# Patient Record
Sex: MALE | Race: BLACK OR AFRICAN AMERICAN | NOT HISPANIC OR LATINO | Employment: OTHER | URBAN - METROPOLITAN AREA
[De-identification: names, ages, dates, MRNs, and addresses within clinical notes are randomized per-mention and may not be internally consistent; named-entity substitution may affect disease eponyms.]

---

## 2017-01-04 ENCOUNTER — HOSPITAL ENCOUNTER (OUTPATIENT)
Dept: RADIOLOGY | Facility: OTHER | Age: 72
Discharge: HOME | End: 2017-01-04
Attending: INTERNAL MEDICINE
Payer: MEDICARE

## 2017-01-04 ENCOUNTER — OFFICE VISIT (OUTPATIENT)
Dept: INTERNAL MEDICINE | Facility: CLINIC | Age: 72
End: 2017-01-04
Payer: MEDICARE

## 2017-01-04 ENCOUNTER — TELEPHONE (OUTPATIENT)
Dept: INTERNAL MEDICINE | Facility: CLINIC | Age: 72
End: 2017-01-04

## 2017-01-04 VITALS
OXYGEN SATURATION: 96 % | DIASTOLIC BLOOD PRESSURE: 82 MMHG | WEIGHT: 206 LBS | TEMPERATURE: 98 F | BODY MASS INDEX: 28.84 KG/M2 | HEIGHT: 71 IN | HEART RATE: 79 BPM | SYSTOLIC BLOOD PRESSURE: 132 MMHG

## 2017-01-04 DIAGNOSIS — R05.9 COUGH: ICD-10-CM

## 2017-01-04 DIAGNOSIS — J41.1 BRONCHITIS, MUCOPURULENT RECURRENT: Primary | ICD-10-CM

## 2017-01-04 PROCEDURE — 71020 XR CHEST PA AND LATERAL: CPT | Mod: TC

## 2017-01-04 PROCEDURE — 1160F RVW MEDS BY RX/DR IN RCRD: CPT | Mod: S$GLB,,, | Performed by: INTERNAL MEDICINE

## 2017-01-04 PROCEDURE — 3075F SYST BP GE 130 - 139MM HG: CPT | Mod: S$GLB,,, | Performed by: INTERNAL MEDICINE

## 2017-01-04 PROCEDURE — 1159F MED LIST DOCD IN RCRD: CPT | Mod: S$GLB,,, | Performed by: INTERNAL MEDICINE

## 2017-01-04 PROCEDURE — 1157F ADVNC CARE PLAN IN RCRD: CPT | Mod: S$GLB,,, | Performed by: INTERNAL MEDICINE

## 2017-01-04 PROCEDURE — 71020 XR CHEST PA AND LATERAL: CPT | Mod: 26,,, | Performed by: RADIOLOGY

## 2017-01-04 PROCEDURE — 90662 IIV NO PRSV INCREASED AG IM: CPT | Mod: S$GLB,,, | Performed by: INTERNAL MEDICINE

## 2017-01-04 PROCEDURE — 99499 UNLISTED E&M SERVICE: CPT | Mod: S$GLB,,, | Performed by: INTERNAL MEDICINE

## 2017-01-04 PROCEDURE — 1126F AMNT PAIN NOTED NONE PRSNT: CPT | Mod: S$GLB,,, | Performed by: INTERNAL MEDICINE

## 2017-01-04 PROCEDURE — G0008 ADMIN INFLUENZA VIRUS VAC: HCPCS | Mod: 59,S$GLB,, | Performed by: INTERNAL MEDICINE

## 2017-01-04 PROCEDURE — 99999 PR PBB SHADOW E&M-EST. PATIENT-LVL III: CPT | Mod: PBBFAC,,, | Performed by: INTERNAL MEDICINE

## 2017-01-04 PROCEDURE — 99213 OFFICE O/P EST LOW 20 MIN: CPT | Mod: 25,S$GLB,, | Performed by: INTERNAL MEDICINE

## 2017-01-04 PROCEDURE — 3079F DIAST BP 80-89 MM HG: CPT | Mod: S$GLB,,, | Performed by: INTERNAL MEDICINE

## 2017-01-04 RX ORDER — BENZONATATE 200 MG/1
200 CAPSULE ORAL 3 TIMES DAILY PRN
Qty: 30 CAPSULE | Refills: 0 | Status: SHIPPED | OUTPATIENT
Start: 2017-01-04 | End: 2017-02-03

## 2017-01-04 NOTE — MR AVS SNAPSHOT
[facility] Internal Medicine  [address]  [place] [address]  Phone: [phone]  Fax: [phone]                  Tito Menard   2017 11:00 AM   Office Visit    Description:  Male : 1945   Provider:  Hayden Franks MD   Department:  [facility] Internal Medicine           Reason for Visit     Cough     Sputum Production     Headache     Immunizations           Diagnoses this Visit        Comments    Bronchitis, mucopurulent recurrent    -  Primary     Cough                To Do List           Future Appointments        Provider Department Dept Phone    2017 11:30 AM [facility] XROP  [facility] [phone]    2017 8:40 AM Omar Casey MD [facility] Neurology [phone]      Goals (5 Years of Data)     None      Follow-Up and Disposition     Return if symptoms worsen or fail to improve.       These Medications        Disp Refills Start End    benzonatate (TESSALON) 200 MG capsule 30 capsule 0 2017 2/3/2017    Take 1 capsule (200 mg total) by mouth 3 (three) times daily as needed for Cough. - Oral    Pharmacy: [facility] [address] [place] [address] KELIN BEN Ph #: [phone]         [facility]     [facility] -  Assistance  Registered nurses in the [facility] provide clinical advisement, health education, appointment booking, and other advisory services.  Call for this free service at [phone].             Medications           Message regarding Medications     Verify the changes and/or additions to your medication regime listed below are the same as discussed with your clinician today.  If any of these changes or additions are incorrect, please notify your healthcare provider.        START taking these NEW medications        Refills    benzonatate (TESSALON) 200 MG capsule 0    Sig: Take 1 capsule (200 mg total) by mouth 3 (three) times daily as needed for Cough.    Class:  Normal    Route: Oral           Verify that the below list of medications is an accurate representation of the medications you are currently taking.  If none reported, the list may be blank. If incorrect, please contact your healthcare provider. Carry this list with you in case of emergency.           Current Medications     acetaminophen (TYLENOL) 500 mg Cap Take 1,000 mg by mouth nightly as needed (for back pain).     albuterol (PROVENTIL) 2.5 mg /3 mL (0.083 %) nebulizer solution Take 3 mLs (2.5 mg total) by nebulization every 6 (six) hours as needed for Wheezing.    albuterol 90 mcg/actuation inhaler Inhale 1-2 puffs into the lungs every 6 (six) hours as needed for Wheezing.    amlodipine (NORVASC) 5 MG tablet TAKE 1 TABLET EVERY DAY    aspirin (ECOTRIN) 81 MG EC tablet Take 81 mg by mouth once daily.    cholecalciferol, vitamin D3, (VITAMIN D3) 2,000 unit Cap Take 1 capsule by mouth once daily.    finasteride (PROSCAR) 5 mg tablet TAKE 1 TABLET ONE TIME DAILY    fish oil-omega-3 fatty acids 300-1,000 mg capsule Take 1 g by mouth once daily.    fluticasone (FLONASE) 50 mcg/actuation nasal spray USE 1 SPRAY NASALLY ONE TIME DAILY    gabapentin (NEURONTIN) 300 MG capsule TAKE 1 CAPSULE THREE TIMES DAILY    lisinopril-hydrochlorothiazide (PRINZIDE,ZESTORETIC) 20-12.5 mg per tablet TAKE 2 TABLETS ONE TIME DAILY    methocarbamol (ROBAXIN) 500 MG Tab 3 (three) times daily as needed.     omeprazole (PRILOSEC) 20 MG capsule TAKE 2 CAPSULES ONE TIME DAILY    potassium chloride (MICRO-K) 10 MEQ CpSR TAKE 1 CAPSULE ONE TIME DAILY    pravastatin (PRAVACHOL) 20 MG tablet TAKE 1 TABLET ONE TIME DAILY    tamsulosin (FLOMAX) 0.4 mg Cp24 Take 1 capsule (0.4 mg total) by mouth once daily.    amoxicillin-clavulanate 875-125mg (AUGMENTIN) 875-125 mg per tablet Take 1 tablet by mouth every 12 (twelve) hours.    azelastine (ASTELIN) 137 mcg nasal spray 1 spray (137 mcg total) by Nasal route 2 (two) times daily.    benzonatate  "(TESSALON) 200 MG capsule Take 1 capsule (200 mg total) by mouth 3 (three) times daily as needed for Cough.    docusate sodium (COLACE) 100 MG capsule Take 100 mg by mouth once daily.    econazole nitrate 1 % cream Apply topically 2 (two) times daily.    olopatadine (PATADAY) 0.2 % Drop Place 1 drop into both eyes once daily.    vardenafil (LEVITRA) 20 MG tablet Take 20 mg by mouth daily as needed.             Clinical Reference Information           Vital Signs - Last Recorded  Most recent update: 1/4/2017 11:08 AM by Addie Marks MA    BP Pulse Temp Ht Wt SpO2    132/82 79 98.2 °F (36.8 °C) (Oral) 5' 11" (1.803 m) 93.4 kg (206 lb) 96%    BMI                28.73 kg/m2          Blood Pressure          Most Recent Value    BP  132/82      Allergies as of 1/4/2017     No Known Allergies      Immunizations Administered on Date of Encounter - 1/4/2017     Name Date Dose VIS Date Route    Influenza - High Dose 1/4/2017 0.5 mL 8/7/2015 Intramuscular      Orders Placed During Today's Visit      Normal Orders This Visit    Ambulatory consult to Pulmonology     Influenza - High Dose (65+) (PF) (IM)     Future Labs/Procedures Expected by Expires    X-Ray Chest PA And Lateral  1/4/2017 1/4/2018      "

## 2017-01-04 NOTE — TELEPHONE ENCOUNTER
Spoke with pt advised per Dr. Franks cxr did not show anything concerning, Pt verbalized understanding

## 2017-01-04 NOTE — PROGRESS NOTES
Subjective:       Patient ID: Tito Menard is a 71 y.o. male.    Chief Complaint: Cough; Sputum Production; Headache (coughing); and Immunizations    HPI Comments: Pt c/o wheezing and some SOB with cough that is prod of thin clear sputum. No fever. Symptoms have been going on for 2 months. Minimal nasal with minimal rhinorrhea. No sore throat. Using flonase, afrin and alb neb with only some relief. He took augmentin at onset and never improved. No orthopnea/pnd. Review of record shows recurrent bronchitis and PFTs that were non-obstructive. He has never seen pulmonary and is not on any maint inhalers. No edema.     Cough   Associated symptoms include headaches. Pertinent negatives include no chest pain, fever, rhinorrhea, sore throat or shortness of breath.   Headache    Associated symptoms include coughing. Pertinent negatives include no fever, rhinorrhea or sore throat.     Review of Systems   Constitutional: Negative for fever.   HENT: Positive for congestion. Negative for rhinorrhea and sore throat.    Respiratory: Positive for cough. Negative for shortness of breath.    Cardiovascular: Negative for chest pain.   Neurological: Positive for headaches.       Objective:      Physical Exam   Constitutional: He is oriented to person, place, and time. He appears well-developed and well-nourished.   HENT:   Right Ear: Tympanic membrane, external ear and ear canal normal.   Left Ear: Tympanic membrane, external ear and ear canal normal.   Nose: No mucosal edema or rhinorrhea.   Mouth/Throat: No oropharyngeal exudate or posterior oropharyngeal erythema.   Neck: Neck supple. No thyromegaly present.   Cardiovascular: Normal rate, regular rhythm and normal heart sounds.    Pulmonary/Chest: Effort normal and breath sounds normal. He has no wheezes. He has no rales.   Lymphadenopathy:     He has no cervical adenopathy.   Neurological: He is alert and oriented to person, place, and time.   Psychiatric: He has a normal mood  and affect.       Assessment:       1. Bronchitis, mucopurulent recurrent    2. Cough        Plan:       1. No evidence for infection at this time or exacerbation of obstructive lung process--continue with flonase, alb neb   2. CXR  3. Pulmonary referral  4. May consider maintenance inhaled steroid

## 2017-02-27 DIAGNOSIS — E78.5 HLD (HYPERLIPIDEMIA): ICD-10-CM

## 2017-03-01 RX ORDER — PRAVASTATIN SODIUM 20 MG/1
TABLET ORAL
Qty: 90 TABLET | Refills: 3 | Status: SHIPPED | OUTPATIENT
Start: 2017-03-01 | End: 2017-12-21 | Stop reason: SDUPTHER

## 2017-04-03 RX ORDER — POTASSIUM CHLORIDE 750 MG/1
CAPSULE, EXTENDED RELEASE ORAL
Qty: 90 CAPSULE | Refills: 3 | Status: SHIPPED | OUTPATIENT
Start: 2017-04-03 | End: 2017-06-02 | Stop reason: SDUPTHER

## 2017-05-17 ENCOUNTER — PATIENT OUTREACH (OUTPATIENT)
Dept: ADMINISTRATIVE | Facility: HOSPITAL | Age: 72
End: 2017-05-17

## 2017-05-23 ENCOUNTER — OFFICE VISIT (OUTPATIENT)
Dept: NEUROLOGY | Facility: CLINIC | Age: 72
End: 2017-05-23
Payer: MEDICARE

## 2017-05-23 ENCOUNTER — OFFICE VISIT (OUTPATIENT)
Dept: INTERNAL MEDICINE | Facility: CLINIC | Age: 72
End: 2017-05-23
Payer: MEDICARE

## 2017-05-23 VITALS
SYSTOLIC BLOOD PRESSURE: 149 MMHG | BODY MASS INDEX: 29.2 KG/M2 | DIASTOLIC BLOOD PRESSURE: 89 MMHG | HEIGHT: 71 IN | HEART RATE: 81 BPM | WEIGHT: 208.56 LBS

## 2017-05-23 VITALS
HEIGHT: 70 IN | DIASTOLIC BLOOD PRESSURE: 80 MMHG | HEART RATE: 73 BPM | BODY MASS INDEX: 29.95 KG/M2 | RESPIRATION RATE: 18 BRPM | OXYGEN SATURATION: 96 % | SYSTOLIC BLOOD PRESSURE: 136 MMHG | WEIGHT: 209.19 LBS

## 2017-05-23 DIAGNOSIS — E78.5 HYPERLIPIDEMIA, UNSPECIFIED HYPERLIPIDEMIA TYPE: ICD-10-CM

## 2017-05-23 DIAGNOSIS — R26.9 GAIT DISORDER: Primary | ICD-10-CM

## 2017-05-23 DIAGNOSIS — M48.00 SPINAL STENOSIS, UNSPECIFIED SPINAL REGION: ICD-10-CM

## 2017-05-23 DIAGNOSIS — E11.42 DIABETIC POLYNEUROPATHY ASSOCIATED WITH TYPE 2 DIABETES MELLITUS: ICD-10-CM

## 2017-05-23 DIAGNOSIS — J44.89 OBSTRUCTIVE CHRONIC BRONCHITIS WITHOUT EXACERBATION: ICD-10-CM

## 2017-05-23 DIAGNOSIS — K21.9 GASTROESOPHAGEAL REFLUX DISEASE, ESOPHAGITIS PRESENCE NOT SPECIFIED: ICD-10-CM

## 2017-05-23 DIAGNOSIS — Z00.00 ENCOUNTER FOR PREVENTIVE HEALTH EXAMINATION: Primary | ICD-10-CM

## 2017-05-23 DIAGNOSIS — I72.4 ANEURYSM OF ARTERY OF LOWER EXTREMITY: ICD-10-CM

## 2017-05-23 DIAGNOSIS — I71.40 ABDOMINAL AORTIC ANEURYSM (AAA) WITHOUT RUPTURE: ICD-10-CM

## 2017-05-23 DIAGNOSIS — E55.9 VITAMIN D DEFICIENCY DISEASE: ICD-10-CM

## 2017-05-23 DIAGNOSIS — M85.80 OSTEOPENIA, UNSPECIFIED LOCATION: ICD-10-CM

## 2017-05-23 DIAGNOSIS — Z12.12 ENCOUNTER FOR COLORECTAL CANCER SCREENING: ICD-10-CM

## 2017-05-23 DIAGNOSIS — I10 ESSENTIAL HYPERTENSION: ICD-10-CM

## 2017-05-23 DIAGNOSIS — Z12.11 ENCOUNTER FOR COLORECTAL CANCER SCREENING: ICD-10-CM

## 2017-05-23 PROCEDURE — 3075F SYST BP GE 130 - 139MM HG: CPT | Mod: S$GLB,,, | Performed by: NURSE PRACTITIONER

## 2017-05-23 PROCEDURE — 1157F ADVNC CARE PLAN IN RCRD: CPT | Mod: 8P,S$GLB,, | Performed by: PSYCHIATRY & NEUROLOGY

## 2017-05-23 PROCEDURE — 3044F HG A1C LEVEL LT 7.0%: CPT | Mod: S$GLB,,, | Performed by: PSYCHIATRY & NEUROLOGY

## 2017-05-23 PROCEDURE — 3079F DIAST BP 80-89 MM HG: CPT | Mod: S$GLB,,, | Performed by: NURSE PRACTITIONER

## 2017-05-23 PROCEDURE — 1125F AMNT PAIN NOTED PAIN PRSNT: CPT | Mod: S$GLB,,, | Performed by: PSYCHIATRY & NEUROLOGY

## 2017-05-23 PROCEDURE — 99999 PR PBB SHADOW E&M-EST. PATIENT-LVL V: CPT | Mod: PBBFAC,,, | Performed by: NURSE PRACTITIONER

## 2017-05-23 PROCEDURE — 99999 PR PBB SHADOW E&M-EST. PATIENT-LVL III: CPT | Mod: PBBFAC,,, | Performed by: PSYCHIATRY & NEUROLOGY

## 2017-05-23 PROCEDURE — 4010F ACE/ARB THERAPY RXD/TAKEN: CPT | Mod: S$GLB,,, | Performed by: PSYCHIATRY & NEUROLOGY

## 2017-05-23 PROCEDURE — 1159F MED LIST DOCD IN RCRD: CPT | Mod: S$GLB,,, | Performed by: PSYCHIATRY & NEUROLOGY

## 2017-05-23 PROCEDURE — 99214 OFFICE O/P EST MOD 30 MIN: CPT | Mod: S$GLB,,, | Performed by: PSYCHIATRY & NEUROLOGY

## 2017-05-23 PROCEDURE — 1160F RVW MEDS BY RX/DR IN RCRD: CPT | Mod: S$GLB,,, | Performed by: PSYCHIATRY & NEUROLOGY

## 2017-05-23 PROCEDURE — 99499 UNLISTED E&M SERVICE: CPT | Mod: S$GLB,,, | Performed by: NURSE PRACTITIONER

## 2017-05-23 PROCEDURE — 3079F DIAST BP 80-89 MM HG: CPT | Mod: S$GLB,,, | Performed by: PSYCHIATRY & NEUROLOGY

## 2017-05-23 PROCEDURE — 99499 UNLISTED E&M SERVICE: CPT | Mod: S$GLB,,, | Performed by: PSYCHIATRY & NEUROLOGY

## 2017-05-23 PROCEDURE — G0439 PPPS, SUBSEQ VISIT: HCPCS | Mod: S$GLB,,, | Performed by: NURSE PRACTITIONER

## 2017-05-23 PROCEDURE — 3077F SYST BP >= 140 MM HG: CPT | Mod: S$GLB,,, | Performed by: PSYCHIATRY & NEUROLOGY

## 2017-05-23 RX ORDER — GUAIFENESIN 600 MG/1
1200 TABLET, EXTENDED RELEASE ORAL 2 TIMES DAILY PRN
COMMUNITY

## 2017-05-23 NOTE — PATIENT INSTRUCTIONS
Fall Prevention  Falls often occur due to slipping, tripping or losing your balance. Millions of people fall every year and injure themselves. Here are ways to reduce your risk of falling again.  · Think about your fall, was there anything that caused your fall that can be fixed, removed, or replaced?  · Make your home safe by keeping walkways clear of objects you may trip over.  · Use non-slip pads under rugs. Do not use area rugs or small throw rugs.  · Use non-slip mats in bathtubs and showers.  · Install handrails and lights on staircases.  · Do not walk in poorly lit areas.  · Do not stand on chairs or wobbly ladders.  · Use caution when reaching overhead or looking upward. This position can cause a loss of balance.  · Be sure your shoes fit properly, have non-slip bottoms and are in good condition.   · Wear shoes both inside and out. Avoid going barefoot or wearing slippers.  · Be cautious when going up and down stairs, curbs, and when walking on uneven sidewalks.  · If your balance is poor, consider using a cane or walker.  · If your fall was related to alcohol use, stop or limit alcohol intake.   · If your fall was related to use of sleeping medicines, talk to your doctor about this. You may need to reduce your dosage at bedtime if you awaken during the night to go to the bathroom.    · To reduce the need for nighttime bathroom trips:  ¨ Avoid drinking fluids for several hours before going to bed  ¨ Empty your bladder before going to bed  ¨ Men can keep a urinal at the bedside  · Stay as active as you can. Balance, flexibility, strength, and endurance all come from exercise. They all play a role in preventing falls. Ask your healthcare provider which types of activity are right for you.  · Get your vision checked on a regular basis.  · If you have pets, know where they are before you stand up or walk so you don't trip over them.  · Use night lights.  Date Last Reviewed: 11/5/2015  © 7075-5383 [employer]. [address] [place] [address], [place] [address]. All rights reserved. This information is not intended as a substitute for professional medical care. Always follow your healthcare professional's instructions.          Counseling and Referral of Other Preventative  (Italic type indicates deductible and co-insurance are waived)    Patient Name: Tito Menard  Today's Date: 5/23/2017      SERVICE LIMITATIONS RECOMMENDATION    Vaccines    · Pneumococcal (once after 65)    · Influenza (annually)    · Hepatitis B (if medium/high risk)    · Prevnar 13      Hepatitis B medium/high risk factors:       - End-stage renal disease       - Hemophiliacs who received Factor VII or         IX concentrates       - Clients of institutions for the mentally             retarded       - Persons who live in the same house as          a HepB carrier       - Homosexual men       - Illicit injectable drug abusers     Pneumococcal: Done, no repeat necessary     Influenza: N/A     Hepatitis B: N/A     Prevnar 13: Done, no repeat necessary    Prostate cancer screening (annually to age 75)     Prostate specific antigen (PSA) Shared decision making with Provider. Sometimes a co-pay may be required if the patient decides to have this test. The USPSTF no longer recommends prostate cancer screening routinely in medicine: not to follow    Colorectal cancer screening (to age 75)    · Fecal occult blood test (annual)  · Flexible sigmoidoscopy (5y)  · Screening colonoscopy (10y)  · Barium enema   Last done 8/2007, recommend to repeat every 10  years    Diabetes self-management training (no USPSTF recommendations)  Requires referral by treating physician for patient with diabetes or renal disease. 10 hours of initial DSMT sessions of no less than 30 minutes each in a continuous 12-month period. 2 hours of follow-up DSMT in subsequent years.  N/A    Glaucoma screening (no USPSTF recommendation)  Diabetes mellitus, family history     American, age 50 or over    American, age 65 or over  Last done 11/2016, recommend to repeat every 1  years    Medical nutrition therapy for diabetes or renal disease (no recommended schedule)  Requires referral by treating physician for patient with diabetes or renal disease or kidney transplant within the past 3 years.  Can be provided in same year as diabetes self-management training (DSMT), and CMS recommends medical nutrition therapy take place after DSMT. Up to 3 hours for initial year and 2 hours in subsequent years.  N/A    Cardiovascular screening blood tests (every 5 years)  · Fasting lipid panel  Order as a panel if possible  Last done 11/2016, recommend to repeat every 1  years    Diabetes screening tests (at least every 3 years, Medicare covers annually or at 6-month intervals for prediabetic patients)  · Fasting blood sugar (FBS) or glucose tolerance test (GTT)  Patient must be diagnosed with one of the following:       - Hypertension       - Dyslipidemia       - Obesity (BMI 30kg/m2)       - Previous elevated impaired FBS or GTT       ... or any two of the following:       - Overweight (BMI 25 but <30)       - Family history of diabetes       - Age 65 or older       - History of gestational diabetes or birth of baby weighing more than 9 pounds  N/A    Abdominal aortic aneurysm screening (once)  · Sonogram   Limited to patients who meet one of the following criteria:       - Men who are 65-75 years old and have smoked more than 100 cigarette in their lifetime       - Anyone with a family history of abdominal aortic aneurysm       - Anyone recommended for screening by the USPSTF  N/A    HIV screening (annually for increased risk patients)  · HIV-1 and HIV-2 by EIA, or DRAKE, rapid antibody test or oral mucosa transudate  Patients must be at increased risk for HIV infection per USPSTF guidelines or pregnant. Tests covered annually for patient at increased risk or as requested by the patient.  Pregnant patients may receive up to 3 tests during pregnancy.  Risks discussed, screening is not recommended    Smoking cessation counseling (up to 8 sessions per year)  Patients must be asymptomatic of tobacco-related conditions to receive as a preventative service.  Non-smoker    Subsequent annual wellness visit  At least 12 months since last AWV  Return in one year     The following information is provided to all patients.  This information is to help you find resources for any of the problems found today that may be affecting your health:                Living healthy guide: www.[facility].[place].[facility]      Understanding Diabetes: www.diabetes.org      Eating healthy: www.cdc.gov/healthyweight      [facility] home safety checklist: www.cdc.gov/steadi/patient.html      Agency on Aging: www.goea.[place].[facility]      Alcoholics anonymous (AA): www.aa.org      Physical Activity: www.geraldine.nih.gov/ox6eoii      Tobacco use: www.quitwithusla.org

## 2017-05-23 NOTE — PROGRESS NOTES
"Tito Menard presented for a  Medicare AWV and comprehensive Health Risk Assessment today. The following components were reviewed and updated:    · Medical history  · Family History  · Social history  · Allergies and Current Medications  · Health Risk Assessment  · Health Maintenance  · Care Team     ** See Completed Assessments for Annual Wellness Visit within the encounter summary.**       The following assessments were completed:  · Living Situation  · CAGE  · Depression Screening  · Timed Get Up and Go  · Whisper Test  · Cognitive Function Screening  · Nutrition Screening  · ADL Screening  · PAQ Screening            Vitals:    05/23/17 0941   BP: 136/80   BP Location: Left arm   Patient Position: Sitting   BP Method: Manual   Pulse: 73   Resp: 18   SpO2: 96%   Weight: 94.9 kg (209 lb 3.5 oz)   Height: 5' 10" (1.778 m)     Body mass index is 30.02 kg/m².  Physical Exam   Constitutional: He is oriented to person, place, and time. He appears well-developed and well-nourished. No distress.   HENT:   Head: Normocephalic.   Right Ear: External ear normal.   Left Ear: External ear normal.   Nose: Nose normal.   Eyes: Conjunctivae are normal. No scleral icterus.   Neck: Normal range of motion. Neck supple.   Cardiovascular: Normal rate, regular rhythm, normal heart sounds and intact distal pulses.  Exam reveals no gallop and no friction rub.    No murmur heard.  Pulmonary/Chest: Effort normal and breath sounds normal. No respiratory distress. He has no wheezes. He has no rales. He exhibits no tenderness.   Abdominal: Soft. Bowel sounds are normal.   Musculoskeletal: Normal range of motion. He exhibits no edema.   Neurological: He is alert and oriented to person, place, and time.   Skin: Skin is warm and dry. He is not diaphoretic. No erythema.   Psychiatric: He has a normal mood and affect. His behavior is normal. Judgment and thought content normal.   Vitals reviewed.        Diagnoses and health risks identified " today and associated recommendations/orders:    1. Diabetic polyneuropathy associated with type 2 diabetes mellitus  Chronic.  DM stable and controlled.  Last Hemoglobin A1C was 5.8% from 11/2016. Treated with diet and exercise. Neuropathy is stable and controlled.  Treated with gabapentin.  Encouraged to increase exercise as tolerated to at least 2 hours and 30 minutes of moderate-intensity aerobic activity per week and  2 days per week of muscle-strengthening activities and to continue diabetic diet.  Encouraged to continue treatment plan.  Monitored by PCP.      2. Abdominal aortic aneurysm (AAA) without rupture  Noted on CT Chest from 11/2016.  Chronic.  Stable.  Monitored by Vascular Surgery .  Recommended CT repeat around 12/2017.      3. Obstructive chronic bronchitis without exacerbation  Chronic.  Stable and controlled.  Treated with albuterol inhaler as needed, albuterol nebulizer as needed.  Encouraged to continue treatment plan.  Monitored by PCP.      4. Encounter for preventive health examination  Annual Health Risk Assessment (HRA) visit today.  Counseling and referral of health maintenance and preventative health measures performed.  Patient given annual wellness paperwork to take home.  Encouraged to return in 1 year for subsequent HRA visit.   Zoster: Financial concerns.  Encouraged to contact insurance regarding immunization co-pay at retail pharmacies.      5. Encounter for colorectal cancer screening  - Case request GI: COLONOSCOPY    6. Essential hypertension  Chronic.  Controlled.  Treated with amlodipine and lisinopril-HCTZ.  Encouraged to increase exercise as tolerated to at least 2 hours and 30 minutes of moderate-intensity aerobic activity per week and  2 days per week of muscle-strengthening activities and to improve diet to heart healthy, low sodium diet.  Education provided.  Encouraged to continue treatment plan.  Monitored by PCP.     7. Hyperlipidemia, unspecified hyperlipidemia  type  Chronic.  Stable and controlled.  Treated with pravastatin.  Encouraged patient to continue treatment plan. Monitored by PCP.      8. Gastroesophageal reflux disease, esophagitis presence not specified  Chronic.  Stable and controlled.  Treated with omeprazole.  Encouraged to continue treatment plan. Monitored by PCP.      9. Vitamin D deficiency disease  Chronic.  Stable and controlled.  Treated with vitamin D supplementation.  Last Vitamin D level = 48 from 11/2016.  Encouraged to continue treatment plan.  Monitored by PCP.      10. Osteopenia, unspecified location  Noted on DXA bone density scan from 10/2012.  Chronic.  Stable.  Treated withvitamin D supplementation.  Encouraged to continue treatment plan. Monitored by PCP.     Provided Tito with a 5-10 year written screening schedule and personal prevention plan. Recommendations were developed using the USPSTF age appropriate recommendations. Education, counseling, and referrals were provided as needed. After Visit Summary printed and given to patient which includes a list of additional screenings\tests needed.    Return in about 1 year (around 5/23/2018) for your next Health Risk Assessment visit.    Wendy Greer NP

## 2017-05-23 NOTE — PROGRESS NOTES
Subjective:       Patient ID: Tito Menard is a 71 y.o. male.    Chief Complaint:  Peripheral Neuropathy and Back Pain      History of Present Illness  HPI  This is a 71-year-old -American male who had been seen by me with complaints of a gait disturbance that he has had since 2014 at which time he had intermittent problems usually noted when walking.  Symptoms are more pronounced when the light was low or if he has his eyes closed.  Symptoms have been more pronounced over the past one year.  The patient has had a history of diabetes for about 20 years and reports that blood sugar control was not very good until about 3 years ago when he got an a strict diet and lost weight and his blood sugars have been doing well and he was able to get off his medications.  His most recent hemoglobin A1c done in November 2016 was 5.9.  His spouse was present today.    He also has history of chronic neck and back problems and was seen at the spine clinic in the past.  His primary problem is that of neck pain and the MRI scan of the cervical spine done showed evidence of degenerative changes.  He has documented myelopathy secondary to the cervical spondylosis and was advised getting a surgical opinion however he declined getting any surgery.  He reports that he has had 2 back surgeries and continues to have back problems.  He has had numbness and tingling in feet for many years but denies any weakness however he does have difficulty climbing stairs and has to pull himself up.  He uses a cane for stability.  He has had no recent falls.  He denies any bladder difficulties.  He has not history of headaches, visual difficulties, blackouts or seizures and no prior history of strokes.  An MRI scan of the brain done after his last visit was unremarkable.  He did not want to go back to the back and spine clinic but is trying to get an exercise stationary bike for home.       Review of Systems  Review of Systems   Constitutional:  Negative.    HENT: Negative.  Negative for hearing loss.    Eyes: Negative.  Negative for visual disturbance.   Respiratory: Negative.  Negative for shortness of breath.    Cardiovascular: Negative.  Negative for chest pain and palpitations.   Gastrointestinal: Negative.    Endocrine: Negative.    Genitourinary: Negative.    Musculoskeletal: Positive for arthralgias, back pain, gait problem, neck pain and neck stiffness.   Skin: Negative.    Allergic/Immunologic: Negative.    Neurological: Positive for numbness. Negative for dizziness, tremors, seizures, syncope, speech difficulty, weakness and headaches.   Hematological: Negative.    Psychiatric/Behavioral: Negative.  Negative for decreased concentration and sleep disturbance.       Objective:      Neurologic Exam     Mental Status   Oriented to person, place, and time.   Registration: recalls 3 of 3 objects. Follows 3 step commands.   Attention: normal. Concentration: normal.   Speech: speech is normal   Level of consciousness: alert  Knowledge: good.   Able to name object. Able to read. Able to repeat. Able to write. Normal comprehension.     Cranial Nerves   Cranial nerves II through XII intact.     Motor Exam   Muscle bulk: normal  Overall muscle tone: normal    Strength   Strength 5/5 except as noted. Some difficulty with heel and toe walking.  Minimal weakness of the left  as compared to the right     Sensory Exam   Right arm light touch: normal  Left arm light touch: normal  Right leg light touch: decreased from toes  Left leg light touch: decreased from toes  Right arm vibration: normal  Left arm vibration: normal  Right leg vibration: decreased from ankle (Vibration sense less than 5 seconds at the toes)  Left leg vibration: decreased from ankle  Right arm proprioception: normal  Left arm proprioception: normal  Right leg proprioception: decreased from toes  Left leg proprioception: decreased from toes  Right arm pinprick: normal  Left arm pinprick:  normal  Right leg pinprick: decreased from ankle  Left leg pinprick: decreased from ankle    Gait, Coordination, and Reflexes     Gait  Gait: normal    Coordination   Romberg: negative  Finger to nose coordination: normal    Tremor   Resting tremor: absent  Intention tremor: absent  Action tremor: absent    Reflexes   Right brachioradialis: 2+  Left brachioradialis: 2+  Right biceps: 2+  Left biceps: 2+  Right triceps: 2+  Left triceps: 2+  Right patellar: 2+  Left patellar: 2+  Right achilles: 1+  Left achilles: 1+  Right plantar: normal  Left plantar: normal      Physical Exam   Constitutional: He is oriented to person, place, and time. He appears well-developed and well-nourished.   HENT:   Head: Normocephalic and atraumatic.   Neck: Neck supple. Muscular tenderness (Bilateral paraspinal muscle tenderness with some limitation of movement rotational and extension secondary to pain) present. Carotid bruit is not present.   Neurological: He is oriented to person, place, and time. He has a normal Finger-Nose-Finger Test and a normal Romberg Test. Gait normal.   Reflex Scores:       Tricep reflexes are 2+ on the right side and 2+ on the left side.       Bicep reflexes are 2+ on the right side and 2+ on the left side.       Brachioradialis reflexes are 2+ on the right side and 2+ on the left side.       Patellar reflexes are 2+ on the right side and 2+ on the left side.       Achilles reflexes are 1+ on the right side and 1+ on the left side.  Psychiatric: His speech is normal.   Vitals reviewed.        Assessment:        1. Gait disorder    2. Spinal stenosis, unspecified spinal region    3. Diabetic polyneuropathy associated with type 2 diabetes mellitus            Plan:         Discussed with patient regarding his gait difficulty.  Clinically there is evidence of cervical myelopathy with hyperreflexia that has been documented at spine clinic in the past, a peripheral neuropathy predominantly sensory most likely  related to the history of diabetes and chronic back problems with status post surgeries.  All these would result in his gait disturbance.  The patient does not want to go back to the back and spine clinic or see neurosurgery.  He is amenable to initiating physical therapy which will be ordered.  In addition he is advised use of a back brace.  Discussed fall precautions.  Follow-up in 6 months if stable.

## 2017-05-23 NOTE — PATIENT INSTRUCTIONS
Discussed with patient regarding his gait difficulty.  Clinically there is evidence of cervical myelopathy with hyperreflexia that has been documented at spine clinic in the past, a peripheral neuropathy predominantly sensory most likely related to the history of diabetes and chronic back problems with status post surgeries.  All these would result in his gait disturbance.  The patient does not want to go back to the back and spine clinic or see neurosurgery.  He is amenable to initiating physical therapy which will be ordered.  In addition he is advised use of a back brace.  Discussed fall precautions.

## 2017-06-01 ENCOUNTER — OFFICE VISIT (OUTPATIENT)
Dept: INTERNAL MEDICINE | Facility: CLINIC | Age: 72
End: 2017-06-01
Attending: INTERNAL MEDICINE
Payer: MEDICARE

## 2017-06-01 ENCOUNTER — HOSPITAL ENCOUNTER (OUTPATIENT)
Dept: RADIOLOGY | Facility: OTHER | Age: 72
Discharge: HOME | End: 2017-06-01
Attending: INTERNAL MEDICINE
Payer: MEDICARE

## 2017-06-01 VITALS
DIASTOLIC BLOOD PRESSURE: 78 MMHG | WEIGHT: 212.94 LBS | HEART RATE: 76 BPM | SYSTOLIC BLOOD PRESSURE: 120 MMHG | HEIGHT: 70 IN | OXYGEN SATURATION: 97 % | BODY MASS INDEX: 30.49 KG/M2

## 2017-06-01 DIAGNOSIS — Z23 NEED FOR SHINGLES VACCINE: ICD-10-CM

## 2017-06-01 DIAGNOSIS — E11.42 DIABETIC POLYNEUROPATHY ASSOCIATED WITH TYPE 2 DIABETES MELLITUS: Primary | ICD-10-CM

## 2017-06-01 DIAGNOSIS — M89.9 DISORDER OF BONE: ICD-10-CM

## 2017-06-01 DIAGNOSIS — E78.2 MIXED HYPERLIPIDEMIA: ICD-10-CM

## 2017-06-01 DIAGNOSIS — M85.80 OSTEOPENIA, UNSPECIFIED LOCATION: ICD-10-CM

## 2017-06-01 DIAGNOSIS — Z12.12 SCREENING FOR COLORECTAL CANCER: ICD-10-CM

## 2017-06-01 DIAGNOSIS — R26.9 GAIT DISORDER: ICD-10-CM

## 2017-06-01 DIAGNOSIS — M48.02 SPINAL STENOSIS OF CERVICAL REGION: ICD-10-CM

## 2017-06-01 DIAGNOSIS — I71.40 ABDOMINAL AORTIC ANEURYSM (AAA) WITHOUT RUPTURE: ICD-10-CM

## 2017-06-01 DIAGNOSIS — Z12.11 SCREENING FOR COLORECTAL CANCER: ICD-10-CM

## 2017-06-01 DIAGNOSIS — E55.9 VITAMIN D DEFICIENCY DISEASE: ICD-10-CM

## 2017-06-01 DIAGNOSIS — R91.8 PULMONARY NODULES: ICD-10-CM

## 2017-06-01 DIAGNOSIS — I10 ESSENTIAL HYPERTENSION: ICD-10-CM

## 2017-06-01 PROCEDURE — 1159F MED LIST DOCD IN RCRD: CPT | Mod: S$GLB,,, | Performed by: INTERNAL MEDICINE

## 2017-06-01 PROCEDURE — 4010F ACE/ARB THERAPY RXD/TAKEN: CPT | Mod: S$GLB,,, | Performed by: INTERNAL MEDICINE

## 2017-06-01 PROCEDURE — 3044F HG A1C LEVEL LT 7.0%: CPT | Mod: S$GLB,,, | Performed by: INTERNAL MEDICINE

## 2017-06-01 PROCEDURE — 99499 UNLISTED E&M SERVICE: CPT | Mod: S$GLB,,, | Performed by: INTERNAL MEDICINE

## 2017-06-01 PROCEDURE — 99214 OFFICE O/P EST MOD 30 MIN: CPT | Mod: S$GLB,,, | Performed by: INTERNAL MEDICINE

## 2017-06-01 PROCEDURE — 99999 PR PBB SHADOW E&M-EST. PATIENT-LVL IV: CPT | Mod: PBBFAC,,, | Performed by: INTERNAL MEDICINE

## 2017-06-01 PROCEDURE — 1125F AMNT PAIN NOTED PAIN PRSNT: CPT | Mod: S$GLB,,, | Performed by: INTERNAL MEDICINE

## 2017-06-01 NOTE — PROGRESS NOTES
Subjective:       Patient ID: Tito Menard is a 71 y.o. male.    Chief Complaint: Hypertension (6 month f/u)     Tito Menard is a 71 y.o.  male who presents for Hypertension (6 month f/u)  .  Patient Active Problem List   Diagnosis    Hypertension    Hyperlipidemia    Osteopenia    GERD (gastroesophageal reflux disease)    Spinal stenosis    Vitamin D deficiency disease    Obstructive chronic bronchitis without exacerbation    Diabetic polyneuropathy associated with type 2 diabetes mellitus    Abdominal aortic aneurysm (AAA) without rupture    Spondylosis of cervical joint with myelopathy    Gait disorder      Tito Menard is a 71 y.o.  male who presents for Hypertension (6 month f/u)  Will be starting PT for his back pain at [facility] next week.          Hypertension   This is a chronic problem. The current episode started more than 1 year ago. The problem is unchanged. The problem is controlled. Pertinent negatives include no chest pain, orthopnea, palpitations, peripheral edema or shortness of breath. Risk factors for coronary artery disease include obesity, male gender, diabetes mellitus and dyslipidemia. Past treatments include diuretics and ACE inhibitors.   Diabetes   He presents for his follow-up diabetic visit. He has type 2 diabetes mellitus. His disease course has been stable. Pertinent negatives for hypoglycemia include no seizures. There are no diabetic associated symptoms. Pertinent negatives for diabetes include no chest pain, no polydipsia, no polyphagia and no polyuria. Risk factors for coronary artery disease include obesity, male sex, hypertension, dyslipidemia, diabetes mellitus and sedentary lifestyle. Current diabetic treatment includes diet. He is compliant with treatment all of the time. He is following a diabetic diet. An ACE inhibitor/angiotensin II receptor blocker is being taken.     Health Maintenance       Date Due Completion Date    Zoster Vaccine 12/05/2004  ---    Hemoglobin A1c 05/01/2017 11/1/2016    Override on 10/23/2015: Done    Colonoscopy 08/01/2017 8/1/2007 (Done)    Override on 8/1/2007: Done    Influenza Vaccine 08/01/2017 1/4/2017    Override on 10/23/2015: Done    Override on 10/1/2014: Done (per pt)    Override on 11/7/2013: Done    Lipid Panel 11/01/2017 11/1/2016    Foot Exam 11/04/2017 11/4/2016    Override on 4/28/2016: Done    Override on 5/15/2015: Done    Eye Exam 11/10/2017 11/10/2016    Override on 8/5/2015: Done    TETANUS VACCINE 07/22/2021 7/22/2011          Review of Systems   Constitutional: Negative for chills and fever.   HENT: Negative for rhinorrhea and sore throat.    Respiratory: Negative for cough and shortness of breath.    Cardiovascular: Negative for chest pain, palpitations and orthopnea.   Gastrointestinal: Negative for nausea and vomiting.   Endocrine: Negative for polydipsia, polyphagia and polyuria.   Genitourinary: Negative for dysuria and hematuria.   Musculoskeletal: Positive for gait problem. Negative for arthralgias.   Skin: Negative for color change and rash.   Neurological: Negative for seizures and syncope.   Psychiatric/Behavioral: Negative for agitation and dysphoric mood.         Past Medical History:   Diagnosis Date    Allergy     Aneurysm of artery of lower extremity     Chalazion of left eye     Diabetes mellitus type II     Enlarged aorta 8/2/2016    Noted on pharmacological stress echo 2/28/2014.      GERD (gastroesophageal reflux disease)     egd 2008    Hyperlipidemia     Hypertension     MGD (meibomian gland dysfunction)     Osteopenia     Spinal stenosis     Spondylosis without myelopathy 10/23/2015    Vitamin D deficiency disease        Past Surgical History:   Procedure Laterality Date    CHALAZION EXCISION Left 8/18/13    CYST REMOVAL  2013    Back of neck    SPINE SURGERY  2007    x2 (2000, 2007)       Family History   Problem Relation Age of Onset    Hyperlipidemia Mother      "Hypertension Mother     Kidney disease Mother     Cancer Mother     No Known Problems Daughter     No Known Problems Sister     No Known Problems Brother     No Known Problems Son     No Known Problems Brother     No Known Problems Son     Hypertension Maternal Grandmother     Amblyopia Neg Hx     Blindness Neg Hx     Cataracts Neg Hx     Diabetes Neg Hx     Glaucoma Neg Hx     Macular degeneration Neg Hx     Retinal detachment Neg Hx     Strabismus Neg Hx     Stroke Neg Hx     Thyroid disease Neg Hx     Melanoma Neg Hx     Psoriasis Neg Hx     Lupus Neg Hx     Eczema Neg Hx        Social History   Substance Use Topics    Smoking status: Never Smoker    Smokeless tobacco: Former User     Types: Chew      Comment: Chewed tobacco once per day for 4-5 years when a     Alcohol use No             Objective:   Blood pressure 120/78, pulse 76, height 5' 10" (1.778 m), weight 96.6 kg (212 lb 15.4 oz), SpO2 97 %.     Physical Exam   Constitutional: He is oriented to person, place, and time. He appears well-developed and well-nourished. No distress.   HENT:   Head: Normocephalic and atraumatic.   Right Ear: External ear normal.   Left Ear: External ear normal.   Eyes: Conjunctivae are normal. No scleral icterus.   Neck: No JVD present. No thyromegaly present.   Cardiovascular: Normal heart sounds.  Exam reveals no gallop and no friction rub.    No murmur heard.  Pulmonary/Chest: Effort normal and breath sounds normal. He has no wheezes. He has no rales.   Abdominal: Soft. Bowel sounds are normal. He exhibits no distension. There is no tenderness.   Musculoskeletal: He exhibits no edema or tenderness.   Lymphadenopathy:     He has no cervical adenopathy.   Neurological: He is alert and oriented to person, place, and time.   Skin: Skin is warm and dry.   Psychiatric: He has a normal mood and affect. Thought content normal.       Prior labs reviewed  Assessment/Plan:        Tito was seen " today for hypertension.    Diagnoses and all orders for this visit:    Diabetic polyneuropathy associated with type 2 diabetes mellitus  Cont diabetic diet and regular exercise  Recommend Annual eye exam and foot exams  Daily 81 mg enteric coated ASA, ACE/ARB     Pulmonary nodules  Repeat scanning planned for follow up of AAA    Screening for colorectal cancer  -     Ambulatory referral to Gastroenterology    Abdominal aortic aneurysm (AAA) without rupture  Sable, repeat scanning planned by vascular med  Spinal stenosis of cervical region    Vitamin D deficiency disease    Essential hypertension  Well controlled  Cont current BP medication(s) and low salt diet    Gait disorder  Starting PT   No new issues    Need for shingles vaccine  -     zoster vaccine live, PF, (ZOSTAVAX, PF,) 19,400 unit/0.65 mL injection; Inject 19,400 Units into the skin once.    Other orders  -     Cancel: Hemoglobin A1c; Future

## 2017-06-02 ENCOUNTER — LAB VISIT (OUTPATIENT)
Dept: LAB | Facility: HOSPITAL | Age: 72
End: 2017-06-02
Attending: INTERNAL MEDICINE
Payer: MEDICARE

## 2017-06-02 ENCOUNTER — OFFICE VISIT (OUTPATIENT)
Dept: UROLOGY | Facility: CLINIC | Age: 72
End: 2017-06-02
Payer: MEDICARE

## 2017-06-02 ENCOUNTER — TELEPHONE (OUTPATIENT)
Dept: INTERNAL MEDICINE | Facility: CLINIC | Age: 72
End: 2017-06-02

## 2017-06-02 VITALS
DIASTOLIC BLOOD PRESSURE: 78 MMHG | BODY MASS INDEX: 29.63 KG/M2 | HEIGHT: 71 IN | WEIGHT: 211.63 LBS | SYSTOLIC BLOOD PRESSURE: 124 MMHG | HEART RATE: 66 BPM

## 2017-06-02 DIAGNOSIS — N40.1 BPH W URINARY OBS/LUTS: ICD-10-CM

## 2017-06-02 DIAGNOSIS — E55.9 VITAMIN D DEFICIENCY DISEASE: ICD-10-CM

## 2017-06-02 DIAGNOSIS — E87.6 HYPOKALEMIA: Primary | ICD-10-CM

## 2017-06-02 DIAGNOSIS — R31.29 MICROSCOPIC HEMATURIA: Primary | ICD-10-CM

## 2017-06-02 DIAGNOSIS — E78.2 MIXED HYPERLIPIDEMIA: ICD-10-CM

## 2017-06-02 DIAGNOSIS — E11.42 DIABETIC POLYNEUROPATHY ASSOCIATED WITH TYPE 2 DIABETES MELLITUS: ICD-10-CM

## 2017-06-02 DIAGNOSIS — N13.8 BPH W URINARY OBS/LUTS: ICD-10-CM

## 2017-06-02 LAB
25(OH)D3+25(OH)D2 SERPL-MCNC: 57 NG/ML
ALBUMIN SERPL BCP-MCNC: 3.5 G/DL
ALP SERPL-CCNC: 75 U/L
ALT SERPL W/O P-5'-P-CCNC: 20 U/L
ANION GAP SERPL CALC-SCNC: 8 MMOL/L
AST SERPL-CCNC: 23 U/L
BILIRUB SERPL-MCNC: 0.5 MG/DL
BUN SERPL-MCNC: 15 MG/DL
CALCIUM SERPL-MCNC: 8.9 MG/DL
CHLORIDE SERPL-SCNC: 103 MMOL/L
CHOLEST/HDLC SERPL: 3.9 {RATIO}
CO2 SERPL-SCNC: 29 MMOL/L
CREAT SERPL-MCNC: 1 MG/DL
EST. GFR  (AFRICAN AMERICAN): >60 ML/MIN/1.73 M^2
EST. GFR  (NON AFRICAN AMERICAN): >60 ML/MIN/1.73 M^2
ESTIMATED AVG GLUCOSE: 117 MG/DL
GLUCOSE SERPL-MCNC: 104 MG/DL
HBA1C MFR BLD HPLC: 5.7 %
HDL/CHOLESTEROL RATIO: 25.9 %
HDLC SERPL-MCNC: 170 MG/DL
HDLC SERPL-MCNC: 44 MG/DL
LDLC SERPL CALC-MCNC: 106.8 MG/DL
NONHDLC SERPL-MCNC: 126 MG/DL
POTASSIUM SERPL-SCNC: 3.3 MMOL/L
PROT SERPL-MCNC: 7.1 G/DL
SODIUM SERPL-SCNC: 140 MMOL/L
TRIGL SERPL-MCNC: 96 MG/DL

## 2017-06-02 PROCEDURE — 1159F MED LIST DOCD IN RCRD: CPT | Mod: S$GLB,,, | Performed by: UROLOGY

## 2017-06-02 PROCEDURE — 99999 PR PBB SHADOW E&M-EST. PATIENT-LVL IV: CPT | Mod: PBBFAC,,, | Performed by: UROLOGY

## 2017-06-02 PROCEDURE — 1125F AMNT PAIN NOTED PAIN PRSNT: CPT | Mod: S$GLB,,, | Performed by: UROLOGY

## 2017-06-02 PROCEDURE — 99214 OFFICE O/P EST MOD 30 MIN: CPT | Mod: 25,S$GLB,, | Performed by: UROLOGY

## 2017-06-02 PROCEDURE — 81002 URINALYSIS NONAUTO W/O SCOPE: CPT | Mod: S$GLB,,, | Performed by: UROLOGY

## 2017-06-02 RX ORDER — LIDOCAINE HYDROCHLORIDE 20 MG/ML
JELLY TOPICAL ONCE
Status: CANCELLED | OUTPATIENT
Start: 2017-06-02 | End: 2017-06-02

## 2017-06-02 RX ORDER — FINASTERIDE 5 MG/1
TABLET, FILM COATED ORAL
Qty: 90 TABLET | Refills: 3 | Status: SHIPPED | OUTPATIENT
Start: 2017-06-02 | End: 2018-02-27 | Stop reason: SDUPTHER

## 2017-06-02 RX ORDER — CIPROFLOXACIN 250 MG/1
500 TABLET, FILM COATED ORAL ONCE
Status: CANCELLED | OUTPATIENT
Start: 2017-06-02 | End: 2017-06-02

## 2017-06-02 RX ORDER — POTASSIUM CHLORIDE 750 MG/1
20 CAPSULE, EXTENDED RELEASE ORAL DAILY
Qty: 180 CAPSULE | Refills: 0 | Status: SHIPPED | OUTPATIENT
Start: 2017-06-02 | End: 2017-08-07 | Stop reason: SDUPTHER

## 2017-06-02 RX ORDER — TAMSULOSIN HYDROCHLORIDE 0.4 MG/1
1 CAPSULE ORAL DAILY
Qty: 90 CAPSULE | Refills: 3 | Status: SHIPPED | OUTPATIENT
Start: 2017-06-02 | End: 2018-02-27 | Stop reason: SDUPTHER

## 2017-06-02 NOTE — TELEPHONE ENCOUNTER
Please call pt and inquire if he is still taking potassium supplement as ordered. If not then rec resume this and let pcp know. If he is taking this then let pcp know as dose will need to be increased.

## 2017-06-02 NOTE — PROGRESS NOTES
CHIEF COMPLAINT:    Mr. Menard is a 71 y.o. male presenting for a follow up on lower urinary tract symptoms, epididymal cysts.    PRESENTING ILLNESS:    Tito Menard is a 71 y.o. male who returns for follow up.  Since he was last seen, he has been taking care of his wife as she had to have emergency heart surgery for an aortic valve complication.  She then was diagnosed with breast cancer.  In the midst of taking care of her, he states that he has not been able to attend to his own issues.   He is being evaluated for osteopenia as he has a curvature of his spine and he has to walk with a cane.  He is also scheduled to so physical therapy.      He states that the epididymal cysts may be getting bigger but they are not interfering with his activities.  He continues to have sweating, which is worse at night so he places his pajamas in the thigh creases.  He used to use Gold Bond powder but stopped when he was prescribed a cream by his dermatologist.      Today, he was found to have microscopic hematuria.  He denies a history of aniline dye or solvent exposure, he is a non smoker, has no history of chemotherapy or radiation therapy.  He continues to take tamsulosin and finasteride and tolerates them both well. He states that they control his urinary symptoms and he has no complaints.      REVIEW OF SYSTEMS:    Review of Systems   Constitutional: Negative.    HENT: Negative.    Eyes: Negative.    Respiratory: Negative.    Cardiovascular:        Deconditioned.  Cannot walk as far as he used to   Gastrointestinal: Negative.    Genitourinary: Negative.    Musculoskeletal: Positive for back pain.        Cannot stand straight   Skin: Negative.    Neurological: Negative.    Endo/Heme/Allergies: Negative.    Psychiatric/Behavioral: Negative.      PATIENT HISTORY:    Past Medical History:   Diagnosis Date    Allergy     Aneurysm of artery of lower extremity     Chalazion of left eye     Diabetes mellitus type II      Enlarged aorta 8/2/2016    Noted on pharmacological stress echo 2/28/2014.      GERD (gastroesophageal reflux disease)     egd 2008    Hyperlipidemia     Hypertension     MGD (meibomian gland dysfunction)     Osteopenia     Spinal stenosis     Spondylosis without myelopathy 10/23/2015    Vitamin D deficiency disease      Past Surgical History:   Procedure Laterality Date    CHALAZION EXCISION Left 8/18/13    CYST REMOVAL  2013    Back of neck    SPINE SURGERY  2007    x2 (2000, 2007)     Family History   Problem Relation Age of Onset    Hyperlipidemia Mother     Hypertension Mother     Kidney disease Mother     Cancer Mother     Hypertension Maternal Grandmother      Social History    Marital status:      Occupational History    Retired           Social History Main Topics    Smoking status: Never Smoker    Smokeless tobacco: Former User     Types: Chew      Comment: Chewed tobacco once per day for 4-5 years when a     Alcohol use No    Drug use: No    Sexual activity: No     Social History Narrative        Colon 2007       Allergies:  Review of patient's allergies indicates no known allergies.    Medications:  Outpatient Encounter Prescriptions as of 6/2/2017   Medication Sig Dispense Refill    acetaminophen (TYLENOL) 500 mg Cap Take 1,000 mg by mouth nightly as needed (for back pain).       albuterol (PROVENTIL) 2.5 mg /3 mL (0.083 %) nebulizer solution Take 3 mLs (2.5 mg total) by nebulization every 6 (six) hours as needed for Wheezing. 3 mL 1    albuterol 90 mcg/actuation inhaler Inhale 1-2 puffs into the lungs every 6 (six) hours as needed for Wheezing. 1 Inhaler 0    amlodipine (NORVASC) 5 MG tablet TAKE 1 TABLET EVERY DAY 90 tablet 3    aspirin (ECOTRIN) 81 MG EC tablet Take 81 mg by mouth once daily.      azelastine (ASTELIN) 137 mcg nasal spray 1 spray (137 mcg total) by Nasal route 2 (two) times daily. (Patient taking differently: 1  spray by Nasal route 2 (two) times daily as needed. ) 90 mL 3    cholecalciferol, vitamin D3, (VITAMIN D3) 2,000 unit Cap Take 1 capsule by mouth once daily.      econazole nitrate 1 % cream Apply topically 2 (two) times daily. (Patient taking differently: Apply topically 2 (two) times daily as needed. ) 85 g 1    finasteride (PROSCAR) 5 mg tablet TAKE 1 TABLET ONE TIME DAILY 90 tablet 3    fish oil-omega-3 fatty acids 300-1,000 mg capsule Take 1 g by mouth once daily.      fluticasone (FLONASE) 50 mcg/actuation nasal spray USE 1 SPRAY NASALLY ONE TIME DAILY 32 g 6    gabapentin (NEURONTIN) 300 MG capsule TAKE 1 CAPSULE THREE TIMES DAILY 270 capsule 1    guaifenesin (MUCINEX) 600 mg 12 hr tablet Take 1,200 mg by mouth 2 (two) times daily.      lisinopril-hydrochlorothiazide (PRINZIDE,ZESTORETIC) 20-12.5 mg per tablet TAKE 2 TABLETS ONE TIME DAILY 180 tablet 3    methocarbamol (ROBAXIN) 500 MG Tab 3 (three) times daily as needed.       olopatadine (PATADAY) 0.2 % Drop Place 1 drop into both eyes once daily. (Patient taking differently: Place 1 drop into both eyes daily as needed. ) 2.5 mL 3    omeprazole (PRILOSEC) 20 MG capsule TAKE 2 CAPSULES ONE TIME DAILY (Patient taking differently: TAKE 1 CAPSULES ONE TIME DAILY) 180 capsule 4    potassium chloride (MICRO-K) 10 MEQ CpSR TAKE 1 CAPSULE ONE TIME DAILY 90 capsule 3    pravastatin (PRAVACHOL) 20 MG tablet TAKE 1 TABLET ONE TIME DAILY 90 tablet 3    tamsulosin (FLOMAX) 0.4 mg Cp24 Take 1 capsule (0.4 mg total) by mouth once daily. 90 capsule 3     No facility-administered encounter medications on file as of 6/2/2017.          PHYSICAL EXAMINATION:    The patient generally appears in good health, is appropriately interactive, and is in no apparent distress.    Skin: No lesions.    Mental: Cooperative with normal affect.    Neuro: Grossly intact.    HEENT: Normal. No evidence of lymphadenopathy.    Chest: , normal inspiratory effort.    Abdomen:  Soft,  non-tender. No masses or organomegaly. Bladder is not palpable. No evidence of flank discomfort. No evidence of inguinal hernia.    Extremities: No clubbing, cyanosis, or edema    Scrotum showed no rashes or lesions. Testicles showed no tenderness, no testiculare masses.  Epididymis showed no tenderness, he had bilateral epididymal cysts, there is a rather large one on the right side, has increased in size, several small ones on the left.  Penis was circumcised. No meatal stenosis. No penile discharge.  No inguinal hernias.  No inguinal lymphadenopathy.    IRINA:  40 g without nodularity.  Normal rectal tone, no rectal masses, median furrow preserved.    LABS:    Lab Results   Component Value Date    BUN 15 11/01/2016    CREATININE 1.0 11/01/2016     Lab Results   Component Value Date    PSA 1.9 05/27/2016    PSA 1.8 06/30/2014    PSA 1.35 06/17/2013    PSADIAG 1.9 05/15/2015     IMPRESSION:    Encounter Diagnoses   Name Primary?    Microscopic hematuria Yes    BPH w urinary obs/LUTS        PLAN:    1.  Renewed the tamsulosin and finasteride  2.  PSA when he comes for the ultrasound  3.  Cystoscopy and renal ultrasound to workup the microscopic hematuria

## 2017-06-02 NOTE — PATIENT INSTRUCTIONS

## 2017-06-02 NOTE — TELEPHONE ENCOUNTER
Please notify pt that I rec  Inc dose of potassium to 20meq daily - new rx sent to pharmacy - please schedule bmp in 1 week to repeat potassium on new dosing.

## 2017-06-02 NOTE — TELEPHONE ENCOUNTER
Spoke with the patient and he states that he is in fact taking the medication and the dose is 10 mEq

## 2017-06-07 ENCOUNTER — HOSPITAL ENCOUNTER (OUTPATIENT)
Dept: RADIOLOGY | Facility: HOSPITAL | Age: 72
Discharge: HOME | End: 2017-06-07
Attending: THORACIC SURGERY (CARDIOTHORACIC VASCULAR SURGERY)
Payer: MEDICARE

## 2017-06-07 DIAGNOSIS — I71.21 ASCENDING AORTIC ANEURYSM: ICD-10-CM

## 2017-06-07 PROCEDURE — 71250 CT THORAX DX C-: CPT | Mod: 26,,, | Performed by: RADIOLOGY

## 2017-06-07 PROCEDURE — 71250 CT THORAX DX C-: CPT | Mod: TC

## 2017-06-08 ENCOUNTER — CLINICAL SUPPORT (OUTPATIENT)
Dept: REHABILITATION | Facility: HOSPITAL | Age: 72
End: 2017-06-08
Attending: PSYCHIATRY & NEUROLOGY
Payer: MEDICARE

## 2017-06-08 DIAGNOSIS — M54.50 CHRONIC RIGHT-SIDED LOW BACK PAIN WITHOUT SCIATICA: ICD-10-CM

## 2017-06-08 DIAGNOSIS — G89.29 CHRONIC RIGHT-SIDED LOW BACK PAIN WITHOUT SCIATICA: ICD-10-CM

## 2017-06-08 PROCEDURE — G8979 MOBILITY GOAL STATUS: HCPCS | Mod: CL,PO

## 2017-06-08 PROCEDURE — 97162 PT EVAL MOD COMPLEX 30 MIN: CPT | Mod: PO

## 2017-06-08 PROCEDURE — 97110 THERAPEUTIC EXERCISES: CPT | Mod: PO

## 2017-06-08 PROCEDURE — G8978 MOBILITY CURRENT STATUS: HCPCS | Mod: CL,PO

## 2017-06-08 NOTE — PROGRESS NOTES
Name: Tito Menard  Clinic Number: [id number]  06/08/2017.     Diagnosis: low back pain   Physician: Omar Casey MD  Treatment Orders: PT Eval and Treat    Past Medical History:   Diagnosis Date    Allergy     Aneurysm of artery of lower extremity     Chalazion of left eye     Diabetes mellitus type II     Enlarged aorta 8/2/2016    Noted on pharmacological stress echo 2/28/2014.      GERD (gastroesophageal reflux disease)     egd 2008    Hyperlipidemia     Hypertension     MGD (meibomian gland dysfunction)     Osteopenia     Spinal stenosis     Spondylosis without myelopathy 10/23/2015    Vitamin D deficiency disease      Current Outpatient Prescriptions   Medication Sig    acetaminophen (TYLENOL) 500 mg Cap Take 1,000 mg by mouth nightly as needed (for back pain).     albuterol (PROVENTIL) 2.5 mg /3 mL (0.083 %) nebulizer solution Take 3 mLs (2.5 mg total) by nebulization every 6 (six) hours as needed for Wheezing.    albuterol 90 mcg/actuation inhaler Inhale 1-2 puffs into the lungs every 6 (six) hours as needed for Wheezing.    amlodipine (NORVASC) 5 MG tablet TAKE 1 TABLET EVERY DAY    aspirin (ECOTRIN) 81 MG EC tablet Take 81 mg by mouth once daily.    azelastine (ASTELIN) 137 mcg nasal spray 1 spray (137 mcg total) by Nasal route 2 (two) times daily. (Patient taking differently: 1 spray by Nasal route 2 (two) times daily as needed. )    cholecalciferol, vitamin D3, (VITAMIN D3) 2,000 unit Cap Take 1 capsule by mouth once daily.    econazole nitrate 1 % cream Apply topically 2 (two) times daily. (Patient taking differently: Apply topically 2 (two) times daily as needed. )    finasteride (PROSCAR) 5 mg tablet TAKE 1 TABLET ONE TIME DAILY    fish oil-omega-3 fatty acids 300-1,000 mg capsule Take 1 g by mouth once daily.    fluticasone (FLONASE) 50 mcg/actuation nasal spray USE 1 SPRAY NASALLY ONE TIME DAILY    gabapentin (NEURONTIN) 300 MG capsule TAKE 1 CAPSULE THREE TIMES  DAILY    guaifenesin (MUCINEX) 600 mg 12 hr tablet Take 1,200 mg by mouth 2 (two) times daily.    lisinopril-hydrochlorothiazide (PRINZIDE,ZESTORETIC) 20-12.5 mg per tablet TAKE 2 TABLETS ONE TIME DAILY    methocarbamol (ROBAXIN) 500 MG Tab 3 (three) times daily as needed.     olopatadine (PATADAY) 0.2 % Drop Place 1 drop into both eyes once daily. (Patient taking differently: Place 1 drop into both eyes daily as needed. )    omeprazole (PRILOSEC) 20 MG capsule TAKE 2 CAPSULES ONE TIME DAILY (Patient taking differently: TAKE 1 CAPSULES ONE TIME DAILY)    potassium chloride (MICRO-K) 10 MEQ CpSR Take 2 capsules (20 mEq total) by mouth once daily.    pravastatin (PRAVACHOL) 20 MG tablet TAKE 1 TABLET ONE TIME DAILY    tamsulosin (FLOMAX) 0.4 mg Cp24 Take 1 capsule (0.4 mg total) by mouth once daily.     No current facility-administered medications for this visit.      Review of patient's allergies indicates:  No Known Allergies  Precautions: fall risk       Subjective:    Previous PT: yes; same dx; states PT helped; could do things without a cane before 2014  Work history: retired; was a ; had to retire 2/2 back, retired in 2006 or 7  Recreational activities/exercise program: basically got away from everything to take care of her back     Tito is a 71 y.o. male referred to PT per MD. Yesterday was his worst incidence.  Had to get an MRI and lay on his back. Is unable to lay on his back comfortably. If he was to get in a chair and lean back, sxs would be on his R side. No history of CA. No night pain, but states wakes up sweaty. Stated LE sxs are sharp, like a sunshine horse. States get out of breath after only walking a short distance, and states he loses his balance often. Denies bowel and bladder changes. Has a 6 year old grandson that he is unable to do stuff with. Bought his grandson a fishing joe, but hasn't had a chance to take him yet. His son comes over to cut his grass. When they go to  the [employer], he has to wait in the car because he can't do the walking. Had 2 back surgeries, last one was about 2006 or 2008.    g  Pain is rated on a 0-10 scale with 0 being no pain and 10 being pain requiring emergency room visit.    Diagnostic Tests: n/a    Patient Goals for PT: improve walking      Objective:    FOTO Low back  Survey 75% limitations  Current -XO Goal -AS (60%)   Category: Mobility     G-Code Modifiers  CH  0% Impaired, limited, or restricted    CI  19% - 1%  Impaired, limited, or restricted    CJ  20% - 39% Impaired, limited, or restricted    CK  40% - 59% Impaired, limited, or restricted    CL  60% - 79% Impaired, limited, or restricted    CM  80% - 99% Impaired, limited, or restricted    CN  100% Impaired, limited, or restricted        Visit # 1  Time In: 1000  Time Out: 1100  Treatment time: 60  Date of eval/re-eval: 6/8/2017       Gait - ambulated with SC in R, decreased step length, reports R LE gives most problems.   2MWT - 150' with SC    AROM l/s   Flexion (-) unable to touch toes   Extension (-) lacks full ROM   Rotation 50% limited B      Unable to tolerate change of positions well enough for completing exam due to pain       PT Evaluation Complete? NO  Discussed Plan of Care with patient: YES     [1 Therapeutic exercise for strengthening and/or improving fitness x 15'   Supine heel slides 8x R/L   Quadruped rock back 2 x 10 HEP  Quadruped shoulder extension 2 x 10 R/l HEP         Written HEP Provided: no  Patient education: HEP  Tito casey good understanding of the education provided. Patient demo good return demo of skill of exercises.    Problem List: muscle length impairments, decreased motor control and mobility, impaired ADLs, activity and participation restrictions.     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  Unable to tolerate more extended posture. Won't be able to make any progress until he can tolerate laying supine with his legs extended. Will continue evaluation next visit.    Tito will benefit from skilled PT services to address these impairments and progress towards the below listed functional goals.  Tito is in agreement with the goals that will be addressed in the treatment plan.Tito demonstrates no additional cultural, spiritual or educational needs and currently has no barriers to learning.      Medical necessity: see problem list -  indicates  / mod /  complexity based on clinical presentation that is  / evolving due to tolerance of positioning. .       Functional Goals  Time frame     1.   The pt will lay supine with legs extended without increase in sxs to improve walking posture.   6 weeks     2.   The pt will ambulate > 200' in 2 minutes with SC for improved community ambulation.   6 weeks     3.    The pt will ambulate > 500' in 6 minutes for improved community ambulation.   8 weeks     4.   The pt will be independent in performance and progression of HEP.     2-3 visits            Plan:      Proceed/Continue with POC.     Pt will be treated by physical therapy 1-2x/week during the certification period. Treatment will consist of therapeutic exercise, manual therapy, neuromuscular re-education, heat and cold modalities, electrical stimulation for pain relief and/or muscle activation, and any other types of treatment hat may be deemed medically necessary. Tito may at times be seen by a PTA as part of the Rehab Team.       Physical Therapist: BETTY Mercado, PT, DPT, CSCS    I certify the need for these services furnished under this plan of treatment and while under my  care.       ___________________________________  Physician/Referring Practitioner        _________________  Date of Signature

## 2017-06-09 ENCOUNTER — TELEPHONE (OUTPATIENT)
Dept: INTERNAL MEDICINE | Facility: CLINIC | Age: 72
End: 2017-06-09

## 2017-06-09 NOTE — TELEPHONE ENCOUNTER
Pt states that he had a bone density test scheduled this morning but was unable to complete his bone density  because of back pain and would like to know if he could complete his bone density while standing up.

## 2017-06-09 NOTE — TELEPHONE ENCOUNTER
rec call radiology in reference to question below     Ok to reschedule bone density in 1-2 weeks when symptoms resolve  If they worsen or do not resolve then rec he make appt for eval of back pain

## 2017-06-09 NOTE — TELEPHONE ENCOUNTER
"----- Message from Garima Goddard sent at 6/9/2017 11:22 AM CDT -----  Contact: Patient himself  X  1st Request  _  2nd Request  _  3rd Request    Who: Tito Menard (mrn# [id number])    Why: Patient called requesting a call.  Says, "he's been in so much pain he didn't have the bone density test that scheduled for today Friday 06/09/2017."  I did ask patient if he feels the need to be seen but he refused.  Please give a call back at your earliest convenience.  THANKS!    What Number to Call Back:  [phone]    When to Expect a call back: (Before the end of the day)   -- if the call is after 12:00, the call back will be tomorrow.                        "

## 2017-06-12 ENCOUNTER — HOSPITAL ENCOUNTER (OUTPATIENT)
Dept: RADIOLOGY | Facility: HOSPITAL | Age: 72
Discharge: HOME | End: 2017-06-12
Attending: UROLOGY
Payer: MEDICARE

## 2017-06-12 ENCOUNTER — TELEPHONE (OUTPATIENT)
Dept: INTERNAL MEDICINE | Facility: CLINIC | Age: 72
End: 2017-06-12

## 2017-06-12 DIAGNOSIS — R31.29 MICROSCOPIC HEMATURIA: ICD-10-CM

## 2017-06-12 PROCEDURE — 76770 US EXAM ABDO BACK WALL COMP: CPT | Mod: TC

## 2017-06-12 PROCEDURE — 76770 US EXAM ABDO BACK WALL COMP: CPT | Mod: 26,,, | Performed by: RADIOLOGY

## 2017-06-12 NOTE — TELEPHONE ENCOUNTER
Please notify pt that his potassium is now in normal range. rec cont current dose of potassium supplement.

## 2017-06-13 ENCOUNTER — TELEPHONE (OUTPATIENT)
Dept: INTERNAL MEDICINE | Facility: CLINIC | Age: 72
End: 2017-06-13

## 2017-06-13 NOTE — TELEPHONE ENCOUNTER
----- Message from Fay Heaton sent at 6/12/2017 10:58 AM CDT -----  _  1st Request  XX_  2nd Request  _  3rd Request        Who: pt wife Guillermina Menard    Why: pt did not take the Bone Density because he couldn't lay flat it hurt his back, please call pt and advise. Thank you  Pt states he called last week and no one returned his call     What Number to Call Back:[phone]    When to Expect a call back: (Before the end of the day)   -- if the call is after 12:00, the call back will be tomorrow.

## 2017-06-13 NOTE — TELEPHONE ENCOUNTER
Pt has been informed of pcp's rec. Pt states that he was unable to hold his leg up during his bone density scan and wanted to know if he could complete the bone density another way. Contact information to radiology has been given to pt in regards to his questions.

## 2017-06-13 NOTE — TELEPHONE ENCOUNTER
----- Message from Savanna De La Cruz sent at 6/13/2017  8:36 AM CDT -----  Contact: Guillermina Menard (Spouse)  X   1st Request  _  2nd Request  _  3rd Request        Who: Guillermina Menard (Spouse)    Why: Pt's wife states she is returning a call to the clinical team. Please call,thanks!    What Number to Call Back: [phone]    When to Expect a call back: (Before the end of the day)   -- if the call is after 12:00, the call back will be tomorrow.

## 2017-06-15 ENCOUNTER — CLINICAL SUPPORT (OUTPATIENT)
Dept: REHABILITATION | Facility: HOSPITAL | Age: 72
End: 2017-06-15
Attending: PSYCHIATRY & NEUROLOGY
Payer: MEDICARE

## 2017-06-15 DIAGNOSIS — G89.29 CHRONIC RIGHT-SIDED LOW BACK PAIN WITHOUT SCIATICA: ICD-10-CM

## 2017-06-15 DIAGNOSIS — M54.50 CHRONIC RIGHT-SIDED LOW BACK PAIN WITHOUT SCIATICA: ICD-10-CM

## 2017-06-15 PROCEDURE — 97110 THERAPEUTIC EXERCISES: CPT | Mod: PO

## 2017-06-15 NOTE — PROGRESS NOTES
Name: Tito Menard  Clinic Number: [id number]  06/15/2017.     Diagnosis: low back pain   Physician: Omar Casey MD  Treatment Orders: PT Eval and Treat    Past Medical History:   Diagnosis Date    Allergy     Aneurysm of artery of lower extremity     Chalazion of left eye     Diabetes mellitus type II     Enlarged aorta 8/2/2016    Noted on pharmacological stress echo 2/28/2014.      GERD (gastroesophageal reflux disease)     egd 2008    Hyperlipidemia     Hypertension     MGD (meibomian gland dysfunction)     Osteopenia     Spinal stenosis     Spondylosis without myelopathy 10/23/2015    Vitamin D deficiency disease      Current Outpatient Prescriptions   Medication Sig    acetaminophen (TYLENOL) 500 mg Cap Take 1,000 mg by mouth nightly as needed (for back pain).     albuterol (PROVENTIL) 2.5 mg /3 mL (0.083 %) nebulizer solution Take 3 mLs (2.5 mg total) by nebulization every 6 (six) hours as needed for Wheezing.    albuterol 90 mcg/actuation inhaler Inhale 1-2 puffs into the lungs every 6 (six) hours as needed for Wheezing.    amlodipine (NORVASC) 5 MG tablet TAKE 1 TABLET EVERY DAY    aspirin (ECOTRIN) 81 MG EC tablet Take 81 mg by mouth once daily.    azelastine (ASTELIN) 137 mcg nasal spray 1 spray (137 mcg total) by Nasal route 2 (two) times daily. (Patient taking differently: 1 spray by Nasal route 2 (two) times daily as needed. )    cholecalciferol, vitamin D3, (VITAMIN D3) 2,000 unit Cap Take 1 capsule by mouth once daily.    econazole nitrate 1 % cream Apply topically 2 (two) times daily. (Patient taking differently: Apply topically 2 (two) times daily as needed. )    finasteride (PROSCAR) 5 mg tablet TAKE 1 TABLET ONE TIME DAILY    fish oil-omega-3 fatty acids 300-1,000 mg capsule Take 1 g by mouth once daily.    fluticasone (FLONASE) 50 mcg/actuation nasal spray USE 1 SPRAY NASALLY ONE TIME DAILY    gabapentin (NEURONTIN) 300 MG capsule TAKE 1 CAPSULE THREE TIMES  DAILY    guaifenesin (MUCINEX) 600 mg 12 hr tablet Take 1,200 mg by mouth 2 (two) times daily.    lisinopril-hydrochlorothiazide (PRINZIDE,ZESTORETIC) 20-12.5 mg per tablet TAKE 2 TABLETS ONE TIME DAILY    methocarbamol (ROBAXIN) 500 MG Tab 3 (three) times daily as needed.     olopatadine (PATADAY) 0.2 % Drop Place 1 drop into both eyes once daily. (Patient taking differently: Place 1 drop into both eyes daily as needed. )    omeprazole (PRILOSEC) 20 MG capsule TAKE 2 CAPSULES ONE TIME DAILY (Patient taking differently: TAKE 1 CAPSULES ONE TIME DAILY)    potassium chloride (MICRO-K) 10 MEQ CpSR Take 2 capsules (20 mEq total) by mouth once daily.    pravastatin (PRAVACHOL) 20 MG tablet TAKE 1 TABLET ONE TIME DAILY    tamsulosin (FLOMAX) 0.4 mg Cp24 Take 1 capsule (0.4 mg total) by mouth once daily.     No current facility-administered medications for this visit.      Review of patient's allergies indicates:  No Known Allergies  Precautions: fall risk       Subjective:    Previous PT: yes; same dx; states PT helped; could do things without a cane before 2014  Work history: retired; was a ; had to retire 2/2 back, retired in 2006 or 7  Recreational activities/exercise program: basically got away from everything to take care of her back     Tito reports doing a little better. Went to an MD appointment with less difficulty. Has been doing the HEP.      Pain is rated on a 0-10 scale with 0 being no pain and 10 being pain requiring emergency room visit.    Diagnostic Tests: n/a    Patient Goals for PT: improve walking      Objective:    FOTO Low back  Survey 75% limitations  Current -TH Goal -OP (60%)   Category: Mobility     G-Code Modifiers  CH  0% Impaired, limited, or restricted    CI  19% - 1%  Impaired, limited, or restricted    CJ  20% - 39% Impaired, limited, or restricted    CK  40% - 59% Impaired, limited, or restricted    CL  60% - 79% Impaired, limited, or restricted    CM  80% -  99% Impaired, limited, or restricted    CN  100% Impaired, limited, or restricted        Visit # 2  Time In: 0900  Time Out: 0945  Treatment time: 45  Date of eval/re-eval: 6/8/2017       Gait - ambulated with SC in R, decreased step length, reports R LE gives most problems.   2MWT - 150' with SC    AROM l/s   Flexion (-) unable to touch toes   Extension (-) lacks full ROM   Rotation 50% limited B      Unable to tolerate change of positions well enough for completing exam due to pain       PT Evaluation Complete? NO  Discussed Plan of Care with patient: YES     [3]Therapeutic exercise for strengthening and/or improving fitness x 45'  Progression to hooklying, 10x horiz abd x 3 at decreasing levels of HOB  D2 football motion 10x R/L in hooklying   Unilateral wall slides 3 x 8 R/L        Written HEP Provided: no  Patient education: HEP  Tito demo good understanding of the education provided. Patient demo good return demo of skill of exercises.    Problem List: muscle length impairments, decreased motor control and mobility, impaired ADLs, activity and participation restrictions.     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  Able to get to hooklying with minimal difficulty. Lack of hip extension c/o increase movement in l/s, likely lacking extension in the l/s as well.  Tito will benefit from skilled PT services to address these impairments and progress towards the below listed functional goals.  Tito is in agreement with the goals that will be addressed in the treatment plan.Tito demonstrates no additional cultural, spiritual or  educational needs and currently has no barriers to learning.      Medical necessity: see problem list -  indicates  / mod /  complexity based on clinical presentation that is  / evolving due to tolerance of positioning. .       Functional Goals  Time frame     1.   The pt will lay supine with legs extended without increase in sxs to improve walking posture.   6 weeks     2.   The pt will ambulate > 200' in 2 minutes with SC for improved community ambulation.   6 weeks     3.    The pt will ambulate > 500' in 6 minutes for improved community ambulation.   8 weeks     4.   The pt will be independent in performance and progression of HEP.     2-3 visits            Plan:      Proceed/Continue with POC.     Pt will be treated by physical therapy 1-2x/week during the certification period. Treatment will consist of therapeutic exercise, manual therapy, neuromuscular re-education, heat and cold modalities, electrical stimulation for pain relief and/or muscle activation, and any other types of treatment hat may be deemed medically necessary. Tito may at times be seen by a PTA as part of the Rehab Team.       Physical Therapist: BETTY Mercado, PT, DPT, CSCS    I certify the need for these services furnished under this plan of treatment and while under my care.       ___________________________________  Physician/Referring Practitioner        _________________  Date of Signature

## 2017-06-23 ENCOUNTER — OFFICE VISIT (OUTPATIENT)
Dept: PODIATRY | Facility: CLINIC | Age: 72
End: 2017-06-23
Payer: MEDICARE

## 2017-06-23 VITALS
WEIGHT: 216.5 LBS | DIASTOLIC BLOOD PRESSURE: 81 MMHG | BODY MASS INDEX: 30.31 KG/M2 | HEIGHT: 71 IN | HEART RATE: 81 BPM | SYSTOLIC BLOOD PRESSURE: 152 MMHG

## 2017-06-23 DIAGNOSIS — B35.1 ONYCHOMYCOSIS DUE TO DERMATOPHYTE: ICD-10-CM

## 2017-06-23 DIAGNOSIS — L60.0 INGROWN NAIL: ICD-10-CM

## 2017-06-23 DIAGNOSIS — L60.9 DISEASE OF NAIL: Primary | ICD-10-CM

## 2017-06-23 PROCEDURE — 1159F MED LIST DOCD IN RCRD: CPT | Mod: S$GLB,,, | Performed by: PODIATRIST

## 2017-06-23 PROCEDURE — 1125F AMNT PAIN NOTED PAIN PRSNT: CPT | Mod: S$GLB,,, | Performed by: PODIATRIST

## 2017-06-23 PROCEDURE — 99999 PR PBB SHADOW E&M-EST. PATIENT-LVL V: CPT | Mod: PBBFAC,,, | Performed by: PODIATRIST

## 2017-06-23 PROCEDURE — 99499 UNLISTED E&M SERVICE: CPT | Mod: S$GLB,,, | Performed by: PODIATRIST

## 2017-06-23 PROCEDURE — 99214 OFFICE O/P EST MOD 30 MIN: CPT | Mod: S$GLB,,, | Performed by: PODIATRIST

## 2017-06-23 RX ORDER — TERBINAFINE HYDROCHLORIDE 250 MG/1
250 TABLET ORAL DAILY
Qty: 90 TABLET | Refills: 0 | Status: SHIPPED | OUTPATIENT
Start: 2017-06-23 | End: 2018-06-01 | Stop reason: ALTCHOICE

## 2017-06-23 NOTE — PATIENT INSTRUCTIONS
Nail Fungal Infection  A nail fungal infection changes the way fingernails and toenails look. They may thicken, discolor, change shape, or split. This condition is hard to treat because nails grow slowly and have limited blood supply. The infection often comes back after treatment.  There are 2 types of medicines used to treat this condition:  · Topical anti-fungal medicines. These are applied to the surface of the skin and nail area. These medicines are not very effective because they cant get deep into the nail.  · Oral antifungal medicines. These medicines work better because they go into the nail from the inside out. But the infection may still come back. It may take 9 to 12 months for your nail to look normal again. This means you are cured. You can repeat treatment if needed. Most people take these medicines without any problems. It is rare to stop therapy because of side effects. But your healthcare provider may give you some monitoring tests. Talk about possible side effects with your provider before starting treatment.  If medicines fail, the nail can be removed surgically or chemically. These methods physically remove the fungus from the body. This helps medical treatment be more effective.  Home care  · Use medicines exactly as directed for as long as directed. Treating a fungal infection can take longer than other kinds of infections.  · Smoking is a risk factor for fungal infection. This is one more reason to quit.  · Wear absorbent socks, and shoes that let your feet breathe. Sweaty feet increase your risk of fungal infection. They also make an existing infection harder to treat.  · Use footwear when in damp public places like swimming pools, gyms, and shower rooms. This will help you avoid the fungus that grows there.  · Don't share nail clippers or scissors with others.  Follow-up care  Follow up with your healthcare provider, or as advised.  When to seek medical advice  Call your healthcare  provider right away if any of these occur:  · Skin by the nail becomes red, swollen, painful, or drains pus (a creamy yellow or white liquid)  · Side effects from oral anti-fungal medicines  Date Last Reviewed: 8/1/2016 © 2000-2016 [employer]. [address] [place] [address]. All rights reserved. This information is not intended as a substitute for professional medical care. Always follow your healthcare professional's instructions.        Ingrown Toenail, Not Infected (Home Treatment)  An ingrown toenail occurs when the nail grows sideways into the skin next to the nail. This can cause pain, especially when wearing tight shoes. It can also lead to an infection with redness, swelling, and pus drainage. Most people respond to the treatments described here. Sometimes surgery is needed, however.  Home care  The following guidelines will help you care for your toenail at home:  · Soak the painful toe in warm water twice a day for 10 to 20 minutes each time. Wash the entire foot with an antibacterial soap.  · If there is redness or swelling around the toenail, apply an antibiotic ointment three times a day.  · Insert a small piece of rolled-up cotton under the corner of the nail to promote growth of the nail outward, away from the cuticle.  · Wear shoes that do not put pressure on the toes, such as a sandal or open shoe. Closed shoes should be big enough in the toes so that there is no pressure on the painful toe.  · You may use acetaminophen or ibuprofen for pain, unless another pain medicine was prescribed. Talk with your healthcare provider before using these medicines if you have chronic liver or kidney disease. Also tell your healthcare provider if you have ever had a stomach ulcer or GI bleeding.  Prevention  The following tips will help you prevent ingrown toenails:  · Avoid pointed, tight, or narrow shoes.  · Trim toenails once a month so they dont grow too long. Cut the nail straight  across.  Follow-up care  Follow up with your healthcare provider or as advised.  When to seek medical advice  Call your health care provider right away if any of these occur:  · Increasing redness, pain, or swelling of the toe  · Tender red streaks in the skin leading toward the ankle  · Pus or fluid drainage from the toe  · Fever of 100.4°F (38°C) or higher  Date Last Reviewed: 5/14/2015  © 4540-2265 [employer]. [address] [place] [address], [place] [address]. All rights reserved. This information is not intended as a substitute for professional medical care. Always follow your healthcare professional's instructions.        Understanding Ingrown Toenails    An ingrown nail is the result of a nail growing into the skin that surrounds it. This often occurs at either edge of the big toe. Ingrown nails may be caused by improper trimming, inherited nail deformities, injuries, fungal infections, or pressure.  Symptoms  Ingrown nails may cause pain at the tip of the toe or all the way to the base of the toe. The pain is often worse while walking. An ingrown nail may also lead to infection, inflammation, or a more serious condition. If its infected, you might see pus or redness.  Evaluation  To determine the extent of your problem, your healthcare provider examines and possibly presses the painful area. If other problems are suspected, blood tests, cultures, and X-rays may be done as well.  Treatment  If the nail isnt infected, your healthcare provider may trim the corner of it to help relieve your symptoms. He or she may need to remove one side of your nail back to the cuticle. The base of the nail may then be treated with a chemical to keep the ingrown part from growing back. Severe infections or ingrown nails may require antibiotics and temporary or permanent removal of a portion of the nail. To prevent pain, a local anesthetic may be used in these procedures. This treatment is usually done at your healthcare  provider's office.  Prevention  Many nail problems can be prevented by wearing the right shoes and trimming your nails properly. To help avoid infection, keep your feet clean and dry. If you have diabetes, talk with your healthcare provider before doing any foot self-care.  · The right shoes: Get your feet measured (your size may change as you age). Wear shoes that are supportive and roomy enough for your toes to wiggle. Look for shoes made of natural materials such as leather, which allow your feet to breathe.  · Proper trimming: To avoid problems, trim your toenails straight across without cutting down into the corners. If you cant trim your own nails, ask your healthcare provider to do so for you.  Date Last Reviewed: 10/1/2016  © 3817-7165 [employer]. [address] [place] [address], [place] [address]. All rights reserved. This information is not intended as a substitute for professional medical care. Always follow your healthcare professional's instructions.    Terbinafine tablets  What is this medicine?  TERBINAFINE (TER bin a feen) is an antifungal medicine. It is used to treat certain kinds of fungal or yeast infections.  How should I use this medicine?  Take this medicine by mouth with a full glass of water. Follow the directions on the prescription label. You can take this medicine with food or on an empty stomach. Take your medicine at regular intervals. Do not take your medicine more often than directed. Do not skip doses or stop your medicine early even if you feel better. Do not stop taking except on your doctor's advice. Talk to your pediatrician regarding the use of this medicine in children. Special care may be needed.  What side effects may I notice from receiving this medicine?  Side effects that you should report to your doctor or health care professional as soon as possible:  · allergic reactions like skin rash or hives, swelling of the face, lips, or tongue  · changes in vision  · dark  urine  · fever or infection  · general ill feeling or flu-like symptoms  · light-colored stools  · loss of appetite, nausea  · redness, blistering, peeling or loosening of the skin, including inside the mouth  · right upper belly pain  · unusually weak or tired  · yellowing of the eyes or skin  Side effects that usually do not require medical attention (report to your doctor or health care professional if they continue or are bothersome):  · changes in taste  · diarrhea  · hair loss  · muscle or joint pain  · stomach gas  · stomach upset  What may interact with this medicine?  Do not take this medicine with any of the following medications:  · thioridazine  This medicine may also interact with the following medications:  · beta-blockers  · caffeine  · cimetidine  · cyclosporine  · medicines for depression, anxiety, or psychotic disturbances  · medicines for fungal infections like fluconazole and ketoconazole  · medicines for irregular heartbeat like amiodarone, flecainide and propafenone  · rifampin  · warfarin  What if I miss a dose?  If you miss a dose, take it as soon as you can. If it is almost time for your next dose, take only that dose. Do not take double or extra doses.  Where should I keep my medicine?  Keep out of the reach of children.  Store at room temperature below 25 degrees C (77 degrees F). Protect from light. Throw away any unused medicine after the expiration date.  What should I tell my health care provider before I take this medicine?  They need to know if you have any of these conditions:  · drink alcoholic beverages  · kidney disease  · liver disease  · an unusual or allergic reaction to terbinafine, other medicines, foods, dyes, or preservatives  · pregnant or trying to get pregnant  · breast-feeding  What should I watch for while using this medicine?  Visit your doctor or health care provider regularly. Tell your doctor right away if you have nausea or vomiting, loss of appetite, stomach  pain on your right upper side, yellow skin, dark urine, light stools, or are over tired. Some fungal infections need many weeks or months of treatment to cure. If you are taking this medicine for a long time, you will need to have important blood work done.  Date Last Reviewed:   NOTE:This sheet is a summary. It may not cover all possible information. If you have questions about this medicine, talk to your doctor, pharmacist, or health care provider. Copyright© 2016 [employer]

## 2017-06-29 ENCOUNTER — CLINICAL SUPPORT (OUTPATIENT)
Dept: REHABILITATION | Facility: HOSPITAL | Age: 72
End: 2017-06-29
Attending: PSYCHIATRY & NEUROLOGY
Payer: MEDICARE

## 2017-06-29 DIAGNOSIS — G89.29 CHRONIC RIGHT-SIDED LOW BACK PAIN WITHOUT SCIATICA: ICD-10-CM

## 2017-06-29 DIAGNOSIS — M54.50 CHRONIC RIGHT-SIDED LOW BACK PAIN WITHOUT SCIATICA: ICD-10-CM

## 2017-06-29 PROCEDURE — 97140 MANUAL THERAPY 1/> REGIONS: CPT | Mod: PO

## 2017-06-29 PROCEDURE — 97110 THERAPEUTIC EXERCISES: CPT | Mod: PO

## 2017-06-29 NOTE — PROGRESS NOTES
Subjective:      Patient ID: Tito Menard is a 71 y.o. male.    Chief Complaint: Diabetes Mellitus (PCP Dr. Brown 6/1/17); Diabetic Foot Exam; Routine Foot Care; Nail Problem (fungus); and Peripheral Neuropathy    Tito is a 71 y.o. male who presents to the clinic upon referral from Dr. Promise monroe. provider found  for evaluation and treatment of diabetic feet. Tito has a past medical history of Allergy; Aneurysm of artery of lower extremity; Chalazion of left eye; Diabetes mellitus type II; Enlarged aorta (8/2/2016); GERD (gastroesophageal reflux disease); Hyperlipidemia; Hypertension; MGD (meibomian gland dysfunction); Osteopenia; Spinal stenosis; Spondylosis without myelopathy (10/23/2015); and Vitamin D deficiency disease. Patient relates no major problem with feet. Only complaints today consist of slight numbness to little toes, dry, itchy feet and fungal toenails.    PCP: Amanda Brown MD    Date Last Seen by PCP: 6/1/17    Current shoe gear: tennis shoes     Hemoglobin A1C   Date Value Ref Range Status   06/02/2017 5.7 4.5 - 6.2 % Final     Comment:     According to ADA guidelines, hemoglobin A1C <7.0% represents  optimal control in non-pregnant diabetic patients.  Different  metrics may apply to specific populations.   Standards of Medical Care in Diabetes - 2016.  For the purpose of screening for the presence of diabetes:  <5.7%     Consistent with the absence of diabetes  5.7-6.4%  Consistent with increasing risk for diabetes   (prediabetes)  >or=6.5%  Consistent with diabetes  Currently no consensus exists for use of hemoglobin A1C  for diagnosis of diabetes for children.     11/01/2016 5.9 4.5 - 6.2 % Final     Comment:     According to ADA guidelines, hemoglobin A1C <7.0% represents  optimal control in non-pregnant diabetic patients.  Different  metrics may apply to specific populations.   Standards of Medical Care in Diabetes - 2016.  For the purpose of screening for the presence of  diabetes:  <5.7%     Consistent with the absence of diabetes  5.7-6.4%  Consistent with increasing risk for diabetes   (prediabetes)  >or=6.5%  Consistent with diabetes  Currently no consensus exists for use of hemoglobin A1C  for diagnosis of diabetes for children.     04/28/2016 5.8 4.5 - 6.2 % Final             Review of Systems   Constitution: Negative for chills, decreased appetite and fever.   Cardiovascular: Negative for leg swelling.   Skin: Positive for color change, dry skin, itching and nail changes (R great nail removed due to IGTN, fungus L great toe ).   Musculoskeletal: Negative for arthritis, joint pain, joint swelling and myalgias.   Gastrointestinal: Negative for nausea and vomiting.   Neurological: Positive for numbness. Negative for loss of balance and paresthesias.           Objective:      Physical Exam   Constitutional: He is oriented to person, place, and time. He appears well-developed and well-nourished.   Cardiovascular: Intact distal pulses.    dorsalis pedis and posterior tibial pulses are palpable bilaterally. Capillary refill time is within normal limits.         Musculoskeletal: Normal range of motion. He exhibits no edema or tenderness.   Adequate joint range of motion without pain, limitation, nor crepitation Bilateral feet and ankle joints. Muscle strength is 5/5 in all groups bilaterally.         Neurological: He is alert and oriented to person, place, and time. He has normal strength. No sensory deficit.   Pilger-Ching 5.07 monofilament is intact bilateral feet. Sharp/dull sensation is also intact Bilateral feet.       Skin: Skin is warm, dry and intact. No lesion and no rash noted.   Toenails 1-5 bilaterally are elongated, thickened by 2-3 mm, discolored/yellowed, dystrophic, brittle with subungual debris.  Plantar foot scaling and mild erythema. No interdigital maceration.    Psychiatric: He has a normal mood and affect. His behavior is normal.             Assessment:        Encounter Diagnoses   Name Primary?    Disease of nail Yes    Onychomycosis due to dermatophyte     Ingrown nail - Left Foot          Plan:       Tito was seen today for diabetes mellitus, diabetic foot exam, routine foot care, nail problem and peripheral neuropathy.    Diagnoses and all orders for this visit:    Disease of nail    Onychomycosis due to dermatophyte  -     terbinafine HCl (LAMISIL) 250 mg tablet; Take 1 tablet (250 mg total) by mouth once daily. 1 pill by mouth x 90 days. Avoid alcohol intake while taking medication    Ingrown nail - Left Foot      I counseled the patient on his conditions, their implications and medical management.        - Shoe inspection. Diabetic Foot Education. Patient reminded of the importance of good nutrition and blood sugar control to help prevent podiatric complications of diabetes. Patient instructed on proper foot hygeine. We discussed wearing proper shoe gear, daily foot inspections, never walking without protective shoe gear, never putting sharp instruments to feet.    - We discussed using Lamisil for onychomycosis. This drug offers a fairly high but not universal cure rate. A 12 week treatment course is recommended. The patient is aware that rare cases of liver injury have been reported; and agrees to come in for liver function tests at 6 weeks of treatment. The symptoms of liver disease have been discussed; call if such occurs. In addition, some insurance plans do not cover the expense of this drug for treating a cosmetic condition, and the patient understands they may have to pay for the medication. Other side effects, such as headaches and rashes, have also been discussed.       - Declines prescription for diabetic shoes today    RTC in  3-4 months or sooner if problems arise

## 2017-06-29 NOTE — PROGRESS NOTES
Name: Tito Menard  Clinic Number: [id number]  06/29/2017.     Diagnosis: low back pain   Physician: Omar Casey MD  Treatment Orders: PT Eval and Treat    Past Medical History:   Diagnosis Date    Allergy     Aneurysm of artery of lower extremity     Chalazion of left eye     Diabetes mellitus type II     Enlarged aorta 8/2/2016    Noted on pharmacological stress echo 2/28/2014.      GERD (gastroesophageal reflux disease)     egd 2008    Hyperlipidemia     Hypertension     MGD (meibomian gland dysfunction)     Osteopenia     Spinal stenosis     Spondylosis without myelopathy 10/23/2015    Vitamin D deficiency disease      Current Outpatient Prescriptions   Medication Sig    acetaminophen (TYLENOL) 500 mg Cap Take 1,000 mg by mouth nightly as needed (for back pain).     albuterol (PROVENTIL) 2.5 mg /3 mL (0.083 %) nebulizer solution Take 3 mLs (2.5 mg total) by nebulization every 6 (six) hours as needed for Wheezing.    albuterol 90 mcg/actuation inhaler Inhale 1-2 puffs into the lungs every 6 (six) hours as needed for Wheezing.    amlodipine (NORVASC) 5 MG tablet TAKE 1 TABLET EVERY DAY    aspirin (ECOTRIN) 81 MG EC tablet Take 81 mg by mouth once daily.    azelastine (ASTELIN) 137 mcg nasal spray 1 spray (137 mcg total) by Nasal route 2 (two) times daily. (Patient taking differently: 1 spray by Nasal route 2 (two) times daily as needed. )    cholecalciferol, vitamin D3, (VITAMIN D3) 2,000 unit Cap Take 1 capsule by mouth once daily.    econazole nitrate 1 % cream Apply topically 2 (two) times daily. (Patient taking differently: Apply topically 2 (two) times daily as needed. )    finasteride (PROSCAR) 5 mg tablet TAKE 1 TABLET ONE TIME DAILY    fish oil-omega-3 fatty acids 300-1,000 mg capsule Take 1 g by mouth once daily.    fluticasone (FLONASE) 50 mcg/actuation nasal spray USE 1 SPRAY NASALLY ONE TIME DAILY    gabapentin (NEURONTIN) 300 MG capsule TAKE 1 CAPSULE THREE TIMES  DAILY    guaifenesin (MUCINEX) 600 mg 12 hr tablet Take 1,200 mg by mouth 2 (two) times daily.    lisinopril-hydrochlorothiazide (PRINZIDE,ZESTORETIC) 20-12.5 mg per tablet TAKE 2 TABLETS ONE TIME DAILY    methocarbamol (ROBAXIN) 500 MG Tab 3 (three) times daily as needed.     olopatadine (PATADAY) 0.2 % Drop Place 1 drop into both eyes once daily. (Patient taking differently: Place 1 drop into both eyes daily as needed. )    omeprazole (PRILOSEC) 20 MG capsule TAKE 2 CAPSULES ONE TIME DAILY (Patient taking differently: TAKE 1 CAPSULES ONE TIME DAILY)    potassium chloride (MICRO-K) 10 MEQ CpSR Take 2 capsules (20 mEq total) by mouth once daily.    pravastatin (PRAVACHOL) 20 MG tablet TAKE 1 TABLET ONE TIME DAILY    tamsulosin (FLOMAX) 0.4 mg Cp24 Take 1 capsule (0.4 mg total) by mouth once daily.    terbinafine HCl (LAMISIL) 250 mg tablet Take 1 tablet (250 mg total) by mouth once daily. 1 pill by mouth x 90 days. Avoid alcohol intake while taking medication     No current facility-administered medications for this visit.      Review of patient's allergies indicates:  No Known Allergies  Precautions: fall risk       Subjective:    Previous PT: yes; same dx; states PT helped; could do things without a cane before 2014  Work history: retired; was a ; had to retire 2/2 back, retired in 2006 or 7  Recreational activities/exercise program: basically got away from everything to take care of her back     Tito reports still has pain but it is less. Did some work in the yard with some tree branches. Took about 20' after to recovery. Has a procedure coming up where he has to lay down, doesn't know what to expect. Has been doing some chair exercises.      Pain is rated on a 0-10 scale with 0 being no pain and 10 being pain requiring emergency room visit.    Diagnostic Tests: n/a    Patient Goals for PT: improve walking      Objective:    FOTO Low back  Survey 75% limitations  Current -HB Goal  "-GW (60%)   Category: Mobility     G-Code Modifiers  CH  0% Impaired, limited, or restricted    CI  19% - 1%  Impaired, limited, or restricted    CJ  20% - 39% Impaired, limited, or restricted    CK  40% - 59% Impaired, limited, or restricted    CL  60% - 79% Impaired, limited, or restricted    CM  80% - 99% Impaired, limited, or restricted    CN  100% Impaired, limited, or restricted        Visit # 3  Time In: 0900  Time Out: 1000  Treatment time: 45  Date of eval/re-eval: 6/8/2017       Gait - ambulated with SC in R, decreased step length, reports R LE gives most problems.   2MWT - 150' with SC    AROM l/s   Flexion (-) unable to touch toes   Extension (-) lacks full ROM   Rotation 50% limited B      Unable to tolerate change of positions well enough for completing exam due to pain       PT Evaluation Complete? NO  Discussed Plan of Care with patient: YES     [1] Manual therapy x 15'   Long axis distraction and oscillations for hip extension in SL R/L   Contract/relax for hip extension 5 x 5" R/L     [2]Therapeutic exercise for strengthening and/or improving fitness x 45'  Progression to hooklying, 10x horiz abd x 3 at decreasing levels of HOB  D2 football motion 10x R/L in hooklying   Unilateral wall slides 3 x 8 R/L NP  Hip progressions from elbows propped to back to wall support        Written HEP Provided: no  Patient education: HEP  Tito casey good understanding of the education provided. Patient demo good return demo of skill of exercises.    Problem List: muscle length impairments, decreased motor control and mobility, impaired ADLs, activity and participation restrictions.     Personal factors: level of disability, chronicity of sxs, coping strategies     CPT Code reference        Hx  Exam  Presentation   Code     Low     0   1-2    Stable    21635     Mod     1-2   3    Evolving    74136     High     3+   4+     Unstable    68735       Assessment:    Physical therapy diagnosis: lumbar extension and " rotation syndrome   Rehab potential: alden Francis presents with the above listed impairments.  Continue to work on tolerance for extension. Worked on hip extension and hip hinge pattern. Difficulty moving into hip flexion instead of lumbar flexion. Slight improvements with practice, but not much.   Tito is in agreement with the goals that will be addressed in the treatment plan.Tito demonstrates no additional cultural, spiritual or educational needs and currently has no barriers to learning.      Medical necessity: see problem list -  indicates  / mod /  complexity based on clinical presentation that is  / evolving due to tolerance of positioning. .       Functional Goals  Time frame     1.   The pt will lay supine with legs extended without increase in sxs to improve walking posture.   6 weeks     2.   The pt will ambulate > 200' in 2 minutes with SC for improved community ambulation.   6 weeks     3.    The pt will ambulate > 500' in 6 minutes for improved community ambulation.   8 weeks     4.   The pt will be independent in performance and progression of HEP.     2-3 visits            Plan:      Proceed/Continue with POC.     Pt will be treated by physical therapy 1-2x/week during the certification period. Treatment will consist of therapeutic exercise, manual therapy, neuromuscular re-education, heat and cold modalities, electrical stimulation for pain relief and/or muscle activation, and any other types of treatment hat may be deemed medically necessary. Tito may at times be seen by a PTA as part of the Rehab Team.       Physical Therapist: BETTY Mercado, PT, DPT, CSCS    I certify the need for these services furnished under this plan of treatment and while under my care.       ___________________________________  Physician/Referring Practitioner        _________________  Date of Signature

## 2017-07-06 ENCOUNTER — CLINICAL SUPPORT (OUTPATIENT)
Dept: REHABILITATION | Facility: HOSPITAL | Age: 72
End: 2017-07-06
Attending: INTERNAL MEDICINE
Payer: MEDICARE

## 2017-07-06 DIAGNOSIS — M54.50 CHRONIC RIGHT-SIDED LOW BACK PAIN WITHOUT SCIATICA: ICD-10-CM

## 2017-07-06 DIAGNOSIS — G89.29 CHRONIC RIGHT-SIDED LOW BACK PAIN WITHOUT SCIATICA: ICD-10-CM

## 2017-07-06 PROCEDURE — 97140 MANUAL THERAPY 1/> REGIONS: CPT | Mod: PO

## 2017-07-06 PROCEDURE — 97110 THERAPEUTIC EXERCISES: CPT | Mod: PO

## 2017-07-06 NOTE — PROGRESS NOTES
Name: Tito Menard  Clinic Number: [id number]  07/06/2017.     Diagnosis: low back pain   Physician: Omar Casey MD  Treatment Orders: PT Eval and Treat    Past Medical History:   Diagnosis Date    Allergy     Aneurysm of artery of lower extremity     Chalazion of left eye     Diabetes mellitus type II     Enlarged aorta 8/2/2016    Noted on pharmacological stress echo 2/28/2014.      GERD (gastroesophageal reflux disease)     egd 2008    Hyperlipidemia     Hypertension     MGD (meibomian gland dysfunction)     Osteopenia     Spinal stenosis     Spondylosis without myelopathy 10/23/2015    Vitamin D deficiency disease      Current Outpatient Prescriptions   Medication Sig    acetaminophen (TYLENOL) 500 mg Cap Take 1,000 mg by mouth nightly as needed (for back pain).     albuterol (PROVENTIL) 2.5 mg /3 mL (0.083 %) nebulizer solution Take 3 mLs (2.5 mg total) by nebulization every 6 (six) hours as needed for Wheezing.    albuterol 90 mcg/actuation inhaler Inhale 1-2 puffs into the lungs every 6 (six) hours as needed for Wheezing.    amlodipine (NORVASC) 5 MG tablet TAKE 1 TABLET EVERY DAY    aspirin (ECOTRIN) 81 MG EC tablet Take 81 mg by mouth once daily.    azelastine (ASTELIN) 137 mcg nasal spray 1 spray (137 mcg total) by Nasal route 2 (two) times daily. (Patient taking differently: 1 spray by Nasal route 2 (two) times daily as needed. )    cholecalciferol, vitamin D3, (VITAMIN D3) 2,000 unit Cap Take 1 capsule by mouth once daily.    econazole nitrate 1 % cream Apply topically 2 (two) times daily. (Patient taking differently: Apply topically 2 (two) times daily as needed. )    finasteride (PROSCAR) 5 mg tablet TAKE 1 TABLET ONE TIME DAILY    fish oil-omega-3 fatty acids 300-1,000 mg capsule Take 1 g by mouth once daily.    fluticasone (FLONASE) 50 mcg/actuation nasal spray USE 1 SPRAY NASALLY ONE TIME DAILY    gabapentin (NEURONTIN) 300 MG capsule TAKE 1 CAPSULE THREE TIMES  DAILY    guaifenesin (MUCINEX) 600 mg 12 hr tablet Take 1,200 mg by mouth 2 (two) times daily.    lisinopril-hydrochlorothiazide (PRINZIDE,ZESTORETIC) 20-12.5 mg per tablet TAKE 2 TABLETS ONE TIME DAILY    methocarbamol (ROBAXIN) 500 MG Tab 3 (three) times daily as needed.     olopatadine (PATADAY) 0.2 % Drop Place 1 drop into both eyes once daily. (Patient taking differently: Place 1 drop into both eyes daily as needed. )    omeprazole (PRILOSEC) 20 MG capsule TAKE 2 CAPSULES ONE TIME DAILY (Patient taking differently: TAKE 1 CAPSULES ONE TIME DAILY)    potassium chloride (MICRO-K) 10 MEQ CpSR Take 2 capsules (20 mEq total) by mouth once daily.    pravastatin (PRAVACHOL) 20 MG tablet TAKE 1 TABLET ONE TIME DAILY    tamsulosin (FLOMAX) 0.4 mg Cp24 Take 1 capsule (0.4 mg total) by mouth once daily.    terbinafine HCl (LAMISIL) 250 mg tablet Take 1 tablet (250 mg total) by mouth once daily. 1 pill by mouth x 90 days. Avoid alcohol intake while taking medication     No current facility-administered medications for this visit.      Review of patient's allergies indicates:  No Known Allergies  Precautions: fall risk       Subjective:    Previous PT: yes; same dx; states PT helped; could do things without a cane before 2014  Work history: retired; was a ; had to retire 2/2 back, retired in 2006 or 7  Recreational activities/exercise program: basically got away from everything to take care of her back     Tito reports used rollator at hospital and felt he walked better/faster.      Pain is rated on a 0-10 scale with 0 being no pain and 10 being pain requiring emergency room visit.    Diagnostic Tests: n/a    Patient Goals for PT: improve walking      Objective:    FOTO Low back  Survey 75% limitations  Current -UV Goal -EJ (60%)   Category: Mobility     G-Code Modifiers  CH  0% Impaired, limited, or restricted    CI  19% - 1%  Impaired, limited, or restricted    CJ  20% - 39% Impaired,  "limited, or restricted    CK  40% - 59% Impaired, limited, or restricted    CL  60% - 79% Impaired, limited, or restricted    CM  80% - 99% Impaired, limited, or restricted    CN  100% Impaired, limited, or restricted        Visit # 4  Time In: 0900  Time Out: 1000  Treatment time: 45  Date of eval/re-eval: 6/8/2017       Gait - ambulated with SC in R, decreased step length, reports R LE gives most problems.   2MWT - 150' with SC    AROM l/s   Flexion (-) unable to touch toes   Extension (-) lacks full ROM   Rotation 50% limited B      Unable to tolerate change of positions well enough for completing exam due to pain       PT Evaluation Complete? NO  Discussed Plan of Care with patient: YES     [1] Manual therapy x 15'   Long axis distraction and oscillations for hip extension in SL R/L   Contract/relax for hip extension 5 x 5" R/L     [2]Therapeutic exercise for strengthening and/or improving fitness x 45'  Progression to hooklying, 10x horiz abd x 3 at decreasing levels of HOB  Supine glute sets/bridges 2 x 10   Hip hinge with BUE support on table 2 x 10   D2 football motion 10x R/L in hooklying   Unilateral wall slides 3 x 8 R/L NP  Hip progressions from elbows propped to back to wall support        Written HEP Provided: no  Patient education: KALIA  Tito demo good understanding of the education provided. Patient demo good return demo of skill of exercises.    Problem List: muscle length impairments, decreased motor control and mobility, impaired ADLs, activity and participation restrictions.     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  Tolerated hooklying in supine. Long discussion on expectations and to focus on his progress. Encouraged to use rollator walker and go with wife into the store instead of continuing to sit in the car.    Tito is in agreement with the goals that will be addressed in the treatment plan.Tito demonstrates no additional cultural, spiritual or educational needs and currently has no barriers to learning.      Medical necessity: see problem list -  indicates  / mod /  complexity based on clinical presentation that is  / evolving due to tolerance of positioning. .       Functional Goals  Time frame     1.   The pt will lay supine with legs extended without increase in sxs to improve walking posture.   6 weeks     2.   The pt will ambulate > 200' in 2 minutes with SC for improved community ambulation.   6 weeks     3.    The pt will ambulate > 500' in 6 minutes for improved community ambulation.   8 weeks     4.   The pt will be independent in performance and progression of HEP.     2-3 visits            Plan:      Proceed/Continue with POC.     Pt will be treated by physical therapy 1-2x/week during the certification period. Treatment will consist of therapeutic exercise, manual therapy, neuromuscular re-education, heat and cold modalities, electrical stimulation for pain relief and/or muscle activation, and any other types of treatment hat may be deemed medically necessary. Tito may at times be seen by a PTA as part of the Rehab Team.       Physical Therapist: BETTY Mercado, PT, DPT, CSCS    I certify the need for these services furnished under this plan of treatment and while under my care.       ___________________________________  Physician/Referring Practitioner        _________________  Date of Signature

## 2017-07-10 ENCOUNTER — HOSPITAL ENCOUNTER (OUTPATIENT)
Dept: UROLOGY | Facility: HOSPITAL | Age: 72
Discharge: HOME | End: 2017-07-10
Attending: UROLOGY
Payer: MEDICARE

## 2017-07-10 VITALS
RESPIRATION RATE: 18 BRPM | DIASTOLIC BLOOD PRESSURE: 90 MMHG | HEART RATE: 65 BPM | TEMPERATURE: 98 F | SYSTOLIC BLOOD PRESSURE: 159 MMHG | HEIGHT: 71 IN | WEIGHT: 213 LBS | BODY MASS INDEX: 29.82 KG/M2

## 2017-07-10 DIAGNOSIS — R31.29 MICROSCOPIC HEMATURIA: ICD-10-CM

## 2017-07-10 PROCEDURE — 52000 CYSTOURETHROSCOPY: CPT

## 2017-07-10 PROCEDURE — 52000 CYSTOURETHROSCOPY: CPT | Mod: ,,, | Performed by: UROLOGY

## 2017-07-10 RX ORDER — LIDOCAINE HYDROCHLORIDE 20 MG/ML
JELLY TOPICAL ONCE
Status: COMPLETED | OUTPATIENT
Start: 2017-07-10 | End: 2017-07-10

## 2017-07-10 RX ORDER — CIPROFLOXACIN 500 MG/1
500 TABLET ORAL ONCE
Status: COMPLETED | OUTPATIENT
Start: 2017-07-10 | End: 2017-07-10

## 2017-07-10 RX ADMIN — LIDOCAINE HYDROCHLORIDE: 20 JELLY TOPICAL at 08:07

## 2017-07-10 RX ADMIN — CIPROFLOXACIN 500 MG: 500 TABLET ORAL at 08:07

## 2017-07-10 NOTE — PATIENT INSTRUCTIONS
What to Expect After a Cystoscopy  For the next 24-48 hours, you may feel a mild burning when you urinate. This burning is normal and expected. Drink plenty of water to dilute the urine to help relieve the burning sensation. You may also see a small amount of blood in your urine after the procedure.    Unless you are already taking antibiotics, you may be given an antibiotic after the test to prevent infection.    Signs and Symptoms to Report  Call the [facility] at [phone] if you develop any of the following:  · Fever of 101 degrees or higher  · Chills or persistent bleeding  · Inability to urinate

## 2017-07-10 NOTE — H&P
CHIEF COMPLAINT:    Mr. Menard is a 71 y.o. male presenting for a flexible cystoscopy to complete workup for microscopic hematuria  PRESENTING ILLNESS:    Tito Menard is a 71 y.o. male who was found to have microscopic hematuria at his last visit.  His renal ultrasound showed a simple right renal cyst.  Left side was normal.  He presents today for flexible cystoscopy.      REVIEW OF SYSTEMS:    Review of Systems   Constitutional: Negative.    HENT: Negative.    Eyes: Negative.    Respiratory: Negative.    Cardiovascular: Negative.    Gastrointestinal: Negative.    Genitourinary: Negative.    Musculoskeletal: Positive for back pain.   Skin: Negative.    Neurological: Negative.    Endo/Heme/Allergies: Negative.    Psychiatric/Behavioral: Negative.      PATIENT HISTORY:    Past Medical History:   Diagnosis Date    Allergy     Aneurysm of artery of lower extremity     Chalazion of left eye     Diabetes mellitus type II     Enlarged aorta 8/2/2016    Noted on pharmacological stress echo 2/28/2014.      GERD (gastroesophageal reflux disease)     egd 2008    Hyperlipidemia     Hypertension     MGD (meibomian gland dysfunction)     Osteopenia     Spinal stenosis     Spondylosis without myelopathy 10/23/2015    Vitamin D deficiency disease        Past Surgical History:   Procedure Laterality Date    CHALAZION EXCISION Left 8/18/13    CYST REMOVAL  2013    Back of neck    SPINE SURGERY  2007    x2 (2000, 2007)       Family History   Problem Relation Age of Onset    Hyperlipidemia Mother     Hypertension Mother     Kidney disease Mother     Cancer Mother     Hypertension Maternal Grandmother      Social History    Marital status:      Occupational History    Retired           Social History Main Topics    Smoking status: Never Smoker    Smokeless tobacco: Former User     Types: Chew      Comment: Chewed tobacco once per day for 4-5 years when a     Alcohol use No     Drug use: No    Sexual activity: No     Social History Narrative        Colon 2007       Allergies:  Review of patient's allergies indicates no known allergies.    Medications:  [unfilled]      PHYSICAL EXAMINATION:    The patient generally appears in good health, is appropriately interactive, and is in no apparent distress.    Skin: No lesions.    Mental: Cooperative with normal affect.    Neuro: Grossly intact.    HEENT: Normal. No evidence of lymphadenopathy.    Chest:  normal inspiratory effort.    Abdomen: Soft, non-tender. No masses or organomegaly. Bladder is not palpable. No evidence of flank discomfort. No evidence of inguinal hernia.    Extremities: No clubbing, cyanosis, or edema      LABS:    Lab Results   Component Value Date    BUN 13 06/12/2017    CREATININE 0.9 06/12/2017     Lab Results   Component Value Date    PSA 1.9 05/27/2016    PSA 1.8 06/30/2014    PSA 1.35 06/17/2013    PSADIAG 1.6 06/12/2017    PSADIAG 1.9 05/15/2015       IMPRESSION:    Microscopic hematuria    PLAN:    1.  For cystoscopy.

## 2017-07-10 NOTE — PROCEDURES
CYSTOSCOPY REPORT    Pre Procedure Diagnosis:  Microscopic hematuria    Post Procedure Diagnosis:  Enlarged prostate    Anesthesia: 10 cc 2% lidocaine jelly applied per urethra.    14 FR Flexible Olympus cystoscope used.    FINDINGS:  Dome, anterior, posterior, lateral walls and bladder base free of urothelial abnormalities. Right and left ureteral orifices in the normal postion and configuration, and were only able to be seen on retroflexion of the cystoscope due to the prostate.  Bladder neck and urethra were normal.  At the bulbar urethra was a soft, thin stricture that accommodated the flexible cystoscope without difficulties.   The SVML was 3 cm.  The prostate was enlarged and has circumferential protrusion into the bladder.      Specimen:  none    The patient was taken to the cystoscopy suite and placed in supine position.  The genitalia was prepped and draped  in the usual sterile fashion.  Two percent lidocaine jelly was inserted in the urethra and held in place with a penile clamp.  After sufficent time had passed to allow good local anesthesia, the cystoscope was inserted in the urethra and passed into the bladder visualizing the urethra along its entire course.  The dome, anterior, posterior and lateral walls were examined systematically.  The ureteral orifices were in their usual position and configuration.  The cystoscope was turned upon itself 180 degrees to visualize the bladder neck.  The cystoscope was then brought to the level of the bladder neck, the water was turned on and the prostate was visualized.  The cystoscope was removed and the patient was instructed to urinate prior to leaving the office.     Post procedure medication:  Cipro 500 mg x 1    ADDITIONAL NOTES:  Renal ultrasound showed a simple right renal cyst.  PSA was 1.6 ng/ml on 6/12/2017 which is stable.       ASSESSMENT/PLAN:  71 year old man status post flexible cystoscopy.  1. Push fluids for 24 hours.  2. May see blood in the  urine, this should gradually improve over the next 2-3 days.  3. The patient was instructed to return to the office or go to the emergency should fever, chills, cloudy urine, or inability to urinate develop.  4. Follow up in 6 months.  We discussed that if his symptoms bothered him, then would do urodynamics.  He has a lot on his plate right now with his wife being ill, he is going through PT for his back.  Would do a TURP if he requires an outlet relieving procedure in the future.

## 2017-07-13 ENCOUNTER — CLINICAL SUPPORT (OUTPATIENT)
Dept: REHABILITATION | Facility: HOSPITAL | Age: 72
End: 2017-07-13
Attending: PSYCHIATRY & NEUROLOGY
Payer: MEDICARE

## 2017-07-13 DIAGNOSIS — G89.29 CHRONIC RIGHT-SIDED LOW BACK PAIN WITHOUT SCIATICA: ICD-10-CM

## 2017-07-13 DIAGNOSIS — M54.50 CHRONIC RIGHT-SIDED LOW BACK PAIN WITHOUT SCIATICA: ICD-10-CM

## 2017-07-13 PROCEDURE — G8979 MOBILITY GOAL STATUS: HCPCS | Mod: CK,PO

## 2017-07-13 PROCEDURE — 97110 THERAPEUTIC EXERCISES: CPT | Mod: PO

## 2017-07-13 PROCEDURE — G8978 MOBILITY CURRENT STATUS: HCPCS | Mod: CL,PO

## 2017-07-13 NOTE — PROGRESS NOTES
Name: Tito Menard  Clinic Number: [id number]  07/13/2017.     Diagnosis: low back pain   Physician: Omar Casey MD  Treatment Orders: PT Eval and Treat    Past Medical History:   Diagnosis Date    Allergy     Aneurysm of artery of lower extremity     Chalazion of left eye     Diabetes mellitus type II     Enlarged aorta 8/2/2016    Noted on pharmacological stress echo 2/28/2014.      GERD (gastroesophageal reflux disease)     egd 2008    Hyperlipidemia     Hypertension     MGD (meibomian gland dysfunction)     Osteopenia     Spinal stenosis     Spondylosis without myelopathy 10/23/2015    Vitamin D deficiency disease      Current Outpatient Prescriptions   Medication Sig    acetaminophen (TYLENOL) 500 mg Cap Take 1,000 mg by mouth nightly as needed (for back pain).     albuterol (PROVENTIL) 2.5 mg /3 mL (0.083 %) nebulizer solution Take 3 mLs (2.5 mg total) by nebulization every 6 (six) hours as needed for Wheezing.    albuterol 90 mcg/actuation inhaler Inhale 1-2 puffs into the lungs every 6 (six) hours as needed for Wheezing.    amlodipine (NORVASC) 5 MG tablet TAKE 1 TABLET EVERY DAY    aspirin (ECOTRIN) 81 MG EC tablet Take 81 mg by mouth once daily.    azelastine (ASTELIN) 137 mcg nasal spray 1 spray (137 mcg total) by Nasal route 2 (two) times daily. (Patient taking differently: 1 spray by Nasal route 2 (two) times daily as needed. )    cholecalciferol, vitamin D3, (VITAMIN D3) 2,000 unit Cap Take 1 capsule by mouth once daily.    econazole nitrate 1 % cream Apply topically 2 (two) times daily. (Patient taking differently: Apply topically 2 (two) times daily as needed. )    finasteride (PROSCAR) 5 mg tablet TAKE 1 TABLET ONE TIME DAILY    fish oil-omega-3 fatty acids 300-1,000 mg capsule Take 1 g by mouth once daily.    fluticasone (FLONASE) 50 mcg/actuation nasal spray USE 1 SPRAY NASALLY ONE TIME DAILY    gabapentin (NEURONTIN) 300 MG capsule TAKE 1 CAPSULE THREE TIMES  DAILY    guaifenesin (MUCINEX) 600 mg 12 hr tablet Take 1,200 mg by mouth 2 (two) times daily.    lisinopril-hydrochlorothiazide (PRINZIDE,ZESTORETIC) 20-12.5 mg per tablet TAKE 2 TABLETS ONE TIME DAILY    methocarbamol (ROBAXIN) 500 MG Tab 3 (three) times daily as needed.     olopatadine (PATADAY) 0.2 % Drop Place 1 drop into both eyes once daily. (Patient taking differently: Place 1 drop into both eyes daily as needed. )    omeprazole (PRILOSEC) 20 MG capsule TAKE 2 CAPSULES ONE TIME DAILY (Patient taking differently: TAKE 1 CAPSULES ONE TIME DAILY)    potassium chloride (MICRO-K) 10 MEQ CpSR Take 2 capsules (20 mEq total) by mouth once daily.    pravastatin (PRAVACHOL) 20 MG tablet TAKE 1 TABLET ONE TIME DAILY    tamsulosin (FLOMAX) 0.4 mg Cp24 Take 1 capsule (0.4 mg total) by mouth once daily.    terbinafine HCl (LAMISIL) 250 mg tablet Take 1 tablet (250 mg total) by mouth once daily. 1 pill by mouth x 90 days. Avoid alcohol intake while taking medication     No current facility-administered medications for this visit.      Review of patient's allergies indicates:  No Known Allergies  Precautions: fall risk       Subjective:    Previous PT: yes; same dx; states PT helped; could do things without a cane before 2014  Work history: retired; was a ; had to retire 2/2 back, retired in 2006 or 7  Recreational activities/exercise program: basically got away from everything to take care of her back     Tito reports able to tolerate last test, and they had an adjustable table that also helped. States been using his rollator and walking up/down the driveway. His wife will walk with him. Has been doing the same in the store as well. Rates himself at 30-40%. States probably at 0% when he first came in .     Pain is rated on a 0-10 scale with 0 being no pain and 10 being pain requiring emergency room visit.    Diagnostic Tests: n/a    Patient Goals for PT: improve walking      Objective:    FOTO Low  back  Survey 63% limitations  Current -FS Goal -GH 51%)   Category: Mobility     G-Code Modifiers  CH  0% Impaired, limited, or restricted    CI  19% - 1%  Impaired, limited, or restricted    CJ  20% - 39% Impaired, limited, or restricted    CK  40% - 59% Impaired, limited, or restricted    CL  60% - 79% Impaired, limited, or restricted    CM  80% - 99% Impaired, limited, or restricted    CN  100% Impaired, limited, or restricted        Visit # 5  Time In: 0900  Time Out: 1000  Treatment time: 45  Date of eval/re-eval: 7/13/2017       Gait - ambulated with SC in R, decreased step length, reports R LE gives most problems.   2MWT - 245' with SC (150' with SC 6/8/2017)    AROM l/s   Flexion (-) unable to touch toes   Extension (-) lacks full ROM   Rotation 50% limited B      Unable to tolerate change of positions well enough for completing exam due to pain       PT Evaluation Complete? NO  Discussed Plan of Care with patient: YES     [1] Manual therapy x 15'   Lumbar distraction at sacrum with diaphragmatic breathing 15x breaths   Long axis distraction and oscillations for hip extension in SL R/L   PA glides with hip extension     [2]Therapeutic exercise for strengthening and/or improving fitness x 45'  Quadruped hip extension with slider 2 x 12/12   Quadruped shoulder flexion 2 x 12/12     --np--  Progression to hooklying, 10x horiz abd x 3 at decreasing levels of HOB  Supine glute sets/bridges 2 x 10   Hip hinge with BUE support on table 2 x 10   D2 football motion 10x R/L in hooklying   Unilateral wall slides 3 x 8 R/L NP  Hip progressions from elbows propped to back to wall support        Written HEP Provided: no  Patient education: HEP  Tito casey good understanding of the education provided. Patient demo good return demo of skill of exercises.    Problem List: muscle length impairments, decreased motor control and mobility, impaired ADLs, activity and participation restrictions.     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 Improved 2 MWT distance. Reports reduced pain and has improved ability to lay flat. Limitations still present but improving.     Tolerated hooklying in supine. Long discussion on expectations and to focus on his progress. Encouraged to use rollator walker and go with wife into the store instead of continuing to sit in the car.    Tito is in agreement with the goals that will be addressed in the treatment plan.Tito demonstrates no additional cultural, spiritual or educational needs and currently has no barriers to learning.      Medical necessity: see problem list -  indicates  / mod /  complexity based on clinical presentation that is  / evolving due to tolerance of positioning. .       Functional Goals  Time frame     1.   The pt will lay supine with legs extended without increase in sxs to improve walking posture. ongoing  6 weeks     2.   The pt will ambulate > 200' in 2 minutes with SC for improved community ambulation. met  6 weeks     3.    The pt will ambulate > 500' in 6 minutes for improved community ambulation. ongoing  8 weeks     4.   The pt will be independent in performance and progression of HEP.     2-3 visits            Plan:      Proceed/Continue with POC.     Pt will be treated by physical therapy 1-2x/week during the certification period. Treatment will consist of therapeutic exercise, manual therapy, neuromuscular re-education, heat and cold modalities, electrical stimulation for pain relief and/or muscle activation, and any other types of treatment hat may be deemed medically necessary. Tito  may at times be seen by a PTA as part of the Rehab Team.       Physical Therapist: BETTY Mercado, PT, DPT, CSCS    I certify the need for these services furnished under this plan of treatment and while under my care.       ___________________________________  Physician/Referring Practitioner        _________________  Date of Signature

## 2017-07-20 ENCOUNTER — CLINICAL SUPPORT (OUTPATIENT)
Dept: REHABILITATION | Facility: HOSPITAL | Age: 72
End: 2017-07-20
Attending: PSYCHIATRY & NEUROLOGY
Payer: MEDICARE

## 2017-07-20 DIAGNOSIS — G89.29 CHRONIC RIGHT-SIDED LOW BACK PAIN WITHOUT SCIATICA: ICD-10-CM

## 2017-07-20 DIAGNOSIS — M54.50 CHRONIC RIGHT-SIDED LOW BACK PAIN WITHOUT SCIATICA: ICD-10-CM

## 2017-07-20 PROCEDURE — 97140 MANUAL THERAPY 1/> REGIONS: CPT | Mod: PO | Performed by: PHYSICAL THERAPIST

## 2017-07-20 PROCEDURE — 97110 THERAPEUTIC EXERCISES: CPT | Mod: PO | Performed by: PHYSICAL THERAPIST

## 2017-07-20 NOTE — PROGRESS NOTES
Name: Tito Menard  Clinic Number: [id number]  07/20/2017.     Diagnosis: low back pain   Physician: Omar Casey MD  Treatment Orders: PT Eval and Treat    Past Medical History:   Diagnosis Date    Allergy     Aneurysm of artery of lower extremity     Chalazion of left eye     Diabetes mellitus type II     Enlarged aorta 8/2/2016    Noted on pharmacological stress echo 2/28/2014.      GERD (gastroesophageal reflux disease)     egd 2008    Hyperlipidemia     Hypertension     MGD (meibomian gland dysfunction)     Osteopenia     Spinal stenosis     Spondylosis without myelopathy 10/23/2015    Vitamin D deficiency disease      Current Outpatient Prescriptions   Medication Sig    acetaminophen (TYLENOL) 500 mg Cap Take 1,000 mg by mouth nightly as needed (for back pain).     albuterol (PROVENTIL) 2.5 mg /3 mL (0.083 %) nebulizer solution Take 3 mLs (2.5 mg total) by nebulization every 6 (six) hours as needed for Wheezing.    albuterol 90 mcg/actuation inhaler Inhale 1-2 puffs into the lungs every 6 (six) hours as needed for Wheezing.    amlodipine (NORVASC) 5 MG tablet TAKE 1 TABLET EVERY DAY    aspirin (ECOTRIN) 81 MG EC tablet Take 81 mg by mouth once daily.    azelastine (ASTELIN) 137 mcg nasal spray 1 spray (137 mcg total) by Nasal route 2 (two) times daily. (Patient taking differently: 1 spray by Nasal route 2 (two) times daily as needed. )    cholecalciferol, vitamin D3, (VITAMIN D3) 2,000 unit Cap Take 1 capsule by mouth once daily.    econazole nitrate 1 % cream Apply topically 2 (two) times daily. (Patient taking differently: Apply topically 2 (two) times daily as needed. )    finasteride (PROSCAR) 5 mg tablet TAKE 1 TABLET ONE TIME DAILY    fish oil-omega-3 fatty acids 300-1,000 mg capsule Take 1 g by mouth once daily.    fluticasone (FLONASE) 50 mcg/actuation nasal spray USE 1 SPRAY NASALLY ONE TIME DAILY    gabapentin (NEURONTIN) 300 MG capsule TAKE 1 CAPSULE THREE TIMES  DAILY    guaifenesin (MUCINEX) 600 mg 12 hr tablet Take 1,200 mg by mouth 2 (two) times daily.    lisinopril-hydrochlorothiazide (PRINZIDE,ZESTORETIC) 20-12.5 mg per tablet TAKE 2 TABLETS ONE TIME DAILY    methocarbamol (ROBAXIN) 500 MG Tab 3 (three) times daily as needed.     olopatadine (PATADAY) 0.2 % Drop Place 1 drop into both eyes once daily. (Patient taking differently: Place 1 drop into both eyes daily as needed. )    omeprazole (PRILOSEC) 20 MG capsule TAKE 2 CAPSULES ONE TIME DAILY (Patient taking differently: TAKE 1 CAPSULES ONE TIME DAILY)    potassium chloride (MICRO-K) 10 MEQ CpSR Take 2 capsules (20 mEq total) by mouth once daily.    pravastatin (PRAVACHOL) 20 MG tablet TAKE 1 TABLET ONE TIME DAILY    tamsulosin (FLOMAX) 0.4 mg Cp24 Take 1 capsule (0.4 mg total) by mouth once daily.    terbinafine HCl (LAMISIL) 250 mg tablet Take 1 tablet (250 mg total) by mouth once daily. 1 pill by mouth x 90 days. Avoid alcohol intake while taking medication     No current facility-administered medications for this visit.      Review of patient's allergies indicates:  No Known Allergies  Precautions: fall risk       Subjective:    Previous PT: yes; same dx; states PT helped; could do things without a cane before 2014  Work history: retired; was a ; had to retire 2/2 back, retired in 2006 or 7  Recreational activities/exercise program: basically got away from everything to take care of her back     Tito reports he was able to walk around the grocery store yesterday using the shopping cart as a walker. He feels like he walks better with a walker because it helps him stand up straighter     Pain: 5/10 low back pain, pain is rated on a 0-10 scale with 0 being no pain and 10 being pain requiring emergency room visit.    Diagnostic Tests: n/a    Patient Goals for PT: improve walking      Objective:    FOTO Low back  Survey 63% limitations  Current -BR Goal -II 51%)   Category: Mobility      G-Code Modifiers  CH  0% Impaired, limited, or restricted    CI  19% - 1%  Impaired, limited, or restricted    CJ  20% - 39% Impaired, limited, or restricted    CK  40% - 59% Impaired, limited, or restricted    CL  60% - 79% Impaired, limited, or restricted    CM  80% - 99% Impaired, limited, or restricted    CN  100% Impaired, limited, or restricted        Visit # 6 Time In: 0920  Time Out: 1015  Treatment time: 55  Date of eval/re-eval: 7/13/2017       Gait - ambulated with SC in R, decreased step length, reports R LE gives most problems.   2MWT - 245' with SC (150' with SC 6/8/2017)    AROM l/s   Flexion (-) unable to touch toes   Extension (-) lacks full ROM   Rotation 50% limited B      Unable to tolerate change of positions well enough for completing exam due to pain       PT Evaluation Complete? NO  Discussed Plan of Care with patient: YES     [1] Manual therapy x 15'   Lumbar distraction at sacrum with diaphragmatic breathing 15x breaths   Long axis distraction and oscillations for hip extension in SL R/L   PA glides with hip extension     [2]Therapeutic exercise for strengthening and/or improving fitness x 40'  Quadruped hip extension with slider 2 x 12/12   Quadruped shoulder flexion 2 x 12/12   Hip progressions from elbows propped to back to wall support   Unilateral wall slides 3 x 8 R/L   Supine glute sets/bridges 2 x 10 with HOB elevated  Ambulation: 3 laps on turf      --np--  Progression to hooklying, 10x horiz abd x 3 at decreasing levels of HOB  Hip hinge with BUE support on table 2 x 10   D2 football motion 10x R/L in hooklying            Written HEP Provided: no  Patient education: HEP  Tito casey good understanding of the education provided. Patient demo good return demo of skill of exercises.    Problem List: muscle length impairments, decreased motor control and mobility, impaired ADLs, activity and participation restrictions.     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 Pt able to perform minimal supine exercises today, pain increased in supine. Pt did not have any increased pain with standing exercises. Pt tolerated 3 laps ambulation on turf before taking a seated rest break. He has been trying to increase his community ambulation by going to the store with his wife.  Tito is in agreement with the goals that will be addressed in the treatment plan.Tito demonstrates no additional cultural, spiritual or educational needs and currently has no barriers to learning.      Medical necessity: see problem list -  indicates  / mod /  complexity based on clinical presentation that is  / evolving due to tolerance of positioning. .       Functional Goals  Time frame     1.   The pt will lay supine with legs extended without increase in sxs to improve walking posture. ongoing  6 weeks     2.   The pt will ambulate > 200' in 2 minutes with SC for improved community ambulation. met  6 weeks     3.    The pt will ambulate > 500' in 6 minutes for improved community ambulation. ongoing  8 weeks     4.   The pt will be independent in performance and progression of HEP.     2-3 visits            Plan:      Proceed/Continue with POC.     Pt will be treated by physical therapy 1-2x/week during the certification period. Treatment will consist of therapeutic exercise, manual therapy, neuromuscular re-education, heat and cold modalities, electrical stimulation for pain relief and/or muscle activation, and any other types of treatment hat may be deemed medically necessary. Tito may at times be seen by a PTA as part of the Rehab Team.        Physical Therapist: Kael Daniel, PT     I certify the need for these services furnished under this plan of treatment and while under my care.       ___________________________________  Physician/Referring Practitioner        _________________  Date of Signature

## 2017-07-25 ENCOUNTER — CLINICAL SUPPORT (OUTPATIENT)
Dept: REHABILITATION | Facility: HOSPITAL | Age: 72
End: 2017-07-25
Attending: PSYCHIATRY & NEUROLOGY
Payer: MEDICARE

## 2017-07-25 DIAGNOSIS — G89.29 CHRONIC RIGHT-SIDED LOW BACK PAIN WITHOUT SCIATICA: ICD-10-CM

## 2017-07-25 DIAGNOSIS — M54.50 CHRONIC RIGHT-SIDED LOW BACK PAIN WITHOUT SCIATICA: ICD-10-CM

## 2017-07-25 PROCEDURE — 97110 THERAPEUTIC EXERCISES: CPT | Mod: PO

## 2017-07-25 NOTE — PROGRESS NOTES
Name: Tito Menard  Clinic Number: [id number]  07/25/2017.     Diagnosis: low back pain   Physician: Omar Casey MD  Treatment Orders: PT Eval and Treat    Past Medical History:   Diagnosis Date    Allergy     Aneurysm of artery of lower extremity     Chalazion of left eye     Diabetes mellitus type II     Enlarged aorta 8/2/2016    Noted on pharmacological stress echo 2/28/2014.      GERD (gastroesophageal reflux disease)     egd 2008    Hyperlipidemia     Hypertension     MGD (meibomian gland dysfunction)     Osteopenia     Spinal stenosis     Spondylosis without myelopathy 10/23/2015    Vitamin D deficiency disease      Current Outpatient Prescriptions   Medication Sig    acetaminophen (TYLENOL) 500 mg Cap Take 1,000 mg by mouth nightly as needed (for back pain).     albuterol (PROVENTIL) 2.5 mg /3 mL (0.083 %) nebulizer solution Take 3 mLs (2.5 mg total) by nebulization every 6 (six) hours as needed for Wheezing.    albuterol 90 mcg/actuation inhaler Inhale 1-2 puffs into the lungs every 6 (six) hours as needed for Wheezing.    amlodipine (NORVASC) 5 MG tablet TAKE 1 TABLET EVERY DAY    aspirin (ECOTRIN) 81 MG EC tablet Take 81 mg by mouth once daily.    azelastine (ASTELIN) 137 mcg nasal spray 1 spray (137 mcg total) by Nasal route 2 (two) times daily. (Patient taking differently: 1 spray by Nasal route 2 (two) times daily as needed. )    cholecalciferol, vitamin D3, (VITAMIN D3) 2,000 unit Cap Take 1 capsule by mouth once daily.    econazole nitrate 1 % cream Apply topically 2 (two) times daily. (Patient taking differently: Apply topically 2 (two) times daily as needed. )    finasteride (PROSCAR) 5 mg tablet TAKE 1 TABLET ONE TIME DAILY    fish oil-omega-3 fatty acids 300-1,000 mg capsule Take 1 g by mouth once daily.    fluticasone (FLONASE) 50 mcg/actuation nasal spray USE 1 SPRAY NASALLY ONE TIME DAILY    gabapentin (NEURONTIN) 300 MG capsule TAKE 1 CAPSULE THREE TIMES  DAILY    guaifenesin (MUCINEX) 600 mg 12 hr tablet Take 1,200 mg by mouth 2 (two) times daily.    lisinopril-hydrochlorothiazide (PRINZIDE,ZESTORETIC) 20-12.5 mg per tablet TAKE 2 TABLETS ONE TIME DAILY    methocarbamol (ROBAXIN) 500 MG Tab 3 (three) times daily as needed.     olopatadine (PATADAY) 0.2 % Drop Place 1 drop into both eyes once daily. (Patient taking differently: Place 1 drop into both eyes daily as needed. )    omeprazole (PRILOSEC) 20 MG capsule TAKE 2 CAPSULES ONE TIME DAILY (Patient taking differently: TAKE 1 CAPSULES ONE TIME DAILY)    potassium chloride (MICRO-K) 10 MEQ CpSR Take 2 capsules (20 mEq total) by mouth once daily.    pravastatin (PRAVACHOL) 20 MG tablet TAKE 1 TABLET ONE TIME DAILY    tamsulosin (FLOMAX) 0.4 mg Cp24 Take 1 capsule (0.4 mg total) by mouth once daily.    terbinafine HCl (LAMISIL) 250 mg tablet Take 1 tablet (250 mg total) by mouth once daily. 1 pill by mouth x 90 days. Avoid alcohol intake while taking medication     No current facility-administered medications for this visit.      Review of patient's allergies indicates:  No Known Allergies  Precautions: fall risk       Subjective:    Previous PT: yes; same dx; states PT helped; could do things without a cane before 2014  Work history: retired; was a ; had to retire 2/2 back, retired in 2006 or 7  Recreational activities/exercise program: basically got away from everything to take care of her back     Tito reports increased pain with in R leg with activities. States he has been walking more in the grocery store, and has to do more yard work today.      Pain is rated on a 0-10 scale with 0 being no pain and 10 being pain requiring emergency room visit.    Diagnostic Tests: n/a    Patient Goals for PT: improve walking      Objective:    FOTO Low back  Survey 63% limitations  Current -BA Goal -RF 51%)   Category: Mobility     G-Code Modifiers  CH  0% Impaired, limited, or restricted    CI   19% - 1%  Impaired, limited, or restricted    CJ  20% - 39% Impaired, limited, or restricted    CK  40% - 59% Impaired, limited, or restricted    CL  60% - 79% Impaired, limited, or restricted    CM  80% - 99% Impaired, limited, or restricted    CN  100% Impaired, limited, or restricted        Visit # 7  Time In: 0900  Time Out: 1000  Treatment time: 45  Date of eval/re-eval: 7/13/2017       Gait - ambulated with SC in R, decreased step length, reports R LE gives most problems.   2MWT - 245' with SC (150' with SC 6/8/2017)    AROM l/s   Flexion (-) unable to touch toes   Extension (-) lacks full ROM   Rotation 50% limited B      Unable to tolerate change of positions well enough for completing exam due to pain       PT Evaluation Complete? NO  Discussed Plan of Care with patient: YES      [2]Therapeutic exercise for strengthening and/or improving fitness x 30'  Quadruped hip extension with slider 2 x 12/12   Quadruped shoulder flexion 2 x 12/12   Seated shoulder flexion 3 x 10 BUE with elevated table height    --np--  Progression to hooklying, 10x horiz abd x 3 at decreasing levels of HOB  Supine glute sets/bridges 2 x 10   Hip hinge with BUE support on table 2 x 10   D2 football motion 10x R/L in hooklying   Unilateral wall slides 3 x 8 R/L NP  Hip progressions from elbows propped to back to wall support        Written HEP Provided: no  Patient education: HEP  Tito casey good understanding of the education provided. Patient demo good return demo of skill of exercises.    Problem List: muscle length impairments, decreased motor control and mobility, impaired ADLs, activity and participation restrictions.     Personal factors: level of disability, chronicity of sxs, coping strategies     CPT Code reference        Hx  Exam  Presentation   Code     Low     0   1-2    Stable    99605     Mod     1-2   3    Evolving    25888     High     3+   4+     Unstable    97247       Assessment:    Physical therapy diagnosis:  lumbar extension and rotation syndrome   Rehab potential: fair     Tito tolerated tx without increase in sxs. Decreased exercise tolerance today. Continue to try and shift attention away from R thigh sxs and focus on function.  LE likely involved, especially with some l/s pathology, but based on structure unable to really unload the structures.   Tito is in agreement with the goals that will be addressed in the treatment plan.Tito demonstrates no additional cultural, spiritual or educational needs and currently has no barriers to learning.      Medical necessity: see problem list -  indicates  / mod /  complexity based on clinical presentation that is  / evolving due to tolerance of positioning. .       Functional Goals  Time frame     1.   The pt will lay supine with legs extended without increase in sxs to improve walking posture. ongoing  6 weeks     2.   The pt will ambulate > 200' in 2 minutes with SC for improved community ambulation. met  6 weeks     3.    The pt will ambulate > 500' in 6 minutes for improved community ambulation. ongoing  8 weeks     4.   The pt will be independent in performance and progression of HEP.     2-3 visits            Plan:      Proceed/Continue with POC.     Pt will be treated by physical therapy 1-2x/week during the certification period. Treatment will consist of therapeutic exercise, manual therapy, neuromuscular re-education, heat and cold modalities, electrical stimulation for pain relief and/or muscle activation, and any other types of treatment hat may be deemed medically necessary. Tito may at times be seen by a PTA as part of the Rehab Team.       Physical Therapist: BETTY Mercado, PT, DPT, CSCS    I certify the need for these services furnished under this plan of treatment and while under my care.       ___________________________________  Physician/Referring Practitioner        _________________  Date of Signature

## 2017-08-01 ENCOUNTER — CLINICAL SUPPORT (OUTPATIENT)
Dept: REHABILITATION | Facility: HOSPITAL | Age: 72
End: 2017-08-01
Attending: PSYCHIATRY & NEUROLOGY
Payer: MEDICARE

## 2017-08-01 DIAGNOSIS — M54.50 CHRONIC RIGHT-SIDED LOW BACK PAIN WITHOUT SCIATICA: ICD-10-CM

## 2017-08-01 DIAGNOSIS — G89.29 CHRONIC RIGHT-SIDED LOW BACK PAIN WITHOUT SCIATICA: ICD-10-CM

## 2017-08-01 PROCEDURE — 97110 THERAPEUTIC EXERCISES: CPT | Mod: PO

## 2017-08-01 NOTE — PROGRESS NOTES
Name: Tito Menard  Clinic Number: [id number]  08/01/2017.     Diagnosis: low back pain   Physician: Omar Casey MD  Treatment Orders: PT Eval and Treat    Past Medical History:   Diagnosis Date    Allergy     Aneurysm of artery of lower extremity     Chalazion of left eye     Diabetes mellitus type II     Enlarged aorta 8/2/2016    Noted on pharmacological stress echo 2/28/2014.      GERD (gastroesophageal reflux disease)     egd 2008    Hyperlipidemia     Hypertension     MGD (meibomian gland dysfunction)     Osteopenia     Spinal stenosis     Spondylosis without myelopathy 10/23/2015    Vitamin D deficiency disease      Current Outpatient Prescriptions   Medication Sig    acetaminophen (TYLENOL) 500 mg Cap Take 1,000 mg by mouth nightly as needed (for back pain).     albuterol (PROVENTIL) 2.5 mg /3 mL (0.083 %) nebulizer solution Take 3 mLs (2.5 mg total) by nebulization every 6 (six) hours as needed for Wheezing.    albuterol 90 mcg/actuation inhaler Inhale 1-2 puffs into the lungs every 6 (six) hours as needed for Wheezing.    amlodipine (NORVASC) 5 MG tablet TAKE 1 TABLET EVERY DAY    aspirin (ECOTRIN) 81 MG EC tablet Take 81 mg by mouth once daily.    azelastine (ASTELIN) 137 mcg nasal spray 1 spray (137 mcg total) by Nasal route 2 (two) times daily. (Patient taking differently: 1 spray by Nasal route 2 (two) times daily as needed. )    cholecalciferol, vitamin D3, (VITAMIN D3) 2,000 unit Cap Take 1 capsule by mouth once daily.    econazole nitrate 1 % cream Apply topically 2 (two) times daily. (Patient taking differently: Apply topically 2 (two) times daily as needed. )    finasteride (PROSCAR) 5 mg tablet TAKE 1 TABLET ONE TIME DAILY    fish oil-omega-3 fatty acids 300-1,000 mg capsule Take 1 g by mouth once daily.    fluticasone (FLONASE) 50 mcg/actuation nasal spray USE 1 SPRAY NASALLY ONE TIME DAILY    gabapentin (NEURONTIN) 300 MG capsule TAKE 1 CAPSULE THREE TIMES  DAILY    guaifenesin (MUCINEX) 600 mg 12 hr tablet Take 1,200 mg by mouth 2 (two) times daily.    lisinopril-hydrochlorothiazide (PRINZIDE,ZESTORETIC) 20-12.5 mg per tablet TAKE 2 TABLETS ONE TIME DAILY    methocarbamol (ROBAXIN) 500 MG Tab 3 (three) times daily as needed.     olopatadine (PATADAY) 0.2 % Drop Place 1 drop into both eyes once daily. (Patient taking differently: Place 1 drop into both eyes daily as needed. )    omeprazole (PRILOSEC) 20 MG capsule TAKE 2 CAPSULES ONE TIME DAILY (Patient taking differently: TAKE 1 CAPSULES ONE TIME DAILY)    potassium chloride (MICRO-K) 10 MEQ CpSR Take 2 capsules (20 mEq total) by mouth once daily.    pravastatin (PRAVACHOL) 20 MG tablet TAKE 1 TABLET ONE TIME DAILY    tamsulosin (FLOMAX) 0.4 mg Cp24 Take 1 capsule (0.4 mg total) by mouth once daily.    terbinafine HCl (LAMISIL) 250 mg tablet Take 1 tablet (250 mg total) by mouth once daily. 1 pill by mouth x 90 days. Avoid alcohol intake while taking medication     No current facility-administered medications for this visit.      Review of patient's allergies indicates:  No Known Allergies  Precautions: fall risk       Subjective:    Previous PT: yes; same dx; states PT helped; could do things without a cane before 2014  Work history: retired; was a ; had to retire 2/2 back, retired in 2006 or 7  Recreational activities/exercise program: basically got away from everything to take care of her back     Tito reports doing slightly better. Less pain in the R leg.     Pain is rated on a 0-10 scale with 0 being no pain and 10 being pain requiring emergency room visit.    Diagnostic Tests: n/a    Patient Goals for PT: improve walking      Objective:    FOTO Low back  Survey 63% limitations  Current -LB Goal -MZ 51%)   Category: Mobility     G-Code Modifiers  CH  0% Impaired, limited, or restricted    CI  19% - 1%  Impaired, limited, or restricted    CJ  20% - 39% Impaired, limited, or  restricted    CK  40% - 59% Impaired, limited, or restricted    CL  60% - 79% Impaired, limited, or restricted    CM  80% - 99% Impaired, limited, or restricted    CN  100% Impaired, limited, or restricted        Visit # 8  Time In: 0900  Time Out: 0930  Treatment time: 30'   Date of eval/re-eval: 7/13/2017       Gait - ambulated with SC in R, decreased step length, reports R LE gives most problems.   2MWT - 245' with SC (150' with SC 6/8/2017)    AROM l/s   Flexion (-) unable to touch toes   Extension (-) lacks full ROM   Rotation 50% limited B      PT Evaluation Complete? NO  Discussed Plan of Care with patient: YES      [2]Therapeutic exercise for strengthening and/or improving fitness x 30'  Quadruped hip extension with slider 2 x 12/12   Quadruped shoulder flexion 2 x 12/12   Hip hinge 2 x 8 10# each hand, 8x 15# each hand       Written HEP Provided: no  Patient education: HEP  Tito demo good understanding of the education provided. Patient demo good return demo of skill of exercises.    Problem List: muscle length impairments, decreased motor control and mobility, impaired ADLs, activity and participation restrictions.     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 Added deadlift pattern with resistance. Did well.   Tito is in agreement with the goals that will be addressed in the treatment plan.Tito demonstrates no additional cultural, spiritual or educational needs and currently has no barriers to learning.      Medical necessity: see problem list -  indicates  / mod /  complexity based on clinical presentation that is  / evolving  due to tolerance of positioning. .       Functional Goals  Time frame     1.   The pt will lay supine with legs extended without increase in sxs to improve walking posture. ongoing  6 weeks     2.   The pt will ambulate > 200' in 2 minutes with SC for improved community ambulation. met  6 weeks     3.    The pt will ambulate > 500' in 6 minutes for improved community ambulation. ongoing  8 weeks     4.   The pt will be independent in performance and progression of HEP.     2-3 visits            Plan:      Proceed/Continue with POC.     Pt will be treated by physical therapy 1-2x/week during the certification period. Treatment will consist of therapeutic exercise, manual therapy, neuromuscular re-education, heat and cold modalities, electrical stimulation for pain relief and/or muscle activation, and any other types of treatment hat may be deemed medically necessary. Tito may at times be seen by a PTA as part of the Rehab Team.       Physical Therapist: BETTY Mercado, PT, DPT, CSCS    I certify the need for these services furnished under this plan of treatment and while under my care.       ___________________________________  Physician/Referring Practitioner        _________________  Date of Signature

## 2017-08-08 ENCOUNTER — CLINICAL SUPPORT (OUTPATIENT)
Dept: REHABILITATION | Facility: HOSPITAL | Age: 72
End: 2017-08-08
Attending: PSYCHIATRY & NEUROLOGY
Payer: MEDICARE

## 2017-08-08 DIAGNOSIS — M54.50 CHRONIC RIGHT-SIDED LOW BACK PAIN WITHOUT SCIATICA: ICD-10-CM

## 2017-08-08 DIAGNOSIS — G89.29 CHRONIC RIGHT-SIDED LOW BACK PAIN WITHOUT SCIATICA: ICD-10-CM

## 2017-08-08 PROCEDURE — 97110 THERAPEUTIC EXERCISES: CPT | Mod: PO

## 2017-08-08 RX ORDER — POTASSIUM CHLORIDE 750 MG/1
CAPSULE, EXTENDED RELEASE ORAL
Qty: 180 CAPSULE | Refills: 0 | Status: SHIPPED | OUTPATIENT
Start: 2017-08-08 | End: 2017-10-10 | Stop reason: SDUPTHER

## 2017-08-08 NOTE — PROGRESS NOTES
Name: Tito Menard  Clinic Number: [id number]  08/08/2017.     Diagnosis: low back pain   Physician: Omar Casey MD  Treatment Orders: PT Eval and Treat    Past Medical History:   Diagnosis Date    Allergy     Aneurysm of artery of lower extremity     Chalazion of left eye     Diabetes mellitus type II     Enlarged aorta 8/2/2016    Noted on pharmacological stress echo 2/28/2014.      GERD (gastroesophageal reflux disease)     egd 2008    Hyperlipidemia     Hypertension     MGD (meibomian gland dysfunction)     Osteopenia     Spinal stenosis     Spondylosis without myelopathy 10/23/2015    Vitamin D deficiency disease      Current Outpatient Prescriptions   Medication Sig    acetaminophen (TYLENOL) 500 mg Cap Take 1,000 mg by mouth nightly as needed (for back pain).     albuterol (PROVENTIL) 2.5 mg /3 mL (0.083 %) nebulizer solution Take 3 mLs (2.5 mg total) by nebulization every 6 (six) hours as needed for Wheezing.    albuterol 90 mcg/actuation inhaler Inhale 1-2 puffs into the lungs every 6 (six) hours as needed for Wheezing.    amlodipine (NORVASC) 5 MG tablet TAKE 1 TABLET EVERY DAY    aspirin (ECOTRIN) 81 MG EC tablet Take 81 mg by mouth once daily.    azelastine (ASTELIN) 137 mcg nasal spray 1 spray (137 mcg total) by Nasal route 2 (two) times daily. (Patient taking differently: 1 spray by Nasal route 2 (two) times daily as needed. )    cholecalciferol, vitamin D3, (VITAMIN D3) 2,000 unit Cap Take 1 capsule by mouth once daily.    econazole nitrate 1 % cream Apply topically 2 (two) times daily. (Patient taking differently: Apply topically 2 (two) times daily as needed. )    finasteride (PROSCAR) 5 mg tablet TAKE 1 TABLET ONE TIME DAILY    fish oil-omega-3 fatty acids 300-1,000 mg capsule Take 1 g by mouth once daily.    fluticasone (FLONASE) 50 mcg/actuation nasal spray USE 1 SPRAY NASALLY ONE TIME DAILY    gabapentin (NEURONTIN) 300 MG capsule TAKE 1 CAPSULE THREE TIMES  DAILY    guaifenesin (MUCINEX) 600 mg 12 hr tablet Take 1,200 mg by mouth 2 (two) times daily.    lisinopril-hydrochlorothiazide (PRINZIDE,ZESTORETIC) 20-12.5 mg per tablet TAKE 2 TABLETS ONE TIME DAILY    methocarbamol (ROBAXIN) 500 MG Tab 3 (three) times daily as needed.     olopatadine (PATADAY) 0.2 % Drop Place 1 drop into both eyes once daily. (Patient taking differently: Place 1 drop into both eyes daily as needed. )    omeprazole (PRILOSEC) 20 MG capsule TAKE 2 CAPSULES ONE TIME DAILY (Patient taking differently: TAKE 1 CAPSULES ONE TIME DAILY)    potassium chloride (MICRO-K) 10 MEQ CpSR TAKE 2 CAPSULES ONE TIME DAILY    pravastatin (PRAVACHOL) 20 MG tablet TAKE 1 TABLET ONE TIME DAILY    tamsulosin (FLOMAX) 0.4 mg Cp24 Take 1 capsule (0.4 mg total) by mouth once daily.    terbinafine HCl (LAMISIL) 250 mg tablet Take 1 tablet (250 mg total) by mouth once daily. 1 pill by mouth x 90 days. Avoid alcohol intake while taking medication     No current facility-administered medications for this visit.      Review of patient's allergies indicates:  No Known Allergies  Precautions: fall risk     Subjective:    Previous PT: yes; same dx; states PT helped; could do things without a cane before 2014  Work history: retired; was a ; had to retire 2/2 back, retired in 2006 or 7  Recreational activities/exercise program: basically got away from everything to take care of her back     Tito reports he normally uses walker and cane at home and walker in community. Bending forward and walking is hard for him. He is compliant with HEP.     Pain is rated 0/10 on a 0-10 scale with 0 being no pain and 10 being pain requiring emergency room visit.    Diagnostic Tests: n/a    Patient Goals for PT: improve walking    Objective:    FOTO Low back  Survey 63% limitations  Current -LZ Goal -PA 51%)   Category: Mobility     G-Code Modifiers  CH  0% Impaired, limited, or restricted    CI  19% - 1%  Impaired,  limited, or restricted    CJ  20% - 39% Impaired, limited, or restricted    CK  40% - 59% Impaired, limited, or restricted    CL  60% - 79% Impaired, limited, or restricted    CM  80% - 99% Impaired, limited, or restricted    CN  100% Impaired, limited, or restricted        Visit # 9  Time In: 0902  Time Out: 1000  Treatment time: 58'   Date of eval/re-eval: 7/13/2017       Gait - ambulated with SC in R, decreased step length, reports R LE gives most problems.   2MWT - 245' with SC (150' with SC 6/8/2017)    AROM l/s   Flexion (-) unable to touch toes   Extension (-) lacks full ROM   Rotation 50% limited B    PT Evaluation Complete? NO  Discussed Plan of Care with patient: YES       [2]Therapeutic exercise for strengthening and/or improving fitness x 30'  Sidelying hip flexor/quad str w strap 5 x 30 sec  Supine hip flexor/quad str w stool 5 x 30 sec  Supine hamstring str w strap 5 x 30 sec  Heel slides x 30  Quadruped hip extension with slider 2 x 12/12   Quadruped shoulder flexion 2 x 12/12   Hip hinge 2 x 8 10# each hand, 8x 15# each hand    Written HEP Provided: supine hamstring stretch, sidelying hip flexor/quad stretch, supine hip flexor/quad stretch  Patient education: KALIA  Tito demo good understanding of the education provided. Patient demo good return demo of skill of exercises.    Problem List: muscle length impairments, decreased motor control and mobility, impaired ADLs, activity and participation restrictions.     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 He was  challenged with quadruped and hip hinging exercises targeting core stabilization. He required manual and verbal cueing to maintain proper spinal alignment. Tito is in agreement with the goals that will be addressed in the treatment plan.Tito demonstrates no additional cultural, spiritual or educational needs and currently has no barriers to learning.      Medical necessity: see problem list -  indicates  / mod /  complexity based on clinical presentation that is  / evolving due to tolerance of positioning. .       Functional Goals  Time frame     1.   The pt will lay supine with legs extended without increase in sxs to improve walking posture. ongoing  6 weeks     2.   The pt will ambulate > 200' in 2 minutes with SC for improved community ambulation. met  6 weeks     3.    The pt will ambulate > 500' in 6 minutes for improved community ambulation. ongoing  8 weeks     4.   The pt will be independent in performance and progression of HEP.     2-3 visits            Plan:      Proceed/Continue with POC.     Pt will be treated by physical therapy 1-2x/week during the certification period. Treatment will consist of therapeutic exercise, manual therapy, neuromuscular re-education, heat and cold modalities, electrical stimulation for pain relief and/or muscle activation, and any other types of treatment hat may be deemed medically necessary. Tito may at times be seen by a PTA as part of the Rehab Team.       Physical Therapist: BETTY Mercado, PT, DPT, CSCS    I certify the need for these services furnished under this plan of treatment and while under my care.       ___________________________________  Physician/Referring Practitioner        _________________  Date of Signature

## 2017-08-15 ENCOUNTER — CLINICAL SUPPORT (OUTPATIENT)
Dept: REHABILITATION | Facility: HOSPITAL | Age: 72
End: 2017-08-15
Attending: PSYCHIATRY & NEUROLOGY
Payer: MEDICARE

## 2017-08-15 DIAGNOSIS — M54.50 CHRONIC RIGHT-SIDED LOW BACK PAIN WITHOUT SCIATICA: ICD-10-CM

## 2017-08-15 DIAGNOSIS — G89.29 CHRONIC RIGHT-SIDED LOW BACK PAIN WITHOUT SCIATICA: ICD-10-CM

## 2017-08-15 PROCEDURE — 97110 THERAPEUTIC EXERCISES: CPT | Mod: PO

## 2017-08-15 PROCEDURE — 97140 MANUAL THERAPY 1/> REGIONS: CPT | Mod: PO

## 2017-08-15 NOTE — PROGRESS NOTES
Name: Tito Menard  Clinic Number: [id number]  08/15/2017.     Diagnosis: low back pain   Physician: Omar Casey MD  Treatment Orders: PT Eval and Treat    Past Medical History:   Diagnosis Date    Allergy     Aneurysm of artery of lower extremity     Chalazion of left eye     Diabetes mellitus type II     Enlarged aorta 8/2/2016    Noted on pharmacological stress echo 2/28/2014.      GERD (gastroesophageal reflux disease)     egd 2008    Hyperlipidemia     Hypertension     MGD (meibomian gland dysfunction)     Osteopenia     Spinal stenosis     Spondylosis without myelopathy 10/23/2015    Vitamin D deficiency disease      Current Outpatient Prescriptions   Medication Sig    acetaminophen (TYLENOL) 500 mg Cap Take 1,000 mg by mouth nightly as needed (for back pain).     albuterol (PROVENTIL) 2.5 mg /3 mL (0.083 %) nebulizer solution Take 3 mLs (2.5 mg total) by nebulization every 6 (six) hours as needed for Wheezing.    albuterol 90 mcg/actuation inhaler Inhale 1-2 puffs into the lungs every 6 (six) hours as needed for Wheezing.    amlodipine (NORVASC) 5 MG tablet TAKE 1 TABLET EVERY DAY    aspirin (ECOTRIN) 81 MG EC tablet Take 81 mg by mouth once daily.    azelastine (ASTELIN) 137 mcg nasal spray 1 spray (137 mcg total) by Nasal route 2 (two) times daily. (Patient taking differently: 1 spray by Nasal route 2 (two) times daily as needed. )    cholecalciferol, vitamin D3, (VITAMIN D3) 2,000 unit Cap Take 1 capsule by mouth once daily.    econazole nitrate 1 % cream Apply topically 2 (two) times daily. (Patient taking differently: Apply topically 2 (two) times daily as needed. )    finasteride (PROSCAR) 5 mg tablet TAKE 1 TABLET ONE TIME DAILY    fish oil-omega-3 fatty acids 300-1,000 mg capsule Take 1 g by mouth once daily.    fluticasone (FLONASE) 50 mcg/actuation nasal spray USE 1 SPRAY NASALLY ONE TIME DAILY    gabapentin (NEURONTIN) 300 MG capsule TAKE 1 CAPSULE THREE TIMES  DAILY    guaifenesin (MUCINEX) 600 mg 12 hr tablet Take 1,200 mg by mouth 2 (two) times daily.    lisinopril-hydrochlorothiazide (PRINZIDE,ZESTORETIC) 20-12.5 mg per tablet TAKE 2 TABLETS ONE TIME DAILY    methocarbamol (ROBAXIN) 500 MG Tab 3 (three) times daily as needed.     olopatadine (PATADAY) 0.2 % Drop Place 1 drop into both eyes once daily. (Patient taking differently: Place 1 drop into both eyes daily as needed. )    omeprazole (PRILOSEC) 20 MG capsule TAKE 2 CAPSULES ONE TIME DAILY (Patient taking differently: TAKE 1 CAPSULES ONE TIME DAILY)    potassium chloride (MICRO-K) 10 MEQ CpSR TAKE 2 CAPSULES ONE TIME DAILY    pravastatin (PRAVACHOL) 20 MG tablet TAKE 1 TABLET ONE TIME DAILY    tamsulosin (FLOMAX) 0.4 mg Cp24 Take 1 capsule (0.4 mg total) by mouth once daily.    terbinafine HCl (LAMISIL) 250 mg tablet Take 1 tablet (250 mg total) by mouth once daily. 1 pill by mouth x 90 days. Avoid alcohol intake while taking medication     No current facility-administered medications for this visit.      Review of patient's allergies indicates:  No Known Allergies  Precautions: fall risk     Subjective:    Previous PT: yes; same dx; states PT helped; could do things without a cane before 2014  Work history: retired; was a ; had to retire 2/2 back, retired in 2006 or 7  Recreational activities/exercise program: basically got away from everything to take care of her back     Tito reports it is getting easier to lay flat. Pain is decreaseing.     Pain is rated 0/10 on a 0-10 scale with 0 being no pain and 10 being pain requiring emergency room visit.    Diagnostic Tests: n/a    Patient Goals for PT: improve walking    Objective:    FOTO Low back  Survey 63% limitations  Current -OQ Goal -EL 51%)   Category: Mobility     G-Code Modifiers  CH  0% Impaired, limited, or restricted    CI  19% - 1%  Impaired, limited, or restricted    CJ  20% - 39% Impaired, limited, or restricted    CK   "40% - 59% Impaired, limited, or restricted    CL  60% - 79% Impaired, limited, or restricted    CM  80% - 99% Impaired, limited, or restricted    CN  100% Impaired, limited, or restricted        Visit # 10  Time In: 0900  Time Out: 0945  Treatment time: 45'  Date of eval/re-eval: 7/13/2017       Gait - ambulated with SC in R, decreased step length, reports R LE gives most problems.   2MWT - 245' with SC (150' with SC 6/8/2017)    AROM l/s   Flexion (-) unable to touch toes   Extension (-) lacks full ROM   Rotation 50% limited B    PT Evaluation Complete? NO  Discussed Plan of Care with patient: YES       [1] Manual therapy x 15'   Oscillations ER/IR 90/90 R hip   MWM hip IR STM 30# db    [2]Therapeutic exercise for strengthening and/or improving fitness x 30'  Hip isometrics 10" x 2 abd, hip IR  DB hip hinge 8x, 10# x 8, 2 x 15#   STS 8x with BUE support x 2, 8x, 8x 15# db      Written HEP Provided: supine hamstring stretch, sidelying hip flexor/quad stretch, supine hip flexor/quad stretch  Patient education: HEP  Tito demo good understanding of the education provided. Patient demo good return demo of skill of exercises.    Problem List: muscle length impairments, decreased motor control and mobility, impaired ADLs, activity and participation restrictions.     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 He was challenged with quadruped and hip hinging exercises targeting core stabilization. He required manual and verbal cueing to maintain proper spinal alignment. Tito is in " agreement with the goals that will be addressed in the treatment plan.Tito demonstrates no additional cultural, spiritual or educational needs and currently has no barriers to learning.      Medical necessity: see problem list -  indicates  / mod /  complexity based on clinical presentation that is  / evolving due to tolerance of positioning. .       Functional Goals  Time frame     1.   The pt will lay supine with legs extended without increase in sxs to improve walking posture. ongoing  6 weeks     2.   The pt will ambulate > 200' in 2 minutes with SC for improved community ambulation. met  6 weeks     3.    The pt will ambulate > 500' in 6 minutes for improved community ambulation. ongoing  8 weeks     4.   The pt will be independent in performance and progression of HEP.     2-3 visits            Plan:      Proceed/Continue with POC.     Pt will be treated by physical therapy 1-2x/week during the certification period. Treatment will consist of therapeutic exercise, manual therapy, neuromuscular re-education, heat and cold modalities, electrical stimulation for pain relief and/or muscle activation, and any other types of treatment hat may be deemed medically necessary. Tito may at times be seen by a PTA as part of the Rehab Team.       Physical Therapist: BETTY Mercado, PT, DPT, CSCS    I certify the need for these services furnished under this plan of treatment and while under my care.       ___________________________________  Physician/Referring Practitioner        _________________  Date of Signature

## 2017-08-22 ENCOUNTER — CLINICAL SUPPORT (OUTPATIENT)
Dept: REHABILITATION | Facility: HOSPITAL | Age: 72
End: 2017-08-22
Attending: PSYCHIATRY & NEUROLOGY
Payer: MEDICARE

## 2017-08-22 DIAGNOSIS — M54.50 CHRONIC RIGHT-SIDED LOW BACK PAIN WITHOUT SCIATICA: ICD-10-CM

## 2017-08-22 DIAGNOSIS — G89.29 CHRONIC RIGHT-SIDED LOW BACK PAIN WITHOUT SCIATICA: ICD-10-CM

## 2017-08-22 PROCEDURE — 97140 MANUAL THERAPY 1/> REGIONS: CPT | Mod: PO

## 2017-08-22 PROCEDURE — 97110 THERAPEUTIC EXERCISES: CPT | Mod: PO

## 2017-08-22 NOTE — PROGRESS NOTES
Name: Tito Menard  Clinic Number: [id number]  08/22/2017.     Diagnosis: low back pain   Physician: Omar Casey MD  Treatment Orders: PT Eval and Treat    Past Medical History:   Diagnosis Date    Allergy     Aneurysm of artery of lower extremity     Chalazion of left eye     Diabetes mellitus type II     Enlarged aorta 8/2/2016    Noted on pharmacological stress echo 2/28/2014.      GERD (gastroesophageal reflux disease)     egd 2008    Hyperlipidemia     Hypertension     MGD (meibomian gland dysfunction)     Osteopenia     Spinal stenosis     Spondylosis without myelopathy 10/23/2015    Vitamin D deficiency disease      Current Outpatient Prescriptions   Medication Sig    acetaminophen (TYLENOL) 500 mg Cap Take 1,000 mg by mouth nightly as needed (for back pain).     albuterol (PROVENTIL) 2.5 mg /3 mL (0.083 %) nebulizer solution Take 3 mLs (2.5 mg total) by nebulization every 6 (six) hours as needed for Wheezing.    albuterol 90 mcg/actuation inhaler Inhale 1-2 puffs into the lungs every 6 (six) hours as needed for Wheezing.    amlodipine (NORVASC) 5 MG tablet TAKE 1 TABLET EVERY DAY    aspirin (ECOTRIN) 81 MG EC tablet Take 81 mg by mouth once daily.    azelastine (ASTELIN) 137 mcg nasal spray 1 spray (137 mcg total) by Nasal route 2 (two) times daily. (Patient taking differently: 1 spray by Nasal route 2 (two) times daily as needed. )    cholecalciferol, vitamin D3, (VITAMIN D3) 2,000 unit Cap Take 1 capsule by mouth once daily.    econazole nitrate 1 % cream Apply topically 2 (two) times daily. (Patient taking differently: Apply topically 2 (two) times daily as needed. )    finasteride (PROSCAR) 5 mg tablet TAKE 1 TABLET ONE TIME DAILY    fish oil-omega-3 fatty acids 300-1,000 mg capsule Take 1 g by mouth once daily.    fluticasone (FLONASE) 50 mcg/actuation nasal spray USE 1 SPRAY NASALLY ONE TIME DAILY    gabapentin (NEURONTIN) 300 MG capsule TAKE 1 CAPSULE THREE TIMES  DAILY    guaifenesin (MUCINEX) 600 mg 12 hr tablet Take 1,200 mg by mouth 2 (two) times daily.    lisinopril-hydrochlorothiazide (PRINZIDE,ZESTORETIC) 20-12.5 mg per tablet TAKE 2 TABLETS ONE TIME DAILY    methocarbamol (ROBAXIN) 500 MG Tab 3 (three) times daily as needed.     olopatadine (PATADAY) 0.2 % Drop Place 1 drop into both eyes once daily. (Patient taking differently: Place 1 drop into both eyes daily as needed. )    omeprazole (PRILOSEC) 20 MG capsule TAKE 2 CAPSULES ONE TIME DAILY (Patient taking differently: TAKE 1 CAPSULES ONE TIME DAILY)    potassium chloride (MICRO-K) 10 MEQ CpSR TAKE 2 CAPSULES ONE TIME DAILY    pravastatin (PRAVACHOL) 20 MG tablet TAKE 1 TABLET ONE TIME DAILY    tamsulosin (FLOMAX) 0.4 mg Cp24 Take 1 capsule (0.4 mg total) by mouth once daily.    terbinafine HCl (LAMISIL) 250 mg tablet Take 1 tablet (250 mg total) by mouth once daily. 1 pill by mouth x 90 days. Avoid alcohol intake while taking medication     No current facility-administered medications for this visit.      Review of patient's allergies indicates:  No Known Allergies  Precautions: fall risk     Subjective:    Previous PT: yes; same dx; states PT helped; could do things without a cane before 2014  Work history: retired; was a ; had to retire 2/2 back, retired in 2006 or 7  Recreational activities/exercise program: basically got away from everything to take care of her back     Tito reports it is getting easier to lay flat. Pain is decreaseing.     Pain is rated 0/10 on a 0-10 scale with 0 being no pain and 10 being pain requiring emergency room visit.    Diagnostic Tests: n/a    Patient Goals for PT: improve walking    Objective:    FOTO Low back  Survey 63% limitations  Current -AA Goal -MG 51%)   Category: Mobility     G-Code Modifiers  CH  0% Impaired, limited, or restricted    CI  19% - 1%  Impaired, limited, or restricted    CJ  20% - 39% Impaired, limited, or restricted    CK   "40% - 59% Impaired, limited, or restricted    CL  60% - 79% Impaired, limited, or restricted    CM  80% - 99% Impaired, limited, or restricted    CN  100% Impaired, limited, or restricted        Visit # 10  Time In: 0900  Time Out: 0945  Treatment time: 45'  Date of eval/re-eval: 7/13/2017       Gait - ambulated with SC in R, decreased step length, reports R LE gives most problems.   2MWT - 245' with SC (150' with SC 6/8/2017)    AROM l/s   Flexion (-) unable to touch toes   Extension (-) lacks full ROM   Rotation 50% limited B    PT Evaluation Complete? NO  Discussed Plan of Care with patient: YES       [1] Manual therapy x 15'   Oscillations ER/IR 90/90 R hip   MWM hip IR STM 30# db    [2]Therapeutic exercise for strengthening and/or improving fitness x 30'  Hip isometrics 5" x 3  abd, hip IR/ER, hip flexion   DB hip hinge 2 x 8 15#, 5x 25# each hand   STS 8x with BUE support x 2, 8x, 8x 15# db  NP    Written HEP Provided: supine hamstring stretch, sidelying hip flexor/quad stretch, supine hip flexor/quad stretch  Patient education: HEP  Tito demo good understanding of the education provided. Patient demo good return demo of skill of exercises.    Problem List: muscle length impairments, decreased motor control and mobility, impaired ADLs, activity and participation restrictions.     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 He was challenged with quadruped and hip hinging exercises targeting core stabilization. He required manual and verbal cueing to maintain proper spinal " alignment. Tito is in agreement with the goals that will be addressed in the treatment plan.Tito demonstrates no additional cultural, spiritual or educational needs and currently has no barriers to learning.      Medical necessity: see problem list -  indicates  / mod /  complexity based on clinical presentation that is  / evolving due to tolerance of positioning. .       Functional Goals  Time frame     1.   The pt will lay supine with legs extended without increase in sxs to improve walking posture. ongoing  6 weeks     2.   The pt will ambulate > 200' in 2 minutes with SC for improved community ambulation. met  6 weeks     3.    The pt will ambulate > 500' in 6 minutes for improved community ambulation. ongoing  8 weeks     4.   The pt will be independent in performance and progression of HEP.     2-3 visits            Plan:      Proceed/Continue with POC.     Pt will be treated by physical therapy 1-2x/week during the certification period. Treatment will consist of therapeutic exercise, manual therapy, neuromuscular re-education, heat and cold modalities, electrical stimulation for pain relief and/or muscle activation, and any other types of treatment hat may be deemed medically necessary. Tito may at times be seen by a PTA as part of the Rehab Team.       Physical Therapist: BETTY Mercado, PT, DPT, CSCS    I certify the need for these services furnished under this plan of treatment and while under my care.       ___________________________________  Physician/Referring Practitioner        _________________  Date of Signature

## 2017-10-10 RX ORDER — POTASSIUM CHLORIDE 750 MG/1
CAPSULE, EXTENDED RELEASE ORAL
Qty: 180 CAPSULE | Refills: 0 | Status: SHIPPED | OUTPATIENT
Start: 2017-10-10 | End: 2017-12-12 | Stop reason: SDUPTHER

## 2017-10-24 RX ORDER — LISINOPRIL AND HYDROCHLOROTHIAZIDE 12.5; 2 MG/1; MG/1
TABLET ORAL
Qty: 180 TABLET | Refills: 3 | Status: SHIPPED | OUTPATIENT
Start: 2017-10-24 | End: 2018-07-23 | Stop reason: SDUPTHER

## 2017-10-24 RX ORDER — AMLODIPINE BESYLATE 5 MG/1
TABLET ORAL
Qty: 90 TABLET | Refills: 3 | Status: SHIPPED | OUTPATIENT
Start: 2017-10-24 | End: 2018-07-23 | Stop reason: SDUPTHER

## 2017-12-12 RX ORDER — FLUTICASONE PROPIONATE 50 MCG
SPRAY, SUSPENSION (ML) NASAL
Qty: 32 G | Refills: 6 | Status: SHIPPED | OUTPATIENT
Start: 2017-12-12 | End: 2018-12-12 | Stop reason: SDUPTHER

## 2017-12-12 RX ORDER — POTASSIUM CHLORIDE 750 MG/1
CAPSULE, EXTENDED RELEASE ORAL
Qty: 180 CAPSULE | Refills: 1 | Status: SHIPPED | OUTPATIENT
Start: 2017-12-12 | End: 2018-05-08 | Stop reason: SDUPTHER

## 2017-12-15 ENCOUNTER — TELEPHONE (OUTPATIENT)
Dept: INTERNAL MEDICINE | Facility: CLINIC | Age: 72
End: 2017-12-15

## 2017-12-15 NOTE — TELEPHONE ENCOUNTER
----- Message from Raheem Reyes sent at 12/15/2017 12:40 PM CST -----  Contact: Pt  _ 1st Request  _ 2nd Request  _ 3rd Request    Who: ASHLEY FISHER [[id number]]    Why: Patient called stating [employer] called and stated he has 4 prescriptions that need new prescriptions sent in. Patient does not know which medications these were but provided the fax number, [phone] fax to [employer] [facility]    What Number to Call Back: [phone]    When to Expect a call back: (Before the end of the day)  -- if call after 3:00 call back will be tomorrow.

## 2017-12-21 DIAGNOSIS — E78.5 HYPERLIPIDEMIA, UNSPECIFIED HYPERLIPIDEMIA TYPE: ICD-10-CM

## 2017-12-21 NOTE — TELEPHONE ENCOUNTER
----- Message from Amanda Lentz sent at 12/21/2017  1:52 PM CST -----  Contact: ASHLEY FISHER [[id number]]      x_  1st Request  _  2nd Request  _  3rd Request    Please refill the medication(s) listed below. Please call the patient when the prescription(s) is ready for  at this phone number            Medication #1 pravastatin (PRAVACHOL) 20 MG tablet 90 tablet     Medication #2    Preferred Pharmacy:    Please Fax to [facility]   [phone]

## 2017-12-22 RX ORDER — PRAVASTATIN SODIUM 20 MG/1
TABLET ORAL
Qty: 90 TABLET | Refills: 3 | Status: SHIPPED | OUTPATIENT
Start: 2017-12-22 | End: 2018-09-25 | Stop reason: SDUPTHER

## 2018-01-05 DIAGNOSIS — E11.9 TYPE 2 DIABETES MELLITUS WITHOUT COMPLICATION: ICD-10-CM

## 2018-02-27 DIAGNOSIS — N13.8 BENIGN PROSTATIC HYPERPLASIA WITH URINARY OBSTRUCTION: ICD-10-CM

## 2018-02-27 DIAGNOSIS — N40.1 BENIGN PROSTATIC HYPERPLASIA WITH URINARY OBSTRUCTION: ICD-10-CM

## 2018-02-27 RX ORDER — TAMSULOSIN HYDROCHLORIDE 0.4 MG/1
CAPSULE ORAL
Qty: 90 CAPSULE | Refills: 1 | Status: SHIPPED | OUTPATIENT
Start: 2018-02-27 | End: 2018-07-19 | Stop reason: SDUPTHER

## 2018-02-27 RX ORDER — FINASTERIDE 5 MG/1
TABLET, FILM COATED ORAL
Qty: 90 TABLET | Refills: 1 | Status: SHIPPED | OUTPATIENT
Start: 2018-02-27 | End: 2018-07-19 | Stop reason: SDUPTHER

## 2018-03-29 ENCOUNTER — PES CALL (OUTPATIENT)
Dept: ADMINISTRATIVE | Facility: CLINIC | Age: 73
End: 2018-03-29

## 2018-05-08 RX ORDER — POTASSIUM CHLORIDE 750 MG/1
CAPSULE, EXTENDED RELEASE ORAL
Qty: 180 CAPSULE | Refills: 1 | Status: SHIPPED | OUTPATIENT
Start: 2018-05-08 | End: 2018-09-25 | Stop reason: SDUPTHER

## 2018-06-01 ENCOUNTER — OFFICE VISIT (OUTPATIENT)
Dept: INTERNAL MEDICINE | Facility: CLINIC | Age: 73
End: 2018-06-01
Payer: MEDICARE

## 2018-06-01 VITALS
HEIGHT: 71 IN | SYSTOLIC BLOOD PRESSURE: 102 MMHG | OXYGEN SATURATION: 97 % | BODY MASS INDEX: 29.38 KG/M2 | WEIGHT: 209.88 LBS | DIASTOLIC BLOOD PRESSURE: 60 MMHG | HEART RATE: 64 BPM

## 2018-06-01 DIAGNOSIS — R26.9 GAIT DISORDER: ICD-10-CM

## 2018-06-01 DIAGNOSIS — I71.40 ABDOMINAL AORTIC ANEURYSM (AAA) WITHOUT RUPTURE: ICD-10-CM

## 2018-06-01 DIAGNOSIS — E11.42 DIABETIC POLYNEUROPATHY ASSOCIATED WITH TYPE 2 DIABETES MELLITUS: ICD-10-CM

## 2018-06-01 DIAGNOSIS — K21.9 GASTROESOPHAGEAL REFLUX DISEASE, ESOPHAGITIS PRESENCE NOT SPECIFIED: ICD-10-CM

## 2018-06-01 DIAGNOSIS — I10 ESSENTIAL HYPERTENSION: ICD-10-CM

## 2018-06-01 DIAGNOSIS — Z00.00 ENCOUNTER FOR PREVENTIVE HEALTH EXAMINATION: Primary | ICD-10-CM

## 2018-06-01 DIAGNOSIS — E55.9 VITAMIN D DEFICIENCY DISEASE: ICD-10-CM

## 2018-06-01 DIAGNOSIS — M85.80 OSTEOPENIA, UNSPECIFIED LOCATION: ICD-10-CM

## 2018-06-01 DIAGNOSIS — M54.50 CHRONIC RIGHT-SIDED LOW BACK PAIN WITHOUT SCIATICA: ICD-10-CM

## 2018-06-01 DIAGNOSIS — J44.89 OBSTRUCTIVE CHRONIC BRONCHITIS WITHOUT EXACERBATION: ICD-10-CM

## 2018-06-01 DIAGNOSIS — E78.5 HYPERLIPIDEMIA, UNSPECIFIED HYPERLIPIDEMIA TYPE: ICD-10-CM

## 2018-06-01 DIAGNOSIS — M47.12 SPONDYLOSIS OF CERVICAL JOINT WITH MYELOPATHY: ICD-10-CM

## 2018-06-01 DIAGNOSIS — G89.29 CHRONIC RIGHT-SIDED LOW BACK PAIN WITHOUT SCIATICA: ICD-10-CM

## 2018-06-01 PROCEDURE — 99999 PR PBB SHADOW E&M-EST. PATIENT-LVL V: CPT | Mod: PBBFAC,,, | Performed by: NURSE PRACTITIONER

## 2018-06-01 PROCEDURE — 3044F HG A1C LEVEL LT 7.0%: CPT | Mod: CPTII,S$GLB,, | Performed by: NURSE PRACTITIONER

## 2018-06-01 PROCEDURE — G0439 PPPS, SUBSEQ VISIT: HCPCS | Mod: S$GLB,,, | Performed by: NURSE PRACTITIONER

## 2018-06-01 PROCEDURE — 3078F DIAST BP <80 MM HG: CPT | Mod: CPTII,S$GLB,, | Performed by: NURSE PRACTITIONER

## 2018-06-01 PROCEDURE — 99499 UNLISTED E&M SERVICE: CPT | Mod: S$GLB,,, | Performed by: NURSE PRACTITIONER

## 2018-06-01 PROCEDURE — 3074F SYST BP LT 130 MM HG: CPT | Mod: CPTII,S$GLB,, | Performed by: NURSE PRACTITIONER

## 2018-06-01 RX ORDER — DEXTROMETHORPHAN HYDROBROMIDE, GUAIFENESIN 5; 100 MG/5ML; MG/5ML
1300 LIQUID ORAL EVERY 8 HOURS PRN
COMMUNITY
End: 2023-06-20

## 2018-06-01 NOTE — PATIENT INSTRUCTIONS
Counseling and Referral of Other Preventative  (Italic type indicates deductible and co-insurance are waived)    Patient Name: Tito Menard  Today's Date: 6/1/2018    Health Maintenance       Date Due Completion Date    Zoster Vaccine 12/05/2004-Back Order ---    Foot Exam 11/04/2017-Podiatry visit will schedule 11/4/2016    Override on 4/28/2016: Done    Override on 5/15/2015: Done    Hemoglobin A1c 12/02/2017-Discussed 6/2/2017    Override on 10/23/2015: Done    Eye Exam 06/01/2018-Discussed 6/1/2017    Override on 8/5/2015: Done    Lipid Panel 06/02/2018 6/2/2017    Influenza Vaccine 08/01/2018 1/4/2017    Override on 10/23/2015: Done    Override on 10/1/2014: Done (per pt)    Override on 11/7/2013: Done    Low Dose Statin 06/01/2019 6/1/2018    TETANUS VACCINE 07/22/2021 7/22/2011    Colonoscopy 09/13/2027 9/13/2017    Override on 8/1/2007: Done        No orders of the defined types were placed in this encounter.    The following information is provided to all patients.  This information is to help you find resources for any of the problems found today that may be affecting your health:                Living healthy guide: www.[facility].[place].gov      Understanding Diabetes: www.diabetes.org      Eating healthy: www.cdc.gov/healthyweight      CDC home safety checklist: www.cdc.gov/steadi/patient.html      Agency on Aging: www.goea.[place].gov      Alcoholics anonymous (AA): www.aa.org      Physical Activity: www.geraldine.nih.gov/oj9iwoz      Tobacco use: www.quitwithusla.org

## 2018-06-01 NOTE — Clinical Note
Dr. Brown, I had the pleasure of seeing Mr Menard and his wife in the clinic for a health assessment today. He reports having shortness of breath after walking 1/2 block. He denies syncope and chest pain. He does have 1+ BLE.  He has become very limited in physical activity and his willingness to go on outings with his wife because of the SOB and problems with balance. He uses a cane today, PT was offered but he declined at this time. His vital signs today in the clinic were within normal range and he was not in any distress. Fi02 97%. He has an appointment with you to address this in July. Please follow up as you deem appropriate. Thank you Melonie

## 2018-06-01 NOTE — PROGRESS NOTES
"Tito Menard presented for a  Medicare AWV and comprehensive Health Risk Assessment today. The following components were reviewed and updated:    · Medical history  · Family History  · Social history  · Allergies and Current Medications  · Health Risk Assessment  · Health Maintenance  · Care Team     ** See Completed Assessments for Annual Wellness Visit within the encounter summary.**       The following assessments were completed:  · Living Situation  · CAGE  · Depression Screening  · Timed Get Up and Go  · Whisper Test  · Cognitive Function Screening  ·   ·   · Nutrition Screening  · ADL Screening  · PAQ Screening    Vitals:    06/01/18 0908   BP: 102/60   Pulse: 64   SpO2: 97%   Weight: 95.2 kg (209 lb 14.1 oz)   Height: 5' 11" (1.803 m)     Body mass index is 29.27 kg/m².  Physical Exam   Constitutional: He is oriented to person, place, and time. He appears well-developed and well-nourished.   HENT:   Head: Normocephalic and atraumatic.   Nose: Nose normal.   Eyes: Conjunctivae and EOM are normal.   Cardiovascular: Normal rate, regular rhythm, normal heart sounds and intact distal pulses.    1+BLE   Pulmonary/Chest: Effort normal and breath sounds normal.   Musculoskeletal: Normal range of motion.   Neurological: He is alert and oriented to person, place, and time.   Skin: Skin is warm and dry.   Psychiatric: He has a normal mood and affect. His behavior is normal. Judgment and thought content normal.   Nursing note and vitals reviewed.        Diagnoses and health risks identified today and associated recommendations/orders:    1. Encounter for preventive health examination  Assessment performed. Health maintenance updated. Chart review completed.  Note to PCP:  Dr. Brown,  I had the pleasure of seeing Mr Menard and his wife in the clinic for a health assessment today. He reports having shortness of breath after walking 1/2 block. He denies syncope and chest pain. He does have 1+ BLE. He has become very " limited in physical activity and his willingness to go on outings with his wife because of the SOB and problems with balance. He uses a cane today, PT was offered but he declined at this time. His vital signs today in the clinic were within normal range and he was not in any distress. Fi02 97%. He has an appointment with you to address this in July. Please follow up as you deem appropriate.  Thank you  Melonie  2. Obstructive chronic bronchitis without exacerbation  Stable. Continue current regimen as instructed. Followed by PCP.    3. Diabetic polyneuropathy associated with type 2 diabetes mellitus  Component      Latest Ref Rng & Units 6/2/2017   Hemoglobin A1C      4.5 - 6.2 % 5.7   Stable. Followed by PCP.    4. Abdominal aortic aneurysm (AAA) without rupture  Stable. Followed by PCP.    5. Essential hypertension  Chronic. Stable on current regimen. Followed by PCP.    6. Hyperlipidemia, unspecified hyperlipidemia type  Chronic. Stable on current regimen. Followed by PCP.    7. Spondylosis of cervical joint with myelopathy  Chronic. Continue regimen as instructed. PT offered for balance. Declined today. Followed PCP.    8. Vitamin D deficiency disease  Chronic. Stable on current regimen. Followed by PCP.    9. Gastroesophageal reflux disease, esophagitis presence not specified  Chronic. Stable on current regimen. Followed by PCP.    10. Chronic right-sided low back pain without sciatica  Chronic. Continue regimen as instructed. PT offered for balance. Declined today. Followed PCP.    11. Osteopenia, unspecified location  Chronic. Stable on current regimen. Followed by PCP.    12. Gait disorder  Chronic. Continue regimen as instructed. PT offered for balance. Declined today. Followed PCP.        Provided Tito with a 5-10 year written screening schedule and personal prevention plan. Recommendations were developed using the USPSTF age appropriate recommendations. Education, counseling, and referrals were provided  as needed. After Visit Summary printed and given to patient which includes a list of additional screenings\tests needed.    Follow-up for follow up with Primary Care Provider as instructed, ;sooner if problems, HRA in 1 year.    SARAH Cloud

## 2018-06-14 ENCOUNTER — OFFICE VISIT (OUTPATIENT)
Dept: OPTOMETRY | Facility: CLINIC | Age: 73
End: 2018-06-14
Payer: COMMERCIAL

## 2018-06-14 DIAGNOSIS — H00.024 HORDEOLUM INTERNUM LEFT UPPER EYELID: ICD-10-CM

## 2018-06-14 DIAGNOSIS — H52.203 HYPEROPIA OF BOTH EYES WITH ASTIGMATISM: ICD-10-CM

## 2018-06-14 DIAGNOSIS — Z01.00 EYE EXAM, ROUTINE: Primary | ICD-10-CM

## 2018-06-14 DIAGNOSIS — H52.03 HYPEROPIA OF BOTH EYES WITH ASTIGMATISM: ICD-10-CM

## 2018-06-14 PROCEDURE — 92014 COMPRE OPH EXAM EST PT 1/>: CPT | Mod: S$GLB,,, | Performed by: OPTOMETRIST

## 2018-06-14 PROCEDURE — 99999 PR PBB SHADOW E&M-EST. PATIENT-LVL III: CPT | Mod: PBBFAC,,, | Performed by: OPTOMETRIST

## 2018-06-14 PROCEDURE — 92015 DETERMINE REFRACTIVE STATE: CPT | Mod: S$GLB,,, | Performed by: OPTOMETRIST

## 2018-06-14 RX ORDER — DOXYCYCLINE 100 MG/1
100 CAPSULE ORAL 2 TIMES DAILY
Qty: 42 CAPSULE | Refills: 0 | Status: SHIPPED | OUTPATIENT
Start: 2018-06-14 | End: 2018-07-09

## 2018-06-14 NOTE — PROGRESS NOTES
HPI     DLS: 11/1/2016  [facility] Exam  Diabetic eye exam  Pt reports a small stye along left upper eyelid that is bothersome.   Pt states doing warm compress  Type 2 diabetes   NSC OU   Hemoglobin A1C       Date                     Value               Ref Range             Status                06/02/2017               5.7                 4.5 - 6.2 %           Final                 11/01/2016               5.9                 4.5 - 6.2 %           Final                    04/28/2016               5.8                 4.5 - 6.2 %           Final                  Last edited by Tito Palafox, OD on 6/14/2018  9:32 AM. (History)            Assessment /Plan     For exam results, see Encounter Report.    Eye exam, routine  -[facility] vision exam    Hyperopia of both eyes with astigmatism  Eyeglass Final Rx     Eyeglass Final Rx       Sphere Cylinder Axis Dist VA Add    Right +1.50 +0.50 005 20/30 +2.50    Left +1.50 +0.50 180 20/30 +2.50    Type:  Bifocal    Expiration Date:  6/15/2019              Hordeolum internum left upper eyelid  -Doxycyline bid x 14 days, QD Po x 14 days  -if Chalazion forms ok to schedule direct with [facility]    Other orders  -     doxycycline (MONODOX) 100 MG capsule; Take 1 capsule (100 mg total) by mouth 2 (two) times daily. BID PO x 14 days, than QD PO x 14 days  Dispense: 42 capsule; Refill: 0      RTC annual

## 2018-06-25 ENCOUNTER — PATIENT OUTREACH (OUTPATIENT)
Dept: ADMINISTRATIVE | Facility: HOSPITAL | Age: 73
End: 2018-06-25

## 2018-07-09 ENCOUNTER — TELEPHONE (OUTPATIENT)
Dept: INTERNAL MEDICINE | Facility: CLINIC | Age: 73
End: 2018-07-09

## 2018-07-09 ENCOUNTER — HOSPITAL ENCOUNTER (OUTPATIENT)
Dept: CARDIOLOGY | Facility: OTHER | Age: 73
Discharge: HOME | End: 2018-07-09
Attending: INTERNAL MEDICINE
Payer: MEDICARE

## 2018-07-09 ENCOUNTER — OFFICE VISIT (OUTPATIENT)
Dept: INTERNAL MEDICINE | Facility: CLINIC | Age: 73
End: 2018-07-09
Attending: INTERNAL MEDICINE
Payer: MEDICARE

## 2018-07-09 VITALS
SYSTOLIC BLOOD PRESSURE: 118 MMHG | BODY MASS INDEX: 29.38 KG/M2 | DIASTOLIC BLOOD PRESSURE: 74 MMHG | HEART RATE: 60 BPM | WEIGHT: 209.88 LBS | HEIGHT: 71 IN

## 2018-07-09 DIAGNOSIS — J43.9 PULMONARY EMPHYSEMA, UNSPECIFIED EMPHYSEMA TYPE: ICD-10-CM

## 2018-07-09 DIAGNOSIS — R06.09 DOE (DYSPNEA ON EXERTION): ICD-10-CM

## 2018-07-09 DIAGNOSIS — R94.31 EKG ABNORMALITIES: Primary | ICD-10-CM

## 2018-07-09 DIAGNOSIS — E78.5 HYPERLIPIDEMIA, UNSPECIFIED HYPERLIPIDEMIA TYPE: Primary | ICD-10-CM

## 2018-07-09 DIAGNOSIS — I10 ESSENTIAL HYPERTENSION: ICD-10-CM

## 2018-07-09 DIAGNOSIS — E11.9 CONTROLLED TYPE 2 DIABETES MELLITUS WITHOUT COMPLICATION, WITHOUT LONG-TERM CURRENT USE OF INSULIN: ICD-10-CM

## 2018-07-09 DIAGNOSIS — M48.00 SPINAL STENOSIS, UNSPECIFIED SPINAL REGION: ICD-10-CM

## 2018-07-09 PROCEDURE — 93010 ELECTROCARDIOGRAM REPORT: CPT | Mod: ,,, | Performed by: INTERNAL MEDICINE

## 2018-07-09 PROCEDURE — 99214 OFFICE O/P EST MOD 30 MIN: CPT | Mod: S$GLB,,, | Performed by: INTERNAL MEDICINE

## 2018-07-09 PROCEDURE — 99999 PR PBB SHADOW E&M-EST. PATIENT-LVL V: CPT | Mod: PBBFAC,,, | Performed by: INTERNAL MEDICINE

## 2018-07-09 PROCEDURE — 93005 ELECTROCARDIOGRAM TRACING: CPT

## 2018-07-09 PROCEDURE — 3078F DIAST BP <80 MM HG: CPT | Mod: CPTII,S$GLB,, | Performed by: INTERNAL MEDICINE

## 2018-07-09 PROCEDURE — 3074F SYST BP LT 130 MM HG: CPT | Mod: CPTII,S$GLB,, | Performed by: INTERNAL MEDICINE

## 2018-07-09 RX ORDER — CLOTRIMAZOLE AND BETAMETHASONE DIPROPIONATE 10; .64 MG/G; MG/G
CREAM TOPICAL 2 TIMES DAILY
Qty: 45 G | Refills: 0 | Status: SHIPPED | OUTPATIENT
Start: 2018-07-09 | End: 2018-07-19

## 2018-07-09 NOTE — PROGRESS NOTES
Subjective:       Patient ID: Tito Menard is a 72 y.o. male.    Chief Complaint: Annual Exam     Tito Menard is a 72 y.o.  male who presents for Annual Exam  .  Patient Active Problem List   Diagnosis    Hypertension    Hyperlipidemia    Osteopenia    GERD (gastroesophageal reflux disease)    Spinal stenosis    Vitamin D deficiency disease    Obstructive chronic bronchitis without exacerbation    Diabetic polyneuropathy associated with type 2 diabetes mellitus    Abdominal aortic aneurysm (AAA) without rupture    Spondylosis of cervical joint with myelopathy    Gait disorder    Chronic right-sided low back pain without sciatica     History of lumbar surgery with residual chronic low back pain and right sciatica.  Also with cervical spinal stenosis.  C/o worsening balance and gait instability.  Pain is bothering him at night.  He has stopped his gabapentin due to feeling it was making him forgetful or he was getting addicted.      Has some ZUNIGA at under one block. No orthopnea or pnd.  No chest tightness, n/v.  C/o fatigue and not being able to do the activities he used to do such as mowing the lawn.  No CP.         Health Maintenance       Date Due Completion Date    Zoster Vaccine 12/05/2004 ---    Hemoglobin A1c 12/02/2017 6/2/2017    Override on 10/23/2015: Done    Lipid Panel 06/02/2018 6/2/2017    Foot Exam 06/23/2018 6/23/2017 (Done)    Override on 6/23/2017: Done    Override on 4/28/2016: Done    Override on 5/15/2015: Done    Influenza Vaccine 08/01/2018 1/4/2017    Override on 10/23/2015: Done    Override on 10/1/2014: Done (per pt)    Override on 11/7/2013: Done    Eye Exam 06/14/2019 6/14/2018    Override on 6/14/2018: Done    Override on 8/5/2015: Done    Low Dose Statin 07/09/2019 7/9/2018    TETANUS VACCINE 07/22/2021 7/22/2011    Colonoscopy 09/13/2027 9/13/2017    Override on 8/1/2007: Done          Review of Systems   Constitutional: Negative for chills and fever.   Respiratory:  Positive for shortness of breath. Negative for cough, chest tightness and wheezing.    Cardiovascular: Negative for chest pain and palpitations.   Gastrointestinal: Negative for nausea and vomiting.   Genitourinary: Positive for scrotal swelling (gradual, followed by urology). Negative for dysuria and hematuria.   Musculoskeletal: Positive for back pain and neck pain.   Skin: Positive for rash. Negative for wound.   Neurological: Positive for light-headedness.   Psychiatric/Behavioral: Negative for dysphoric mood. The patient is not nervous/anxious.          Past Medical History:   Diagnosis Date    Allergy     Aneurysm of artery of lower extremity     Chalazion of left eye     Diabetes mellitus type II     Enlarged aorta 8/2/2016    Noted on pharmacological stress echo 2/28/2014.      GERD (gastroesophageal reflux disease)     egd 2008    Hyperlipidemia     Hypertension     MGD (meibomian gland dysfunction)     Osteopenia     Spinal stenosis     Spondylosis without myelopathy 10/23/2015    Vitamin D deficiency disease        Past Surgical History:   Procedure Laterality Date    CHALAZION EXCISION Left 8/18/13    CYST REMOVAL  2013    Back of neck    SPINE SURGERY  2007    x2 (2000, 2007)       Family History   Problem Relation Age of Onset    Hyperlipidemia Mother     Hypertension Mother     Kidney disease Mother     Cancer Mother     No Known Problems Daughter     No Known Problems Sister     No Known Problems Brother     No Known Problems Son     No Known Problems Brother     No Known Problems Son     Hypertension Maternal Grandmother     Amblyopia Neg Hx     Blindness Neg Hx     Cataracts Neg Hx     Diabetes Neg Hx     Glaucoma Neg Hx     Macular degeneration Neg Hx     Retinal detachment Neg Hx     Strabismus Neg Hx     Stroke Neg Hx     Thyroid disease Neg Hx     Melanoma Neg Hx     Psoriasis Neg Hx     Lupus Neg Hx     Eczema Neg Hx        Social History   Substance  "Use Topics    Smoking status: Never Smoker    Smokeless tobacco: Former User     Types: Chew      Comment: Chewed tobacco once per day for 4-5 years when a     Alcohol use No             Objective:   Blood pressure 118/74, pulse 60, height 5' 11" (1.803 m), weight 95.2 kg (209 lb 14.1 oz).     Physical Exam   Constitutional: He is oriented to person, place, and time. He appears well-developed and well-nourished. No distress.   Walks with cane   HENT:   Head: Normocephalic and atraumatic.   Right Ear: External ear normal.   Left Ear: External ear normal.   Eyes: Conjunctivae are normal. No scleral icterus.   Neck: No JVD present.   Cardiovascular: Normal heart sounds.  Exam reveals no gallop and no friction rub.    No murmur heard.  Pulmonary/Chest: Effort normal and breath sounds normal. He has no wheezes. He has no rales.   Abdominal: Soft. Bowel sounds are normal. He exhibits no distension. There is no tenderness.   Musculoskeletal: He exhibits edema (1 + edema to ankle bilaterally). He exhibits no tenderness.   Lymphadenopathy:     He has no cervical adenopathy.   Neurological: He is alert and oriented to person, place, and time. He displays normal reflexes.   Nl foot flexion, unable to walk on toes, nl gait, nl patellar dtrs   Skin: Skin is warm and dry.   Psychiatric: He has a normal mood and affect. Thought content normal.       Prior labs reviewed  Assessment/Plan:        Tito was seen today for annual exam.    Diagnoses and all orders for this visit:    Hyperlipidemia, unspecified hyperlipidemia type  -     Lipid panel; Future  -  Previously well controlled  - cont current medication  - recommend low cholesterol diet and regular cardiovascular exercise to reduce risk of cardiovascular events  - repeat lipid profile and CMP annually    ZUNIGA (dyspnea on exertion)  -     CBC auto differential; Future  -     Urinalysis; Future  -     Brain natriuretic peptide; Future  -     EKG 12-lead; " Future    Controlled type 2 diabetes mellitus without complication, without long-term current use of insulin  -     Comprehensive metabolic panel; Future  -     Hemoglobin A1c; Future    Spinal stenosis, unspecified spinal region  -     Ambulatory Referral to [facility]  Restart gabapentin 300 mg qhs  Stop benadryl at night  Needs assistance with pain control options    Essential hypertension  -     NURSING COMMUNICATION: Create [facility] Account  -     Hypertension Digital Medicine (HDMP) Enrollment Order  Well controlled  Cont current BP medication(s) and low salt diet    Pulmonary emphysema, unspecified emphysema type  -     Complete PFT with bronchodilator; Future  Hx of chronic bronchitis  Cont prn albuterol    Tinea corporis  -     clotrimazole-betamethasone 1-0.05% (LOTRISONE) cream; Apply topically 2 (two) times daily. for 10 days

## 2018-07-09 NOTE — TELEPHONE ENCOUNTER
Scheduled for Stress Test on 7/18 and cardiology appointment on 7/17. Patient was notified to present to the ER or call 911 should he have continuation or worsening of his current symptoms, or if new symptoms develop. He verbalized understanding and will do so.

## 2018-07-09 NOTE — TELEPHONE ENCOUNTER
Please inform pt that his EKG has some changes and I would like to do a stress test and have him see cardiology ASAP.

## 2018-07-13 ENCOUNTER — HOSPITAL ENCOUNTER (OUTPATIENT)
Dept: PULMONOLOGY | Facility: OTHER | Age: 73
Discharge: HOME | End: 2018-07-13
Attending: INTERNAL MEDICINE
Payer: MEDICARE

## 2018-07-13 DIAGNOSIS — J43.9 PULMONARY EMPHYSEMA, UNSPECIFIED EMPHYSEMA TYPE: ICD-10-CM

## 2018-07-13 PROCEDURE — 94727 GAS DIL/WSHOT DETER LNG VOL: CPT

## 2018-07-13 PROCEDURE — 94729 DIFFUSING CAPACITY: CPT

## 2018-07-13 PROCEDURE — 94060 EVALUATION OF WHEEZING: CPT

## 2018-07-13 PROCEDURE — 25000242 PHARM REV CODE 250 ALT 637 W/ HCPCS

## 2018-07-17 ENCOUNTER — OFFICE VISIT (OUTPATIENT)
Dept: CARDIOLOGY | Facility: CLINIC | Age: 73
End: 2018-07-17
Payer: MEDICARE

## 2018-07-17 VITALS
HEART RATE: 79 BPM | BODY MASS INDEX: 29.75 KG/M2 | HEIGHT: 71 IN | OXYGEN SATURATION: 98 % | SYSTOLIC BLOOD PRESSURE: 131 MMHG | DIASTOLIC BLOOD PRESSURE: 81 MMHG | WEIGHT: 212.5 LBS

## 2018-07-17 DIAGNOSIS — E11.42 DIABETIC POLYNEUROPATHY ASSOCIATED WITH TYPE 2 DIABETES MELLITUS: Primary | ICD-10-CM

## 2018-07-17 DIAGNOSIS — R06.09 DOE (DYSPNEA ON EXERTION): ICD-10-CM

## 2018-07-17 DIAGNOSIS — E78.00 PURE HYPERCHOLESTEROLEMIA: ICD-10-CM

## 2018-07-17 DIAGNOSIS — I10 ESSENTIAL HYPERTENSION: ICD-10-CM

## 2018-07-17 DIAGNOSIS — R94.31 NONSPECIFIC ABNORMAL ELECTROCARDIOGRAM (ECG) (EKG): ICD-10-CM

## 2018-07-17 PROCEDURE — 99999 PR PBB SHADOW E&M-EST. PATIENT-LVL III: CPT | Mod: PBBFAC,,, | Performed by: INTERNAL MEDICINE

## 2018-07-17 PROCEDURE — 3079F DIAST BP 80-89 MM HG: CPT | Mod: CPTII,S$GLB,, | Performed by: INTERNAL MEDICINE

## 2018-07-17 PROCEDURE — 99204 OFFICE O/P NEW MOD 45 MIN: CPT | Mod: S$GLB,,, | Performed by: INTERNAL MEDICINE

## 2018-07-17 PROCEDURE — 3044F HG A1C LEVEL LT 7.0%: CPT | Mod: CPTII,S$GLB,, | Performed by: INTERNAL MEDICINE

## 2018-07-17 PROCEDURE — 3075F SYST BP GE 130 - 139MM HG: CPT | Mod: CPTII,S$GLB,, | Performed by: INTERNAL MEDICINE

## 2018-07-17 NOTE — PROGRESS NOTES
Subjective:   Patient ID:  Tito Menard is a 72 y.o. male who presents for evaluation of Abnormal ECG; Shortness of Breath; and Dizziness      HPI: He is referred today by Dr. Brown for an abnormal ECG and dyspnea. He reports that he used to be able to cut his grass in 30 minutes and now it takes 2 days. He feels short of breath and very fatigued with exertion. He denies chest discomfort. He has chronic back pain as well as problems with his balance. He walks with a walker. His ECG done 7/9/18 showed NSR with a RBBB.  He had a previous DSE done in 2014 which was normal.    Past Medical History:   Diagnosis Date    Allergy     Aneurysm of artery of lower extremity     Chalazion of left eye     Diabetes mellitus type II     Enlarged aorta 8/2/2016    Noted on pharmacological stress echo 2/28/2014.      GERD (gastroesophageal reflux disease)     egd 2008    Hyperlipidemia     Hypertension     MGD (meibomian gland dysfunction)     Osteopenia     Spinal stenosis     Spondylosis without myelopathy 10/23/2015    Vitamin D deficiency disease        Past Surgical History:   Procedure Laterality Date    CHALAZION EXCISION Left 8/18/13    CYST REMOVAL  2013    Back of neck    SPINE SURGERY  2007    x2 (2000, 2007)       Social History     Social History    Marital status:      Spouse name: N/A    Number of children: N/A    Years of education: N/A     Occupational History    Retired           Social History Main Topics    Smoking status: Never Smoker    Smokeless tobacco: Former User     Types: Chew      Comment: Chewed tobacco once per day for 4-5 years when a     Alcohol use No    Drug use: No    Sexual activity: No     Other Topics Concern    None     Social History Narrative        Colon 2007       Family History   Problem Relation Age of Onset    Hyperlipidemia Mother     Hypertension Mother     Kidney disease Mother     Cancer Mother     Heart  disease Mother     No Known Problems Daughter     No Known Problems Sister     No Known Problems Brother     No Known Problems Son     No Known Problems Brother     No Known Problems Son     Hypertension Maternal Grandmother     Amblyopia Neg Hx     Blindness Neg Hx     Cataracts Neg Hx     Diabetes Neg Hx     Glaucoma Neg Hx     Macular degeneration Neg Hx     Retinal detachment Neg Hx     Strabismus Neg Hx     Stroke Neg Hx     Thyroid disease Neg Hx     Melanoma Neg Hx     Psoriasis Neg Hx     Lupus Neg Hx     Eczema Neg Hx        Patient's Medications   New Prescriptions    No medications on file   Previous Medications    ACETAMINOPHEN (TYLENOL) 650 MG TBSR    Take 650 mg by mouth every 8 (eight) hours.    ALBUTEROL 90 MCG/ACTUATION INHALER    Inhale 1-2 puffs into the lungs every 6 (six) hours as needed for Wheezing.    AMLODIPINE (NORVASC) 5 MG TABLET    TAKE 1 TABLET EVERY DAY    ASPIRIN (ECOTRIN) 81 MG EC TABLET    Take 81 mg by mouth once daily.    AZELASTINE (ASTELIN) 137 MCG NASAL SPRAY    1 spray (137 mcg total) by Nasal route 2 (two) times daily.    CHOLECALCIFEROL, VITAMIN D3, (VITAMIN D3) 2,000 UNIT CAP    Take 1 capsule by mouth once daily.    CLOTRIMAZOLE-BETAMETHASONE 1-0.05% (LOTRISONE) CREAM    Apply topically 2 (two) times daily. for 10 days    ECONAZOLE NITRATE 1 % CREAM    Apply topically 2 (two) times daily.    FINASTERIDE (PROSCAR) 5 MG TABLET    TAKE 1 TABLET EVERY DAY    FISH OIL-OMEGA-3 FATTY ACIDS 300-1,000 MG CAPSULE    Take 1 g by mouth once daily.    FLUTICASONE (FLONASE) 50 MCG/ACTUATION NASAL SPRAY    USE 1 SPRAY NASALLY ONE TIME DAILY    GABAPENTIN (NEURONTIN) 300 MG CAPSULE    TAKE 1 CAPSULE THREE TIMES DAILY    GUAIFENESIN (MUCINEX) 600 MG 12 HR TABLET    Take 1,200 mg by mouth 2 (two) times daily.    LISINOPRIL-HYDROCHLOROTHIAZIDE (PRINZIDE,ZESTORETIC) 20-12.5 MG PER TABLET    TAKE 2 TABLETS ONE TIME DAILY    METHOCARBAMOL (ROBAXIN) 500 MG TAB    3 (three)  times daily as needed.     OLOPATADINE (PATADAY) 0.2 % DROP    Place 1 drop into both eyes once daily.    OMEPRAZOLE (PRILOSEC) 20 MG CAPSULE    TAKE 2 CAPSULES ONE TIME DAILY    POTASSIUM CHLORIDE (MICRO-K) 10 MEQ CPSR    TAKE 2 CAPSULES ONE TIME DAILY    PRAVASTATIN (PRAVACHOL) 20 MG TABLET    TAKE 1 TABLET ONE TIME DAILY    TAMSULOSIN (FLOMAX) 0.4 MG CP24    TAKE 1 CAPSULE ONE TIME DAILY   Modified Medications    No medications on file   Discontinued Medications    ACETAMINOPHEN (TYLENOL) 500 MG CAP    Take 1,000 mg by mouth nightly as needed (for back pain).        Review of Systems   Constitution: Positive for malaise/fatigue. Negative for weakness and weight gain.   HENT: Positive for tinnitus. Negative for hearing loss.    Eyes: Negative for visual disturbance.   Cardiovascular: Positive for dyspnea on exertion. Negative for chest pain, claudication, leg swelling, near-syncope, orthopnea, palpitations, paroxysmal nocturnal dyspnea and syncope.   Respiratory: Negative for cough, shortness of breath, sleep disturbances due to breathing, snoring and wheezing.    Endocrine: Negative for cold intolerance, heat intolerance, polydipsia, polyphagia and polyuria.   Hematologic/Lymphatic: Negative for bleeding problem. Does not bruise/bleed easily.   Skin: Negative for rash and suspicious lesions.   Musculoskeletal: Positive for muscle cramps. Negative for arthritis, falls, joint pain, muscle weakness and myalgias.   Gastrointestinal: Negative for abdominal pain, change in bowel habit, constipation, diarrhea, heartburn, hematochezia, melena and nausea.   Genitourinary: Negative for hematuria and nocturia.   Neurological: Positive for dizziness and loss of balance. Negative for excessive daytime sleepiness, headaches and light-headedness.   Psychiatric/Behavioral: Negative for depression. The patient is not nervous/anxious.    Allergic/Immunologic: Negative for environmental allergies.       /81 (BP Location: Left  "arm, Patient Position: Sitting, BP Method: Medium (Automatic))   Pulse 79   Ht 5' 11" (1.803 m)   Wt 96.4 kg (212 lb 8.4 oz)   SpO2 98%   BMI 29.64 kg/m²     Objective:   Physical Exam   Constitutional: He is oriented to person, place, and time. He appears well-developed and well-nourished.        HENT:   Head: Normocephalic and atraumatic.   Mouth/Throat: Oropharynx is clear and moist.   Eyes: Conjunctivae and EOM are normal. Pupils are equal, round, and reactive to light. No scleral icterus.   Neck: Normal range of motion. Neck supple. No hepatojugular reflux and no JVD present. No tracheal deviation present. No thyromegaly present.   Cardiovascular: Normal rate, regular rhythm, normal heart sounds and intact distal pulses.  PMI is not displaced.    Pulses:       Carotid pulses are 2+ on the right side, and 2+ on the left side.       Radial pulses are 2+ on the right side, and 2+ on the left side.        Dorsalis pedis pulses are 2+ on the right side, and 2+ on the left side.        Posterior tibial pulses are 2+ on the right side, and 2+ on the left side.   Pulmonary/Chest: Effort normal and breath sounds normal.   Abdominal: Soft. Bowel sounds are normal. He exhibits no distension and no mass. There is no hepatosplenomegaly. There is no tenderness.   Musculoskeletal: He exhibits no edema or tenderness.   Lymphadenopathy:     He has no cervical adenopathy.   Neurological: He is alert and oriented to person, place, and time.   Skin: Skin is warm and dry. No rash noted. No cyanosis or erythema. Nails show no clubbing.   Psychiatric: He has a normal mood and affect. His speech is normal and behavior is normal.       Lab Results   Component Value Date     07/09/2018    K 3.6 07/09/2018     07/09/2018    CO2 29 07/09/2018    BUN 15 07/09/2018    CREATININE 1.0 07/09/2018     07/09/2018    HGBA1C 5.8 (H) 07/09/2018    MG 2.0 01/24/2009    AST 21 07/09/2018    ALT 22 07/09/2018    ALBUMIN 3.9 " 07/09/2018    PROT 7.6 07/09/2018    BILITOT 0.5 07/09/2018    WBC 6.33 07/09/2018    HGB 14.1 07/09/2018    HCT 43.3 07/09/2018    MCV 90 07/09/2018     07/09/2018    INR 1.0 01/24/2009    TSH 1.023 10/03/2014    CHOL 200 (H) 07/09/2018    HDL 56 07/09/2018    LDLCALC 114.4 07/09/2018    TRIG 148 07/09/2018    BNP <10 07/09/2018       Assessment:     1. Diabetic polyneuropathy associated with type 2 diabetes mellitus    2. Pure hypercholesterolemia : He's on pravastatin. His LDL has been creeping up.   3. Essential hypertension : Blood pressure is at goal. I have made no changes. Continue current regimen.   4. ZUNIGA (dyspnea on exertion) : He is already scheduled for a DSE tomorrow and PFT's for further evaluation. He has no evidence of CHF.   5. Nonspecific abnormal electrocardiogram (ECG) (EKG) : His ECG shows a RBBB which is not new. The anterior T wave inversions are secondary to the bundle branch block.       Plan:     Tito was seen today for abnormal ecg, shortness of breath and dizziness.    Diagnoses and all orders for this visit:    Diabetic polyneuropathy associated with type 2 diabetes mellitus    Pure hypercholesterolemia    Essential hypertension    ZUNIGA (dyspnea on exertion)    Nonspecific abnormal electrocardiogram (ECG) (EKG)        Thank you for allowing me to participate in this patient's care. Please do not hesitate to contact me with any questions or concerns.

## 2018-07-17 NOTE — LETTER
July 17, 2018      Amanda Brown MD  [address]  [facility] [address]           [facility] - Cardiology  [address]  [place] [address]  Phone: [phone]          Patient: Tito Menard   MR Number: [id number]   YOB: 1945   Date of Visit: 7/17/2018       Dear Dr. Amanda Brown:    Thank you for referring Tito Menard to me for evaluation. Attached you will find relevant portions of my assessment and plan of care.    If you have questions, please do not hesitate to call me. I look forward to following Tito Menard along with you.    Sincerely,    Ghada Lua MD    Enclosure  CC:  No Recipients    If you would like to receive this communication electronically, please contact [email or web address] or [phone] to request more information on [employer] Link access.    For providers and/or their staff who would like to refer a patient to [facility], please contact us through our one-stop-shop provider referral line, [facility], at [phone].    If you feel you have received this communication in error or would no longer like to receive these types of communications, please e-mail [email or web address]

## 2018-07-18 ENCOUNTER — DOCUMENTATION ONLY (OUTPATIENT)
Dept: CARDIOLOGY | Facility: CLINIC | Age: 73
End: 2018-07-18

## 2018-07-18 ENCOUNTER — HOSPITAL ENCOUNTER (OUTPATIENT)
Dept: CARDIOLOGY | Facility: CLINIC | Age: 73
Discharge: HOME | End: 2018-07-18
Attending: INTERNAL MEDICINE
Payer: MEDICARE

## 2018-07-18 DIAGNOSIS — R06.09 DOE (DYSPNEA ON EXERTION): ICD-10-CM

## 2018-07-18 DIAGNOSIS — R94.31 EKG ABNORMALITIES: ICD-10-CM

## 2018-07-18 LAB
DIASTOLIC DYSFUNCTION: NO
MITRAL VALVE MOBILITY: NORMAL
RETIRED EF AND QEF - SEE NOTES: 65 (ref 55–65)

## 2018-07-18 PROCEDURE — 93321 DOPPLER ECHO F-UP/LMTD STD: CPT | Mod: S$GLB,,, | Performed by: INTERNAL MEDICINE

## 2018-07-18 PROCEDURE — 93351 STRESS TTE COMPLETE: CPT | Mod: S$GLB,,, | Performed by: INTERNAL MEDICINE

## 2018-07-18 PROCEDURE — 96372 THER/PROPH/DIAG INJ SC/IM: CPT | Mod: 59,S$GLB,, | Performed by: INTERNAL MEDICINE

## 2018-07-18 NOTE — PROCEDURES
DIAGNOSIS:  Pulmonary emphysema.    HISTORY OF PRESENT ILLNESS:  The patient is a 72-year-old male, 71 inches tall,   209 pounds.    The patient has a forced vital capacity of 2.51 L, which is 66% of predicted.    FEV1 is 1.90, which is 76% of predicted.  FEV1/FVC is 115%.  FEF 25-75 is   decreased at 62% of predicted.  There is no significant response to   bronchodilators.  Lung volume showed total lung capacity of 67%.  Diffusing   capacity corrects for alveolar ventilation.  Flow volume loop is consistent with   above.    IMPRESSION:  1.  Spirometry is suggestive of a restrictive ventilatory defect.  2.  Lung volumes confirm this as a mild restrictive ventilatory defect.  3.  There is no evidence of diffusion defect.      CCS/IN  dd: 07/18/2018 07:16:16 (CDT)  td: 07/18/2018 12:32:38 (CDT)  Doc ID   #[id number]  Job ID #[id number]    CC: Amanda Brown M.D.

## 2018-07-19 ENCOUNTER — OFFICE VISIT (OUTPATIENT)
Dept: UROLOGY | Facility: CLINIC | Age: 73
End: 2018-07-19
Payer: MEDICARE

## 2018-07-19 VITALS
SYSTOLIC BLOOD PRESSURE: 142 MMHG | HEART RATE: 78 BPM | DIASTOLIC BLOOD PRESSURE: 79 MMHG | HEIGHT: 71 IN | BODY MASS INDEX: 29.75 KG/M2 | WEIGHT: 212.5 LBS

## 2018-07-19 DIAGNOSIS — N13.8 BPH WITH OBSTRUCTION/LOWER URINARY TRACT SYMPTOMS: Primary | ICD-10-CM

## 2018-07-19 DIAGNOSIS — N40.1 BENIGN PROSTATIC HYPERPLASIA WITH URINARY OBSTRUCTION: ICD-10-CM

## 2018-07-19 DIAGNOSIS — N40.1 BPH WITH OBSTRUCTION/LOWER URINARY TRACT SYMPTOMS: Primary | ICD-10-CM

## 2018-07-19 DIAGNOSIS — N13.8 BENIGN PROSTATIC HYPERPLASIA WITH URINARY OBSTRUCTION: ICD-10-CM

## 2018-07-19 DIAGNOSIS — B35.6 JOCK ITCH: ICD-10-CM

## 2018-07-19 PROCEDURE — 99999 PR PBB SHADOW E&M-EST. PATIENT-LVL III: CPT | Mod: PBBFAC,,, | Performed by: UROLOGY

## 2018-07-19 PROCEDURE — 3078F DIAST BP <80 MM HG: CPT | Mod: CPTII,S$GLB,, | Performed by: UROLOGY

## 2018-07-19 PROCEDURE — 3077F SYST BP >= 140 MM HG: CPT | Mod: CPTII,S$GLB,, | Performed by: UROLOGY

## 2018-07-19 PROCEDURE — 99213 OFFICE O/P EST LOW 20 MIN: CPT | Mod: S$GLB,,, | Performed by: UROLOGY

## 2018-07-19 RX ORDER — FINASTERIDE 5 MG/1
5 TABLET, FILM COATED ORAL DAILY
Qty: 90 TABLET | Refills: 3 | Status: SHIPPED | OUTPATIENT
Start: 2018-07-19 | End: 2019-04-29 | Stop reason: SDUPTHER

## 2018-07-19 RX ORDER — TAMSULOSIN HYDROCHLORIDE 0.4 MG/1
1 CAPSULE ORAL DAILY
Qty: 90 CAPSULE | Refills: 3 | Status: SHIPPED | OUTPATIENT
Start: 2018-07-19 | End: 2019-04-29 | Stop reason: SDUPTHER

## 2018-07-19 NOTE — PROGRESS NOTES
CHIEF COMPLAINT:    Mr. Menard is a 72 y.o. male presenting for a follow up on LUTS and history of microscopic hematuria and epididymal cysts .    PRESENTING ILLNESS:    Tito Menard is a 72 y.o. male who returns for follow up.  Since he was last seen he has had no episodes of gross hematuria.  He continues on finasteride and tamsulosin and these control his urinary symptoms.  He states that he has had a rash between the scrotum and inner thighs and he was given a prescription for lotrisone by his primary but he just got it in the mail.  He has tried a number of topical therapies in the past (Gold Tafoya, and an over the counter anti fungal that his wife gave him--likely monistat)  But these did not work.  He has epididymal cysts demonstrated on ultrasound previously and the right two are getting bigger.  He has one on the left that is the size of a marble.      He just lost his mother who was 91 years old, had renal failure and had broken her hip.  His daughter is in the hospital with a renal and psoas abscess.      REVIEW OF SYSTEMS:    Review of Systems   Constitutional: Negative.    HENT: Negative.    Eyes: Negative.    Respiratory: Negative.    Cardiovascular: Negative.    Gastrointestinal: Negative.    Musculoskeletal: Positive for back pain.   Skin: Positive for rash.   Neurological: Negative.    Endo/Heme/Allergies: Negative.    Psychiatric/Behavioral: Negative.      PATIENT HISTORY:    Past Medical History:   Diagnosis Date    Allergy     Aneurysm of artery of lower extremity     Chalazion of left eye     Diabetes mellitus type II     Enlarged aorta 8/2/2016    Noted on pharmacological stress echo 2/28/2014.      GERD (gastroesophageal reflux disease)     egd 2008    Hyperlipidemia     Hypertension     MGD (meibomian gland dysfunction)     Osteopenia     Spinal stenosis     Spondylosis without myelopathy 10/23/2015    Vitamin D deficiency disease        Past Surgical History:   Procedure  Laterality Date    CHALAZION EXCISION Left 8/18/13    CYST REMOVAL  2013    Back of neck    SPINE SURGERY  2007    x2 (2000, 2007)       Family History   Problem Relation Age of Onset    Hyperlipidemia Mother     Hypertension Mother     Kidney disease Mother     Cancer Mother     Heart disease Mother      Social History    Marital status:      Occupational History    Retired           Social History Main Topics    Smoking status: Never Smoker    Smokeless tobacco: Former User     Types: Chew      Comment: Chewed tobacco once per day for 4-5 years when a     Alcohol use No    Drug use: No    Sexual activity: No     Social History Narrative        Colon 2007       Allergies:  Patient has no known allergies.    Medications:  Outpatient Encounter Prescriptions as of 7/19/2018   Medication Sig Dispense Refill    acetaminophen (TYLENOL) 650 MG TbSR Take 650 mg by mouth every 8 (eight) hours.      albuterol 90 mcg/actuation inhaler Inhale 1-2 puffs into the lungs every 6 (six) hours as needed for Wheezing. 1 Inhaler 0    amLODIPine (NORVASC) 5 MG tablet TAKE 1 TABLET EVERY DAY 90 tablet 3    aspirin (ECOTRIN) 81 MG EC tablet Take 81 mg by mouth once daily.      azelastine (ASTELIN) 137 mcg nasal spray 1 spray (137 mcg total) by Nasal route 2 (two) times daily. (Patient taking differently: 1 spray by Nasal route 2 (two) times daily as needed. ) 90 mL 3    cholecalciferol, vitamin D3, (VITAMIN D3) 2,000 unit Cap Take 1 capsule by mouth once daily.      clotrimazole-betamethasone 1-0.05% (LOTRISONE) cream Apply topically 2 (two) times daily. for 10 days 45 g 0    finasteride (PROSCAR) 5 mg tablet Take 1 tablet (5 mg total) by mouth once daily. 90 tablet 3    fish oil-omega-3 fatty acids 300-1,000 mg capsule Take 1 g by mouth once daily.      fluticasone (FLONASE) 50 mcg/actuation nasal spray USE 1 SPRAY NASALLY ONE TIME DAILY 32 g 6    gabapentin (NEURONTIN)  300 MG capsule TAKE 1 CAPSULE THREE TIMES DAILY 270 capsule 1    guaifenesin (MUCINEX) 600 mg 12 hr tablet Take 1,200 mg by mouth 2 (two) times daily.      lisinopril-hydrochlorothiazide (PRINZIDE,ZESTORETIC) 20-12.5 mg per tablet TAKE 2 TABLETS ONE TIME DAILY 180 tablet 3    methocarbamol (ROBAXIN) 500 MG Tab 3 (three) times daily as needed.       olopatadine (PATADAY) 0.2 % Drop Place 1 drop into both eyes once daily. (Patient taking differently: Place 1 drop into both eyes daily as needed. ) 2.5 mL 3    omeprazole (PRILOSEC) 20 MG capsule TAKE 2 CAPSULES ONE TIME DAILY (Patient taking differently: TAKE 1 CAPSULES ONE TIME DAILY) 180 capsule 4    potassium chloride (MICRO-K) 10 MEQ CpSR TAKE 2 CAPSULES ONE TIME DAILY 180 capsule 1    pravastatin (PRAVACHOL) 20 MG tablet TAKE 1 TABLET ONE TIME DAILY 90 tablet 3    tamsulosin (FLOMAX) 0.4 mg Cap Take 1 capsule (0.4 mg total) by mouth once daily. 90 capsule 3    econazole nitrate 1 % cream Apply topically 2 (two) times daily. (Patient taking differently: Apply topically 2 (two) times daily as needed. ) 85 g 1     No facility-administered encounter medications on file as of 7/19/2018.          PHYSICAL EXAMINATION:    The patient generally appears in good health, is appropriately interactive, and is in no apparent distress.    Skin: No lesions.    Mental: Cooperative with normal affect.    Neuro: Grossly intact.    HEENT: Normal. No evidence of lymphadenopathy.    Chest:  normal inspiratory effort.    Abdomen: Soft, non-tender. No masses or organomegaly. Bladder is not palpable. No evidence of flank discomfort. No evidence of inguinal hernia.    Extremities: No clubbing, cyanosis, or edema    Scrotum showed no lesions.  Skin is hyperpigmented in the intertriginous area. Testicles showed no masses or tenderness.  Epididymis showed two epididymal cysts fairly large about the size of the testicle on the right side, one smaller on the left, no tenderness.  Penis  was circumcised. No meatal stenosis. No penile discharge.  No inguinal hernias.  No inguinal lymphadenopathy.      IRINA:  Normal rectal tone, no rectal masses.  The prostate is about 50 g in size, no nodularity.  Central sulcus preserved.   LABS:    Lab Results   Component Value Date    BUN 15 07/09/2018    CREATININE 1.0 07/09/2018     Lab Results   Component Value Date    PSA 1.9 05/27/2016    PSA 1.8 06/30/2014    PSA 1.35 06/17/2013    PSADIAG 1.6 06/12/2017    PSADIAG 1.9 05/15/2015         IMPRESSION:    Encounter Diagnoses   Name Primary?    BPH with obstruction/lower urinary tract symptoms Yes    Benign prostatic hyperplasia with urinary obstruction     Jock itch        PLAN:    1.  PSA   2.  Refilled the tamsulosin and finasteride  3.  Discussed excision of the right epididymal cysts, could take care of the left as well. Discussed that this is outpatient surgery.  He will think about it and consider a date for surgery.

## 2018-07-19 NOTE — LETTER
July 22, 2018      Amanda Brown MD  [address]  [facility] [address]  [facility] [address]           [facility] - Urology 4th Floor  [address]  [facility] [address]  Phone: [phone]          Patient: Tito Menard   MR Number: [id number]   YOB: 1945   Date of Visit: 7/19/2018       Dear Dr. Amanda Brown:    Thank you for referring Tito Menard to me for evaluation. Attached you will find relevant portions of my assessment and plan of care.    If you have questions, please do not hesitate to call me. I look forward to following Tito Menard along with you.    Sincerely,    Beatrice Vaca MD    Enclosure  CC:  No Recipients    If you would like to receive this communication electronically, please contact [email or web address] or [phone] to request more information on [employer] Link access.    For providers and/or their staff who would like to refer a patient to [facility], please contact us through our one-stop-shop provider referral line, [facility], at [phone].    If you feel you have received this communication in error or would no longer like to receive these types of communications, please e-mail [email or web address]

## 2018-07-24 RX ORDER — LISINOPRIL AND HYDROCHLOROTHIAZIDE 12.5; 2 MG/1; MG/1
TABLET ORAL
Qty: 180 TABLET | Refills: 3 | Status: SHIPPED | OUTPATIENT
Start: 2018-07-24 | End: 2018-12-13 | Stop reason: ALTCHOICE

## 2018-07-24 RX ORDER — AMLODIPINE BESYLATE 5 MG/1
TABLET ORAL
Qty: 90 TABLET | Refills: 3 | Status: SHIPPED | OUTPATIENT
Start: 2018-07-24 | End: 2019-04-29 | Stop reason: SDUPTHER

## 2018-08-01 ENCOUNTER — TELEPHONE (OUTPATIENT)
Dept: INTERNAL MEDICINE | Facility: CLINIC | Age: 73
End: 2018-08-01

## 2018-08-01 RX ORDER — CLOTRIMAZOLE AND BETAMETHASONE DIPROPIONATE 10; .64 MG/G; MG/G
CREAM TOPICAL
Qty: 45 G | Refills: 0 | Status: SHIPPED | OUTPATIENT
Start: 2018-08-01 | End: 2018-10-08 | Stop reason: SDUPTHER

## 2018-08-01 NOTE — TELEPHONE ENCOUNTER
----- Message from Amanda Lentz sent at 8/1/2018 12:48 PM CDT -----  Contact: Spouse            Name of Who is Calling: Spouse       What is the request in detail: Please call her to let her know if the forms has been filled out and faxed to the insurance company. Please call her.       Can the clinic reply by [facility]: No      What Number to Call Back if not in [facility]: [phone]

## 2018-08-01 NOTE — TELEPHONE ENCOUNTER
spoke w/ pt and confirmed that forms has been faxed to [facility].  Pt verbally understands and asked if copy can be mailed .     Copy has been placed in mail for pt

## 2018-08-06 ENCOUNTER — OFFICE VISIT (OUTPATIENT)
Dept: INTERNAL MEDICINE | Facility: CLINIC | Age: 73
End: 2018-08-06
Attending: INTERNAL MEDICINE
Payer: MEDICARE

## 2018-08-06 VITALS
HEART RATE: 72 BPM | OXYGEN SATURATION: 98 % | HEIGHT: 71 IN | WEIGHT: 211.44 LBS | SYSTOLIC BLOOD PRESSURE: 130 MMHG | BODY MASS INDEX: 29.6 KG/M2 | DIASTOLIC BLOOD PRESSURE: 80 MMHG

## 2018-08-06 DIAGNOSIS — I10 ESSENTIAL HYPERTENSION: Primary | ICD-10-CM

## 2018-08-06 DIAGNOSIS — F43.21 GRIEF REACTION: ICD-10-CM

## 2018-08-06 DIAGNOSIS — M48.02 SPINAL STENOSIS OF CERVICAL REGION: ICD-10-CM

## 2018-08-06 DIAGNOSIS — J98.4 RESTRICTIVE LUNG DISEASE DUE TO KYPHOSCOLIOSIS: ICD-10-CM

## 2018-08-06 DIAGNOSIS — R06.09 DOE (DYSPNEA ON EXERTION): ICD-10-CM

## 2018-08-06 DIAGNOSIS — M41.9 RESTRICTIVE LUNG DISEASE DUE TO KYPHOSCOLIOSIS: ICD-10-CM

## 2018-08-06 PROCEDURE — 3079F DIAST BP 80-89 MM HG: CPT | Mod: CPTII,S$GLB,, | Performed by: INTERNAL MEDICINE

## 2018-08-06 PROCEDURE — 99499 UNLISTED E&M SERVICE: CPT | Mod: S$GLB,,, | Performed by: INTERNAL MEDICINE

## 2018-08-06 PROCEDURE — 3075F SYST BP GE 130 - 139MM HG: CPT | Mod: CPTII,S$GLB,, | Performed by: INTERNAL MEDICINE

## 2018-08-06 PROCEDURE — 99214 OFFICE O/P EST MOD 30 MIN: CPT | Mod: S$GLB,,, | Performed by: INTERNAL MEDICINE

## 2018-08-06 PROCEDURE — 99999 PR PBB SHADOW E&M-EST. PATIENT-LVL III: CPT | Mod: PBBFAC,,, | Performed by: INTERNAL MEDICINE

## 2018-08-06 RX ORDER — OMEPRAZOLE 20 MG/1
40 CAPSULE, DELAYED RELEASE ORAL DAILY
Qty: 180 CAPSULE | Refills: 4 | Status: SHIPPED | OUTPATIENT
Start: 2018-08-06 | End: 2019-07-01 | Stop reason: SDUPTHER

## 2018-08-06 NOTE — PROGRESS NOTES
"Subjective:       Patient ID: Tito Menard is a 72 y.o. male.    Chief Complaint: Follow-up     Tito Menard is a 72 y.o.  male who presents for Follow-up  .  Seen recently for ZUNIGA.  DSE was normal.  PFTs showed persistent mild restriction.      Back pain - reports "good days and bad days"      Still working in yard.  Recommend continuing with rests as needed.  Not interested in PT [facility].  Recently lost his mother at age 91.  Father with alzheimers age 94.  He is having trouble visiting.  No SI.        Review of Systems   Constitutional: Negative for chills and fever.   HENT: Negative for rhinorrhea and sore throat.    Respiratory: Negative for cough and shortness of breath.    Cardiovascular: Negative for chest pain and palpitations.   Gastrointestinal: Negative for nausea and vomiting.   Genitourinary: Negative for dysuria and hematuria.   Musculoskeletal: Negative for arthralgias and back pain.   Skin: Negative for color change and rash.   Neurological: Negative for weakness and numbness.   Psychiatric/Behavioral: Positive for dysphoric mood. Negative for agitation and suicidal ideas.       Patient Active Problem List   Diagnosis    Hypertension    Hyperlipidemia    Osteopenia    GERD (gastroesophageal reflux disease)    Spinal stenosis    Vitamin D deficiency disease    Pulmonary emphysema    Obstructive chronic bronchitis without exacerbation    Diabetic polyneuropathy associated with type 2 diabetes mellitus    Abdominal aortic aneurysm (AAA) without rupture    Spondylosis of cervical joint with myelopathy    Gait disorder    Chronic right-sided low back pain without sciatica    ZUNIGA (dyspnea on exertion)    Nonspecific abnormal electrocardiogram (ECG) (EKG)    BPH with obstruction/lower urinary tract symptoms    Jock itch    Restrictive lung disease due to kyphoscoliosis       Past Medical History:   Diagnosis Date    Allergy     Aneurysm of artery of lower extremity     " "Chalazion of left eye     Diabetes mellitus type II     Enlarged aorta 8/2/2016    Noted on pharmacological stress echo 2/28/2014.      GERD (gastroesophageal reflux disease)     egd 2008    Hyperlipidemia     Hypertension     MGD (meibomian gland dysfunction)     Osteopenia     Spinal stenosis     Spondylosis without myelopathy 10/23/2015    Vitamin D deficiency disease        Past Surgical History:   Procedure Laterality Date    CHALAZION EXCISION Left 8/18/13    CYST REMOVAL  2013    Back of neck    SPINE SURGERY  2007    x2 (2000, 2007)       Family History   Problem Relation Age of Onset    Hyperlipidemia Mother     Hypertension Mother     Kidney disease Mother     Cancer Mother     Heart disease Mother     No Known Problems Daughter     No Known Problems Sister     No Known Problems Brother     No Known Problems Son     No Known Problems Brother     No Known Problems Son     Hypertension Maternal Grandmother     Amblyopia Neg Hx     Blindness Neg Hx     Cataracts Neg Hx     Diabetes Neg Hx     Glaucoma Neg Hx     Macular degeneration Neg Hx     Retinal detachment Neg Hx     Strabismus Neg Hx     Stroke Neg Hx     Thyroid disease Neg Hx     Melanoma Neg Hx     Psoriasis Neg Hx     Lupus Neg Hx     Eczema Neg Hx        Social History   Substance Use Topics    Smoking status: Never Smoker    Smokeless tobacco: Former User     Types: Chew      Comment: Chewed tobacco once per day for 4-5 years when a     Alcohol use No       Objective:   Blood pressure 130/80, pulse 72, height 5' 11" (1.803 m), weight 95.9 kg (211 lb 6.7 oz), SpO2 98 %.     Physical Exam   Constitutional: He is oriented to person, place, and time. He appears well-developed and well-nourished. No distress.   HENT:   Head: Normocephalic and atraumatic.   Right Ear: External ear normal.   Left Ear: External ear normal.   Eyes: Conjunctivae are normal. No scleral icterus.   Neck: No JVD present. No " thyromegaly present.   Cardiovascular: Normal heart sounds.  Exam reveals no gallop and no friction rub.    No murmur heard.  Pulmonary/Chest: Effort normal and breath sounds normal. He has no wheezes. He has no rales.   Abdominal: Soft. Bowel sounds are normal. He exhibits no distension. There is no tenderness.   Musculoskeletal: He exhibits no edema or tenderness.   Lymphadenopathy:     He has no cervical adenopathy.   Neurological: He is alert and oriented to person, place, and time.   Skin: Skin is warm and dry.   Psychiatric: He has a normal mood and affect. Thought content normal.       Prior labs reviewed  Assessment/Plan:        Tito was seen today for follow-up.    Diagnoses and all orders for this visit:    Essential hypertension  Well controlled  Cont current BP medication(s) and low salt diet  Has new phone- start digital hypertension    Spinal stenosis of cervical region  Pain improving with gabapentin    ZUNIGA (dyspnea on exertion)  Stable, work up unchanged, no acute issues    Restrictive lung disease due to kyphoscoliosis  Recommend PT for multiple chroninc conditions but declines at this time  Recommend he maintain his active lifestyle with rests as needed    Grief reaction  Appropriate  reassurrance  Other orders  -     omeprazole (PRILOSEC) 20 MG capsule; Take 2 capsules (40 mg total) by mouth once daily.  -     NURSING COMMUNICATION: Create [facility] Account  -     NURSING COMMUNICATION: Create [facility] Account  -     Hypertension Digital Medicine (HDMP) Enrollment Order  -     Diabetes Digital Medicine (DDMP) Enrollment Order

## 2018-09-25 DIAGNOSIS — E78.5 HYPERLIPIDEMIA, UNSPECIFIED HYPERLIPIDEMIA TYPE: ICD-10-CM

## 2018-09-26 RX ORDER — PRAVASTATIN SODIUM 20 MG/1
TABLET ORAL
Qty: 90 TABLET | Refills: 3 | Status: SHIPPED | OUTPATIENT
Start: 2018-09-26 | End: 2019-07-15 | Stop reason: SDUPTHER

## 2018-10-09 RX ORDER — CLOTRIMAZOLE AND BETAMETHASONE DIPROPIONATE 10; .64 MG/G; MG/G
CREAM TOPICAL
Qty: 45 G | Refills: 0 | Status: SHIPPED | OUTPATIENT
Start: 2018-10-09 | End: 2019-07-01 | Stop reason: SDUPTHER

## 2018-12-13 ENCOUNTER — OFFICE VISIT (OUTPATIENT)
Dept: INTERNAL MEDICINE | Facility: CLINIC | Age: 73
End: 2018-12-13
Attending: INTERNAL MEDICINE
Payer: MEDICARE

## 2018-12-13 VITALS
HEART RATE: 82 BPM | SYSTOLIC BLOOD PRESSURE: 134 MMHG | OXYGEN SATURATION: 96 % | BODY MASS INDEX: 29.38 KG/M2 | TEMPERATURE: 99 F | HEIGHT: 71 IN | DIASTOLIC BLOOD PRESSURE: 85 MMHG | WEIGHT: 209.88 LBS

## 2018-12-13 DIAGNOSIS — J44.1 OBSTRUCTIVE CHRONIC BRONCHITIS WITH EXACERBATION: ICD-10-CM

## 2018-12-13 DIAGNOSIS — R60.0 LOWER EXTREMITY EDEMA: ICD-10-CM

## 2018-12-13 DIAGNOSIS — E11.42 DIABETIC PERIPHERAL NEUROPATHY: ICD-10-CM

## 2018-12-13 DIAGNOSIS — Z78.9 ALLERGY HISTORY UNKNOWN: Primary | ICD-10-CM

## 2018-12-13 DIAGNOSIS — I10 ESSENTIAL HYPERTENSION: Primary | ICD-10-CM

## 2018-12-13 PROCEDURE — 99214 OFFICE O/P EST MOD 30 MIN: CPT | Mod: HCNC,25,S$GLB, | Performed by: INTERNAL MEDICINE

## 2018-12-13 PROCEDURE — 99999 PR PBB SHADOW E&M-EST. PATIENT-LVL III: CPT | Mod: PBBFAC,HCNC,, | Performed by: INTERNAL MEDICINE

## 2018-12-13 PROCEDURE — 3044F HG A1C LEVEL LT 7.0%: CPT | Mod: CPTII,HCNC,S$GLB, | Performed by: INTERNAL MEDICINE

## 2018-12-13 PROCEDURE — 3079F DIAST BP 80-89 MM HG: CPT | Mod: CPTII,HCNC,S$GLB, | Performed by: INTERNAL MEDICINE

## 2018-12-13 PROCEDURE — 1101F PT FALLS ASSESS-DOCD LE1/YR: CPT | Mod: CPTII,HCNC,S$GLB, | Performed by: INTERNAL MEDICINE

## 2018-12-13 PROCEDURE — 3075F SYST BP GE 130 - 139MM HG: CPT | Mod: CPTII,HCNC,S$GLB, | Performed by: INTERNAL MEDICINE

## 2018-12-13 PROCEDURE — 96372 THER/PROPH/DIAG INJ SC/IM: CPT | Mod: HCNC,S$GLB,, | Performed by: INTERNAL MEDICINE

## 2018-12-13 RX ORDER — CHLORTHALIDONE 25 MG/1
25 TABLET ORAL DAILY
Qty: 90 TABLET | Refills: 2 | Status: SHIPPED | OUTPATIENT
Start: 2018-12-13 | End: 2019-07-01 | Stop reason: SDUPTHER

## 2018-12-13 RX ORDER — FLUTICASONE PROPIONATE 50 MCG
SPRAY, SUSPENSION (ML) NASAL
Qty: 32 G | Refills: 6 | Status: SHIPPED | OUTPATIENT
Start: 2018-12-13 | End: 2019-04-01 | Stop reason: SDUPTHER

## 2018-12-13 RX ORDER — AZITHROMYCIN 250 MG/1
250 TABLET, FILM COATED ORAL DAILY
Status: DISCONTINUED | OUTPATIENT
Start: 2018-12-13 | End: 2021-07-16

## 2018-12-13 RX ORDER — IRBESARTAN 300 MG/1
300 TABLET ORAL NIGHTLY
Qty: 90 TABLET | Refills: 3 | Status: SHIPPED | OUTPATIENT
Start: 2018-12-13 | End: 2019-10-30 | Stop reason: SDUPTHER

## 2018-12-13 RX ORDER — TRIAMCINOLONE ACETONIDE 40 MG/ML
40 INJECTION, SUSPENSION INTRA-ARTICULAR; INTRAMUSCULAR
Status: COMPLETED | OUTPATIENT
Start: 2018-12-13 | End: 2018-12-13

## 2018-12-13 RX ORDER — PROMETHAZINE HYDROCHLORIDE AND DEXTROMETHORPHAN HYDROBROMIDE 6.25; 15 MG/5ML; MG/5ML
5 SYRUP ORAL EVERY 8 HOURS PRN
Qty: 150 ML | Refills: 0 | Status: SHIPPED | OUTPATIENT
Start: 2018-12-13 | End: 2018-12-23

## 2018-12-13 RX ORDER — FLUTICASONE PROPIONATE 50 MCG
SPRAY, SUSPENSION (ML) NASAL
Qty: 32 G | Refills: 6 | Status: SHIPPED | OUTPATIENT
Start: 2018-12-13 | End: 2020-01-08 | Stop reason: SDUPTHER

## 2018-12-13 RX ADMIN — TRIAMCINOLONE ACETONIDE 40 MG: 40 INJECTION, SUSPENSION INTRA-ARTICULAR; INTRAMUSCULAR at 02:12

## 2018-12-13 NOTE — PROGRESS NOTES
"Subjective:       Patient ID: Tito Menard is a 73 y.o. male.    Chief Complaint: Follow-up; Cough; and Hoarse     Tito Menard is a 73 y.o.  male who presents for Follow-up; Cough; and Hoarse  .  Using his home nebulizer to help with wheezing. Started in chest and is now also in his sinuses.  Reports he has this "every so often".     Using his gabapentin and his back pain has been improving again.      Reports BP at home high.  Complaint with meds and low salt diet. No CP.       URI    This is a new problem. The current episode started 1 to 4 weeks ago. The problem has been gradually worsening. There has been no fever. Associated symptoms include congestion, coughing and wheezing. Pertinent negatives include no chest pain, dysuria, nausea, rash, rhinorrhea, sinus pain, sore throat or vomiting. He has tried NSAIDs (robitussin, mucinex, and albuterol ) for the symptoms. The treatment provided mild relief.     Review of Systems   Constitutional: Negative for chills and fever.   HENT: Positive for congestion. Negative for rhinorrhea, sinus pain and sore throat.    Respiratory: Positive for cough, shortness of breath and wheezing.         No hemoptysis   Cardiovascular: Negative for chest pain and palpitations.   Gastrointestinal: Negative for nausea and vomiting.   Genitourinary: Negative for dysuria and hematuria.   Musculoskeletal: Positive for back pain. Negative for myalgias.   Skin: Negative for color change and rash.       Patient Active Problem List   Diagnosis    Hypertension    Hyperlipidemia    Osteopenia    GERD (gastroesophageal reflux disease)    Spinal stenosis    Vitamin D deficiency disease    Pulmonary emphysema    Obstructive chronic bronchitis without exacerbation    Diabetic polyneuropathy associated with type 2 diabetes mellitus    Abdominal aortic aneurysm (AAA) without rupture    Spondylosis of cervical joint with myelopathy    Gait disorder    Chronic right-sided low back " pain without sciatica    ZUNIGA (dyspnea on exertion)    Nonspecific abnormal electrocardiogram (ECG) (EKG)    BPH with obstruction/lower urinary tract symptoms    Jock itch    Restrictive lung disease due to kyphoscoliosis       Past Medical History:   Diagnosis Date    Allergy     Aneurysm of artery of lower extremity     Chalazion of left eye     Diabetes mellitus type II     Enlarged aorta 8/2/2016    Noted on pharmacological stress echo 2/28/2014.      GERD (gastroesophageal reflux disease)     egd 2008    Hyperlipidemia     Hypertension     MGD (meibomian gland dysfunction)     Osteopenia     Spinal stenosis     Spondylosis without myelopathy 10/23/2015    Vitamin D deficiency disease        Past Surgical History:   Procedure Laterality Date    CHALAZION EXCISION Left 8/18/13    CYST REMOVAL  2013    Back of neck    EGD (ESOPHAGOGASTRODUODENOSCOPY) N/A 7/25/2013    Performed by Rasheed Thibodeaux MD at [facility] ([address] [place] [address])    SPINE SURGERY  2007    x2 (2000, 2007)       Family History   Problem Relation Age of Onset    Hyperlipidemia Mother     Hypertension Mother     Kidney disease Mother     Cancer Mother     Heart disease Mother     No Known Problems Daughter     No Known Problems Sister     No Known Problems Brother     No Known Problems Son     No Known Problems Brother     No Known Problems Son     Hypertension Maternal Grandmother     Amblyopia Neg Hx     Blindness Neg Hx     Cataracts Neg Hx     Diabetes Neg Hx     Glaucoma Neg Hx     Macular degeneration Neg Hx     Retinal detachment Neg Hx     Strabismus Neg Hx     Stroke Neg Hx     Thyroid disease Neg Hx     Melanoma Neg Hx     Psoriasis Neg Hx     Lupus Neg Hx     Eczema Neg Hx        Social History     Tobacco Use    Smoking status: Never Smoker    Smokeless tobacco: Former User     Types: Chew    Tobacco comment: Chewed tobacco once per day for 4-5 years when a    Substance Use Topics    Alcohol  "use: No    Drug use: No       Objective:   Blood pressure 134/85, pulse 82, temperature 98.5 °F (36.9 °C), height 5' 11" (1.803 m), weight 95.2 kg (209 lb 14.1 oz), SpO2 96 %.     Physical Exam   Constitutional: He is oriented to person, place, and time. He appears well-developed and well-nourished. No distress.   HENT:   Head: Normocephalic and atraumatic.   Right Ear: External ear normal.   Left Ear: External ear normal.   Eyes: Conjunctivae are normal. No scleral icterus.   Neck: No JVD present. No thyromegaly present.   Cardiovascular: Normal heart sounds. Exam reveals no gallop and no friction rub.   No murmur heard.  Pulmonary/Chest: Effort normal. He has wheezes. He has no rales.   Abdominal: Soft. Bowel sounds are normal. He exhibits no distension. There is no tenderness.   Musculoskeletal: He exhibits edema. He exhibits no tenderness.   Lymphadenopathy:     He has no cervical adenopathy.   Neurological: He is alert and oriented to person, place, and time.   Skin: Skin is warm and dry.   Psychiatric: He has a normal mood and affect. Thought content normal.       Prior labs reviewed  Assessment/Plan:        Tito was seen today for follow-up, cough and hoarse.    Diagnoses and all orders for this visit:    Essential hypertension  Comments: uncontrolled at home also  stop lisinopril hct  start irbesartan 300mg and chlorthalidone 25 mg  [facility]  Orders:  -     Basic metabolic panel; Future    irbesartan (AVAPRO) 300 MG tablet; Take 1 tablet (300 mg total) by mouth every evening.  -     chlorthalidone (HYGROTEN) 25 MG Tab; Take 1 tablet (25 mg total) by mouth once daily.    Obstructive chronic bronchitis with exacerbation  -     triamcinolone acetonide injection 40 mg  -     azithromycin tablet 250 mg  -     promethazine-dextromethorphan (PROMETHAZINE-DM) 6.25-15 mg/5 mL Syrp; Take 5 mLs by mouth every 8 (eight) hours as needed.  -       Lower extremity edema  Comments:  compression " stockings  elevate feet when seated    Diabetic peripheral neuropathy  -     Hemoglobin A1c; Future              Medication List           Accurate as of 12/13/18  2:24 PM. If you have any questions, ask your nurse or doctor.               START taking these medications    chlorthalidone 25 MG Tab  Commonly known as:  HYGROTEN  Take 1 tablet (25 mg total) by mouth once daily.  Started by:  Amanda Brown MD     irbesartan 300 MG tablet  Commonly known as:  AVAPRO  Take 1 tablet (300 mg total) by mouth every evening.  Started by:  Amanda Brown MD     promethazine-dextromethorphan 6.25-15 mg/5 mL Syrp  Commonly known as:  PROMETHAZINE-DM  Take 5 mLs by mouth every 8 (eight) hours as needed.  Started by:  Amanda Brown MD        CHANGE how you take these medications    azelastine 137 mcg (0.1 %) nasal spray  Commonly known as:  ASTELIN  1 spray (137 mcg total) by Nasal route 2 (two) times daily.  What changed:    · when to take this  · reasons to take this     econazole nitrate 1 % cream  Apply topically 2 (two) times daily.  What changed:    · when to take this  · reasons to take this        CONTINUE taking these medications    acetaminophen 650 MG Tbsr  Commonly known as:  TYLENOL     albuterol 90 mcg/actuation inhaler  Commonly known as:  PROVENTIL/VENTOLIN HFA  Inhale 1-2 puffs into the lungs every 6 (six) hours as needed for Wheezing.     amLODIPine 5 MG tablet  Commonly known as:  NORVASC  TAKE 1 TABLET EVERY DAY     aspirin 81 MG EC tablet  Commonly known as:  ECOTRIN     clotrimazole-betamethasone 1-0.05% cream  Commonly known as:  LOTRISONE  APPLY TO THE AFFECTED AREA(S) TWICE DAILY  FOR  10  DAYS     finasteride 5 mg tablet  Commonly known as:  PROSCAR  Take 1 tablet (5 mg total) by mouth once daily.     fish oil-omega-3 fatty acids 300-1,000 mg capsule     fluticasone 50 mcg/actuation nasal spray  Commonly known as:  FLONASE  USE 1 SPRAY NASALLY ONE TIME DAILY     gabapentin 300 MG capsule  Commonly  known as:  NEURONTIN  TAKE 1 CAPSULE THREE TIMES DAILY     guaiFENesin 600 mg 12 hr tablet  Commonly known as:  MUCINEX     KLOR-CON SPRINKLE 10 MEQ Cpsr  Generic drug:  potassium chloride  TAKE 2 CAPSULES ONE TIME DAILY     methocarbamol 500 MG Tab  Commonly known as:  ROBAXIN     omeprazole 20 MG capsule  Commonly known as:  PRILOSEC  Take 2 capsules (40 mg total) by mouth once daily.     pravastatin 20 MG tablet  Commonly known as:  PRAVACHOL  TAKE 1 TABLET ONE TIME DAILY     tamsulosin 0.4 mg Cap  Commonly known as:  FLOMAX  Take 1 capsule (0.4 mg total) by mouth once daily.     VITAMIN D3 2,000 unit Cap  Generic drug:  cholecalciferol (vitamin D3)        STOP taking these medications    lisinopril-hydrochlorothiazide 20-12.5 mg per tablet  Commonly known as:  JACOB CROFTSTORETIC  Stopped by:  Amanda Brown MD           Where to Get Your Medications      These medications were sent to [facility] - [place], [place] - [address]  [address], [facility] [address]    Phone:  [phone]   · chlorthalidone 25 MG Tab  · irbesartan 300 MG tablet  · promethazine-dextromethorphan 6.25-15 mg/5 mL Syrp

## 2018-12-13 NOTE — PROGRESS NOTES
"Per order, patient to receive 1mL of Kenalog (triamcinolone acetonide) 40mg/mL. The area of injection was palpated using the medial fold and the iliac crest as anatomical landmarks. Patient was advised to relax the muscle. The area was cleaned with alcohol and allowed to dry. Using a 25g 1.5" needle, 1mL of Kenalog (triamcinolone acetonide) 40mg/mL was placed intramuscularly into the left upper outer gluteal quadrant. Patient experienced no complications and was discharged in stable condition. Kenalog Lot: [id number] Exp: [id number]    "

## 2018-12-28 ENCOUNTER — TELEPHONE (OUTPATIENT)
Dept: INTERNAL MEDICINE | Facility: CLINIC | Age: 73
End: 2018-12-28

## 2018-12-28 NOTE — TELEPHONE ENCOUNTER
----- Message from Raheem Reyes sent at 12/28/2018 10:31 AM CST -----  Contact: Guillermina (spouse)    Name of Who is Calling: Guillermina (spouse)      What is the request in detail: would like to speak with staff in regards to insurance will not fill new medication due to being duplicate therapy to another drug he is taking lisinopril. Please advise on what medication patient is suppose to be taking.      Can the clinic reply by [facility]: no      What [facility] to [facility] [phone]

## 2018-12-28 NOTE — TELEPHONE ENCOUNTER
Spoke with pt and explained the meds that were changed at his last appt with Dr. Brown. He states he has not received any of the new meds but they are on their way. He was ask to cont present until the new meds arrive and then stop the old and start the new. If he does not receive the meds that was ordered let us know. Pt verbalized understanding

## 2019-01-10 ENCOUNTER — LAB VISIT (OUTPATIENT)
Dept: LAB | Facility: OTHER | Age: 74
End: 2019-01-10
Attending: INTERNAL MEDICINE
Payer: MEDICARE

## 2019-01-10 DIAGNOSIS — I10 ESSENTIAL HYPERTENSION: ICD-10-CM

## 2019-01-10 DIAGNOSIS — E11.42 DIABETIC PERIPHERAL NEUROPATHY: ICD-10-CM

## 2019-01-10 LAB
ANION GAP SERPL CALC-SCNC: 10 MMOL/L
BUN SERPL-MCNC: 13 MG/DL
CALCIUM SERPL-MCNC: 9.3 MG/DL
CHLORIDE SERPL-SCNC: 102 MMOL/L
CO2 SERPL-SCNC: 27 MMOL/L
CREAT SERPL-MCNC: 1 MG/DL
EST. GFR  (AFRICAN AMERICAN): >60 ML/MIN/1.73 M^2
EST. GFR  (NON AFRICAN AMERICAN): >60 ML/MIN/1.73 M^2
ESTIMATED AVG GLUCOSE: 120 MG/DL
GLUCOSE SERPL-MCNC: 121 MG/DL
HBA1C MFR BLD HPLC: 5.8 %
POTASSIUM SERPL-SCNC: 3.4 MMOL/L
SODIUM SERPL-SCNC: 139 MMOL/L

## 2019-01-10 PROCEDURE — 80048 BASIC METABOLIC PNL TOTAL CA: CPT | Mod: HCNC

## 2019-01-10 PROCEDURE — 83036 HEMOGLOBIN GLYCOSYLATED A1C: CPT | Mod: HCNC

## 2019-01-10 PROCEDURE — 36415 COLL VENOUS BLD VENIPUNCTURE: CPT | Mod: HCNC

## 2019-01-14 ENCOUNTER — OFFICE VISIT (OUTPATIENT)
Dept: INTERNAL MEDICINE | Facility: CLINIC | Age: 74
End: 2019-01-14
Attending: INTERNAL MEDICINE
Payer: MEDICARE

## 2019-01-14 VITALS
WEIGHT: 208.13 LBS | SYSTOLIC BLOOD PRESSURE: 130 MMHG | OXYGEN SATURATION: 95 % | BODY MASS INDEX: 29.14 KG/M2 | HEIGHT: 71 IN | HEART RATE: 102 BPM | DIASTOLIC BLOOD PRESSURE: 77 MMHG

## 2019-01-14 DIAGNOSIS — E11.42 DIABETIC POLYNEUROPATHY ASSOCIATED WITH TYPE 2 DIABETES MELLITUS: ICD-10-CM

## 2019-01-14 DIAGNOSIS — I77.9 AORTIC DISEASE: ICD-10-CM

## 2019-01-14 DIAGNOSIS — J44.89 OBSTRUCTIVE CHRONIC BRONCHITIS WITHOUT EXACERBATION: ICD-10-CM

## 2019-01-14 DIAGNOSIS — I10 ESSENTIAL HYPERTENSION: Primary | ICD-10-CM

## 2019-01-14 PROCEDURE — 1101F PR PT FALLS ASSESS DOC 0-1 FALLS W/OUT INJ PAST YR: ICD-10-PCS | Mod: CPTII,HCNC,S$GLB, | Performed by: INTERNAL MEDICINE

## 2019-01-14 PROCEDURE — 99214 PR OFFICE/OUTPT VISIT, EST, LEVL IV, 30-39 MIN: ICD-10-PCS | Mod: HCNC,25,S$GLB, | Performed by: INTERNAL MEDICINE

## 2019-01-14 PROCEDURE — 1101F PT FALLS ASSESS-DOCD LE1/YR: CPT | Mod: CPTII,HCNC,S$GLB, | Performed by: INTERNAL MEDICINE

## 2019-01-14 PROCEDURE — 90662 IIV NO PRSV INCREASED AG IM: CPT | Mod: HCNC,S$GLB,, | Performed by: INTERNAL MEDICINE

## 2019-01-14 PROCEDURE — 3075F SYST BP GE 130 - 139MM HG: CPT | Mod: CPTII,HCNC,S$GLB, | Performed by: INTERNAL MEDICINE

## 2019-01-14 PROCEDURE — 3044F PR MOST RECENT HEMOGLOBIN A1C LEVEL <7.0%: ICD-10-PCS | Mod: CPTII,HCNC,S$GLB, | Performed by: INTERNAL MEDICINE

## 2019-01-14 PROCEDURE — G0008 FLU VACCINE - HIGH DOSE (65+) PRESERVATIVE FREE IM: ICD-10-PCS | Mod: HCNC,S$GLB,, | Performed by: INTERNAL MEDICINE

## 2019-01-14 PROCEDURE — 3078F DIAST BP <80 MM HG: CPT | Mod: CPTII,HCNC,S$GLB, | Performed by: INTERNAL MEDICINE

## 2019-01-14 PROCEDURE — G0008 ADMIN INFLUENZA VIRUS VAC: HCPCS | Mod: HCNC,S$GLB,, | Performed by: INTERNAL MEDICINE

## 2019-01-14 PROCEDURE — 3044F HG A1C LEVEL LT 7.0%: CPT | Mod: CPTII,HCNC,S$GLB, | Performed by: INTERNAL MEDICINE

## 2019-01-14 PROCEDURE — 99214 OFFICE O/P EST MOD 30 MIN: CPT | Mod: HCNC,25,S$GLB, | Performed by: INTERNAL MEDICINE

## 2019-01-14 PROCEDURE — 90662 FLU VACCINE - HIGH DOSE (65+) PRESERVATIVE FREE IM: ICD-10-PCS | Mod: HCNC,S$GLB,, | Performed by: INTERNAL MEDICINE

## 2019-01-14 PROCEDURE — 3078F PR MOST RECENT DIASTOLIC BLOOD PRESSURE < 80 MM HG: ICD-10-PCS | Mod: CPTII,HCNC,S$GLB, | Performed by: INTERNAL MEDICINE

## 2019-01-14 PROCEDURE — 3075F PR MOST RECENT SYSTOLIC BLOOD PRESS GE 130-139MM HG: ICD-10-PCS | Mod: CPTII,HCNC,S$GLB, | Performed by: INTERNAL MEDICINE

## 2019-01-14 PROCEDURE — 99999 PR PBB SHADOW E&M-EST. PATIENT-LVL V: ICD-10-PCS | Mod: PBBFAC,HCNC,, | Performed by: INTERNAL MEDICINE

## 2019-01-14 PROCEDURE — 99999 PR PBB SHADOW E&M-EST. PATIENT-LVL V: CPT | Mod: PBBFAC,HCNC,, | Performed by: INTERNAL MEDICINE

## 2019-01-14 RX ORDER — POTASSIUM CHLORIDE 750 MG/1
30 CAPSULE, EXTENDED RELEASE ORAL DAILY
Qty: 270 CAPSULE | Refills: 2 | Status: SHIPPED | OUTPATIENT
Start: 2019-01-14 | End: 2019-12-30 | Stop reason: SDUPTHER

## 2019-01-14 NOTE — PROGRESS NOTES
"Patient was given vaccine information sheet for the Flu Vaccine. The area of injection was palpated using the acromion process as a landmark. This area was cleaned with alcohol. Using a 25g 1" safety needle, 0.5mL of the vaccine was placed into the left deltiod muscle. The injection site was dressed with a bandage. Patient experienced no complications and was discharged in stable condition. Fluzone High Dose vaccine Lot: [id number] Exp: [id number]    "

## 2019-01-14 NOTE — PROGRESS NOTES
Subjective:       Patient ID: Tito Menard is a 73 y.o. male.    Chief Complaint: Annual Exam     Tito Menard is a 73 y.o.  male who presents for Annual Exam  .  Changed from lisinopril hct to chlorthalidone and irbesartan 300mg.  Recent labs show low potassium. Is taking 20 MeQ daily.  Started with mild leg cramps last week.     Cough has improved. Still taking mucinex. No SOB.      Diabetes doing well on diet only. No recent issues. Attempting diabetic diet.     Back doing fairly well.  Plans to attmept to be more active. Walks with cane.     Patient has a history of hyperlipidemia. he follows a low cholesterol diet and has been exercising regularly.  he is compliant with cholesterol lowering medications listed in medication list.         Review of Systems   Constitutional: Negative for chills and fever.   Respiratory: Negative for cough and shortness of breath.    Cardiovascular: Negative for chest pain and palpitations.   Gastrointestinal: Negative for nausea and vomiting.   Genitourinary: Negative for dysuria and hematuria.       Patient Active Problem List   Diagnosis    Hypertension    Hyperlipidemia    Osteopenia    GERD (gastroesophageal reflux disease)    Spinal stenosis    Vitamin D deficiency disease    Pulmonary emphysema    Obstructive chronic bronchitis without exacerbation    Diabetic polyneuropathy associated with type 2 diabetes mellitus    Abdominal aortic aneurysm (AAA) without rupture    Spondylosis of cervical joint with myelopathy    Gait disorder    Chronic right-sided low back pain without sciatica    ZUNIGA (dyspnea on exertion)    Nonspecific abnormal electrocardiogram (ECG) (EKG)    BPH with obstruction/lower urinary tract symptoms    Jock itch    Restrictive lung disease due to kyphoscoliosis       Past Medical History:   Diagnosis Date    Allergy     Aneurysm of artery of lower extremity     Chalazion of left eye     Diabetes mellitus type II     Enlarged  "aorta 8/2/2016    Noted on pharmacological stress echo 2/28/2014.      GERD (gastroesophageal reflux disease)     egd 2008    Hyperlipidemia     Hypertension     MGD (meibomian gland dysfunction)     Osteopenia     Spinal stenosis     Spondylosis without myelopathy 10/23/2015    Vitamin D deficiency disease        Past Surgical History:   Procedure Laterality Date    CHALAZION EXCISION Left 8/18/13    CYST REMOVAL  2013    Back of neck    EGD (ESOPHAGOGASTRODUODENOSCOPY) N/A 7/25/2013    Performed by Rasheed Thibodeaux MD at [facility] ([address] [place] [address])    SPINE SURGERY  2007    x2 (2000, 2007)       Family History   Problem Relation Age of Onset    Hyperlipidemia Mother     Hypertension Mother     Kidney disease Mother     Cancer Mother     Heart disease Mother     No Known Problems Daughter     No Known Problems Sister     No Known Problems Brother     No Known Problems Son     No Known Problems Brother     No Known Problems Son     Hypertension Maternal Grandmother     Amblyopia Neg Hx     Blindness Neg Hx     Cataracts Neg Hx     Diabetes Neg Hx     Glaucoma Neg Hx     Macular degeneration Neg Hx     Retinal detachment Neg Hx     Strabismus Neg Hx     Stroke Neg Hx     Thyroid disease Neg Hx     Melanoma Neg Hx     Psoriasis Neg Hx     Lupus Neg Hx     Eczema Neg Hx        Social History     Tobacco Use    Smoking status: Never Smoker    Smokeless tobacco: Former User     Types: Chew    Tobacco comment: Chewed tobacco once per day for 4-5 years when a    Substance Use Topics    Alcohol use: No    Drug use: No       Objective:   Blood pressure 130/77, pulse 102, height 5' 11" (1.803 m), weight 94.4 kg (208 lb 1.8 oz), SpO2 95 %.     Physical Exam   Constitutional: He is oriented to person, place, and time. He appears well-developed and well-nourished. No distress.   HENT:   Head: Normocephalic and atraumatic.   Right Ear: External ear normal.   Left Ear: External ear normal. "   Eyes: Conjunctivae are normal. No scleral icterus.   Neck: No JVD present. No thyromegaly present.   Cardiovascular: Normal heart sounds. Exam reveals no gallop and no friction rub.   No murmur heard.  Pulmonary/Chest: Effort normal and breath sounds normal. He has no wheezes. He has no rales.   Abdominal: Soft. Bowel sounds are normal. He exhibits no distension. There is no tenderness.   Musculoskeletal: He exhibits no edema or tenderness.   Lymphadenopathy:     He has no cervical adenopathy.   Neurological: He is alert and oriented to person, place, and time.   Skin: Skin is warm and dry.   Psychiatric: He has a normal mood and affect. Thought content normal.       Prior labs reviewed  Assessment/Plan:        Tito was seen today for annual exam.    Diagnoses and all orders for this visit:    Essential hypertension  Comments:  mild decrease in potassium  increase to 30 meq daily  cont chlorthalidone, irbesartan and amlodipine  digital hypertenstion referral  Orders:  -     Diabetes Digital Medicine (DDMP) Enrollment Order  -     Diabetes Digital Medicine (DDMP): Assign Onboarding Questionnaires  -     NURSING COMMUNICATION: Create [facility] Account  -     NURSING COMMUNICATION: Create [facility] Account  -     Hypertension Digital Medicine (HDMP) Enrollment Order  -     Hypertension Digital Medicine (HDMP): Assign Onboarding Questionnaires    Obstructive chronic bronchitis without exacerbation  Comments:  recetn exacerbation improved  cont prn albuterol with exacerbations      Diabetic polyneuropathy associated with type 2 diabetes mellitus  Comments:  diet controlled  cont current plan  Orders:  -     Lipid panel; Future  -     Comprehensive metabolic panel; Future  -     Hemoglobin A1c; Future    Aortic disease  Comments:  previously followed by jamil  repeat ct chest  if any changes will refer back to vascular  Orders:  -     CT Chest Without Contrast; Future    Other orders  -     Influenza - High Dose (65+)  (PF) (IM)  -     potassium chloride (KLOR-CON SPRINKLE) 10 MEQ CpSR; Take 3 capsules (30 mEq total) by mouth once daily.           Medication List           Accurate as of 1/14/19 11:07 AM. If you have any questions, ask your nurse or doctor.               CHANGE how you take these medications    azelastine 137 mcg (0.1 %) nasal spray  Commonly known as:  ASTELIN  1 spray (137 mcg total) by Nasal route 2 (two) times daily.  What changed:    · when to take this  · reasons to take this     econazole nitrate 1 % cream  Apply topically 2 (two) times daily.  What changed:    · when to take this  · reasons to take this     potassium chloride 10 MEQ Cpsr  Commonly known as:  KLOR-CON SPRINKLE  Take 3 capsules (30 mEq total) by mouth once daily.  What changed:  how much to take  Changed by:  Amanda Brown MD        CONTINUE taking these medications    acetaminophen 650 MG Tbsr  Commonly known as:  TYLENOL     albuterol 90 mcg/actuation inhaler  Commonly known as:  PROVENTIL/VENTOLIN HFA  Inhale 1-2 puffs into the lungs every 6 (six) hours as needed for Wheezing.     amLODIPine 5 MG tablet  Commonly known as:  NORVASC  TAKE 1 TABLET EVERY DAY     aspirin 81 MG EC tablet  Commonly known as:  ECOTRIN     chlorthalidone 25 MG Tab  Commonly known as:  HYGROTEN  Take 1 tablet (25 mg total) by mouth once daily.     clotrimazole-betamethasone 1-0.05% cream  Commonly known as:  LOTRISONE  APPLY TO THE AFFECTED AREA(S) TWICE DAILY  FOR  10  DAYS     finasteride 5 mg tablet  Commonly known as:  PROSCAR  Take 1 tablet (5 mg total) by mouth once daily.     fish oil-omega-3 fatty acids 300-1,000 mg capsule     * fluticasone 50 mcg/actuation nasal spray  Commonly known as:  FLONASE  USE 1 SPRAY NASALLY ONE TIME DAILY     * fluticasone 50 mcg/actuation nasal spray  Commonly known as:  FLONASE  USE 1 SPRAY NASALLY ONE TIME DAILY     gabapentin 300 MG capsule  Commonly known as:  NEURONTIN  TAKE 1 CAPSULE THREE TIMES DAILY     guaiFENesin  600 mg 12 hr tablet  Commonly known as:  MUCINEX     irbesartan 300 MG tablet  Commonly known as:  AVAPRO  Take 1 tablet (300 mg total) by mouth every evening.     methocarbamol 500 MG Tab  Commonly known as:  ROBAXIN     omeprazole 20 MG capsule  Commonly known as:  PRILOSEC  Take 2 capsules (40 mg total) by mouth once daily.     pravastatin 20 MG tablet  Commonly known as:  PRAVACHOL  TAKE 1 TABLET ONE TIME DAILY     tamsulosin 0.4 mg Cap  Commonly known as:  FLOMAX  Take 1 capsule (0.4 mg total) by mouth once daily.     VITAMIN D3 2,000 unit Cap  Generic drug:  cholecalciferol (vitamin D3)         * This list has 2 medication(s) that are the same as other medications prescribed for you. Read the directions carefully, and ask your doctor or other care provider to review them with you.               Where to Get Your Medications      These medications were sent to [facility] - [place], [place] - [address]  [address], [facility] [address]    Phone:  [phone]   · potassium chloride 10 MEQ Cpsr

## 2019-01-14 NOTE — PATIENT INSTRUCTIONS
Step-by-Step  Checking Your Blood Pressure    Date Last Reviewed: 4/27/2016  © 8204-5917 [employer], [employer]. [address] [place] [address], [place] [address]. All rights reserved. This information is not intended as a substitute for professional medical care. Always follow your healthcare professional's instructions.

## 2019-02-22 ENCOUNTER — PES CALL (OUTPATIENT)
Dept: ADMINISTRATIVE | Facility: CLINIC | Age: 74
End: 2019-02-22

## 2019-04-01 ENCOUNTER — OFFICE VISIT (OUTPATIENT)
Dept: INTERNAL MEDICINE | Facility: CLINIC | Age: 74
End: 2019-04-01
Payer: MEDICARE

## 2019-04-01 VITALS
BODY MASS INDEX: 28.98 KG/M2 | HEIGHT: 71 IN | OXYGEN SATURATION: 97 % | HEART RATE: 66 BPM | SYSTOLIC BLOOD PRESSURE: 118 MMHG | WEIGHT: 207 LBS | DIASTOLIC BLOOD PRESSURE: 72 MMHG

## 2019-04-01 DIAGNOSIS — J43.9 PULMONARY EMPHYSEMA, UNSPECIFIED EMPHYSEMA TYPE: ICD-10-CM

## 2019-04-01 DIAGNOSIS — Z00.00 ENCOUNTER FOR PREVENTIVE HEALTH EXAMINATION: Primary | ICD-10-CM

## 2019-04-01 DIAGNOSIS — J44.89 OBSTRUCTIVE CHRONIC BRONCHITIS WITHOUT EXACERBATION: ICD-10-CM

## 2019-04-01 DIAGNOSIS — R26.9 GAIT DISORDER: ICD-10-CM

## 2019-04-01 DIAGNOSIS — E11.42 DIABETIC POLYNEUROPATHY ASSOCIATED WITH TYPE 2 DIABETES MELLITUS: ICD-10-CM

## 2019-04-01 DIAGNOSIS — N18.2 CONTROLLED TYPE 2 DIABETES MELLITUS WITH STAGE 2 CHRONIC KIDNEY DISEASE, WITHOUT LONG-TERM CURRENT USE OF INSULIN: ICD-10-CM

## 2019-04-01 DIAGNOSIS — N18.2 CKD (CHRONIC KIDNEY DISEASE), STAGE II: ICD-10-CM

## 2019-04-01 DIAGNOSIS — I71.40 ABDOMINAL AORTIC ANEURYSM (AAA) WITHOUT RUPTURE: ICD-10-CM

## 2019-04-01 DIAGNOSIS — M41.9 RESTRICTIVE LUNG DISEASE DUE TO KYPHOSCOLIOSIS: ICD-10-CM

## 2019-04-01 DIAGNOSIS — E11.22 CONTROLLED TYPE 2 DIABETES MELLITUS WITH STAGE 2 CHRONIC KIDNEY DISEASE, WITHOUT LONG-TERM CURRENT USE OF INSULIN: ICD-10-CM

## 2019-04-01 DIAGNOSIS — M85.80 OSTEOPENIA, UNSPECIFIED LOCATION: ICD-10-CM

## 2019-04-01 DIAGNOSIS — K21.9 GASTROESOPHAGEAL REFLUX DISEASE, ESOPHAGITIS PRESENCE NOT SPECIFIED: ICD-10-CM

## 2019-04-01 DIAGNOSIS — N40.1 BPH WITH OBSTRUCTION/LOWER URINARY TRACT SYMPTOMS: ICD-10-CM

## 2019-04-01 DIAGNOSIS — E78.00 PURE HYPERCHOLESTEROLEMIA: ICD-10-CM

## 2019-04-01 DIAGNOSIS — M48.02 SPINAL STENOSIS OF CERVICAL REGION: ICD-10-CM

## 2019-04-01 DIAGNOSIS — N13.8 BPH WITH OBSTRUCTION/LOWER URINARY TRACT SYMPTOMS: ICD-10-CM

## 2019-04-01 DIAGNOSIS — I10 ESSENTIAL HYPERTENSION: ICD-10-CM

## 2019-04-01 DIAGNOSIS — E55.9 VITAMIN D DEFICIENCY DISEASE: ICD-10-CM

## 2019-04-01 DIAGNOSIS — J98.4 RESTRICTIVE LUNG DISEASE DUE TO KYPHOSCOLIOSIS: ICD-10-CM

## 2019-04-01 PROCEDURE — 99999 PR PBB SHADOW E&M-EST. PATIENT-LVL V: CPT | Mod: PBBFAC,HCNC,, | Performed by: NURSE PRACTITIONER

## 2019-04-01 PROCEDURE — G0439 PR MEDICARE ANNUAL WELLNESS SUBSEQUENT VISIT: ICD-10-PCS | Mod: HCNC,S$GLB,, | Performed by: NURSE PRACTITIONER

## 2019-04-01 PROCEDURE — 99499 UNLISTED E&M SERVICE: CPT | Mod: HCNC,S$GLB,, | Performed by: NURSE PRACTITIONER

## 2019-04-01 PROCEDURE — 99499 RISK ADDL DX/OHS AUDIT: ICD-10-PCS | Mod: HCNC,S$GLB,, | Performed by: NURSE PRACTITIONER

## 2019-04-01 PROCEDURE — 3044F HG A1C LEVEL LT 7.0%: CPT | Mod: HCNC,CPTII,S$GLB, | Performed by: NURSE PRACTITIONER

## 2019-04-01 PROCEDURE — 3074F SYST BP LT 130 MM HG: CPT | Mod: HCNC,CPTII,S$GLB, | Performed by: NURSE PRACTITIONER

## 2019-04-01 PROCEDURE — 3078F PR MOST RECENT DIASTOLIC BLOOD PRESSURE < 80 MM HG: ICD-10-PCS | Mod: HCNC,CPTII,S$GLB, | Performed by: NURSE PRACTITIONER

## 2019-04-01 PROCEDURE — 3078F DIAST BP <80 MM HG: CPT | Mod: HCNC,CPTII,S$GLB, | Performed by: NURSE PRACTITIONER

## 2019-04-01 PROCEDURE — G0439 PPPS, SUBSEQ VISIT: HCPCS | Mod: HCNC,S$GLB,, | Performed by: NURSE PRACTITIONER

## 2019-04-01 PROCEDURE — 99999 PR PBB SHADOW E&M-EST. PATIENT-LVL V: ICD-10-PCS | Mod: PBBFAC,HCNC,, | Performed by: NURSE PRACTITIONER

## 2019-04-01 PROCEDURE — 3074F PR MOST RECENT SYSTOLIC BLOOD PRESSURE < 130 MM HG: ICD-10-PCS | Mod: HCNC,CPTII,S$GLB, | Performed by: NURSE PRACTITIONER

## 2019-04-01 PROCEDURE — 3044F PR MOST RECENT HEMOGLOBIN A1C LEVEL <7.0%: ICD-10-PCS | Mod: HCNC,CPTII,S$GLB, | Performed by: NURSE PRACTITIONER

## 2019-04-01 RX ORDER — IBUPROFEN 200 MG
200 TABLET ORAL EVERY 6 HOURS PRN
COMMUNITY
End: 2023-06-20

## 2019-04-01 RX ORDER — MELATONIN 10 MG
1 CAPSULE ORAL NIGHTLY PRN
COMMUNITY
End: 2023-06-20

## 2019-04-01 NOTE — PATIENT INSTRUCTIONS
Counseling and Referral of Other Preventative  (Italic type indicates deductible and co-insurance are waived)    Patient Name: Tito Menard  Today's Date: 4/1/2019    Health Maintenance       Date Due Completion Date    Zoster Vaccine 12/05/2004 ---    Foot Exam 06/23/2018 6/23/2017 (Done)    Override on 6/23/2017: Done    Override on 4/28/2016: Done    Override on 5/15/2015: Done    Eye Exam 06/14/2019 6/14/2018    Override on 6/14/2018: Done    Override on 8/5/2015: Done    Lipid Panel 07/09/2019 7/9/2018    Hemoglobin A1c 07/10/2019 1/10/2019    Override on 10/23/2015: Done    Low Dose Statin 04/01/2020 4/1/2019    TETANUS VACCINE 07/22/2021 7/22/2011    Colonoscopy 09/13/2027 9/13/2017    Override on 8/1/2007: Done        No orders of the defined types were placed in this encounter.    The following information is provided to all patients.  This information is to help you find resources for any of the problems found today that may be affecting your health:                Living healthy guide: www.[facility].[place].gov      Understanding Diabetes: www.diabetes.org      Eating healthy: www.cdc.gov/healthyweight      CDC home safety checklist: www.cdc.gov/steadi/patient.html      Agency on Aging: www.goea.[place].[facility]      Alcoholics anonymous (AA): www.aa.org      Physical Activity: www.geraldine.nih.gov/rj5obhm      Tobacco use: www.quitwithusla.org

## 2019-04-01 NOTE — PROGRESS NOTES
"  I offered to discuss end of life issues, including information on how to make advance directives that the patient could use to name someone who would make medical decisions on their behalf if they became too ill to make themselves.    ___Patient declined  _X_Patient is interested, I provided paper work and offered to shadi  Tito Menard presented for a  Medicare AWV and comprehensive Health Risk Assessment today. The following components were reviewed and updated:    · Medical history  · Family History  · Social history  · Allergies and Current Medications  · Health Risk Assessment  · Health Maintenance  · Care Team     ** See Completed Assessments for Annual Wellness Visit within the encounter summary.**       The following assessments were completed:  · Living Situation  · CAGE  · Depression Screening  · Timed Get Up and Go  · Whisper Test  · Cognitive Function Screening  ·   ·   · Nutrition Screening  · ADL Screening  · PAQ Screening    Vitals:    04/01/19 1106   BP: 118/72   Pulse: 66   SpO2: 97%   Weight: 93.9 kg (207 lb 0.2 oz)   Height: 5' 11" (1.803 m)     Body mass index is 28.87 kg/m².  Physical Exam   Constitutional: He is oriented to person, place, and time. He appears well-developed and well-nourished.   HENT:   Head: Normocephalic and atraumatic.   Nose: Nose normal.   Eyes: Conjunctivae and EOM are normal.   Cardiovascular: Normal rate, regular rhythm, normal heart sounds and intact distal pulses.   Pulmonary/Chest: Effort normal and breath sounds normal.   Musculoskeletal: Normal range of motion.   Neurological: He is alert and oriented to person, place, and time. Gait abnormal.   Cane use   Skin: Skin is warm and dry.   Psychiatric: He has a normal mood and affect. His behavior is normal. Judgment and thought content normal.   Nursing note and vitals reviewed.        Diagnoses and health risks identified today and associated recommendations/orders:    1. Encounter for preventive health " examination  Assessment performed. Health maintenance updated. Chart review completed.    2. Controlled type 2 diabetes mellitus with stage 2 chronic kidney disease, without long-term current use of insulin  Chronic. Stable with current regimen. Followed by PCP.    3. Obstructive chronic bronchitis without exacerbation  Chronic. Stable with current regimen. Followed by Pulmonology.    4. Pulmonary emphysema, unspecified emphysema type  Chronic. Stable with current regimen. Followed by Pulmonology.    5. Diabetic polyneuropathy associated with type 2 diabetes mellitus  Chronic. Stable with current regimen. Followed by PCP.    6. Abdominal aortic aneurysm (AAA) without rupture  Chronic. Stable. Followed by Vascular surgery.  Last seen 2016.    7. Spinal stenosis of cervical region  Chronic. Stable. Followed by spine services.    8. Restrictive lung disease due to kyphoscoliosis  Chronic. Stable with current regimen. Followed by Pulmonology.    9. Pure hypercholesterolemia  Chronic. Stable with current regimen. Followed by PCP.    10. Essential hypertension  Chronic. Stable with current regimen. Followed by PCP.    11. Vitamin D deficiency disease  Chronic. Stable with current regimen. Followed by PCP.    12. BPH with obstruction/lower urinary tract symptoms  Chronic. Stable with current regimen. Followed by Urology.    13. Gastroesophageal reflux disease, esophagitis presence not specified  Chronic. Stable with current regimen. Followed by PCP.    14. Osteopenia, unspecified location  Chronic. Stable with current regimen. Followed by PCP.    15. Gait disorder  Chronic. Stable with current regimen. Followed by PCP.  Continue cane use.    16. CKD (chronic kidney disease), stage II  Chronic. Stable with current regimen. Followed by PCP.    Provided Tito with a 5-10 year written screening schedule and personal prevention plan. Recommendations were developed using the USPSTF age appropriate recommendations. Education,  counseling, and referrals were provided as needed. After Visit Summary printed and given to patient which includes a list of additional screenings\tests needed.    Follow up for Annual Wellness Visit in 1 year, follow up with PCP as instructed, ;sooner if problems.    SARAH Cloud

## 2019-04-29 DIAGNOSIS — N40.1 BENIGN PROSTATIC HYPERPLASIA WITH URINARY OBSTRUCTION: ICD-10-CM

## 2019-04-29 DIAGNOSIS — N13.8 BENIGN PROSTATIC HYPERPLASIA WITH URINARY OBSTRUCTION: ICD-10-CM

## 2019-04-29 RX ORDER — TAMSULOSIN HYDROCHLORIDE 0.4 MG/1
CAPSULE ORAL
Qty: 90 CAPSULE | Refills: 0 | Status: SHIPPED | OUTPATIENT
Start: 2019-04-29 | End: 2019-07-02 | Stop reason: SDUPTHER

## 2019-04-29 RX ORDER — FINASTERIDE 5 MG/1
5 TABLET, FILM COATED ORAL DAILY
Qty: 90 TABLET | Refills: 0 | Status: SHIPPED | OUTPATIENT
Start: 2019-04-29 | End: 2019-07-01 | Stop reason: SDUPTHER

## 2019-04-30 RX ORDER — AMLODIPINE BESYLATE 5 MG/1
TABLET ORAL
Qty: 90 TABLET | Refills: 3 | Status: SHIPPED | OUTPATIENT
Start: 2019-04-30 | End: 2020-02-03

## 2019-06-24 ENCOUNTER — TELEPHONE (OUTPATIENT)
Dept: INTERNAL MEDICINE | Facility: CLINIC | Age: 74
End: 2019-06-24

## 2019-06-24 DIAGNOSIS — N18.2 CKD (CHRONIC KIDNEY DISEASE), STAGE II: ICD-10-CM

## 2019-06-24 DIAGNOSIS — E78.00 PURE HYPERCHOLESTEROLEMIA: ICD-10-CM

## 2019-06-24 DIAGNOSIS — E11.42 DIABETIC POLYNEUROPATHY ASSOCIATED WITH TYPE 2 DIABETES MELLITUS: Primary | ICD-10-CM

## 2019-06-24 NOTE — TELEPHONE ENCOUNTER
----- Message from Willa Pruitt sent at 6/24/2019 10:36 AM CDT -----  Type: Patient Call Back    Who called: pt    What is the request in detail: pt asking for lab work for 6 mo f/u to be added prior to appt on 07/01    Can the clinic reply by [facility]? No     Would the patient rather a call back or a response via [facility]? Call back     Best call back number: [phone]    Additional Information:

## 2019-07-01 ENCOUNTER — OFFICE VISIT (OUTPATIENT)
Dept: INTERNAL MEDICINE | Facility: CLINIC | Age: 74
End: 2019-07-01
Attending: INTERNAL MEDICINE
Payer: MEDICARE

## 2019-07-01 ENCOUNTER — HOSPITAL ENCOUNTER (OUTPATIENT)
Dept: CARDIOLOGY | Facility: OTHER | Age: 74
Discharge: HOME | End: 2019-07-01
Attending: INTERNAL MEDICINE
Payer: MEDICARE

## 2019-07-01 VITALS
SYSTOLIC BLOOD PRESSURE: 132 MMHG | BODY MASS INDEX: 30.21 KG/M2 | DIASTOLIC BLOOD PRESSURE: 80 MMHG | OXYGEN SATURATION: 99 % | HEIGHT: 71 IN | HEART RATE: 70 BPM | WEIGHT: 215.81 LBS

## 2019-07-01 DIAGNOSIS — R06.09 DOE (DYSPNEA ON EXERTION): ICD-10-CM

## 2019-07-01 DIAGNOSIS — M48.02 SPINAL STENOSIS OF CERVICAL REGION: ICD-10-CM

## 2019-07-01 DIAGNOSIS — N13.8 BENIGN PROSTATIC HYPERPLASIA WITH URINARY OBSTRUCTION: ICD-10-CM

## 2019-07-01 DIAGNOSIS — N40.1 BENIGN PROSTATIC HYPERPLASIA WITH URINARY OBSTRUCTION: ICD-10-CM

## 2019-07-01 DIAGNOSIS — E11.42 DIABETIC POLYNEUROPATHY ASSOCIATED WITH TYPE 2 DIABETES MELLITUS: ICD-10-CM

## 2019-07-01 DIAGNOSIS — Z86.39 HISTORY OF DIABETES MELLITUS, TYPE II: ICD-10-CM

## 2019-07-01 DIAGNOSIS — B35.6 JOCK ITCH: ICD-10-CM

## 2019-07-01 DIAGNOSIS — I10 ESSENTIAL HYPERTENSION: Primary | ICD-10-CM

## 2019-07-01 DIAGNOSIS — K21.00 GASTROESOPHAGEAL REFLUX DISEASE WITH ESOPHAGITIS: Primary | ICD-10-CM

## 2019-07-01 PROCEDURE — 93005 ELECTROCARDIOGRAM TRACING: CPT | Mod: HCNC

## 2019-07-01 PROCEDURE — 3079F DIAST BP 80-89 MM HG: CPT | Mod: HCNC,CPTII,S$GLB, | Performed by: INTERNAL MEDICINE

## 2019-07-01 PROCEDURE — 3075F PR MOST RECENT SYSTOLIC BLOOD PRESS GE 130-139MM HG: ICD-10-PCS | Mod: HCNC,CPTII,S$GLB, | Performed by: INTERNAL MEDICINE

## 2019-07-01 PROCEDURE — 99499 RISK ADDL DX/OHS AUDIT: ICD-10-PCS | Mod: HCNC,S$GLB,, | Performed by: INTERNAL MEDICINE

## 2019-07-01 PROCEDURE — 99499 UNLISTED E&M SERVICE: CPT | Mod: HCNC,S$GLB,, | Performed by: INTERNAL MEDICINE

## 2019-07-01 PROCEDURE — 99214 OFFICE O/P EST MOD 30 MIN: CPT | Mod: HCNC,S$GLB,, | Performed by: INTERNAL MEDICINE

## 2019-07-01 PROCEDURE — 99214 PR OFFICE/OUTPT VISIT, EST, LEVL IV, 30-39 MIN: ICD-10-PCS | Mod: HCNC,S$GLB,, | Performed by: INTERNAL MEDICINE

## 2019-07-01 PROCEDURE — 3075F SYST BP GE 130 - 139MM HG: CPT | Mod: HCNC,CPTII,S$GLB, | Performed by: INTERNAL MEDICINE

## 2019-07-01 PROCEDURE — 3044F HG A1C LEVEL LT 7.0%: CPT | Mod: HCNC,CPTII,S$GLB, | Performed by: INTERNAL MEDICINE

## 2019-07-01 PROCEDURE — 99999 PR PBB SHADOW E&M-EST. PATIENT-LVL V: CPT | Mod: PBBFAC,HCNC,, | Performed by: INTERNAL MEDICINE

## 2019-07-01 PROCEDURE — 3079F PR MOST RECENT DIASTOLIC BLOOD PRESSURE 80-89 MM HG: ICD-10-PCS | Mod: HCNC,CPTII,S$GLB, | Performed by: INTERNAL MEDICINE

## 2019-07-01 PROCEDURE — 93010 ELECTROCARDIOGRAM REPORT: CPT | Mod: HCNC,,, | Performed by: INTERNAL MEDICINE

## 2019-07-01 PROCEDURE — 1101F PT FALLS ASSESS-DOCD LE1/YR: CPT | Mod: HCNC,CPTII,S$GLB, | Performed by: INTERNAL MEDICINE

## 2019-07-01 PROCEDURE — 3044F PR MOST RECENT HEMOGLOBIN A1C LEVEL <7.0%: ICD-10-PCS | Mod: HCNC,CPTII,S$GLB, | Performed by: INTERNAL MEDICINE

## 2019-07-01 PROCEDURE — 93010 EKG 12-LEAD: ICD-10-PCS | Mod: HCNC,,, | Performed by: INTERNAL MEDICINE

## 2019-07-01 PROCEDURE — 1101F PR PT FALLS ASSESS DOC 0-1 FALLS W/OUT INJ PAST YR: ICD-10-PCS | Mod: HCNC,CPTII,S$GLB, | Performed by: INTERNAL MEDICINE

## 2019-07-01 PROCEDURE — 99999 PR PBB SHADOW E&M-EST. PATIENT-LVL V: ICD-10-PCS | Mod: PBBFAC,HCNC,, | Performed by: INTERNAL MEDICINE

## 2019-07-01 RX ORDER — GABAPENTIN 300 MG/1
300 CAPSULE ORAL 3 TIMES DAILY
Qty: 270 CAPSULE | Refills: 3 | Status: SHIPPED | OUTPATIENT
Start: 2019-07-01 | End: 2019-07-01 | Stop reason: SDUPTHER

## 2019-07-01 RX ORDER — GABAPENTIN 300 MG/1
300 CAPSULE ORAL 3 TIMES DAILY
Qty: 90 CAPSULE | Refills: 0 | Status: SHIPPED | OUTPATIENT
Start: 2019-07-01 | End: 2019-10-10

## 2019-07-01 RX ORDER — TAMSULOSIN HYDROCHLORIDE 0.4 MG/1
1 CAPSULE ORAL DAILY
Qty: 90 CAPSULE | Refills: 3 | Status: CANCELLED | OUTPATIENT
Start: 2019-07-01

## 2019-07-01 NOTE — PROGRESS NOTES
Subjective:       Patient ID: Tito Menard is a 73 y.o. male.    Chief Complaint: Follow-up (6month)     Tito Menard is a 73 y.o.  male who presents for Follow-up (6month)  .  Pt has a history of HTN.  Counseled on low salt diet and graded exercise program. Denies CP, SOB, orthopnea or PND.  Currently treated with antihypertensives listed in med card and compliant most of the time. No side effects from medication noted by patient.   Referred to digital medicine at last visit.  Patient has a history of hyperlipidemia. Pt is complaint with her medication. Denies muscle cramping and weakness. Pt attempts to follow a low cholesterol diet and exercise. No CP, SOB, orthopnea or PND.     Pt has a recent hx of prediabetes and a remote history of type 2 diabetes.  Is taking no diabetic medications.  Last two hba1c under 6. Treated for diabetes over ten years ago.      Has restarted his gabapentin and is now taking 300 mg tid. Has had significant improvement in his pain. Able to lay down now and tolerate pain.     Review of Systems   Constitutional: Negative for chills and fever.   HENT: Negative for rhinorrhea and sore throat.    Respiratory: Negative for cough and shortness of breath.         + ZUNIGA in driveway c 3-6 mo   Cardiovascular: Negative for chest pain and palpitations.   Gastrointestinal: Negative for nausea and vomiting.   Genitourinary: Negative for dysuria and hematuria.   Musculoskeletal: Negative for arthralgias and back pain.   Skin: Negative for color change and rash.   Neurological: Negative for weakness and numbness.   Psychiatric/Behavioral: Negative for agitation and dysphoric mood.       Patient Active Problem List   Diagnosis    Essential hypertension    Mixed hyperlipidemia    Osteopenia    GERD (gastroesophageal reflux disease)    Spinal stenosis    Vitamin D deficiency disease    Pulmonary emphysema    Obstructive chronic bronchitis without exacerbation    Diabetic polyneuropathy  associated with type 2 diabetes mellitus    Abdominal aortic aneurysm (AAA) without rupture    Spondylosis of cervical joint with myelopathy    Gait disorder    Chronic right-sided low back pain without sciatica    ZUNIGA (dyspnea on exertion)    Nonspecific abnormal electrocardiogram (ECG) (EKG)    BPH with obstruction/lower urinary tract symptoms    Jock itch    Restrictive lung disease due to kyphoscoliosis    CKD (chronic kidney disease), stage II    History of diabetes mellitus, type II       Past Medical History:   Diagnosis Date    Allergy     Aneurysm of artery of lower extremity     Chalazion of left eye     CKD (chronic kidney disease), stage II 4/1/2019    Diabetes mellitus type II     Enlarged aorta 8/2/2016    Noted on pharmacological stress echo 2/28/2014.      GERD (gastroesophageal reflux disease)     egd 2008    Hyperlipidemia     Hypertension     MGD (meibomian gland dysfunction)     Osteopenia     Spinal stenosis     Spondylosis without myelopathy 10/23/2015    Vitamin D deficiency disease        Past Surgical History:   Procedure Laterality Date    CHALAZION EXCISION Left 8/18/13    CYST REMOVAL  2013    Back of neck    EGD (ESOPHAGOGASTRODUODENOSCOPY) N/A 7/25/2013    Performed by Rasheed Thibodeaux MD at [facility] ([address] [place] [address])    SPINE SURGERY  2007    x2 (2000, 2007)       Family History   Problem Relation Age of Onset    Hyperlipidemia Mother     Hypertension Mother     Kidney disease Mother     Cancer Mother     Heart disease Mother     Kidney failure Mother     No Known Problems Daughter     No Known Problems Sister     No Known Problems Brother     No Known Problems Son     No Known Problems Brother     No Known Problems Son     Hypertension Maternal Grandmother     Amblyopia Neg Hx     Blindness Neg Hx     Cataracts Neg Hx     Diabetes Neg Hx     Glaucoma Neg Hx     Macular degeneration Neg Hx     Retinal detachment Neg Hx     Strabismus Neg Hx      "Stroke Neg Hx     Thyroid disease Neg Hx     Melanoma Neg Hx     Psoriasis Neg Hx     Lupus Neg Hx     Eczema Neg Hx        Social History     Tobacco Use    Smoking status: Never Smoker    Smokeless tobacco: Former User     Types: Chew    Tobacco comment: Chewed tobacco once per day for 4-5 years when a    Substance Use Topics    Alcohol use: No    Drug use: No       Objective:   Blood pressure 132/80, pulse 70, height 5' 11" (1.803 m), weight 97.9 kg (215 lb 13.3 oz), SpO2 99 %.     Physical Exam   Constitutional: He is oriented to person, place, and time. He appears well-developed and well-nourished. No distress.   HENT:   Head: Normocephalic and atraumatic.   Right Ear: External ear normal.   Left Ear: External ear normal.   Eyes: Conjunctivae are normal. No scleral icterus.   Neck: JVD (8 cm) present. No thyromegaly present.   Cardiovascular: Normal heart sounds. Exam reveals no gallop and no friction rub.   No murmur heard.  Pulses:       Dorsalis pedis pulses are 2+ on the right side, and 2+ on the left side.   Pulmonary/Chest: Effort normal and breath sounds normal. He has no wheezes. He has no rales.   Abdominal: Soft. Bowel sounds are normal. He exhibits no distension. There is no tenderness.   Musculoskeletal: He exhibits edema (1+ bilateral LE). He exhibits no tenderness.        Right foot: There is no deformity.        Left foot: There is no deformity.   Missing right great toenail   Feet:   Right Foot:   Protective Sensation: 6 sites tested. 6 sites sensed.   Skin Integrity: Negative for ulcer, blister or skin breakdown.   Left Foot:   Protective Sensation: 6 sites tested. 6 sites sensed.   Skin Integrity: Negative for ulcer, blister or skin breakdown.   Lymphadenopathy:     He has no cervical adenopathy.   Neurological: He is alert and oriented to person, place, and time.   Skin: Skin is warm and dry.   Psychiatric: He has a normal mood and affect. Thought content normal.     "   Prior labs reviewed  Assessment/Plan:        Tito was seen today for follow-up.    Diagnoses and all orders for this visit:    Essential hypertension  Well controlled  Cont current BP medication(s) and low salt diet  Check bmp today    History of diabetes mellitus, type II  Diabetic polyneuropathy associated with type 2 diabetes mellitus  -     Ambulatory consult to Podiatry  Diet controlled  hba1c today    ZUNIGA (dyspnea on exertion)  -     EKG 12-lead; Future  -     Ambulatory Referral to Cardiology    Spinal stenosis of cervical region  Cont gabapentin  Other orders  -     Discontinue: gabapentin (NEURONTIN) 300 MG capsule; Take 1 capsule (300 mg total) by mouth 3 (three) times daily.  -     gabapentin (NEURONTIN) 300 MG capsule; Take 1 capsule (300 mg total) by mouth 3 (three) times daily.        Medication List with Changes/Refills   Current Medications    ACETAMINOPHEN (TYLENOL) 650 MG TBSR    Take 650 mg by mouth every 8 (eight) hours.    ALBUTEROL 90 MCG/ACTUATION INHALER    Inhale 1-2 puffs into the lungs every 6 (six) hours as needed for Wheezing.    AMLODIPINE (NORVASC) 5 MG TABLET    TAKE 1 TABLET EVERY DAY    ASPIRIN (ECOTRIN) 81 MG EC TABLET    Take 81 mg by mouth once daily.    AZELASTINE (ASTELIN) 137 MCG NASAL SPRAY    1 spray (137 mcg total) by Nasal route 2 (two) times daily.    CHLORTHALIDONE (HYGROTEN) 25 MG TAB    Take 1 tablet (25 mg total) by mouth once daily.    CHOLECALCIFEROL, VITAMIN D3, (VITAMIN D3) 2,000 UNIT CAP    Take 1 capsule by mouth once daily.    CLOTRIMAZOLE-BETAMETHASONE 1-0.05% (LOTRISONE) CREAM    APPLY TO THE AFFECTED AREA(S) TWICE DAILY  FOR  10  DAYS    DOCUSATE SODIUM (STOOL SOFTENER) 50 MG CAPSULE    Take by mouth 2 (two) times daily.    ECONAZOLE NITRATE 1 % CREAM    Apply topically 2 (two) times daily.    FINASTERIDE (PROSCAR) 5 MG TABLET    TAKE 1 TABLET (5 MG TOTAL) BY MOUTH ONCE DAILY.    FLUTICASONE (FLONASE) 50 MCG/ACTUATION NASAL SPRAY    USE 1 SPRAY NASALLY  ONE TIME DAILY    GUAIFENESIN (MUCINEX) 600 MG 12 HR TABLET    Take 1,200 mg by mouth 2 (two) times daily.    IBUPROFEN (ADVIL) 200 MG TABLET    Take 200 mg by mouth every 6 (six) hours as needed for Pain.    IRBESARTAN (AVAPRO) 300 MG TABLET    Take 1 tablet (300 mg total) by mouth every evening.    MELATONIN 10 MG CAP    Take 1 capsule by mouth every evening.    METHOCARBAMOL (ROBAXIN) 500 MG TAB    3 (three) times daily as needed.     OMEPRAZOLE (PRILOSEC) 20 MG CAPSULE    Take 2 capsules (40 mg total) by mouth once daily.    POTASSIUM CHLORIDE (KLOR-CON SPRINKLE) 10 MEQ CPSR    Take 3 capsules (30 mEq total) by mouth once daily.    PRAVASTATIN (PRAVACHOL) 20 MG TABLET    TAKE 1 TABLET ONE TIME DAILY    TAMSULOSIN (FLOMAX) 0.4 MG CAP    TAKE 1 CAPSULE EVERY DAY   Changed and/or Refilled Medications    Modified Medication Previous Medication    GABAPENTIN (NEURONTIN) 300 MG CAPSULE gabapentin (NEURONTIN) 300 MG capsule       Take 1 capsule (300 mg total) by mouth 3 (three) times daily.    TAKE 1 CAPSULE THREE TIMES DAILY

## 2019-07-02 RX ORDER — TAMSULOSIN HYDROCHLORIDE 0.4 MG/1
CAPSULE ORAL
Qty: 90 CAPSULE | Refills: 0 | Status: SHIPPED | OUTPATIENT
Start: 2019-07-02 | End: 2019-08-29 | Stop reason: SDUPTHER

## 2019-07-02 RX ORDER — FINASTERIDE 5 MG/1
5 TABLET, FILM COATED ORAL DAILY
Qty: 90 TABLET | Refills: 0 | Status: SHIPPED | OUTPATIENT
Start: 2019-07-02 | End: 2019-08-29 | Stop reason: SDUPTHER

## 2019-07-03 RX ORDER — CLOTRIMAZOLE AND BETAMETHASONE DIPROPIONATE 10; .64 MG/G; MG/G
CREAM TOPICAL
Qty: 45 G | Refills: 3 | Status: SHIPPED | OUTPATIENT
Start: 2019-07-03 | End: 2019-08-16 | Stop reason: SDUPTHER

## 2019-07-03 RX ORDER — CHLORTHALIDONE 25 MG/1
25 TABLET ORAL DAILY
Qty: 90 TABLET | Refills: 0 | Status: SHIPPED | OUTPATIENT
Start: 2019-07-03 | End: 2019-09-11 | Stop reason: SDUPTHER

## 2019-07-03 RX ORDER — OMEPRAZOLE 20 MG/1
40 CAPSULE, DELAYED RELEASE ORAL DAILY
Qty: 180 CAPSULE | Refills: 4 | Status: SHIPPED | OUTPATIENT
Start: 2019-07-03 | End: 2020-06-18

## 2019-07-08 ENCOUNTER — LAB VISIT (OUTPATIENT)
Dept: LAB | Facility: OTHER | Age: 74
End: 2019-07-08
Attending: INTERNAL MEDICINE
Payer: MEDICARE

## 2019-07-08 DIAGNOSIS — I10 ESSENTIAL HYPERTENSION: ICD-10-CM

## 2019-07-08 DIAGNOSIS — Z86.39 HISTORY OF DIABETES MELLITUS, TYPE II: ICD-10-CM

## 2019-07-08 DIAGNOSIS — E11.42 DIABETIC POLYNEUROPATHY ASSOCIATED WITH TYPE 2 DIABETES MELLITUS: ICD-10-CM

## 2019-07-08 LAB
ALBUMIN SERPL BCP-MCNC: 3.7 G/DL (ref 3.5–5.2)
ALP SERPL-CCNC: 94 U/L (ref 55–135)
ALT SERPL W/O P-5'-P-CCNC: 23 U/L (ref 10–44)
ANION GAP SERPL CALC-SCNC: 10 MMOL/L (ref 8–16)
AST SERPL-CCNC: 21 U/L (ref 10–40)
BILIRUB SERPL-MCNC: 0.4 MG/DL (ref 0.1–1)
BUN SERPL-MCNC: 14 MG/DL (ref 8–23)
CALCIUM SERPL-MCNC: 9.1 MG/DL (ref 8.7–10.5)
CHLORIDE SERPL-SCNC: 103 MMOL/L (ref 95–110)
CHOLEST SERPL-MCNC: 163 MG/DL (ref 120–199)
CHOLEST/HDLC SERPL: 3.3 {RATIO} (ref 2–5)
CO2 SERPL-SCNC: 27 MMOL/L (ref 23–29)
CREAT SERPL-MCNC: 0.9 MG/DL (ref 0.5–1.4)
EST. GFR  (AFRICAN AMERICAN): >60 ML/MIN/1.73 M^2
EST. GFR  (NON AFRICAN AMERICAN): >60 ML/MIN/1.73 M^2
ESTIMATED AVG GLUCOSE: 117 MG/DL (ref 68–131)
GLUCOSE SERPL-MCNC: 97 MG/DL (ref 70–110)
HBA1C MFR BLD HPLC: 5.7 % (ref 4–5.6)
HDLC SERPL-MCNC: 49 MG/DL (ref 40–75)
HDLC SERPL: 30.1 % (ref 20–50)
LDLC SERPL CALC-MCNC: 93.2 MG/DL (ref 63–159)
NONHDLC SERPL-MCNC: 114 MG/DL
POTASSIUM SERPL-SCNC: 3.6 MMOL/L (ref 3.5–5.1)
PROT SERPL-MCNC: 7.3 G/DL (ref 6–8.4)
SODIUM SERPL-SCNC: 140 MMOL/L (ref 136–145)
TRIGL SERPL-MCNC: 104 MG/DL (ref 30–150)

## 2019-07-08 PROCEDURE — 80061 LIPID PANEL: CPT | Mod: HCNC

## 2019-07-08 PROCEDURE — 83036 HEMOGLOBIN GLYCOSYLATED A1C: CPT | Mod: HCNC

## 2019-07-08 PROCEDURE — 80053 COMPREHEN METABOLIC PANEL: CPT | Mod: HCNC

## 2019-07-08 PROCEDURE — 36415 COLL VENOUS BLD VENIPUNCTURE: CPT | Mod: HCNC

## 2019-07-10 ENCOUNTER — TELEPHONE (OUTPATIENT)
Dept: INTERNAL MEDICINE | Facility: CLINIC | Age: 74
End: 2019-07-10

## 2019-07-10 NOTE — TELEPHONE ENCOUNTER
----- Message from Amanda Brown MD sent at 7/8/2019  5:49 PM CDT -----  Please let pt know last labs were fine.  Please let me know if they have any concerns.

## 2019-07-15 DIAGNOSIS — E78.5 HYPERLIPIDEMIA, UNSPECIFIED HYPERLIPIDEMIA TYPE: ICD-10-CM

## 2019-07-16 RX ORDER — PRAVASTATIN SODIUM 20 MG/1
TABLET ORAL
Qty: 90 TABLET | Refills: 3 | Status: SHIPPED | OUTPATIENT
Start: 2019-07-16 | End: 2020-04-25

## 2019-07-24 ENCOUNTER — OFFICE VISIT (OUTPATIENT)
Dept: OPTOMETRY | Facility: CLINIC | Age: 74
End: 2019-07-24
Payer: COMMERCIAL

## 2019-07-24 ENCOUNTER — LAB VISIT (OUTPATIENT)
Dept: LAB | Facility: HOSPITAL | Age: 74
End: 2019-07-24
Attending: INTERNAL MEDICINE
Payer: MEDICARE

## 2019-07-24 DIAGNOSIS — Z01.00 EYE EXAM, ROUTINE: Primary | ICD-10-CM

## 2019-07-24 DIAGNOSIS — E11.42 DIABETIC POLYNEUROPATHY ASSOCIATED WITH TYPE 2 DIABETES MELLITUS: ICD-10-CM

## 2019-07-24 DIAGNOSIS — H52.03 HYPEROPIA OF BOTH EYES WITH ASTIGMATISM: ICD-10-CM

## 2019-07-24 DIAGNOSIS — E78.00 PURE HYPERCHOLESTEROLEMIA: ICD-10-CM

## 2019-07-24 DIAGNOSIS — H52.203 HYPEROPIA OF BOTH EYES WITH ASTIGMATISM: ICD-10-CM

## 2019-07-24 DIAGNOSIS — N18.2 CKD (CHRONIC KIDNEY DISEASE), STAGE II: ICD-10-CM

## 2019-07-24 DIAGNOSIS — H00.012 HORDEOLUM EXTERNUM OF RIGHT LOWER EYELID: ICD-10-CM

## 2019-07-24 LAB
ALBUMIN SERPL BCP-MCNC: 3.7 G/DL (ref 3.5–5.2)
ALP SERPL-CCNC: 101 U/L (ref 55–135)
ALT SERPL W/O P-5'-P-CCNC: 20 U/L (ref 10–44)
ANION GAP SERPL CALC-SCNC: 10 MMOL/L (ref 8–16)
AST SERPL-CCNC: 23 U/L (ref 10–40)
BILIRUB SERPL-MCNC: 0.5 MG/DL (ref 0.1–1)
BUN SERPL-MCNC: 24 MG/DL (ref 8–23)
CALCIUM SERPL-MCNC: 9.7 MG/DL (ref 8.7–10.5)
CHLORIDE SERPL-SCNC: 105 MMOL/L (ref 95–110)
CHOLEST SERPL-MCNC: 171 MG/DL (ref 120–199)
CHOLEST/HDLC SERPL: 3.6 {RATIO} (ref 2–5)
CO2 SERPL-SCNC: 29 MMOL/L (ref 23–29)
CREAT SERPL-MCNC: 1.2 MG/DL (ref 0.5–1.4)
EST. GFR  (AFRICAN AMERICAN): >60 ML/MIN/1.73 M^2
EST. GFR  (NON AFRICAN AMERICAN): 59.2 ML/MIN/1.73 M^2
ESTIMATED AVG GLUCOSE: 120 MG/DL (ref 68–131)
GLUCOSE SERPL-MCNC: 92 MG/DL (ref 70–110)
HBA1C MFR BLD HPLC: 5.8 % (ref 4–5.6)
HDLC SERPL-MCNC: 47 MG/DL (ref 40–75)
HDLC SERPL: 27.5 % (ref 20–50)
LDLC SERPL CALC-MCNC: 99 MG/DL (ref 63–159)
NONHDLC SERPL-MCNC: 124 MG/DL
POTASSIUM SERPL-SCNC: 3.6 MMOL/L (ref 3.5–5.1)
PROT SERPL-MCNC: 7.4 G/DL (ref 6–8.4)
SODIUM SERPL-SCNC: 144 MMOL/L (ref 136–145)
TRIGL SERPL-MCNC: 125 MG/DL (ref 30–150)

## 2019-07-24 PROCEDURE — 92014 COMPRE OPH EXAM EST PT 1/>: CPT | Mod: S$GLB,,, | Performed by: OPTOMETRIST

## 2019-07-24 PROCEDURE — 83036 HEMOGLOBIN GLYCOSYLATED A1C: CPT | Mod: HCNC

## 2019-07-24 PROCEDURE — 92015 PR REFRACTION: ICD-10-PCS | Mod: S$GLB,,, | Performed by: OPTOMETRIST

## 2019-07-24 PROCEDURE — 92015 DETERMINE REFRACTIVE STATE: CPT | Mod: S$GLB,,, | Performed by: OPTOMETRIST

## 2019-07-24 PROCEDURE — 99999 PR PBB SHADOW E&M-EST. PATIENT-LVL II: ICD-10-PCS | Mod: PBBFAC,,, | Performed by: OPTOMETRIST

## 2019-07-24 PROCEDURE — 80061 LIPID PANEL: CPT | Mod: HCNC

## 2019-07-24 PROCEDURE — 99999 PR PBB SHADOW E&M-EST. PATIENT-LVL II: CPT | Mod: PBBFAC,,, | Performed by: OPTOMETRIST

## 2019-07-24 PROCEDURE — 36415 COLL VENOUS BLD VENIPUNCTURE: CPT | Mod: HCNC

## 2019-07-24 PROCEDURE — 92014 PR EYE EXAM, EST PATIENT,COMPREHESV: ICD-10-PCS | Mod: S$GLB,,, | Performed by: OPTOMETRIST

## 2019-07-24 PROCEDURE — 80053 COMPREHEN METABOLIC PANEL: CPT | Mod: HCNC

## 2019-07-24 NOTE — PROGRESS NOTES
HPI     DLS:06/14/2018 Vivienne  [facility]  Patient states OS vision seem blurry and feeling of fb sensation.  OD bump RLL. No warm compress. ATs for dryness  No eye pain.  Eye drops:OTC Lubricating daily OU  BS stable    Hemoglobin A1C       Date                     Value               Ref Range             Status                07/08/2019               5.7 (H)             4.0 - 5.6 %           Final                  01/10/2019               5.8 (H)             4.0 - 5.6 %           Final                  07/09/2018               5.8 (H)             4.0 - 5.6 %           Final                Last edited by Tito Palafox, OD on 7/24/2019 10:05 AM. (History)            Assessment /Plan     For exam results, see Encounter Report.    Eye exam, routine  -[facility]    Hyperopia of both eyes with astigmatism  Eyeglass Final Rx     Eyeglass Final Rx       Sphere Cylinder Axis Dist VA Add    Right +1.50 +0.50 005 20/25 +2.50    Left +1.25 +0.50 180 20/30 +2.50    Type:  PAL    Expiration Date:  7/24/2020                Hordeolum externum of right lower eyelid  -hot compresses BID x 4wks  -if persists Ava      RTC 12 mo annual

## 2019-08-05 ENCOUNTER — OFFICE VISIT (OUTPATIENT)
Dept: CARDIOLOGY | Facility: CLINIC | Age: 74
End: 2019-08-05
Payer: MEDICARE

## 2019-08-05 VITALS
WEIGHT: 209.44 LBS | HEART RATE: 77 BPM | BODY MASS INDEX: 29.32 KG/M2 | DIASTOLIC BLOOD PRESSURE: 81 MMHG | HEIGHT: 71 IN | SYSTOLIC BLOOD PRESSURE: 131 MMHG

## 2019-08-05 DIAGNOSIS — I10 ESSENTIAL HYPERTENSION: ICD-10-CM

## 2019-08-05 DIAGNOSIS — R06.09 DOE (DYSPNEA ON EXERTION): Primary | ICD-10-CM

## 2019-08-05 PROCEDURE — 99213 OFFICE O/P EST LOW 20 MIN: CPT | Mod: HCNC,GC,S$GLB, | Performed by: STUDENT IN AN ORGANIZED HEALTH CARE EDUCATION/TRAINING PROGRAM

## 2019-08-05 PROCEDURE — 1101F PR PT FALLS ASSESS DOC 0-1 FALLS W/OUT INJ PAST YR: ICD-10-PCS | Mod: HCNC,CPTII,GC,S$GLB | Performed by: STUDENT IN AN ORGANIZED HEALTH CARE EDUCATION/TRAINING PROGRAM

## 2019-08-05 PROCEDURE — 3079F PR MOST RECENT DIASTOLIC BLOOD PRESSURE 80-89 MM HG: ICD-10-PCS | Mod: HCNC,CPTII,GC,S$GLB | Performed by: STUDENT IN AN ORGANIZED HEALTH CARE EDUCATION/TRAINING PROGRAM

## 2019-08-05 PROCEDURE — 3079F DIAST BP 80-89 MM HG: CPT | Mod: HCNC,CPTII,GC,S$GLB | Performed by: STUDENT IN AN ORGANIZED HEALTH CARE EDUCATION/TRAINING PROGRAM

## 2019-08-05 PROCEDURE — 3075F PR MOST RECENT SYSTOLIC BLOOD PRESS GE 130-139MM HG: ICD-10-PCS | Mod: HCNC,CPTII,GC,S$GLB | Performed by: STUDENT IN AN ORGANIZED HEALTH CARE EDUCATION/TRAINING PROGRAM

## 2019-08-05 PROCEDURE — 3075F SYST BP GE 130 - 139MM HG: CPT | Mod: HCNC,CPTII,GC,S$GLB | Performed by: STUDENT IN AN ORGANIZED HEALTH CARE EDUCATION/TRAINING PROGRAM

## 2019-08-05 PROCEDURE — 99999 PR PBB SHADOW E&M-EST. PATIENT-LVL V: CPT | Mod: PBBFAC,HCNC,GC, | Performed by: STUDENT IN AN ORGANIZED HEALTH CARE EDUCATION/TRAINING PROGRAM

## 2019-08-05 PROCEDURE — 99213 PR OFFICE/OUTPT VISIT, EST, LEVL III, 20-29 MIN: ICD-10-PCS | Mod: HCNC,GC,S$GLB, | Performed by: STUDENT IN AN ORGANIZED HEALTH CARE EDUCATION/TRAINING PROGRAM

## 2019-08-05 PROCEDURE — 1101F PT FALLS ASSESS-DOCD LE1/YR: CPT | Mod: HCNC,CPTII,GC,S$GLB | Performed by: STUDENT IN AN ORGANIZED HEALTH CARE EDUCATION/TRAINING PROGRAM

## 2019-08-05 PROCEDURE — 99999 PR PBB SHADOW E&M-EST. PATIENT-LVL V: ICD-10-PCS | Mod: PBBFAC,HCNC,GC, | Performed by: STUDENT IN AN ORGANIZED HEALTH CARE EDUCATION/TRAINING PROGRAM

## 2019-08-05 NOTE — PROGRESS NOTES
Cardiology Clinic Note  Reason for Visit: ZUNIGA    HPI:   74 y/o M referred for dyspnea on exertion ongoing for over 1 year. Occurs when walking around his home and occurred when walking up the ramp on the second floor on his way to our clinic.  Associated with his chronic back pain. Dyspnea resolves with rest. Denies chest discomfort, no hx of MI/CAD, does not smoke, no PND, PFTs with mild restriction, not a diabetic A1c for the past 8 years is normal.  He had the same symptoms 1 year ago and was seen by Dr. Lua, dobutamine stress echo negative for ischemia. States that over the past 2 years due to his back pain his activity level has decreased, denies hx of VTE, no recent surgeries.    Hx of HTN.  Ekg NSR with RBBB     ROS:    Review of Systems   Constitution: Negative.   Eyes: Negative.    Cardiovascular: Negative.    Respiratory: Negative.    Endocrine: Negative.    Gastrointestinal: Negative.    Genitourinary: Negative.    Neurological: Negative.      PMH:     Past Medical History:   Diagnosis Date    Allergy     Aneurysm of artery of lower extremity     Chalazion of left eye     CKD (chronic kidney disease), stage II 4/1/2019    Diabetes mellitus type II     Enlarged aorta 8/2/2016    Noted on pharmacological stress echo 2/28/2014.      GERD (gastroesophageal reflux disease)     egd 2008    Hyperlipidemia     Hypertension     MGD (meibomian gland dysfunction)     Osteopenia     Spinal stenosis     Spondylosis without myelopathy 10/23/2015    Vitamin D deficiency disease      Past Surgical History:   Procedure Laterality Date    CHALAZION EXCISION Left 8/18/13    CYST REMOVAL  2013    Back of neck    EGD (ESOPHAGOGASTRODUODENOSCOPY) N/A 7/25/2013    Performed by Rasheed [facility]aux MD at [facility] ([address] [place] [address])    SPINE SURGERY  2007    x2 (2000, 2007)     Allergies:   Review of patient's allergies indicates:  No Known Allergies  Medications:     Current Outpatient Medications on File Prior to Visit    Medication Sig Dispense Refill    acetaminophen (TYLENOL) 650 MG TbSR Take 650 mg by mouth every 8 (eight) hours.      albuterol 90 mcg/actuation inhaler Inhale 1-2 puffs into the lungs every 6 (six) hours as needed for Wheezing. 1 Inhaler 0    amLODIPine (NORVASC) 5 MG tablet TAKE 1 TABLET EVERY DAY 90 tablet 3    aspirin (ECOTRIN) 81 MG EC tablet Take 81 mg by mouth once daily.      azelastine (ASTELIN) 137 mcg nasal spray 1 spray (137 mcg total) by Nasal route 2 (two) times daily. (Patient taking differently: 1 spray by Nasal route 2 (two) times daily as needed. ) 90 mL 3    chlorthalidone (HYGROTEN) 25 MG Tab Take 1 tablet (25 mg total) by mouth once daily. 90 tablet 0    cholecalciferol, vitamin D3, (VITAMIN D3) 2,000 unit Cap Take 1 capsule by mouth once daily.      clotrimazole-betamethasone 1-0.05% (LOTRISONE) cream APPLY TO THE AFFECTED AREA(S) TWICE DAILY  FOR  10  DAYS 45 g 3    docusate sodium (STOOL SOFTENER) 50 MG capsule Take by mouth 2 (two) times daily.      econazole nitrate 1 % cream Apply topically 2 (two) times daily. (Patient taking differently: Apply topically 2 (two) times daily as needed. ) 85 g 1    finasteride (PROSCAR) 5 mg tablet TAKE 1 TABLET (5 MG TOTAL) BY MOUTH ONCE DAILY. 90 tablet 0    fluticasone (FLONASE) 50 mcg/actuation nasal spray USE 1 SPRAY NASALLY ONE TIME DAILY 32 g 6    gabapentin (NEURONTIN) 300 MG capsule Take 1 capsule (300 mg total) by mouth 3 (three) times daily. 90 capsule 0    guaifenesin (MUCINEX) 600 mg 12 hr tablet Take 1,200 mg by mouth 2 (two) times daily.      ibuprofen (ADVIL) 200 MG tablet Take 200 mg by mouth every 6 (six) hours as needed for Pain.      irbesartan (AVAPRO) 300 MG tablet Take 1 tablet (300 mg total) by mouth every evening. 90 tablet 3    melatonin 10 mg Cap Take 1 capsule by mouth every evening.      methocarbamol (ROBAXIN) 500 MG Tab 3 (three) times daily as needed.       omeprazole (PRILOSEC) 20 MG capsule Take 2  "capsules (40 mg total) by mouth once daily. 180 capsule 4    potassium chloride (KLOR-CON SPRINKLE) 10 MEQ CpSR Take 3 capsules (30 mEq total) by mouth once daily. 270 capsule 2    pravastatin (PRAVACHOL) 20 MG tablet TAKE 1 TABLET EVERY DAY 90 tablet 3    tamsulosin (FLOMAX) 0.4 mg Cap TAKE 1 CAPSULE EVERY DAY 90 capsule 0     Current Facility-Administered Medications on File Prior to Visit   Medication Dose Route Frequency Provider Last Rate Last Dose    azithromycin tablet 250 mg  250 mg Oral Daily Amanda Brown MD         Social History:     Social History     Tobacco Use    Smoking status: Never Smoker    Smokeless tobacco: Former User     Types: Chew    Tobacco comment: Chewed tobacco once per day for 4-5 years when a    Substance Use Topics    Alcohol use: No     Family History:     Family History   Problem Relation Age of Onset    Hyperlipidemia Mother     Hypertension Mother     Kidney disease Mother     Cancer Mother     Heart disease Mother     Kidney failure Mother     No Known Problems Daughter     No Known Problems Sister     No Known Problems Brother     No Known Problems Son     No Known Problems Brother     No Known Problems Son     Hypertension Maternal Grandmother     Amblyopia Neg Hx     Blindness Neg Hx     Cataracts Neg Hx     Diabetes Neg Hx     Glaucoma Neg Hx     Macular degeneration Neg Hx     Retinal detachment Neg Hx     Strabismus Neg Hx     Stroke Neg Hx     Thyroid disease Neg Hx     Melanoma Neg Hx     Psoriasis Neg Hx     Lupus Neg Hx     Eczema Neg Hx      Physical Exam:   /81 (BP Location: Left arm, Patient Position: Sitting, BP Method: Large (Automatic))   Pulse 77   Ht 5' 11" (1.803 m)   Wt 95 kg (209 lb 7 oz)   BMI 29.21 kg/m²      Physical Exam   Constitutional: He is oriented to person, place, and time. He appears well-developed and well-nourished.   HENT:   Head: Normocephalic and atraumatic.   Eyes: No scleral " icterus.   Cardiovascular: Normal rate, regular rhythm and normal heart sounds.   Pulmonary/Chest: Effort normal and breath sounds normal.   Musculoskeletal: He exhibits no edema.   Neurological: He is oriented to person, place, and time.   Skin: Skin is warm.       Labs:     Lab Results   Component Value Date     07/24/2019    K 3.6 07/24/2019     07/24/2019    CO2 29 07/24/2019    BUN 24 (H) 07/24/2019    CREATININE 1.2 07/24/2019    ANIONGAP 10 07/24/2019     Lab Results   Component Value Date    HGBA1C 5.8 (H) 07/24/2019     Lab Results   Component Value Date    BNP <10 07/09/2018    BNP <10 01/23/2009    Lab Results   Component Value Date    WBC 6.33 07/09/2018    HGB 14.1 07/09/2018    HCT 43.3 07/09/2018     07/09/2018    GRAN 2.3 07/09/2018    GRAN 36.0 (L) 07/09/2018     Lab Results   Component Value Date    CHOL 171 07/24/2019    HDL 47 07/24/2019    LDLCALC 99.0 07/24/2019    TRIG 125 07/24/2019          Imaging:     Assessment:      1. ZUNIGA (dyspnea on exertion)    2. Essential hypertension      74 y/o M, his dyspnea on exertion is likely a result of deconditioning. His symptoms are the same as they were one year ago and his stress echo was negative then.   Not a diabetic or a smoker, no hx of CAD.  Ideally would have liked to do a CPX but I dont think he can participate given his back pain.      Plan:     Follow up with primary care for pain control for lower back    RTC Prn         Signed:  Steve Mackenzie DO  Cardiology Fellow

## 2019-08-16 DIAGNOSIS — B35.6 JOCK ITCH: ICD-10-CM

## 2019-08-16 RX ORDER — CLOTRIMAZOLE AND BETAMETHASONE DIPROPIONATE 10; .64 MG/G; MG/G
CREAM TOPICAL
Qty: 45 G | Refills: 0 | Status: SHIPPED | OUTPATIENT
Start: 2019-08-16 | End: 2020-02-26

## 2019-08-26 ENCOUNTER — TELEPHONE (OUTPATIENT)
Dept: INTERNAL MEDICINE | Facility: CLINIC | Age: 74
End: 2019-08-26

## 2019-08-26 DIAGNOSIS — M48.00 SPINAL STENOSIS, UNSPECIFIED SPINAL REGION: Primary | ICD-10-CM

## 2019-08-26 NOTE — TELEPHONE ENCOUNTER
Called pt and LVM stating that PCP rec he f/u w/ [facility] , left [facility] dept number for pt to call and schedule .  Also left office number for pt to call w/ any question

## 2019-08-26 NOTE — PROGRESS NOTES
CHIEF COMPLAINT:    Mr. Menard is a 73 y.o. male presenting for a follow up on LUTS, epididymal cysts, bilaterally    PRESENTING ILLNESS:    Tito Menard is a 73 y.o. male who returns for follow up.  He continues on finasteride and tamsulosin.  No complaints.  He feels he empties well.  He states that the epididymal cysts are enlarging so the discomfort is more marked particularly on the right side.  This is particularly bothersome during the hot months as the skin chafes.  He denies any pain.     REVIEW OF SYSTEMS:    Review of Systems   Constitutional: Negative.    HENT: Negative.    Eyes: Negative.    Respiratory: Negative.    Cardiovascular: Negative.    Gastrointestinal: Negative.    Genitourinary: Negative.    Musculoskeletal: Positive for back pain and myalgias.   Skin: Negative.    Neurological: Positive for weakness.        Balance is off   Endo/Heme/Allergies: Negative.    Psychiatric/Behavioral: Negative.        PATIENT HISTORY:    Past Medical History:   Diagnosis Date    Allergy     Aneurysm of artery of lower extremity     Chalazion of left eye     CKD (chronic kidney disease), stage II 4/1/2019    Diabetes mellitus type II     Enlarged aorta 8/2/2016    Noted on pharmacological stress echo 2/28/2014.      GERD (gastroesophageal reflux disease)     egd 2008    Hyperlipidemia     Hypertension     MGD (meibomian gland dysfunction)     Osteopenia     Spinal stenosis     Spondylosis without myelopathy 10/23/2015    Vitamin D deficiency disease        Past Surgical History:   Procedure Laterality Date    CHALAZION EXCISION Left 8/18/13    CYST REMOVAL  2013    Back of neck    EGD (ESOPHAGOGASTRODUODENOSCOPY) N/A 7/25/2013    Performed by Rasheed Thibodeaux MD at [facility] ([address] [place] [address])    SPINE SURGERY  2007    x2 (2000, 2007)       Family History   Problem Relation Age of Onset    Hyperlipidemia Mother     Hypertension Mother     Kidney disease Mother     Cancer Mother     Heart disease  Mother     Kidney failure Mother     Hypertension Maternal Grandmother      Socioeconomic History    Marital status:    Occupational History    Occupation: Retired     Comment:    Tobacco Use    Smoking status: Never Smoker    Smokeless tobacco: Former User     Types: Chew    Tobacco comment: Chewed tobacco once per day for 4-5 years when a    Substance and Sexual Activity    Alcohol use: No    Drug use: No    Sexual activity: Never     Partners: Female   Social History Narrative        Colon 2007       Allergies:  Patient has no known allergies.    Medications:  Outpatient Encounter Medications as of 8/29/2019   Medication Sig Dispense Refill    acetaminophen (TYLENOL) 650 MG TbSR Take 650 mg by mouth every 8 (eight) hours.      albuterol 90 mcg/actuation inhaler Inhale 1-2 puffs into the lungs every 6 (six) hours as needed for Wheezing. 1 Inhaler 0    amLODIPine (NORVASC) 5 MG tablet TAKE 1 TABLET EVERY DAY 90 tablet 3    aspirin (ECOTRIN) 81 MG EC tablet Take 81 mg by mouth once daily.      azelastine (ASTELIN) 137 mcg nasal spray 1 spray (137 mcg total) by Nasal route 2 (two) times daily. (Patient taking differently: 1 spray by Nasal route 2 (two) times daily as needed. ) 90 mL 3    chlorthalidone (HYGROTEN) 25 MG Tab Take 1 tablet (25 mg total) by mouth once daily. 90 tablet 0    cholecalciferol, vitamin D3, (VITAMIN D3) 2,000 unit Cap Take 1 capsule by mouth once daily.      clotrimazole-betamethasone 1-0.05% (LOTRISONE) cream APPLY TO THE AFFECTED AREA(S) TWICE DAILY  FOR  10  DAYS 45 g 0    docusate sodium (STOOL SOFTENER) 50 MG capsule Take by mouth 2 (two) times daily.      finasteride (PROSCAR) 5 mg tablet Take 1 tablet (5 mg total) by mouth once daily. 90 tablet 3    fluticasone (FLONASE) 50 mcg/actuation nasal spray USE 1 SPRAY NASALLY ONE TIME DAILY 32 g 6    gabapentin (NEURONTIN) 300 MG capsule Take 1 capsule (300 mg total) by mouth 3  (three) times daily. 90 capsule 0    guaifenesin (MUCINEX) 600 mg 12 hr tablet Take 1,200 mg by mouth 2 (two) times daily.      ibuprofen (ADVIL) 200 MG tablet Take 200 mg by mouth every 6 (six) hours as needed for Pain.      irbesartan (AVAPRO) 300 MG tablet Take 1 tablet (300 mg total) by mouth every evening. 90 tablet 3    melatonin 10 mg Cap Take 1 capsule by mouth every evening.      methocarbamol (ROBAXIN) 500 MG Tab 3 (three) times daily as needed.       omeprazole (PRILOSEC) 20 MG capsule Take 2 capsules (40 mg total) by mouth once daily. 180 capsule 4    potassium chloride (KLOR-CON SPRINKLE) 10 MEQ CpSR Take 3 capsules (30 mEq total) by mouth once daily. 270 capsule 2    pravastatin (PRAVACHOL) 20 MG tablet TAKE 1 TABLET EVERY DAY 90 tablet 3    tamsulosin (FLOMAX) 0.4 mg Cap Take 1 capsule (0.4 mg total) by mouth once daily. 90 capsule 3    [DISCONTINUED] finasteride (PROSCAR) 5 mg tablet TAKE 1 TABLET (5 MG TOTAL) BY MOUTH ONCE DAILY. 90 tablet 0    [DISCONTINUED] tamsulosin (FLOMAX) 0.4 mg Cap TAKE 1 CAPSULE EVERY DAY 90 capsule 0    econazole nitrate 1 % cream Apply topically 2 (two) times daily. (Patient taking differently: Apply topically 2 (two) times daily as needed. ) 85 g 1     Facility-Administered Encounter Medications as of 8/29/2019   Medication Dose Route Frequency Provider Last Rate Last Dose    azithromycin tablet 250 mg  250 mg Oral Daily Amanda Brown MD             PHYSICAL EXAMINATION:    The patient generally appears in good health, is appropriately interactive, and is in no apparent distress.    Skin: No lesions.    Mental: Cooperative with normal affect.    Neuro: Grossly intact.    HEENT: Normal. No evidence of lymphadenopathy.    Chest:  normal inspiratory effort.    Abdomen: Soft, non-tender. No masses or organomegaly. Bladder is not palpable. No evidence of flank discomfort. No evidence of inguinal hernia.    Extremities: No clubbing, cyanosis, or  edema    Scrotum showed no rashes or lesions. Testicles showed no masses or tenderness.  Epididymis showed large ballotable cyst on the right side, this may also be an encysted hydrocele of the cord or tenderness.  Smaller one present on the left side. Penis was circumcised. No meatal stenosis. No penile discharge.  No inguinal hernias.  No inguinal lymphadenopathy.    IRINA:  50 g prostate without any nodularity.  Normal rectal tone, no anal masses.     LABS:    Lab Results   Component Value Date    BUN 24 (H) 07/24/2019    CREATININE 1.2 07/24/2019     Lab Results   Component Value Date    PSA 1.9 05/27/2016    PSADIAG 2.1 07/19/2018     UA 1.000, pH 7, otherwise, negative    IMPRESSION:    Encounter Diagnoses   Name Primary?    Prostate cancer screening Yes    Benign prostatic hyperplasia with urinary obstruction     Epididymal cyst        PLAN:    1. Refilled the tamsulosin and finasteride  2.  PSA today  3.  Discussed the following regarding excision of the epididymal cysts       Out patient procedure       Midline scrotal incision       Risks and benefits (bleeding, infection, recurrence), will be more comfortable       Need for scrotal support (can get a jock strap or use tight white underwear)       Can take 3 months for the swelling to go down       Will need to get him cleared through the pre op center, Dr. James as he has many co morbidities and this is an elective procedure.  4.  We tried to record the above on his phone for his wife to hear as she is having a heart cath today, but his phone did not have enough memory in his phone.  Will call next week to give her the information.

## 2019-08-26 NOTE — TELEPHONE ENCOUNTER
----- Message from Amanda Brown MD sent at 8/26/2019 11:14 AM CDT -----  Needs apt with back and spine

## 2019-08-26 NOTE — PROGRESS NOTES
Pt's wife requesting referral to back and spine for assistance managing symptoms. Still not interested in surgery.  Consider pain management referral if appropriate

## 2019-08-27 ENCOUNTER — OFFICE VISIT (OUTPATIENT)
Dept: PODIATRY | Facility: CLINIC | Age: 74
End: 2019-08-27
Payer: MEDICARE

## 2019-08-27 VITALS
SYSTOLIC BLOOD PRESSURE: 143 MMHG | HEART RATE: 72 BPM | WEIGHT: 209 LBS | HEIGHT: 71 IN | BODY MASS INDEX: 29.26 KG/M2 | DIASTOLIC BLOOD PRESSURE: 88 MMHG

## 2019-08-27 DIAGNOSIS — L60.0 INGROWN NAIL: ICD-10-CM

## 2019-08-27 DIAGNOSIS — E11.42 DIABETIC POLYNEUROPATHY ASSOCIATED WITH TYPE 2 DIABETES MELLITUS: Primary | ICD-10-CM

## 2019-08-27 PROCEDURE — 99214 PR OFFICE/OUTPT VISIT, EST, LEVL IV, 30-39 MIN: ICD-10-PCS | Mod: HCNC,S$GLB,, | Performed by: PODIATRIST

## 2019-08-27 PROCEDURE — 3079F DIAST BP 80-89 MM HG: CPT | Mod: HCNC,CPTII,S$GLB, | Performed by: PODIATRIST

## 2019-08-27 PROCEDURE — 3044F HG A1C LEVEL LT 7.0%: CPT | Mod: HCNC,CPTII,S$GLB, | Performed by: PODIATRIST

## 2019-08-27 PROCEDURE — 99999 PR PBB SHADOW E&M-EST. PATIENT-LVL III: ICD-10-PCS | Mod: PBBFAC,HCNC,, | Performed by: PODIATRIST

## 2019-08-27 PROCEDURE — 99214 OFFICE O/P EST MOD 30 MIN: CPT | Mod: HCNC,S$GLB,, | Performed by: PODIATRIST

## 2019-08-27 PROCEDURE — 3077F SYST BP >= 140 MM HG: CPT | Mod: HCNC,CPTII,S$GLB, | Performed by: PODIATRIST

## 2019-08-27 PROCEDURE — 1101F PR PT FALLS ASSESS DOC 0-1 FALLS W/OUT INJ PAST YR: ICD-10-PCS | Mod: HCNC,CPTII,S$GLB, | Performed by: PODIATRIST

## 2019-08-27 PROCEDURE — 99999 PR PBB SHADOW E&M-EST. PATIENT-LVL III: CPT | Mod: PBBFAC,HCNC,, | Performed by: PODIATRIST

## 2019-08-27 PROCEDURE — 1101F PT FALLS ASSESS-DOCD LE1/YR: CPT | Mod: HCNC,CPTII,S$GLB, | Performed by: PODIATRIST

## 2019-08-27 PROCEDURE — 3044F PR MOST RECENT HEMOGLOBIN A1C LEVEL <7.0%: ICD-10-PCS | Mod: HCNC,CPTII,S$GLB, | Performed by: PODIATRIST

## 2019-08-27 PROCEDURE — 3079F PR MOST RECENT DIASTOLIC BLOOD PRESSURE 80-89 MM HG: ICD-10-PCS | Mod: HCNC,CPTII,S$GLB, | Performed by: PODIATRIST

## 2019-08-27 PROCEDURE — 3077F PR MOST RECENT SYSTOLIC BLOOD PRESSURE >= 140 MM HG: ICD-10-PCS | Mod: HCNC,CPTII,S$GLB, | Performed by: PODIATRIST

## 2019-08-27 NOTE — LETTER
September 2, 2019      Amanda Brown MD  [address]  [facility] [address]           [facility] - [facility]iat[facility]  [address]  [facility] [address]  Phone: [phone]          Patient: Tito Menard   MR Number: [id number]   YOB: 1945   Date of Visit: 8/27/2019       Dear Dr. Amanda Brown:    Thank you for referring Tito Menard to me for evaluation. Attached you will find relevant portions of my assessment and plan of care.    If you have questions, please do not hesitate to call me. I look forward to following Tito Menard along with you.    Sincerely,    Wilian Brown, DPSYLVESTER    Enclosure  CC:  No Recipients    If you would like to receive this communication electronically, please contact [email or web address] or [phone] to request more information on [employer] Link access.    For providers and/or their staff who would like to refer a patient to [facility], please contact us through our one-stop-shop provider referral line, [facility], at [phone].    If you feel you have received this communication in error or would no longer like to receive these types of communications, please e-mail [email or web address]

## 2019-08-29 ENCOUNTER — OFFICE VISIT (OUTPATIENT)
Dept: UROLOGY | Facility: CLINIC | Age: 74
End: 2019-08-29
Payer: MEDICARE

## 2019-08-29 VITALS
HEIGHT: 71 IN | SYSTOLIC BLOOD PRESSURE: 148 MMHG | WEIGHT: 209.19 LBS | HEART RATE: 61 BPM | DIASTOLIC BLOOD PRESSURE: 72 MMHG | BODY MASS INDEX: 29.29 KG/M2

## 2019-08-29 DIAGNOSIS — N13.8 BENIGN PROSTATIC HYPERPLASIA WITH URINARY OBSTRUCTION: ICD-10-CM

## 2019-08-29 DIAGNOSIS — N40.1 BENIGN PROSTATIC HYPERPLASIA WITH URINARY OBSTRUCTION: ICD-10-CM

## 2019-08-29 DIAGNOSIS — N50.3 EPIDIDYMAL CYST: ICD-10-CM

## 2019-08-29 DIAGNOSIS — Z12.5 PROSTATE CANCER SCREENING: Primary | ICD-10-CM

## 2019-08-29 PROCEDURE — 99999 PR PBB SHADOW E&M-EST. PATIENT-LVL IV: CPT | Mod: PBBFAC,HCNC,, | Performed by: UROLOGY

## 2019-08-29 PROCEDURE — 99213 OFFICE O/P EST LOW 20 MIN: CPT | Mod: HCNC,S$GLB,, | Performed by: UROLOGY

## 2019-08-29 PROCEDURE — 3078F DIAST BP <80 MM HG: CPT | Mod: HCNC,CPTII,S$GLB, | Performed by: UROLOGY

## 2019-08-29 PROCEDURE — 3078F PR MOST RECENT DIASTOLIC BLOOD PRESSURE < 80 MM HG: ICD-10-PCS | Mod: HCNC,CPTII,S$GLB, | Performed by: UROLOGY

## 2019-08-29 PROCEDURE — 99999 PR PBB SHADOW E&M-EST. PATIENT-LVL IV: ICD-10-PCS | Mod: PBBFAC,HCNC,, | Performed by: UROLOGY

## 2019-08-29 PROCEDURE — 3077F SYST BP >= 140 MM HG: CPT | Mod: HCNC,CPTII,S$GLB, | Performed by: UROLOGY

## 2019-08-29 PROCEDURE — 99213 PR OFFICE/OUTPT VISIT, EST, LEVL III, 20-29 MIN: ICD-10-PCS | Mod: HCNC,S$GLB,, | Performed by: UROLOGY

## 2019-08-29 PROCEDURE — 3077F PR MOST RECENT SYSTOLIC BLOOD PRESSURE >= 140 MM HG: ICD-10-PCS | Mod: HCNC,CPTII,S$GLB, | Performed by: UROLOGY

## 2019-08-29 PROCEDURE — 1101F PR PT FALLS ASSESS DOC 0-1 FALLS W/OUT INJ PAST YR: ICD-10-PCS | Mod: HCNC,CPTII,S$GLB, | Performed by: UROLOGY

## 2019-08-29 PROCEDURE — 1101F PT FALLS ASSESS-DOCD LE1/YR: CPT | Mod: HCNC,CPTII,S$GLB, | Performed by: UROLOGY

## 2019-08-29 RX ORDER — FINASTERIDE 5 MG/1
5 TABLET, FILM COATED ORAL DAILY
Qty: 90 TABLET | Refills: 3 | Status: SHIPPED | OUTPATIENT
Start: 2019-08-29 | End: 2020-06-01

## 2019-08-29 RX ORDER — TAMSULOSIN HYDROCHLORIDE 0.4 MG/1
1 CAPSULE ORAL DAILY
Qty: 90 CAPSULE | Refills: 3 | Status: SHIPPED | OUTPATIENT
Start: 2019-08-29 | End: 2020-06-01

## 2019-09-02 NOTE — PROGRESS NOTES
Subjective:      Patient ID: Tito Menard is a 73 y.o. male.    Chief Complaint: Ingrown Toenail (lt great toe ) and Diabetes Mellitus (bilateral pcp Dr Brown 7/1/19)    Tito is a 73 y.o. male who presents to the clinic complaining of painful ingrown toenail on the right foot.    Review of Systems   Constitution: Negative for chills and fever.   HENT: Negative for congestion and tinnitus.    Eyes: Negative for double vision and visual disturbance.   Cardiovascular: Negative for chest pain and claudication.   Respiratory: Negative for hemoptysis and shortness of breath.    Endocrine: Negative for cold intolerance and heat intolerance.   Hematologic/Lymphatic: Negative for adenopathy and bleeding problem.   Skin: Negative for color change and nail changes.   Musculoskeletal: Negative for myalgias and stiffness.   Gastrointestinal: Negative for nausea and vomiting.   Genitourinary: Negative for dysuria and hematuria.   Neurological: Negative for numbness and paresthesias.   Psychiatric/Behavioral: Negative for altered mental status and suicidal ideas.   Allergic/Immunologic: Negative for environmental allergies and persistent infections.           Objective:      Physical Exam   Constitutional: He is oriented to person, place, and time. Vital signs are normal. He appears well-developed and well-nourished.   Cardiovascular:   Pulses:       Dorsalis pedis pulses are 2+ on the right side, and 2+ on the left side.        Posterior tibial pulses are 2+ on the right side, and 2+ on the left side.   Musculoskeletal:        Right foot: There is normal range of motion and no deformity.        Left foot: There is normal range of motion and no deformity.   Inspection and palpation of the muscles joints and bones of both lower extremities reveal that muscle strength for the anterior, lateral, and posterior muscle groups and intrinsic muscle groups of the foot are all 5 over 5 symmetrical. Ankle, subtalar, midtarsal, and  digital joint range of motion  are within normal limits, nonpainful, without crepitus or effusion. Patient exhibits a normal angle and base of gait. Palpation of the tendons reveal no defects.     Feet:   Right Foot:   Skin Integrity: Negative for skin breakdown or erythema.   Left Foot:   Skin Integrity: Negative for skin breakdown or erythema.   Neurological: He is oriented to person, place, and time. He has normal strength. No sensory deficit.   Reflex Scores:       Patellar reflexes are 2+ on the right side and 2+ on the left side.       Achilles reflexes are 2+ on the right side and 2+ on the left side.  Sharp, dull, light touch, vibratory, and proprioceptive sensation are intact bilaterally. Deep tendon reflexes to patellar and Achilles tendon are symmetrical, 2/4 bilaterally. No ankle clonus or Babinski reflexes noted bilaterally. Coordination is normal to both feet and lower extremities.   Skin: Skin is warm, dry and intact. No cyanosis. Nails show no clubbing.   Skin turgor is normal bilaterally. Skin texture is well hydrated to both lower extremities. No lesions or rashes or wounds appreciated bilaterally. Nails mildly thickened and discolored, incurvation noted with minimal discomfort.             Assessment:       Encounter Diagnoses   Name Primary?    Diabetic polyneuropathy associated with type 2 diabetes mellitus Yes    Ingrown nail          Plan:       Tito was seen today for ingrown toenail and diabetes mellitus.    Diagnoses and all orders for this visit:    Diabetic polyneuropathy associated with type 2 diabetes mellitus    Ingrown nail      I counseled the patient on his conditions, their implications and medical management.      Shoe inspection. Diabetic Foot Education. Patient reminded of the importance of good nutrition and blood sugar control to help prevent podiatric complications of diabetes. Patient instructed on proper foot hygeine. We discussed wearing proper shoe gear, daily foot  inspections and Diabetic foot education in detail.    Return to clinic in 3-6 months or sooner if problems arise   .

## 2019-09-11 ENCOUNTER — TELEPHONE (OUTPATIENT)
Dept: OPTOMETRY | Facility: CLINIC | Age: 74
End: 2019-09-11

## 2019-09-11 ENCOUNTER — TELEPHONE (OUTPATIENT)
Dept: OPHTHALMOLOGY | Facility: CLINIC | Age: 74
End: 2019-09-11

## 2019-09-11 DIAGNOSIS — B35.6 JOCK ITCH: ICD-10-CM

## 2019-09-12 RX ORDER — CLOTRIMAZOLE AND BETAMETHASONE DIPROPIONATE 10; .64 MG/G; MG/G
CREAM TOPICAL
Qty: 45 G | Refills: 0 | OUTPATIENT
Start: 2019-09-12

## 2019-09-12 RX ORDER — CHLORTHALIDONE 25 MG/1
TABLET ORAL
Qty: 90 TABLET | Refills: 0 | Status: SHIPPED | OUTPATIENT
Start: 2019-09-12 | End: 2019-11-18 | Stop reason: SDUPTHER

## 2019-09-24 ENCOUNTER — PROCEDURE VISIT (OUTPATIENT)
Dept: PODIATRY | Facility: CLINIC | Age: 74
End: 2019-09-24
Payer: MEDICARE

## 2019-09-24 VITALS
WEIGHT: 209 LBS | HEIGHT: 71 IN | SYSTOLIC BLOOD PRESSURE: 142 MMHG | HEART RATE: 62 BPM | DIASTOLIC BLOOD PRESSURE: 76 MMHG | BODY MASS INDEX: 29.26 KG/M2

## 2019-09-24 DIAGNOSIS — L60.0 INGROWN NAIL: ICD-10-CM

## 2019-09-24 DIAGNOSIS — E11.42 DIABETIC POLYNEUROPATHY ASSOCIATED WITH TYPE 2 DIABETES MELLITUS: Primary | ICD-10-CM

## 2019-09-24 PROCEDURE — 11750 PR REMOVAL OF NAIL BED: ICD-10-PCS | Mod: HCNC,S$GLB,, | Performed by: PODIATRIST

## 2019-09-24 PROCEDURE — 11750 EXCISION NAIL&NAIL MATRIX: CPT | Mod: HCNC,S$GLB,, | Performed by: PODIATRIST

## 2019-09-29 NOTE — PROCEDURES
Subjective:      Patient ID: Tito Menard is a 73 y.o. male.    Chief Complaint: Ingrown Toenail (both great toes)    Tito is a 73 y.o. male who presents to the clinic complaining of painful ingrown toenail on the right foot.    Review of Systems   Constitution: Negative for chills and fever.   HENT: Negative for congestion and tinnitus.    Eyes: Negative for double vision and visual disturbance.   Cardiovascular: Negative for chest pain and claudication.   Respiratory: Negative for hemoptysis and shortness of breath.    Endocrine: Negative for cold intolerance and heat intolerance.   Hematologic/Lymphatic: Negative for adenopathy and bleeding problem.   Skin: Negative for color change and nail changes.   Musculoskeletal: Negative for myalgias and stiffness.   Gastrointestinal: Negative for nausea and vomiting.   Genitourinary: Negative for dysuria and hematuria.   Neurological: Negative for numbness and paresthesias.   Psychiatric/Behavioral: Negative for altered mental status and suicidal ideas.   Allergic/Immunologic: Negative for environmental allergies and persistent infections.           Objective:      Physical Exam   Constitutional: He is oriented to person, place, and time. Vital signs are normal. He appears well-developed and well-nourished.   Cardiovascular:   Pulses:       Dorsalis pedis pulses are 2+ on the right side, and 2+ on the left side.        Posterior tibial pulses are 2+ on the right side, and 2+ on the left side.   Musculoskeletal:        Right foot: There is normal range of motion and no deformity.        Left foot: There is normal range of motion and no deformity.   Inspection and palpation of the muscles joints and bones of both lower extremities reveal that muscle strength for the anterior, lateral, and posterior muscle groups and intrinsic muscle groups of the foot are all 5 over 5 symmetrical. Ankle, subtalar, midtarsal, and digital joint range of motion  are within normal limits,  nonpainful, without crepitus or effusion. Patient exhibits a normal angle and base of gait. Palpation of the tendons reveal no defects.     Feet:   Right Foot:   Skin Integrity: Negative for skin breakdown or erythema.   Left Foot:   Skin Integrity: Negative for skin breakdown or erythema.   Neurological: He is oriented to person, place, and time. He has normal strength. No sensory deficit.   Reflex Scores:       Patellar reflexes are 2+ on the right side and 2+ on the left side.       Achilles reflexes are 2+ on the right side and 2+ on the left side.  Sharp, dull, light touch, vibratory, and proprioceptive sensation are intact bilaterally. Deep tendon reflexes to patellar and Achilles tendon are symmetrical, 2/4 bilaterally. No ankle clonus or Babinski reflexes noted bilaterally. Coordination is normal to both feet and lower extremities.   Skin: Skin is warm, dry and intact. No cyanosis. Nails show no clubbing.   Skin turgor is normal bilaterally. Skin texture is well hydrated to both lower extremities. No lesions or rashes or wounds appreciated bilaterally. Nails mildly thickened and discolored, incurvation noted with minimal discomfort.             Assessment:       Encounter Diagnoses   Name Primary?    Diabetic polyneuropathy associated with type 2 diabetes mellitus Yes    Ingrown nail          Plan:       Tito was seen today for ingrown toenail.    Diagnoses and all orders for this visit:    Diabetic polyneuropathy associated with type 2 diabetes mellitus    Ingrown nail      I counseled the patient on his conditions, their implications and medical management.      Permanent Nail Removal     Performed by:  Wilian Brown   Authorized by:  Patient     Consent Done?:  Yes (Written)     Location:   right hallux  Anesthesia:  Digital block  Local anesthetic: 0.5% Marcaine without epinephrine  Anesthetic total (ml):  4  Preparation:  Skin prepped with alcohol and skin prepped with Betadine     Amount  removed:   partial matrixectomy right hallux bilateral borders  Wedge excision of skin of nail fold: No    Nail bed sutured?: No    Nail matrix removed:  Yes  Removed nail replaced and anchored: No    Dressing applied:  4x4, antibiotic ointment and dressing applied  Patient tolerance:  Patient tolerated the procedure well with no immediate complications    Digital nerve block applied to the above-mentioned toe. Toe was prepped and draped in a sterile fashion and penrose drain applied. Anesthesia was confirmed and the offending portion of nail plate was freed from the nail bed and eponychium, and was then excised. 89% phenol was applied to the nail matrix for 3 consecutive periods of 30 seconds and was then lavaged with copious amounts of 70% isopropyl alcohol. Penrose drain was removed and betadine ointment and dry sterile dressing applied. Post-op care instructions were discussed and dispensed to patient.     .  Follow-up in 2 weeks.

## 2019-10-08 ENCOUNTER — OFFICE VISIT (OUTPATIENT)
Dept: PODIATRY | Facility: CLINIC | Age: 74
End: 2019-10-08
Payer: MEDICARE

## 2019-10-08 VITALS
WEIGHT: 209 LBS | BODY MASS INDEX: 29.26 KG/M2 | DIASTOLIC BLOOD PRESSURE: 82 MMHG | SYSTOLIC BLOOD PRESSURE: 139 MMHG | HEIGHT: 71 IN | HEART RATE: 63 BPM

## 2019-10-08 DIAGNOSIS — L60.0 INGROWN NAIL: ICD-10-CM

## 2019-10-08 DIAGNOSIS — E11.42 DIABETIC POLYNEUROPATHY ASSOCIATED WITH TYPE 2 DIABETES MELLITUS: Primary | ICD-10-CM

## 2019-10-08 PROCEDURE — 99213 OFFICE O/P EST LOW 20 MIN: CPT | Mod: HCNC,S$GLB,, | Performed by: PODIATRIST

## 2019-10-08 PROCEDURE — 3075F SYST BP GE 130 - 139MM HG: CPT | Mod: HCNC,CPTII,S$GLB, | Performed by: PODIATRIST

## 2019-10-08 PROCEDURE — 99999 PR PBB SHADOW E&M-EST. PATIENT-LVL III: CPT | Mod: PBBFAC,HCNC,, | Performed by: PODIATRIST

## 2019-10-08 PROCEDURE — 99999 PR PBB SHADOW E&M-EST. PATIENT-LVL III: ICD-10-PCS | Mod: PBBFAC,HCNC,, | Performed by: PODIATRIST

## 2019-10-08 PROCEDURE — 99213 PR OFFICE/OUTPT VISIT, EST, LEVL III, 20-29 MIN: ICD-10-PCS | Mod: HCNC,S$GLB,, | Performed by: PODIATRIST

## 2019-10-08 PROCEDURE — 3044F HG A1C LEVEL LT 7.0%: CPT | Mod: HCNC,CPTII,S$GLB, | Performed by: PODIATRIST

## 2019-10-08 PROCEDURE — 3044F PR MOST RECENT HEMOGLOBIN A1C LEVEL <7.0%: ICD-10-PCS | Mod: HCNC,CPTII,S$GLB, | Performed by: PODIATRIST

## 2019-10-08 PROCEDURE — 3079F PR MOST RECENT DIASTOLIC BLOOD PRESSURE 80-89 MM HG: ICD-10-PCS | Mod: HCNC,CPTII,S$GLB, | Performed by: PODIATRIST

## 2019-10-08 PROCEDURE — 1101F PR PT FALLS ASSESS DOC 0-1 FALLS W/OUT INJ PAST YR: ICD-10-PCS | Mod: HCNC,CPTII,S$GLB, | Performed by: PODIATRIST

## 2019-10-08 PROCEDURE — 3075F PR MOST RECENT SYSTOLIC BLOOD PRESS GE 130-139MM HG: ICD-10-PCS | Mod: HCNC,CPTII,S$GLB, | Performed by: PODIATRIST

## 2019-10-08 PROCEDURE — 3079F DIAST BP 80-89 MM HG: CPT | Mod: HCNC,CPTII,S$GLB, | Performed by: PODIATRIST

## 2019-10-08 PROCEDURE — 1101F PT FALLS ASSESS-DOCD LE1/YR: CPT | Mod: HCNC,CPTII,S$GLB, | Performed by: PODIATRIST

## 2019-10-08 NOTE — LETTER
October 15, 2019      Amanda Brown MD  [address]  [facility] [address]           [facility] - Podiatry  [address]  [facility] [address]  Phone: [phone]          Patient: Tito Menard   MR Number: [id number]   YOB: 1945   Date of Visit: 10/8/2019       Dear Dr. Amanda Brown:    Thank you for referring Tito Menard to me for evaluation. Attached you will find relevant portions of my assessment and plan of care.    If you have questions, please do not hesitate to call me. I look forward to following Tito Menard along with you.    Sincerely,    Wilian Brown, DPSYLVESTER    Enclosure  CC:  No Recipients    If you would like to receive this communication electronically, please contact [email or web address] or [phone] to request more information on [employer] Link access.    For providers and/or their staff who would like to refer a patient to [facility], please contact us through our one-stop-shop provider referral line, [facility], at [phone].    If you feel you have received this communication in error or would no longer like to receive these types of communications, please e-mail [email or web address]

## 2019-10-10 ENCOUNTER — OFFICE VISIT (OUTPATIENT)
Dept: INTERNAL MEDICINE | Facility: CLINIC | Age: 74
End: 2019-10-10
Attending: INTERNAL MEDICINE
Payer: MEDICARE

## 2019-10-10 VITALS
OXYGEN SATURATION: 97 % | SYSTOLIC BLOOD PRESSURE: 130 MMHG | DIASTOLIC BLOOD PRESSURE: 78 MMHG | BODY MASS INDEX: 29.42 KG/M2 | HEIGHT: 71 IN | HEART RATE: 72 BPM | WEIGHT: 210.13 LBS

## 2019-10-10 DIAGNOSIS — E11.9 DIET-CONTROLLED DIABETES MELLITUS: ICD-10-CM

## 2019-10-10 DIAGNOSIS — M48.062 SPINAL STENOSIS OF LUMBAR REGION WITH NEUROGENIC CLAUDICATION: ICD-10-CM

## 2019-10-10 DIAGNOSIS — I10 ESSENTIAL HYPERTENSION: Primary | ICD-10-CM

## 2019-10-10 PROCEDURE — 99999 PR PBB SHADOW E&M-EST. PATIENT-LVL V: ICD-10-PCS | Mod: PBBFAC,HCNC,, | Performed by: INTERNAL MEDICINE

## 2019-10-10 PROCEDURE — 99999 PR PBB SHADOW E&M-EST. PATIENT-LVL V: CPT | Mod: PBBFAC,HCNC,, | Performed by: INTERNAL MEDICINE

## 2019-10-10 PROCEDURE — 99214 PR OFFICE/OUTPT VISIT, EST, LEVL IV, 30-39 MIN: ICD-10-PCS | Mod: HCNC,S$GLB,, | Performed by: INTERNAL MEDICINE

## 2019-10-10 PROCEDURE — 99214 OFFICE O/P EST MOD 30 MIN: CPT | Mod: HCNC,S$GLB,, | Performed by: INTERNAL MEDICINE

## 2019-10-10 RX ORDER — GABAPENTIN 300 MG/1
CAPSULE ORAL
Qty: 120 CAPSULE | Refills: 3 | Status: SHIPPED | OUTPATIENT
Start: 2019-10-10 | End: 2020-02-13 | Stop reason: SDUPTHER

## 2019-10-10 NOTE — PROGRESS NOTES
Subjective:       Patient ID: Tito Menard is a 73 y.o. male.    Chief Complaint: Hypertension     Tito Menard is a 73 y.o.  male who presents for Hypertension  .  Patient Active Problem List   Diagnosis    Essential hypertension    Mixed hyperlipidemia    Osteopenia    GERD (gastroesophageal reflux disease)    Spinal stenosis    Vitamin D deficiency disease    Pulmonary emphysema    Obstructive chronic bronchitis without exacerbation    Diabetic polyneuropathy associated with type 2 diabetes mellitus    Abdominal aortic aneurysm (AAA) without rupture    Spondylosis of cervical joint with myelopathy    Gait disorder    Chronic right-sided low back pain without sciatica    ZUNIGA (dyspnea on exertion)    Nonspecific abnormal electrocardiogram (ECG) (EKG)    BPH with obstruction/lower urinary tract symptoms    Jock itch    Restrictive lung disease due to kyphoscoliosis    CKD (chronic kidney disease), stage II    History of diabetes mellitus, type II     Pt has a history of HTN.  Counseled on low salt diet and graded exercise program. Denies CP, SOB, orthopnea or PND.  Currently treated with antihypertensives listed in med card and compliant most of the time. No side effects from medication noted by patient.     His main complaint today is his back pain due to his history of spinal stenosis.  He reports continued decline in his functional abilities due to pain.  No longer as active as he used to be. Affecting his enjoyment of life and his mood. No SI.  He is not interested in pursuing surgery. He has declined or not follow up with previous referrals to spine clinic, PMR and PT. Has seen neurology who felt his gait imbalance was related to his lulmbar and cervical spine issues as well as his peripheral neuropathy. Has been taking his gabapentin with mild improvement in his pain.    Health Maintenance       Date Due Completion Date    Shingles Vaccine (1 of 2) 12/05/1994 ---    Influenza  Vaccine (1) 09/01/2019 1/14/2019    Override on 10/1/2014: Done (per pt)    Override on 11/7/2013: Done    Hemoglobin A1c 01/24/2020 7/24/2019    Override on 10/23/2015: Done    Lipid Panel 07/24/2020 7/24/2019    Eye Exam 07/24/2020 7/24/2019    Override on 6/14/2018: Done    Override on 8/5/2015: Done    Foot Exam 09/24/2020 9/24/2019    Override on 6/23/2017: Done    Override on 4/28/2016: Done    Override on 5/15/2015: Done    Low Dose Statin 10/08/2020 10/8/2019    TETANUS VACCINE 07/22/2021 7/22/2011    Colonoscopy 09/13/2027 9/13/2017    Override on 8/1/2007: Done          Review of Systems      Past Medical History:   Diagnosis Date    Allergy     Aneurysm of artery of lower extremity     Chalazion of left eye     CKD (chronic kidney disease), stage II 4/1/2019    Diabetes mellitus type II     Enlarged aorta 8/2/2016    Noted on pharmacological stress echo 2/28/2014.      GERD (gastroesophageal reflux disease)     egd 2008    Hyperlipidemia     Hypertension     MGD (meibomian gland dysfunction)     Osteopenia     Spinal stenosis     Spondylosis without myelopathy 10/23/2015    Vitamin D deficiency disease        Past Surgical History:   Procedure Laterality Date    CHALAZION EXCISION Left 8/18/13    CYST REMOVAL  2013    Back of neck    SPINE SURGERY  2007    x2 (2000, 2007)       Family History   Problem Relation Age of Onset    Hyperlipidemia Mother     Hypertension Mother     Kidney disease Mother     Cancer Mother     Heart disease Mother     Kidney failure Mother     No Known Problems Daughter     No Known Problems Sister     No Known Problems Brother     No Known Problems Son     No Known Problems Brother     No Known Problems Son     Hypertension Maternal Grandmother     Amblyopia Neg Hx     Blindness Neg Hx     Cataracts Neg Hx     Diabetes Neg Hx     Glaucoma Neg Hx     Macular degeneration Neg Hx     Retinal detachment Neg Hx     Strabismus Neg Hx     Stroke  "Neg Hx     Thyroid disease Neg Hx     Melanoma Neg Hx     Psoriasis Neg Hx     Lupus Neg Hx     Eczema Neg Hx        Social History     Tobacco Use    Smoking status: Never Smoker    Smokeless tobacco: Former User     Types: Chew    Tobacco comment: Chewed tobacco once per day for 4-5 years when a    Substance Use Topics    Alcohol use: No    Drug use: No             Objective:   Blood pressure 130/78, pulse 72, height 5' 11" (1.803 m), weight 95.3 kg (210 lb 1.6 oz), SpO2 97 %.     Physical Exam   Constitutional: He is oriented to person, place, and time. He appears well-developed and well-nourished. No distress.   HENT:   Head: Normocephalic and atraumatic.   Right Ear: External ear normal.   Left Ear: External ear normal.   Eyes: Conjunctivae are normal. No scleral icterus.   Neck: No JVD present. No thyromegaly present.   Cardiovascular: Normal heart sounds. Exam reveals no gallop and no friction rub.   No murmur heard.  Pulmonary/Chest: Effort normal and breath sounds normal. He has no wheezes. He has no rales.   Abdominal: Soft. Bowel sounds are normal. He exhibits no distension. There is no tenderness.   Musculoskeletal: He exhibits no edema or tenderness (no point tenderness).   Lymphadenopathy:     He has no cervical adenopathy.   Neurological: He is alert and oriented to person, place, and time.   Skin: Skin is warm and dry.   Psychiatric: He has a normal mood and affect. Thought content normal.       Prior labs reviewed  Assessment/Plan:        Tito was seen today for hypertension.    Diagnoses and all orders for this visit:    Essential hypertension  Well controlled  Cont current BP medication(s) and low salt diet    Spinal stenosis of lumbar region with neurogenic claudication  -     [facility]  Increase gabapentin gradually  Cont one 300 mg am, lunch and incerase to two in evening    Diet-controlled diabetes mellitus  -     Hemoglobin " A1c; Future  Well contorlled  repat hba1c q 6 mo  Other orders  -     gabapentin (NEURONTIN) 300 MG capsule; Take one po in morning one at noon and two in the evening        Medication List with Changes/Refills   Current Medications    ACETAMINOPHEN (TYLENOL) 650 MG TBSR    Take 650 mg by mouth every 8 (eight) hours.    ALBUTEROL 90 MCG/ACTUATION INHALER    Inhale 1-2 puffs into the lungs every 6 (six) hours as needed for Wheezing.    AMLODIPINE (NORVASC) 5 MG TABLET    TAKE 1 TABLET EVERY DAY    ASPIRIN (ECOTRIN) 81 MG EC TABLET    Take 81 mg by mouth once daily.    AZELASTINE (ASTELIN) 137 MCG NASAL SPRAY    1 spray (137 mcg total) by Nasal route 2 (two) times daily.    CHLORTHALIDONE (HYGROTEN) 25 MG TAB    TAKE 1 TABLET EVERY DAY    CHOLECALCIFEROL, VITAMIN D3, (VITAMIN D3) 2,000 UNIT CAP    Take 1 capsule by mouth once daily.    CLOTRIMAZOLE-BETAMETHASONE 1-0.05% (LOTRISONE) CREAM    APPLY TO THE AFFECTED AREA(S) TWICE DAILY  FOR  10  DAYS    DOCUSATE SODIUM (STOOL SOFTENER) 50 MG CAPSULE    Take by mouth 2 (two) times daily.    ECONAZOLE NITRATE 1 % CREAM    Apply topically 2 (two) times daily.    FINASTERIDE (PROSCAR) 5 MG TABLET    Take 1 tablet (5 mg total) by mouth once daily.    FLUTICASONE (FLONASE) 50 MCG/ACTUATION NASAL SPRAY    USE 1 SPRAY NASALLY ONE TIME DAILY    GUAIFENESIN (MUCINEX) 600 MG 12 HR TABLET    Take 1,200 mg by mouth 2 (two) times daily.    IBUPROFEN (ADVIL) 200 MG TABLET    Take 200 mg by mouth every 6 (six) hours as needed for Pain.    IRBESARTAN (AVAPRO) 300 MG TABLET    Take 1 tablet (300 mg total) by mouth every evening.    MELATONIN 10 MG CAP    Take 1 capsule by mouth every evening.    METHOCARBAMOL (ROBAXIN) 500 MG TAB    3 (three) times daily as needed.     OMEPRAZOLE (PRILOSEC) 20 MG CAPSULE    Take 2 capsules (40 mg total) by mouth once daily.    POTASSIUM CHLORIDE (KLOR-CON SPRINKLE) 10 MEQ CPSR    Take 3 capsules (30 mEq total) by mouth once daily.    PRAVASTATIN  (PRAVACHOL) 20 MG TABLET    TAKE 1 TABLET EVERY DAY    TAMSULOSIN (FLOMAX) 0.4 MG CAP    Take 1 capsule (0.4 mg total) by mouth once daily.   Changed and/or Refilled Medications    Modified Medication Previous Medication    GABAPENTIN (NEURONTIN) 300 MG CAPSULE gabapentin (NEURONTIN) 300 MG capsule       Take one po in morning one at noon and two in the evening    Take 1 capsule (300 mg total) by mouth 3 (three) times daily.

## 2019-10-11 ENCOUNTER — IMMUNIZATION (OUTPATIENT)
Dept: PHARMACY | Facility: CLINIC | Age: 74
End: 2019-10-11
Payer: MEDICARE

## 2019-10-16 NOTE — PROGRESS NOTES
Subjective:      Patient ID: Tito Menard is a 73 y.o. male.    Chief Complaint: Ingrown Toenail (post op f/u both great toe nails)    Tito is a 73 y.o. male who presents to the clinic for nail surgery follow-up, doing well with no complications.  Review of Systems   Constitution: Negative for chills and fever.   HENT: Negative for congestion and tinnitus.    Eyes: Negative for double vision and visual disturbance.   Cardiovascular: Negative for chest pain and claudication.   Respiratory: Negative for hemoptysis and shortness of breath.    Endocrine: Negative for cold intolerance and heat intolerance.   Hematologic/Lymphatic: Negative for adenopathy and bleeding problem.   Skin: Negative for color change and nail changes.   Musculoskeletal: Negative for myalgias and stiffness.   Gastrointestinal: Negative for nausea and vomiting.   Genitourinary: Negative for dysuria and hematuria.   Neurological: Negative for numbness and paresthesias.   Psychiatric/Behavioral: Negative for altered mental status and suicidal ideas.   Allergic/Immunologic: Negative for environmental allergies and persistent infections.           Objective:      Physical Exam   Constitutional: He is oriented to person, place, and time. Vital signs are normal. He appears well-developed and well-nourished.   Cardiovascular:   Pulses:       Dorsalis pedis pulses are 2+ on the right side, and 2+ on the left side.        Posterior tibial pulses are 2+ on the right side, and 2+ on the left side.   Musculoskeletal:        Right foot: There is normal range of motion and no deformity.        Left foot: There is normal range of motion and no deformity.   Inspection and palpation of the muscles joints and bones of both lower extremities reveal that muscle strength for the anterior, lateral, and posterior muscle groups and intrinsic muscle groups of the foot are all 5 over 5 symmetrical. Ankle, subtalar, midtarsal, and digital joint range of motion  are  within normal limits, nonpainful, without crepitus or effusion. Patient exhibits a normal angle and base of gait. Palpation of the tendons reveal no defects.     Feet:   Right Foot:   Skin Integrity: Negative for skin breakdown or erythema.   Left Foot:   Skin Integrity: Negative for skin breakdown or erythema.   Neurological: He is oriented to person, place, and time. He has normal strength. No sensory deficit.   Reflex Scores:       Patellar reflexes are 2+ on the right side and 2+ on the left side.       Achilles reflexes are 2+ on the right side and 2+ on the left side.  Sharp, dull, light touch, vibratory, and proprioceptive sensation are intact bilaterally. Deep tendon reflexes to patellar and Achilles tendon are symmetrical, 2/4 bilaterally. No ankle clonus or Babinski reflexes noted bilaterally. Coordination is normal to both feet and lower extremities.   Skin: Skin is warm, dry and intact. No cyanosis. Nails show no clubbing.   Skin turgor is normal bilaterally. Skin texture is well hydrated to both lower extremities. No lesions or rashes or wounds appreciated bilaterally. Nail surgery site well healed.             Assessment:       Encounter Diagnoses   Name Primary?    Diabetic polyneuropathy associated with type 2 diabetes mellitus Yes    Ingrown nail          Plan:       Tito was seen today for ingrown toenail.    Diagnoses and all orders for this visit:    Diabetic polyneuropathy associated with type 2 diabetes mellitus    Ingrown nail      I counseled the patient on his conditions, their implications and medical management.      Shoe inspection. Diabetic Foot Education. Patient reminded of the importance of good nutrition and blood sugar control to help prevent podiatric complications of diabetes. Patient instructed on proper foot hygeine. We discussed wearing proper shoe gear, daily foot inspections and Diabetic foot education in detail.    Return to clinic in 3-6 months or sooner if problems  arise   .

## 2019-10-22 ENCOUNTER — OFFICE VISIT (OUTPATIENT)
Dept: PAIN MEDICINE | Facility: OTHER | Age: 74
End: 2019-10-22
Payer: MEDICARE

## 2019-10-22 DIAGNOSIS — R52 PAIN AGGRAVATED BY ACTIVITIES OF DAILY LIVING: ICD-10-CM

## 2019-10-22 DIAGNOSIS — R53.81 PHYSICAL DECONDITIONING: ICD-10-CM

## 2019-10-22 DIAGNOSIS — M51.36 DDD (DEGENERATIVE DISC DISEASE), LUMBAR: ICD-10-CM

## 2019-10-22 DIAGNOSIS — M50.30 DDD (DEGENERATIVE DISC DISEASE), CERVICAL: ICD-10-CM

## 2019-10-22 DIAGNOSIS — M48.061 SPINAL STENOSIS OF LUMBAR REGION, UNSPECIFIED WHETHER NEUROGENIC CLAUDICATION PRESENT: ICD-10-CM

## 2019-10-22 DIAGNOSIS — F32.A DEPRESSION, UNSPECIFIED DEPRESSION TYPE: ICD-10-CM

## 2019-10-22 DIAGNOSIS — M96.1 LUMBAR POST-LAMINECTOMY SYNDROME: ICD-10-CM

## 2019-10-22 DIAGNOSIS — Z78.9 IMPAIRED INSTRUMENTAL ACTIVITIES OF DAILY LIVING (IADL): ICD-10-CM

## 2019-10-22 DIAGNOSIS — M47.816 LUMBAR SPONDYLOSIS: ICD-10-CM

## 2019-10-22 DIAGNOSIS — Z78.9 DECREASED ACTIVITIES OF DAILY LIVING (ADL): ICD-10-CM

## 2019-10-22 DIAGNOSIS — M47.812 SPONDYLOSIS OF CERVICAL REGION WITHOUT MYELOPATHY OR RADICULOPATHY: ICD-10-CM

## 2019-10-22 DIAGNOSIS — M48.02 CERVICAL STENOSIS OF SPINAL CANAL: ICD-10-CM

## 2019-10-22 DIAGNOSIS — G89.4 CHRONIC PAIN DISORDER: Primary | ICD-10-CM

## 2019-10-22 PROCEDURE — 99499 UNLISTED E&M SERVICE: CPT | Mod: HCNC,,, | Performed by: NURSE PRACTITIONER

## 2019-10-22 PROCEDURE — 99499 RISK ADDL DX/OHS AUDIT: ICD-10-PCS | Mod: HCNC,,, | Performed by: NURSE PRACTITIONER

## 2019-10-22 PROCEDURE — 1101F PR PT FALLS ASSESS DOC 0-1 FALLS W/OUT INJ PAST YR: ICD-10-PCS | Mod: HCNC,CPTII,, | Performed by: NURSE PRACTITIONER

## 2019-10-22 PROCEDURE — 1101F PT FALLS ASSESS-DOCD LE1/YR: CPT | Mod: HCNC,CPTII,, | Performed by: NURSE PRACTITIONER

## 2019-10-22 PROCEDURE — 99205 OFFICE O/P NEW HI 60 MIN: CPT | Mod: HCNC,,, | Performed by: NURSE PRACTITIONER

## 2019-10-22 PROCEDURE — 99205 PR OFFICE/OUTPT VISIT, NEW, LEVL V, 60-74 MIN: ICD-10-PCS | Mod: HCNC,,, | Performed by: NURSE PRACTITIONER

## 2019-10-22 NOTE — PROGRESS NOTES
Functional Restoration Program  NP Note    Subjective:       Chief Complaint Requiring Rehabilitation: lower back and right leg Pain    Consulted by: Dr. Brown    HPI:    Tito Menard is a 73 y.o. male who presents today with his wife. His CC is lower back and right LE pain. Reports onset of current pain appx 4-5 years ago, but he has a long-standing history of back issues and is s/p two lumbar surgeries, one is 2000 and one in 2007. He said that the surgeries helped but eventually the pain would come back. He had a prolonged recovery after the first surgery but denies complications. States at one point he did PT and got much better and did not need an assistive device to walk, but as time went on his condition declined.He also reports that his RLE will give out when walking occasionally. States this does not happen frequently but it will happen from time to time. He uses a rollator or cane at all times. He is currently leading a very sedentary lifestyle and his wife is particularly concerned. He notes SOB and weakness with prolonged walking. He was evaluated by his cardiologist in August and worked-up for SOB on exertion; per Cardiology 'dyspnea on exertion is likely a result of deconditioning. His symptoms are the same as they were one year ago and his stress echo was negative then'.     In 2015 he was seen by MILLICENT Soto for low back pain and balance problems. His back pain improved with PT. A C spine MRI was ordered to further evaluate myelopathic symptoms and he was noted to have C3-4 spinal stenosis but was not interested in surgical intervention and did not follow-up again after that.     He has also seen Neurology for balance deficits. His LCV with Dr. Casey was 5/2017 who noted that:  'Clinically there is evidence of cervical myelopathy with hyperreflexia that has been documented at spine clinic in the past, a peripheral neuropathy predominantly sensory most likely related to the history  of diabetes and chronic back problems with status post surgeries.  All these would result in his gait disturbance.  The patient does not want to go back to the back and spine clinic or see neurosurgery.  He is amenable to initiating physical therapy which will be ordered.'    This pain is described in detail below.    Aggravating factors: mowing the yard, laying on his back flat    Mitigating factors: laying on right side (sleeps on right side)    Physical Therapy: 2-2.5 years ago ([facility])- reports this was helpful     HEP: no. He does do some leg stretches occasionally but not regularly and not in the past several months     Non-pharmacologic Treatment:     · Ice/Heat: no  · TENS: in the past   · Massage: yes  · Chiropractic care: no  · Acupuncture: no  · Other: OTC pain cream          Pain Medications:         · Currently taking: gabapentin 300 mg TID, tylenol PM nightly     · Has tried in the past:    · Robaxin, Norco    Blood thinners: ASA 81 mg     Interventional Therapies: Yes- sometime prior to 2005- helpful     Relevant Surgeries: two lumbar surgeries- 2000 and 2007     Affecting sleep? Goes to bed at 9-10 AM and wakes around 5:30 AM. Occasionally pain/muscle cramping will wake him from sleep. Has to sleep on his right side for comfort; takes tylenol PM nightly     Affecting daily activities? Yes    Depressive symptoms? Mild           · SI/HI? No    Work status: Retired around 2008. Worked as a . Could not return to work after second lumbar surgery in 2007 2/2 medication he was taking for pain and overall condition of his lower back.     Daily routine: He is mostly at home during the daytime. He does not get out of the house much. He will try to do things in the yard (cutting grass), but that is about it. He is sedentary for much of the day.       Past Medical History:   Diagnosis Date    Allergy     Aneurysm of artery of lower extremity     Chalazion of left eye     CKD (chronic kidney  disease), stage II 4/1/2019    Diabetes mellitus type II     Enlarged aorta 8/2/2016    Noted on pharmacological stress echo 2/28/2014.      GERD (gastroesophageal reflux disease)     egd 2008    Hyperlipidemia     Hypertension     MGD (meibomian gland dysfunction)     Osteopenia     Spinal stenosis     Spondylosis without myelopathy 10/23/2015    Vitamin D deficiency disease        Past Surgical History:   Procedure Laterality Date    CHALAZION EXCISION Left 8/18/13    CYST REMOVAL  2013    Back of neck    SPINE SURGERY  2007    x2 (2000, 2007)       Review of patient's allergies indicates:  No Known Allergies    Current Outpatient Medications   Medication Sig Dispense Refill    acetaminophen (TYLENOL) 650 MG TbSR Take 650 mg by mouth every 8 (eight) hours.      albuterol 90 mcg/actuation inhaler Inhale 1-2 puffs into the lungs every 6 (six) hours as needed for Wheezing. 1 Inhaler 0    amLODIPine (NORVASC) 5 MG tablet TAKE 1 TABLET EVERY DAY 90 tablet 3    aspirin (ECOTRIN) 81 MG EC tablet Take 81 mg by mouth once daily.      azelastine (ASTELIN) 137 mcg nasal spray 1 spray (137 mcg total) by Nasal route 2 (two) times daily. (Patient taking differently: 1 spray by Nasal route 2 (two) times daily as needed. ) 90 mL 3    chlorthalidone (HYGROTEN) 25 MG Tab TAKE 1 TABLET EVERY DAY 90 tablet 0    cholecalciferol, vitamin D3, (VITAMIN D3) 2,000 unit Cap Take 1 capsule by mouth once daily.      clotrimazole-betamethasone 1-0.05% (LOTRISONE) cream APPLY TO THE AFFECTED AREA(S) TWICE DAILY  FOR  10  DAYS 45 g 0    docusate sodium (STOOL SOFTENER) 50 MG capsule Take by mouth 2 (two) times daily.      econazole nitrate 1 % cream Apply topically 2 (two) times daily. (Patient taking differently: Apply topically 2 (two) times daily as needed. ) 85 g 1    finasteride (PROSCAR) 5 mg tablet Take 1 tablet (5 mg total) by mouth once daily. 90 tablet 3    fluticasone (FLONASE) 50 mcg/actuation nasal spray USE  1 SPRAY NASALLY ONE TIME DAILY 32 g 6    gabapentin (NEURONTIN) 300 MG capsule Take one po in morning one at noon and two in the evening 120 capsule 3    guaifenesin (MUCINEX) 600 mg 12 hr tablet Take 1,200 mg by mouth 2 (two) times daily.      ibuprofen (ADVIL) 200 MG tablet Take 200 mg by mouth every 6 (six) hours as needed for Pain.      irbesartan (AVAPRO) 300 MG tablet Take 1 tablet (300 mg total) by mouth every evening. 90 tablet 3    melatonin 10 mg Cap Take 1 capsule by mouth every evening.      methocarbamol (ROBAXIN) 500 MG Tab 3 (three) times daily as needed.       omeprazole (PRILOSEC) 20 MG capsule Take 2 capsules (40 mg total) by mouth once daily. 180 capsule 4    potassium chloride (KLOR-CON SPRINKLE) 10 MEQ CpSR Take 3 capsules (30 mEq total) by mouth once daily. 270 capsule 2    pravastatin (PRAVACHOL) 20 MG tablet TAKE 1 TABLET EVERY DAY 90 tablet 3    tamsulosin (FLOMAX) 0.4 mg Cap Take 1 capsule (0.4 mg total) by mouth once daily. 90 capsule 3     Current Facility-Administered Medications   Medication Dose Route Frequency Provider Last Rate Last Dose    azithromycin tablet 250 mg  250 mg Oral Daily Amanda Brown MD           Family History   Problem Relation Age of Onset    Hyperlipidemia Mother     Hypertension Mother     Kidney disease Mother     Cancer Mother     Heart disease Mother     Kidney failure Mother     No Known Problems Daughter     No Known Problems Sister     No Known Problems Brother     No Known Problems Son     No Known Problems Brother     No Known Problems Son     Hypertension Maternal Grandmother     Amblyopia Neg Hx     Blindness Neg Hx     Cataracts Neg Hx     Diabetes Neg Hx     Glaucoma Neg Hx     Macular degeneration Neg Hx     Retinal detachment Neg Hx     Strabismus Neg Hx     Stroke Neg Hx     Thyroid disease Neg Hx     Melanoma Neg Hx     Psoriasis Neg Hx     Lupus Neg Hx     Eczema Neg Hx        Social History      Socioeconomic History    Marital status:      Spouse name: Not on file    Number of children: Not on file    Years of education: Not on file    Highest education level: Not on file   Occupational History    Occupation: Retired     Comment:    Social Needs    Financial resource strain: Not on file    Food insecurity:     Worry: Not on file     Inability: Not on file    Transportation needs:     Medical: Not on file     Non-medical: Not on file   Tobacco Use    Smoking status: Never Smoker    Smokeless tobacco: Former User     Types: Chew    Tobacco comment: Chewed tobacco once per day for 4-5 years when a    Substance and Sexual Activity    Alcohol use: No    Drug use: No    Sexual activity: Never     Partners: Female   Lifestyle    Physical activity:     Days per week: Not on file     Minutes per session: Not on file    Stress: Not on file   Relationships    Social connections:     Talks on phone: Not on file     Gets together: Not on file     Attends Holiness service: Not on file     Active member of club or organization: Not on file     Attends meetings of clubs or organizations: Not on file     Relationship status: Not on file   Other Topics Concern    Not on file   Social History Narrative        Colon 2007            Objective:     GEN: Well developed, well nourished. No acute distress.   HEENT: No trauma. Mucous membranes moist. Nares patent bilaterally.  PSYCH: Normal affect. Thought content appropriate.  CHEST: Breathing symmetric. No audible wheezing.  SKIN: Warm, pink, dry. No rash on exposed areas.   EXT: No cyanosis, clubbing, or edema. No color change or changes in nail or hair growth.  NEURO/MUSCULOSKELETAL:  Lumbar: limited lumbar flexion and pain with return to standing from flexed position; limited and painful extension; TTP lumbar musculature; diffusely diminished strength RLE compared to LLE; gross sensation and reflexes intact  bilaterally; negative clonus bilaterally  SLR negative bilaterally (sitting)  IRVING positive on the right. Pt has to lift his right leg to cross it over his left knee.   IRVING negative on the left.   Fully alert, oriented, and appropriate. Speech normal ar. No cranial nerve deficits.   Gait: Antalgic; uses a rollator. Requires support/assistance to rise from sitting to standing.        Imaging:    Lumbar MRI reports from 2009 reviewed in [facility] and shows multilevel DDD, spondylosis and stenosis of the L spine and post-operative changes. Stenosis is most significant at L3-4 and L4-5 levels.     Report Summary:  Impression:   Duplex ultrasound of the bilateral Popliteal arteries reveals no evidence of an aneurysm.    Sonographer: LEONEL Kelly    Electronically Signed by:  Venkata Thomas MD [[id number]]                        On: 12/07/2016 09:45      Findings: No marrow replacement marrow edema infection or neoplasm is seen.  No soft tissue injury or whiplash injury is identified.  The odontoid is intact.  The craniocervical junction demonstrates a nothing unusual.  There is moderate degenerative change.    C2 -- C3 demonstrates disk osteophyte complex.  This flattens the anterior thecal sac and there is mild neural foraminal narrowing on the left.    C3 -- C4 demonstrates disk osteophyte complex that lateralizes towards the left.  This causes canal stenosis left worse than right with slight flattening of the cord on the left and mild cord edema versus myelomalacia.  There is severe bilateral neural foraminal narrowing.    C4 -- C5 demonstrates disk osteophyte complex.  Canal is patent.  There is severe bilateral neuroforaminal narrowing.    C5 -- C6 demonstrates disk osteophyte complex that lateralizes toward the left.  Canal is patent.  There is severe bilateral neural foraminal narrowing.    C6 -- C7 demonstrates mild disk osteophyte complex and severe bilateral neural foraminal narrowing.    C7 -- T1  demonstrates mild degenerative change.      T1 -- T2 demonstrates bulging disk osteophyte complex.      Impression      Degenerative change as above.      Electronically signed by: JUDE VILLAGRAN  Date: 12/29/15  Time: 10:09      Time of Procedure: 06/08/13 19:39:00  Accession # [id number]    Procedure: MRI the brain without contrast.    Technique: Sagittal and axial T1, axial T2, axial FLAIR, axial gradient, axial diffusion, axial T2 Star, ADC, and coronal T2 images of the whole brain.  No IV contrast administered.    Comparison: Head CT from 01/29/2009    Findings: No significant cerebral volume loss.  Minimal increased T2/flair signal of the supratentorial white matter consistent with mild chronic microvascular ischemic changes.  There are no areas of restricted diffusion to suggest acute infarction. The ventricles are normal in size and configuration without evidence for hydrocephalus. There are no abnormal areas of the gradient susceptibility to suggest parenchymal hemorrhage. No abnormal intra or extra axial fluid collections. The major intracranial T2  flow-voids are present.    The globes, orbits, pituitary gland, pineal gland, craniocervical junction, and upper cervical spine demonstrate no significant abnormalities.  Because of membrane thickening of the ethmoid sinuses bilaterally as well as the maxillary sinuses bilaterally.      Impression          1. No evidence of acute infarction, hemorrhage, mass, or hydrocephalus.    2.  Minimal chronic microvascular ischemic changes.    3.  Patchy sinus disease.      Electronically signed by: MAURI RIOS MD  Date: 11/26/16  Time: 12:15          Assessment:     Encounter Diagnoses   Name Primary?    Chronic pain disorder Yes    Impaired instrumental activities of daily living (IADL)     Pain aggravated by activities of daily living     Cervical stenosis of spinal canal     Depression, unspecified depression type     Decreased activities of daily living (ADL)      Physical deconditioning     Spondylosis of cervical region without myelopathy or radiculopathy     DDD (degenerative disc disease), lumbar     DDD (degenerative disc disease), cervical     Lumbar spondylosis     Spinal stenosis of lumbar region, unspecified whether neurogenic claudication present     Lumbar post-laminectomy syndrome        Plan:     Diagnoses and all orders for this visit:    Chronic pain disorder    Impaired instrumental activities of daily living (IADL)    Pain aggravated by activities of daily living    Cervical stenosis of spinal canal    Depression, unspecified depression type    Decreased activities of daily living (ADL)    Physical deconditioning    Spondylosis of cervical region without myelopathy or radiculopathy    DDD (degenerative disc disease), lumbar    DDD (degenerative disc disease), cervical    Lumbar spondylosis    Spinal stenosis of lumbar region, unspecified whether neurogenic claudication present    Lumbar post-laminectomy syndrome       Tito Menard is a 73 y.o. with CC of low back and RLE pain. He also has a hx of cervical myelopathy and balance issues. He is s/p lumbar surgery x2 (2000 and 2007). No cervical spine surgery. He declined C spine surgical intervention in the past. He continues to struggle with balance deficits in addition to back and RLE pain. He has benefited from PT in the remote and more recent (2 years ago) past but has not been consistent with HEP and is currently living a largely sedentary lifestyle that has contributed to a decline in his endurance and function. At this point Tito Menard wishes to avoid invasive treatment and is amenable to considering FRP. Based on the above he appears to be an appropriate candidate, although he will certainly need to have his strength, endurance and balance further evaluated by FRP OT and PT prior to considering program enrollment to ensure safety and appropriateness.     We discussed the FRP program  in detail.  We discussed that this program is to enhance functional ability and decrease disability. Ultimately Tito Menard will benefit from a multidisciplinary approach to managing his chronic pain to help with pain, emotional and physical health and wellness. Recommend proceeding with PT/OT screening visit for further evaluation of  his personal goals, functional status and limitations prior to enrolling in FR.      I spent a total of 60 minutes with the patient and his wife, and greater than 50% of that time was spent in counseling.       The above plan and management options were discussed at length with patient. Patient is in agreement with the above and verbalized understanding. It will be communicated with the referring physician via electronic record, fax, or mail.

## 2019-10-25 ENCOUNTER — CLINICAL SUPPORT (OUTPATIENT)
Dept: REHABILITATION | Facility: OTHER | Age: 74
End: 2019-10-25
Payer: MEDICARE

## 2019-10-25 DIAGNOSIS — Z78.9 IMPAIRED INSTRUMENTAL ACTIVITIES OF DAILY LIVING (IADL): ICD-10-CM

## 2019-10-25 DIAGNOSIS — M47.816 LUMBAR SPONDYLOSIS: ICD-10-CM

## 2019-10-25 DIAGNOSIS — M48.061 SPINAL STENOSIS OF LUMBAR REGION, UNSPECIFIED WHETHER NEUROGENIC CLAUDICATION PRESENT: ICD-10-CM

## 2019-10-25 DIAGNOSIS — Z78.9 ALTERATION IN PERFORMANCE OF ACTIVITIES OF DAILY LIVING: ICD-10-CM

## 2019-10-25 DIAGNOSIS — R52 PAIN AGGRAVATED BY ACTIVITIES OF DAILY LIVING: ICD-10-CM

## 2019-10-25 DIAGNOSIS — M50.30 DDD (DEGENERATIVE DISC DISEASE), CERVICAL: ICD-10-CM

## 2019-10-25 DIAGNOSIS — M48.02 CERVICAL STENOSIS OF SPINAL CANAL: ICD-10-CM

## 2019-10-25 DIAGNOSIS — R26.89 DECREASED FUNCTIONAL MOBILITY: ICD-10-CM

## 2019-10-25 DIAGNOSIS — F32.A DEPRESSION, UNSPECIFIED DEPRESSION TYPE: ICD-10-CM

## 2019-10-25 DIAGNOSIS — M96.1 LUMBAR POST-LAMINECTOMY SYNDROME: ICD-10-CM

## 2019-10-25 DIAGNOSIS — R53.81 PHYSICAL DECONDITIONING: ICD-10-CM

## 2019-10-25 DIAGNOSIS — Z78.9 DECREASED ACTIVITIES OF DAILY LIVING (ADL): ICD-10-CM

## 2019-10-25 DIAGNOSIS — M47.812 SPONDYLOSIS OF CERVICAL REGION WITHOUT MYELOPATHY OR RADICULOPATHY: ICD-10-CM

## 2019-10-25 DIAGNOSIS — M51.36 DDD (DEGENERATIVE DISC DISEASE), LUMBAR: ICD-10-CM

## 2019-10-25 DIAGNOSIS — G89.4 CHRONIC PAIN DISORDER: ICD-10-CM

## 2019-10-25 PROCEDURE — G8991 OTHER PT/OT GOAL STATUS: HCPCS | Mod: CJ,HCNC

## 2019-10-25 PROCEDURE — G8979 MOBILITY GOAL STATUS: HCPCS | Mod: CJ,HCNC

## 2019-10-25 PROCEDURE — G8978 MOBILITY CURRENT STATUS: HCPCS | Mod: CK,HCNC

## 2019-10-25 PROCEDURE — 97165 OT EVAL LOW COMPLEX 30 MIN: CPT | Mod: HCNC

## 2019-10-25 PROCEDURE — 97161 PT EVAL LOW COMPLEX 20 MIN: CPT | Mod: HCNC

## 2019-10-25 PROCEDURE — G8990 OTHER PT/OT CURRENT STATUS: HCPCS | Mod: CK,HCNC
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
## 2019-10-30 RX ORDER — IRBESARTAN 300 MG/1
300 TABLET ORAL NIGHTLY
Qty: 90 TABLET | Refills: 3 | Status: SHIPPED | OUTPATIENT
Start: 2019-10-30 | End: 2020-08-04

## 2019-10-31 ENCOUNTER — TELEPHONE (OUTPATIENT)
Dept: PAIN MEDICINE | Facility: OTHER | Age: 74
End: 2019-10-31
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
## 2019-11-06 ENCOUNTER — CLINICAL SUPPORT (OUTPATIENT)
Dept: REHABILITATION | Facility: OTHER | Age: 74
End: 2019-11-06
Payer: MEDICARE

## 2019-11-06 DIAGNOSIS — M47.12 SPONDYLOSIS OF CERVICAL JOINT WITH MYELOPATHY: ICD-10-CM

## 2019-11-06 DIAGNOSIS — M48.02 SPINAL STENOSIS OF CERVICAL REGION: ICD-10-CM

## 2019-11-06 DIAGNOSIS — G89.29 CHRONIC RIGHT-SIDED LOW BACK PAIN WITHOUT SCIATICA: ICD-10-CM

## 2019-11-06 DIAGNOSIS — R26.9 GAIT DISORDER: ICD-10-CM

## 2019-11-06 DIAGNOSIS — M54.50 CHRONIC RIGHT-SIDED LOW BACK PAIN WITHOUT SCIATICA: ICD-10-CM

## 2019-11-06 DIAGNOSIS — Z78.9 IMPAIRED INSTRUMENTAL ACTIVITIES OF DAILY LIVING (IADL): ICD-10-CM

## 2019-11-06 DIAGNOSIS — R26.89 DECREASED FUNCTIONAL MOBILITY: Primary | ICD-10-CM

## 2019-11-06 PROCEDURE — 97110 THERAPEUTIC EXERCISES: CPT | Mod: HCNC

## 2019-11-06 NOTE — PROGRESS NOTES
Functional Restoration Program  Physical Therapy   Daily Therapy Note      Name: Tito Menard  MRN:[id number]  Date: 11/6/2019    Independent Therapy  Time in: 10:00a  Time out: 11:05p     Billable minutes: 60      Subjective: Mr. Menard reports that he was not too sore after his evaluation & he is trying to get in shape enough to tolerate the January cohort.       Current 2/10  Location(s): bilateral back  and R LE      Objective:   Mr. Menard participated in the following activities:    Individualized Treatment:       Tito received therapeutic exercises to develop strength, endurance, posture and core stabilization for 60 minutes including:    Bike manual 10 minutes level 1  Standing hip abduction 2x10  Seated hip flx 1.5 lb 2x10 B  Seated LAQ 1.5 lb 2x12 B  Seated PF 2x20 (AAROM R LE)  Sit to stand 1 pad w/out UE 2x10  Quick walking steps for ~40 ft x 3 trials    Billable units: Therapeutic Exercise 4 units       Assessment: Mr. Menard w/ good tolerance to exercises & demonstrates poor activation of lower leg muscles leading to very poor balance.    Plan: Continue PT per POC.    Nick Momin, PT, DPT

## 2019-11-08 ENCOUNTER — CLINICAL SUPPORT (OUTPATIENT)
Dept: REHABILITATION | Facility: OTHER | Age: 74
End: 2019-11-08
Payer: MEDICARE

## 2019-11-08 DIAGNOSIS — M54.50 CHRONIC RIGHT-SIDED LOW BACK PAIN WITHOUT SCIATICA: ICD-10-CM

## 2019-11-08 DIAGNOSIS — R26.89 DECREASED FUNCTIONAL MOBILITY: Primary | ICD-10-CM

## 2019-11-08 DIAGNOSIS — R26.9 GAIT DISORDER: ICD-10-CM

## 2019-11-08 DIAGNOSIS — G89.29 CHRONIC RIGHT-SIDED LOW BACK PAIN WITHOUT SCIATICA: ICD-10-CM

## 2019-11-08 DIAGNOSIS — M48.02 SPINAL STENOSIS OF CERVICAL REGION: ICD-10-CM

## 2019-11-08 DIAGNOSIS — Z78.9 IMPAIRED INSTRUMENTAL ACTIVITIES OF DAILY LIVING (IADL): ICD-10-CM

## 2019-11-08 DIAGNOSIS — M47.12 SPONDYLOSIS OF CERVICAL JOINT WITH MYELOPATHY: ICD-10-CM

## 2019-11-08 PROCEDURE — 97110 THERAPEUTIC EXERCISES: CPT | Mod: HCNC

## 2019-11-08 NOTE — PROGRESS NOTES
Functional Restoration Program  Physical Therapy   Daily Therapy Note      Name: Tito Menard  MRN:[id number]  Date: 11/8/2019    Independent Therapy  Time in: 10:00a  Time out: 11:05p     Billable minutes: 60      Subjective: Mr. Menard reports some quad & low back soreness, but he wore his back brace today to help.       Current 5/10  Location(s): bilateral back  and R LE      Objective:   Mr. Menard participated in the following activities:    Individualized Treatment:       Tito received therapeutic exercises to develop strength, endurance, posture and core stabilization for 60 minutes including:    Bike manual 10 minutes level 2  Standing hip abduction on standing on foam 3x10  Seated hip flx 1.5 lb 2x10 B  Seated LAQ 1.5 lb 2x15 B  Seated PF w/ black band 2x20   Sit to stand 1 pad w/out UE 2x12  Quick walking steps for ~40 ft x 4 trials  BATCA single leg press 15 lb 2x15 each side  Heel to toe walking w/ min A for balance 2x10 steps  Hip marching walking w/ min A for balance 2x10 steps    Billable units: Therapeutic Exercise 4 units       Assessment: Mr. Menard w/ improved tolerance to increased exercises in today's treatment session.    Plan: Continue PT per POC.    Nick Momin, PT, DPT

## 2019-11-18 RX ORDER — CHLORTHALIDONE 25 MG/1
TABLET ORAL
Qty: 90 TABLET | Refills: 3 | Status: SHIPPED | OUTPATIENT
Start: 2019-11-18 | End: 2020-08-24

## 2019-12-10 ENCOUNTER — TELEPHONE (OUTPATIENT)
Dept: INTERNAL MEDICINE | Facility: CLINIC | Age: 74
End: 2019-12-10

## 2019-12-10 NOTE — TELEPHONE ENCOUNTER
----- Message from Miriam Vazquez sent at 12/10/2019  3:21 PM CST -----  Contact: ASHLEY FISHER [[id number]]  Name of Who is Calling: ASHLEY FISHER [[id number]]      What is the request in detail: Pt is calling to speak to staff regarding medical records . Please call to further assist .      Can the clinic reply by [facility]: Prefer a phone call       What Number to Call Back if not in [facility]: [phone]

## 2019-12-19 ENCOUNTER — OFFICE VISIT (OUTPATIENT)
Dept: OPHTHALMOLOGY | Facility: CLINIC | Age: 74
End: 2019-12-19
Payer: MEDICARE

## 2019-12-19 DIAGNOSIS — H00.12 CHALAZION OF RIGHT LOWER EYELID: Primary | ICD-10-CM

## 2019-12-19 DIAGNOSIS — H02.883 MEIBOMIAN GLAND DYSFUNCTION (MGD) OF BOTH EYES: ICD-10-CM

## 2019-12-19 DIAGNOSIS — H00.15 CHALAZION OF LEFT LOWER EYELID: ICD-10-CM

## 2019-12-19 DIAGNOSIS — H02.886 MEIBOMIAN GLAND DYSFUNCTION (MGD) OF BOTH EYES: ICD-10-CM

## 2019-12-19 PROCEDURE — 92285 EXTERNAL PHOTOGRAPHY - OU - BOTH EYES: ICD-10-PCS | Mod: HCNC,S$GLB,, | Performed by: OPHTHALMOLOGY

## 2019-12-19 PROCEDURE — 92285 EXTERNAL OCULAR PHOTOGRAPHY: CPT | Mod: HCNC,S$GLB,, | Performed by: OPHTHALMOLOGY

## 2019-12-19 PROCEDURE — 99999 PR PBB SHADOW E&M-EST. PATIENT-LVL II: ICD-10-PCS | Mod: PBBFAC,HCNC,, | Performed by: OPHTHALMOLOGY

## 2019-12-19 PROCEDURE — 99999 PR PBB SHADOW E&M-EST. PATIENT-LVL II: CPT | Mod: PBBFAC,HCNC,, | Performed by: OPHTHALMOLOGY

## 2019-12-19 PROCEDURE — 92012 PR EYE EXAM, EST PATIENT,INTERMED: ICD-10-PCS | Mod: HCNC,S$GLB,, | Performed by: OPHTHALMOLOGY

## 2019-12-19 PROCEDURE — 92012 INTRM OPH EXAM EST PATIENT: CPT | Mod: HCNC,S$GLB,, | Performed by: OPHTHALMOLOGY

## 2019-12-19 NOTE — LETTER
December 19, 2019      Tito Palafox, OD  [address]  [place] [address]           [facility]  [address]  [place] [address]  Phone: [phone]  Fax: [phone]          Patient: Tito Menard   MR Number: [id number]   YOB: 1945   Date of Visit: 12/19/2019       Dear Dr. Tito Palafox:    Thank you for referring Tito Menard to me for evaluation. Attached you will find relevant portions of my assessment and plan of care.    If you have questions, please do not hesitate to call me. I look forward to following Tito Menard along with you.    Sincerely,    Nallely Escalante MD    Enclosure  CC:  No Recipients    If you would like to receive this communication electronically, please contact [email or web address] or [phone] to request more information on [facility] access.    For providers and/or their staff who would like to refer a patient to [facility], please contact us through our one-stop-shop provider referral line, [facility] [place], at [phone].    If you feel you have received this communication in error or would no longer like to receive these types of communications, please e-mail [email or web address]

## 2019-12-23 ENCOUNTER — DOCUMENTATION ONLY (OUTPATIENT)
Dept: REHABILITATION | Facility: OTHER | Age: 74
End: 2019-12-23

## 2019-12-23 NOTE — PROGRESS NOTES
Pt's wife reported that he would no longer participate in PT w/ FRP w/out an estimate for the cost of the total program.  FRP coordinator & patient finance department reported to PT attempting to contact pt and/or his wife several times & was never contacted back to give patient the full estimate.  Pt to be discharged from PT due to lack of communication & attendance.

## 2019-12-30 RX ORDER — POTASSIUM CHLORIDE 750 MG/1
30 CAPSULE, EXTENDED RELEASE ORAL DAILY
Qty: 270 CAPSULE | Refills: 2 | Status: SHIPPED | OUTPATIENT
Start: 2019-12-30 | End: 2020-07-16

## 2019-12-30 NOTE — TELEPHONE ENCOUNTER
Contacted patient and states he would like provider to look at notes from ER visit 12/5/19 and would like to know are there any recommendations and does he need evaluation sooner than next available appointment on 2/13/20. States [facility] sent a letter to get refills on medication. Unable verify what medications needed refills. Contacted [facility] mail Delivery [facility] to clarify what medications needed refills and states the only prescription that does not have any refills is the potassium. Medication pended and message sent to provider. Appointment reminder placed in mail.

## 2019-12-30 NOTE — TELEPHONE ENCOUNTER
----- Message from Mary Grace Bailey sent at 12/30/2019 11:00 AM CST -----  Contact: Self  Type: Patient Call Back    Who called:Tito    What is the request in detail: Patient called to speak with nurse regarding [place] [facility] also request that [facility] because pharmacy is having trouble with med refillsPlease call to advise.    Can the clinic reply by [facility]?    Would the patient rather a call back or a response via [facility]? Call    Best call back number:[phone]

## 2019-12-31 NOTE — TELEPHONE ENCOUNTER
Contacted patient and informed him potassium was the medication that did not have any refills and was sent to the pharmacy. Also requested to make an appointment for patient's wife on the same day as patient's office visit. Appointment made.

## 2020-01-06 ENCOUNTER — LAB VISIT (OUTPATIENT)
Dept: LAB | Facility: OTHER | Age: 75
End: 2020-01-06
Attending: INTERNAL MEDICINE
Payer: MEDICARE

## 2020-01-06 DIAGNOSIS — E11.9 DIET-CONTROLLED DIABETES MELLITUS: ICD-10-CM

## 2020-01-06 LAB
ESTIMATED AVG GLUCOSE: 126 MG/DL (ref 68–131)
HBA1C MFR BLD HPLC: 6 % (ref 4–5.6)

## 2020-01-06 PROCEDURE — 83036 HEMOGLOBIN GLYCOSYLATED A1C: CPT | Mod: HCNC

## 2020-01-06 PROCEDURE — 36415 COLL VENOUS BLD VENIPUNCTURE: CPT | Mod: HCNC

## 2020-01-08 RX ORDER — FLUTICASONE PROPIONATE 50 MCG
SPRAY, SUSPENSION (ML) NASAL
Qty: 32 G | Refills: 6 | Status: SHIPPED | OUTPATIENT
Start: 2020-01-08 | End: 2021-02-24

## 2020-01-20 ENCOUNTER — PES CALL (OUTPATIENT)
Dept: ADMINISTRATIVE | Facility: CLINIC | Age: 75
End: 2020-01-20

## 2020-01-30 ENCOUNTER — PATIENT OUTREACH (OUTPATIENT)
Dept: ADMINISTRATIVE | Facility: HOSPITAL | Age: 75
End: 2020-01-30

## 2020-02-03 RX ORDER — AMLODIPINE BESYLATE 5 MG/1
TABLET ORAL
Qty: 90 TABLET | Refills: 3 | Status: SHIPPED | OUTPATIENT
Start: 2020-02-03 | End: 2020-02-24

## 2020-02-13 ENCOUNTER — OFFICE VISIT (OUTPATIENT)
Dept: INTERNAL MEDICINE | Facility: CLINIC | Age: 75
End: 2020-02-13
Attending: INTERNAL MEDICINE
Payer: MEDICARE

## 2020-02-13 ENCOUNTER — LAB VISIT (OUTPATIENT)
Dept: LAB | Facility: OTHER | Age: 75
End: 2020-02-13
Attending: INTERNAL MEDICINE
Payer: MEDICARE

## 2020-02-13 VITALS
HEART RATE: 85 BPM | DIASTOLIC BLOOD PRESSURE: 83 MMHG | BODY MASS INDEX: 30.13 KG/M2 | HEIGHT: 71 IN | WEIGHT: 215.19 LBS | SYSTOLIC BLOOD PRESSURE: 122 MMHG | OXYGEN SATURATION: 97 %

## 2020-02-13 DIAGNOSIS — I45.10 RIGHT BUNDLE BRANCH BLOCK: ICD-10-CM

## 2020-02-13 DIAGNOSIS — D22.9 NEVUS: Primary | ICD-10-CM

## 2020-02-13 DIAGNOSIS — I51.89 MILD LEFT VENTRICULAR SYSTOLIC DYSFUNCTION: ICD-10-CM

## 2020-02-13 DIAGNOSIS — I51.9 MILD AORTIC REGURGITATION WITH LEFT VENTRICULAR SYSTOLIC DYSFUNCTION BY PRIOR ECHOCARDIOGRAM: ICD-10-CM

## 2020-02-13 DIAGNOSIS — I35.1 MILD AORTIC REGURGITATION WITH LEFT VENTRICULAR SYSTOLIC DYSFUNCTION BY PRIOR ECHOCARDIOGRAM: ICD-10-CM

## 2020-02-13 DIAGNOSIS — I71.20 THORACIC AORTIC ANEURYSM WITHOUT RUPTURE: ICD-10-CM

## 2020-02-13 DIAGNOSIS — E87.6 HYPOKALEMIA: ICD-10-CM

## 2020-02-13 DIAGNOSIS — N18.2 CKD (CHRONIC KIDNEY DISEASE), STAGE II: ICD-10-CM

## 2020-02-13 DIAGNOSIS — R25.1 PILL ROLLING TREMOR: ICD-10-CM

## 2020-02-13 DIAGNOSIS — E78.5 HYPERLIPIDEMIA, UNSPECIFIED HYPERLIPIDEMIA TYPE: ICD-10-CM

## 2020-02-13 DIAGNOSIS — I71.40 ABDOMINAL AORTIC ANEURYSM (AAA) WITHOUT RUPTURE: ICD-10-CM

## 2020-02-13 DIAGNOSIS — R73.03 PREDIABETES: ICD-10-CM

## 2020-02-13 DIAGNOSIS — E44.0 MODERATE PROTEIN-CALORIE MALNUTRITION: ICD-10-CM

## 2020-02-13 DIAGNOSIS — Z86.39 HISTORY OF DIABETES MELLITUS, TYPE II: ICD-10-CM

## 2020-02-13 PROBLEM — B35.6 JOCK ITCH: Status: RESOLVED | Noted: 2018-07-19 | Resolved: 2020-02-13

## 2020-02-13 LAB
ALBUMIN SERPL BCP-MCNC: 3.6 G/DL (ref 3.5–5.2)
ALP SERPL-CCNC: 97 U/L (ref 55–135)
ALT SERPL W/O P-5'-P-CCNC: 24 U/L (ref 10–44)
ANION GAP SERPL CALC-SCNC: 9 MMOL/L (ref 8–16)
AST SERPL-CCNC: 23 U/L (ref 10–40)
BILIRUB SERPL-MCNC: 0.4 MG/DL (ref 0.1–1)
BUN SERPL-MCNC: 14 MG/DL (ref 8–23)
CALCIUM SERPL-MCNC: 9.1 MG/DL (ref 8.7–10.5)
CHLORIDE SERPL-SCNC: 103 MMOL/L (ref 95–110)
CO2 SERPL-SCNC: 28 MMOL/L (ref 23–29)
CREAT SERPL-MCNC: 1 MG/DL (ref 0.5–1.4)
EST. GFR  (AFRICAN AMERICAN): >60 ML/MIN/1.73 M^2
EST. GFR  (NON AFRICAN AMERICAN): >60 ML/MIN/1.73 M^2
GLUCOSE SERPL-MCNC: 106 MG/DL (ref 70–110)
POTASSIUM SERPL-SCNC: 3.6 MMOL/L (ref 3.5–5.1)
PROT SERPL-MCNC: 7.4 G/DL (ref 6–8.4)
SODIUM SERPL-SCNC: 140 MMOL/L (ref 136–145)

## 2020-02-13 PROCEDURE — 99214 PR OFFICE/OUTPT VISIT, EST, LEVL IV, 30-39 MIN: ICD-10-PCS | Mod: HCNC,S$GLB,, | Performed by: INTERNAL MEDICINE

## 2020-02-13 PROCEDURE — 3074F PR MOST RECENT SYSTOLIC BLOOD PRESSURE < 130 MM HG: ICD-10-PCS | Mod: HCNC,CPTII,S$GLB, | Performed by: INTERNAL MEDICINE

## 2020-02-13 PROCEDURE — 1126F AMNT PAIN NOTED NONE PRSNT: CPT | Mod: HCNC,S$GLB,, | Performed by: INTERNAL MEDICINE

## 2020-02-13 PROCEDURE — 99214 OFFICE O/P EST MOD 30 MIN: CPT | Mod: HCNC,S$GLB,, | Performed by: INTERNAL MEDICINE

## 2020-02-13 PROCEDURE — 1126F PR PAIN SEVERITY QUANTIFIED, NO PAIN PRESENT: ICD-10-PCS | Mod: HCNC,S$GLB,, | Performed by: INTERNAL MEDICINE

## 2020-02-13 PROCEDURE — 99999 PR PBB SHADOW E&M-EST. PATIENT-LVL V: CPT | Mod: PBBFAC,HCNC,, | Performed by: INTERNAL MEDICINE

## 2020-02-13 PROCEDURE — 1101F PT FALLS ASSESS-DOCD LE1/YR: CPT | Mod: HCNC,CPTII,S$GLB, | Performed by: INTERNAL MEDICINE

## 2020-02-13 PROCEDURE — 3074F SYST BP LT 130 MM HG: CPT | Mod: HCNC,CPTII,S$GLB, | Performed by: INTERNAL MEDICINE

## 2020-02-13 PROCEDURE — 3079F PR MOST RECENT DIASTOLIC BLOOD PRESSURE 80-89 MM HG: ICD-10-PCS | Mod: HCNC,CPTII,S$GLB, | Performed by: INTERNAL MEDICINE

## 2020-02-13 PROCEDURE — 36415 COLL VENOUS BLD VENIPUNCTURE: CPT | Mod: HCNC

## 2020-02-13 PROCEDURE — 99499 RISK ADDL DX/OHS AUDIT: ICD-10-PCS | Mod: HCNC,S$GLB,, | Performed by: INTERNAL MEDICINE

## 2020-02-13 PROCEDURE — 1159F MED LIST DOCD IN RCRD: CPT | Mod: HCNC,S$GLB,, | Performed by: INTERNAL MEDICINE

## 2020-02-13 PROCEDURE — 3079F DIAST BP 80-89 MM HG: CPT | Mod: HCNC,CPTII,S$GLB, | Performed by: INTERNAL MEDICINE

## 2020-02-13 PROCEDURE — 99999 PR PBB SHADOW E&M-EST. PATIENT-LVL V: ICD-10-PCS | Mod: PBBFAC,HCNC,, | Performed by: INTERNAL MEDICINE

## 2020-02-13 PROCEDURE — 80053 COMPREHEN METABOLIC PANEL: CPT | Mod: HCNC

## 2020-02-13 PROCEDURE — 1159F PR MEDICATION LIST DOCUMENTED IN MEDICAL RECORD: ICD-10-PCS | Mod: HCNC,S$GLB,, | Performed by: INTERNAL MEDICINE

## 2020-02-13 PROCEDURE — 99499 UNLISTED E&M SERVICE: CPT | Mod: HCNC,S$GLB,, | Performed by: INTERNAL MEDICINE

## 2020-02-13 PROCEDURE — 1101F PR PT FALLS ASSESS DOC 0-1 FALLS W/OUT INJ PAST YR: ICD-10-PCS | Mod: HCNC,CPTII,S$GLB, | Performed by: INTERNAL MEDICINE

## 2020-02-13 RX ORDER — DULOXETIN HYDROCHLORIDE 30 MG/1
30 CAPSULE, DELAYED RELEASE ORAL DAILY
Qty: 30 CAPSULE | Refills: 3 | Status: SHIPPED | OUTPATIENT
Start: 2020-02-13 | End: 2020-05-14

## 2020-02-13 RX ORDER — GABAPENTIN 300 MG/1
CAPSULE ORAL
Qty: 120 CAPSULE | Refills: 3 | Status: SHIPPED | OUTPATIENT
Start: 2020-02-13 | End: 2020-08-06 | Stop reason: SDUPTHER

## 2020-02-13 NOTE — PROGRESS NOTES
Subjective:       Patient ID: Tito Menard is a 74 y.o. male.    Chief Complaint: Hospital Follow Up     Tito Menard is a 74 y.o.  male who presents for Hospital Follow Up  .  Episode of syncope and bradycardia on 12/5/19. Taking to [facility]. Found to by hypovolemic, mild hyponatremia. Normal carotid ultrasound, TTE below:    Study Quality:The study was technically adequate.  ECG Rhythm:Resting bradycardia (Heart rate < 60 bpm).  AORTA:Normal aortic root and proximal ascending aorta.  LEFT VENTRICLE (LV)::Overall left ventricular systolic function is at the lower limit of normal with an estimated EF of 50 - 55%. The left ventricle size is normal. Left ventricular wall thickness is normal.  LV HEMODYNAMICS:The left ventricular hemodynamics for this study based upon an LVOT diameter of 2.1cm are as follows: Heart rate 54BPM. Stroke volume index 37.22ml/m². Cardiac index 2.03l/minm².  LV STRAIN:Mildly reduced mean left ventricular global longitudinal strain value of -17% in a non-specific pattern.  DIASTOLOGY:Normal diastolic left ventricular function.  RIGHT VENTRICLE:The right ventricle is normal in size and function.  LEFT ATRIUM:The left atrial volume is normal.  RIGHT ATRIUM:The right atrial size is normal.  AORTIC VALVE:The aortic valve is trileaflet and appears structurally normal. There is no aortic stenosis. Mild aortic regurgitation.  MITRAL VALVE:Structurally normal mitral valve without stenosis. Trace mitral regurgitation (MR).  TRICUSPID VALVE:The tricuspid valve is structurally normal without stenosis. Mild tricuspid regurgitation.  PULMONIC VALVE:Normal pulmonic valve with a minor degree of pulmonic regurgitation.  PULMONARY VEINS:The spectral Doppler pulmonary venous flow pattern is normal (S:D > 1).  PERICARDIUM / EFFUSIONS:No pericardial effusion.  VENA CAVAE:Normal size inferior vena cava (<2.1 cm) with <50% collapse during inspiration.  PA PRESSURE:The estimated resting systolic pulmonary artery  "pressure is normal at 30-35 mm Hg.  CARDIOLOGY: Electronically signed: STAFF:THUY Thompson M.D.  Fellow:JAEL GUSTAFSON M.D.    CONCLUSIONS  -----------  1. Low normal to mildly impaired LV systolic function. LVEF of 50-55%.  2. Mild aortic regurgitation.    He reports he has returned to his regular medications however reports taking two potassium tabs in am, not three.  No further weakness or lightheadedness or dizziness.  Has some weakness with standing quickly or leaning down and raises head quickly.    Still with balance issues but has not continued PT due to cost of co-pays. Admits to feeling down after losing his father in late 2019 and his mother one year earlier. He is able to "push through it", no SI.       Previous labs showed prediabetes.  He has continued to gain wt.  HAs not followed up with PT due to expense. Attempting to do some exercises at home but admits to starting to limit further his activites.  Has noticed what sounds like a pill rolling tremor in his right hand when he is seated.  Gait has been unsteady for some time so difficult to assess changes.  No masked facies. Feels his "mind wanders" and that he loses his train of thought.  Admits to feeling somewhat down with his physical limitations. Denies SI.   States that he has noticed that he notices his right hand thumb and forefinger rubbing together at times. Feels loss of soft touch in these fingers.     Review of Systems   Constitutional: Negative for chills and fever.   HENT: Negative for rhinorrhea and sore throat.    Respiratory: Negative for cough and shortness of breath.    Cardiovascular: Negative for chest pain, palpitations and leg swelling.   Gastrointestinal: Negative for nausea and vomiting.   Genitourinary: Negative for dysuria and hematuria.   Musculoskeletal: Negative for arthralgias and back pain.   Skin: Negative for color change and rash.   Neurological: Negative for weakness and numbness.   Psychiatric/Behavioral: Negative for " agitation and dysphoric mood.       Patient Active Problem List   Diagnosis    Essential hypertension    Mixed hyperlipidemia    Osteopenia    GERD (gastroesophageal reflux disease)    Spinal stenosis    Vitamin D deficiency disease    Pulmonary emphysema    Obstructive chronic bronchitis without exacerbation    Diabetic polyneuropathy associated with type 2 diabetes mellitus    Abdominal aortic aneurysm (AAA) without rupture    Spondylosis of cervical joint with myelopathy    Gait disorder    Chronic right-sided low back pain without sciatica    ZUNIGA (dyspnea on exertion)    Nonspecific abnormal electrocardiogram (ECG) (EKG)    BPH with obstruction/lower urinary tract symptoms    Restrictive lung disease due to kyphoscoliosis    CKD (chronic kidney disease), stage II    History of diabetes mellitus, type II    Decreased functional mobility    Alteration in performance of activities of daily living    Impaired instrumental activities of daily living (IADL)    Mild aortic regurgitation with left ventricular systolic dysfunction by prior echocardiogram    Right bundle branch block       Past Medical History:   Diagnosis Date    Allergy     Aneurysm of artery of lower extremity     Chalazion of left eye     CKD (chronic kidney disease), stage II 4/1/2019    Diabetes mellitus type II     Enlarged aorta 8/2/2016    Noted on pharmacological stress echo 2/28/2014.      GERD (gastroesophageal reflux disease)     egd 2008    Hyperlipidemia     Hypertension     Jock itch 7/19/2018    MGD (meibomian gland dysfunction)     Osteopenia     Spinal stenosis     Spondylosis without myelopathy 10/23/2015    Vitamin D deficiency disease        Past Surgical History:   Procedure Laterality Date    CHALAZION EXCISION Left 8/18/13    CYST REMOVAL  2013    Back of neck    SPINE SURGERY  2007    x2 (2000, 2007)       Family History   Problem Relation Age of Onset    Hyperlipidemia Mother      "Hypertension Mother     Kidney disease Mother     Cancer Mother     Heart disease Mother     Kidney failure Mother     No Known Problems Daughter     No Known Problems Sister     No Known Problems Brother     No Known Problems Son     No Known Problems Brother     No Known Problems Son     Hypertension Maternal Grandmother     Amblyopia Neg Hx     Blindness Neg Hx     Cataracts Neg Hx     Diabetes Neg Hx     Glaucoma Neg Hx     Macular degeneration Neg Hx     Retinal detachment Neg Hx     Strabismus Neg Hx     Stroke Neg Hx     Thyroid disease Neg Hx     Melanoma Neg Hx     Psoriasis Neg Hx     Lupus Neg Hx     Eczema Neg Hx        Social History     Tobacco Use    Smoking status: Never Smoker    Smokeless tobacco: Former User     Types: Chew    Tobacco comment: Chewed tobacco once per day for 4-5 years when a    Substance Use Topics    Alcohol use: No    Drug use: No       Objective:   Blood pressure 122/83, pulse 85, height 5' 11" (1.803 m), weight 97.6 kg (215 lb 2.7 oz), SpO2 97 %.     Physical Exam   Constitutional: He is oriented to person, place, and time. He appears well-developed and well-nourished. No distress.   HENT:   Head: Normocephalic and atraumatic.   Right Ear: External ear normal.   Left Ear: External ear normal.   Eyes: Conjunctivae are normal. No scleral icterus.   Neck: No JVD present. No thyromegaly present.   Cardiovascular: Normal heart sounds. Exam reveals no gallop and no friction rub.   No murmur heard.  Pulmonary/Chest: Effort normal and breath sounds normal. He has no wheezes. He has no rales.   Abdominal: Soft. Bowel sounds are normal. He exhibits no distension. There is no tenderness.   Musculoskeletal: He exhibits no edema or tenderness.   Lymphadenopathy:     He has no cervical adenopathy.   Neurological: He is alert and oriented to person, place, and time.   Skin: Skin is warm and dry.   Psychiatric: He has a normal mood and affect. Thought " content normal.             Prior labs reviewed  Assessment/Plan:        Tito was seen today for hospital follow up.    Diagnoses and all orders for this visit:    Nevus  -     Ambulatory referral/consult to Dermatology; Future  New nevus,  Pt will see derm    Mild left ventricular systolic dysfunction  New finding  Follow up with cards  aysmptomatic    Mild aortic regurgitation with left ventricular systolic dysfunction by prior echocardiogram  -     Ambulatory referral/consult to Cardiology; Future    Right bundle branch block  -     Ambulatory referral/consult to Cardiology; Future  Old finding    Hypokalemia  -     Comprehensive metabolic panel; Future  Seen during ED visit, repeat and adjust up from the 20 mEq daily if needed    Pill rolling tremor  -     Ambulatory referral/consult to Neurology; Future    Thoracic aortic aneurysm without rupture  -     CT Chest Without Contrast; Future    Abdominal aortic aneurysm (AAA) without rupture  Repeat CT chest  Stable    CKD (chronic kidney disease), stage II  Stable, avoid nsadis  BP control    History of diabetes mellitus, type II  Now returning to prediabetes  Recommend mediterreanean diet  Avoid simple carbs and sweets    Moderate protein-calorie malnutrition  Comments:  increase protien in diet, add boost after meals        Medication List with Changes/Refills   Current Medications    ACETAMINOPHEN (TYLENOL) 650 MG TBSR    Take 650 mg by mouth every 8 (eight) hours.    ALBUTEROL 90 MCG/ACTUATION INHALER    Inhale 1-2 puffs into the lungs every 6 (six) hours as needed for Wheezing.    AMLODIPINE (NORVASC) 5 MG TABLET    TAKE 1 TABLET EVERY DAY    ASPIRIN (ECOTRIN) 81 MG EC TABLET    Take 81 mg by mouth once daily.    AZELASTINE (ASTELIN) 137 MCG NASAL SPRAY    1 spray (137 mcg total) by Nasal route 2 (two) times daily.    CHLORTHALIDONE (HYGROTEN) 25 MG TAB    TAKE 1 TABLET EVERY DAY    CHOLECALCIFEROL, VITAMIN D3, (VITAMIN D3) 2,000 UNIT CAP    Take 1 capsule  by mouth once daily.    CLOTRIMAZOLE-BETAMETHASONE 1-0.05% (LOTRISONE) CREAM    APPLY TO THE AFFECTED AREA(S) TWICE DAILY  FOR  10  DAYS    DOCUSATE SODIUM (STOOL SOFTENER) 50 MG CAPSULE    Take by mouth 2 (two) times daily.    ECONAZOLE NITRATE 1 % CREAM    Apply topically 2 (two) times daily.    FINASTERIDE (PROSCAR) 5 MG TABLET    Take 1 tablet (5 mg total) by mouth once daily.    FLUTICASONE PROPIONATE (FLONASE) 50 MCG/ACTUATION NASAL SPRAY    USE 1 SPRAY NASALLY ONE TIME DAILY    GABAPENTIN (NEURONTIN) 300 MG CAPSULE    Take one po in morning one at noon and two in the evening    GUAIFENESIN (MUCINEX) 600 MG 12 HR TABLET    Take 1,200 mg by mouth 2 (two) times daily.    IBUPROFEN (ADVIL) 200 MG TABLET    Take 200 mg by mouth every 6 (six) hours as needed for Pain.    IRBESARTAN (AVAPRO) 300 MG TABLET    TAKE 1 TABLET (300 MG TOTAL) BY MOUTH EVERY EVENING.    MELATONIN 10 MG CAP    Take 1 capsule by mouth every evening.    METHOCARBAMOL (ROBAXIN) 500 MG TAB    3 (three) times daily as needed.     OMEPRAZOLE (PRILOSEC) 20 MG CAPSULE    Take 2 capsules (40 mg total) by mouth once daily.    POTASSIUM CHLORIDE (KLOR-CON SPRINKLE) 10 MEQ CPSR    Take 3 capsules (30 mEq total) by mouth once daily.    PRAVASTATIN (PRAVACHOL) 20 MG TABLET    TAKE 1 TABLET EVERY DAY    TAMSULOSIN (FLOMAX) 0.4 MG CAP    Take 1 capsule (0.4 mg total) by mouth once daily.

## 2020-02-18 ENCOUNTER — OFFICE VISIT (OUTPATIENT)
Dept: INTERNAL MEDICINE | Facility: CLINIC | Age: 75
End: 2020-02-18
Payer: MEDICARE

## 2020-02-18 VITALS
DIASTOLIC BLOOD PRESSURE: 84 MMHG | SYSTOLIC BLOOD PRESSURE: 128 MMHG | WEIGHT: 215.81 LBS | HEART RATE: 82 BPM | BODY MASS INDEX: 30.21 KG/M2 | HEIGHT: 71 IN

## 2020-02-18 DIAGNOSIS — I45.10 RIGHT BUNDLE BRANCH BLOCK: ICD-10-CM

## 2020-02-18 DIAGNOSIS — E55.9 VITAMIN D DEFICIENCY DISEASE: ICD-10-CM

## 2020-02-18 DIAGNOSIS — R26.9 ABNORMALITY OF GAIT AND MOBILITY: ICD-10-CM

## 2020-02-18 DIAGNOSIS — G89.29 CHRONIC RIGHT-SIDED LOW BACK PAIN WITHOUT SCIATICA: ICD-10-CM

## 2020-02-18 DIAGNOSIS — I71.40 ABDOMINAL AORTIC ANEURYSM (AAA) WITHOUT RUPTURE: ICD-10-CM

## 2020-02-18 DIAGNOSIS — J44.89 OBSTRUCTIVE CHRONIC BRONCHITIS WITHOUT EXACERBATION: ICD-10-CM

## 2020-02-18 DIAGNOSIS — I10 ESSENTIAL HYPERTENSION: ICD-10-CM

## 2020-02-18 DIAGNOSIS — E11.42 DIABETIC POLYNEUROPATHY ASSOCIATED WITH TYPE 2 DIABETES MELLITUS: ICD-10-CM

## 2020-02-18 DIAGNOSIS — N18.2 CKD (CHRONIC KIDNEY DISEASE), STAGE II: ICD-10-CM

## 2020-02-18 DIAGNOSIS — Z86.39 HISTORY OF DIABETES MELLITUS, TYPE II: ICD-10-CM

## 2020-02-18 DIAGNOSIS — K21.9 GASTROESOPHAGEAL REFLUX DISEASE, ESOPHAGITIS PRESENCE NOT SPECIFIED: ICD-10-CM

## 2020-02-18 DIAGNOSIS — M47.12 SPONDYLOSIS OF CERVICAL JOINT WITH MYELOPATHY: ICD-10-CM

## 2020-02-18 DIAGNOSIS — N13.8 BPH WITH OBSTRUCTION/LOWER URINARY TRACT SYMPTOMS: ICD-10-CM

## 2020-02-18 DIAGNOSIS — M54.50 CHRONIC RIGHT-SIDED LOW BACK PAIN WITHOUT SCIATICA: ICD-10-CM

## 2020-02-18 DIAGNOSIS — I51.9 MILD AORTIC REGURGITATION WITH LEFT VENTRICULAR SYSTOLIC DYSFUNCTION BY PRIOR ECHOCARDIOGRAM: ICD-10-CM

## 2020-02-18 DIAGNOSIS — I35.1 MILD AORTIC REGURGITATION WITH LEFT VENTRICULAR SYSTOLIC DYSFUNCTION BY PRIOR ECHOCARDIOGRAM: ICD-10-CM

## 2020-02-18 DIAGNOSIS — Z00.00 ENCOUNTER FOR PREVENTIVE HEALTH EXAMINATION: Primary | ICD-10-CM

## 2020-02-18 DIAGNOSIS — M41.9 RESTRICTIVE LUNG DISEASE DUE TO KYPHOSCOLIOSIS: ICD-10-CM

## 2020-02-18 DIAGNOSIS — Z99.89 DEPENDENCE ON OTHER ENABLING MACHINES AND DEVICES: ICD-10-CM

## 2020-02-18 DIAGNOSIS — M85.80 OSTEOPENIA, UNSPECIFIED LOCATION: ICD-10-CM

## 2020-02-18 DIAGNOSIS — J43.9 PULMONARY EMPHYSEMA, UNSPECIFIED EMPHYSEMA TYPE: ICD-10-CM

## 2020-02-18 DIAGNOSIS — E78.2 MIXED HYPERLIPIDEMIA: ICD-10-CM

## 2020-02-18 DIAGNOSIS — N40.1 BPH WITH OBSTRUCTION/LOWER URINARY TRACT SYMPTOMS: ICD-10-CM

## 2020-02-18 DIAGNOSIS — J98.4 RESTRICTIVE LUNG DISEASE DUE TO KYPHOSCOLIOSIS: ICD-10-CM

## 2020-02-18 DIAGNOSIS — M48.00 SPINAL STENOSIS, UNSPECIFIED SPINAL REGION: ICD-10-CM

## 2020-02-18 PROCEDURE — 3074F PR MOST RECENT SYSTOLIC BLOOD PRESSURE < 130 MM HG: ICD-10-PCS | Mod: HCNC,CPTII,S$GLB, | Performed by: NURSE PRACTITIONER

## 2020-02-18 PROCEDURE — 99999 PR PBB SHADOW E&M-EST. PATIENT-LVL V: ICD-10-PCS | Mod: PBBFAC,HCNC,, | Performed by: NURSE PRACTITIONER

## 2020-02-18 PROCEDURE — 3074F SYST BP LT 130 MM HG: CPT | Mod: HCNC,CPTII,S$GLB, | Performed by: NURSE PRACTITIONER

## 2020-02-18 PROCEDURE — 3044F HG A1C LEVEL LT 7.0%: CPT | Mod: HCNC,CPTII,S$GLB, | Performed by: NURSE PRACTITIONER

## 2020-02-18 PROCEDURE — G0439 PR MEDICARE ANNUAL WELLNESS SUBSEQUENT VISIT: ICD-10-PCS | Mod: HCNC,S$GLB,, | Performed by: NURSE PRACTITIONER

## 2020-02-18 PROCEDURE — 3079F DIAST BP 80-89 MM HG: CPT | Mod: HCNC,CPTII,S$GLB, | Performed by: NURSE PRACTITIONER

## 2020-02-18 PROCEDURE — 3044F PR MOST RECENT HEMOGLOBIN A1C LEVEL <7.0%: ICD-10-PCS | Mod: HCNC,CPTII,S$GLB, | Performed by: NURSE PRACTITIONER

## 2020-02-18 PROCEDURE — 3079F PR MOST RECENT DIASTOLIC BLOOD PRESSURE 80-89 MM HG: ICD-10-PCS | Mod: HCNC,CPTII,S$GLB, | Performed by: NURSE PRACTITIONER

## 2020-02-18 PROCEDURE — 99999 PR PBB SHADOW E&M-EST. PATIENT-LVL V: CPT | Mod: PBBFAC,HCNC,, | Performed by: NURSE PRACTITIONER

## 2020-02-18 PROCEDURE — 99499 UNLISTED E&M SERVICE: CPT | Mod: HCNC,S$GLB,, | Performed by: NURSE PRACTITIONER

## 2020-02-18 PROCEDURE — G0439 PPPS, SUBSEQ VISIT: HCPCS | Mod: HCNC,S$GLB,, | Performed by: NURSE PRACTITIONER

## 2020-02-18 PROCEDURE — 99499 RISK ADDL DX/OHS AUDIT: ICD-10-PCS | Mod: HCNC,S$GLB,, | Performed by: NURSE PRACTITIONER

## 2020-02-18 NOTE — PATIENT INSTRUCTIONS
Counseling and Referral of Other Preventative  (Italic type indicates deductible and co-insurance are waived)    Patient Name: Tito Menard  Today's Date: 2/18/2020    Health Maintenance       Date Due Completion Date    Shingles Vaccine (1 of 2) 12/05/1994 Obtain new shingles vaccine - SHINGRIX - when available    Hemoglobin A1c 07/06/2020 1/6/2020    Override on 10/23/2015: Done    Lipid Panel 07/24/2020 7/24/2019    Foot Exam 10/08/2020 10/8/2019    Override on 6/23/2017: Done    Override on 4/28/2016: Done    Override on 5/15/2015: Done    Low Dose Statin 12/19/2020 12/19/2019    Eye Exam 12/19/2020 12/19/2019    Override on 6/14/2018: Done    Override on 8/5/2015: Done    TETANUS VACCINE 07/22/2021 7/22/2011    Colonoscopy 09/13/2027 9/13/2017    Override on 8/1/2007: Done        No orders of the defined types were placed in this encounter.    The following information is provided to all patients.  This information is to help you find resources for any of the problems found today that may be affecting your health:                Living healthy guide: www.[facility].[place].gov      Understanding Diabetes: www.diabetes.org      Eating healthy: www.cdc.gov/healthyweight      CDC home safety checklist: www.cdc.gov/steadi/patient.html      Agency on Aging: www.goea.[place].[facility]      Alcoholics anonymous (AA): www.aa.org      Physical Activity: www.geraldine.nih.gov/ti0skuk      Tobacco use: www.quitwithusla.org

## 2020-02-18 NOTE — PROGRESS NOTES
"Tito Menard presented for a  Medicare AWV and comprehensive Health Risk Assessment today. The following components were reviewed and updated:    · Medical history  · Family History  · Social history  · Allergies and Current Medications  · Health Risk Assessment  · Health Maintenance  · Care Team     ** See Completed Assessments for Annual Wellness Visit within the encounter summary.**       The following assessments were completed:  · Living Situation  · CAGE  · Depression Screening  · Timed Get Up and Go  · Whisper Test  · Cognitive Function Screening      ·   · Nutrition Screening  · ADL Screening  · PAQ Screening    Vitals:    02/18/20 1006   BP: 128/84   BP Location: Right arm   Pulse: 82   Weight: 97.9 kg (215 lb 13.3 oz)   Height: 5' 11" (1.803 m)     Body mass index is 30.1 kg/m².  Physical Exam   Constitutional: He is oriented to person, place, and time. He appears well-developed and well-nourished.   HENT:   Head: Normocephalic.   Cardiovascular: Normal rate, regular rhythm and normal heart sounds.   No murmur heard.  Pulmonary/Chest: Effort normal and breath sounds normal.   Abdominal: Soft. Bowel sounds are normal.   Musculoskeletal: He exhibits no edema.   Gait antalgic, ambulates with walker   Neurological: He is alert and oriented to person, place, and time.   Skin: Skin is warm and dry.   Psychiatric: He has a normal mood and affect.   Nursing note and vitals reviewed.        Diagnoses and health risks identified today and associated recommendations/orders:    1. Encounter for preventive health examination  Here for Health Risk Assessment/Annual Wellness Visit.  Health maintenance reviewed and updated. Follow up in one year.    2. Essential hypertension  Chronic, stable on current medications. Followed by PCP.    3. Abdominal aortic aneurysm (AAA) without rupture  Chronic, stable on current medications. Followed by PCP, Cardiology.    4. Mild aortic regurgitation with left ventricular systolic " dysfunction by prior echocardiogram  Chronic, stable on current medications. Followed by PCP, Cardiology.    5. Right bundle branch block  Chronic, stable. Followed by PCP, Cardiology.    6. Mixed hyperlipidemia  Chronic, stable on current medications. Followed by PCP, Cardiology.    7. Restrictive lung disease due to kyphoscoliosis  Chronic, stable on current medications. Followed by PCP.    8. Obstructive chronic bronchitis without exacerbation  Chronic, stable on current medications. Followed by PCP.    9. Pulmonary emphysema, unspecified emphysema type  Chronic, stable on current medications. Followed by PCP.    10. CKD (chronic kidney disease), stage II  Chronic, stable on current medications. Followed by PCP.    11. BPH with obstruction/lower urinary tract symptoms  Chronic, stable on current medications.  Followed by PCP, Urology.    12. History of diabetes mellitus, type II  Stable with diet. Last A1c 6.0. Followed by PCP.    13. Diabetic polyneuropathy associated with type 2 diabetes mellitus  Chronic, stable on current medications. Followed by PCP.    14. Vitamin D deficiency disease  Chronic, stable on current medication. Followed by PCP.    15. Gastroesophageal reflux disease, esophagitis presence not specified  Chronic, stable on current medication. Followed by PCP.    16. Osteopenia, unspecified location  Chronic, stable on current medications. Followed by PCP.    17. Spondylosis of cervical joint with myelopathy  Chronic, stable on current medications. Followed by PCP.    18. Spinal stenosis, unspecified spinal region  Chronic, stable on current medications. Followed by PCP.    19. Chronic right-sided low back pain without sciatica  Chronic, stable on current medications. Followed by PCP.    20. Dependence on other enabling machines and devices  Chronic, uses walker for ambulation, no reported falls last year. Followed by PCP.    21. Abnormality of gait and mobility  Chronic, uses walker for  ambulation, no reported falls last year. Followed by PCP.      Provided Tito with a 5-10 year written screening schedule and personal prevention plan. Recommendations were developed using the USPSTF age appropriate recommendations. Education, counseling, and referrals were provided as needed. After Visit Summary printed and given to patient which includes a list of additional screenings\tests needed.    Follow up in about 6 months (around 8/13/2020).with PCP    Brisa Clayton NP

## 2020-02-20 ENCOUNTER — HOSPITAL ENCOUNTER (OUTPATIENT)
Dept: RADIOLOGY | Facility: OTHER | Age: 75
Discharge: HOME | End: 2020-02-20
Attending: INTERNAL MEDICINE
Payer: MEDICARE

## 2020-02-20 DIAGNOSIS — I71.20 THORACIC AORTIC ANEURYSM WITHOUT RUPTURE: ICD-10-CM

## 2020-02-20 PROCEDURE — 71250 CT THORAX DX C-: CPT | Mod: 26,HCNC,, | Performed by: RADIOLOGY

## 2020-02-20 PROCEDURE — 71250 CT CHEST WITHOUT CONTRAST: ICD-10-PCS | Mod: 26,HCNC,, | Performed by: RADIOLOGY

## 2020-02-20 PROCEDURE — 71250 CT THORAX DX C-: CPT | Mod: TC,HCNC

## 2020-02-24 ENCOUNTER — OFFICE VISIT (OUTPATIENT)
Dept: CARDIOLOGY | Facility: CLINIC | Age: 75
End: 2020-02-24
Payer: MEDICARE

## 2020-02-24 VITALS
HEIGHT: 71 IN | WEIGHT: 219.81 LBS | DIASTOLIC BLOOD PRESSURE: 86 MMHG | HEART RATE: 84 BPM | SYSTOLIC BLOOD PRESSURE: 136 MMHG | BODY MASS INDEX: 30.77 KG/M2

## 2020-02-24 DIAGNOSIS — I45.10 RIGHT BUNDLE BRANCH BLOCK: ICD-10-CM

## 2020-02-24 DIAGNOSIS — R06.09 DOE (DYSPNEA ON EXERTION): ICD-10-CM

## 2020-02-24 DIAGNOSIS — I35.1 MILD AORTIC REGURGITATION WITH LEFT VENTRICULAR SYSTOLIC DYSFUNCTION BY PRIOR ECHOCARDIOGRAM: ICD-10-CM

## 2020-02-24 DIAGNOSIS — I51.9 MILD AORTIC REGURGITATION WITH LEFT VENTRICULAR SYSTOLIC DYSFUNCTION BY PRIOR ECHOCARDIOGRAM: ICD-10-CM

## 2020-02-24 DIAGNOSIS — I10 ESSENTIAL HYPERTENSION: ICD-10-CM

## 2020-02-24 DIAGNOSIS — E78.2 MIXED HYPERLIPIDEMIA: ICD-10-CM

## 2020-02-24 PROCEDURE — 99999 PR PBB SHADOW E&M-EST. PATIENT-LVL V: ICD-10-PCS | Mod: PBBFAC,HCNC,GC, | Performed by: INTERNAL MEDICINE

## 2020-02-24 PROCEDURE — 99213 PR OFFICE/OUTPT VISIT, EST, LEVL III, 20-29 MIN: ICD-10-PCS | Mod: HCNC,GC,S$GLB, | Performed by: INTERNAL MEDICINE

## 2020-02-24 PROCEDURE — 99999 PR PBB SHADOW E&M-EST. PATIENT-LVL V: CPT | Mod: PBBFAC,HCNC,GC, | Performed by: INTERNAL MEDICINE

## 2020-02-24 PROCEDURE — 99213 OFFICE O/P EST LOW 20 MIN: CPT | Mod: HCNC,GC,S$GLB, | Performed by: INTERNAL MEDICINE

## 2020-02-24 RX ORDER — AMLODIPINE BESYLATE 5 MG/1
5 TABLET ORAL DAILY
Qty: 90 TABLET | Refills: 3 | Status: SHIPPED | OUTPATIENT
Start: 2020-02-24 | End: 2020-12-29

## 2020-02-24 NOTE — PROGRESS NOTES
I have personally taken the history and examined this patient and agree with the fellow's note as stated above. Concur that deconditioning playing a role in the patient's SOB. Very sedentary. Suggest trying a stationary bike for graded exercise.

## 2020-02-24 NOTE — PROGRESS NOTES
"    Cardiology Clinic Note  Reason for Visit: HTN    HPI:   73 y/o male with PMH of DM (resolved, off medication), HTN, HLD, chronic LBP, and mild restrictive lung disease 2/2 kyphoscoliosis who presents for f/u. The patient went to the [facility] for syncope upon standing, was found to have positive orthostatics, and was discharged with f/u with his PCP. He had a carotid US that was negative and an ECHO that was read as EF 50-55%, mild AI, trace MR, and normal diastolic function. The patient says his BP runs in the 130s at home. He has chronic ZUNIGA at 1/2 block for 2 years. He has a negative Rx stress ECHO in 7/2018 that was performed for ZUNIGA. The patient denies orthopnea, CP, palpitations and smoking. He uses one pillow without PND. His ZUNIGA was thought to be 2/2 deconditioning 8/2019 in cardiology clinic. A CPX was considered but deferred because of chronic LBP. He says he has chronic sinus issues with congestion and that his ZUNIGA gets worse with wheezing when he is congested. The patient has chronic LH for the last two years is associated with "leaning over".      Review of Systems   Constitution: Negative for chills and fever.   HENT: Negative for ear discharge.    Eyes: Negative for pain and visual disturbance.   Cardiovascular: Negative for chest pain, dyspnea on exertion, irregular heartbeat, leg swelling, orthopnea, palpitations and paroxysmal nocturnal dyspnea.   Respiratory: Negative for hemoptysis, shortness of breath and wheezing.    Skin: Negative for rash and suspicious lesions.   Musculoskeletal: Positive for arthritis and back pain. Negative for joint pain and muscle weakness.   Gastrointestinal: Negative for abdominal pain, diarrhea, nausea and vomiting.   Genitourinary: Negative for dysuria and frequency.   Neurological: Positive for light-headedness. Negative for focal weakness, headaches and tremors.       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      GERD (gastroesophageal reflux disease)     egd 2008    Hyperlipidemia     Hypertension     Jock itch 7/19/2018    MGD (meibomian gland dysfunction)     Osteopenia     Spinal stenosis     Spondylosis without myelopathy 10/23/2015    Vitamin D deficiency disease      Past Surgical History:   Procedure Laterality Date    CHALAZION EXCISION Left 8/18/13    CYST REMOVAL  2013    Back of neck    SPINE SURGERY  2007    x2 (2000, 2007)     Allergies:   Review of patient's allergies indicates:  No Known Allergies  Medications:     Current Outpatient Medications on File Prior to Visit   Medication Sig Dispense Refill    acetaminophen (TYLENOL) 650 MG TbSR Take 1,300 mg by mouth every 8 (eight) hours as needed.       albuterol 90 mcg/actuation inhaler Inhale 1-2 puffs into the lungs every 6 (six) hours as needed for Wheezing. 1 Inhaler 0    amLODIPine (NORVASC) 5 MG tablet TAKE 1 TABLET EVERY DAY 90 tablet 3    aspirin (ECOTRIN) 81 MG EC tablet Take 81 mg by mouth once daily.      azelastine (ASTELIN) 137 mcg nasal spray 1 spray (137 mcg total) by Nasal route 2 (two) times daily. 90 mL 3    chlorthalidone (HYGROTEN) 25 MG Tab TAKE 1 TABLET EVERY DAY 90 tablet 3    cholecalciferol, vitamin D3, (VITAMIN D3) 2,000 unit Cap Take 1 capsule by mouth once daily.      clotrimazole-betamethasone 1-0.05% (LOTRISONE) cream APPLY TO THE AFFECTED AREA(S) TWICE DAILY  FOR  10  DAYS 45 g 0    docusate sodium (STOOL SOFTENER) 50 MG capsule Take by mouth 2 (two) times daily.      DULoxetine (CYMBALTA) 30 MG capsule Take 1 capsule (30 mg total) by mouth once daily. For pain control 30 capsule 3    finasteride (PROSCAR) 5 mg tablet Take 1 tablet (5 mg total) by mouth once daily.  90 tablet 3    fluticasone propionate (FLONASE) 50 mcg/actuation nasal spray USE 1 SPRAY NASALLY ONE TIME DAILY 32 g 6    gabapentin (NEURONTIN) 300 MG capsule Take one po in morning one at noon and two in the evening 120 capsule 3    guaifenesin (MUCINEX) 600 mg 12 hr tablet Take 1,200 mg by mouth 2 (two) times daily.      ibuprofen (ADVIL) 200 MG tablet Take 200 mg by mouth every 6 (six) hours as needed for Pain.      ibuprofen/diphenhydramine cit (ADVIL PM ORAL) Take 1 tablet by mouth every evening.      irbesartan (AVAPRO) 300 MG tablet TAKE 1 TABLET (300 MG TOTAL) BY MOUTH EVERY EVENING. 90 tablet 3    melatonin 10 mg Cap Take 1 capsule by mouth nightly as needed.       methocarbamol (ROBAXIN) 500 MG Tab 3 (three) times daily as needed.       omeprazole (PRILOSEC) 20 MG capsule Take 2 capsules (40 mg total) by mouth once daily. 180 capsule 4    potassium chloride (KLOR-CON SPRINKLE) 10 MEQ CpSR Take 3 capsules (30 mEq total) by mouth once daily. 270 capsule 2    pravastatin (PRAVACHOL) 20 MG tablet TAKE 1 TABLET EVERY DAY 90 tablet 3    tamsulosin (FLOMAX) 0.4 mg Cap Take 1 capsule (0.4 mg total) by mouth once daily. 90 capsule 3    econazole nitrate 1 % cream Apply topically 2 (two) times daily. (Patient taking differently: Apply topically 2 (two) times daily as needed. ) 85 g 1     Current Facility-Administered Medications on File Prior to Visit   Medication Dose Route Frequency Provider Last Rate Last Dose    azithromycin tablet 250 mg  250 mg Oral Daily Amanda Brown MD         Social History:     Social History     Tobacco Use    Smoking status: Never Smoker    Smokeless tobacco: Former User     Types: Chew    Tobacco comment: Chewed tobacco once per day for 4-5 years when a    Substance Use Topics    Alcohol use: No     Family History:     Family History   Problem Relation Age of Onset    Hyperlipidemia Mother     Hypertension Mother     Kidney disease Mother      "Cancer Mother     Heart disease Mother     Kidney failure Mother     No Known Problems Daughter     No Known Problems Sister     No Known Problems Brother     No Known Problems Son     No Known Problems Brother     No Known Problems Son     Hypertension Maternal Grandmother     Amblyopia Neg Hx     Blindness Neg Hx     Cataracts Neg Hx     Diabetes Neg Hx     Glaucoma Neg Hx     Macular degeneration Neg Hx     Retinal detachment Neg Hx     Strabismus Neg Hx     Stroke Neg Hx     Thyroid disease Neg Hx     Melanoma Neg Hx     Psoriasis Neg Hx     Lupus Neg Hx     Eczema Neg Hx        Physical Exam   Constitutional: He is oriented to person, place, and time. He appears well-developed and well-nourished.   HENT:   Head: Normocephalic and atraumatic.   Eyes: EOM are normal.   Cardiovascular: Normal rate and regular rhythm. Exam reveals no gallop and no friction rub.   Murmur heard.   Holosystolic murmur is present with a grade of 1/6 at the upper right sternal border, upper left sternal border and lower left sternal border.  Pulmonary/Chest: Effort normal and breath sounds normal. No stridor. He has no wheezes. He has no rales.   Abdominal: Soft. Bowel sounds are normal. There is no rebound and no guarding.   Musculoskeletal: He exhibits no edema.   Neurological: He is alert and oriented to person, place, and time. No cranial nerve deficit.   Skin: Skin is warm and dry.   Psychiatric: He has a normal mood and affect. His behavior is normal.     /86 (BP Location: Left arm, Patient Position: Sitting, BP Method: Medium (Automatic))   Pulse 84   Ht 5' 11" (1.803 m)   Wt 99.7 kg (219 lb 12.8 oz)   BMI 30.66 kg/m²          Labs:     Lab Results   Component Value Date     02/13/2020    K 3.6 02/13/2020     02/13/2020    CO2 28 02/13/2020    BUN 14 02/13/2020    CREATININE 1.0 02/13/2020    ANIONGAP 9 02/13/2020     Lab Results   Component Value Date    HGBA1C 6.0 (H) 01/06/2020 "     Lab Results   Component Value Date    BNP <10 07/09/2018    BNP <10 01/23/2009    Lab Results   Component Value Date    WBC 6.33 07/09/2018    HGB 14.1 07/09/2018    HCT 43.3 07/09/2018     07/09/2018    GRAN 2.3 07/09/2018    GRAN 36.0 (L) 07/09/2018     Lab Results   Component Value Date    CHOL 171 07/24/2019    HDL 47 07/24/2019    LDLCALC 99.0 07/24/2019    TRIG 125 07/24/2019          No results found for: EF    Assessment and Plan  Tito Menard is a 75 y/o male with PMH of DM (resolved, off medication), HTN, HLD, chronic LBP, and mild restrictive lung disease 2/2 kyphoscoliosis who presents for f/u.     Essential hypertension  - goal BP typically <130/80; however, because of syncopal episode 2/2 orthostatic hypotension in 12/2019, will tolerate SBP in the 130s   - c/w amlodipine, chlorthalidone and irbesartan    Mixed hyperlipidemia  - defer to PCP  - c/w statin    ZUNIGA (dyspnea on exertion)  - occurs in the absence of angina and in the presence of chronic LBP, knee pain, and mild restrictive disease from kyphoscoliosis  - recommended aerobic exercise routine          Signed:    Hossein Johnson

## 2020-02-24 NOTE — ASSESSMENT & PLAN NOTE
- goal BP typically <130/80; however, because of syncopal episode 2/2 orthostatic hypotension in 12/2019, will tolerate SBP in the 130s   - c/w amlodipine, chlorthalidone and irbesartan

## 2020-02-24 NOTE — ASSESSMENT & PLAN NOTE
- occurs in the absence of angina and in the presence of chronic LBP, knee pain, and mild restrictive disease from kyphoscoliosis  - recommended aerobic exercise routine

## 2020-02-25 DIAGNOSIS — B35.6 JOCK ITCH: ICD-10-CM

## 2020-02-26 RX ORDER — CLOTRIMAZOLE AND BETAMETHASONE DIPROPIONATE 10; .64 MG/G; MG/G
CREAM TOPICAL
Qty: 45 G | Refills: 0 | Status: SHIPPED | OUTPATIENT
Start: 2020-02-26 | End: 2020-02-28

## 2020-02-28 DIAGNOSIS — B35.6 JOCK ITCH: ICD-10-CM

## 2020-02-28 RX ORDER — CLOTRIMAZOLE AND BETAMETHASONE DIPROPIONATE 10; .64 MG/G; MG/G
CREAM TOPICAL
Qty: 45 G | Refills: 0 | Status: SHIPPED | OUTPATIENT
Start: 2020-02-28 | End: 2020-03-13

## 2020-03-09 DIAGNOSIS — B35.6 JOCK ITCH: ICD-10-CM

## 2020-03-13 ENCOUNTER — TELEPHONE (OUTPATIENT)
Dept: OPHTHALMOLOGY | Facility: CLINIC | Age: 75
End: 2020-03-13

## 2020-03-13 RX ORDER — CLOTRIMAZOLE AND BETAMETHASONE DIPROPIONATE 10; .64 MG/G; MG/G
CREAM TOPICAL
Qty: 45 G | Refills: 0 | Status: SHIPPED | OUTPATIENT
Start: 2020-03-13 | End: 2020-03-23

## 2020-03-13 NOTE — TELEPHONE ENCOUNTER
----- Message from Main [place] sent at 3/13/2020 10:14 AM CDT -----  Contact: PT       ----- Message -----  From: Shahla Laureano  Sent: 3/13/2020   9:55 AM CDT  To: Ava Browning Staff    PT is calling to see if the doctor sent the eye drops prescription to the pharmacy yet. I didn't see any eye drops on his medication chart. Said he needed them before his surgery.     [facility] [address] [place] [address] - [id number] [facility] [address]  Phone: [phone] Fax: [phone]    Callback: [phone]

## 2020-03-16 ENCOUNTER — PROCEDURE VISIT (OUTPATIENT)
Dept: OPHTHALMOLOGY | Facility: CLINIC | Age: 75
End: 2020-03-16
Payer: MEDICARE

## 2020-03-16 VITALS — DIASTOLIC BLOOD PRESSURE: 80 MMHG | HEART RATE: 72 BPM | SYSTOLIC BLOOD PRESSURE: 127 MMHG

## 2020-03-16 DIAGNOSIS — H02.883 MEIBOMIAN GLAND DYSFUNCTION (MGD) OF BOTH EYES: ICD-10-CM

## 2020-03-16 DIAGNOSIS — H00.12 CHALAZION OF RIGHT LOWER EYELID: Primary | ICD-10-CM

## 2020-03-16 DIAGNOSIS — H02.886 MEIBOMIAN GLAND DYSFUNCTION (MGD) OF BOTH EYES: ICD-10-CM

## 2020-03-16 PROCEDURE — 99499 UNLISTED E&M SERVICE: CPT | Mod: HCNC,S$GLB,, | Performed by: OPHTHALMOLOGY

## 2020-03-16 PROCEDURE — 67800 PR EXCIS CHALAZION,SINGLE: ICD-10-PCS | Mod: E4,HCNC,S$GLB, | Performed by: OPHTHALMOLOGY

## 2020-03-16 PROCEDURE — 99499 NO LOS: ICD-10-PCS | Mod: HCNC,S$GLB,, | Performed by: OPHTHALMOLOGY

## 2020-03-16 PROCEDURE — 67800 REMOVE EYELID LESION: CPT | Mod: E4,HCNC,S$GLB, | Performed by: OPHTHALMOLOGY

## 2020-03-16 RX ORDER — HYDROCODONE BITARTRATE AND ACETAMINOPHEN 5; 325 MG/1; MG/1
1 TABLET ORAL EVERY 6 HOURS PRN
Qty: 5 TABLET | Refills: 0 | Status: SHIPPED | OUTPATIENT
Start: 2020-03-16 | End: 2021-04-26

## 2020-03-16 RX ORDER — TOBRAMYCIN AND DEXAMETHASONE 3; 1 MG/ML; MG/ML
1 SUSPENSION/ DROPS OPHTHALMIC 4 TIMES DAILY
Qty: 5 ML | Refills: 0 | Status: SHIPPED | OUTPATIENT
Start: 2020-03-16 | End: 2020-03-26

## 2020-03-16 NOTE — PROGRESS NOTES
HPI     Pt is here for Chalazion removal OD.    Last edited by Main [place] on 3/16/2020  8:15 AM. (History)            Assessment /Plan     For exam results, see Encounter Report.    Chalazion of right lower eyelid  -     tobramycin-dexamethasone 0.3-0.1% (TOBRADEX) 0.3-0.1 % DrpS; Place 1 drop into the right eye 4 (four) times daily. for 10 days  Dispense: 5 mL; Refill: 0    Meibomian gland dysfunction (MGD) of both eyes    Other orders  -     HYDROcodone-acetaminophen (NORCO) 5-325 mg per tablet; Take 1 tablet by mouth every 6 (six) hours as needed for Pain.  Dispense: 5 tablet; Refill: 0      Procedure Note:    Procedure note: Chalazion excision, right lower lid  Attending: Nallely Escalante  Resident: Jana Thompson MD  Pre-op Dx: Right lower eyelid chalazion  Post-op Dx: same  Local: 2% lidocaine with epinephrine, 5% marcaine, Vitrase  Specimens: None  Complications: None  Blood Loss: minimal     Informed consent obtained after extensive risks/benefits/alternatives were discussed with the patient including but not limited to pain, bleeding, infection, ocular injury, loss of the eye, recurrence, need for further treatment, scarring. Alternatives such as waiting were discussed. All questions were answered. Consent was obtained.      The patient was brought to the minor procedure room and placed in supine position. A time out was performed identifying the correct patient, site, and procedure to be performed.  The site was prepped and draped in the usual sterile manner for ophthalmic plastic surgery. 1 cc of 2% lidocaine with epinephrine, marcaine, and vitrase mixture was injected subcutaneously into the right lower eyelid. A corneal shield was placed in the right palpebral fissure. A chalazion clamp was placed over the lesion and secured in place. The lid was everted to expose its posterior surface. A conjunctival incision was made through the lesion using a #11 scalpel. A small amount of fibrotic tissue was excised  from the chalazion using Angie scissors. No purulent drainage was noted. Hemostasis was maintained using high-temperature cautery. The lesion was left open to drain. The corneal shield was removed. Tobradex ointment was applied to the wound. The patient tolerated the procedure well. There were no complications.            Plan:    Take tobradex drops QID for the next week.   Use warm compresses TID for both upper and lower eyelids.    I have reviewed and concur with the resident's history, physical, assessment, and plan.  I have personally interviewed and examined the patient.

## 2020-03-22 DIAGNOSIS — B35.6 JOCK ITCH: ICD-10-CM

## 2020-03-23 RX ORDER — CLOTRIMAZOLE AND BETAMETHASONE DIPROPIONATE 10; .64 MG/G; MG/G
CREAM TOPICAL
Qty: 45 G | Refills: 0 | Status: SHIPPED | OUTPATIENT
Start: 2020-03-23 | End: 2020-04-01 | Stop reason: SDUPTHER

## 2020-03-24 ENCOUNTER — TELEPHONE (OUTPATIENT)
Dept: DERMATOLOGY | Facility: CLINIC | Age: 75
End: 2020-03-24

## 2020-03-24 NOTE — TELEPHONE ENCOUNTER
We are attempting to contact you in regards to your appointment. Unfortunately we will have to cancel your appointment at this time due to the rapid spread of the COVID-19. We will contact you at a later date to reschedule your appointment. At this time we are more focused on the safety of our patients and want to reduce the risk of the virus spreading at this time.     We appreciate your patience with us and if you have any questions or concerns you can give us a call at [phone].     Thank you!

## 2020-04-01 DIAGNOSIS — B35.6 JOCK ITCH: ICD-10-CM

## 2020-04-01 RX ORDER — CLOTRIMAZOLE AND BETAMETHASONE DIPROPIONATE 10; .64 MG/G; MG/G
CREAM TOPICAL
Qty: 45 G | Refills: 0 | Status: SHIPPED | OUTPATIENT
Start: 2020-04-01 | End: 2020-09-02 | Stop reason: SDUPTHER

## 2020-04-25 DIAGNOSIS — E78.5 HYPERLIPIDEMIA, UNSPECIFIED HYPERLIPIDEMIA TYPE: ICD-10-CM

## 2020-04-25 RX ORDER — PRAVASTATIN SODIUM 20 MG/1
TABLET ORAL
Qty: 90 TABLET | Refills: 3 | Status: SHIPPED | OUTPATIENT
Start: 2020-04-25 | End: 2020-12-29 | Stop reason: SDUPTHER

## 2020-05-14 RX ORDER — DULOXETIN HYDROCHLORIDE 30 MG/1
30 CAPSULE, DELAYED RELEASE ORAL DAILY
Qty: 90 CAPSULE | Refills: 0 | Status: SHIPPED | OUTPATIENT
Start: 2020-05-14 | End: 2020-08-17 | Stop reason: SDUPTHER

## 2020-05-21 ENCOUNTER — TELEPHONE (OUTPATIENT)
Dept: NEUROLOGY | Facility: CLINIC | Age: 75
End: 2020-05-21

## 2020-05-21 NOTE — TELEPHONE ENCOUNTER
"Spoke with Mr. Menard, he was informed there are no in person appts at this time due to covid concerns, appts are being converted to video visits through the portal. Mr. Menard "I will like to reschedule for a later date". Appt moved to 7/2/2020 at 8:20. Mailed appt letter   "

## 2020-05-29 DIAGNOSIS — N13.8 BENIGN PROSTATIC HYPERPLASIA WITH URINARY OBSTRUCTION: ICD-10-CM

## 2020-05-29 DIAGNOSIS — N40.1 BENIGN PROSTATIC HYPERPLASIA WITH URINARY OBSTRUCTION: ICD-10-CM

## 2020-06-01 RX ORDER — FINASTERIDE 5 MG/1
5 TABLET, FILM COATED ORAL DAILY
Qty: 90 TABLET | Refills: 0 | Status: SHIPPED | OUTPATIENT
Start: 2020-06-01 | End: 2020-09-02 | Stop reason: SDUPTHER

## 2020-06-01 RX ORDER — TAMSULOSIN HYDROCHLORIDE 0.4 MG/1
1 CAPSULE ORAL DAILY
Qty: 90 CAPSULE | Refills: 0 | Status: SHIPPED | OUTPATIENT
Start: 2020-06-01 | End: 2020-09-02 | Stop reason: SDUPTHER

## 2020-07-31 ENCOUNTER — LAB VISIT (OUTPATIENT)
Dept: LAB | Facility: OTHER | Age: 75
End: 2020-07-31
Attending: INTERNAL MEDICINE
Payer: MEDICARE

## 2020-07-31 DIAGNOSIS — E78.5 HYPERLIPIDEMIA, UNSPECIFIED HYPERLIPIDEMIA TYPE: ICD-10-CM

## 2020-07-31 DIAGNOSIS — R73.03 PREDIABETES: ICD-10-CM

## 2020-07-31 LAB
ALBUMIN SERPL BCP-MCNC: 3.7 G/DL (ref 3.5–5.2)
ALP SERPL-CCNC: 99 U/L (ref 55–135)
ALT SERPL W/O P-5'-P-CCNC: 22 U/L (ref 10–44)
ANION GAP SERPL CALC-SCNC: 12 MMOL/L (ref 8–16)
AST SERPL-CCNC: 23 U/L (ref 10–40)
BILIRUB SERPL-MCNC: 0.5 MG/DL (ref 0.1–1)
BUN SERPL-MCNC: 18 MG/DL (ref 8–23)
CALCIUM SERPL-MCNC: 9.1 MG/DL (ref 8.7–10.5)
CHLORIDE SERPL-SCNC: 105 MMOL/L (ref 95–110)
CHOLEST SERPL-MCNC: 151 MG/DL (ref 120–199)
CHOLEST/HDLC SERPL: 3.4 {RATIO} (ref 2–5)
CO2 SERPL-SCNC: 25 MMOL/L (ref 23–29)
CREAT SERPL-MCNC: 0.9 MG/DL (ref 0.5–1.4)
EST. GFR  (AFRICAN AMERICAN): >60 ML/MIN/1.73 M^2
EST. GFR  (NON AFRICAN AMERICAN): >60 ML/MIN/1.73 M^2
ESTIMATED AVG GLUCOSE: 123 MG/DL (ref 68–131)
GLUCOSE SERPL-MCNC: 120 MG/DL (ref 70–110)
HBA1C MFR BLD HPLC: 5.9 % (ref 4–5.6)
HDLC SERPL-MCNC: 45 MG/DL (ref 40–75)
HDLC SERPL: 29.8 % (ref 20–50)
LDLC SERPL CALC-MCNC: 86.2 MG/DL (ref 63–159)
NONHDLC SERPL-MCNC: 106 MG/DL
POTASSIUM SERPL-SCNC: 3.4 MMOL/L (ref 3.5–5.1)
PROT SERPL-MCNC: 7 G/DL (ref 6–8.4)
SODIUM SERPL-SCNC: 142 MMOL/L (ref 136–145)
TRIGL SERPL-MCNC: 99 MG/DL (ref 30–150)

## 2020-07-31 PROCEDURE — 83036 HEMOGLOBIN GLYCOSYLATED A1C: CPT | Mod: HCNC

## 2020-07-31 PROCEDURE — 36415 COLL VENOUS BLD VENIPUNCTURE: CPT | Mod: HCNC

## 2020-07-31 PROCEDURE — 80061 LIPID PANEL: CPT | Mod: HCNC

## 2020-07-31 PROCEDURE — 80053 COMPREHEN METABOLIC PANEL: CPT | Mod: HCNC

## 2020-08-04 RX ORDER — IRBESARTAN 300 MG/1
TABLET ORAL
Qty: 90 TABLET | Refills: 3 | Status: SHIPPED | OUTPATIENT
Start: 2020-08-04 | End: 2020-12-29 | Stop reason: SDUPTHER

## 2020-08-04 NOTE — PROGRESS NOTES
Refill Routing Note   Medication(s) are not appropriate for processing by [facility]:       - Non-participating provider           Medication reconciliation completed: No      Automatic Epic Generated Protocol Data:        Requested Prescriptions   Pending Prescriptions Disp Refills    irbesartan (AVAPRO) 300 MG tablet [Pharmacy Med Name: IRBESARTAN 300 MG Tablet] 90 tablet 3     Sig: TAKE 1 TABLET EVERY EVENING.       ARB Protocol Passed - 8/3/2020  8:30 PM        Passed - Serum Creatinine less than 1.4 on file in the past 12 months     Lab Results   Component Value Date    CREATININE 0.9 07/31/2020    CREATININE 1.0 02/13/2020    CREATININE 1.2 07/24/2019     Lab Results   Component Value Date    ESTGFRAFRICA >60 07/31/2020    ESTGFRAFRICA >60 02/13/2020    ESTGFRAFRICA >60.0 07/24/2019               Passed - Visit with authorizing provider in past 12 months or upcoming 90 days         Passed - Serum Potassium less than 5.2 on file in the past 12 months     Lab Results   Component Value Date    K 3.4 (L) 07/31/2020    K 3.6 02/13/2020    K 3.6 07/24/2019                  Passed - Blood Pressure below 139/89 on file in past 12 months      BP Readings from Last 3 Encounters:   03/16/20 127/80   02/24/20 136/86   02/18/20 128/84                   Appointments  past 12m or future 3m with PCP    Date Provider   Last Visit   2/13/2020 Amanda Brown MD   Next Visit   8/6/2020 Amanda Brown MD   ED visits in past 90 days: 0     Note composed:8:46 PM 08/03/2020

## 2020-08-04 NOTE — TELEPHONE ENCOUNTER
Encounter details (provider/department) have been updated by OR staff.   Of note, HF details may not display.     As of this time Protocols and CDM: Does not populate or display     Updated: Provider    Will resend refill request encounter to P Centralized Refill Staff Pool.     [facility]     Note composed:8:30 PM 08/03/2020

## 2020-08-06 ENCOUNTER — OFFICE VISIT (OUTPATIENT)
Dept: INTERNAL MEDICINE | Facility: CLINIC | Age: 75
End: 2020-08-06
Attending: INTERNAL MEDICINE
Payer: MEDICARE

## 2020-08-06 VITALS
HEIGHT: 71 IN | SYSTOLIC BLOOD PRESSURE: 117 MMHG | OXYGEN SATURATION: 98 % | DIASTOLIC BLOOD PRESSURE: 72 MMHG | HEART RATE: 82 BPM | BODY MASS INDEX: 29.02 KG/M2 | WEIGHT: 207.25 LBS

## 2020-08-06 DIAGNOSIS — E78.2 MIXED HYPERLIPIDEMIA: ICD-10-CM

## 2020-08-06 DIAGNOSIS — I51.9 MILD AORTIC REGURGITATION WITH LEFT VENTRICULAR SYSTOLIC DYSFUNCTION BY PRIOR ECHOCARDIOGRAM: ICD-10-CM

## 2020-08-06 DIAGNOSIS — I45.10 RIGHT BUNDLE BRANCH BLOCK: ICD-10-CM

## 2020-08-06 DIAGNOSIS — R73.03 PREDIABETES: ICD-10-CM

## 2020-08-06 DIAGNOSIS — N18.2 CKD (CHRONIC KIDNEY DISEASE), STAGE II: ICD-10-CM

## 2020-08-06 DIAGNOSIS — Z86.39 HISTORY OF DIABETES MELLITUS, TYPE II: ICD-10-CM

## 2020-08-06 DIAGNOSIS — I71.40 ABDOMINAL AORTIC ANEURYSM (AAA) WITHOUT RUPTURE: ICD-10-CM

## 2020-08-06 DIAGNOSIS — J98.6 CHRONICALLY ELEVATED HEMIDIAPHRAGM: ICD-10-CM

## 2020-08-06 DIAGNOSIS — J98.4 RESTRICTIVE LUNG DISEASE DUE TO KYPHOSCOLIOSIS: ICD-10-CM

## 2020-08-06 DIAGNOSIS — I35.1 MILD AORTIC REGURGITATION WITH LEFT VENTRICULAR SYSTOLIC DYSFUNCTION BY PRIOR ECHOCARDIOGRAM: ICD-10-CM

## 2020-08-06 DIAGNOSIS — M41.9 RESTRICTIVE LUNG DISEASE DUE TO KYPHOSCOLIOSIS: ICD-10-CM

## 2020-08-06 DIAGNOSIS — M54.50 CHRONIC RIGHT-SIDED LOW BACK PAIN WITHOUT SCIATICA: ICD-10-CM

## 2020-08-06 DIAGNOSIS — E55.9 VITAMIN D DEFICIENCY DISEASE: ICD-10-CM

## 2020-08-06 DIAGNOSIS — R26.89 DECREASED FUNCTIONAL MOBILITY: ICD-10-CM

## 2020-08-06 DIAGNOSIS — Z87.898 HX OF MULTIPLE PULMONARY NODULES: ICD-10-CM

## 2020-08-06 DIAGNOSIS — I10 ESSENTIAL HYPERTENSION: ICD-10-CM

## 2020-08-06 DIAGNOSIS — D22.72: Primary | ICD-10-CM

## 2020-08-06 DIAGNOSIS — M48.00 SPINAL STENOSIS, UNSPECIFIED SPINAL REGION: ICD-10-CM

## 2020-08-06 DIAGNOSIS — G89.29 CHRONIC RIGHT-SIDED LOW BACK PAIN WITHOUT SCIATICA: ICD-10-CM

## 2020-08-06 DIAGNOSIS — M47.12 SPONDYLOSIS OF CERVICAL JOINT WITH MYELOPATHY: ICD-10-CM

## 2020-08-06 PROCEDURE — 3078F PR MOST RECENT DIASTOLIC BLOOD PRESSURE < 80 MM HG: ICD-10-PCS | Mod: HCNC,CPTII,S$GLB, | Performed by: INTERNAL MEDICINE

## 2020-08-06 PROCEDURE — 99999 PR PBB SHADOW E&M-EST. PATIENT-LVL V: ICD-10-PCS | Mod: PBBFAC,HCNC,, | Performed by: INTERNAL MEDICINE

## 2020-08-06 PROCEDURE — 1101F PT FALLS ASSESS-DOCD LE1/YR: CPT | Mod: HCNC,CPTII,S$GLB, | Performed by: INTERNAL MEDICINE

## 2020-08-06 PROCEDURE — 1159F MED LIST DOCD IN RCRD: CPT | Mod: HCNC,S$GLB,, | Performed by: INTERNAL MEDICINE

## 2020-08-06 PROCEDURE — 99999 PR PBB SHADOW E&M-EST. PATIENT-LVL V: CPT | Mod: PBBFAC,HCNC,, | Performed by: INTERNAL MEDICINE

## 2020-08-06 PROCEDURE — 1101F PR PT FALLS ASSESS DOC 0-1 FALLS W/OUT INJ PAST YR: ICD-10-PCS | Mod: HCNC,CPTII,S$GLB, | Performed by: INTERNAL MEDICINE

## 2020-08-06 PROCEDURE — 99214 PR OFFICE/OUTPT VISIT, EST, LEVL IV, 30-39 MIN: ICD-10-PCS | Mod: HCNC,S$GLB,, | Performed by: INTERNAL MEDICINE

## 2020-08-06 PROCEDURE — 3078F DIAST BP <80 MM HG: CPT | Mod: HCNC,CPTII,S$GLB, | Performed by: INTERNAL MEDICINE

## 2020-08-06 PROCEDURE — 1125F AMNT PAIN NOTED PAIN PRSNT: CPT | Mod: HCNC,S$GLB,, | Performed by: INTERNAL MEDICINE

## 2020-08-06 PROCEDURE — 3074F SYST BP LT 130 MM HG: CPT | Mod: HCNC,CPTII,S$GLB, | Performed by: INTERNAL MEDICINE

## 2020-08-06 PROCEDURE — 99214 OFFICE O/P EST MOD 30 MIN: CPT | Mod: HCNC,S$GLB,, | Performed by: INTERNAL MEDICINE

## 2020-08-06 PROCEDURE — 3074F PR MOST RECENT SYSTOLIC BLOOD PRESSURE < 130 MM HG: ICD-10-PCS | Mod: HCNC,CPTII,S$GLB, | Performed by: INTERNAL MEDICINE

## 2020-08-06 PROCEDURE — 1159F PR MEDICATION LIST DOCUMENTED IN MEDICAL RECORD: ICD-10-PCS | Mod: HCNC,S$GLB,, | Performed by: INTERNAL MEDICINE

## 2020-08-06 PROCEDURE — 1125F PR PAIN SEVERITY QUANTIFIED, PAIN PRESENT: ICD-10-PCS | Mod: HCNC,S$GLB,, | Performed by: INTERNAL MEDICINE

## 2020-08-06 RX ORDER — GABAPENTIN 300 MG/1
CAPSULE ORAL
Qty: 120 CAPSULE | Refills: 3 | Status: SHIPPED | OUTPATIENT
Start: 2020-08-06 | End: 2020-09-25 | Stop reason: SDUPTHER

## 2020-08-06 NOTE — PROGRESS NOTES
Subjective:       Patient ID: Tito Menard is a 74 y.o. male.    Chief Complaint: Annual Exam, Cyst (neck), and Shoulder Pain (shoulder has a burning feeling)     Tito Menard is a 74 y.o.  male who presents for Annual Exam, Cyst (neck), and Shoulder Pain (shoulder has a burning feeling)  .  C/o pain down right arm when sleeping, hx of Cervical spinal stenosis, right leg as well. Having some relief with gabapentin but stopped recently when his rx rasn out.    No weakness or numbness    BP controlled, no cp sor sob. No further syncopal episodes.Pt has a history of HTN.  Counseled on low salt diet and graded exercise program. Denies CP, SOB, orthopnea or PND.  Currently treated with antihypertensives listed in med card and compliant most of the time. No side effects from medication noted by patient.       Otherwise doing well.  Patient has a history of hyperlipidemia. Pt is complaint with her medication. Denies muscle cramping and weakness. Pt attempts to follow a low cholesterol diet and exercise. No CP, SOB, orthopnea or PND.     Pt has a history of HTN.  Counseled on low salt diet and graded exercise program. Denies CP, SOB, orthopnea or PND.  Currently treated with antihypertensives listed in med card and compliant most of the time. No side effects from medication noted by patient.   Complications include AAA stable.    Patient has a history of hyperlipidemia. Pt is complaint with his medication. Denies muscle cramping and weakness. Pt attempts to follow a low cholesterol diet and exercise. No CP, SOB, orthopnea or PND.       Cyst  Associated symptoms include arthralgias and myalgias. Pertinent negatives include no chest pain, chills, coughing, fever, nausea or vomiting.   Shoulder Pain   Pertinent negatives include no fever.     Review of Systems   Constitutional: Negative for chills and fever.   Respiratory: Negative for cough and shortness of breath.    Cardiovascular: Negative for chest pain and  palpitations.   Gastrointestinal: Negative for nausea and vomiting.   Genitourinary: Negative for dysuria and hematuria.   Musculoskeletal: Positive for arthralgias, back pain and myalgias.   Psychiatric/Behavioral: Negative for dysphoric mood and suicidal ideas.       Patient Active Problem List   Diagnosis    Essential hypertension    Mixed hyperlipidemia    Osteopenia    GERD (gastroesophageal reflux disease)    Spinal stenosis    Vitamin D deficiency disease    Pulmonary emphysema    Obstructive chronic bronchitis without exacerbation    Diabetic polyneuropathy associated with type 2 diabetes mellitus    Abdominal aortic aneurysm (AAA) without rupture    Spondylosis of cervical joint with myelopathy    Gait disorder    Chronic right-sided low back pain without sciatica    ZUNIGA (dyspnea on exertion)    Nonspecific abnormal electrocardiogram (ECG) (EKG)    BPH with obstruction/lower urinary tract symptoms    Restrictive lung disease due to kyphoscoliosis    CKD (chronic kidney disease), stage II    History of diabetes mellitus, type II    Decreased functional mobility    Alteration in performance of activities of daily living    Impaired instrumental activities of daily living (IADL)    Mild aortic regurgitation with left ventricular systolic dysfunction by prior echocardiogram    Right bundle branch block    Hx of multiple pulmonary nodules    Chronically elevated hemidiaphragm    Prediabetes       Past Medical History:   Diagnosis Date    Allergy     Aneurysm of artery of lower extremity     Chalazion of left eye     CKD (chronic kidney disease), stage II 4/1/2019    Diabetes mellitus type II     Diabetes with neurologic complications     Enlarged aorta 8/2/2016    Noted on pharmacological stress echo 2/28/2014.      GERD (gastroesophageal reflux disease)     egd 2008    Hyperlipidemia     Hypertension     Jock itch 7/19/2018    MGD (meibomian gland dysfunction)      "Osteopenia     Spinal stenosis     Spondylosis without myelopathy 10/23/2015    Vitamin D deficiency disease        Past Surgical History:   Procedure Laterality Date    CHALAZION EXCISION Left 8/18/13    CYST REMOVAL  2013    Back of neck    SPINE SURGERY  2007    x2 (2000, 2007)       Family History   Problem Relation Age of Onset    Hyperlipidemia Mother     Hypertension Mother     Kidney disease Mother     Cancer Mother     Heart disease Mother     Kidney failure Mother     No Known Problems Daughter     No Known Problems Sister     No Known Problems Brother     No Known Problems Son     No Known Problems Brother     No Known Problems Son     Hypertension Maternal Grandmother     Amblyopia Neg Hx     Blindness Neg Hx     Cataracts Neg Hx     Diabetes Neg Hx     Glaucoma Neg Hx     Macular degeneration Neg Hx     Retinal detachment Neg Hx     Strabismus Neg Hx     Stroke Neg Hx     Thyroid disease Neg Hx     Melanoma Neg Hx     Psoriasis Neg Hx     Lupus Neg Hx     Eczema Neg Hx        Social History     Tobacco Use    Smoking status: Never Smoker    Smokeless tobacco: Former User     Types: Chew    Tobacco comment: Chewed tobacco once per day for 4-5 years when a    Substance Use Topics    Alcohol use: No    Drug use: No       Objective:   Blood pressure 117/72, pulse 82, height 5' 11" (1.803 m), weight 94 kg (207 lb 3.7 oz), SpO2 98 %.     Physical Exam  Constitutional:       Appearance: He is well-developed.   HENT:      Head: Normocephalic and atraumatic.   Eyes:      General: No scleral icterus.     Conjunctiva/sclera: Conjunctivae normal.   Cardiovascular:      Rate and Rhythm: Normal rate.      Heart sounds: Normal heart sounds. No murmur. No friction rub. No gallop.    Pulmonary:      Effort: Pulmonary effort is normal.      Breath sounds: Normal breath sounds. No wheezing or rales.   Chest:      Chest wall: No tenderness.   Musculoskeletal:         " General: No tenderness.   Skin:     General: Skin is warm and dry.      Comments: Atypical Nevus left ankle   Neurological:      Mental Status: He is alert and oriented to person, place, and time.   Psychiatric:         Thought Content: Thought content normal.         Prior labs reviewed  Assessment/Plan:        Tito was seen today for annual exam, cyst and shoulder pain.    Diagnoses and all orders for this visit:    Nevus of left ankle  -     Ambulatory referral/consult to Dermatology; Future    Spondylosis of cervical joint with myelopathy  Return to gababpentin  cymbalta may help  Declines PT    Essential hypertension  Goal <120   Well controlled  Cont current BP medication(s) and low salt diet    Chronic right-sided low back pain without sciatica  Decreased functional mobility  Spinal stenosis, unspecified spinal region  Declines PT, gapentin and cymbalta as above    Abdominal aortic aneurysm (AAA) without rupture  Stable on recent repeat CT  Cont goal BP <120    Restrictive lung disease due to kyphoscoliosis  Stable    CKD (chronic kidney disease), stage II  Stable, cont BP control    History of diabetes mellitus, type II  Prediabetes  consner for return to diabetes afters of resolution  recommend diabetic diet and regular exercise to reduce risk of developing diabetes    Mild aortic regurgitation with left ventricular systolic dysfunction by prior echocardiogram  Stable,cont to follow with cards/echo    Right bundle branch block  Stable, follow with cards    Mixed hyperlipidemia  -  well controlled  - cont current medication  - recommend low cholesterol diet and regular cardiovascular exercise to reduce risk of cardiovascular events  - repeat lipid profile and CMP annually    Vitamin D deficiency disease    Hx of multiple pulmonary nodules  Comments:  micronodules, stable on CT 2017, no further eval needed     Chronically elevated hemidiaphragm  Comments:  right, seen on imaging     Other orders  -      gabapentin (NEURONTIN) 300 MG capsule; Take one po in morning one at noon and two in the evening       did not complete PT or funct restoration program sdue to cost. Will contsdier pmr refraal but would like to defer for now    Cont current plans  No further syndcpe, BP doing well   .  Medication List with Changes/Refills   Current Medications    ACETAMINOPHEN (TYLENOL) 650 MG TBSR    Take 1,300 mg by mouth every 8 (eight) hours as needed.     ALBUTEROL 90 MCG/ACTUATION INHALER    Inhale 1-2 puffs into the lungs every 6 (six) hours as needed for Wheezing.    AMLODIPINE (NORVASC) 5 MG TABLET    Take 1 tablet (5 mg total) by mouth once daily.    ASPIRIN (ECOTRIN) 81 MG EC TABLET    Take 81 mg by mouth once daily.    AZELASTINE (ASTELIN) 137 MCG NASAL SPRAY    1 spray (137 mcg total) by Nasal route 2 (two) times daily.    CHOLECALCIFEROL, VITAMIN D3, (VITAMIN D3) 2,000 UNIT CAP    Take 1 capsule by mouth once daily.    DOCUSATE SODIUM (STOOL SOFTENER) 50 MG CAPSULE    Take by mouth 2 (two) times daily.    FLUTICASONE PROPIONATE (FLONASE) 50 MCG/ACTUATION NASAL SPRAY    USE 1 SPRAY NASALLY ONE TIME DAILY    GUAIFENESIN (MUCINEX) 600 MG 12 HR TABLET    Take 1,200 mg by mouth 2 (two) times daily as needed.     HYDROCODONE-ACETAMINOPHEN (NORCO) 5-325 MG PER TABLET    Take 1 tablet by mouth every 6 (six) hours as needed for Pain.    IBUPROFEN (ADVIL) 200 MG TABLET    Take 200 mg by mouth every 6 (six) hours as needed for Pain.    IBUPROFEN/DIPHENHYDRAMINE CIT (ADVIL PM ORAL)    Take 1 tablet by mouth every evening.    IRBESARTAN (AVAPRO) 300 MG TABLET    TAKE 1 TABLET EVERY EVENING.    MELATONIN 10 MG CAP    Take 1 capsule by mouth nightly as needed.     METHOCARBAMOL (ROBAXIN) 500 MG TAB    3 (three) times daily as needed.     OMEPRAZOLE (PRILOSEC) 20 MG CAPSULE    TAKE 2 CAPSULES ONE TIME DAILY    POTASSIUM CHLORIDE (MICRO-K) 10 MEQ CPSR    TAKE 3 CAPSULES ONE TIME DAILY    PRAVASTATIN (PRAVACHOL) 20 MG TABLET    TAKE 1  TABLET EVERY DAY   Changed and/or Refilled Medications    Modified Medication Previous Medication    CHLORTHALIDONE (HYGROTEN) 25 MG TAB chlorthalidone (HYGROTEN) 25 MG Tab       TAKE 1 TABLET EVERY DAY    TAKE 1 TABLET EVERY DAY    CLOTRIMAZOLE-BETAMETHASONE 1-0.05% (LOTRISONE) CREAM clotrimazole-betamethasone 1-0.05% (LOTRISONE) cream       Apply topically 2 (two) times daily.    APPLY TO THE AFFECTED AREA(S) TWICE DAILY  FOR  10  DAYS    DULOXETINE (CYMBALTA) 60 MG CAPSULE DULoxetine (CYMBALTA) 30 MG capsule       Take 1 capsule (60 mg total) by mouth once daily. For pain control    TAKE 1 CAPSULE (30 MG TOTAL) BY MOUTH ONCE DAILY FOR PAIN CONTROL    FINASTERIDE (PROSCAR) 5 MG TABLET finasteride (PROSCAR) 5 mg tablet       Take 1 tablet (5 mg total) by mouth once daily.    TAKE 1 TABLET (5 MG TOTAL) BY MOUTH ONCE DAILY.    GABAPENTIN (NEURONTIN) 300 MG CAPSULE gabapentin (NEURONTIN) 300 MG capsule       Take one po in morning one at noon and two in the evening    Take one po in morning one at noon and two in the evening    TAMSULOSIN (FLOMAX) 0.4 MG CAP tamsulosin (FLOMAX) 0.4 mg Cap       Take 1 capsule (0.4 mg total) by mouth once daily.    TAKE 1 CAPSULE (0.4 MG TOTAL) BY MOUTH ONCE DAILY.   Discontinued Medications    ECONAZOLE NITRATE 1 % CREAM    Apply topically 2 (two) times daily.

## 2020-08-17 RX ORDER — DULOXETIN HYDROCHLORIDE 60 MG/1
60 CAPSULE, DELAYED RELEASE ORAL DAILY
Qty: 90 CAPSULE | Refills: 3 | Status: SHIPPED | OUTPATIENT
Start: 2020-08-17 | End: 2021-05-05

## 2020-08-17 NOTE — TELEPHONE ENCOUNTER
----- Message from Claudia Waller sent at 8/17/2020  8:54 AM CDT -----  Regarding: refill  Type: RX Refill Request    Who Called: pt    Have you contacted your pharmacy:    Refill or New Rx:DULoxetine (CYMBALTA) 30 MG capsule    RX Name and Strength:    How is the patient currently taking it? (ex. 1XDay):    Is this a 30 day or 90 day RX:    Preferred Pharmacy with phone number:  [facility] - [facility] [address]  [address] [facility] [address] [address]  Phone: [phone] Fax: [phone]        Local or Mail Order:    Ordering Provider:    Would the patient rather a call back or a response via [facility]? call    Best Call Back Number:[phone] (home)       Additional Information:

## 2020-08-18 NOTE — TELEPHONE ENCOUNTER
Left message on voicemail    ----- Message from Bettina Washburn sent at 8/18/2020  9:11 AM CDT -----  Contact: Patient [phone]  Type:  Patient Returning Call    Who Called: Patient    Who Left Message for Patient: Not sure     Does the patient know what this is regarding?: Missed call.    Would the patient rather a call back or a response via [facility]? Call back    Best Call Back Number: [phone]

## 2020-09-01 ENCOUNTER — PATIENT OUTREACH (OUTPATIENT)
Dept: ADMINISTRATIVE | Facility: OTHER | Age: 75
End: 2020-09-01

## 2020-09-01 NOTE — PROGRESS NOTES
LINKS immunization registry updated  Care Everywhere updated  Health Maintenance updated  Chart reviewed for overdue Proactive [facility] Encounters ([facility]) health maintenance testing (CRS, Breast Ca, Diabetic Eye Exam)   Orders entered:N/A

## 2020-09-02 ENCOUNTER — OFFICE VISIT (OUTPATIENT)
Dept: UROLOGY | Facility: CLINIC | Age: 75
End: 2020-09-02
Payer: MEDICARE

## 2020-09-02 VITALS
HEART RATE: 80 BPM | SYSTOLIC BLOOD PRESSURE: 126 MMHG | WEIGHT: 214.31 LBS | BODY MASS INDEX: 30 KG/M2 | HEIGHT: 71 IN | DIASTOLIC BLOOD PRESSURE: 71 MMHG

## 2020-09-02 DIAGNOSIS — N13.8 BENIGN PROSTATIC HYPERPLASIA WITH URINARY OBSTRUCTION: Primary | ICD-10-CM

## 2020-09-02 DIAGNOSIS — B35.6 JOCK ITCH: ICD-10-CM

## 2020-09-02 DIAGNOSIS — Z12.5 SCREENING PSA (PROSTATE SPECIFIC ANTIGEN): ICD-10-CM

## 2020-09-02 DIAGNOSIS — N40.1 BENIGN PROSTATIC HYPERPLASIA WITH URINARY OBSTRUCTION: Primary | ICD-10-CM

## 2020-09-02 PROBLEM — J98.6 CHRONICALLY ELEVATED HEMIDIAPHRAGM: Status: ACTIVE | Noted: 2020-09-02

## 2020-09-02 PROBLEM — R73.03 PREDIABETES: Status: ACTIVE | Noted: 2020-09-02

## 2020-09-02 PROBLEM — Z87.898 HX OF MULTIPLE PULMONARY NODULES: Status: ACTIVE | Noted: 2020-09-02

## 2020-09-02 PROCEDURE — 1101F PR PT FALLS ASSESS DOC 0-1 FALLS W/OUT INJ PAST YR: ICD-10-PCS | Mod: HCNC,CPTII,S$GLB, | Performed by: UROLOGY

## 2020-09-02 PROCEDURE — 51798 US URINE CAPACITY MEASURE: CPT | Mod: HCNC,S$GLB,, | Performed by: UROLOGY

## 2020-09-02 PROCEDURE — 99999 PR PBB SHADOW E&M-EST. PATIENT-LVL IV: CPT | Mod: PBBFAC,HCNC,, | Performed by: UROLOGY

## 2020-09-02 PROCEDURE — 1125F PR PAIN SEVERITY QUANTIFIED, PAIN PRESENT: ICD-10-PCS | Mod: HCNC,S$GLB,, | Performed by: UROLOGY

## 2020-09-02 PROCEDURE — 1125F AMNT PAIN NOTED PAIN PRSNT: CPT | Mod: HCNC,S$GLB,, | Performed by: UROLOGY

## 2020-09-02 PROCEDURE — 3078F DIAST BP <80 MM HG: CPT | Mod: HCNC,CPTII,S$GLB, | Performed by: UROLOGY

## 2020-09-02 PROCEDURE — 99999 PR PBB SHADOW E&M-EST. PATIENT-LVL IV: ICD-10-PCS | Mod: PBBFAC,HCNC,, | Performed by: UROLOGY

## 2020-09-02 PROCEDURE — 51798 PR MEAS,POST-VOID RES,US,NON-IMAGING: ICD-10-PCS | Mod: HCNC,S$GLB,, | Performed by: UROLOGY

## 2020-09-02 PROCEDURE — 1159F PR MEDICATION LIST DOCUMENTED IN MEDICAL RECORD: ICD-10-PCS | Mod: HCNC,S$GLB,, | Performed by: UROLOGY

## 2020-09-02 PROCEDURE — 3074F SYST BP LT 130 MM HG: CPT | Mod: HCNC,CPTII,S$GLB, | Performed by: UROLOGY

## 2020-09-02 PROCEDURE — 1159F MED LIST DOCD IN RCRD: CPT | Mod: HCNC,S$GLB,, | Performed by: UROLOGY

## 2020-09-02 PROCEDURE — 99213 OFFICE O/P EST LOW 20 MIN: CPT | Mod: HCNC,25,S$GLB, | Performed by: UROLOGY

## 2020-09-02 PROCEDURE — 3078F PR MOST RECENT DIASTOLIC BLOOD PRESSURE < 80 MM HG: ICD-10-PCS | Mod: HCNC,CPTII,S$GLB, | Performed by: UROLOGY

## 2020-09-02 PROCEDURE — 99213 PR OFFICE/OUTPT VISIT, EST, LEVL III, 20-29 MIN: ICD-10-PCS | Mod: HCNC,25,S$GLB, | Performed by: UROLOGY

## 2020-09-02 PROCEDURE — 3074F PR MOST RECENT SYSTOLIC BLOOD PRESSURE < 130 MM HG: ICD-10-PCS | Mod: HCNC,CPTII,S$GLB, | Performed by: UROLOGY

## 2020-09-02 PROCEDURE — 1101F PT FALLS ASSESS-DOCD LE1/YR: CPT | Mod: HCNC,CPTII,S$GLB, | Performed by: UROLOGY

## 2020-09-02 RX ORDER — CLOTRIMAZOLE AND BETAMETHASONE DIPROPIONATE 10; .64 MG/G; MG/G
CREAM TOPICAL 2 TIMES DAILY
Qty: 45 G | Refills: 0 | Status: SHIPPED | OUTPATIENT
Start: 2020-09-02 | End: 2020-09-24

## 2020-09-02 RX ORDER — TAMSULOSIN HYDROCHLORIDE 0.4 MG/1
1 CAPSULE ORAL DAILY
Qty: 90 CAPSULE | Refills: 3 | Status: SHIPPED | OUTPATIENT
Start: 2020-09-02 | End: 2021-06-02

## 2020-09-02 RX ORDER — FINASTERIDE 5 MG/1
5 TABLET, FILM COATED ORAL DAILY
Qty: 90 TABLET | Refills: 3 | Status: SHIPPED | OUTPATIENT
Start: 2020-09-02 | End: 2021-06-02

## 2020-09-02 NOTE — PROGRESS NOTES
CHIEF COMPLAINT:    Mr. Menard is a 74 y.o. male presenting for a follow up on epididymal cysts, BPH with LUTS and scrotal skin irritation    PRESENTING ILLNESS:    Tito Menard is a 74 y.o. male who returns for follow up.  He states that the epididymal cysts have grown on the right side.  Causes heaviness in the scrotum.  His wife bought him underwear that provides more support.  We had discussed operative intervention last year and he states he considered it but in the climate of the pandemic, is reluctant to undergo surgery.  He is able to tolerate the heaviness with the supportive underwear.  He reports nocturia x 1.      He occasionally has a rash on the scrotum and inner thighs and desired a refill for Lotrisone cream.      He now uses a walker as he has the history of osteoporosis.      REVIEW OF SYSTEMS:    Review of Systems   Constitutional: Negative.    HENT: Negative.    Eyes: Negative.    Respiratory: Negative.    Cardiovascular: Negative.    Gastrointestinal: Negative.    Genitourinary: Positive for frequency.   Musculoskeletal: Positive for back pain.   Skin: Negative.    Neurological: Negative.    Endo/Heme/Allergies: Negative.    Psychiatric/Behavioral: Negative.        PATIENT HISTORY:    Past Medical History:   Diagnosis Date    Allergy     Aneurysm of artery of lower extremity     Chalazion of left eye     CKD (chronic kidney disease), stage II 4/1/2019    Diabetes mellitus type II     Diabetes with neurologic complications     Enlarged aorta 8/2/2016    Noted on pharmacological stress echo 2/28/2014.      GERD (gastroesophageal reflux disease)     egd 2008    Hyperlipidemia     Hypertension     Jock itch 7/19/2018    MGD (meibomian gland dysfunction)     Osteopenia     Spinal stenosis     Spondylosis without myelopathy 10/23/2015    Vitamin D deficiency disease        Past Surgical History:   Procedure Laterality Date    CHALAZION EXCISION Left 8/18/13    CYST REMOVAL  2013     Back of neck    SPINE SURGERY  2007    x2 (2000, 2007)       Family History   Problem Relation Age of Onset    Hyperlipidemia Mother     Hypertension Mother     Kidney disease Mother     Cancer Mother     Heart disease Mother     Kidney failure Mother     Hypertension Maternal Grandmother      Socioeconomic History    Marital status:    Occupational History    Occupation: Retired     Comment:    Tobacco Use    Smoking status: Never Smoker    Smokeless tobacco: Former User     Types: Chew    Tobacco comment: Chewed tobacco once per day for 4-5 years when a    Substance and Sexual Activity    Alcohol use: No    Drug use: No    Sexual activity: Not Currently     Partners: Female   Social History Narrative        Colon 2007       Allergies:  Patient has no known allergies.    Medications:  Outpatient Encounter Medications as of 9/2/2020   Medication Sig Dispense Refill    acetaminophen (TYLENOL) 650 MG TbSR Take 1,300 mg by mouth every 8 (eight) hours as needed.       albuterol 90 mcg/actuation inhaler Inhale 1-2 puffs into the lungs every 6 (six) hours as needed for Wheezing. 1 Inhaler 0    amLODIPine (NORVASC) 5 MG tablet Take 1 tablet (5 mg total) by mouth once daily. 90 tablet 3    aspirin (ECOTRIN) 81 MG EC tablet Take 81 mg by mouth once daily.      azelastine (ASTELIN) 137 mcg nasal spray 1 spray (137 mcg total) by Nasal route 2 (two) times daily. 90 mL 3    chlorthalidone (HYGROTEN) 25 MG Tab TAKE 1 TABLET EVERY DAY 90 tablet 3    cholecalciferol, vitamin D3, (VITAMIN D3) 2,000 unit Cap Take 1 capsule by mouth once daily.      clotrimazole-betamethasone 1-0.05% (LOTRISONE) cream Apply topically 2 (two) times daily. 45 g 0    docusate sodium (STOOL SOFTENER) 50 MG capsule Take by mouth 2 (two) times daily.      DULoxetine (CYMBALTA) 60 MG capsule Take 1 capsule (60 mg total) by mouth once daily. For pain control 90 capsule 3    finasteride  (PROSCAR) 5 mg tablet Take 1 tablet (5 mg total) by mouth once daily. 90 tablet 3    fluticasone propionate (FLONASE) 50 mcg/actuation nasal spray USE 1 SPRAY NASALLY ONE TIME DAILY 32 g 6    gabapentin (NEURONTIN) 300 MG capsule Take one po in morning one at noon and two in the evening 120 capsule 3    guaifenesin (MUCINEX) 600 mg 12 hr tablet Take 1,200 mg by mouth 2 (two) times daily as needed.       HYDROcodone-acetaminophen (NORCO) 5-325 mg per tablet Take 1 tablet by mouth every 6 (six) hours as needed for Pain. 5 tablet 0    ibuprofen (ADVIL) 200 MG tablet Take 200 mg by mouth every 6 (six) hours as needed for Pain.      ibuprofen/diphenhydramine cit (ADVIL PM ORAL) Take 1 tablet by mouth every evening.      irbesartan (AVAPRO) 300 MG tablet TAKE 1 TABLET EVERY EVENING. 90 tablet 3    melatonin 10 mg Cap Take 1 capsule by mouth nightly as needed.       methocarbamol (ROBAXIN) 500 MG Tab 3 (three) times daily as needed.       omeprazole (PRILOSEC) 20 MG capsule TAKE 2 CAPSULES ONE TIME DAILY 180 capsule 4    potassium chloride (MICRO-K) 10 MEQ CpSR TAKE 3 CAPSULES ONE TIME DAILY 270 capsule 2    pravastatin (PRAVACHOL) 20 MG tablet TAKE 1 TABLET EVERY DAY 90 tablet 3    tamsulosin (FLOMAX) 0.4 mg Cap Take 1 capsule (0.4 mg total) by mouth once daily. 90 capsule 3    [DISCONTINUED] clotrimazole-betamethasone 1-0.05% (LOTRISONE) cream APPLY TO THE AFFECTED AREA(S) TWICE DAILY  FOR  10  DAYS 45 g 0    [DISCONTINUED] finasteride (PROSCAR) 5 mg tablet TAKE 1 TABLET (5 MG TOTAL) BY MOUTH ONCE DAILY. 90 tablet 0    [DISCONTINUED] tamsulosin (FLOMAX) 0.4 mg Cap TAKE 1 CAPSULE (0.4 MG TOTAL) BY MOUTH ONCE DAILY. 90 capsule 0    [DISCONTINUED] econazole nitrate 1 % cream Apply topically 2 (two) times daily. (Patient taking differently: Apply topically 2 (two) times daily as needed. ) 85 g 1     Facility-Administered Encounter Medications as of 9/2/2020   Medication Dose Route Frequency Provider Last  Rate Last Dose    azithromycin tablet 250 mg  250 mg Oral Daily Amanda Brown MD             PHYSICAL EXAMINATION:    The patient generally appears in good health, is appropriately interactive, and is in no apparent distress.    Skin: No lesions.    Mental: Cooperative with normal affect.    Neuro: Grossly intact.    HEENT: Normal. No evidence of lymphadenopathy.    Chest:  normal inspiratory effort.    Abdomen: Soft, non-tender. No masses or organomegaly. Bladder is not palpable. No evidence of flank discomfort. No evidence of inguinal hernia.    Extremities: No clubbing, cyanosis, or edema    Scrotum showed no rashes or lesions. Testicles showed no masses or tenderness.  Epididymis showed no masses or tenderness.  Penis was circumcised. No meatal stenosis. No penile discharge.  No inguinal hernias.  No inguinal lymphadenopathy.      IRINA:  40 g prostate without nodularity. Normal rectal tone, no anal masses.     PVR by bladder scan was 12 ml     LABS:    Lab Results   Component Value Date    BUN 18 07/31/2020    CREATININE 0.9 07/31/2020     Lab Results   Component Value Date    PSA 1.9 08/29/2019    PSA 1.9 05/27/2016    PSA 1.8 06/30/2014    PSADIAG 2.1 07/19/2018    PSADIAG 1.6 06/12/2017    PSADIAG 1.9 05/15/2015       IMPRESSION:    Encounter Diagnoses   Name Primary?    Benign prostatic hyperplasia with urinary obstruction Yes    Jock itch     Screening PSA (prostate specific antigen)        PLAN:    1.  Refilled his medication tamsulosin, finasteride and lotrisone cream  2.  PSA today  3.  Follow up in 1 year if PSA is stable.

## 2020-09-25 RX ORDER — GABAPENTIN 300 MG/1
CAPSULE ORAL
Qty: 120 CAPSULE | Refills: 3 | Status: SHIPPED | OUTPATIENT
Start: 2020-09-25 | End: 2020-12-29 | Stop reason: SDUPTHER

## 2020-11-12 ENCOUNTER — TELEPHONE (OUTPATIENT)
Dept: UROLOGY | Facility: CLINIC | Age: 75
End: 2020-11-12

## 2020-11-13 NOTE — TELEPHONE ENCOUNTER
Called the patient as I received a refill request for his betamethasone and clotrimazole cream from [facility].  I was concerned that the patient may be using too much of this cream and so I called him.  He states he does not need a refill that this was not generated at his behest.  He still has quite a bit left in the tube.  He has tried to cancel that in the past, however they have sent another repeat refill request.  Who therefore I will refuse this prescription and will wait until hear from the patient himself regarding any further refill of this medicine.  I was concerned that he may be using too much of it and would have thinning of the skin in that affected area, however he assures me he has not been using it in excess.

## 2020-12-17 ENCOUNTER — PES CALL (OUTPATIENT)
Dept: ADMINISTRATIVE | Facility: CLINIC | Age: 75
End: 2020-12-17

## 2020-12-21 ENCOUNTER — TELEPHONE (OUTPATIENT)
Dept: INTERNAL MEDICINE | Facility: CLINIC | Age: 75
End: 2020-12-21

## 2020-12-21 NOTE — TELEPHONE ENCOUNTER
"----- Message from Latrice Quevedo sent at 12/21/2020 12:25 PM CST -----  Regarding: Guillermina "wife"  or [phone]  .Type: Patient Call Back    Who called: Guillermina "wife"    What is the request in detail: Pt is requesting a call back in regards to appt with Cardiology     Can the clinic reply by [facility]? Call back     Would the patient rather a call back or a response via [facility]?  Call back     Best call back number:  or [phone]        "

## 2020-12-21 NOTE — TELEPHONE ENCOUNTER
Spoke to the Pt and he said someone called him  Informed the Pt there is no msg    Scheduled f/u to see PCP in February to f/u  Scheduled Pt to see Dr. Solano on 12/29 due to CC: noticed blood in toilet but not in stool and not when wiping for the past 2 weeks    Not due for a colonoscopy if he completed one in 2017

## 2020-12-29 ENCOUNTER — OFFICE VISIT (OUTPATIENT)
Dept: INTERNAL MEDICINE | Facility: CLINIC | Age: 75
End: 2020-12-29
Attending: INTERNAL MEDICINE
Payer: MEDICARE

## 2020-12-29 VITALS
HEART RATE: 78 BPM | DIASTOLIC BLOOD PRESSURE: 86 MMHG | BODY MASS INDEX: 31.05 KG/M2 | SYSTOLIC BLOOD PRESSURE: 154 MMHG | OXYGEN SATURATION: 96 % | HEIGHT: 71 IN | WEIGHT: 221.81 LBS

## 2020-12-29 DIAGNOSIS — M25.473 ANKLE SWELLING, UNSPECIFIED LATERALITY: ICD-10-CM

## 2020-12-29 DIAGNOSIS — R35.89 POLYURIA: ICD-10-CM

## 2020-12-29 DIAGNOSIS — I10 ESSENTIAL HYPERTENSION: Primary | ICD-10-CM

## 2020-12-29 DIAGNOSIS — R05.9 COUGH: ICD-10-CM

## 2020-12-29 DIAGNOSIS — E78.5 HYPERLIPIDEMIA, UNSPECIFIED HYPERLIPIDEMIA TYPE: ICD-10-CM

## 2020-12-29 DIAGNOSIS — R60.9 EDEMA, UNSPECIFIED TYPE: ICD-10-CM

## 2020-12-29 LAB
BACTERIA #/AREA URNS AUTO: NORMAL /HPF
BILIRUB SERPL-MCNC: NEGATIVE MG/DL
BILIRUB UR QL STRIP: NEGATIVE
BLOOD URINE, POC: NORMAL
CLARITY UR REFRACT.AUTO: CLEAR
COLOR UR AUTO: YELLOW
COLOR, POC UA: NORMAL
GLUCOSE UR QL STRIP: NEGATIVE
GLUCOSE UR QL STRIP: NORMAL
HGB UR QL STRIP: ABNORMAL
KETONES UR QL STRIP: NEGATIVE
KETONES UR QL STRIP: NEGATIVE
LEUKOCYTE ESTERASE UR QL STRIP: NEGATIVE
LEUKOCYTE ESTERASE URINE, POC: NEGATIVE
MICROSCOPIC COMMENT: NORMAL
NITRITE UR QL STRIP: NEGATIVE
NITRITE, POC UA: NEGATIVE
PH UR STRIP: 6 [PH] (ref 5–8)
PH, POC UA: 6
PROT UR QL STRIP: NEGATIVE
PROTEIN, POC: NEGATIVE
RBC #/AREA URNS AUTO: 1 /HPF (ref 0–4)
SP GR UR STRIP: 1.01 (ref 1–1.03)
SPECIFIC GRAVITY, POC UA: 1.01
URN SPEC COLLECT METH UR: ABNORMAL
UROBILINOGEN, POC UA: NORMAL

## 2020-12-29 PROCEDURE — U0003 INFECTIOUS AGENT DETECTION BY NUCLEIC ACID (DNA OR RNA); SEVERE ACUTE RESPIRATORY SYNDROME CORONAVIRUS 2 (SARS-COV-2) (CORONAVIRUS DISEASE [COVID-19]), AMPLIFIED PROBE TECHNIQUE, MAKING USE OF HIGH THROUGHPUT TECHNOLOGIES AS DESCRIBED BY CMS-2020-01-R: HCPCS

## 2020-12-29 PROCEDURE — 3072F PR LOW RISK FOR RETINOPATHY: ICD-10-PCS | Mod: S$GLB,,, | Performed by: INTERNAL MEDICINE

## 2020-12-29 PROCEDURE — 1159F MED LIST DOCD IN RCRD: CPT | Mod: S$GLB,,, | Performed by: INTERNAL MEDICINE

## 2020-12-29 PROCEDURE — 1159F PR MEDICATION LIST DOCUMENTED IN MEDICAL RECORD: ICD-10-PCS | Mod: S$GLB,,, | Performed by: INTERNAL MEDICINE

## 2020-12-29 PROCEDURE — 3072F LOW RISK FOR RETINOPATHY: CPT | Mod: S$GLB,,, | Performed by: INTERNAL MEDICINE

## 2020-12-29 PROCEDURE — 99999 PR PBB SHADOW E&M-EST. PATIENT-LVL V: ICD-10-PCS | Mod: PBBFAC,,, | Performed by: INTERNAL MEDICINE

## 2020-12-29 PROCEDURE — 99499 UNLISTED E&M SERVICE: CPT | Mod: S$GLB,,, | Performed by: INTERNAL MEDICINE

## 2020-12-29 PROCEDURE — 81001 URINALYSIS AUTO W/SCOPE: CPT | Mod: S$GLB,,, | Performed by: INTERNAL MEDICINE

## 2020-12-29 PROCEDURE — 3079F DIAST BP 80-89 MM HG: CPT | Mod: CPTII,S$GLB,, | Performed by: INTERNAL MEDICINE

## 2020-12-29 PROCEDURE — 81001 POCT URINALYSIS, DIPSTICK OR TABLET REAGENT, AUTOMATED, WITH MICROSCOP: ICD-10-PCS | Mod: S$GLB,,, | Performed by: INTERNAL MEDICINE

## 2020-12-29 PROCEDURE — 1125F PR PAIN SEVERITY QUANTIFIED, PAIN PRESENT: ICD-10-PCS | Mod: S$GLB,,, | Performed by: INTERNAL MEDICINE

## 2020-12-29 PROCEDURE — 3077F SYST BP >= 140 MM HG: CPT | Mod: CPTII,S$GLB,, | Performed by: INTERNAL MEDICINE

## 2020-12-29 PROCEDURE — 99214 OFFICE O/P EST MOD 30 MIN: CPT | Mod: 25,S$GLB,, | Performed by: INTERNAL MEDICINE

## 2020-12-29 PROCEDURE — 1125F AMNT PAIN NOTED PAIN PRSNT: CPT | Mod: S$GLB,,, | Performed by: INTERNAL MEDICINE

## 2020-12-29 PROCEDURE — 99999 PR PBB SHADOW E&M-EST. PATIENT-LVL V: CPT | Mod: PBBFAC,,, | Performed by: INTERNAL MEDICINE

## 2020-12-29 PROCEDURE — 99499 RISK ADDL DX/OHS AUDIT: ICD-10-PCS | Mod: S$GLB,,, | Performed by: INTERNAL MEDICINE

## 2020-12-29 PROCEDURE — 99214 PR OFFICE/OUTPT VISIT, EST, LEVL IV, 30-39 MIN: ICD-10-PCS | Mod: 25,S$GLB,, | Performed by: INTERNAL MEDICINE

## 2020-12-29 PROCEDURE — 3077F PR MOST RECENT SYSTOLIC BLOOD PRESSURE >= 140 MM HG: ICD-10-PCS | Mod: CPTII,S$GLB,, | Performed by: INTERNAL MEDICINE

## 2020-12-29 PROCEDURE — 3079F PR MOST RECENT DIASTOLIC BLOOD PRESSURE 80-89 MM HG: ICD-10-PCS | Mod: CPTII,S$GLB,, | Performed by: INTERNAL MEDICINE

## 2020-12-29 PROCEDURE — 81001 URINALYSIS AUTO W/SCOPE: CPT

## 2020-12-29 RX ORDER — GABAPENTIN 300 MG/1
CAPSULE ORAL
Qty: 120 CAPSULE | Refills: 3 | Status: SHIPPED | OUTPATIENT
Start: 2020-12-29 | End: 2022-02-11

## 2020-12-29 RX ORDER — AMLODIPINE BESYLATE 10 MG/1
10 TABLET ORAL DAILY
Qty: 30 TABLET | Refills: 6 | Status: SHIPPED | OUTPATIENT
Start: 2020-12-29 | End: 2021-02-04

## 2020-12-29 RX ORDER — CHLORTHALIDONE 25 MG/1
25 TABLET ORAL DAILY
Qty: 90 TABLET | Refills: 3 | Status: CANCELLED | OUTPATIENT
Start: 2020-12-29

## 2020-12-29 RX ORDER — AMLODIPINE BESYLATE 5 MG/1
5 TABLET ORAL DAILY
Qty: 90 TABLET | Refills: 3 | Status: CANCELLED | OUTPATIENT
Start: 2020-12-29

## 2020-12-29 RX ORDER — BENZONATATE 200 MG/1
200 CAPSULE ORAL 3 TIMES DAILY PRN
Qty: 30 CAPSULE | Refills: 0 | Status: SHIPPED | OUTPATIENT
Start: 2020-12-29 | End: 2021-01-08

## 2020-12-29 RX ORDER — IRBESARTAN 300 MG/1
300 TABLET ORAL NIGHTLY
Qty: 90 TABLET | Refills: 3 | Status: SHIPPED | OUTPATIENT
Start: 2020-12-29 | End: 2021-09-01 | Stop reason: SDUPTHER

## 2020-12-29 RX ORDER — PRAVASTATIN SODIUM 20 MG/1
20 TABLET ORAL DAILY
Qty: 90 TABLET | Refills: 3 | Status: SHIPPED | OUTPATIENT
Start: 2020-12-29 | End: 2021-11-11

## 2020-12-29 NOTE — PROGRESS NOTES
"Subjective:   Patient ID: Tito Menard is a 75 y.o. male  Chief complaint:   Chief Complaint   Patient presents with    Rectal Bleeding     seems to have cleared up by now    Foot Swelling     bilateral; ankles included    Nasal Congestion     coughs up phlegm; mucinex and robitussin not helping       HPI    Had episode of rectal bleeding that occurred about 2 weeks ago  Painless   Taking stool softener bid and this helps reduce constipation  - has appt with gi on 1/5 with [facility] to further eval his symptoms    Varicocele - known and dx 2011    HTN:   Took bp meds first in am   - home readings 130s/70-80s   - reports chlorthalidone is bothersome for urination - has to wear depends when leaves home   Followed by urology     Reports cough for 1 ago after grandson returned from school with a cold   - started with sore throat and this resolved   - reports has sinus congestion at baseline   - am cough is clear and white   - reports pain at frontal sinuses   - lifelong nonsmoker   - taking mucinex and robitussin and helfpu     - takign flonase bid intermittently   No fevers or chills or body aches   No recent abx use     Diabetes:   Diet controlled  To schedule eye exam - had eye surgery 3 months    Chronic LE edema bilaterally:   Pt reports this has been present for a few years and stable   No leg pain or redness   Review of Systems    Objective:  Vitals:    12/29/20 1306   BP: (!) 154/86   BP Location: Left arm   Patient Position: Sitting   Pulse: 78   SpO2: 96%   Weight: 100.6 kg (221 lb 12.5 oz)   Height: 5' 11" (1.803 m)     Body mass index is 30.93 kg/m².    Physical Exam  Vitals signs reviewed.   Constitutional:       Appearance: Normal appearance. He is well-developed.   HENT:      Head: Normocephalic and atraumatic.      Right Ear: Tympanic membrane, ear canal and external ear normal.      Left Ear: Tympanic membrane, ear canal and external ear normal.      Nose: Congestion and rhinorrhea present.      " Comments: Wearing mask  Nasal turbinates pale and edematous, clear mucous  No ttp of maxillary sinuses      Mouth/Throat:      Mouth: Mucous membranes are moist.      Pharynx: Oropharynx is clear.   Eyes:      Extraocular Movements: Extraocular movements intact.      Conjunctiva/sclera: Conjunctivae normal.   Neck:      Musculoskeletal: Neck supple.   Cardiovascular:      Rate and Rhythm: Normal rate and regular rhythm.      Pulses: Normal pulses.      Heart sounds: Normal heart sounds.   Pulmonary:      Effort: Pulmonary effort is normal.      Breath sounds: Normal breath sounds.   Abdominal:      General: Bowel sounds are normal.      Palpations: Abdomen is soft.   Musculoskeletal:         General: No tenderness.      Comments: Bilateral ankle edema   No ttp of calves   No redness or inc warmth   Lymphadenopathy:      Cervical: No cervical adenopathy.   Skin:     General: Skin is warm and dry.   Neurological:      Mental Status: He is alert and oriented to person, place, and time. Mental status is at baseline.   Psychiatric:         Behavior: Behavior normal.         Thought Content: Thought content normal.         Judgment: Judgment normal.         Assessment:  1. Essential hypertension    2. Hyperlipidemia, unspecified hyperlipidemia type    3. Polyuria    4. Cough    5. Ankle swelling, unspecified laterality    6. Edema, unspecified type         Plan:  Tito was seen today for rectal bleeding, foot swelling and nasal congestion.    Diagnoses and all orders for this visit:    Essential hypertension  -     irbesartan (AVAPRO) 300 MG tablet; Take 1 tablet (300 mg total) by mouth every evening.    Hyperlipidemia, unspecified hyperlipidemia type  -     pravastatin (PRAVACHOL) 20 MG tablet; Take 1 tablet (20 mg total) by mouth once daily.    Polyuria  -     Urinalysis, Reflex to Urine Culture Urine, Clean Catch; Future  -     POCT urinalysis, dipstick or tablet reag  -     Urinalysis, Reflex to Urine Culture Urine,  Clean Catch    Cough  -     COVID-19 Routine Screening    Ankle swelling, unspecified laterality  -     Echo Color Flow Doppler? Yes; Future  -     US Lower Extremity Veins Bilateral; Future  -     Ambulatory referral/consult to Vascular Surgery; Future    Edema, unspecified type   -     US Lower Extremity Veins Bilateral; Future    Other orders  -     Cancel: amLODIPine (NORVASC) 5 MG tablet; Take 1 tablet (5 mg total) by mouth once daily.  -     Cancel: chlorthalidone (HYGROTEN) 25 MG Tab; Take 1 tablet (25 mg total) by mouth once daily.  -     gabapentin (NEURONTIN) 300 MG capsule; Take one po in morning one at noon and two in the evening  -     amLODIPine (NORVASC) 10 MG tablet; Take 1 tablet (10 mg total) by mouth once daily.  -     benzonatate (TESSALON) 200 MG capsule; Take 1 capsule (200 mg total) by mouth 3 (three) times daily as needed for Cough.  -     Urinalysis Microscopic    stop chlorthalidone to see if this improves baseline urinary sx and inc amlodipine   Reviewed pot SE of med changes including potentially worsening ankle edema     Keep appt with specialists   F/u with pcp in 2-4 weeks as scheduled   rtc in 2 weeks for bp check     Health Maintenance   Topic Date Due    Hepatitis C Screening  1945    Foot Exam  10/08/2020    Eye Exam  12/19/2020    Hemoglobin A1c  01/31/2021    TETANUS VACCINE  07/22/2021    Lipid Panel  07/31/2021    Pneumococcal Vaccine (65+ Low/Medium Risk)  Completed

## 2020-12-30 LAB — SARS-COV-2 RNA RESP QL NAA+PROBE: NOT DETECTED

## 2020-12-31 ENCOUNTER — TELEPHONE (OUTPATIENT)
Dept: INTERNAL MEDICINE | Facility: CLINIC | Age: 75
End: 2020-12-31

## 2021-01-08 RX ORDER — BENZONATATE 200 MG/1
CAPSULE ORAL
Qty: 30 CAPSULE | Refills: 0 | Status: SHIPPED | OUTPATIENT
Start: 2021-01-08 | End: 2021-01-13

## 2021-01-13 ENCOUNTER — HOSPITAL ENCOUNTER (OUTPATIENT)
Dept: CARDIOLOGY | Facility: OTHER | Age: 76
Discharge: HOME | End: 2021-01-13
Attending: INTERNAL MEDICINE
Payer: MEDICARE

## 2021-01-13 ENCOUNTER — HOSPITAL ENCOUNTER (OUTPATIENT)
Dept: RADIOLOGY | Facility: OTHER | Age: 76
Discharge: HOME | End: 2021-01-13
Attending: INTERNAL MEDICINE
Payer: MEDICARE

## 2021-01-13 ENCOUNTER — CLINICAL SUPPORT (OUTPATIENT)
Dept: INTERNAL MEDICINE | Facility: CLINIC | Age: 76
End: 2021-01-13
Payer: MEDICARE

## 2021-01-13 VITALS — HEART RATE: 76 BPM | DIASTOLIC BLOOD PRESSURE: 82 MMHG | SYSTOLIC BLOOD PRESSURE: 132 MMHG | OXYGEN SATURATION: 98 %

## 2021-01-13 VITALS
HEIGHT: 71 IN | SYSTOLIC BLOOD PRESSURE: 154 MMHG | DIASTOLIC BLOOD PRESSURE: 86 MMHG | WEIGHT: 221 LBS | BODY MASS INDEX: 30.94 KG/M2

## 2021-01-13 DIAGNOSIS — M25.473 ANKLE SWELLING, UNSPECIFIED LATERALITY: ICD-10-CM

## 2021-01-13 DIAGNOSIS — R60.9 EDEMA, UNSPECIFIED TYPE: ICD-10-CM

## 2021-01-13 LAB
ASCENDING AORTA: 3.48 CM
AV INDEX (PROSTH): 0.82
AV MEAN GRADIENT: 4 MMHG
AV PEAK GRADIENT: 7 MMHG
AV VALVE AREA: 2.81 CM2
AV VELOCITY RATIO: 0.68
BSA FOR ECHO PROCEDURE: 2.24 M2
CV ECHO LV RWT: 0.35 CM
DOP CALC AO PEAK VEL: 1.33 M/S
DOP CALC AO VTI: 29.26 CM
DOP CALC LVOT AREA: 3.4 CM2
DOP CALC LVOT DIAMETER: 2.09 CM
DOP CALC LVOT PEAK VEL: 0.91 M/S
DOP CALC LVOT STROKE VOLUME: 82.3 CM3
DOP CALCLVOT PEAK VEL VTI: 24 CM
E WAVE DECELERATION TIME: 177.39 MSEC
E/A RATIO: 0.76
E/E' RATIO: 9.29 M/S
ECHO LV POSTERIOR WALL: 0.86 CM (ref 0.6–1.1)
FRACTIONAL SHORTENING: 46 % (ref 28–44)
INTERVENTRICULAR SEPTUM: 0.94 CM (ref 0.6–1.1)
IVRT: 110.91 MSEC
LA MAJOR: 3.78 CM
LA MINOR: 5.4 CM
LA WIDTH: 3.78 CM
LEFT ATRIUM SIZE: 3.53 CM
LEFT ATRIUM VOLUME INDEX MOD: 27.3 ML/M2
LEFT ATRIUM VOLUME INDEX: 22.9 ML/M2
LEFT ATRIUM VOLUME MOD: 60 CM3
LEFT ATRIUM VOLUME: 50.44 CM3
LEFT INTERNAL DIMENSION IN SYSTOLE: 2.68 CM (ref 2.1–4)
LEFT VENTRICLE DIASTOLIC VOLUME INDEX: 51.89 ML/M2
LEFT VENTRICLE DIASTOLIC VOLUME: 114.17 ML
LEFT VENTRICLE MASS INDEX: 70 G/M2
LEFT VENTRICLE SYSTOLIC VOLUME INDEX: 12.1 ML/M2
LEFT VENTRICLE SYSTOLIC VOLUME: 26.61 ML
LEFT VENTRICULAR INTERNAL DIMENSION IN DIASTOLE: 4.93 CM (ref 3.5–6)
LEFT VENTRICULAR MASS: 154.52 G
LV LATERAL E/E' RATIO: 9.29 M/S
LV SEPTAL E/E' RATIO: 9.29 M/S
MV PEAK A VEL: 0.85 M/S
MV PEAK E VEL: 0.65 M/S
PISA TR MAX VEL: 2.01 M/S
PV PEAK VELOCITY: 0.86 CM/S
RA MAJOR: 2.18 CM
RA WIDTH: 3.35 CM
RIGHT VENTRICULAR END-DIASTOLIC DIMENSION: 3.41 CM
SINUS: 3.36 CM
STJ: 2.99 CM
TDI LATERAL: 0.07 M/S
TDI SEPTAL: 0.07 M/S
TDI: 0.07 M/S
TR MAX PG: 16 MMHG
TRICUSPID ANNULAR PLANE SYSTOLIC EXCURSION: 1.36 CM

## 2021-01-13 PROCEDURE — 93306 TTE W/DOPPLER COMPLETE: CPT

## 2021-01-13 PROCEDURE — 93970 US LOWER EXTREMITY VEINS BILATERAL: ICD-10-PCS | Mod: 26,,, | Performed by: RADIOLOGY

## 2021-01-13 PROCEDURE — 93970 EXTREMITY STUDY: CPT | Mod: 26,,, | Performed by: RADIOLOGY

## 2021-01-13 PROCEDURE — 99999 PR PBB SHADOW E&M-EST. PATIENT-LVL I: ICD-10-PCS | Mod: PBBFAC,,,

## 2021-01-13 PROCEDURE — 93306 ECHO (CUPID ONLY): ICD-10-PCS | Mod: 26,,, | Performed by: INTERNAL MEDICINE

## 2021-01-13 PROCEDURE — 99999 PR PBB SHADOW E&M-EST. PATIENT-LVL I: CPT | Mod: PBBFAC,,,

## 2021-01-13 PROCEDURE — 93970 EXTREMITY STUDY: CPT | Mod: TC

## 2021-01-13 PROCEDURE — 93306 TTE W/DOPPLER COMPLETE: CPT | Mod: 26,,, | Performed by: INTERNAL MEDICINE

## 2021-01-13 RX ORDER — BENZONATATE 200 MG/1
CAPSULE ORAL
Qty: 30 CAPSULE | Refills: 0 | Status: SHIPPED | OUTPATIENT
Start: 2021-01-13 | End: 2021-01-22

## 2021-01-21 PROBLEM — I51.89 GRADE I DIASTOLIC DYSFUNCTION: Status: ACTIVE | Noted: 2021-01-21

## 2021-01-22 DIAGNOSIS — I87.2 VENOUS INSUFFICIENCY OF BOTH LOWER EXTREMITIES: Primary | ICD-10-CM

## 2021-02-03 ENCOUNTER — PATIENT OUTREACH (OUTPATIENT)
Dept: ADMINISTRATIVE | Facility: HOSPITAL | Age: 76
End: 2021-02-03

## 2021-02-03 ENCOUNTER — PES CALL (OUTPATIENT)
Dept: ADMINISTRATIVE | Facility: CLINIC | Age: 76
End: 2021-02-03

## 2021-02-03 DIAGNOSIS — Z11.59 NEED FOR HEPATITIS C SCREENING TEST: ICD-10-CM

## 2021-02-03 DIAGNOSIS — I87.2 VENOUS INSUFFICIENCY: Primary | ICD-10-CM

## 2021-02-03 DIAGNOSIS — E11.9 DIABETES MELLITUS WITHOUT COMPLICATION: Primary | ICD-10-CM

## 2021-02-04 ENCOUNTER — OFFICE VISIT (OUTPATIENT)
Dept: INTERNAL MEDICINE | Facility: CLINIC | Age: 76
End: 2021-02-04
Attending: INTERNAL MEDICINE
Payer: MEDICARE

## 2021-02-04 ENCOUNTER — LAB VISIT (OUTPATIENT)
Dept: LAB | Facility: OTHER | Age: 76
End: 2021-02-04
Attending: INTERNAL MEDICINE
Payer: MEDICARE

## 2021-02-04 ENCOUNTER — TELEPHONE (OUTPATIENT)
Dept: ADMINISTRATIVE | Facility: OTHER | Age: 76
End: 2021-02-04

## 2021-02-04 VITALS
OXYGEN SATURATION: 97 % | BODY MASS INDEX: 31.14 KG/M2 | HEIGHT: 71 IN | SYSTOLIC BLOOD PRESSURE: 128 MMHG | DIASTOLIC BLOOD PRESSURE: 72 MMHG | HEART RATE: 83 BPM | WEIGHT: 222.44 LBS

## 2021-02-04 DIAGNOSIS — M48.062 SPINAL STENOSIS OF LUMBAR REGION WITH NEUROGENIC CLAUDICATION: ICD-10-CM

## 2021-02-04 DIAGNOSIS — R60.9 EDEMA, UNSPECIFIED TYPE: ICD-10-CM

## 2021-02-04 DIAGNOSIS — Z11.59 NEED FOR HEPATITIS C SCREENING TEST: ICD-10-CM

## 2021-02-04 DIAGNOSIS — Z11.59 ENCOUNTER FOR HEPATITIS C SCREENING TEST FOR LOW RISK PATIENT: ICD-10-CM

## 2021-02-04 DIAGNOSIS — I71.40 ABDOMINAL AORTIC ANEURYSM (AAA) WITHOUT RUPTURE: ICD-10-CM

## 2021-02-04 DIAGNOSIS — E11.9 DIABETES MELLITUS WITHOUT COMPLICATION: ICD-10-CM

## 2021-02-04 DIAGNOSIS — I10 ESSENTIAL HYPERTENSION: ICD-10-CM

## 2021-02-04 DIAGNOSIS — D22.9 ATYPICAL NEVUS: Primary | ICD-10-CM

## 2021-02-04 DIAGNOSIS — R26.89 BALANCE PROBLEM: ICD-10-CM

## 2021-02-04 DIAGNOSIS — I51.89 GRADE I DIASTOLIC DYSFUNCTION: ICD-10-CM

## 2021-02-04 LAB
ALBUMIN SERPL BCP-MCNC: 3.6 G/DL (ref 3.5–5.2)
ALP SERPL-CCNC: 94 U/L (ref 55–135)
ALT SERPL W/O P-5'-P-CCNC: 19 U/L (ref 10–44)
ANION GAP SERPL CALC-SCNC: 9 MMOL/L (ref 8–16)
AST SERPL-CCNC: 27 U/L (ref 10–40)
BILIRUB SERPL-MCNC: 0.6 MG/DL (ref 0.1–1)
BUN SERPL-MCNC: 16 MG/DL (ref 8–23)
CALCIUM SERPL-MCNC: 9 MG/DL (ref 8.7–10.5)
CHLORIDE SERPL-SCNC: 102 MMOL/L (ref 95–110)
CO2 SERPL-SCNC: 28 MMOL/L (ref 23–29)
CREAT SERPL-MCNC: 1 MG/DL (ref 0.5–1.4)
EST. GFR  (AFRICAN AMERICAN): >60 ML/MIN/1.73 M^2
EST. GFR  (NON AFRICAN AMERICAN): >60 ML/MIN/1.73 M^2
GLUCOSE SERPL-MCNC: 115 MG/DL (ref 70–110)
POTASSIUM SERPL-SCNC: 3.4 MMOL/L (ref 3.5–5.1)
PROT SERPL-MCNC: 6.9 G/DL (ref 6–8.4)
SODIUM SERPL-SCNC: 139 MMOL/L (ref 136–145)

## 2021-02-04 PROCEDURE — 36415 COLL VENOUS BLD VENIPUNCTURE: CPT

## 2021-02-04 PROCEDURE — 3074F SYST BP LT 130 MM HG: CPT | Mod: CPTII,S$GLB,, | Performed by: INTERNAL MEDICINE

## 2021-02-04 PROCEDURE — 80053 COMPREHEN METABOLIC PANEL: CPT

## 2021-02-04 PROCEDURE — 99499 UNLISTED E&M SERVICE: CPT | Mod: ,,, | Performed by: INTERNAL MEDICINE

## 2021-02-04 PROCEDURE — 99999 PR PBB SHADOW E&M-EST. PATIENT-LVL V: CPT | Mod: PBBFAC,,, | Performed by: INTERNAL MEDICINE

## 2021-02-04 PROCEDURE — 3078F DIAST BP <80 MM HG: CPT | Mod: CPTII,S$GLB,, | Performed by: INTERNAL MEDICINE

## 2021-02-04 PROCEDURE — 1159F PR MEDICATION LIST DOCUMENTED IN MEDICAL RECORD: ICD-10-PCS | Mod: S$GLB,,, | Performed by: INTERNAL MEDICINE

## 2021-02-04 PROCEDURE — 1101F PT FALLS ASSESS-DOCD LE1/YR: CPT | Mod: CPTII,S$GLB,, | Performed by: INTERNAL MEDICINE

## 2021-02-04 PROCEDURE — 3288F PR FALLS RISK ASSESSMENT DOCUMENTED: ICD-10-PCS | Mod: CPTII,S$GLB,, | Performed by: INTERNAL MEDICINE

## 2021-02-04 PROCEDURE — 3288F FALL RISK ASSESSMENT DOCD: CPT | Mod: CPTII,S$GLB,, | Performed by: INTERNAL MEDICINE

## 2021-02-04 PROCEDURE — 86803 HEPATITIS C AB TEST: CPT

## 2021-02-04 PROCEDURE — 99499 RISK ADDL DX/OHS AUDIT: ICD-10-PCS | Mod: S$GLB,,, | Performed by: INTERNAL MEDICINE

## 2021-02-04 PROCEDURE — 99214 OFFICE O/P EST MOD 30 MIN: CPT | Mod: S$GLB,,, | Performed by: INTERNAL MEDICINE

## 2021-02-04 PROCEDURE — 1125F PR PAIN SEVERITY QUANTIFIED, PAIN PRESENT: ICD-10-PCS | Mod: S$GLB,,, | Performed by: INTERNAL MEDICINE

## 2021-02-04 PROCEDURE — 3078F PR MOST RECENT DIASTOLIC BLOOD PRESSURE < 80 MM HG: ICD-10-PCS | Mod: CPTII,S$GLB,, | Performed by: INTERNAL MEDICINE

## 2021-02-04 PROCEDURE — 99214 PR OFFICE/OUTPT VISIT, EST, LEVL IV, 30-39 MIN: ICD-10-PCS | Mod: S$GLB,,, | Performed by: INTERNAL MEDICINE

## 2021-02-04 PROCEDURE — 99999 PR PBB SHADOW E&M-EST. PATIENT-LVL V: ICD-10-PCS | Mod: PBBFAC,,, | Performed by: INTERNAL MEDICINE

## 2021-02-04 PROCEDURE — 1125F AMNT PAIN NOTED PAIN PRSNT: CPT | Mod: S$GLB,,, | Performed by: INTERNAL MEDICINE

## 2021-02-04 PROCEDURE — 99499 RISK ADDL DX/OHS AUDIT: ICD-10-PCS | Mod: ,,, | Performed by: INTERNAL MEDICINE

## 2021-02-04 PROCEDURE — 83036 HEMOGLOBIN GLYCOSYLATED A1C: CPT

## 2021-02-04 PROCEDURE — 99499 UNLISTED E&M SERVICE: CPT | Mod: S$GLB,,, | Performed by: INTERNAL MEDICINE

## 2021-02-04 PROCEDURE — 1101F PR PT FALLS ASSESS DOC 0-1 FALLS W/OUT INJ PAST YR: ICD-10-PCS | Mod: CPTII,S$GLB,, | Performed by: INTERNAL MEDICINE

## 2021-02-04 PROCEDURE — 1159F MED LIST DOCD IN RCRD: CPT | Mod: S$GLB,,, | Performed by: INTERNAL MEDICINE

## 2021-02-04 PROCEDURE — 3074F PR MOST RECENT SYSTOLIC BLOOD PRESSURE < 130 MM HG: ICD-10-PCS | Mod: CPTII,S$GLB,, | Performed by: INTERNAL MEDICINE

## 2021-02-04 RX ORDER — AMLODIPINE BESYLATE 5 MG/1
5 TABLET ORAL DAILY
Qty: 30 TABLET | Refills: 6 | Status: SHIPPED | OUTPATIENT
Start: 2021-02-04 | End: 2021-06-09

## 2021-02-04 RX ORDER — CHLORTHALIDONE 25 MG/1
25 TABLET ORAL DAILY
Qty: 90 TABLET | Refills: 3 | Status: SHIPPED | OUTPATIENT
Start: 2021-02-04 | End: 2021-05-03

## 2021-02-05 LAB
ESTIMATED AVG GLUCOSE: 123 MG/DL (ref 68–131)
HBA1C MFR BLD: 5.9 % (ref 4–5.6)
HCV AB SERPL QL IA: NEGATIVE

## 2021-02-10 ENCOUNTER — PATIENT OUTREACH (OUTPATIENT)
Dept: ADMINISTRATIVE | Facility: HOSPITAL | Age: 76
End: 2021-02-10

## 2021-02-15 ENCOUNTER — PATIENT OUTREACH (OUTPATIENT)
Dept: ADMINISTRATIVE | Facility: OTHER | Age: 76
End: 2021-02-15

## 2021-02-17 ENCOUNTER — HOSPITAL ENCOUNTER (OUTPATIENT)
Dept: RADIOLOGY | Facility: OTHER | Age: 76
Discharge: HOME | End: 2021-02-17
Attending: SURGERY
Payer: MEDICARE

## 2021-02-17 ENCOUNTER — OFFICE VISIT (OUTPATIENT)
Dept: VASCULAR SURGERY | Facility: CLINIC | Age: 76
End: 2021-02-17
Attending: INTERNAL MEDICINE
Payer: MEDICARE

## 2021-02-17 VITALS — SYSTOLIC BLOOD PRESSURE: 132 MMHG | TEMPERATURE: 98 F | DIASTOLIC BLOOD PRESSURE: 79 MMHG | HEART RATE: 92 BPM

## 2021-02-17 DIAGNOSIS — I87.2 VENOUS INSUFFICIENCY OF BOTH LOWER EXTREMITIES: ICD-10-CM

## 2021-02-17 DIAGNOSIS — M25.473 ANKLE SWELLING, UNSPECIFIED LATERALITY: ICD-10-CM

## 2021-02-17 PROCEDURE — 3078F DIAST BP <80 MM HG: CPT | Mod: CPTII,S$GLB,, | Performed by: SURGERY

## 2021-02-17 PROCEDURE — 93970 EXTREMITY STUDY: CPT | Mod: TC

## 2021-02-17 PROCEDURE — 3078F PR MOST RECENT DIASTOLIC BLOOD PRESSURE < 80 MM HG: ICD-10-PCS | Mod: CPTII,S$GLB,, | Performed by: SURGERY

## 2021-02-17 PROCEDURE — 93970 US LOWER EXTREMITY VEINS BILATERAL INSUFFICIENCY: ICD-10-PCS | Mod: 26,,, | Performed by: RADIOLOGY

## 2021-02-17 PROCEDURE — 1159F MED LIST DOCD IN RCRD: CPT | Mod: S$GLB,,, | Performed by: SURGERY

## 2021-02-17 PROCEDURE — 1126F PR PAIN SEVERITY QUANTIFIED, NO PAIN PRESENT: ICD-10-PCS | Mod: S$GLB,,, | Performed by: SURGERY

## 2021-02-17 PROCEDURE — 1159F PR MEDICATION LIST DOCUMENTED IN MEDICAL RECORD: ICD-10-PCS | Mod: S$GLB,,, | Performed by: SURGERY

## 2021-02-17 PROCEDURE — 3075F SYST BP GE 130 - 139MM HG: CPT | Mod: CPTII,S$GLB,, | Performed by: SURGERY

## 2021-02-17 PROCEDURE — 99202 OFFICE O/P NEW SF 15 MIN: CPT | Mod: S$GLB,,, | Performed by: SURGERY

## 2021-02-17 PROCEDURE — 93970 EXTREMITY STUDY: CPT | Mod: 26,,, | Performed by: RADIOLOGY

## 2021-02-17 PROCEDURE — 1126F AMNT PAIN NOTED NONE PRSNT: CPT | Mod: S$GLB,,, | Performed by: SURGERY

## 2021-02-17 PROCEDURE — 3075F PR MOST RECENT SYSTOLIC BLOOD PRESS GE 130-139MM HG: ICD-10-PCS | Mod: CPTII,S$GLB,, | Performed by: SURGERY

## 2021-02-17 PROCEDURE — 99202 PR OFFICE/OUTPT VISIT, NEW, LEVL II, 15-29 MIN: ICD-10-PCS | Mod: S$GLB,,, | Performed by: SURGERY

## 2021-04-13 ENCOUNTER — OFFICE VISIT (OUTPATIENT)
Dept: INTERNAL MEDICINE | Facility: CLINIC | Age: 76
End: 2021-04-13
Attending: INTERNAL MEDICINE
Payer: MEDICARE

## 2021-04-13 ENCOUNTER — TELEPHONE (OUTPATIENT)
Dept: INTERNAL MEDICINE | Facility: CLINIC | Age: 76
End: 2021-04-13

## 2021-04-13 ENCOUNTER — LAB VISIT (OUTPATIENT)
Dept: LAB | Facility: OTHER | Age: 76
End: 2021-04-13
Payer: MEDICARE

## 2021-04-13 VITALS
SYSTOLIC BLOOD PRESSURE: 132 MMHG | HEART RATE: 79 BPM | HEIGHT: 71 IN | WEIGHT: 226 LBS | OXYGEN SATURATION: 97 % | DIASTOLIC BLOOD PRESSURE: 76 MMHG | BODY MASS INDEX: 31.64 KG/M2

## 2021-04-13 DIAGNOSIS — I10 ESSENTIAL HYPERTENSION: ICD-10-CM

## 2021-04-13 DIAGNOSIS — N18.2 CKD (CHRONIC KIDNEY DISEASE), STAGE II: ICD-10-CM

## 2021-04-13 DIAGNOSIS — I10 ESSENTIAL HYPERTENSION: Primary | ICD-10-CM

## 2021-04-13 DIAGNOSIS — R60.9 EDEMA, UNSPECIFIED TYPE: Primary | ICD-10-CM

## 2021-04-13 DIAGNOSIS — E87.6 HYPOKALEMIA: ICD-10-CM

## 2021-04-13 DIAGNOSIS — R60.9 EDEMA, UNSPECIFIED TYPE: ICD-10-CM

## 2021-04-13 DIAGNOSIS — R79.9 ABNORMAL FINDING OF BLOOD CHEMISTRY, UNSPECIFIED: ICD-10-CM

## 2021-04-13 DIAGNOSIS — D22.9 MULTIPLE ATYPICAL NEVI: ICD-10-CM

## 2021-04-13 LAB
ANION GAP SERPL CALC-SCNC: 10 MMOL/L (ref 8–16)
BUN SERPL-MCNC: 18 MG/DL (ref 8–23)
CALCIUM SERPL-MCNC: 9.1 MG/DL (ref 8.7–10.5)
CHLORIDE SERPL-SCNC: 105 MMOL/L (ref 95–110)
CO2 SERPL-SCNC: 28 MMOL/L (ref 23–29)
CREAT SERPL-MCNC: 1.2 MG/DL (ref 0.5–1.4)
EST. GFR  (AFRICAN AMERICAN): >60 ML/MIN/1.73 M^2
EST. GFR  (NON AFRICAN AMERICAN): 58 ML/MIN/1.73 M^2
ESTIMATED AVG GLUCOSE: 120 MG/DL (ref 68–131)
GLUCOSE SERPL-MCNC: 101 MG/DL (ref 70–110)
HBA1C MFR BLD: 5.8 % (ref 4–5.6)
POTASSIUM SERPL-SCNC: 3.6 MMOL/L (ref 3.5–5.1)
SODIUM SERPL-SCNC: 143 MMOL/L (ref 136–145)

## 2021-04-13 PROCEDURE — 1100F PR PT FALLS ASSESS DOC 2+ FALLS/FALL W/INJURY/YR: ICD-10-PCS | Mod: CPTII,S$GLB,, | Performed by: PHYSICIAN ASSISTANT

## 2021-04-13 PROCEDURE — 99213 OFFICE O/P EST LOW 20 MIN: CPT | Mod: S$GLB,,, | Performed by: PHYSICIAN ASSISTANT

## 2021-04-13 PROCEDURE — 99999 PR PBB SHADOW E&M-EST. PATIENT-LVL V: ICD-10-PCS | Mod: PBBFAC,,, | Performed by: PHYSICIAN ASSISTANT

## 2021-04-13 PROCEDURE — 1159F PR MEDICATION LIST DOCUMENTED IN MEDICAL RECORD: ICD-10-PCS | Mod: S$GLB,,, | Performed by: PHYSICIAN ASSISTANT

## 2021-04-13 PROCEDURE — 99999 PR PBB SHADOW E&M-EST. PATIENT-LVL V: CPT | Mod: PBBFAC,,, | Performed by: PHYSICIAN ASSISTANT

## 2021-04-13 PROCEDURE — 3288F FALL RISK ASSESSMENT DOCD: CPT | Mod: CPTII,S$GLB,, | Performed by: PHYSICIAN ASSISTANT

## 2021-04-13 PROCEDURE — 3288F PR FALLS RISK ASSESSMENT DOCUMENTED: ICD-10-PCS | Mod: CPTII,S$GLB,, | Performed by: PHYSICIAN ASSISTANT

## 2021-04-13 PROCEDURE — 1125F AMNT PAIN NOTED PAIN PRSNT: CPT | Mod: S$GLB,,, | Performed by: PHYSICIAN ASSISTANT

## 2021-04-13 PROCEDURE — 80048 BASIC METABOLIC PNL TOTAL CA: CPT | Performed by: PHYSICIAN ASSISTANT

## 2021-04-13 PROCEDURE — 83036 HEMOGLOBIN GLYCOSYLATED A1C: CPT | Performed by: PHYSICIAN ASSISTANT

## 2021-04-13 PROCEDURE — 86803 HEPATITIS C AB TEST: CPT | Performed by: PHYSICIAN ASSISTANT

## 2021-04-13 PROCEDURE — 1100F PTFALLS ASSESS-DOCD GE2>/YR: CPT | Mod: CPTII,S$GLB,, | Performed by: PHYSICIAN ASSISTANT

## 2021-04-13 PROCEDURE — 36415 COLL VENOUS BLD VENIPUNCTURE: CPT | Performed by: PHYSICIAN ASSISTANT

## 2021-04-13 PROCEDURE — 1159F MED LIST DOCD IN RCRD: CPT | Mod: S$GLB,,, | Performed by: PHYSICIAN ASSISTANT

## 2021-04-13 PROCEDURE — 99213 PR OFFICE/OUTPT VISIT, EST, LEVL III, 20-29 MIN: ICD-10-PCS | Mod: S$GLB,,, | Performed by: PHYSICIAN ASSISTANT

## 2021-04-13 PROCEDURE — 1125F PR PAIN SEVERITY QUANTIFIED, PAIN PRESENT: ICD-10-PCS | Mod: S$GLB,,, | Performed by: PHYSICIAN ASSISTANT

## 2021-04-14 LAB — HCV AB SERPL QL IA: NEGATIVE

## 2021-04-16 ENCOUNTER — OFFICE VISIT (OUTPATIENT)
Dept: HOME HEALTH SERVICES | Facility: CLINIC | Age: 76
End: 2021-04-16
Payer: MEDICARE

## 2021-04-16 VITALS
HEIGHT: 71 IN | SYSTOLIC BLOOD PRESSURE: 136 MMHG | WEIGHT: 225 LBS | HEART RATE: 71 BPM | BODY MASS INDEX: 31.5 KG/M2 | DIASTOLIC BLOOD PRESSURE: 81 MMHG | TEMPERATURE: 98 F

## 2021-04-16 DIAGNOSIS — N13.8 BPH WITH OBSTRUCTION/LOWER URINARY TRACT SYMPTOMS: ICD-10-CM

## 2021-04-16 DIAGNOSIS — Z00.00 ENCOUNTER FOR PREVENTIVE HEALTH EXAMINATION: Primary | ICD-10-CM

## 2021-04-16 DIAGNOSIS — I45.10 RIGHT BUNDLE BRANCH BLOCK: ICD-10-CM

## 2021-04-16 DIAGNOSIS — Z87.898 HX OF MULTIPLE PULMONARY NODULES: ICD-10-CM

## 2021-04-16 DIAGNOSIS — I71.40 ABDOMINAL AORTIC ANEURYSM (AAA) WITHOUT RUPTURE: ICD-10-CM

## 2021-04-16 DIAGNOSIS — Z99.89 DEPENDENCE ON OTHER ENABLING MACHINES AND DEVICES: ICD-10-CM

## 2021-04-16 DIAGNOSIS — I51.89 GRADE I DIASTOLIC DYSFUNCTION: ICD-10-CM

## 2021-04-16 DIAGNOSIS — J43.9 PULMONARY EMPHYSEMA, UNSPECIFIED EMPHYSEMA TYPE: ICD-10-CM

## 2021-04-16 DIAGNOSIS — E78.2 MIXED HYPERLIPIDEMIA: ICD-10-CM

## 2021-04-16 DIAGNOSIS — E11.42 DIABETIC POLYNEUROPATHY ASSOCIATED WITH TYPE 2 DIABETES MELLITUS: ICD-10-CM

## 2021-04-16 DIAGNOSIS — I10 ESSENTIAL HYPERTENSION: ICD-10-CM

## 2021-04-16 DIAGNOSIS — J44.89 OBSTRUCTIVE CHRONIC BRONCHITIS WITHOUT EXACERBATION: ICD-10-CM

## 2021-04-16 DIAGNOSIS — N40.1 BPH WITH OBSTRUCTION/LOWER URINARY TRACT SYMPTOMS: ICD-10-CM

## 2021-04-16 DIAGNOSIS — I35.1 MILD AORTIC REGURGITATION WITH LEFT VENTRICULAR SYSTOLIC DYSFUNCTION BY PRIOR ECHOCARDIOGRAM: ICD-10-CM

## 2021-04-16 DIAGNOSIS — Z74.09 OTHER REDUCED MOBILITY: ICD-10-CM

## 2021-04-16 DIAGNOSIS — I51.9 MILD AORTIC REGURGITATION WITH LEFT VENTRICULAR SYSTOLIC DYSFUNCTION BY PRIOR ECHOCARDIOGRAM: ICD-10-CM

## 2021-04-16 DIAGNOSIS — M85.80 OSTEOPENIA, UNSPECIFIED LOCATION: ICD-10-CM

## 2021-04-16 DIAGNOSIS — K21.9 GASTROESOPHAGEAL REFLUX DISEASE, UNSPECIFIED WHETHER ESOPHAGITIS PRESENT: ICD-10-CM

## 2021-04-16 PROCEDURE — 1126F AMNT PAIN NOTED NONE PRSNT: CPT | Mod: S$GLB,,, | Performed by: NURSE PRACTITIONER

## 2021-04-16 PROCEDURE — 99499 UNLISTED E&M SERVICE: CPT | Mod: S$GLB,,, | Performed by: NURSE PRACTITIONER

## 2021-04-16 PROCEDURE — 99499 RISK ADDL DX/OHS AUDIT: ICD-10-PCS | Mod: S$GLB,,, | Performed by: NURSE PRACTITIONER

## 2021-04-16 PROCEDURE — 3288F PR FALLS RISK ASSESSMENT DOCUMENTED: ICD-10-PCS | Mod: CPTII,S$GLB,, | Performed by: NURSE PRACTITIONER

## 2021-04-16 PROCEDURE — 1100F PR PT FALLS ASSESS DOC 2+ FALLS/FALL W/INJURY/YR: ICD-10-PCS | Mod: CPTII,S$GLB,, | Performed by: NURSE PRACTITIONER

## 2021-04-16 PROCEDURE — 3288F FALL RISK ASSESSMENT DOCD: CPT | Mod: CPTII,S$GLB,, | Performed by: NURSE PRACTITIONER

## 2021-04-16 PROCEDURE — G0439 PR MEDICARE ANNUAL WELLNESS SUBSEQUENT VISIT: ICD-10-PCS | Mod: S$GLB,,, | Performed by: NURSE PRACTITIONER

## 2021-04-16 PROCEDURE — G0439 PPPS, SUBSEQ VISIT: HCPCS | Mod: S$GLB,,, | Performed by: NURSE PRACTITIONER

## 2021-04-16 PROCEDURE — 1158F PR ADVANCE CARE PLANNING DISCUSS DOCUMENTED IN MEDICAL RECORD: ICD-10-PCS | Mod: S$GLB,,, | Performed by: NURSE PRACTITIONER

## 2021-04-16 PROCEDURE — 1100F PTFALLS ASSESS-DOCD GE2>/YR: CPT | Mod: CPTII,S$GLB,, | Performed by: NURSE PRACTITIONER

## 2021-04-16 PROCEDURE — 1126F PR PAIN SEVERITY QUANTIFIED, NO PAIN PRESENT: ICD-10-PCS | Mod: S$GLB,,, | Performed by: NURSE PRACTITIONER

## 2021-04-16 PROCEDURE — 1158F ADVNC CARE PLAN TLK DOCD: CPT | Mod: S$GLB,,, | Performed by: NURSE PRACTITIONER

## 2021-04-18 PROBLEM — N18.2 CKD (CHRONIC KIDNEY DISEASE), STAGE II: Status: RESOLVED | Noted: 2019-04-01 | Resolved: 2021-04-18

## 2021-04-26 ENCOUNTER — HOSPITAL ENCOUNTER (OUTPATIENT)
Facility: HOSPITAL | Age: 76
Discharge: HOME | End: 2021-04-27
Attending: EMERGENCY MEDICINE | Admitting: EMERGENCY MEDICINE
Payer: MEDICARE

## 2021-04-26 DIAGNOSIS — E86.0 DEHYDRATION: ICD-10-CM

## 2021-04-26 DIAGNOSIS — R42 LIGHTHEADEDNESS: ICD-10-CM
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
## 2021-04-26 DIAGNOSIS — R55 NEAR SYNCOPE: ICD-10-CM

## 2021-04-26 DIAGNOSIS — E87.6 HYPOKALEMIA: ICD-10-CM

## 2021-04-26 DIAGNOSIS — R53.1 EPISODIC WEAKNESS: Primary | ICD-10-CM

## 2021-04-26 LAB
ALBUMIN SERPL BCP-MCNC: 3.4 G/DL (ref 3.5–5.2)
ALP SERPL-CCNC: 83 U/L (ref 55–135)
ALT SERPL W/O P-5'-P-CCNC: 19 U/L (ref 10–44)
ANION GAP SERPL CALC-SCNC: 11 MMOL/L (ref 8–16)
APTT BLDCRRT: <21 SEC (ref 21–32)
AST SERPL-CCNC: 29 U/L (ref 10–40)
BASOPHILS # BLD AUTO: 0.02 K/UL (ref 0–0.2)
BASOPHILS NFR BLD: 0.3 % (ref 0–1.9)
BILIRUB SERPL-MCNC: 0.5 MG/DL (ref 0.1–1)
BUN SERPL-MCNC: 13 MG/DL (ref 8–23)
CALCIUM SERPL-MCNC: 8.1 MG/DL (ref 8.7–10.5)
CHLORIDE SERPL-SCNC: 107 MMOL/L (ref 95–110)
CO2 SERPL-SCNC: 20 MMOL/L (ref 23–29)
CREAT SERPL-MCNC: 1.2 MG/DL (ref 0.5–1.4)
CTP QC/QA: YES
DIFFERENTIAL METHOD: ABNORMAL
EOSINOPHIL # BLD AUTO: 0.2 K/UL (ref 0–0.5)
EOSINOPHIL NFR BLD: 3.2 % (ref 0–8)
ERYTHROCYTE [DISTWIDTH] IN BLOOD BY AUTOMATED COUNT: 13.4 % (ref 11.5–14.5)
EST. GFR  (AFRICAN AMERICAN): >60 ML/MIN/1.73 M^2
EST. GFR  (NON AFRICAN AMERICAN): 58.4 ML/MIN/1.73 M^2
GLUCOSE SERPL-MCNC: 116 MG/DL (ref 70–110)
HCT VFR BLD AUTO: 41.2 % (ref 40–54)
HGB BLD-MCNC: 13.6 G/DL (ref 14–18)
IMM GRANULOCYTES # BLD AUTO: 0.02 K/UL (ref 0–0.04)
IMM GRANULOCYTES NFR BLD AUTO: 0.3 % (ref 0–0.5)
INR PPP: 1 (ref 0.8–1.2)
LYMPHOCYTES # BLD AUTO: 1.5 K/UL (ref 1–4.8)
LYMPHOCYTES NFR BLD: 20.5 % (ref 18–48)
MAGNESIUM SERPL-MCNC: 1.7 MG/DL (ref 1.6–2.6)
MCH RBC QN AUTO: 29.2 PG (ref 27–31)
MCHC RBC AUTO-ENTMCNC: 33 G/DL (ref 32–36)
MCV RBC AUTO: 89 FL (ref 82–98)
MONOCYTES # BLD AUTO: 0.5 K/UL (ref 0.3–1)
MONOCYTES NFR BLD: 6.5 % (ref 4–15)
NEUTROPHILS # BLD AUTO: 4.9 K/UL (ref 1.8–7.7)
NEUTROPHILS NFR BLD: 69.2 % (ref 38–73)
NRBC BLD-RTO: 0 /100 WBC
PLATELET # BLD AUTO: 218 K/UL (ref 150–450)
PMV BLD AUTO: 9.4 FL (ref 9.2–12.9)
POTASSIUM SERPL-SCNC: 4.6 MMOL/L (ref 3.5–5.1)
PROT SERPL-MCNC: 7.2 G/DL (ref 6–8.4)
PROTHROMBIN TIME: 10.9 SEC (ref 9–12.5)
RBC # BLD AUTO: 4.65 M/UL (ref 4.6–6.2)
SARS-COV-2 RDRP RESP QL NAA+PROBE: NEGATIVE
SODIUM SERPL-SCNC: 138 MMOL/L (ref 136–145)
TROPONIN I SERPL DL<=0.01 NG/ML-MCNC: 0.02 NG/ML (ref 0–0.03)
WBC # BLD AUTO: 7.08 K/UL (ref 3.9–12.7)

## 2021-04-26 PROCEDURE — 80047 BASIC METABLC PNL IONIZED CA: CPT | Mod: 59

## 2021-04-26 PROCEDURE — U0002 COVID-19 LAB TEST NON-CDC: HCPCS | Performed by: EMERGENCY MEDICINE

## 2021-04-26 PROCEDURE — 85730 THROMBOPLASTIN TIME PARTIAL: CPT | Performed by: EMERGENCY MEDICINE

## 2021-04-26 PROCEDURE — 85025 COMPLETE CBC W/AUTO DIFF WBC: CPT | Performed by: EMERGENCY MEDICINE

## 2021-04-26 PROCEDURE — 93005 ELECTROCARDIOGRAM TRACING: CPT

## 2021-04-26 PROCEDURE — 63600175 PHARM REV CODE 636 W HCPCS: Performed by: STUDENT IN AN ORGANIZED HEALTH CARE EDUCATION/TRAINING PROGRAM

## 2021-04-26 PROCEDURE — 83735 ASSAY OF MAGNESIUM: CPT | Performed by: EMERGENCY MEDICINE

## 2021-04-26 PROCEDURE — 99284 PR EMERGENCY DEPT VISIT,LEVEL IV: ICD-10-PCS | Mod: CS,,, | Performed by: EMERGENCY MEDICINE

## 2021-04-26 PROCEDURE — 93010 ELECTROCARDIOGRAM REPORT: CPT | Mod: ,,, | Performed by: STUDENT IN AN ORGANIZED HEALTH CARE EDUCATION/TRAINING PROGRAM

## 2021-04-26 PROCEDURE — 63600175 PHARM REV CODE 636 W HCPCS: Performed by: PHYSICIAN ASSISTANT

## 2021-04-26 PROCEDURE — 25000003 PHARM REV CODE 250: Performed by: PHYSICIAN ASSISTANT

## 2021-04-26 PROCEDURE — 85610 PROTHROMBIN TIME: CPT | Performed by: EMERGENCY MEDICINE

## 2021-04-26 PROCEDURE — 84484 ASSAY OF TROPONIN QUANT: CPT | Performed by: EMERGENCY MEDICINE

## 2021-04-26 PROCEDURE — G0378 HOSPITAL OBSERVATION PER HR: HCPCS

## 2021-04-26 PROCEDURE — 96360 HYDRATION IV INFUSION INIT: CPT

## 2021-04-26 PROCEDURE — 99285 EMERGENCY DEPT VISIT HI MDM: CPT | Mod: 25

## 2021-04-26 PROCEDURE — 80053 COMPREHEN METABOLIC PANEL: CPT | Performed by: EMERGENCY MEDICINE

## 2021-04-26 PROCEDURE — 99284 EMERGENCY DEPT VISIT MOD MDM: CPT | Mod: CS,,, | Performed by: EMERGENCY MEDICINE

## 2021-04-26 PROCEDURE — 96361 HYDRATE IV INFUSION ADD-ON: CPT

## 2021-04-26 PROCEDURE — 93010 EKG 12-LEAD: ICD-10-PCS | Mod: ,,, | Performed by: STUDENT IN AN ORGANIZED HEALTH CARE EDUCATION/TRAINING PROGRAM

## 2021-04-26 RX ORDER — ALBUTEROL SULFATE 90 UG/1
2 AEROSOL, METERED RESPIRATORY (INHALATION) EVERY 6 HOURS PRN
Status: DISCONTINUED | OUTPATIENT
Start: 2021-04-26 | End: 2021-04-27 | Stop reason: HOSPADM

## 2021-04-26 RX ORDER — ACETAMINOPHEN 325 MG/1
650 TABLET ORAL EVERY 6 HOURS PRN
Status: DISCONTINUED | OUTPATIENT
Start: 2021-04-26 | End: 2021-04-27 | Stop reason: HOSPADM

## 2021-04-26 RX ORDER — SODIUM CHLORIDE 0.9 % (FLUSH) 0.9 %
10 SYRINGE (ML) INJECTION
Status: DISCONTINUED | OUTPATIENT
Start: 2021-04-26 | End: 2021-04-27 | Stop reason: HOSPADM

## 2021-04-26 RX ORDER — DULOXETIN HYDROCHLORIDE 60 MG/1
60 CAPSULE, DELAYED RELEASE ORAL DAILY
Status: DISCONTINUED | OUTPATIENT
Start: 2021-04-27 | End: 2021-04-27 | Stop reason: HOSPADM

## 2021-04-26 RX ORDER — SODIUM CHLORIDE, SODIUM LACTATE, POTASSIUM CHLORIDE, CALCIUM CHLORIDE 600; 310; 30; 20 MG/100ML; MG/100ML; MG/100ML; MG/100ML
500 INJECTION, SOLUTION INTRAVENOUS
Status: COMPLETED | OUTPATIENT
Start: 2021-04-26 | End: 2021-04-27

## 2021-04-26 RX ORDER — ASPIRIN 81 MG/1
81 TABLET ORAL DAILY
Status: DISCONTINUED | OUTPATIENT
Start: 2021-04-27 | End: 2021-04-27 | Stop reason: HOSPADM

## 2021-04-26 RX ORDER — CHLORTHALIDONE 25 MG/1
25 TABLET ORAL DAILY
Status: DISCONTINUED | OUTPATIENT
Start: 2021-04-27 | End: 2021-04-27 | Stop reason: HOSPADM

## 2021-04-26 RX ORDER — TALC
6 POWDER (GRAM) TOPICAL NIGHTLY PRN
Status: DISCONTINUED | OUTPATIENT
Start: 2021-04-26 | End: 2021-04-27 | Stop reason: HOSPADM

## 2021-04-26 RX ORDER — LOSARTAN POTASSIUM 50 MG/1
100 TABLET ORAL DAILY
Refills: 3 | Status: DISCONTINUED | OUTPATIENT
Start: 2021-04-27 | End: 2021-04-27 | Stop reason: HOSPADM

## 2021-04-26 RX ORDER — AMLODIPINE BESYLATE 5 MG/1
5 TABLET ORAL DAILY
Status: DISCONTINUED | OUTPATIENT
Start: 2021-04-27 | End: 2021-04-27 | Stop reason: HOSPADM

## 2021-04-26 RX ADMIN — SODIUM CHLORIDE, SODIUM LACTATE, POTASSIUM CHLORIDE, AND CALCIUM CHLORIDE 500 ML: .6; .31; .03; .02 INJECTION, SOLUTION INTRAVENOUS at 10:04

## 2021-04-26 RX ADMIN — SODIUM CHLORIDE, SODIUM LACTATE, POTASSIUM CHLORIDE, AND CALCIUM CHLORIDE 500 ML: .6; .31; .03; .02 INJECTION, SOLUTION INTRAVENOUS at 08:04

## 2021-04-26 RX ADMIN — GABAPENTIN 600 MG: 400 CAPSULE ORAL at 10:04

## 2021-04-27 VITALS
HEIGHT: 71 IN | BODY MASS INDEX: 30.1 KG/M2 | HEART RATE: 67 BPM | RESPIRATION RATE: 18 BRPM | SYSTOLIC BLOOD PRESSURE: 130 MMHG | OXYGEN SATURATION: 99 % | WEIGHT: 215 LBS | DIASTOLIC BLOOD PRESSURE: 75 MMHG | TEMPERATURE: 98 F

## 2021-04-27 LAB
ANION GAP SERPL CALC-SCNC: 8 MMOL/L (ref 8–16)
BASOPHILS # BLD AUTO: 0.02 K/UL (ref 0–0.2)
BASOPHILS NFR BLD: 0.3 % (ref 0–1.9)
BILIRUB UR QL STRIP: NEGATIVE
BUN SERPL-MCNC: 12 MG/DL (ref 6–30)
BUN SERPL-MCNC: 14 MG/DL (ref 8–23)
CALCIUM SERPL-MCNC: 8.3 MG/DL (ref 8.7–10.5)
CHLORIDE SERPL-SCNC: 101 MMOL/L (ref 95–110)
CHLORIDE SERPL-SCNC: 105 MMOL/L (ref 95–110)
CLARITY UR REFRACT.AUTO: CLEAR
CO2 SERPL-SCNC: 26 MMOL/L (ref 23–29)
COLOR UR AUTO: YELLOW
CREAT SERPL-MCNC: 0.9 MG/DL (ref 0.5–1.4)
CREAT SERPL-MCNC: 1 MG/DL (ref 0.5–1.4)
DIFFERENTIAL METHOD: ABNORMAL
EOSINOPHIL # BLD AUTO: 0.3 K/UL (ref 0–0.5)
EOSINOPHIL NFR BLD: 4.2 % (ref 0–8)
ERYTHROCYTE [DISTWIDTH] IN BLOOD BY AUTOMATED COUNT: 13.3 % (ref 11.5–14.5)
EST. GFR  (AFRICAN AMERICAN): >60 ML/MIN/1.73 M^2
EST. GFR  (NON AFRICAN AMERICAN): >60 ML/MIN/1.73 M^2
GLUCOSE SERPL-MCNC: 111 MG/DL (ref 70–110)
GLUCOSE SERPL-MCNC: 124 MG/DL (ref 70–110)
GLUCOSE UR QL STRIP: NEGATIVE
HCT VFR BLD AUTO: 37.1 % (ref 40–54)
HCT VFR BLD CALC: 39 %PCV (ref 36–54)
HGB BLD-MCNC: 12.4 G/DL (ref 14–18)
HGB UR QL STRIP: NEGATIVE
IMM GRANULOCYTES # BLD AUTO: 0.02 K/UL (ref 0–0.04)
IMM GRANULOCYTES NFR BLD AUTO: 0.3 % (ref 0–0.5)
KETONES UR QL STRIP: NEGATIVE
LEUKOCYTE ESTERASE UR QL STRIP: NEGATIVE
LYMPHOCYTES # BLD AUTO: 2.1 K/UL (ref 1–4.8)
LYMPHOCYTES NFR BLD: 34.2 % (ref 18–48)
MCH RBC QN AUTO: 29.7 PG (ref 27–31)
MCHC RBC AUTO-ENTMCNC: 33.4 G/DL (ref 32–36)
MCV RBC AUTO: 89 FL (ref 82–98)
MONOCYTES # BLD AUTO: 0.6 K/UL (ref 0.3–1)
MONOCYTES NFR BLD: 9.5 % (ref 4–15)
NEUTROPHILS # BLD AUTO: 3.1 K/UL (ref 1.8–7.7)
NEUTROPHILS NFR BLD: 51.5 % (ref 38–73)
NITRITE UR QL STRIP: NEGATIVE
NRBC BLD-RTO: 0 /100 WBC
PH UR STRIP: 5 [PH] (ref 5–8)
PLATELET # BLD AUTO: 196 K/UL (ref 150–450)
PMV BLD AUTO: 9.2 FL (ref 9.2–12.9)
POC IONIZED CALCIUM: 1.13 MMOL/L (ref 1.06–1.42)
POC TCO2 (MEASURED): 28 MMOL/L (ref 23–29)
POTASSIUM BLD-SCNC: 3.4 MMOL/L (ref 3.5–5.1)
POTASSIUM SERPL-SCNC: 3.4 MMOL/L (ref 3.5–5.1)
PROT UR QL STRIP: NEGATIVE
RBC # BLD AUTO: 4.18 M/UL (ref 4.6–6.2)
SAMPLE: ABNORMAL
SODIUM BLD-SCNC: 141 MMOL/L (ref 136–145)
SODIUM SERPL-SCNC: 139 MMOL/L (ref 136–145)
SP GR UR STRIP: 1.01 (ref 1–1.03)
URN SPEC COLLECT METH UR: NORMAL
WBC # BLD AUTO: 5.99 K/UL (ref 3.9–12.7)

## 2021-04-27 PROCEDURE — 80048 BASIC METABOLIC PNL TOTAL CA: CPT | Performed by: PHYSICIAN ASSISTANT

## 2021-04-27 PROCEDURE — 81003 URINALYSIS AUTO W/O SCOPE: CPT | Performed by: PHYSICIAN ASSISTANT

## 2021-04-27 PROCEDURE — 85025 COMPLETE CBC W/AUTO DIFF WBC: CPT | Performed by: PHYSICIAN ASSISTANT

## 2021-04-27 PROCEDURE — 96361 HYDRATE IV INFUSION ADD-ON: CPT

## 2021-04-27 PROCEDURE — 25000003 PHARM REV CODE 250: Performed by: PHYSICIAN ASSISTANT

## 2021-04-27 PROCEDURE — G0378 HOSPITAL OBSERVATION PER HR: HCPCS

## 2021-04-27 RX ORDER — POTASSIUM CHLORIDE 750 MG/1
20 CAPSULE, EXTENDED RELEASE ORAL ONCE
Status: COMPLETED | OUTPATIENT
Start: 2021-04-27 | End: 2021-04-27

## 2021-04-27 RX ADMIN — AMLODIPINE BESYLATE 5 MG: 5 TABLET ORAL at 09:04

## 2021-04-27 RX ADMIN — POTASSIUM CHLORIDE 20 MEQ: 10 CAPSULE, COATED, EXTENDED RELEASE ORAL at 11:04

## 2021-04-27 RX ADMIN — DULOXETINE HYDROCHLORIDE 60 MG: 60 CAPSULE, DELAYED RELEASE ORAL at 09:04

## 2021-04-27 RX ADMIN — LOSARTAN POTASSIUM 100 MG: 50 TABLET, FILM COATED ORAL at 09:04

## 2021-04-27 RX ADMIN — ASPIRIN 81 MG: 81 TABLET, COATED ORAL at 09:04

## 2021-04-27 RX ADMIN — CHLORTHALIDONE 25 MG: 25 TABLET ORAL at 10:04

## 2021-04-28 ENCOUNTER — TELEPHONE (OUTPATIENT)
Dept: INTERNAL MEDICINE | Facility: CLINIC | Age: 76
End: 2021-04-28

## 2021-05-03 ENCOUNTER — OFFICE VISIT (OUTPATIENT)
Dept: INTERNAL MEDICINE | Facility: CLINIC | Age: 76
End: 2021-05-03
Attending: INTERNAL MEDICINE
Payer: MEDICARE

## 2021-05-03 VITALS
SYSTOLIC BLOOD PRESSURE: 132 MMHG | OXYGEN SATURATION: 95 % | WEIGHT: 225.06 LBS | DIASTOLIC BLOOD PRESSURE: 80 MMHG | BODY MASS INDEX: 31.51 KG/M2 | HEART RATE: 84 BPM | HEIGHT: 71 IN

## 2021-05-03 DIAGNOSIS — I51.9 MILD AORTIC REGURGITATION WITH LEFT VENTRICULAR SYSTOLIC DYSFUNCTION BY PRIOR ECHOCARDIOGRAM: ICD-10-CM

## 2021-05-03 DIAGNOSIS — E11.42 DIABETIC POLYNEUROPATHY ASSOCIATED WITH TYPE 2 DIABETES MELLITUS: ICD-10-CM

## 2021-05-03 DIAGNOSIS — I67.82 SUBCORTICAL MICROVASCULAR ISCHEMIC OCCLUSIVE DISEASE: ICD-10-CM

## 2021-05-03 DIAGNOSIS — I10 ESSENTIAL HYPERTENSION: ICD-10-CM

## 2021-05-03 DIAGNOSIS — I35.1 MILD AORTIC REGURGITATION WITH LEFT VENTRICULAR SYSTOLIC DYSFUNCTION BY PRIOR ECHOCARDIOGRAM: ICD-10-CM

## 2021-05-03 DIAGNOSIS — R55 SYNCOPE, UNSPECIFIED SYNCOPE TYPE: Primary | ICD-10-CM

## 2021-05-03 PROCEDURE — 1159F PR MEDICATION LIST DOCUMENTED IN MEDICAL RECORD: ICD-10-PCS | Mod: S$GLB,,, | Performed by: INTERNAL MEDICINE

## 2021-05-03 PROCEDURE — 99999 PR PBB SHADOW E&M-EST. PATIENT-LVL V: CPT | Mod: PBBFAC,,, | Performed by: INTERNAL MEDICINE

## 2021-05-03 PROCEDURE — 3288F FALL RISK ASSESSMENT DOCD: CPT | Mod: CPTII,S$GLB,, | Performed by: INTERNAL MEDICINE

## 2021-05-03 PROCEDURE — 99214 OFFICE O/P EST MOD 30 MIN: CPT | Mod: S$GLB,,, | Performed by: INTERNAL MEDICINE

## 2021-05-03 PROCEDURE — 1125F PR PAIN SEVERITY QUANTIFIED, PAIN PRESENT: ICD-10-PCS | Mod: S$GLB,,, | Performed by: INTERNAL MEDICINE

## 2021-05-03 PROCEDURE — 1101F PT FALLS ASSESS-DOCD LE1/YR: CPT | Mod: CPTII,S$GLB,, | Performed by: INTERNAL MEDICINE

## 2021-05-03 PROCEDURE — 3288F PR FALLS RISK ASSESSMENT DOCUMENTED: ICD-10-PCS | Mod: CPTII,S$GLB,, | Performed by: INTERNAL MEDICINE

## 2021-05-03 PROCEDURE — 99214 PR OFFICE/OUTPT VISIT, EST, LEVL IV, 30-39 MIN: ICD-10-PCS | Mod: S$GLB,,, | Performed by: INTERNAL MEDICINE

## 2021-05-03 PROCEDURE — 1159F MED LIST DOCD IN RCRD: CPT | Mod: S$GLB,,, | Performed by: INTERNAL MEDICINE

## 2021-05-03 PROCEDURE — 1101F PR PT FALLS ASSESS DOC 0-1 FALLS W/OUT INJ PAST YR: ICD-10-PCS | Mod: CPTII,S$GLB,, | Performed by: INTERNAL MEDICINE

## 2021-05-03 PROCEDURE — 1125F AMNT PAIN NOTED PAIN PRSNT: CPT | Mod: S$GLB,,, | Performed by: INTERNAL MEDICINE

## 2021-05-03 PROCEDURE — 99999 PR PBB SHADOW E&M-EST. PATIENT-LVL V: ICD-10-PCS | Mod: PBBFAC,,, | Performed by: INTERNAL MEDICINE

## 2021-05-03 RX ORDER — CHLORTHALIDONE 25 MG/1
12.5 TABLET ORAL DAILY
Qty: 90 TABLET | Refills: 3 | Status: SHIPPED | OUTPATIENT
Start: 2021-05-03 | End: 2022-06-08

## 2021-05-03 RX ORDER — ASPIRIN 81 MG/1
81 TABLET ORAL DAILY
Qty: 90 TABLET | Refills: 3 | Status: SHIPPED | OUTPATIENT
Start: 2021-05-03 | End: 2022-01-18

## 2021-05-04 ENCOUNTER — PATIENT OUTREACH (OUTPATIENT)
Dept: ADMINISTRATIVE | Facility: OTHER | Age: 76
End: 2021-05-04

## 2021-05-04 ENCOUNTER — OFFICE VISIT (OUTPATIENT)
Dept: DERMATOLOGY | Facility: CLINIC | Age: 76
End: 2021-05-04
Payer: MEDICARE

## 2021-05-04 DIAGNOSIS — D22.9 CONGENITAL MELANOCYTIC NEVUS: ICD-10-CM

## 2021-05-04 DIAGNOSIS — L21.9 SEBORRHEIC DERMATITIS: Primary | ICD-10-CM

## 2021-05-04 DIAGNOSIS — L82.1 SEBORRHEIC KERATOSES: ICD-10-CM

## 2021-05-04 DIAGNOSIS — L85.3 XEROSIS CUTIS: ICD-10-CM

## 2021-05-04 DIAGNOSIS — Q82.5 CONGENITAL MELANOCYTIC NEVUS: ICD-10-CM

## 2021-05-04 PROCEDURE — 1159F MED LIST DOCD IN RCRD: CPT | Mod: S$GLB,,, | Performed by: DERMATOLOGY

## 2021-05-04 PROCEDURE — 99204 PR OFFICE/OUTPT VISIT, NEW, LEVL IV, 45-59 MIN: ICD-10-PCS | Mod: S$GLB,,, | Performed by: DERMATOLOGY

## 2021-05-04 PROCEDURE — 99999 PR PBB SHADOW E&M-EST. PATIENT-LVL I: CPT | Mod: PBBFAC,,,

## 2021-05-04 PROCEDURE — 99204 OFFICE O/P NEW MOD 45 MIN: CPT | Mod: S$GLB,,, | Performed by: DERMATOLOGY

## 2021-05-04 PROCEDURE — 1159F PR MEDICATION LIST DOCUMENTED IN MEDICAL RECORD: ICD-10-PCS | Mod: S$GLB,,, | Performed by: DERMATOLOGY

## 2021-05-04 PROCEDURE — 99999 PR PBB SHADOW E&M-EST. PATIENT-LVL I: ICD-10-PCS | Mod: PBBFAC,,,

## 2021-05-04 RX ORDER — KETOCONAZOLE 20 MG/ML
SHAMPOO, SUSPENSION TOPICAL
Qty: 120 ML | Refills: 5 | Status: SHIPPED | OUTPATIENT
Start: 2021-05-04 | End: 2022-09-08

## 2021-05-04 RX ORDER — KETOCONAZOLE 20 MG/G
CREAM TOPICAL
Qty: 60 G | Refills: 3 | Status: SHIPPED | OUTPATIENT
Start: 2021-05-04

## 2021-05-14 ENCOUNTER — OFFICE VISIT (OUTPATIENT)
Dept: OPTOMETRY | Facility: CLINIC | Age: 76
End: 2021-05-14
Payer: MEDICARE

## 2021-05-14 DIAGNOSIS — H52.203 HYPEROPIA OF BOTH EYES WITH ASTIGMATISM: ICD-10-CM

## 2021-05-14 DIAGNOSIS — H52.03 HYPEROPIA OF BOTH EYES WITH ASTIGMATISM: ICD-10-CM

## 2021-05-14 DIAGNOSIS — Z01.00 ENCOUNTER FOR ROUTINE EYE AND VISION EXAMINATION: Primary | ICD-10-CM

## 2021-05-14 PROCEDURE — 99999 PR PBB SHADOW E&M-EST. PATIENT-LVL IV: CPT | Mod: PBBFAC,,, | Performed by: OPTOMETRIST

## 2021-05-14 PROCEDURE — 92015 PR REFRACTION: ICD-10-PCS | Mod: S$GLB,,, | Performed by: OPTOMETRIST

## 2021-05-14 PROCEDURE — 92015 DETERMINE REFRACTIVE STATE: CPT | Mod: S$GLB,,, | Performed by: OPTOMETRIST

## 2021-05-14 PROCEDURE — 1126F AMNT PAIN NOTED NONE PRSNT: CPT | Mod: S$GLB,,, | Performed by: OPTOMETRIST

## 2021-05-14 PROCEDURE — 1126F PR PAIN SEVERITY QUANTIFIED, NO PAIN PRESENT: ICD-10-PCS | Mod: S$GLB,,, | Performed by: OPTOMETRIST

## 2021-05-14 PROCEDURE — 2023F DILAT RTA XM W/O RTNOPTHY: CPT | Mod: S$GLB,,, | Performed by: OPTOMETRIST

## 2021-05-14 PROCEDURE — 92014 COMPRE OPH EXAM EST PT 1/>: CPT | Mod: S$GLB,,, | Performed by: OPTOMETRIST

## 2021-05-14 PROCEDURE — 2023F PR DILATED RETINAL EXAM W/O EVID OF RETINOPATHY: ICD-10-PCS | Mod: S$GLB,,, | Performed by: OPTOMETRIST

## 2021-05-14 PROCEDURE — 92014 PR EYE EXAM, EST PATIENT,COMPREHESV: ICD-10-PCS | Mod: S$GLB,,, | Performed by: OPTOMETRIST

## 2021-05-14 PROCEDURE — 99999 PR PBB SHADOW E&M-EST. PATIENT-LVL IV: ICD-10-PCS | Mod: PBBFAC,,, | Performed by: OPTOMETRIST

## 2021-06-04 ENCOUNTER — OFFICE VISIT (OUTPATIENT)
Dept: PODIATRY | Facility: CLINIC | Age: 76
End: 2021-06-04
Payer: MEDICARE

## 2021-06-04 VITALS
HEIGHT: 71 IN | DIASTOLIC BLOOD PRESSURE: 82 MMHG | BODY MASS INDEX: 32.01 KG/M2 | WEIGHT: 228.63 LBS | SYSTOLIC BLOOD PRESSURE: 156 MMHG | HEART RATE: 74 BPM

## 2021-06-04 DIAGNOSIS — E11.42 DIABETIC POLYNEUROPATHY ASSOCIATED WITH TYPE 2 DIABETES MELLITUS: ICD-10-CM

## 2021-06-04 DIAGNOSIS — B35.1 ONYCHOMYCOSIS DUE TO DERMATOPHYTE: Primary | ICD-10-CM

## 2021-06-04 PROCEDURE — 99499 NO LOS: ICD-10-PCS | Mod: S$GLB,,, | Performed by: PODIATRIST

## 2021-06-04 PROCEDURE — 11721 PR DEBRIDEMENT OF NAILS, 6 OR MORE: ICD-10-PCS | Mod: Q9,S$GLB,, | Performed by: PODIATRIST

## 2021-06-04 PROCEDURE — 1126F PR PAIN SEVERITY QUANTIFIED, NO PAIN PRESENT: ICD-10-PCS | Mod: S$GLB,,, | Performed by: PODIATRIST

## 2021-06-04 PROCEDURE — 99999 PR PBB SHADOW E&M-EST. PATIENT-LVL IV: ICD-10-PCS | Mod: PBBFAC,,, | Performed by: PODIATRIST

## 2021-06-04 PROCEDURE — 99499 UNLISTED E&M SERVICE: CPT | Mod: S$GLB,,, | Performed by: PODIATRIST

## 2021-06-04 PROCEDURE — 99999 PR PBB SHADOW E&M-EST. PATIENT-LVL IV: CPT | Mod: PBBFAC,,, | Performed by: PODIATRIST

## 2021-06-04 PROCEDURE — 11721 DEBRIDE NAIL 6 OR MORE: CPT | Mod: Q9,S$GLB,, | Performed by: PODIATRIST

## 2021-06-04 PROCEDURE — 1126F AMNT PAIN NOTED NONE PRSNT: CPT | Mod: S$GLB,,, | Performed by: PODIATRIST

## 2021-06-10 ENCOUNTER — LAB VISIT (OUTPATIENT)
Dept: LAB | Facility: OTHER | Age: 76
End: 2021-06-10
Attending: INTERNAL MEDICINE
Payer: MEDICARE

## 2021-06-10 DIAGNOSIS — I10 ESSENTIAL HYPERTENSION: ICD-10-CM

## 2021-06-10 LAB
ANION GAP SERPL CALC-SCNC: 9 MMOL/L (ref 8–16)
BUN SERPL-MCNC: 16 MG/DL (ref 8–23)
CALCIUM SERPL-MCNC: 8.8 MG/DL (ref 8.7–10.5)
CHLORIDE SERPL-SCNC: 106 MMOL/L (ref 95–110)
CO2 SERPL-SCNC: 27 MMOL/L (ref 23–29)
CREAT SERPL-MCNC: 1.1 MG/DL (ref 0.5–1.4)
EST. GFR  (AFRICAN AMERICAN): >60 ML/MIN/1.73 M^2
EST. GFR  (NON AFRICAN AMERICAN): >60 ML/MIN/1.73 M^2
GLUCOSE SERPL-MCNC: 101 MG/DL (ref 70–110)
POTASSIUM SERPL-SCNC: 3.7 MMOL/L (ref 3.5–5.1)
SODIUM SERPL-SCNC: 142 MMOL/L (ref 136–145)

## 2021-06-10 PROCEDURE — 36415 COLL VENOUS BLD VENIPUNCTURE: CPT | Performed by: INTERNAL MEDICINE

## 2021-06-10 PROCEDURE — 80048 BASIC METABOLIC PNL TOTAL CA: CPT | Performed by: INTERNAL MEDICINE

## 2021-06-17 ENCOUNTER — OFFICE VISIT (OUTPATIENT)
Dept: INTERNAL MEDICINE | Facility: CLINIC | Age: 76
End: 2021-06-17
Attending: INTERNAL MEDICINE
Payer: MEDICARE

## 2021-06-17 ENCOUNTER — PATIENT MESSAGE (OUTPATIENT)
Dept: ADMINISTRATIVE | Facility: OTHER | Age: 76
End: 2021-06-17

## 2021-06-17 VITALS
BODY MASS INDEX: 31.18 KG/M2 | SYSTOLIC BLOOD PRESSURE: 128 MMHG | WEIGHT: 222.69 LBS | HEIGHT: 71 IN | DIASTOLIC BLOOD PRESSURE: 82 MMHG | HEART RATE: 93 BPM | OXYGEN SATURATION: 95 %

## 2021-06-17 DIAGNOSIS — I51.89 GRADE I DIASTOLIC DYSFUNCTION: ICD-10-CM

## 2021-06-17 DIAGNOSIS — I10 ESSENTIAL HYPERTENSION: ICD-10-CM

## 2021-06-17 DIAGNOSIS — E11.42 DIABETIC POLYNEUROPATHY ASSOCIATED WITH TYPE 2 DIABETES MELLITUS: Primary | ICD-10-CM

## 2021-06-17 DIAGNOSIS — R60.0 LOWER EXTREMITY EDEMA: ICD-10-CM

## 2021-06-17 DIAGNOSIS — E66.09 CLASS 1 OBESITY DUE TO EXCESS CALORIES WITH SERIOUS COMORBIDITY AND BODY MASS INDEX (BMI) OF 31.0 TO 31.9 IN ADULT: ICD-10-CM

## 2021-06-17 DIAGNOSIS — R55 SYNCOPE, UNSPECIFIED SYNCOPE TYPE: ICD-10-CM

## 2021-06-17 PROBLEM — E66.811 CLASS 1 OBESITY DUE TO EXCESS CALORIES WITH SERIOUS COMORBIDITY AND BODY MASS INDEX (BMI) OF 31.0 TO 31.9 IN ADULT: Status: ACTIVE | Noted: 2021-06-17

## 2021-06-17 PROCEDURE — 99499 UNLISTED E&M SERVICE: CPT | Mod: S$GLB,,, | Performed by: INTERNAL MEDICINE

## 2021-06-17 PROCEDURE — 1159F PR MEDICATION LIST DOCUMENTED IN MEDICAL RECORD: ICD-10-PCS | Mod: S$GLB,,, | Performed by: INTERNAL MEDICINE

## 2021-06-17 PROCEDURE — 3288F FALL RISK ASSESSMENT DOCD: CPT | Mod: CPTII,S$GLB,, | Performed by: INTERNAL MEDICINE

## 2021-06-17 PROCEDURE — 99214 PR OFFICE/OUTPT VISIT, EST, LEVL IV, 30-39 MIN: ICD-10-PCS | Mod: S$GLB,,, | Performed by: INTERNAL MEDICINE

## 2021-06-17 PROCEDURE — 99999 PR PBB SHADOW E&M-EST. PATIENT-LVL V: CPT | Mod: PBBFAC,,, | Performed by: INTERNAL MEDICINE

## 2021-06-17 PROCEDURE — 1101F PT FALLS ASSESS-DOCD LE1/YR: CPT | Mod: CPTII,S$GLB,, | Performed by: INTERNAL MEDICINE

## 2021-06-17 PROCEDURE — 99214 OFFICE O/P EST MOD 30 MIN: CPT | Mod: S$GLB,,, | Performed by: INTERNAL MEDICINE

## 2021-06-17 PROCEDURE — 99499 RISK ADDL DX/OHS AUDIT: ICD-10-PCS | Mod: S$GLB,,, | Performed by: INTERNAL MEDICINE

## 2021-06-17 PROCEDURE — 1159F MED LIST DOCD IN RCRD: CPT | Mod: S$GLB,,, | Performed by: INTERNAL MEDICINE

## 2021-06-17 PROCEDURE — 1125F AMNT PAIN NOTED PAIN PRSNT: CPT | Mod: S$GLB,,, | Performed by: INTERNAL MEDICINE

## 2021-06-17 PROCEDURE — 3288F PR FALLS RISK ASSESSMENT DOCUMENTED: ICD-10-PCS | Mod: CPTII,S$GLB,, | Performed by: INTERNAL MEDICINE

## 2021-06-17 PROCEDURE — 1125F PR PAIN SEVERITY QUANTIFIED, PAIN PRESENT: ICD-10-PCS | Mod: S$GLB,,, | Performed by: INTERNAL MEDICINE

## 2021-06-17 PROCEDURE — 1101F PR PT FALLS ASSESS DOC 0-1 FALLS W/OUT INJ PAST YR: ICD-10-PCS | Mod: CPTII,S$GLB,, | Performed by: INTERNAL MEDICINE

## 2021-06-17 PROCEDURE — 99999 PR PBB SHADOW E&M-EST. PATIENT-LVL V: ICD-10-PCS | Mod: PBBFAC,,, | Performed by: INTERNAL MEDICINE

## 2021-06-18 PROCEDURE — 99453 PR REMOTE MONITR, PHYSIOL PARAM, INITIAL: ICD-10-PCS | Mod: S$GLB,,, | Performed by: INTERNAL MEDICINE

## 2021-06-18 PROCEDURE — 99453 REM MNTR PHYSIOL PARAM SETUP: CPT | Mod: S$GLB,,, | Performed by: INTERNAL MEDICINE

## 2021-06-23 ENCOUNTER — TELEPHONE (OUTPATIENT)
Dept: NEUROLOGY | Facility: CLINIC | Age: 76
End: 2021-06-23

## 2021-07-01 ENCOUNTER — PATIENT MESSAGE (OUTPATIENT)
Dept: INTERNAL MEDICINE | Facility: CLINIC | Age: 76
End: 2021-07-01

## 2021-07-01 ENCOUNTER — CLINICAL SUPPORT (OUTPATIENT)
Dept: CARDIOLOGY | Facility: HOSPITAL | Age: 76
End: 2021-07-01
Attending: INTERNAL MEDICINE
Payer: MEDICARE

## 2021-07-01 DIAGNOSIS — R55 SYNCOPE, UNSPECIFIED SYNCOPE TYPE: ICD-10-CM

## 2021-07-01 PROCEDURE — 93271 ECG/MONITORING AND ANALYSIS: CPT

## 2021-07-01 PROCEDURE — 93272 ECG/REVIEW INTERPRET ONLY: CPT | Mod: ,,, | Performed by: INTERNAL MEDICINE

## 2021-07-01 PROCEDURE — 93272 CARDIAC EVENT MONITOR (CUPID ONLY): ICD-10-PCS | Mod: ,,, | Performed by: INTERNAL MEDICINE

## 2021-07-14 ENCOUNTER — PATIENT OUTREACH (OUTPATIENT)
Dept: ADMINISTRATIVE | Facility: OTHER | Age: 76
End: 2021-07-14

## 2021-07-16 ENCOUNTER — LAB VISIT (OUTPATIENT)
Dept: LAB | Facility: HOSPITAL | Age: 76
End: 2021-07-16
Attending: INTERNAL MEDICINE
Payer: MEDICARE

## 2021-07-16 ENCOUNTER — OFFICE VISIT (OUTPATIENT)
Dept: CARDIOLOGY | Facility: CLINIC | Age: 76
End: 2021-07-16
Payer: MEDICARE

## 2021-07-16 VITALS
HEIGHT: 71 IN | WEIGHT: 222.88 LBS | BODY MASS INDEX: 31.2 KG/M2 | DIASTOLIC BLOOD PRESSURE: 81 MMHG | SYSTOLIC BLOOD PRESSURE: 138 MMHG | HEART RATE: 68 BPM

## 2021-07-16 DIAGNOSIS — I51.89 GRADE I DIASTOLIC DYSFUNCTION: Primary | ICD-10-CM

## 2021-07-16 DIAGNOSIS — R55 SYNCOPE, UNSPECIFIED SYNCOPE TYPE: ICD-10-CM

## 2021-07-16 DIAGNOSIS — E78.2 MIXED HYPERLIPIDEMIA: ICD-10-CM

## 2021-07-16 DIAGNOSIS — R60.0 LOWER EXTREMITY EDEMA: ICD-10-CM

## 2021-07-16 DIAGNOSIS — I10 ESSENTIAL HYPERTENSION: ICD-10-CM

## 2021-07-16 DIAGNOSIS — E11.42 DIABETIC POLYNEUROPATHY ASSOCIATED WITH TYPE 2 DIABETES MELLITUS: ICD-10-CM

## 2021-07-16 DIAGNOSIS — R06.09 DOE (DYSPNEA ON EXERTION): ICD-10-CM

## 2021-07-16 LAB
CHOLEST SERPL-MCNC: 153 MG/DL (ref 120–199)
CHOLEST/HDLC SERPL: 3.3 {RATIO} (ref 2–5)
HDLC SERPL-MCNC: 46 MG/DL (ref 40–75)
HDLC SERPL: 30.1 % (ref 20–50)
LDLC SERPL CALC-MCNC: 90.6 MG/DL (ref 63–159)
NONHDLC SERPL-MCNC: 107 MG/DL
TRIGL SERPL-MCNC: 82 MG/DL (ref 30–150)

## 2021-07-16 PROCEDURE — 80061 LIPID PANEL: CPT | Performed by: INTERNAL MEDICINE

## 2021-07-16 PROCEDURE — 99214 PR OFFICE/OUTPT VISIT, EST, LEVL IV, 30-39 MIN: ICD-10-PCS | Mod: GC,S$GLB,, | Performed by: STUDENT IN AN ORGANIZED HEALTH CARE EDUCATION/TRAINING PROGRAM

## 2021-07-16 PROCEDURE — 99999 PR PBB SHADOW E&M-EST. PATIENT-LVL V: ICD-10-PCS | Mod: PBBFAC,GC,, | Performed by: STUDENT IN AN ORGANIZED HEALTH CARE EDUCATION/TRAINING PROGRAM

## 2021-07-16 PROCEDURE — 1159F MED LIST DOCD IN RCRD: CPT | Mod: GC,S$GLB,, | Performed by: STUDENT IN AN ORGANIZED HEALTH CARE EDUCATION/TRAINING PROGRAM

## 2021-07-16 PROCEDURE — 1159F PR MEDICATION LIST DOCUMENTED IN MEDICAL RECORD: ICD-10-PCS | Mod: GC,S$GLB,, | Performed by: STUDENT IN AN ORGANIZED HEALTH CARE EDUCATION/TRAINING PROGRAM

## 2021-07-16 PROCEDURE — 36415 COLL VENOUS BLD VENIPUNCTURE: CPT | Performed by: INTERNAL MEDICINE

## 2021-07-16 PROCEDURE — 99999 PR PBB SHADOW E&M-EST. PATIENT-LVL V: CPT | Mod: PBBFAC,GC,, | Performed by: STUDENT IN AN ORGANIZED HEALTH CARE EDUCATION/TRAINING PROGRAM

## 2021-07-16 PROCEDURE — 99214 OFFICE O/P EST MOD 30 MIN: CPT | Mod: GC,S$GLB,, | Performed by: STUDENT IN AN ORGANIZED HEALTH CARE EDUCATION/TRAINING PROGRAM

## 2021-07-19 ENCOUNTER — OFFICE VISIT (OUTPATIENT)
Dept: INTERNAL MEDICINE | Facility: CLINIC | Age: 76
End: 2021-07-19
Attending: INTERNAL MEDICINE
Payer: MEDICARE

## 2021-07-19 VITALS
SYSTOLIC BLOOD PRESSURE: 135 MMHG | OXYGEN SATURATION: 94 % | HEART RATE: 85 BPM | DIASTOLIC BLOOD PRESSURE: 70 MMHG | WEIGHT: 225.06 LBS | HEIGHT: 71 IN | BODY MASS INDEX: 31.51 KG/M2

## 2021-07-19 DIAGNOSIS — I10 ESSENTIAL HYPERTENSION: ICD-10-CM

## 2021-07-19 DIAGNOSIS — Z87.898 HISTORY OF SYNCOPE: ICD-10-CM

## 2021-07-19 DIAGNOSIS — E11.42 DIABETIC POLYNEUROPATHY ASSOCIATED WITH TYPE 2 DIABETES MELLITUS: ICD-10-CM

## 2021-07-19 DIAGNOSIS — M48.00 SPINAL STENOSIS, UNSPECIFIED SPINAL REGION: ICD-10-CM

## 2021-07-19 DIAGNOSIS — I71.20 THORACIC AORTIC ANEURYSM WITHOUT RUPTURE: ICD-10-CM

## 2021-07-19 DIAGNOSIS — R60.9 EDEMA, UNSPECIFIED TYPE: Primary | ICD-10-CM

## 2021-07-19 PROBLEM — R73.03 PREDIABETES: Status: RESOLVED | Noted: 2020-09-02 | Resolved: 2021-07-19

## 2021-07-19 PROCEDURE — 3078F DIAST BP <80 MM HG: CPT | Mod: CPTII,S$GLB,, | Performed by: INTERNAL MEDICINE

## 2021-07-19 PROCEDURE — 3078F PR MOST RECENT DIASTOLIC BLOOD PRESSURE < 80 MM HG: ICD-10-PCS | Mod: CPTII,S$GLB,, | Performed by: INTERNAL MEDICINE

## 2021-07-19 PROCEDURE — 3075F SYST BP GE 130 - 139MM HG: CPT | Mod: CPTII,S$GLB,, | Performed by: INTERNAL MEDICINE

## 2021-07-19 PROCEDURE — 99214 PR OFFICE/OUTPT VISIT, EST, LEVL IV, 30-39 MIN: ICD-10-PCS | Mod: S$GLB,,, | Performed by: INTERNAL MEDICINE

## 2021-07-19 PROCEDURE — 99999 PR PBB SHADOW E&M-EST. PATIENT-LVL V: CPT | Mod: PBBFAC,,, | Performed by: INTERNAL MEDICINE

## 2021-07-19 PROCEDURE — 1125F AMNT PAIN NOTED PAIN PRSNT: CPT | Mod: CPTII,S$GLB,, | Performed by: INTERNAL MEDICINE

## 2021-07-19 PROCEDURE — 3075F PR MOST RECENT SYSTOLIC BLOOD PRESS GE 130-139MM HG: ICD-10-PCS | Mod: CPTII,S$GLB,, | Performed by: INTERNAL MEDICINE

## 2021-07-19 PROCEDURE — 99999 PR PBB SHADOW E&M-EST. PATIENT-LVL V: ICD-10-PCS | Mod: PBBFAC,,, | Performed by: INTERNAL MEDICINE

## 2021-07-19 PROCEDURE — 1159F MED LIST DOCD IN RCRD: CPT | Mod: CPTII,S$GLB,, | Performed by: INTERNAL MEDICINE

## 2021-07-19 PROCEDURE — 3288F FALL RISK ASSESSMENT DOCD: CPT | Mod: CPTII,S$GLB,, | Performed by: INTERNAL MEDICINE

## 2021-07-19 PROCEDURE — 1100F PR PT FALLS ASSESS DOC 2+ FALLS/FALL W/INJURY/YR: ICD-10-PCS | Mod: CPTII,S$GLB,, | Performed by: INTERNAL MEDICINE

## 2021-07-19 PROCEDURE — 1159F PR MEDICATION LIST DOCUMENTED IN MEDICAL RECORD: ICD-10-PCS | Mod: CPTII,S$GLB,, | Performed by: INTERNAL MEDICINE

## 2021-07-19 PROCEDURE — 1100F PTFALLS ASSESS-DOCD GE2>/YR: CPT | Mod: CPTII,S$GLB,, | Performed by: INTERNAL MEDICINE

## 2021-07-19 PROCEDURE — 3288F PR FALLS RISK ASSESSMENT DOCUMENTED: ICD-10-PCS | Mod: CPTII,S$GLB,, | Performed by: INTERNAL MEDICINE

## 2021-07-19 PROCEDURE — 99214 OFFICE O/P EST MOD 30 MIN: CPT | Mod: S$GLB,,, | Performed by: INTERNAL MEDICINE

## 2021-07-19 PROCEDURE — 1125F PR PAIN SEVERITY QUANTIFIED, PAIN PRESENT: ICD-10-PCS | Mod: CPTII,S$GLB,, | Performed by: INTERNAL MEDICINE

## 2021-07-21 ENCOUNTER — HOSPITAL ENCOUNTER (OUTPATIENT)
Dept: CARDIOLOGY | Facility: HOSPITAL | Age: 76
Discharge: HOME | End: 2021-07-21
Attending: STUDENT IN AN ORGANIZED HEALTH CARE EDUCATION/TRAINING PROGRAM
Payer: MEDICARE

## 2021-07-21 VITALS
HEART RATE: 58 BPM | BODY MASS INDEX: 31.5 KG/M2 | DIASTOLIC BLOOD PRESSURE: 88 MMHG | SYSTOLIC BLOOD PRESSURE: 161 MMHG | RESPIRATION RATE: 20 BRPM | HEIGHT: 71 IN | WEIGHT: 225 LBS

## 2021-07-21 DIAGNOSIS — R55 SYNCOPE, UNSPECIFIED SYNCOPE TYPE: ICD-10-CM

## 2021-07-21 LAB
ASCENDING AORTA: 4.1 CM
BSA FOR ECHO PROCEDURE: 2.26 M2
CV ECHO LV RWT: 0.27 CM
CV STRESS BASE HR: 59 BPM
DIASTOLIC BLOOD PRESSURE: 88 MMHG
DOP CALC LVOT AREA: 4.6 CM2
DOP CALC LVOT DIAMETER: 2.41 CM
DOP CALC LVOT PEAK VEL: 1.04 M/S
DOP CALC LVOT STROKE VOLUME: 99.35 CM3
DOP CALCLVOT PEAK VEL VTI: 21.79 CM
E WAVE DECELERATION TIME: 264.5 MSEC
E/A RATIO: 0.98
E/E' RATIO: 8.86 M/S
ECHO LV POSTERIOR WALL: 0.7 CM (ref 0.6–1.1)
EJECTION FRACTION: 60 %
FRACTIONAL SHORTENING: 29 % (ref 28–44)
INTERVENTRICULAR SEPTUM: 0.7 CM (ref 0.6–1.1)
IVRT: 108.47 MSEC
LA MAJOR: 5.55 CM
LA MINOR: 5.6 CM
LA WIDTH: 3.66 CM
LEFT ATRIUM SIZE: 3.89 CM
LEFT ATRIUM VOLUME INDEX: 30.4 ML/M2
LEFT ATRIUM VOLUME: 67.47 CM3
LEFT INTERNAL DIMENSION IN SYSTOLE: 3.69 CM (ref 2.1–4)
LEFT VENTRICLE DIASTOLIC VOLUME INDEX: 58.33 ML/M2
LEFT VENTRICLE DIASTOLIC VOLUME: 129.49 ML
LEFT VENTRICLE MASS INDEX: 55 G/M2
LEFT VENTRICLE SYSTOLIC VOLUME INDEX: 26 ML/M2
LEFT VENTRICLE SYSTOLIC VOLUME: 57.61 ML
LEFT VENTRICULAR INTERNAL DIMENSION IN DIASTOLE: 5.2 CM (ref 3.5–6)
LEFT VENTRICULAR MASS: 122.81 G
LV LATERAL E/E' RATIO: 7.75 M/S
LV SEPTAL E/E' RATIO: 10.33 M/S
MV A" WAVE DURATION": 8.28 MSEC
MV PEAK A VEL: 0.63 M/S
MV PEAK E VEL: 0.62 M/S
MV STENOSIS PRESSURE HALF TIME: 76.7 MS
MV VALVE AREA P 1/2 METHOD: 2.87 CM2
OHS CV CPX 1 MINUTE RECOVERY HEART RATE: 129 BPM
OHS CV CPX 85 PERCENT MAX PREDICTED HEART RATE MALE: 122
OHS CV CPX MAX PREDICTED HEART RATE: 144
OHS CV CPX PATIENT IS FEMALE: 0
OHS CV CPX PATIENT IS MALE: 1
OHS CV CPX PEAK DIASTOLIC BLOOD PRESSURE: 57 MMHG
OHS CV CPX PEAK HEAR RATE: 134 BPM
OHS CV CPX PEAK RATE PRESSURE PRODUCT: NORMAL
OHS CV CPX PEAK SYSTOLIC BLOOD PRESSURE: 221 MMHG
OHS CV CPX PERCENT MAX PREDICTED HEART RATE ACHIEVED: 93
OHS CV CPX RATE PRESSURE PRODUCT PRESENTING: 9499
PISA TR MAX VEL: 2.49 M/S
PULM VEIN S/D RATIO: 1.53
PV PEAK D VEL: 0.4 M/S
PV PEAK S VEL: 0.61 M/S
RA MAJOR: 4.08 CM
RA PRESSURE: 3 MMHG
RA WIDTH: 2.94 CM
RIGHT VENTRICULAR END-DIASTOLIC DIMENSION: 3.88 CM
RV TISSUE DOPPLER FREE WALL SYSTOLIC VELOCITY 1 (APICAL 4 CHAMBER VIEW): 9.54 CM/S
SINUS: 3.74 CM
STJ: 3.2 CM
SYSTOLIC BLOOD PRESSURE: 161 MMHG
TDI LATERAL: 0.08 M/S
TDI SEPTAL: 0.06 M/S
TDI: 0.07 M/S
TR MAX PG: 25 MMHG
TRICUSPID ANNULAR PLANE SYSTOLIC EXCURSION: 1.73 CM
TV REST PULMONARY ARTERY PRESSURE: 28 MMHG

## 2021-07-21 PROCEDURE — 93351 STRESS ECHO (CUPID ONLY): ICD-10-PCS | Mod: 26,,, | Performed by: INTERNAL MEDICINE

## 2021-07-21 PROCEDURE — 93351 STRESS TTE COMPLETE: CPT

## 2021-07-21 PROCEDURE — 93351 STRESS TTE COMPLETE: CPT | Mod: 26,,, | Performed by: INTERNAL MEDICINE

## 2021-07-21 PROCEDURE — 63600175 PHARM REV CODE 636 W HCPCS: Performed by: STUDENT IN AN ORGANIZED HEALTH CARE EDUCATION/TRAINING PROGRAM

## 2021-07-21 RX ORDER — ATROPINE SULFATE 0.1 MG/ML
0.35 INJECTION INTRAVENOUS
Status: COMPLETED | OUTPATIENT
Start: 2021-07-21 | End: 2021-07-21

## 2021-07-21 RX ORDER — DOBUTAMINE HYDROCHLORIDE 400 MG/100ML
30 INJECTION INTRAVENOUS
Status: COMPLETED | OUTPATIENT
Start: 2021-07-21 | End: 2021-07-21

## 2021-07-21 RX ADMIN — DOBUTAMINE HYDROCHLORIDE 30 MCG/KG/MIN: 400 INJECTION INTRAVENOUS at 08:07

## 2021-07-21 RX ADMIN — ATROPINE SULFATE 0.35 MG: 0.1 INJECTION PARENTERAL at 08:07

## 2021-07-27 ENCOUNTER — LAB VISIT (OUTPATIENT)
Dept: LAB | Facility: HOSPITAL | Age: 76
End: 2021-07-27
Attending: INTERNAL MEDICINE
Payer: MEDICARE

## 2021-07-27 DIAGNOSIS — E11.42 DIABETIC POLYNEUROPATHY ASSOCIATED WITH TYPE 2 DIABETES MELLITUS: ICD-10-CM

## 2021-07-27 LAB
ESTIMATED AVG GLUCOSE: 120 MG/DL (ref 68–131)
HBA1C MFR BLD: 5.8 % (ref 4–5.6)

## 2021-07-27 PROCEDURE — 83036 HEMOGLOBIN GLYCOSYLATED A1C: CPT | Performed by: INTERNAL MEDICINE

## 2021-07-27 PROCEDURE — 36415 COLL VENOUS BLD VENIPUNCTURE: CPT | Performed by: INTERNAL MEDICINE

## 2021-07-28 ENCOUNTER — TELEPHONE (OUTPATIENT)
Dept: CARDIOLOGY | Facility: HOSPITAL | Age: 76
End: 2021-07-28

## 2021-07-31 PROCEDURE — 99457 PR MONITORING, PHYSIOL PARAM, REMOTE, 1ST 20 MINS, PER MONTH: ICD-10-PCS | Mod: S$GLB,,, | Performed by: INTERNAL MEDICINE

## 2021-07-31 PROCEDURE — 99457 RPM TX MGMT 1ST 20 MIN: CPT | Mod: S$GLB,,, | Performed by: INTERNAL MEDICINE

## 2021-08-17 ENCOUNTER — TELEPHONE (OUTPATIENT)
Dept: NEUROLOGY | Facility: CLINIC | Age: 76
End: 2021-08-17

## 2021-08-31 DIAGNOSIS — I10 ESSENTIAL HYPERTENSION: ICD-10-CM

## 2021-09-01 RX ORDER — POTASSIUM CHLORIDE 750 MG/1
CAPSULE, EXTENDED RELEASE ORAL
Qty: 270 CAPSULE | Refills: 2 | Status: SHIPPED | OUTPATIENT
Start: 2021-09-01 | End: 2022-04-19

## 2021-09-01 RX ORDER — IRBESARTAN 300 MG/1
TABLET ORAL
Qty: 90 TABLET | Refills: 3 | Status: SHIPPED | OUTPATIENT
Start: 2021-09-01 | End: 2022-08-02 | Stop reason: SDUPTHER

## 2021-09-01 RX ORDER — BENZONATATE 200 MG/1
CAPSULE ORAL
Qty: 30 CAPSULE | Refills: 0 | Status: SHIPPED | OUTPATIENT
Start: 2021-09-01 | End: 2021-09-07 | Stop reason: SDUPTHER

## 2021-09-13 ENCOUNTER — OFFICE VISIT (OUTPATIENT)
Dept: PODIATRY | Facility: CLINIC | Age: 76
End: 2021-09-13
Payer: MEDICARE

## 2021-09-13 VITALS
BODY MASS INDEX: 31.51 KG/M2 | DIASTOLIC BLOOD PRESSURE: 86 MMHG | WEIGHT: 225.06 LBS | SYSTOLIC BLOOD PRESSURE: 161 MMHG | HEIGHT: 71 IN | HEART RATE: 81 BPM

## 2021-09-13 DIAGNOSIS — E11.42 DIABETIC POLYNEUROPATHY ASSOCIATED WITH TYPE 2 DIABETES MELLITUS: Primary | ICD-10-CM

## 2021-09-13 DIAGNOSIS — B35.1 ONYCHOMYCOSIS DUE TO DERMATOPHYTE: ICD-10-CM

## 2021-09-13 PROCEDURE — 99999 PR PBB SHADOW E&M-EST. PATIENT-LVL IV: ICD-10-PCS | Mod: PBBFAC,,, | Performed by: PODIATRIST

## 2021-09-13 PROCEDURE — 3077F PR MOST RECENT SYSTOLIC BLOOD PRESSURE >= 140 MM HG: ICD-10-PCS | Mod: CPTII,S$GLB,, | Performed by: PODIATRIST

## 2021-09-13 PROCEDURE — 3077F SYST BP >= 140 MM HG: CPT | Mod: CPTII,S$GLB,, | Performed by: PODIATRIST

## 2021-09-13 PROCEDURE — 3079F PR MOST RECENT DIASTOLIC BLOOD PRESSURE 80-89 MM HG: ICD-10-PCS | Mod: CPTII,S$GLB,, | Performed by: PODIATRIST

## 2021-09-13 PROCEDURE — 99499 NO LOS: ICD-10-PCS | Mod: S$GLB,,, | Performed by: PODIATRIST

## 2021-09-13 PROCEDURE — 3079F DIAST BP 80-89 MM HG: CPT | Mod: CPTII,S$GLB,, | Performed by: PODIATRIST

## 2021-09-13 PROCEDURE — 99499 UNLISTED E&M SERVICE: CPT | Mod: S$GLB,,, | Performed by: PODIATRIST

## 2021-09-13 PROCEDURE — 99999 PR PBB SHADOW E&M-EST. PATIENT-LVL IV: CPT | Mod: PBBFAC,,, | Performed by: PODIATRIST

## 2021-09-13 PROCEDURE — 1159F PR MEDICATION LIST DOCUMENTED IN MEDICAL RECORD: ICD-10-PCS | Mod: CPTII,S$GLB,, | Performed by: PODIATRIST

## 2021-09-13 PROCEDURE — 1159F MED LIST DOCD IN RCRD: CPT | Mod: CPTII,S$GLB,, | Performed by: PODIATRIST

## 2021-09-13 PROCEDURE — 1126F PR PAIN SEVERITY QUANTIFIED, NO PAIN PRESENT: ICD-10-PCS | Mod: CPTII,S$GLB,, | Performed by: PODIATRIST

## 2021-09-13 PROCEDURE — 11042 PR DEBRIDEMENT, SKIN, SUB-Q TISSUE,=<20 SQ CM: ICD-10-PCS | Mod: S$GLB,,, | Performed by: PODIATRIST

## 2021-09-13 PROCEDURE — 11042 DBRDMT SUBQ TIS 1ST 20SQCM/<: CPT | Mod: S$GLB,,, | Performed by: PODIATRIST

## 2021-09-13 PROCEDURE — 1126F AMNT PAIN NOTED NONE PRSNT: CPT | Mod: CPTII,S$GLB,, | Performed by: PODIATRIST

## 2021-10-15 ENCOUNTER — LAB VISIT (OUTPATIENT)
Dept: LAB | Facility: HOSPITAL | Age: 76
End: 2021-10-15
Payer: MEDICARE

## 2021-10-15 ENCOUNTER — OFFICE VISIT (OUTPATIENT)
Dept: NEUROLOGY | Facility: CLINIC | Age: 76
End: 2021-10-15
Payer: MEDICARE

## 2021-10-15 VITALS
HEART RATE: 87 BPM | DIASTOLIC BLOOD PRESSURE: 77 MMHG | BODY MASS INDEX: 30.09 KG/M2 | HEIGHT: 71 IN | WEIGHT: 214.94 LBS | SYSTOLIC BLOOD PRESSURE: 146 MMHG

## 2021-10-15 DIAGNOSIS — I63.9 CEREBROVASCULAR ACCIDENT (CVA), UNSPECIFIED MECHANISM: ICD-10-CM

## 2021-10-15 DIAGNOSIS — I63.81 STROKE, LACUNAR: ICD-10-CM

## 2021-10-15 DIAGNOSIS — I61.1: ICD-10-CM

## 2021-10-15 DIAGNOSIS — R29.898 RIGHT LEG WEAKNESS: Primary | ICD-10-CM

## 2021-10-15 LAB
CREAT SERPL-MCNC: 1 MG/DL (ref 0.5–1.4)
EST. GFR  (AFRICAN AMERICAN): >60 ML/MIN/1.73 M^2
EST. GFR  (NON AFRICAN AMERICAN): >60 ML/MIN/1.73 M^2

## 2021-10-15 PROCEDURE — 99999 PR PBB SHADOW E&M-EST. PATIENT-LVL IV: CPT | Mod: PBBFAC,GC,, | Performed by: STUDENT IN AN ORGANIZED HEALTH CARE EDUCATION/TRAINING PROGRAM

## 2021-10-15 PROCEDURE — 82565 ASSAY OF CREATININE: CPT | Performed by: STUDENT IN AN ORGANIZED HEALTH CARE EDUCATION/TRAINING PROGRAM

## 2021-10-15 PROCEDURE — 99999 PR PBB SHADOW E&M-EST. PATIENT-LVL IV: ICD-10-PCS | Mod: PBBFAC,GC,, | Performed by: STUDENT IN AN ORGANIZED HEALTH CARE EDUCATION/TRAINING PROGRAM

## 2021-10-15 PROCEDURE — 36415 COLL VENOUS BLD VENIPUNCTURE: CPT | Performed by: STUDENT IN AN ORGANIZED HEALTH CARE EDUCATION/TRAINING PROGRAM

## 2021-10-27 ENCOUNTER — TELEPHONE (OUTPATIENT)
Dept: NEUROLOGY | Facility: CLINIC | Age: 76
End: 2021-10-27

## 2021-10-27 DIAGNOSIS — K21.00 GASTROESOPHAGEAL REFLUX DISEASE WITH ESOPHAGITIS: ICD-10-CM

## 2021-10-27 NOTE — TELEPHONE ENCOUNTER
----- Message from Bismark Neumann sent at 10/27/2021  9:22 AM CDT -----  Regarding: MRI  Name of Who is Calling: ASHLEY FISHER [[id number]]           What is the request in detail: The patient is requesting  call back from the staff in regards to his upcoming MRI. He states that he has trouble laying on his back for a long time and would like to discuss the Mri with staff. Please advise            Can the clinic reply by [facility]: no            What Number to [facility] Back [facility]: [phone]

## 2021-10-28 RX ORDER — OMEPRAZOLE 20 MG/1
CAPSULE, DELAYED RELEASE ORAL
Qty: 90 CAPSULE | Refills: 1 | Status: SHIPPED | OUTPATIENT
Start: 2021-10-28 | End: 2022-02-11

## 2021-10-29 PROCEDURE — 99454 REM MNTR PHYSIOL PARAM 16-30: CPT | Mod: S$GLB,,, | Performed by: INTERNAL MEDICINE

## 2021-10-29 PROCEDURE — 99454 PR REMOTE MNTR, PHYS PARAM, INITIAL, EA 30 DAYS: ICD-10-PCS | Mod: S$GLB,,, | Performed by: INTERNAL MEDICINE

## 2021-11-02 ENCOUNTER — HOSPITAL ENCOUNTER (OUTPATIENT)
Dept: RADIOLOGY | Facility: HOSPITAL | Age: 76
Discharge: HOME | End: 2021-11-02
Attending: STUDENT IN AN ORGANIZED HEALTH CARE EDUCATION/TRAINING PROGRAM
Payer: MEDICARE

## 2021-11-02 DIAGNOSIS — R29.898 RIGHT LEG WEAKNESS: ICD-10-CM

## 2021-11-02 DIAGNOSIS — I61.1: ICD-10-CM

## 2021-11-02 DIAGNOSIS — I63.81 STROKE, LACUNAR: ICD-10-CM

## 2021-11-02 DIAGNOSIS — I63.9 CEREBROVASCULAR ACCIDENT (CVA), UNSPECIFIED MECHANISM: ICD-10-CM

## 2021-11-02 PROCEDURE — 72141 MRI NECK SPINE W/O DYE: CPT | Mod: TC

## 2021-11-02 PROCEDURE — 70544 MRA BRAIN WITHOUT CONTRAST: ICD-10-PCS | Mod: 26,,, | Performed by: RADIOLOGY

## 2021-11-02 PROCEDURE — 70544 MR ANGIOGRAPHY HEAD W/O DYE: CPT | Mod: TC

## 2021-11-02 PROCEDURE — 70544 MR ANGIOGRAPHY HEAD W/O DYE: CPT | Mod: 26,,, | Performed by: RADIOLOGY

## 2021-11-02 PROCEDURE — 70547 MR ANGIOGRAPHY NECK W/O DYE: CPT | Mod: 26,,, | Performed by: RADIOLOGY

## 2021-11-02 PROCEDURE — 70547 MRA NECK WITHOUT CONTRAST: ICD-10-PCS | Mod: 26,,, | Performed by: RADIOLOGY

## 2021-11-02 PROCEDURE — 70547 MR ANGIOGRAPHY NECK W/O DYE: CPT | Mod: TC

## 2021-11-02 PROCEDURE — 72141 MRI CERVICAL SPINE WITHOUT CONTRAST: ICD-10-PCS | Mod: 26,,, | Performed by: RADIOLOGY

## 2021-11-02 PROCEDURE — 72141 MRI NECK SPINE W/O DYE: CPT | Mod: 26,,, | Performed by: RADIOLOGY

## 2021-11-11 ENCOUNTER — OFFICE VISIT (OUTPATIENT)
Dept: UROLOGY | Facility: CLINIC | Age: 76
End: 2021-11-11
Payer: MEDICARE

## 2021-11-11 ENCOUNTER — LAB VISIT (OUTPATIENT)
Dept: LAB | Facility: HOSPITAL | Age: 76
End: 2021-11-11
Attending: UROLOGY
Payer: MEDICARE

## 2021-11-11 ENCOUNTER — TELEPHONE (OUTPATIENT)
Dept: UROLOGY | Facility: CLINIC | Age: 76
End: 2021-11-11
Payer: MEDICARE

## 2021-11-11 VITALS
DIASTOLIC BLOOD PRESSURE: 82 MMHG | HEART RATE: 95 BPM | SYSTOLIC BLOOD PRESSURE: 143 MMHG | WEIGHT: 220.25 LBS | HEIGHT: 71 IN | BODY MASS INDEX: 30.83 KG/M2

## 2021-11-11 DIAGNOSIS — Z12.5 SCREENING PSA (PROSTATE SPECIFIC ANTIGEN): ICD-10-CM

## 2021-11-11 DIAGNOSIS — M47.12 CERVICAL SPONDYLOSIS WITH MYELOPATHY: ICD-10-CM

## 2021-11-11 DIAGNOSIS — N39.41 URGE INCONTINENCE: Primary | ICD-10-CM

## 2021-11-11 DIAGNOSIS — E78.5 HYPERLIPIDEMIA, UNSPECIFIED HYPERLIPIDEMIA TYPE: ICD-10-CM

## 2021-11-11 DIAGNOSIS — N39.41 URGE INCONTINENCE: ICD-10-CM

## 2021-11-11 DIAGNOSIS — N13.8 BENIGN PROSTATIC HYPERPLASIA WITH URINARY OBSTRUCTION: ICD-10-CM

## 2021-11-11 DIAGNOSIS — N40.1 BENIGN PROSTATIC HYPERPLASIA WITH URINARY OBSTRUCTION: ICD-10-CM

## 2021-11-11 DIAGNOSIS — N13.8 BENIGN PROSTATIC HYPERPLASIA WITH URINARY OBSTRUCTION: Primary | ICD-10-CM

## 2021-11-11 DIAGNOSIS — N43.3 RIGHT HYDROCELE: ICD-10-CM

## 2021-11-11 DIAGNOSIS — N40.1 BENIGN PROSTATIC HYPERPLASIA WITH URINARY OBSTRUCTION: Primary | ICD-10-CM

## 2021-11-11 LAB — COMPLEXED PSA SERPL-MCNC: 2.2 NG/ML (ref 0–4)

## 2021-11-11 PROCEDURE — 84153 ASSAY OF PSA TOTAL: CPT | Performed by: UROLOGY

## 2021-11-11 PROCEDURE — 1100F PR PT FALLS ASSESS DOC 2+ FALLS/FALL W/INJURY/YR: ICD-10-PCS | Mod: CPTII,S$GLB,, | Performed by: UROLOGY

## 2021-11-11 PROCEDURE — 1159F MED LIST DOCD IN RCRD: CPT | Mod: CPTII,S$GLB,, | Performed by: UROLOGY

## 2021-11-11 PROCEDURE — 3288F FALL RISK ASSESSMENT DOCD: CPT | Mod: CPTII,S$GLB,, | Performed by: UROLOGY

## 2021-11-11 PROCEDURE — 99999 PR PBB SHADOW E&M-EST. PATIENT-LVL III: ICD-10-PCS | Mod: PBBFAC,,, | Performed by: UROLOGY

## 2021-11-11 PROCEDURE — 51798 US URINE CAPACITY MEASURE: CPT | Mod: S$GLB,,, | Performed by: UROLOGY

## 2021-11-11 PROCEDURE — 99214 OFFICE O/P EST MOD 30 MIN: CPT | Mod: S$GLB,,, | Performed by: UROLOGY

## 2021-11-11 PROCEDURE — 1126F AMNT PAIN NOTED NONE PRSNT: CPT | Mod: CPTII,S$GLB,, | Performed by: UROLOGY

## 2021-11-11 PROCEDURE — 99214 PR OFFICE/OUTPT VISIT, EST, LEVL IV, 30-39 MIN: ICD-10-PCS | Mod: S$GLB,,, | Performed by: UROLOGY

## 2021-11-11 PROCEDURE — 3079F PR MOST RECENT DIASTOLIC BLOOD PRESSURE 80-89 MM HG: ICD-10-PCS | Mod: CPTII,S$GLB,, | Performed by: UROLOGY

## 2021-11-11 PROCEDURE — 81002 URINALYSIS NONAUTO W/O SCOPE: CPT | Mod: S$GLB,,, | Performed by: UROLOGY

## 2021-11-11 PROCEDURE — 3288F PR FALLS RISK ASSESSMENT DOCUMENTED: ICD-10-PCS | Mod: CPTII,S$GLB,, | Performed by: UROLOGY

## 2021-11-11 PROCEDURE — 3077F SYST BP >= 140 MM HG: CPT | Mod: CPTII,S$GLB,, | Performed by: UROLOGY

## 2021-11-11 PROCEDURE — 1126F PR PAIN SEVERITY QUANTIFIED, NO PAIN PRESENT: ICD-10-PCS | Mod: CPTII,S$GLB,, | Performed by: UROLOGY

## 2021-11-11 PROCEDURE — 3077F PR MOST RECENT SYSTOLIC BLOOD PRESSURE >= 140 MM HG: ICD-10-PCS | Mod: CPTII,S$GLB,, | Performed by: UROLOGY

## 2021-11-11 PROCEDURE — 51798 PR MEAS,POST-VOID RES,US,NON-IMAGING: ICD-10-PCS | Mod: S$GLB,,, | Performed by: UROLOGY

## 2021-11-11 PROCEDURE — 1100F PTFALLS ASSESS-DOCD GE2>/YR: CPT | Mod: CPTII,S$GLB,, | Performed by: UROLOGY

## 2021-11-11 PROCEDURE — 1159F PR MEDICATION LIST DOCUMENTED IN MEDICAL RECORD: ICD-10-PCS | Mod: CPTII,S$GLB,, | Performed by: UROLOGY

## 2021-11-11 PROCEDURE — 81002 PR URINALYSIS NONAUTO W/O SCOPE: ICD-10-PCS | Mod: S$GLB,,, | Performed by: UROLOGY

## 2021-11-11 PROCEDURE — 3079F DIAST BP 80-89 MM HG: CPT | Mod: CPTII,S$GLB,, | Performed by: UROLOGY

## 2021-11-11 PROCEDURE — 99999 PR PBB SHADOW E&M-EST. PATIENT-LVL III: CPT | Mod: PBBFAC,,, | Performed by: UROLOGY

## 2021-11-11 PROCEDURE — 36415 COLL VENOUS BLD VENIPUNCTURE: CPT | Performed by: UROLOGY

## 2021-11-11 RX ORDER — LIDOCAINE HYDROCHLORIDE 20 MG/ML
JELLY TOPICAL ONCE
Status: CANCELLED | OUTPATIENT
Start: 2021-11-11 | End: 2021-11-11

## 2021-11-11 RX ORDER — TAMSULOSIN HYDROCHLORIDE 0.4 MG/1
1 CAPSULE ORAL DAILY
Qty: 90 CAPSULE | Refills: 3 | Status: SHIPPED | OUTPATIENT
Start: 2021-11-11 | End: 2022-10-03

## 2021-11-11 RX ORDER — DOXYCYCLINE HYCLATE 100 MG
100 TABLET ORAL ONCE
Status: CANCELLED | OUTPATIENT
Start: 2021-11-11 | End: 2021-11-11

## 2021-11-11 RX ORDER — PRAVASTATIN SODIUM 20 MG/1
TABLET ORAL
Qty: 90 TABLET | Refills: 3 | Status: SHIPPED | OUTPATIENT
Start: 2021-11-11 | End: 2022-08-24

## 2021-11-11 RX ORDER — FINASTERIDE 5 MG/1
5 TABLET, FILM COATED ORAL DAILY
Qty: 90 TABLET | Refills: 3 | Status: SHIPPED | OUTPATIENT
Start: 2021-11-11 | End: 2022-02-10

## 2021-11-24 ENCOUNTER — HOSPITAL ENCOUNTER (OUTPATIENT)
Dept: RADIOLOGY | Facility: HOSPITAL | Age: 76
Discharge: HOME | End: 2021-11-24
Attending: UROLOGY
Payer: MEDICARE

## 2021-11-24 DIAGNOSIS — N43.3 RIGHT HYDROCELE: ICD-10-CM

## 2021-11-24 PROCEDURE — 76870 US EXAM SCROTUM: CPT | Mod: TC

## 2021-11-24 PROCEDURE — 76870 US EXAM SCROTUM: CPT | Mod: 26,,, | Performed by: RADIOLOGY

## 2021-11-24 PROCEDURE — 76870 US SCROTUM AND TESTICLES: ICD-10-PCS | Mod: 26,,, | Performed by: RADIOLOGY

## 2021-11-30 PROCEDURE — 99457 RPM TX MGMT 1ST 20 MIN: CPT | Mod: S$GLB,,, | Performed by: INTERNAL MEDICINE

## 2021-11-30 PROCEDURE — 99457 PR MONITORING, PHYSIOL PARAM, REMOTE, 1ST 20 MINS, PER MONTH: ICD-10-PCS | Mod: S$GLB,,, | Performed by: INTERNAL MEDICINE

## 2021-12-07 ENCOUNTER — HOSPITAL ENCOUNTER (OUTPATIENT)
Facility: HOSPITAL | Age: 76
Discharge: HOME | End: 2021-12-07
Attending: UROLOGY | Admitting: UROLOGY
Payer: MEDICARE

## 2021-12-07 VITALS
DIASTOLIC BLOOD PRESSURE: 95 MMHG | BODY MASS INDEX: 29.57 KG/M2 | HEART RATE: 71 BPM | RESPIRATION RATE: 20 BRPM | WEIGHT: 212 LBS | TEMPERATURE: 99 F | SYSTOLIC BLOOD PRESSURE: 164 MMHG | OXYGEN SATURATION: 99 %

## 2021-12-07 DIAGNOSIS — N39.498 OTHER URINARY INCONTINENCE: ICD-10-CM

## 2021-12-07 PROCEDURE — 51741 ELECTRO-UROFLOWMETRY FIRST: CPT | Mod: 26,51,, | Performed by: UROLOGY

## 2021-12-07 PROCEDURE — 27200971 HC CYSTO SUPPLY II (SCOPE PRCDR.): Performed by: UROLOGY

## 2021-12-07 PROCEDURE — 51784 PR ANAL/URINARY MUSCLE STUDY: ICD-10-PCS | Mod: 26,51,, | Performed by: UROLOGY

## 2021-12-07 PROCEDURE — 27201008 HC FLEXSCOPE: Performed by: UROLOGY

## 2021-12-07 PROCEDURE — 74455 X-RAY URETHRA/BLADDER: CPT | Mod: 26,,, | Performed by: UROLOGY

## 2021-12-07 PROCEDURE — 51728 CYSTOMETROGRAM W/VP: CPT | Mod: 26,,, | Performed by: UROLOGY

## 2021-12-07 PROCEDURE — 36000707: Performed by: UROLOGY

## 2021-12-07 PROCEDURE — 51728 PR COMPLEX CYSTOMETROGRAM VOIDING PRESSURE STUDIES: ICD-10-PCS | Mod: 26,,, | Performed by: UROLOGY

## 2021-12-07 PROCEDURE — 51741 PR UROFLOWMETRY, COMPLEX: ICD-10-PCS | Mod: 26,51,, | Performed by: UROLOGY

## 2021-12-07 PROCEDURE — 51600 PR INJECTION FOR BLADDER X-RAY: ICD-10-PCS | Mod: 51,,, | Performed by: UROLOGY

## 2021-12-07 PROCEDURE — 51600 INJECTION FOR BLADDER X-RAY: CPT | Mod: 51,,, | Performed by: UROLOGY

## 2021-12-07 PROCEDURE — 27200973 HC CYSTO SUPPLY IV (URODYNAMICS): Performed by: UROLOGY

## 2021-12-07 PROCEDURE — 51784 ANAL/URINARY MUSCLE STUDY: CPT | Mod: 26,51,, | Performed by: UROLOGY

## 2021-12-07 PROCEDURE — 74455 PR X-RAY URETHROCYSTOGRAM+VOIDING: ICD-10-PCS | Mod: 26,,, | Performed by: UROLOGY

## 2021-12-07 PROCEDURE — 36000706: Performed by: UROLOGY

## 2022-01-03 ENCOUNTER — TELEPHONE (OUTPATIENT)
Dept: UROLOGY | Facility: CLINIC | Age: 77
End: 2022-01-03
Payer: MEDICARE

## 2022-01-03 ENCOUNTER — OFFICE VISIT (OUTPATIENT)
Dept: PODIATRY | Facility: CLINIC | Age: 77
End: 2022-01-03
Payer: MEDICARE

## 2022-01-03 VITALS
BODY MASS INDEX: 29.69 KG/M2 | HEIGHT: 71 IN | SYSTOLIC BLOOD PRESSURE: 161 MMHG | HEART RATE: 89 BPM | DIASTOLIC BLOOD PRESSURE: 89 MMHG | WEIGHT: 212.06 LBS

## 2022-01-03 DIAGNOSIS — E11.42 DIABETIC POLYNEUROPATHY ASSOCIATED WITH TYPE 2 DIABETES MELLITUS: Primary | ICD-10-CM

## 2022-01-03 DIAGNOSIS — B35.1 ONYCHOMYCOSIS DUE TO DERMATOPHYTE: ICD-10-CM

## 2022-01-03 PROCEDURE — 1159F PR MEDICATION LIST DOCUMENTED IN MEDICAL RECORD: ICD-10-PCS | Mod: CPTII,S$GLB,, | Performed by: PODIATRIST

## 2022-01-03 PROCEDURE — 11721 PR DEBRIDEMENT OF NAILS, 6 OR MORE: ICD-10-PCS | Mod: Q9,S$GLB,, | Performed by: PODIATRIST

## 2022-01-03 PROCEDURE — 3079F DIAST BP 80-89 MM HG: CPT | Mod: CPTII,S$GLB,, | Performed by: PODIATRIST

## 2022-01-03 PROCEDURE — 99499 UNLISTED E&M SERVICE: CPT | Mod: S$GLB,,, | Performed by: PODIATRIST

## 2022-01-03 PROCEDURE — 99999 PR PBB SHADOW E&M-EST. PATIENT-LVL IV: CPT | Mod: PBBFAC,,, | Performed by: PODIATRIST

## 2022-01-03 PROCEDURE — 3072F LOW RISK FOR RETINOPATHY: CPT | Mod: CPTII,S$GLB,, | Performed by: PODIATRIST

## 2022-01-03 PROCEDURE — 3072F PR LOW RISK FOR RETINOPATHY: ICD-10-PCS | Mod: CPTII,S$GLB,, | Performed by: PODIATRIST

## 2022-01-03 PROCEDURE — 99999 PR PBB SHADOW E&M-EST. PATIENT-LVL IV: ICD-10-PCS | Mod: PBBFAC,,, | Performed by: PODIATRIST

## 2022-01-03 PROCEDURE — 1159F MED LIST DOCD IN RCRD: CPT | Mod: CPTII,S$GLB,, | Performed by: PODIATRIST

## 2022-01-03 PROCEDURE — 11721 DEBRIDE NAIL 6 OR MORE: CPT | Mod: Q9,S$GLB,, | Performed by: PODIATRIST

## 2022-01-03 PROCEDURE — 99499 NO LOS: ICD-10-PCS | Mod: S$GLB,,, | Performed by: PODIATRIST

## 2022-01-03 PROCEDURE — 3077F PR MOST RECENT SYSTOLIC BLOOD PRESSURE >= 140 MM HG: ICD-10-PCS | Mod: CPTII,S$GLB,, | Performed by: PODIATRIST

## 2022-01-03 PROCEDURE — 1126F PR PAIN SEVERITY QUANTIFIED, NO PAIN PRESENT: ICD-10-PCS | Mod: CPTII,S$GLB,, | Performed by: PODIATRIST

## 2022-01-03 PROCEDURE — 1126F AMNT PAIN NOTED NONE PRSNT: CPT | Mod: CPTII,S$GLB,, | Performed by: PODIATRIST

## 2022-01-03 PROCEDURE — 3079F PR MOST RECENT DIASTOLIC BLOOD PRESSURE 80-89 MM HG: ICD-10-PCS | Mod: CPTII,S$GLB,, | Performed by: PODIATRIST

## 2022-01-03 PROCEDURE — 3077F SYST BP >= 140 MM HG: CPT | Mod: CPTII,S$GLB,, | Performed by: PODIATRIST

## 2022-01-03 NOTE — PROGRESS NOTES
Subjective:      Patient ID: Tito Menard is a 76 y.o. male.    Chief Complaint: Nail Care and Diabetes Mellitus (PcpLynda 10/29/2021)    Tito is a 76 y.o. male who presents to the clinic for evaluation and treatment of high risk feet. Tito has a past medical history of Allergy, Aneurysm of artery of lower extremity, Chalazion of left eye, CKD (chronic kidney disease), stage II (4/1/2019), Diabetes mellitus type II, Diabetes with neurologic complications, Enlarged aorta (8/2/2016), GERD (gastroesophageal reflux disease), Hyperlipidemia, Hypertension, Jock itch (7/19/2018), MGD (meibomian gland dysfunction), Osteopenia, Spinal stenosis, Spondylosis without myelopathy (10/23/2015), and Vitamin D deficiency disease. The patient's chief complaint is long, thick toenails. This patient has documented high risk feet requiring routine maintenance secondary to peripheral neuropathy.    PCP: Amanda Brown MD    Date Last Seen by PCP:   Chief Complaint   Patient presents with    Nail Care    Diabetes Mellitus     Александр 10/29/2021         Current shoe gear:  Affected Foot: Tennis shoes     Unaffected Foot: Tennis shoes    Hemoglobin A1C   Date Value Ref Range Status   07/27/2021 5.8 (H) 4.0 - 5.6 % Final     Comment:     ADA Screening Guidelines:  5.7-6.4%  Consistent with prediabetes  >or=6.5%  Consistent with diabetes    High levels of fetal hemoglobin interfere with the HbA1C  assay. Heterozygous hemoglobin variants (HbS, HgC, etc)do  not significantly interfere with this assay.   However, presence of multiple variants may affect accuracy.     04/13/2021 5.8 (H) 4.0 - 5.6 % Final     Comment:     ADA Screening Guidelines:  5.7-6.4%  Consistent with prediabetes  >or=6.5%  Consistent with diabetes    High levels of fetal hemoglobin interfere with the HbA1C  assay. Heterozygous hemoglobin variants (HbS, HgC, etc)do  not significantly interfere with this assay.   However, presence of multiple variants may  affect accuracy.     02/04/2021 5.9 (H) 4.0 - 5.6 % Final     Comment:     ADA Screening Guidelines:  5.7-6.4%  Consistent with prediabetes  >or=6.5%  Consistent with diabetes    High levels of fetal hemoglobin interfere with the HbA1C  assay. Heterozygous hemoglobin variants (HbS, HgC, etc)do  not significantly interfere with this assay.   However, presence of multiple variants may affect accuracy.         Review of Systems   Constitutional: Negative for chills, fever and malaise/fatigue.   HENT: Negative for hearing loss.    Cardiovascular: Negative for claudication.   Respiratory: Negative for shortness of breath.    Skin: Positive for color change, dry skin and nail changes. Negative for flushing and rash.   Musculoskeletal: Negative for joint pain and myalgias.   Neurological: Positive for numbness and paresthesias. Negative for loss of balance and sensory change.   Psychiatric/Behavioral: Negative for altered mental status.           Objective:      Physical Exam  Vitals reviewed.   Cardiovascular:      Pulses:           Dorsalis pedis pulses are 2+ on the right side and 2+ on the left side.        Posterior tibial pulses are 2+ on the right side and 2+ on the left side.      Comments: No edema noted b/L  Musculoskeletal:         General: Normal range of motion.      Comments:        Feet:      Right foot:      Protective Sensation: 5 sites tested. 5 sites sensed.      Left foot:      Protective Sensation: 5 sites tested. 5 sites sensed.   Skin:     General: Skin is warm.      Capillary Refill: Capillary refill takes 2 to 3 seconds.      Comments: Normal skin tugor noted.   No open lesion noted b/L  Skin temp is warm to warm from proximal to distal b/L.  Webspaces clean, dry, and intact     Neurological:      Mental Status: He is alert.      Comments: Gross sensation intact b/L         Nails x9 are elongated by  5-7mm's, thickened by 2-3 mm's, dystrophic, and are yellowish in  coloration . Xerosis Bilaterally.  No open lesions noted.   Right hallux nail absent            Assessment:       Encounter Diagnoses   Name Primary?    Diabetic polyneuropathy associated with type 2 diabetes mellitus Yes    Onychomycosis due to dermatophyte          Plan:       Tito was seen today for nail care and diabetes mellitus.    Diagnoses and all orders for this visit:    Diabetic polyneuropathy associated with type 2 diabetes mellitus    Onychomycosis due to dermatophyte      I counseled the patient on his conditions, their implications and medical management.        - Shoe inspection. Diabetic Foot Education. Patient reminded of the importance of good nutrition and blood sugar control to help prevent podiatric complications of diabetes. Patient instructed on proper foot hygeine. We discussed wearing proper shoe gear, daily foot inspections, never walking without protective shoe gear, never putting sharp instruments to feet, routine podiatric nail visits every 2-3 months.      - With patient's permission, nails were aggressively reduced and debrided x 9 to their soft tissue attachment mechanically and with electric , removing all offending nail and debris. Patient relates relief following the procedure. He will continue to monitor the areas daily, inspect his feet, wear protective shoe gear when ambulatory, moisturizer to maintain skin integrity and follow in this office in approximately 2-3 months, sooner p.r.n.

## 2022-01-03 NOTE — BRIEF OP NOTE
[facility] - Surgery (1st Fl)  Brief Operative Note    Surgery Date: 12/7/2021     Surgeon(s) and Role:     * Beatrice Vaca MD - Primary    Assisting Surgeon: None    Pre-op Diagnosis:  Urge incontinence [N39.41]  Benign prostatic hyperplasia with urinary obstruction [N40.1, N13.8]  Cervical spondylosis with myelopathy [M47.12]    Post-op Diagnosis:  Post-Op Diagnosis Codes:     * Urge incontinence [N39.41]     * Benign prostatic hyperplasia with urinary obstruction [N40.1, N13.8]     * Cervical spondylosis with myelopathy [M47.12]    Procedure(s) (LRB):  URODYNAMIC STUDY, FLUOROSCOPIC (N/A)  CYSTOSCOPY, FLEXIBLE (N/A)    Anesthesia: N/A    Operative Findings: see op note    Estimated Blood Loss: * No values recorded between 12/7/2021  1:55 PM and 12/7/2021  3:00 PM *         Specimens:   Specimen (24h ago, onward)            None            Discharge Note    OUTCOME: Patient tolerated treatment/procedure well without complication and is now ready for discharge.    DISPOSITION: Home or Self Care    FINAL DIAGNOSIS:  <principal problem not specified>    FOLLOWUP: In clinic    DISCHARGE INSTRUCTIONS:  FUDS/cystoscopy

## 2022-01-03 NOTE — H&P
[facility]  Urology  History & Physical    Patient Name: Tito Menard  MRN: [id number]  Admission Date: 12/7/2021  Code Status: Prior   Attending Provider:  Beatrice Vaca MD   Primary Care Physician: Amanda Brown MD  Principal Problem:<principal problem not specified>    Subjective:     HPI:  CHIEF COMPLAINT:     Mr. Menard is a 75 y.o. male presenting for a follow up on LUTS     PRESENTING ILLNESS:     Tito Menard is a 75 y.o. male who states that his urinary symptoms have worsened.  He now wears a Depend pull up if he leaves the house because he has urge incontinence with little lead time.  He has frequency.  He has back issues, uses a rolling walker because he his knee gives out and also he has kyphosis of his back.  He is on maximal medical therapy with finasteride and tamsulosin      He also finds that the right hemiscrotum has gotten bigger, feels like there are 3 masses on the right side and several on the left side.  This causes skin irritation due to the size.  He uses lotrisone cream on occasion.      REVIEW OF SYSTEMS:     Review of Systems   Constitutional: Negative.    HENT: Negative.    Eyes: Negative.    Respiratory: Negative.    Cardiovascular: Negative.    Gastrointestinal: Negative.    Genitourinary: Positive for frequency and urgency.        Urge incontinence   Musculoskeletal: Positive for back pain.   Skin: Negative.    Neurological: Positive for dizziness.          No new subjective & objective note has been filed under this hospital service since the last note was generated.      Assessment and Plan:     For FUDS/cystoscopy    Beatrice Vaca MD  Urology  [facility]

## 2022-01-03 NOTE — OP NOTE
Date of procedure:  12/7/2021    Fluoro Urodynamic Report    Indication:  Urge incnotinence in a patient with spinal stenosis, diabetic polyneuropathy    Patient was taken to the Urodynamic Suite with a comfortably full bladder and asked to perform a free uroflow.  Next, the patient was prepped and the urinary residual was drained with a 14 Fr catheter.  A 7 Fr dual lumen catheter was placed to measure intravesical pressures.  A 10 Fr balloon manometer was placed into the rectum for abdominal pressure measurements.  Patch EMG electrodes were placed on the perineum.  The patient was connected to the [employer] Urodynamic machine, using a multichannel technique.  The bladder was filled with Cysto ConRay at room temperature at a rate of 30 ml/min.  Patient is filled to urgency.  Filling is performed with the patient in the seated position.  The patient was then asked to sit and void for a pressure flow study.    The following are the results of the study:  1.  Uroflow       Q max:  8.8 ml/sec       Voided volume: 143.5 ml        Pattern of the curve:  flattened    2.  PVR:  100 ml    3.  CMG       Sensation:         First Desire:  79.2 ml         Normal Desire:  108.4 ml         Strong Desire:  143.7 ml         Urgency:  147.6 ml       Capacity:  171.2 ml       Abnormal Contractions:  none       Compliance:  Normal.      4.  EMG:  Normal guarding, no change with voiding    5.  Voiding phase       Q max:  6.4 ml/sec       P det at Q max:  26 cm H2O       Pattern of the curve:  Prolonged and attenuated       Voided volume:  141.2 ml       PVR:  30 ml    6.  Fluoroscopy--I have personally interpreted the images and the results are as follows:  Bladder with a vertical configuration, some trabeculation.  There was no VU reflux.  Long segment of the urethra that did not fill but does not have DESD.     7.  Analysis:  Increased sensation and decreased capacity, variable ability to empty.     8.  Recommendations:       A.  See  cystoscopy       B.  Has a high bladder neck and an elongated SVM.  Given the findings, this is more consistent with neurogenic bladder than bladder outlet obstruction.  However, given his high bladder neck and inconsistent bladder emptying which compounds the frequency, possibly the urge incontinence, one could consider doing an outlet relieving procedure, followed by Botox injection of the bladder.  There is still a risk for urinary retention and need for self catheterization.  The other alternative would be SNM.               CYSTOSCOPY REPORT    Pre Procedure Diagnosis:  Urge incontinence    Post Procedure Diagnosis:  High bladder neck, ureteral orifices could only be seen on retroflexion of the cystoscope    Anesthesia: 10 cc 2% lidocaine jelly applied per urethra.    14 FR Flexible Olympus cystoscope used.    FINDINGS:  Dome, anterior, posterior, lateral walls and bladder base free of urothelial abnormalities. The ureteral orifices could only be appreciated on retroflexion of the cystoscope and were noted to be normal.    Bladder neck was high, and urethra was normal.  SVML was 5 cm     Specimen:  none    The patient was taken to the cystoscopy suite and placed in supine position.  The genitalia was prepped and draped  in the usual sterile fashion.  Two percent lidocaine jelly was inserted in the urethra and held in place with a penile clamp.  After sufficent time had passed to allow good local anesthesia, the cystoscope was inserted in the urethra and passed into the bladder visualizing the urethra along its entire course.  The dome, anterior, posterior and lateral walls were examined systematically.  The ureteral orifices were in their usual position and configuration.  The cystoscope was turned upon itself 180 degrees to visualize the bladder neck.  The cystoscope was then brought to the level of the bladder neck, the water was turned on and the prostate was visualized.  The cystoscope was removed and the  patient was instructed to urinate prior to leaving the office.     Post procedure medication:  none     ASSESSMENT/PLAN:  76 year old man status post flexible cystoscopy.  1. Push fluids for 24 hours.  2. May see blood in the urine, this should gradually improve over the next 2-3 days.  3. The patient was instructed to return to the office or go to the emergency should fever, chills, cloudy urine, or inability to urinate develop.  4. Follow up for discussion of procedure.      36.7

## 2022-01-03 NOTE — HPI
CHIEF COMPLAINT:     Mr. Menard is a 75 y.o. male presenting for a follow up on LUTS     PRESENTING ILLNESS:     Tito Menard is a 75 y.o. male who states that his urinary symptoms have worsened.  He now wears a Depend pull up if he leaves the house because he has urge incontinence with little lead time.  He has frequency.  He has back issues, uses a rolling walker because he his knee gives out and also he has kyphosis of his back.  He is on maximal medical therapy with finasteride and tamsulosin      He also finds that the right hemiscrotum has gotten bigger, feels like there are 3 masses on the right side and several on the left side.  This causes skin irritation due to the size.  He uses lotrisone cream on occasion.      REVIEW OF SYSTEMS:     Review of Systems   Constitutional: Negative.    HENT: Negative.    Eyes: Negative.    Respiratory: Negative.    Cardiovascular: Negative.    Gastrointestinal: Negative.    Genitourinary: Positive for frequency and urgency.        Urge incontinence   Musculoskeletal: Positive for back pain.   Skin: Negative.    Neurological: Positive for dizziness.

## 2022-01-04 NOTE — TELEPHONE ENCOUNTER
Called the patient and reviewed the findings. Discussed that his symptoms are more consistent with a neurogenic bladder than bladder outlet obstruction.  One could consider botox but because he has incomplete bladder emptying that is inconsistent, Botox could throw him into urinary retention.  Some might consider doing an outlet relieving procedure first then injecting botox.    He asked what would happen if he only did the spermatocele excision.  The recovery time from surgery would not be bad, however, because the scrotum is so distensible, it can take 3 months for the swelling to go down.  (some patients return and it does not look like surgery was done due to the edema.)     He would like to consider this and return in 3 months.

## 2022-01-04 NOTE — TELEPHONE ENCOUNTER
----- Message from Maria Teresa Kaufman LPN sent at 12/27/2021  3:52 PM CST -----  Contact: patient  Pt calling for results after FUDS/ CYSTO on 12/7/21. Please advise.   ----- Message -----  From: Melecio Conley  Sent: 12/27/2021   2:38 PM CST  To: [facility] Staff    Pt would like to speak w/ nurse or dr in regards to information on past procedure     Call back @[phone]

## 2022-01-04 NOTE — TELEPHONE ENCOUNTER
----- Message from Maria Teresa Kaufman LPN sent at 12/8/2021  3:37 PM CST -----  Regarding: Call pt to review FUDS results from procedure on 12/7/21  Reminder from Ms. Blanchard

## 2022-01-11 ENCOUNTER — PATIENT OUTREACH (OUTPATIENT)
Dept: ADMINISTRATIVE | Facility: OTHER | Age: 77
End: 2022-01-11
Payer: MEDICARE

## 2022-01-11 NOTE — PROGRESS NOTES
Health Maintenance Due   Topic Date Due    Shingles Vaccine (1 of 2) Never done    TETANUS VACCINE  07/22/2021    Influenza Vaccine (1) 09/01/2021     Updates were requested from care everywhere.  Chart was reviewed for overdue Proactive [facility] Encounters ([facility]) topics (CRS, Breast Cancer Screening, Eye exam)  Health Maintenance has been updated.  LINKS immunization registry triggered.  Immunizations were reconciled.

## 2022-01-13 ENCOUNTER — OFFICE VISIT (OUTPATIENT)
Dept: NEUROLOGY | Facility: CLINIC | Age: 77
End: 2022-01-13
Payer: MEDICARE

## 2022-01-13 VITALS
WEIGHT: 221.25 LBS | HEIGHT: 71 IN | BODY MASS INDEX: 30.98 KG/M2 | SYSTOLIC BLOOD PRESSURE: 143 MMHG | HEART RATE: 103 BPM | DIASTOLIC BLOOD PRESSURE: 86 MMHG

## 2022-01-13 DIAGNOSIS — R29.898 LEFT LEG WEAKNESS: ICD-10-CM

## 2022-01-13 DIAGNOSIS — M48.07 SPINAL STENOSIS, LUMBOSACRAL REGION: ICD-10-CM

## 2022-01-13 DIAGNOSIS — M48.02 CERVICAL STENOSIS OF SPINAL CANAL: Primary | ICD-10-CM

## 2022-01-13 PROCEDURE — 99999 PR PBB SHADOW E&M-EST. PATIENT-LVL IV: CPT | Mod: PBBFAC,GC,, | Performed by: STUDENT IN AN ORGANIZED HEALTH CARE EDUCATION/TRAINING PROGRAM

## 2022-01-13 PROCEDURE — 3077F PR MOST RECENT SYSTOLIC BLOOD PRESSURE >= 140 MM HG: ICD-10-PCS | Mod: CPTII,GC,S$GLB, | Performed by: STUDENT IN AN ORGANIZED HEALTH CARE EDUCATION/TRAINING PROGRAM

## 2022-01-13 PROCEDURE — 3079F DIAST BP 80-89 MM HG: CPT | Mod: CPTII,GC,S$GLB, | Performed by: STUDENT IN AN ORGANIZED HEALTH CARE EDUCATION/TRAINING PROGRAM

## 2022-01-13 PROCEDURE — 1125F PR PAIN SEVERITY QUANTIFIED, PAIN PRESENT: ICD-10-PCS | Mod: CPTII,GC,S$GLB, | Performed by: STUDENT IN AN ORGANIZED HEALTH CARE EDUCATION/TRAINING PROGRAM

## 2022-01-13 PROCEDURE — 99214 PR OFFICE/OUTPT VISIT, EST, LEVL IV, 30-39 MIN: ICD-10-PCS | Mod: GC,S$GLB,, | Performed by: STUDENT IN AN ORGANIZED HEALTH CARE EDUCATION/TRAINING PROGRAM

## 2022-01-13 PROCEDURE — 1125F AMNT PAIN NOTED PAIN PRSNT: CPT | Mod: CPTII,GC,S$GLB, | Performed by: STUDENT IN AN ORGANIZED HEALTH CARE EDUCATION/TRAINING PROGRAM

## 2022-01-13 PROCEDURE — 3077F SYST BP >= 140 MM HG: CPT | Mod: CPTII,GC,S$GLB, | Performed by: STUDENT IN AN ORGANIZED HEALTH CARE EDUCATION/TRAINING PROGRAM

## 2022-01-13 PROCEDURE — 99214 OFFICE O/P EST MOD 30 MIN: CPT | Mod: GC,S$GLB,, | Performed by: STUDENT IN AN ORGANIZED HEALTH CARE EDUCATION/TRAINING PROGRAM

## 2022-01-13 PROCEDURE — 1100F PTFALLS ASSESS-DOCD GE2>/YR: CPT | Mod: CPTII,GC,S$GLB, | Performed by: STUDENT IN AN ORGANIZED HEALTH CARE EDUCATION/TRAINING PROGRAM

## 2022-01-13 PROCEDURE — 99999 PR PBB SHADOW E&M-EST. PATIENT-LVL IV: ICD-10-PCS | Mod: PBBFAC,GC,, | Performed by: STUDENT IN AN ORGANIZED HEALTH CARE EDUCATION/TRAINING PROGRAM

## 2022-01-13 PROCEDURE — 3288F PR FALLS RISK ASSESSMENT DOCUMENTED: ICD-10-PCS | Mod: CPTII,GC,S$GLB, | Performed by: STUDENT IN AN ORGANIZED HEALTH CARE EDUCATION/TRAINING PROGRAM

## 2022-01-13 PROCEDURE — 3288F FALL RISK ASSESSMENT DOCD: CPT | Mod: CPTII,GC,S$GLB, | Performed by: STUDENT IN AN ORGANIZED HEALTH CARE EDUCATION/TRAINING PROGRAM

## 2022-01-13 PROCEDURE — 3079F PR MOST RECENT DIASTOLIC BLOOD PRESSURE 80-89 MM HG: ICD-10-PCS | Mod: CPTII,GC,S$GLB, | Performed by: STUDENT IN AN ORGANIZED HEALTH CARE EDUCATION/TRAINING PROGRAM

## 2022-01-13 PROCEDURE — 1100F PR PT FALLS ASSESS DOC 2+ FALLS/FALL W/INJURY/YR: ICD-10-PCS | Mod: CPTII,GC,S$GLB, | Performed by: STUDENT IN AN ORGANIZED HEALTH CARE EDUCATION/TRAINING PROGRAM

## 2022-01-13 NOTE — PROGRESS NOTES
[facility] - NEUROLOGY [address]TH [place] [facility], [place]    Date: 1/13/22  Patient Name: Tito Menard   MRN: [id number]   PCP: Amanda Brown  Referring Provider: No ref. provider found    Assessment:   Tito Menard is a 76 y.o. male presenting with gait unsteadiness. Previous MRA was unremarkable. MRI C spine demonstrated severe canal stenosis which could certainly cause his ongoing symptoms given imbalance, LE weakness, and diffusely elevated reflexes. His chronic microhemmorhages may also be contributing to his lack of balance as well. However, further workup of his lumbar spine may also be revealing given hsi previous back surgeries and largely LE weakness. Recommending L spine MRI with NSx consultation and continued PT which was prescribed at the last visit.     Plan:     NSx consultation  MRI L spine, will follow up  EMG/NCS order placed, will follow up  Continue PT  Will continue to monitor tremors  Follow up 3 months    Problem List Items Addressed This Visit    None     Visit Diagnoses     Cervical stenosis of spinal canal    -  Primary    Relevant Orders    Ambulatory referral/consult to Neurosurgery    EMG W/ ULTRASOUND AND NERVE CONDUCTION TEST 2 Extremities    Spinal stenosis, lumbosacral region        Relevant Orders    MRI Lumbar Spine Without Contrast    Left leg weakness        Relevant Orders    EMG W/ ULTRASOUND AND NERVE CONDUCTION TEST 2 Extremities          Henok Castornea MD    Patient note was created using MModal Dictation.  Any errors in syntax or even information may not have been identified and edited on initial review prior to signing this note.  Subjective:   Patient seen in consultation at the request of No ref. provider found for the evaluation of gait instability. A copy of this note will be sent to the referring physician.        HPI:   Mr. Tito Menard is a 75 y.o. male with previous back surgeries who initially presented in 10/2021 for  "evaluation of difficulty with his balance. The patient feels as though he feels off balance particularly while standing up or moving without support. He has not had any falls as a result of this feeling. He denies any dizziness or LH and states that it more so feels "like he is staggering" when standing up. He additionally reported R Leg pain which feels MSK in nature, which is more prominent while trying to get dressed. The patients last MRI was 4/21 and demonstrated remote microhemmorhages in b/l parietal and frontal lobes as well as generalized volume loss.     On second evaluation on 1/13/22, the patient was noted to have completed an MRA H/N with no significant abnormalities as well as MRI cervical spine which demonstrated severe spinal stenosis. Since his last visit, the patient reports that his symptoms have remained stable without any significant improvements or worsening. He reports that it takes him 30 minutes to get out of the tub. He continues to deny any dizziness but does endorse some lower back and neck pain. He is uncertain if his pain changes with bending forward. Of note, the patient is also complaining of some tremors in arms while reading newspaper. Also feels as though he is using his L hand more to do things despite being right handed    PAST MEDICAL HISTORY:  Past Medical History:   Diagnosis Date    Allergy     Aneurysm of artery of lower extremity     Chalazion of left eye     CKD (chronic kidney disease), stage II 4/1/2019    Diabetes mellitus type II     Diabetes with neurologic complications     Enlarged aorta 8/2/2016    Noted on pharmacological stress echo 2/28/2014.      GERD (gastroesophageal reflux disease)     egd 2008    Hyperlipidemia     Hypertension     Jock itch 7/19/2018    MGD (meibomian gland dysfunction)     Osteopenia     Spinal stenosis     Spondylosis without myelopathy 10/23/2015    Vitamin D deficiency disease        PAST SURGICAL HISTORY:  Past " Surgical History:   Procedure Laterality Date    CHALAZION EXCISION Left 8/18/13    CYST REMOVAL  2013    Back of neck    FLEXIBLE CYSTOSCOPY N/A 12/7/2021    Procedure: CYSTOSCOPY, FLEXIBLE;  Surgeon: Beatrice Vaca MD;  [facility];  Service: Urology;  Laterality: N/A;    FLUOROSCOPIC URODYNAMIC STUDY N/A 12/7/2021    Procedure: URODYNAMIC STUDY, FLUOROSCOPIC;  Surgeon: Beatrice Vaca MD;  [facility];  Service: Urology;  Laterality: N/A;  90 minutes     SPINE SURGERY  2007    x2 (2000, 2007)       CURRENT MEDS:  Current Outpatient Medications   Medication Sig Dispense Refill    acetaminophen (TYLENOL) 650 MG TbSR Take 1,300 mg by mouth every 8 (eight) hours as needed.       albuterol 90 mcg/actuation inhaler Inhale 1-2 puffs into the lungs every 6 (six) hours as needed for Wheezing. 1 Inhaler 0    amLODIPine (NORVASC) 5 MG tablet TAKE 1 TABLET EVERY DAY 90 tablet 3    aspirin (ECOTRIN) 81 MG EC tablet Take 1 tablet (81 mg total) by mouth once daily. 90 tablet 3    azelastine (ASTELIN) 137 mcg nasal spray 1 spray (137 mcg total) by Nasal route 2 (two) times daily. 90 mL 3    benzonatate (TESSALON) 200 MG capsule TAKE 1 CAPSULE THREE TIMES DAILY AS NEEDED FOR COUGH 30 capsule 0    chlorthalidone (HYGROTEN) 25 MG Tab Take 0.5 tablets (12.5 mg total) by mouth once daily. 90 tablet 3    cholecalciferol, vitamin D3, (VITAMIN D3) 50 mcg (2,000 unit) Cap Take 1 capsule by mouth once daily.      clotrimazole-betamethasone 1-0.05% (LOTRISONE) cream APPLY TOPICALLY 2 (TWO) TIMES DAILY. 45 g 0    docusate sodium (COLACE) 50 MG capsule Take by mouth 2 (two) times daily.      DULoxetine (CYMBALTA) 60 MG capsule TAKE 1 CAPSULE EVERY DAY  FOR  PAIN CONTROL 90 capsule 3    finasteride (PROSCAR) 5 mg tablet Take 1 tablet (5 mg total) by mouth once daily. 90 tablet 3    fluticasone propionate (FLONASE) 50 mcg/actuation nasal spray USE 1 SPRAY NASALLY ONE TIME DAILY 32 g 6     gabapentin (NEURONTIN) 300 MG capsule Take one po in morning one at noon and two in the evening 120 capsule 3    guaifenesin (MUCINEX) 600 mg 12 hr tablet Take 1,200 mg by mouth 2 (two) times daily as needed.       ibuprofen (ADVIL,MOTRIN) 200 MG tablet Take 200 mg by mouth every 6 (six) hours as needed for Pain.      ibuprofen/diphenhydramine cit (ADVIL PM ORAL) Take 1 tablet by mouth every evening.      irbesartan (AVAPRO) 300 MG tablet TAKE 1 TABLET EVERY EVENING 90 tablet 3    ketoconazole (NIZORAL) 2 % cream AAA bid (facial redness and scaling) 60 g 3    ketoconazole (NIZORAL) 2 % shampoo Wash hair with medicated shampoo at least 2x/week - let sit on scalp at least 5 minutes prior to rinsing 120 mL 5    melatonin 10 mg Cap Take 1 capsule by mouth nightly as needed.       methocarbamol (ROBAXIN) 500 MG Tab 3 (three) times daily as needed.       omeprazole (PRILOSEC) 20 MG capsule TAKE 1 CAPSULE EVERY DAY 90 capsule 1    potassium chloride (MICRO-K) 10 MEQ CpSR TAKE 3 CAPSULES ONE TIME DAILY 270 capsule 2    pravastatin (PRAVACHOL) 20 MG tablet TAKE 1 TABLET EVERY DAY 90 tablet 3    tamsulosin (FLOMAX) 0.4 mg Cap Take 1 capsule (0.4 mg total) by mouth once daily. 90 capsule 3     No current facility-administered medications for this visit.       ALLERGIES:  Review of patient's allergies indicates:  No Known Allergies    FAMILY HISTORY:  Family History   Problem Relation Age of Onset    Hyperlipidemia Mother     Hypertension Mother     Kidney disease Mother     Cancer Mother     Heart disease Mother     Kidney failure Mother     No Known Problems Daughter     No Known Problems Sister     No Known Problems Brother     No Known Problems Son     No Known Problems Brother     No Known Problems Son     Hypertension Maternal Grandmother     Amblyopia Neg Hx     Blindness Neg Hx     Cataracts Neg Hx     Diabetes Neg Hx     Glaucoma Neg Hx     Macular degeneration Neg Hx     Retinal  "detachment Neg Hx     Strabismus Neg Hx     Stroke Neg Hx     Thyroid disease Neg Hx     Melanoma Neg Hx     Psoriasis Neg Hx     Lupus Neg Hx     Eczema Neg Hx        SOCIAL HISTORY:  Social History     Tobacco Use    Smoking status: Never Smoker    Smokeless tobacco: Former User     Types: Chew    Tobacco comment: Chewed tobacco once per day for 4-5 years when a    Substance Use Topics    Alcohol use: No    Drug use: No       Review of Systems:  12 system review of systems is negative except for the symptoms mentioned in HPI.      Objective:     Vitals:    01/13/22 0953   BP: (!) 143/86   BP Location: Left arm   Patient Position: Sitting   BP Method: Large (Automatic)   Pulse: 103   Weight: 100.4 kg (221 lb 3.7 oz)   Height: 5' 11" (1.803 m)     General: NAD, well nourished   Eyes: no tearing, discharge, no erythema   ENT: moist mucous membranes of the oral cavity, nares patent    Neck: Supple, full range of motion  Cardiovascular: Warm and well perfused, pulses equal and symmetrical  Lungs: Normal work of breathing, normal chest wall excursions  Skin: No rash, lesions, or breakdown on exposed skin  Psychiatry: Mood and affect are appropriate   Abdomen: soft, non tender, non distended  Extremeties: No cyanosis, clubbing or edema.    Neurological   MENTAL STATUS: Alert and oriented to person, place, and time. Attention and concentration within normal limits. Speech without dysarthria, able to name and repeat without difficulty. Recent and remote memory within normal limits   CRANIAL NERVES: Visual fields intact. PERRL. EOMI. Facial sensation intact. Face symmetrical. Hearing grossly intact. Full shoulder shrug bilaterally. Tongue protrudes midline   SENSORY: Sensation is intact to light touch throughout.  Joint position perception intact. Negative Romberg.   MOTOR: Normal bulk and tone. No pronator drift.    L lef 4-/5 with all movements  R leg 4+/5 with all movements  No UE " weakness  REFLEXES:  +Cross adduction reflex,  3+ with shoulder reflex  3+ on b/l LE patellar and achilles  CEREBELLAR/COORDINATION/GAIT: Gait steady with normal arm swing and stride length. Cautious gait. Heel to shin intact. Finger to nose intact. Normal rapid alternating movements.

## 2022-01-14 ENCOUNTER — TELEPHONE (OUTPATIENT)
Dept: NEUROSURGERY | Facility: CLINIC | Age: 77
End: 2022-01-14
Payer: MEDICARE

## 2022-01-17 DIAGNOSIS — I51.9 MILD AORTIC REGURGITATION WITH LEFT VENTRICULAR SYSTOLIC DYSFUNCTION BY PRIOR ECHOCARDIOGRAM: Primary | ICD-10-CM

## 2022-01-17 DIAGNOSIS — I35.1 MILD AORTIC REGURGITATION WITH LEFT VENTRICULAR SYSTOLIC DYSFUNCTION BY PRIOR ECHOCARDIOGRAM: Primary | ICD-10-CM

## 2022-01-18 RX ORDER — ASPIRIN 81 MG/1
TABLET ORAL
Qty: 90 TABLET | Refills: 3 | Status: SHIPPED | OUTPATIENT
Start: 2022-01-18 | End: 2022-11-11

## 2022-01-26 ENCOUNTER — HOSPITAL ENCOUNTER (OUTPATIENT)
Dept: RADIOLOGY | Facility: HOSPITAL | Age: 77
Discharge: HOME | End: 2022-01-26
Attending: STUDENT IN AN ORGANIZED HEALTH CARE EDUCATION/TRAINING PROGRAM
Payer: MEDICARE

## 2022-01-26 ENCOUNTER — OFFICE VISIT (OUTPATIENT)
Dept: NEUROSURGERY | Facility: CLINIC | Age: 77
End: 2022-01-26
Payer: MEDICARE

## 2022-01-26 VITALS
HEIGHT: 71 IN | SYSTOLIC BLOOD PRESSURE: 157 MMHG | BODY MASS INDEX: 30.94 KG/M2 | DIASTOLIC BLOOD PRESSURE: 92 MMHG | HEART RATE: 89 BPM | WEIGHT: 221 LBS

## 2022-01-26 DIAGNOSIS — G95.9 CERVICAL MYELOPATHY: Primary | ICD-10-CM

## 2022-01-26 DIAGNOSIS — M48.062 LUMBAR STENOSIS WITH NEUROGENIC CLAUDICATION: ICD-10-CM

## 2022-01-26 DIAGNOSIS — M48.02 CERVICAL STENOSIS OF SPINAL CANAL: ICD-10-CM

## 2022-01-26 DIAGNOSIS — M48.07 SPINAL STENOSIS, LUMBOSACRAL REGION: ICD-10-CM

## 2022-01-26 PROCEDURE — 99205 OFFICE O/P NEW HI 60 MIN: CPT | Mod: S$GLB,,, | Performed by: PHYSICIAN ASSISTANT

## 2022-01-26 PROCEDURE — 72148 MRI LUMBAR SPINE W/O DYE: CPT | Mod: TC

## 2022-01-26 PROCEDURE — 72148 MRI LUMBAR SPINE W/O DYE: CPT | Mod: 26,,, | Performed by: RADIOLOGY

## 2022-01-26 PROCEDURE — 99999 PR PBB SHADOW E&M-EST. PATIENT-LVL V: CPT | Mod: PBBFAC,,, | Performed by: PHYSICIAN ASSISTANT

## 2022-01-26 PROCEDURE — 3077F PR MOST RECENT SYSTOLIC BLOOD PRESSURE >= 140 MM HG: ICD-10-PCS | Mod: CPTII,S$GLB,, | Performed by: PHYSICIAN ASSISTANT

## 2022-01-26 PROCEDURE — 3077F SYST BP >= 140 MM HG: CPT | Mod: CPTII,S$GLB,, | Performed by: PHYSICIAN ASSISTANT

## 2022-01-26 PROCEDURE — 3072F PR LOW RISK FOR RETINOPATHY: ICD-10-PCS | Mod: CPTII,S$GLB,, | Performed by: PHYSICIAN ASSISTANT

## 2022-01-26 PROCEDURE — 99999 PR PBB SHADOW E&M-EST. PATIENT-LVL V: ICD-10-PCS | Mod: PBBFAC,,, | Performed by: PHYSICIAN ASSISTANT

## 2022-01-26 PROCEDURE — 99499 UNLISTED E&M SERVICE: CPT | Mod: S$GLB,,, | Performed by: PHYSICIAN ASSISTANT

## 2022-01-26 PROCEDURE — 1159F PR MEDICATION LIST DOCUMENTED IN MEDICAL RECORD: ICD-10-PCS | Mod: CPTII,S$GLB,, | Performed by: PHYSICIAN ASSISTANT

## 2022-01-26 PROCEDURE — 1125F PR PAIN SEVERITY QUANTIFIED, PAIN PRESENT: ICD-10-PCS | Mod: CPTII,S$GLB,, | Performed by: PHYSICIAN ASSISTANT

## 2022-01-26 PROCEDURE — 99499 RISK ADDL DX/OHS AUDIT: ICD-10-PCS | Mod: S$GLB,,, | Performed by: PHYSICIAN ASSISTANT

## 2022-01-26 PROCEDURE — 3080F DIAST BP >= 90 MM HG: CPT | Mod: CPTII,S$GLB,, | Performed by: PHYSICIAN ASSISTANT

## 2022-01-26 PROCEDURE — 1125F AMNT PAIN NOTED PAIN PRSNT: CPT | Mod: CPTII,S$GLB,, | Performed by: PHYSICIAN ASSISTANT

## 2022-01-26 PROCEDURE — 3080F PR MOST RECENT DIASTOLIC BLOOD PRESSURE >= 90 MM HG: ICD-10-PCS | Mod: CPTII,S$GLB,, | Performed by: PHYSICIAN ASSISTANT

## 2022-01-26 PROCEDURE — 1159F MED LIST DOCD IN RCRD: CPT | Mod: CPTII,S$GLB,, | Performed by: PHYSICIAN ASSISTANT

## 2022-01-26 PROCEDURE — 72148 MRI LUMBAR SPINE WITHOUT CONTRAST: ICD-10-PCS | Mod: 26,,, | Performed by: RADIOLOGY

## 2022-01-26 PROCEDURE — 99205 PR OFFICE/OUTPT VISIT, NEW, LEVL V, 60-74 MIN: ICD-10-PCS | Mod: S$GLB,,, | Performed by: PHYSICIAN ASSISTANT

## 2022-01-26 PROCEDURE — 3072F LOW RISK FOR RETINOPATHY: CPT | Mod: CPTII,S$GLB,, | Performed by: PHYSICIAN ASSISTANT

## 2022-01-26 NOTE — PROGRESS NOTES
"Neurosurgery  History & Physical    SUBJECTIVE:     Chief Complaint: neck pain and gait instability     History of Present Illness:  Tito Menard is a pleasant 77 y/o male who presents to clinic today to re-establish care. He was referred to our clinic by Dr. Henok Castorena for cervical stenosis. Patient has a history of lumbar stenosis and is s/p L2-4 decompressive laminectomy with Dr. Kdid in 2008. Patient reports he has been doing well since that surgery until about 2 years ago. He reports right sided neck pain that radiates into the right shoulder. The pain is burning and achy. The pain will intermittently radiates down the right arm in the radial aspect of the forearm. He is right handed. He also reports feeling very unbalanced when walking and even just standing. He reports he walks as if he is intoxicated. He reports frequent falls. He uses a rollator walker when he leaves home and a cane in the house. He started needing assistive devices to ambulate approximately 1 year ago. He reports issues with his prostate recently and is seeing a urologist. He reports some concern over upcoming medical procedures he will need. He does state that he does not want surgery because of all his other medical issues. He reports he can "get around okay" and is comfortable with his current functionality.           Review of patient's allergies indicates:  No Known Allergies    Current Outpatient Medications   Medication Sig Dispense Refill    acetaminophen (TYLENOL) 650 MG TbSR Take 1,300 mg by mouth every 8 (eight) hours as needed.       albuterol 90 mcg/actuation inhaler Inhale 1-2 puffs into the lungs every 6 (six) hours as needed for Wheezing. 1 Inhaler 0    amLODIPine (NORVASC) 5 MG tablet TAKE 1 TABLET EVERY DAY 90 tablet 3    aspirin (ECOTRIN) 81 MG EC tablet TAKE 1 TABLET EVERY DAY 90 tablet 3    azelastine (ASTELIN) 137 mcg nasal spray 1 spray (137 mcg total) by Nasal route 2 (two) times daily. 90 mL 3    " benzonatate (TESSALON) 200 MG capsule TAKE 1 CAPSULE THREE TIMES DAILY AS NEEDED FOR COUGH 30 capsule 0    chlorthalidone (HYGROTEN) 25 MG Tab Take 0.5 tablets (12.5 mg total) by mouth once daily. 90 tablet 3    cholecalciferol, vitamin D3, (VITAMIN D3) 50 mcg (2,000 unit) Cap Take 1 capsule by mouth once daily.      clotrimazole-betamethasone 1-0.05% (LOTRISONE) cream APPLY TOPICALLY 2 (TWO) TIMES DAILY. 45 g 0    docusate sodium (COLACE) 50 MG capsule Take by mouth 2 (two) times daily.      DULoxetine (CYMBALTA) 60 MG capsule TAKE 1 CAPSULE EVERY DAY  FOR  PAIN CONTROL 90 capsule 3    finasteride (PROSCAR) 5 mg tablet Take 1 tablet (5 mg total) by mouth once daily. 90 tablet 3    fluticasone propionate (FLONASE) 50 mcg/actuation nasal spray USE 1 SPRAY NASALLY ONE TIME DAILY 32 g 6    gabapentin (NEURONTIN) 300 MG capsule Take one po in morning one at noon and two in the evening 120 capsule 3    guaifenesin (MUCINEX) 600 mg 12 hr tablet Take 1,200 mg by mouth 2 (two) times daily as needed.       ibuprofen (ADVIL,MOTRIN) 200 MG tablet Take 200 mg by mouth every 6 (six) hours as needed for Pain.      irbesartan (AVAPRO) 300 MG tablet TAKE 1 TABLET EVERY EVENING 90 tablet 3    ketoconazole (NIZORAL) 2 % cream AAA bid (facial redness and scaling) 60 g 3    ketoconazole (NIZORAL) 2 % shampoo Wash hair with medicated shampoo at least 2x/week - let sit on scalp at least 5 minutes prior to rinsing 120 mL 5    methocarbamol (ROBAXIN) 500 MG Tab 3 (three) times daily as needed.       omeprazole (PRILOSEC) 20 MG capsule TAKE 1 CAPSULE EVERY DAY 90 capsule 1    potassium chloride (MICRO-K) 10 MEQ CpSR TAKE 3 CAPSULES ONE TIME DAILY 270 capsule 2    pravastatin (PRAVACHOL) 20 MG tablet TAKE 1 TABLET EVERY DAY 90 tablet 3    tamsulosin (FLOMAX) 0.4 mg Cap Take 1 capsule (0.4 mg total) by mouth once daily. 90 capsule 3    ibuprofen/diphenhydramine cit (ADVIL PM ORAL) Take 1 tablet by mouth every evening.       melatonin 10 mg Cap Take 1 capsule by mouth nightly as needed.        No current facility-administered medications for this visit.       Past Medical History:   Diagnosis Date    Allergy     Aneurysm of artery of lower extremity     Chalazion of left eye     CKD (chronic kidney disease), stage II 4/1/2019    Diabetes mellitus type II     Diabetes with neurologic complications     Enlarged aorta 8/2/2016    Noted on pharmacological stress echo 2/28/2014.      GERD (gastroesophageal reflux disease)     egd 2008    Hyperlipidemia     Hypertension     Jock itch 7/19/2018    MGD (meibomian gland dysfunction)     Osteopenia     Spinal stenosis     Spondylosis without myelopathy 10/23/2015    Vitamin D deficiency disease      Past Surgical History:   Procedure Laterality Date    CHALAZION EXCISION Left 8/18/13    CYST REMOVAL  2013    Back of neck    FLEXIBLE CYSTOSCOPY N/A 12/7/2021    Procedure: CYSTOSCOPY, FLEXIBLE;  Surgeon: Beatrice Vaca MD;  Location: [facility] OR [address] [place] [address];  Service: Urology;  Laterality: N/A;    FLUOROSCOPIC URODYNAMIC STUDY N/A 12/7/2021    Procedure: URODYNAMIC STUDY, FLUOROSCOPIC;  Surgeon: Beatrice Vaca MD;  Location: [facility] OR [address] [place] [address];  Service: Urology;  Laterality: N/A;  90 minutes     SPINE SURGERY  2007    x2 (2000, 2007)     Family History     Problem Relation (Age of Onset)    Cancer Mother    Heart disease Mother    Hyperlipidemia Mother    Hypertension Mother, Maternal Grandmother    Kidney disease Mother    Kidney failure Mother    No Known Problems Daughter, Sister, Brother, Son, Brother, Son        Social History     Socioeconomic History    Marital status:    Occupational History    Occupation: Retired     Comment:    Tobacco Use    Smoking status: Never Smoker    Smokeless tobacco: Former User     Types: Chew    Tobacco comment: Chewed tobacco once per day for 4-5 years when a    Substance and Sexual Activity    Alcohol use:  No    Drug use: No    Sexual activity: Not Currently     Partners: Female   Social History Narrative        Colon 2007     Social Determinants of Health     Financial Resource Strain: Low Risk     Difficulty of Paying Living Expenses: Not hard at all   Food Insecurity: No Food Insecurity    Worried About Running Out of Food in the Last Year: Never true    Ran Out of Food in the Last Year: Never true   Transportation Needs: No Transportation Needs    Lack of Transportation (Medical): No    Lack of Transportation (Non-Medical): No   Physical Activity: Inactive    Days of Exercise per Week: 0 days    Minutes of Exercise per Session: 0 min   Stress: No Stress Concern Present    Feeling of Stress : Only a little   Social Connections: Moderately Isolated    Frequency of Communication with Friends and Family: More than three times a week    Frequency of Social Gatherings with Friends and Family: Twice a week    Attends Buddhism Services: Never    Active Member of Clubs or Organizations: No    Attends Club or Organization Meetings: Never    Marital Status:    Housing Stability: Low Risk     Unable to Pay for Housing in the Last Year: No    Number of Places Lived in the Last Year: 1    Unstable Housing in the Last Year: No       Review of Systems   Constitutional: Positive for activity change. Negative for chills, fatigue and fever.   Eyes: Negative for photophobia and visual disturbance.   Respiratory: Negative for cough and shortness of breath.    Cardiovascular: Negative for chest pain, palpitations and leg swelling.   Gastrointestinal: Negative for constipation, diarrhea, nausea and vomiting.   Genitourinary: Positive for urgency. Negative for difficulty urinating, dysuria and frequency.   Musculoskeletal: Positive for arthralgias (right shoulder and arm pain), gait problem and neck pain (right sided). Negative for back pain.   Neurological: Positive for weakness (right leg).  "Negative for dizziness, seizures, speech difficulty, numbness and headaches.   Psychiatric/Behavioral: Negative for confusion. The patient is not nervous/anxious.        OBJECTIVE:     Vital Signs  Pulse: 89  BP: (!) 157/92  Pain Score:   7  Height: 5' 11" (180.3 cm)  Weight: 100.2 kg (221 lb)  Body mass index is 30.82 kg/m².      Neurosurgery Physical Exam  General: well developed, well nourished, no distress.   Head: normocephalic, atraumatic  Neurologic: Alert and oriented. Thought content appropriate.  GCS: Motor: 6/Verbal: 5/Eyes: 4 GCS Total: 15  Cranial nerves: face symmetric, tongue midline, CN II-XII grossly intact.   Eyes: pupils equal, round, reactive to light with accommodation, EOMI.   Pulmonary: normal respirations, no signs of respiratory distressn  Skin: Skin is warm, dry and intact.  Sensory: intact to light touch throughout  Motor Strength:Moves all extremities spontaneously with good tone  Strength  Deltoids Triceps Biceps Wrist Extension Wrist Flexion Hand    Upper: R 5/5 5/5 5/5 5/5 5/5 5/5    L 5/5 5/5 5/5 5/5 5/5 5/5     Iliopsoas Quadriceps Knee  Flexion Tibialis  anterior Gastro- cnemius EHL   Lower: R 4/5 5/5 5/5 5/5 5/5 5/5    L 5/5 5/5 5/5 5/5 5/5 5/5     DTR:2+ in biceps and brachioradialis bilaterally; 1+ in patellar and achilles bilaterally  Ontiveros's: Negative.  Clonus: Negative.  Gait ataxic with a forward stance.   Tandem Gait: unable to perform   Unable to walk on heels & toes       Diagnostic Results:  I independently reviewed the imaging.     MRI cervical spine wo contrast (11/2/2021): severe degenerative changes from C3-T1. Severe canal stenosis at C3-4 with T2 signal change in the cord with severe bilateral foraminal narrowing    MRI lumbar spine wo contrast (1/26/2022): severe degenerative changes throughout. Reversal of normal lordosis. Severe central stenosis at L3-4 with severe left foraminal stenosis. Anterolisthesis of L3 on L4    ASSESSMENT/PLAN:     77 y/o male with " cervical stenosis at C3-4 with myelopathy and lumbar stenosis (s/p L2-4 laminectomy with Dr. Kidd in 2008) with neurogenic claudication.     -Patient would benefit from decompression of the cord at C3-4. Would need CT cervical spine wo contrast to assess ACDF vs PCF. Patient is symptomatic with gait instability and right lower extremity weakness. Patient would not like to pursue surgery at this time. I educated the patient on the risks of progressive myelopathy. Patient verbalized understanding. I do recommend physical therapy to help with strength and gait. I would like the patient to follow up with me in 3 months to assess his progress.   I have encouraged him to contact the clinic with any questions, concerns, or adverse clinical changes. He verbalized understanding.       Time spent on this encounter: 60 minutes. This includes face-to-face time and non-face to face time preparing to see the patient (eg, review of tests), obtaining and/or reviewing separately obtained history, documenting clinical information in the electronic or other health record, independently interpreting results and communicating results to the patient/family/caregiver, or care coordinator    Note dictated with voice recognition software, please excuse any grammatical errors.

## 2022-01-27 ENCOUNTER — LAB VISIT (OUTPATIENT)
Dept: LAB | Facility: OTHER | Age: 77
End: 2022-01-27
Attending: INTERNAL MEDICINE
Payer: MEDICARE

## 2022-01-27 ENCOUNTER — OFFICE VISIT (OUTPATIENT)
Dept: INTERNAL MEDICINE | Facility: CLINIC | Age: 77
End: 2022-01-27
Attending: INTERNAL MEDICINE
Payer: MEDICARE

## 2022-01-27 VITALS
HEIGHT: 71 IN | SYSTOLIC BLOOD PRESSURE: 118 MMHG | OXYGEN SATURATION: 96 % | HEART RATE: 80 BPM | WEIGHT: 221.31 LBS | BODY MASS INDEX: 30.98 KG/M2 | DIASTOLIC BLOOD PRESSURE: 62 MMHG

## 2022-01-27 DIAGNOSIS — I67.82 SUBCORTICAL MICROVASCULAR ISCHEMIC OCCLUSIVE DISEASE: ICD-10-CM

## 2022-01-27 DIAGNOSIS — J44.89 OBSTRUCTIVE CHRONIC BRONCHITIS WITHOUT EXACERBATION: ICD-10-CM

## 2022-01-27 DIAGNOSIS — M48.00 SPINAL STENOSIS, UNSPECIFIED SPINAL REGION: ICD-10-CM

## 2022-01-27 DIAGNOSIS — E11.42 DIABETIC POLYNEUROPATHY ASSOCIATED WITH TYPE 2 DIABETES MELLITUS: ICD-10-CM

## 2022-01-27 DIAGNOSIS — J30.2 SEASONAL ALLERGIC RHINITIS, UNSPECIFIED TRIGGER: Primary | ICD-10-CM

## 2022-01-27 DIAGNOSIS — I51.89 GRADE I DIASTOLIC DYSFUNCTION: ICD-10-CM

## 2022-01-27 DIAGNOSIS — M41.9 RESTRICTIVE LUNG DISEASE DUE TO KYPHOSCOLIOSIS: ICD-10-CM

## 2022-01-27 DIAGNOSIS — I71.40 ABDOMINAL AORTIC ANEURYSM (AAA) WITHOUT RUPTURE: ICD-10-CM

## 2022-01-27 DIAGNOSIS — J98.4 RESTRICTIVE LUNG DISEASE DUE TO KYPHOSCOLIOSIS: ICD-10-CM

## 2022-01-27 DIAGNOSIS — Z78.9 IMPAIRED INSTRUMENTAL ACTIVITIES OF DAILY LIVING (IADL): ICD-10-CM

## 2022-01-27 LAB
ALBUMIN SERPL BCP-MCNC: 3.6 G/DL (ref 3.5–5.2)
ALP SERPL-CCNC: 98 U/L (ref 55–135)
ALT SERPL W/O P-5'-P-CCNC: 26 U/L (ref 10–44)
ANION GAP SERPL CALC-SCNC: 9 MMOL/L (ref 8–16)
AST SERPL-CCNC: 25 U/L (ref 10–40)
BILIRUB SERPL-MCNC: 0.5 MG/DL (ref 0.1–1)
BUN SERPL-MCNC: 13 MG/DL (ref 8–23)
CALCIUM SERPL-MCNC: 9.4 MG/DL (ref 8.7–10.5)
CHLORIDE SERPL-SCNC: 103 MMOL/L (ref 95–110)
CO2 SERPL-SCNC: 28 MMOL/L (ref 23–29)
CREAT SERPL-MCNC: 1.2 MG/DL (ref 0.5–1.4)
EST. GFR  (AFRICAN AMERICAN): >60 ML/MIN/1.73 M^2
EST. GFR  (NON AFRICAN AMERICAN): 58 ML/MIN/1.73 M^2
ESTIMATED AVG GLUCOSE: 120 MG/DL (ref 68–131)
GLUCOSE SERPL-MCNC: 96 MG/DL (ref 70–110)
HBA1C MFR BLD: 5.8 % (ref 4–5.6)
POTASSIUM SERPL-SCNC: 3.7 MMOL/L (ref 3.5–5.1)
PROT SERPL-MCNC: 7.5 G/DL (ref 6–8.4)
SODIUM SERPL-SCNC: 140 MMOL/L (ref 136–145)

## 2022-01-27 PROCEDURE — 3074F SYST BP LT 130 MM HG: CPT | Mod: CPTII,S$GLB,, | Performed by: INTERNAL MEDICINE

## 2022-01-27 PROCEDURE — 99999 PR PBB SHADOW E&M-EST. PATIENT-LVL IV: CPT | Mod: PBBFAC,,, | Performed by: INTERNAL MEDICINE

## 2022-01-27 PROCEDURE — 99215 OFFICE O/P EST HI 40 MIN: CPT | Mod: S$GLB,,, | Performed by: INTERNAL MEDICINE

## 2022-01-27 PROCEDURE — 1125F PR PAIN SEVERITY QUANTIFIED, PAIN PRESENT: ICD-10-PCS | Mod: CPTII,S$GLB,, | Performed by: INTERNAL MEDICINE

## 2022-01-27 PROCEDURE — 1101F PT FALLS ASSESS-DOCD LE1/YR: CPT | Mod: CPTII,S$GLB,, | Performed by: INTERNAL MEDICINE

## 2022-01-27 PROCEDURE — 99999 PR PBB SHADOW E&M-EST. PATIENT-LVL IV: ICD-10-PCS | Mod: PBBFAC,,, | Performed by: INTERNAL MEDICINE

## 2022-01-27 PROCEDURE — 1125F AMNT PAIN NOTED PAIN PRSNT: CPT | Mod: CPTII,S$GLB,, | Performed by: INTERNAL MEDICINE

## 2022-01-27 PROCEDURE — 36415 COLL VENOUS BLD VENIPUNCTURE: CPT | Performed by: INTERNAL MEDICINE

## 2022-01-27 PROCEDURE — 1160F RVW MEDS BY RX/DR IN RCRD: CPT | Mod: CPTII,S$GLB,, | Performed by: INTERNAL MEDICINE

## 2022-01-27 PROCEDURE — 3072F PR LOW RISK FOR RETINOPATHY: ICD-10-PCS | Mod: CPTII,S$GLB,, | Performed by: INTERNAL MEDICINE

## 2022-01-27 PROCEDURE — 3074F PR MOST RECENT SYSTOLIC BLOOD PRESSURE < 130 MM HG: ICD-10-PCS | Mod: CPTII,S$GLB,, | Performed by: INTERNAL MEDICINE

## 2022-01-27 PROCEDURE — 99215 PR OFFICE/OUTPT VISIT, EST, LEVL V, 40-54 MIN: ICD-10-PCS | Mod: S$GLB,,, | Performed by: INTERNAL MEDICINE

## 2022-01-27 PROCEDURE — 3288F FALL RISK ASSESSMENT DOCD: CPT | Mod: CPTII,S$GLB,, | Performed by: INTERNAL MEDICINE

## 2022-01-27 PROCEDURE — 83036 HEMOGLOBIN GLYCOSYLATED A1C: CPT | Performed by: INTERNAL MEDICINE

## 2022-01-27 PROCEDURE — 80053 COMPREHEN METABOLIC PANEL: CPT | Performed by: INTERNAL MEDICINE

## 2022-01-27 PROCEDURE — 3078F DIAST BP <80 MM HG: CPT | Mod: CPTII,S$GLB,, | Performed by: INTERNAL MEDICINE

## 2022-01-27 PROCEDURE — 3072F LOW RISK FOR RETINOPATHY: CPT | Mod: CPTII,S$GLB,, | Performed by: INTERNAL MEDICINE

## 2022-01-27 PROCEDURE — 3288F PR FALLS RISK ASSESSMENT DOCUMENTED: ICD-10-PCS | Mod: CPTII,S$GLB,, | Performed by: INTERNAL MEDICINE

## 2022-01-27 PROCEDURE — 3078F PR MOST RECENT DIASTOLIC BLOOD PRESSURE < 80 MM HG: ICD-10-PCS | Mod: CPTII,S$GLB,, | Performed by: INTERNAL MEDICINE

## 2022-01-27 PROCEDURE — 1101F PR PT FALLS ASSESS DOC 0-1 FALLS W/OUT INJ PAST YR: ICD-10-PCS | Mod: CPTII,S$GLB,, | Performed by: INTERNAL MEDICINE

## 2022-01-27 PROCEDURE — 1160F PR REVIEW ALL MEDS BY PRESCRIBER/CLIN PHARMACIST DOCUMENTED: ICD-10-PCS | Mod: CPTII,S$GLB,, | Performed by: INTERNAL MEDICINE

## 2022-01-27 PROCEDURE — 1159F PR MEDICATION LIST DOCUMENTED IN MEDICAL RECORD: ICD-10-PCS | Mod: CPTII,S$GLB,, | Performed by: INTERNAL MEDICINE

## 2022-01-27 PROCEDURE — 1159F MED LIST DOCD IN RCRD: CPT | Mod: CPTII,S$GLB,, | Performed by: INTERNAL MEDICINE

## 2022-01-27 RX ORDER — AZELASTINE 1 MG/ML
1 SPRAY, METERED NASAL 2 TIMES DAILY
Qty: 90 ML | Refills: 3 | Status: SHIPPED | OUTPATIENT
Start: 2022-01-27 | End: 2023-02-08

## 2022-01-27 NOTE — PROGRESS NOTES
Subjective:       Patient ID: Tito Menard is a 76 y.o. male.    Chief Complaint: No chief complaint on file.     Tito Menard is a 76 y.o.  male who presents for No chief complaint on file.  .  Discussed recent evaluation of cspine and l spine and coordination of his care.  He is also being elvaluated by urology for probable neurogenic bladder and spermtoceole.  He is reluctant to have surgery for any of his issues if it can be avoided he would prefer this.      Pt has a history of HTN.  Counseled on low salt diet and graded exercise program. Denies CP, SOB, orthopnea or PND.  Currently treated with antihypertensives listed in med card and compliant most of the time. No side effects from medication noted by patient.     No further syncopal issues, evaluated by neuro and cards.     Reports return of his postnasal drip causing a light cough.  Uses flonase but no longer has astelin. No fever or chills. Occasional dry cough.      Review of Systems   Constitutional: Negative for chills and fever.   HENT: Positive for postnasal drip and rhinorrhea.         + dry mouth   Respiratory: Negative for cough and shortness of breath.    Cardiovascular: Negative for chest pain and palpitations.   Gastrointestinal: Negative for nausea and vomiting.   Genitourinary: Negative for dysuria and hematuria.       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 Treated for several years.  Has not been diabetic for over ten years. Labs in 2018 showed return of prediabetic level hba1c.     Restrictive Lung Disease Due to Kyphoscoliosis 8/6/2018   Abdominal Aortic Aneurysm (Aaa) Without Rupture     4.1 cm fusiform dilitation of ascending aorta Seen 2014, stable 2016, 2017 and 2020. Seen by [facility] med in 2016  Pt did not follow with CT sx as recommended  Goal BP <120 adjusted to <130 due to syncopal episodes 2019     Diabetic Polyneuropathy Associated With Type 2 Diabetes Mellitus 10/23/2015    DM had resolved off meds with lifestlye hanges but has returned to prediabetic levels  Manage as diabetes due to long history of DM increasing risk of complication      Essential Hypertension     Goal BP <120 systolic due to history of mild fusiform dilation of Ascending Aorta, stable since 2014.   Syncopal episode 2-19, goal SBP < 130.  12/20 pt tx with amlodipine 10 mg , cholthalidone stopped and irbesartan increased due to urinary frequency       Mixed Hyperlipidemia    Prediabetes (Resolved) 9/2/2020   Mild Left Ventricular Systolic Dysfunction (Resolved) 2/13/2020    On echo at [facility] after admission for syncope     Jock Itch (Resolved) 7/19/2018       Past Medical History:   Diagnosis Date    Allergy     Aneurysm of artery of lower extremity     Chalazion of left eye     CKD (chronic kidney disease), stage II 4/1/2019    Diabetes mellitus type II     Diabetes with neurologic complications     Enlarged aorta 8/2/2016    Noted on pharmacological stress echo 2/28/2014.      GERD (gastroesophageal reflux disease)     egd  "2008    Hyperlipidemia     Hypertension     Jock itch 7/19/2018    MGD (meibomian gland dysfunction)     Osteopenia     Spinal stenosis     Spondylosis without myelopathy 10/23/2015    Vitamin D deficiency disease        Past Surgical History:   Procedure Laterality Date    CHALAZION EXCISION Left 8/18/13    CYST REMOVAL  2013    Back of neck    FLEXIBLE CYSTOSCOPY N/A 12/7/2021    Procedure: CYSTOSCOPY, FLEXIBLE;  Surgeon: Beatrice Vaca MD;  Location: [facility] OR [address] [place] [address];  Service: Urology;  Laterality: N/A;    FLUOROSCOPIC URODYNAMIC STUDY N/A 12/7/2021    Procedure: URODYNAMIC STUDY, FLUOROSCOPIC;  Surgeon: Beatrice Vaca MD;  Location: [facility] OR [address] [place] [address];  Service: Urology;  Laterality: N/A;  90 minutes     SPINE SURGERY  2007    x2 (2000, 2007)       Family History   Problem Relation Age of Onset    Hyperlipidemia Mother     Hypertension Mother     Kidney disease Mother     Cancer Mother     Heart disease Mother     Kidney failure Mother     No Known Problems Daughter     No Known Problems Sister     No Known Problems Brother     No Known Problems Son     No Known Problems Brother     No Known Problems Son     Hypertension Maternal Grandmother     Amblyopia Neg Hx     Blindness Neg Hx     Cataracts Neg Hx     Diabetes Neg Hx     Glaucoma Neg Hx     Macular degeneration Neg Hx     Retinal detachment Neg Hx     Strabismus Neg Hx     Stroke Neg Hx     Thyroid disease Neg Hx     Melanoma Neg Hx     Psoriasis Neg Hx     Lupus Neg Hx     Eczema Neg Hx        Social History     Tobacco Use    Smoking status: Never Smoker    Smokeless tobacco: Former User     Types: Chew    Tobacco comment: Chewed tobacco once per day for 4-5 years when a    Substance Use Topics    Alcohol use: No    Drug use: No       Objective:   Blood pressure 118/62, pulse 80, height 5' 11" (1.803 m), weight 100.4 kg (221 lb 5.5 oz), SpO2 96 %.     Physical Exam  Constitutional:       " General: He is not in acute distress.     Appearance: He is well-developed.   HENT:      Head: Normocephalic and atraumatic.      Right Ear: Tympanic membrane and external ear normal.      Left Ear: Tympanic membrane and external ear normal.      Nose: Rhinorrhea present.      Mouth/Throat:      Mouth: Mucous membranes are dry.      Pharynx: Oropharynx is clear. No oropharyngeal exudate.   Eyes:      General:         Right eye: No discharge.         Left eye: No discharge.      Conjunctiva/sclera: Conjunctivae normal.   Neck:      Thyroid: No thyromegaly.   Cardiovascular:      Heart sounds: Normal heart sounds. No murmur heard.  No friction rub. No gallop.    Pulmonary:      Effort: Pulmonary effort is normal.      Breath sounds: Normal breath sounds. No wheezing or rales.   Abdominal:      General: Bowel sounds are normal. There is no distension.      Palpations: Abdomen is soft.      Tenderness: There is no abdominal tenderness.   Musculoskeletal:         General: No tenderness.   Lymphadenopathy:      Cervical: No cervical adenopathy.   Skin:     General: Skin is warm and dry.   Neurological:      Mental Status: He is alert and oriented to person, place, and time.   Psychiatric:         Thought Content: Thought content normal.         Prior labs reviewed  Assessment/Plan:        Diagnoses and all orders for this visit:    Seasonal allergic rhinitis, unspecified trigger  Comments:  cont flonase, add astelin nasal spray and claritan  Orders:  -     azelastine (ASTELIN) 137 mcg (0.1 %) nasal spray; 1 spray (137 mcg total) by Nasal route 2 (two) times daily.    Diabetic polyneuropathy associated with type 2 diabetes mellitus  Comments:  diet controlled, warned of risk of worrsining without diet and exercise efforts  consider medication if worsens  Orders:  -     Comprehensive Metabolic Panel; Future  -     Hemoglobin A1C; Future    Abdominal aortic aneurysm (AAA) without rupture  Comments:  stable, followed by  vascular  cont with tight BP control    Restrictive lung disease due to kyphoscoliosis  Comments:  stable, control AR symptoms, recommend exercise as tolerated, deep breaths to avoid atelectisis prn    Impaired instrumental activities of daily living (IADL)  Comments:  pain effecting ADLs, follow up with pain management, recommend PT for home plan and participation    Grade I diastolic dysfunction  Comments:  stable, cont lasix, compression hose  BP control    Subcortical microvascular ischemic occlusive disease  Comments:  daily ASA, BP control, stable    Spinal stenosis, unspecified spinal region  Comments:  evaluated by neurosurgery, pain  recommend contined engagement in efforts to improve activity level to prevent [facility]    Obstructive chronic bronchitis without exacerbation  Comments:  currently stable, recommend control of AR symptoms, mask wearing and hand hhygeine to avoid URI       agree with PT plan  Encourage pt to participate  Message sent to neurology re poss amyloid follow up    Medication List with Changes/Refills   Current Medications    ACETAMINOPHEN (TYLENOL) 650 MG TBSR    Take 1,300 mg by mouth every 8 (eight) hours as needed.     ALBUTEROL 90 MCG/ACTUATION INHALER    Inhale 1-2 puffs into the lungs every 6 (six) hours as needed for Wheezing.    AMLODIPINE (NORVASC) 5 MG TABLET    TAKE 1 TABLET EVERY DAY    ASPIRIN (ECOTRIN) 81 MG EC TABLET    TAKE 1 TABLET EVERY DAY    BENZONATATE (TESSALON) 200 MG CAPSULE    TAKE 1 CAPSULE THREE TIMES DAILY AS NEEDED FOR COUGH    CHLORTHALIDONE (HYGROTEN) 25 MG TAB    Take 0.5 tablets (12.5 mg total) by mouth once daily.    CHOLECALCIFEROL, VITAMIN D3, (VITAMIN D3) 50 MCG (2,000 UNIT) CAP    Take 1 capsule by mouth once daily.    CLOTRIMAZOLE-BETAMETHASONE 1-0.05% (LOTRISONE) CREAM    APPLY TOPICALLY 2 (TWO) TIMES DAILY.    DOCUSATE SODIUM (COLACE) 50 MG CAPSULE    Take by mouth 2 (two) times daily.    DULOXETINE (CYMBALTA) 60 MG CAPSULE    TAKE 1  CAPSULE EVERY DAY  FOR  PAIN CONTROL    FINASTERIDE (PROSCAR) 5 MG TABLET    Take 1 tablet (5 mg total) by mouth once daily.    FLUTICASONE PROPIONATE (FLONASE) 50 MCG/ACTUATION NASAL SPRAY    USE 1 SPRAY NASALLY ONE TIME DAILY    GABAPENTIN (NEURONTIN) 300 MG CAPSULE    Take one po in morning one at noon and two in the evening    GUAIFENESIN (MUCINEX) 600 MG 12 HR TABLET    Take 1,200 mg by mouth 2 (two) times daily as needed.     IBUPROFEN (ADVIL,MOTRIN) 200 MG TABLET    Take 200 mg by mouth every 6 (six) hours as needed for Pain.    IBUPROFEN/DIPHENHYDRAMINE CIT (ADVIL PM ORAL)    Take 1 tablet by mouth every evening.    IRBESARTAN (AVAPRO) 300 MG TABLET    TAKE 1 TABLET EVERY EVENING    KETOCONAZOLE (NIZORAL) 2 % CREAM    AAA bid (facial redness and scaling)    KETOCONAZOLE (NIZORAL) 2 % SHAMPOO    Wash hair with medicated shampoo at least 2x/week - let sit on scalp at least 5 minutes prior to rinsing    MELATONIN 10 MG CAP    Take 1 capsule by mouth nightly as needed.     METHOCARBAMOL (ROBAXIN) 500 MG TAB    3 (three) times daily as needed.     OMEPRAZOLE (PRILOSEC) 20 MG CAPSULE    TAKE 1 CAPSULE EVERY DAY    POTASSIUM CHLORIDE (MICRO-K) 10 MEQ CPSR    TAKE 3 CAPSULES ONE TIME DAILY    PRAVASTATIN (PRAVACHOL) 20 MG TABLET    TAKE 1 TABLET EVERY DAY    TAMSULOSIN (FLOMAX) 0.4 MG CAP    Take 1 capsule (0.4 mg total) by mouth once daily.   Changed and/or Refilled Medications    Modified Medication Previous Medication    AZELASTINE (ASTELIN) 137 MCG (0.1 %) NASAL SPRAY azelastine (ASTELIN) 137 mcg nasal spray       1 spray (137 mcg total) by Nasal route 2 (two) times daily.    1 spray (137 mcg total) by Nasal route 2 (two) times daily.

## 2022-01-27 NOTE — Clinical Note
Renee, Saw back in October you were concerned about amyloid.  Can you confirm that this has been evaluated?  I don't see it mentioned on the fellow's last note. Please forgive my ignorance but want to follow up for my patient. Thanks! Amanda

## 2022-01-28 ENCOUNTER — IMMUNIZATION (OUTPATIENT)
Dept: PHARMACY | Facility: CLINIC | Age: 77
End: 2022-01-28
Payer: MEDICARE

## 2022-02-10 RX ORDER — DULOXETIN HYDROCHLORIDE 60 MG/1
CAPSULE, DELAYED RELEASE ORAL
Qty: 90 CAPSULE | Refills: 0 | Status: SHIPPED | OUTPATIENT
Start: 2022-02-10 | End: 2022-05-04

## 2022-02-10 NOTE — TELEPHONE ENCOUNTER
No new care gaps identified.  Powered by [employer]. Reference number: [id number].   2/10/2022 3:27:44 PM CST

## 2022-02-15 RX ORDER — FLUTICASONE PROPIONATE 50 MCG
SPRAY, SUSPENSION (ML) NASAL
Qty: 32 G | Refills: 6 | Status: SHIPPED | OUTPATIENT
Start: 2022-02-15 | End: 2023-03-20

## 2022-02-15 NOTE — TELEPHONE ENCOUNTER
Personalized Prevention Plan  You are due for the preventive services outlined below.  Your care team is available to assist you in scheduling these services.  If you have already completed any of these items, please share that information with your care team to update in your medical record.  Health Maintenance Due   Topic Date Due     Zoster (Shingles) Vaccine (2 of 3) 12/24/2013     PHQ-2  01/01/2021     Flu Vaccine (1) 09/01/2021     Thyroid Function Lab  10/28/2021     FALL RISK ASSESSMENT  10/28/2021         Fax from [facility] requesting refill for flonase.

## 2022-02-15 NOTE — TELEPHONE ENCOUNTER
No new care gaps identified.  Powered by [employer]. Reference number: [id number].   2/15/2022 1:02:36 PM CST

## 2022-03-28 NOTE — TELEPHONE ENCOUNTER
No new care gaps identified.  Powered by [employer]. Reference number: [id number].   3/28/2022 1:53:50 PM CDT

## 2022-03-29 RX ORDER — AMLODIPINE BESYLATE 5 MG/1
TABLET ORAL
Qty: 90 TABLET | Refills: 3 | Status: SHIPPED | OUTPATIENT
Start: 2022-03-29 | End: 2023-02-08

## 2022-03-29 NOTE — TELEPHONE ENCOUNTER
Refill Authorization Note   Tito Menard  is requesting a refill authorization.  Brief Assessment and Rationale for Refill:  Approve     Medication Therapy Plan:       Medication Reconciliation Completed: No   Comments:   --->Care Gap information included below if applicable.       Requested Prescriptions   Pending Prescriptions Disp Refills    amLODIPine (NORVASC) 5 MG tablet [Pharmacy Med Name: AMLODIPINE BESYLATE 5 MG Tablet] 90 tablet 3     Sig: TAKE 1 TABLET EVERY DAY       Cardiovascular:  Calcium Channel Blockers Passed - 3/29/2022  3:00 PM        Passed - Patient is at least 18 years old        Passed - Last BP in normal range within 360 days     BP Readings from Last 3 Encounters:   01/27/22 118/62   01/26/22 (!) 157/92   01/13/22 (!) 143/86               Passed - Valid encounter within last 15 months     Recent Visits  Date Type Provider Dept   01/27/22 Office Visit Amanda Brown MD [facility]   07/19/21 Office Visit Amanda Brown MD [facility]   06/17/21 Office Visit Amanda Brown MD [facility]   05/03/21 Office Visit Amanda Brown MD [facility]   02/04/21 Office Visit Amanda Brown MD [facility]   08/06/20 Office Visit Amanda Brown MD [facility]   Showing recent visits within past 720 days and meeting all other requirements  Future Appointments  No visits were found meeting these conditions.  Showing future appointments within next 150 days and meeting all other requirements      Future Appointments              In 1 week ANN MARIE AveryHVikram [address]                Passed - Matches previous order       Previous Authorizing Provider: Giovanna Solano MD (amLODIPine (NORVASC) 5 MG tablet)  Previous Pharmacy: [facility] [id number]            Passed - No ED/Hospital visits since last PCP visit     Last PCP Visit: 1/27/2022  Last Admission: 12/7/2021 Last ED Visit: 4/26/2021              Appointments  past 12m or future 3m with PCP    Date Provider   Last Visit   1/27/2022 Amanda Brown MD   Next Visit   Visit date not found Amanda Brown MD   ED visits in past 90 days: 0     Note composed:3:01 PM 03/29/2022

## 2022-05-04 RX ORDER — DULOXETIN HYDROCHLORIDE 60 MG/1
CAPSULE, DELAYED RELEASE ORAL
Qty: 90 CAPSULE | Refills: 3 | Status: SHIPPED | OUTPATIENT
Start: 2022-05-04 | End: 2023-05-31

## 2022-05-04 NOTE — TELEPHONE ENCOUNTER
No new care gaps identified.  [facility] Embedded Care Gaps. Reference number: [id number]. 5/03/2022   8:07:44 PM CDT

## 2022-06-08 RX ORDER — CHLORTHALIDONE 25 MG/1
TABLET ORAL
Qty: 45 TABLET | Refills: 2 | Status: SHIPPED | OUTPATIENT
Start: 2022-06-08 | End: 2023-04-19

## 2022-06-08 NOTE — TELEPHONE ENCOUNTER
No new care gaps identified.  [facility] Embedded Care Gaps. Reference number: [id number]. 6/08/2022   2:34:48 PM CDT   11

## 2022-06-08 NOTE — TELEPHONE ENCOUNTER
Refill Authorization Note   Tito Menard  is requesting a refill authorization.  Brief Assessment and Rationale for Refill:  Approve     Medication Therapy Plan:       Medication Reconciliation Completed: No   Comments:     No Care Gaps recommended.     Note composed:3:47 PM 06/08/2022

## 2022-06-17 ENCOUNTER — HOSPITAL ENCOUNTER (INPATIENT)
Facility: HOSPITAL | Age: 77
LOS: 3 days | Discharge: HOME HEALTH | DRG: 872 | End: 2022-06-21
Attending: EMERGENCY MEDICINE | Admitting: STUDENT IN AN ORGANIZED HEALTH CARE EDUCATION/TRAINING PROGRAM
Payer: MEDICARE

## 2022-06-17 DIAGNOSIS — T67.1XXA HEAT SYNCOPE, INITIAL ENCOUNTER: ICD-10-CM

## 2022-06-17 DIAGNOSIS — N45.3 EPIDIDYMO-ORCHITIS: Primary | ICD-10-CM

## 2022-06-17 DIAGNOSIS — R60.0 LOWER EXTREMITY EDEMA: ICD-10-CM

## 2022-06-17 DIAGNOSIS — N45.3 EPIDIDYMOORCHITIS: ICD-10-CM

## 2022-06-17 DIAGNOSIS — N32.81 OVERACTIVE BLADDER: ICD-10-CM

## 2022-06-17 DIAGNOSIS — R78.81 BACTEREMIA: ICD-10-CM

## 2022-06-17 DIAGNOSIS — R07.9 CHEST PAIN: ICD-10-CM

## 2022-06-17 DIAGNOSIS — R50.9 ACUTE FEBRILE ILLNESS: ICD-10-CM

## 2022-06-17 DIAGNOSIS — N50.812 TESTICULAR PAIN, LEFT: ICD-10-CM

## 2022-06-17 DIAGNOSIS — N40.1 BPH WITH OBSTRUCTION/LOWER URINARY TRACT SYMPTOMS: Chronic | ICD-10-CM

## 2022-06-17 DIAGNOSIS — I10 ESSENTIAL HYPERTENSION: Chronic | ICD-10-CM

## 2022-06-17 DIAGNOSIS — I71.20 THORACIC AORTIC ANEURYSM WITHOUT RUPTURE: Chronic | ICD-10-CM

## 2022-06-17 DIAGNOSIS — R00.0 TACHYCARDIA: ICD-10-CM

## 2022-06-17 DIAGNOSIS — R53.1 GENERALIZED WEAKNESS: ICD-10-CM

## 2022-06-17 DIAGNOSIS — N13.8 BPH WITH OBSTRUCTION/LOWER URINARY TRACT SYMPTOMS: Chronic | ICD-10-CM

## 2022-06-17 PROBLEM — J98.4 RESTRICTIVE LUNG DISEASE DUE TO KYPHOSCOLIOSIS: Chronic | Status: ACTIVE | Noted: 2018-08-06

## 2022-06-17 PROBLEM — M41.9 RESTRICTIVE LUNG DISEASE DUE TO KYPHOSCOLIOSIS: Chronic | Status: ACTIVE | Noted: 2018-08-06

## 2022-06-17 PROBLEM — I45.10 RIGHT BUNDLE BRANCH BLOCK: Chronic | Status: ACTIVE | Noted: 2020-02-13

## 2022-06-17 PROBLEM — E66.09 CLASS 1 OBESITY DUE TO EXCESS CALORIES WITH SERIOUS COMORBIDITY AND BODY MASS INDEX (BMI) OF 31.0 TO 31.9 IN ADULT: Chronic | Status: ACTIVE | Noted: 2021-06-17

## 2022-06-17 PROBLEM — I51.89 GRADE I DIASTOLIC DYSFUNCTION: Chronic | Status: ACTIVE | Noted: 2021-01-21

## 2022-06-17 PROBLEM — E66.811 CLASS 1 OBESITY DUE TO EXCESS CALORIES WITH SERIOUS COMORBIDITY AND BODY MASS INDEX (BMI) OF 31.0 TO 31.9 IN ADULT: Chronic | Status: ACTIVE | Noted: 2021-06-17

## 2022-06-17 LAB
ALBUMIN SERPL BCP-MCNC: 3.3 G/DL (ref 3.5–5.2)
ALP SERPL-CCNC: 96 U/L (ref 55–135)
ALT SERPL W/O P-5'-P-CCNC: 20 U/L (ref 10–44)
ANION GAP SERPL CALC-SCNC: 10 MMOL/L (ref 8–16)
AST SERPL-CCNC: 26 U/L (ref 10–40)
BASOPHILS # BLD AUTO: 0.02 K/UL (ref 0–0.2)
BASOPHILS NFR BLD: 0.1 % (ref 0–1.9)
BILIRUB SERPL-MCNC: 0.7 MG/DL (ref 0.1–1)
BUN SERPL-MCNC: 14 MG/DL (ref 8–23)
CALCIUM SERPL-MCNC: 9 MG/DL (ref 8.7–10.5)
CHLORIDE SERPL-SCNC: 100 MMOL/L (ref 95–110)
CO2 SERPL-SCNC: 27 MMOL/L (ref 23–29)
CREAT SERPL-MCNC: 1 MG/DL (ref 0.5–1.4)
CTP QC/QA: YES
CTP QC/QA: YES
DIFFERENTIAL METHOD: ABNORMAL
EOSINOPHIL # BLD AUTO: 0 K/UL (ref 0–0.5)
EOSINOPHIL NFR BLD: 0 % (ref 0–8)
ERYTHROCYTE [DISTWIDTH] IN BLOOD BY AUTOMATED COUNT: 13.1 % (ref 11.5–14.5)
EST. GFR  (AFRICAN AMERICAN): >60 ML/MIN/1.73 M^2
EST. GFR  (NON AFRICAN AMERICAN): >60 ML/MIN/1.73 M^2
GLUCOSE SERPL-MCNC: 122 MG/DL (ref 70–110)
HCT VFR BLD AUTO: 36.6 % (ref 40–54)
HGB BLD-MCNC: 12.1 G/DL (ref 14–18)
IMM GRANULOCYTES # BLD AUTO: 0.08 K/UL (ref 0–0.04)
IMM GRANULOCYTES NFR BLD AUTO: 0.5 % (ref 0–0.5)
LACTATE SERPL-SCNC: 1.4 MMOL/L (ref 0.5–2.2)
LYMPHOCYTES # BLD AUTO: 1.3 K/UL (ref 1–4.8)
LYMPHOCYTES NFR BLD: 7.7 % (ref 18–48)
MCH RBC QN AUTO: 29.2 PG (ref 27–31)
MCHC RBC AUTO-ENTMCNC: 33.1 G/DL (ref 32–36)
MCV RBC AUTO: 88 FL (ref 82–98)
MONOCYTES # BLD AUTO: 1.1 K/UL (ref 0.3–1)
MONOCYTES NFR BLD: 6.9 % (ref 4–15)
NEUTROPHILS # BLD AUTO: 13.7 K/UL (ref 1.8–7.7)
NEUTROPHILS NFR BLD: 84.8 % (ref 38–73)
NRBC BLD-RTO: 0 /100 WBC
PLATELET # BLD AUTO: 206 K/UL (ref 150–450)
PMV BLD AUTO: 9.2 FL (ref 9.2–12.9)
POC MOLECULAR INFLUENZA A AGN: NEGATIVE
POC MOLECULAR INFLUENZA B AGN: NEGATIVE
POTASSIUM SERPL-SCNC: 3.4 MMOL/L (ref 3.5–5.1)
PROCALCITONIN SERPL IA-MCNC: 0.26 NG/ML
PROT SERPL-MCNC: 7.2 G/DL (ref 6–8.4)
RBC # BLD AUTO: 4.14 M/UL (ref 4.6–6.2)
SARS-COV-2 RDRP RESP QL NAA+PROBE: NEGATIVE
SODIUM SERPL-SCNC: 137 MMOL/L (ref 136–145)
WBC # BLD AUTO: 16.17 K/UL (ref 3.9–12.7)

## 2022-06-17 PROCEDURE — 80053 COMPREHEN METABOLIC PANEL: CPT | Performed by: EMERGENCY MEDICINE

## 2022-06-17 PROCEDURE — 63600175 PHARM REV CODE 636 W HCPCS

## 2022-06-17 PROCEDURE — 63600175 PHARM REV CODE 636 W HCPCS: Performed by: EMERGENCY MEDICINE

## 2022-06-17 PROCEDURE — 25000003 PHARM REV CODE 250: Performed by: EMERGENCY MEDICINE

## 2022-06-17 PROCEDURE — 84145 PROCALCITONIN (PCT): CPT | Performed by: EMERGENCY MEDICINE

## 2022-06-17 PROCEDURE — 25000003 PHARM REV CODE 250

## 2022-06-17 PROCEDURE — 96375 TX/PRO/DX INJ NEW DRUG ADDON: CPT

## 2022-06-17 PROCEDURE — 99285 PR EMERGENCY DEPT VISIT,LEVEL V: ICD-10-PCS | Mod: CS,,, | Performed by: EMERGENCY MEDICINE

## 2022-06-17 PROCEDURE — 93005 ELECTROCARDIOGRAM TRACING: CPT

## 2022-06-17 PROCEDURE — U0002 COVID-19 LAB TEST NON-CDC: HCPCS | Performed by: EMERGENCY MEDICINE

## 2022-06-17 PROCEDURE — 99285 EMERGENCY DEPT VISIT HI MDM: CPT | Mod: CS,,, | Performed by: EMERGENCY MEDICINE

## 2022-06-17 PROCEDURE — 85025 COMPLETE CBC W/AUTO DIFF WBC: CPT | Performed by: EMERGENCY MEDICINE

## 2022-06-17 PROCEDURE — 96365 THER/PROPH/DIAG IV INF INIT: CPT

## 2022-06-17 PROCEDURE — 99285 EMERGENCY DEPT VISIT HI MDM: CPT | Mod: 25

## 2022-06-17 PROCEDURE — 87040 BLOOD CULTURE FOR BACTERIA: CPT | Performed by: EMERGENCY MEDICINE

## 2022-06-17 PROCEDURE — 87186 SC STD MICRODIL/AGAR DIL: CPT | Performed by: EMERGENCY MEDICINE

## 2022-06-17 PROCEDURE — 87502 INFLUENZA DNA AMP PROBE: CPT

## 2022-06-17 PROCEDURE — 87077 CULTURE AEROBIC IDENTIFY: CPT | Performed by: EMERGENCY MEDICINE

## 2022-06-17 PROCEDURE — 93010 EKG 12-LEAD: ICD-10-PCS | Mod: ,,, | Performed by: INTERNAL MEDICINE

## 2022-06-17 PROCEDURE — 83605 ASSAY OF LACTIC ACID: CPT | Performed by: EMERGENCY MEDICINE

## 2022-06-17 PROCEDURE — P9612 CATHETERIZE FOR URINE SPEC: HCPCS

## 2022-06-17 PROCEDURE — 93010 ELECTROCARDIOGRAM REPORT: CPT | Mod: ,,, | Performed by: INTERNAL MEDICINE

## 2022-06-17 RX ORDER — MORPHINE SULFATE 2 MG/ML
6 INJECTION, SOLUTION INTRAMUSCULAR; INTRAVENOUS
Status: COMPLETED | OUTPATIENT
Start: 2022-06-17 | End: 2022-06-17

## 2022-06-17 RX ORDER — ACETAMINOPHEN 500 MG
1000 TABLET ORAL
Status: COMPLETED | OUTPATIENT
Start: 2022-06-17 | End: 2022-06-17

## 2022-06-17 RX ADMIN — ACETAMINOPHEN 1000 MG: 500 TABLET ORAL at 11:06

## 2022-06-17 RX ADMIN — PIPERACILLIN SODIUM AND TAZOBACTAM SODIUM 4.5 G: 4; .5 INJECTION, POWDER, LYOPHILIZED, FOR SOLUTION INTRAVENOUS at 11:06

## 2022-06-17 RX ADMIN — MORPHINE SULFATE 6 MG: 2 INJECTION, SOLUTION INTRAMUSCULAR; INTRAVENOUS at 11:06

## 2022-06-18 PROBLEM — N45.3 EPIDIDYMO-ORCHITIS: Status: ACTIVE | Noted: 2022-06-18

## 2022-06-18 PROBLEM — N45.1 EPIDIDYMITIS: Status: ACTIVE | Noted: 2022-06-18

## 2022-06-18 LAB
ALBUMIN SERPL BCP-MCNC: 3 G/DL (ref 3.5–5.2)
ALP SERPL-CCNC: 86 U/L (ref 55–135)
ALT SERPL W/O P-5'-P-CCNC: 18 U/L (ref 10–44)
ANION GAP SERPL CALC-SCNC: 13 MMOL/L (ref 8–16)
AST SERPL-CCNC: 29 U/L (ref 10–40)
BACTERIA #/AREA URNS AUTO: ABNORMAL /HPF
BASOPHILS # BLD AUTO: 0.03 K/UL (ref 0–0.2)
BASOPHILS NFR BLD: 0.2 % (ref 0–1.9)
BILIRUB SERPL-MCNC: 1 MG/DL (ref 0.1–1)
BILIRUB UR QL STRIP: NEGATIVE
BUN SERPL-MCNC: 16 MG/DL (ref 8–23)
CALCIUM SERPL-MCNC: 8.4 MG/DL (ref 8.7–10.5)
CHLORIDE SERPL-SCNC: 98 MMOL/L (ref 95–110)
CLARITY UR REFRACT.AUTO: ABNORMAL
CO2 SERPL-SCNC: 23 MMOL/L (ref 23–29)
COLOR UR AUTO: YELLOW
CREAT SERPL-MCNC: 1.2 MG/DL (ref 0.5–1.4)
DIFFERENTIAL METHOD: ABNORMAL
EOSINOPHIL # BLD AUTO: 0 K/UL (ref 0–0.5)
EOSINOPHIL NFR BLD: 0 % (ref 0–8)
ERYTHROCYTE [DISTWIDTH] IN BLOOD BY AUTOMATED COUNT: 13.2 % (ref 11.5–14.5)
EST. GFR  (AFRICAN AMERICAN): >60 ML/MIN/1.73 M^2
EST. GFR  (NON AFRICAN AMERICAN): 58 ML/MIN/1.73 M^2
GLUCOSE SERPL-MCNC: 101 MG/DL (ref 70–110)
GLUCOSE UR QL STRIP: NEGATIVE
HCT VFR BLD AUTO: 35.9 % (ref 40–54)
HGB BLD-MCNC: 12 G/DL (ref 14–18)
HGB UR QL STRIP: ABNORMAL
HYALINE CASTS UR QL AUTO: 0 /LPF
IMM GRANULOCYTES # BLD AUTO: 0.09 K/UL (ref 0–0.04)
IMM GRANULOCYTES NFR BLD AUTO: 0.5 % (ref 0–0.5)
KETONES UR QL STRIP: ABNORMAL
LACTATE SERPL-SCNC: 0.8 MMOL/L (ref 0.5–2.2)
LEUKOCYTE ESTERASE UR QL STRIP: ABNORMAL
LYMPHOCYTES # BLD AUTO: 2 K/UL (ref 1–4.8)
LYMPHOCYTES NFR BLD: 11.6 % (ref 18–48)
MAGNESIUM SERPL-MCNC: 1.7 MG/DL (ref 1.6–2.6)
MCH RBC QN AUTO: 29.5 PG (ref 27–31)
MCHC RBC AUTO-ENTMCNC: 33.4 G/DL (ref 32–36)
MCV RBC AUTO: 88 FL (ref 82–98)
MICROSCOPIC COMMENT: ABNORMAL
MONOCYTES # BLD AUTO: 1.5 K/UL (ref 0.3–1)
MONOCYTES NFR BLD: 8.6 % (ref 4–15)
NEUTROPHILS # BLD AUTO: 13.5 K/UL (ref 1.8–7.7)
NEUTROPHILS NFR BLD: 79.1 % (ref 38–73)
NITRITE UR QL STRIP: POSITIVE
NRBC BLD-RTO: 0 /100 WBC
PH UR STRIP: 5 [PH] (ref 5–8)
PHOSPHATE SERPL-MCNC: 3.7 MG/DL (ref 2.7–4.5)
PLATELET # BLD AUTO: 185 K/UL (ref 150–450)
PMV BLD AUTO: 9.1 FL (ref 9.2–12.9)
POTASSIUM SERPL-SCNC: 3.7 MMOL/L (ref 3.5–5.1)
PROT SERPL-MCNC: 6 G/DL (ref 6–8.4)
PROT UR QL STRIP: ABNORMAL
RBC # BLD AUTO: 4.07 M/UL (ref 4.6–6.2)
RBC #/AREA URNS AUTO: 8 /HPF (ref 0–4)
SODIUM SERPL-SCNC: 134 MMOL/L (ref 136–145)
SP GR UR STRIP: 1.02 (ref 1–1.03)
SQUAMOUS #/AREA URNS AUTO: 0 /HPF
URN SPEC COLLECT METH UR: ABNORMAL
WBC # BLD AUTO: 17.05 K/UL (ref 3.9–12.7)
WBC #/AREA URNS AUTO: 46 /HPF (ref 0–5)

## 2022-06-18 PROCEDURE — 87088 URINE BACTERIA CULTURE: CPT | Performed by: EMERGENCY MEDICINE

## 2022-06-18 PROCEDURE — 36415 COLL VENOUS BLD VENIPUNCTURE: CPT | Performed by: STUDENT IN AN ORGANIZED HEALTH CARE EDUCATION/TRAINING PROGRAM

## 2022-06-18 PROCEDURE — 63600175 PHARM REV CODE 636 W HCPCS: Performed by: EMERGENCY MEDICINE

## 2022-06-18 PROCEDURE — 99223 1ST HOSP IP/OBS HIGH 75: CPT | Mod: ,,, | Performed by: INTERNAL MEDICINE

## 2022-06-18 PROCEDURE — 87040 BLOOD CULTURE FOR BACTERIA: CPT | Performed by: STUDENT IN AN ORGANIZED HEALTH CARE EDUCATION/TRAINING PROGRAM

## 2022-06-18 PROCEDURE — 99222 1ST HOSP IP/OBS MODERATE 55: CPT | Mod: GC,,, | Performed by: UROLOGY

## 2022-06-18 PROCEDURE — 25000003 PHARM REV CODE 250: Performed by: EMERGENCY MEDICINE

## 2022-06-18 PROCEDURE — 87086 URINE CULTURE/COLONY COUNT: CPT | Performed by: EMERGENCY MEDICINE

## 2022-06-18 PROCEDURE — 87077 CULTURE AEROBIC IDENTIFY: CPT | Performed by: EMERGENCY MEDICINE

## 2022-06-18 PROCEDURE — 83735 ASSAY OF MAGNESIUM: CPT | Performed by: INTERNAL MEDICINE

## 2022-06-18 PROCEDURE — 85025 COMPLETE CBC W/AUTO DIFF WBC: CPT | Performed by: INTERNAL MEDICINE

## 2022-06-18 PROCEDURE — 99222 PR INITIAL HOSPITAL CARE,LEVL II: ICD-10-PCS | Mod: GC,,, | Performed by: UROLOGY

## 2022-06-18 PROCEDURE — 11000001 HC ACUTE MED/SURG PRIVATE ROOM

## 2022-06-18 PROCEDURE — 81001 URINALYSIS AUTO W/SCOPE: CPT | Performed by: EMERGENCY MEDICINE

## 2022-06-18 PROCEDURE — 96366 THER/PROPH/DIAG IV INF ADDON: CPT

## 2022-06-18 PROCEDURE — 99223 PR INITIAL HOSPITAL CARE,LEVL III: ICD-10-PCS | Mod: ,,, | Performed by: INTERNAL MEDICINE

## 2022-06-18 PROCEDURE — 87186 SC STD MICRODIL/AGAR DIL: CPT | Performed by: EMERGENCY MEDICINE

## 2022-06-18 PROCEDURE — 25000003 PHARM REV CODE 250: Performed by: INTERNAL MEDICINE

## 2022-06-18 PROCEDURE — 84100 ASSAY OF PHOSPHORUS: CPT | Performed by: INTERNAL MEDICINE

## 2022-06-18 PROCEDURE — 63600175 PHARM REV CODE 636 W HCPCS: Performed by: STUDENT IN AN ORGANIZED HEALTH CARE EDUCATION/TRAINING PROGRAM

## 2022-06-18 PROCEDURE — 63600175 PHARM REV CODE 636 W HCPCS: Performed by: INTERNAL MEDICINE

## 2022-06-18 PROCEDURE — 80053 COMPREHEN METABOLIC PANEL: CPT | Performed by: INTERNAL MEDICINE

## 2022-06-18 PROCEDURE — 25000003 PHARM REV CODE 250: Performed by: STUDENT IN AN ORGANIZED HEALTH CARE EDUCATION/TRAINING PROGRAM

## 2022-06-18 PROCEDURE — 83605 ASSAY OF LACTIC ACID: CPT | Performed by: EMERGENCY MEDICINE

## 2022-06-18 PROCEDURE — 96367 TX/PROPH/DG ADDL SEQ IV INF: CPT

## 2022-06-18 RX ORDER — PANTOPRAZOLE SODIUM 40 MG/1
40 TABLET, DELAYED RELEASE ORAL DAILY
Refills: 1 | Status: DISCONTINUED | OUTPATIENT
Start: 2022-06-18 | End: 2022-06-21 | Stop reason: HOSPADM

## 2022-06-18 RX ORDER — GABAPENTIN 300 MG/1
300 CAPSULE ORAL 2 TIMES DAILY
Status: DISCONTINUED | OUTPATIENT
Start: 2022-06-18 | End: 2022-06-21 | Stop reason: HOSPADM

## 2022-06-18 RX ORDER — MIRABEGRON 50 MG/1
50 TABLET, FILM COATED, EXTENDED RELEASE ORAL DAILY
Qty: 30 TABLET | Refills: 11 | Status: SHIPPED | OUTPATIENT
Start: 2022-06-18 | End: 2022-07-13 | Stop reason: SINTOL

## 2022-06-18 RX ORDER — DOXYCYCLINE HYCLATE 100 MG
100 TABLET ORAL EVERY 12 HOURS
Status: DISCONTINUED | OUTPATIENT
Start: 2022-06-18 | End: 2022-06-20

## 2022-06-18 RX ORDER — IPRATROPIUM BROMIDE AND ALBUTEROL SULFATE 2.5; .5 MG/3ML; MG/3ML
3 SOLUTION RESPIRATORY (INHALATION) EVERY 6 HOURS PRN
Status: DISCONTINUED | OUTPATIENT
Start: 2022-06-18 | End: 2022-06-21 | Stop reason: HOSPADM

## 2022-06-18 RX ORDER — LOSARTAN POTASSIUM 50 MG/1
100 TABLET ORAL DAILY
Refills: 3 | Status: DISCONTINUED | OUTPATIENT
Start: 2022-06-18 | End: 2022-06-21 | Stop reason: HOSPADM

## 2022-06-18 RX ORDER — POLYETHYLENE GLYCOL 3350 17 G/17G
17 POWDER, FOR SOLUTION ORAL 2 TIMES DAILY PRN
Status: DISCONTINUED | OUTPATIENT
Start: 2022-06-18 | End: 2022-06-21 | Stop reason: HOSPADM

## 2022-06-18 RX ORDER — ACETAMINOPHEN 325 MG/1
650 TABLET ORAL EVERY 8 HOURS PRN
Status: DISCONTINUED | OUTPATIENT
Start: 2022-06-18 | End: 2022-06-21 | Stop reason: HOSPADM

## 2022-06-18 RX ORDER — OXYCODONE HYDROCHLORIDE 5 MG/1
5 TABLET ORAL EVERY 6 HOURS PRN
Status: DISCONTINUED | OUTPATIENT
Start: 2022-06-18 | End: 2022-06-19

## 2022-06-18 RX ORDER — AMLODIPINE BESYLATE 5 MG/1
5 TABLET ORAL DAILY
Status: DISCONTINUED | OUTPATIENT
Start: 2022-06-18 | End: 2022-06-21 | Stop reason: HOSPADM

## 2022-06-18 RX ORDER — TALC
6 POWDER (GRAM) TOPICAL NIGHTLY PRN
Status: DISCONTINUED | OUTPATIENT
Start: 2022-06-18 | End: 2022-06-21 | Stop reason: HOSPADM

## 2022-06-18 RX ORDER — NALOXONE HCL 0.4 MG/ML
0.02 VIAL (ML) INJECTION
Status: DISCONTINUED | OUTPATIENT
Start: 2022-06-18 | End: 2022-06-21 | Stop reason: HOSPADM

## 2022-06-18 RX ORDER — TAMSULOSIN HYDROCHLORIDE 0.4 MG/1
1 CAPSULE ORAL DAILY
Status: DISCONTINUED | OUTPATIENT
Start: 2022-06-18 | End: 2022-06-21 | Stop reason: HOSPADM

## 2022-06-18 RX ORDER — PRAVASTATIN SODIUM 20 MG/1
20 TABLET ORAL DAILY
Status: DISCONTINUED | OUTPATIENT
Start: 2022-06-18 | End: 2022-06-21 | Stop reason: HOSPADM

## 2022-06-18 RX ORDER — ASPIRIN 81 MG/1
81 TABLET ORAL DAILY
Status: DISCONTINUED | OUTPATIENT
Start: 2022-06-18 | End: 2022-06-21 | Stop reason: HOSPADM

## 2022-06-18 RX ORDER — SODIUM CHLORIDE 0.9 % (FLUSH) 0.9 %
10 SYRINGE (ML) INJECTION
Status: DISCONTINUED | OUTPATIENT
Start: 2022-06-18 | End: 2022-06-21 | Stop reason: HOSPADM

## 2022-06-18 RX ORDER — IBUPROFEN 200 MG
16 TABLET ORAL
Status: DISCONTINUED | OUTPATIENT
Start: 2022-06-18 | End: 2022-06-21 | Stop reason: HOSPADM

## 2022-06-18 RX ORDER — GLUCAGON 1 MG
1 KIT INJECTION
Status: DISCONTINUED | OUTPATIENT
Start: 2022-06-18 | End: 2022-06-21 | Stop reason: HOSPADM

## 2022-06-18 RX ORDER — SIMETHICONE 80 MG
1 TABLET,CHEWABLE ORAL 4 TIMES DAILY PRN
Status: DISCONTINUED | OUTPATIENT
Start: 2022-06-18 | End: 2022-06-21 | Stop reason: HOSPADM

## 2022-06-18 RX ORDER — PROCHLORPERAZINE EDISYLATE 5 MG/ML
5 INJECTION INTRAMUSCULAR; INTRAVENOUS EVERY 6 HOURS PRN
Status: DISCONTINUED | OUTPATIENT
Start: 2022-06-18 | End: 2022-06-21 | Stop reason: HOSPADM

## 2022-06-18 RX ORDER — IBUPROFEN 200 MG
24 TABLET ORAL
Status: DISCONTINUED | OUTPATIENT
Start: 2022-06-18 | End: 2022-06-21 | Stop reason: HOSPADM

## 2022-06-18 RX ORDER — DULOXETIN HYDROCHLORIDE 60 MG/1
60 CAPSULE, DELAYED RELEASE ORAL DAILY
Status: DISCONTINUED | OUTPATIENT
Start: 2022-06-18 | End: 2022-06-21 | Stop reason: HOSPADM

## 2022-06-18 RX ORDER — ONDANSETRON 4 MG/1
4 TABLET, ORALLY DISINTEGRATING ORAL EVERY 8 HOURS PRN
Status: DISCONTINUED | OUTPATIENT
Start: 2022-06-18 | End: 2022-06-21 | Stop reason: HOSPADM

## 2022-06-18 RX ORDER — OXYBUTYNIN CHLORIDE 5 MG/1
5 TABLET, EXTENDED RELEASE ORAL DAILY
Status: DISCONTINUED | OUTPATIENT
Start: 2022-06-18 | End: 2022-06-19

## 2022-06-18 RX ADMIN — VANCOMYCIN HYDROCHLORIDE 2000 MG: 10 INJECTION, POWDER, LYOPHILIZED, FOR SOLUTION INTRAVENOUS at 01:06

## 2022-06-18 RX ADMIN — PANTOPRAZOLE SODIUM 40 MG: 40 TABLET, DELAYED RELEASE ORAL at 08:06

## 2022-06-18 RX ADMIN — OXYCODONE 5 MG: 5 TABLET ORAL at 09:06

## 2022-06-18 RX ADMIN — DOXYCYCLINE HYCLATE 100 MG: 100 TABLET, COATED ORAL at 10:06

## 2022-06-18 RX ADMIN — OXYCODONE 5 MG: 5 TABLET ORAL at 03:06

## 2022-06-18 RX ADMIN — TAMSULOSIN HYDROCHLORIDE 0.4 MG: 0.4 CAPSULE ORAL at 08:06

## 2022-06-18 RX ADMIN — DULOXETINE 60 MG: 60 CAPSULE, DELAYED RELEASE ORAL at 08:06

## 2022-06-18 RX ADMIN — PIPERACILLIN SODIUM AND TAZOBACTAM SODIUM 4.5 G: 4; .5 INJECTION, POWDER, LYOPHILIZED, FOR SOLUTION INTRAVENOUS at 08:06

## 2022-06-18 RX ADMIN — ACETAMINOPHEN 650 MG: 325 TABLET ORAL at 03:06

## 2022-06-18 RX ADMIN — GABAPENTIN 300 MG: 300 CAPSULE ORAL at 09:06

## 2022-06-18 RX ADMIN — OXYBUTYNIN CHLORIDE 5 MG: 5 TABLET, EXTENDED RELEASE ORAL at 10:06

## 2022-06-18 RX ADMIN — PRAVASTATIN SODIUM 20 MG: 20 TABLET ORAL at 08:06

## 2022-06-18 RX ADMIN — CEFTRIAXONE 1 G: 1 INJECTION, SOLUTION INTRAVENOUS at 10:06

## 2022-06-18 RX ADMIN — SIMETHICONE 80 MG: 80 TABLET, CHEWABLE ORAL at 08:06

## 2022-06-18 RX ADMIN — GABAPENTIN 300 MG: 300 CAPSULE ORAL at 08:06

## 2022-06-18 RX ADMIN — DOXYCYCLINE HYCLATE 100 MG: 100 TABLET, COATED ORAL at 09:06

## 2022-06-18 RX ADMIN — AMLODIPINE BESYLATE 5 MG: 5 TABLET ORAL at 08:06

## 2022-06-18 RX ADMIN — Medication 6 MG: at 09:06

## 2022-06-18 RX ADMIN — ACETAMINOPHEN 650 MG: 325 TABLET ORAL at 08:06

## 2022-06-18 RX ADMIN — LOSARTAN POTASSIUM 100 MG: 50 TABLET, FILM COATED ORAL at 08:06

## 2022-06-18 RX ADMIN — OXYCODONE 5 MG: 5 TABLET ORAL at 08:06

## 2022-06-18 RX ADMIN — ASPIRIN 81 MG: 81 TABLET, COATED ORAL at 08:06

## 2022-06-18 NOTE — ASSESSMENT & PLAN NOTE
- Recommend de-escalating abx. Continue Zosyn.    - Recommend scrotal support and intermittent ice packs  - NSAIDS for scrotal pain to alleviate inflammation  - On discharge, recommend 10 day course of po Levaquin  - Pain should decrease, but can take a month to resolve  - Please call Urology with any other questions or if his scrotal exam acutely worsens

## 2022-06-18 NOTE — H&P
"[facility] - Emergency Dept  [facility] Medicine  History & Physical    Patient Name: Tito Menard  MRN: [id number]  Patient Class: IP- Inpatient  Admission Date: 6/17/2022  Attending Physician: Saundra Irizarry MD   Primary Care Provider: Amanda Brown MD         Patient information was obtained from patient, past medical records and ER records.     Subjective:     Principal Problem:Epididymo-orchitis    Chief Complaint:   Chief Complaint   Patient presents with    Fall     Pt had a fall tonight and was left on the floor for an hour before he called for help. Per family Pt has been increasingly fatigued and weak over the last day. -head trauma. -bloodthinners. Pt is AAOx4, GCS of 15 at this time.         HPI:  75 yo male with HTN, HPLD, AAA, restrictive lung disease, RBBB, GERD, DM presenting for fatigue and a fall today. He states for the past 2 weeks he has been feeling fatigued and tired. Today while he was getting up from sitting he slid off and fell gently to the floor. He did not lose consciousness or hit his head. He was not able to get up because of it. He states that he has been having some testicle pain during this time as well, he has not had any dysuria or hematuria but did not his left hurt more this his right and that his right was more swollen and he was having trouble walking. He also states over the past couple of months he states he has fallen and has had some trouble walking unrelated to his testicle pain.     In ED, found to have u/s findings of, "Hyperemic left testicle and enlarged hyperemic left epididymis.  Findings consistent with epididymo-orchitis.  Small to moderate in somewhat complex in appearance left-sided hydrocele with some septations and low level echoes present.  Possible pyocele in the setting of suspected epididymo-orchitis.  Consider urology consultation or follow-up, as clinically indicated.  2. Bilateral testes contain intratesticular cysts as above.  Majority of these " "appear stable but there are some that are slightly more complex than previously.  Consider short-term follow-up with re-evaluation in 2-3 months." Abx started. Medicine called for admission.           Past Medical History:   Diagnosis Date    Allergy     Aneurysm of artery of lower extremity     Chalazion of left eye     CKD (chronic kidney disease), stage II 4/1/2019    Diabetes mellitus type II     Diabetes with neurologic complications     Enlarged aorta 8/2/2016    Noted on pharmacological stress echo 2/28/2014.      GERD (gastroesophageal reflux disease)     egd 2008    Hyperlipidemia     Hypertension     Jock itch 7/19/2018    MGD (meibomian gland dysfunction)     Osteopenia     Spinal stenosis     Spondylosis without myelopathy 10/23/2015    Vitamin D deficiency disease        Past Surgical History:   Procedure Laterality Date    CHALAZION EXCISION Left 8/18/13    CYST REMOVAL  2013    Back of neck    FLEXIBLE CYSTOSCOPY N/A 12/7/2021    Procedure: CYSTOSCOPY, FLEXIBLE;  Surgeon: Beatrice Vaca MD;  Location: [facility] OR [address] [place] [address];  Service: Urology;  Laterality: N/A;    FLUOROSCOPIC URODYNAMIC STUDY N/A 12/7/2021    Procedure: URODYNAMIC STUDY, FLUOROSCOPIC;  Surgeon: Beatrice Vaca MD;  Location: [facility] OR [address] [place] [address];  Service: Urology;  Laterality: N/A;  90 minutes     SPINE SURGERY  2007    x2 (2000, 2007)       Review of patient's allergies indicates:  No Known Allergies    No current facility-administered medications on file prior to encounter.     Current Outpatient Medications on File Prior to Encounter   Medication Sig    acetaminophen (TYLENOL) 650 MG TbSR Take 1,300 mg by mouth every 8 (eight) hours as needed.     albuterol 90 mcg/actuation inhaler Inhale 1-2 puffs into the lungs every 6 (six) hours as needed for Wheezing.    amLODIPine (NORVASC) 5 MG tablet TAKE 1 TABLET EVERY DAY    aspirin (ECOTRIN) 81 MG EC tablet TAKE 1 TABLET EVERY DAY    azelastine (ASTELIN) 137 mcg (0.1 " %) nasal spray 1 spray (137 mcg total) by Nasal route 2 (two) times daily.    benzonatate (TESSALON) 200 MG capsule TAKE 1 CAPSULE THREE TIMES DAILY AS NEEDED FOR COUGH    chlorthalidone (HYGROTEN) 25 MG Tab TAKE 1/2 TABLET EVERY DAY    cholecalciferol, vitamin D3, (VITAMIN D3) 50 mcg (2,000 unit) Cap Take 1 capsule by mouth once daily.    clotrimazole-betamethasone 1-0.05% (LOTRISONE) cream APPLY TOPICALLY 2 (TWO) TIMES DAILY.    docusate sodium (COLACE) 50 MG capsule Take by mouth 2 (two) times daily.    DULoxetine (CYMBALTA) 60 MG capsule TAKE 1 CAPSULE EVERY DAY FOR PAIN CONTROL    finasteride (PROSCAR) 5 mg tablet Take 1 tablet (5 mg total) by mouth once daily.    fluticasone propionate (FLONASE) 50 mcg/actuation nasal spray USE 1 SPRAY NASALLY ONE TIME DAILY    gabapentin (NEURONTIN) 300 MG capsule TAKE 1 CAPSULE EVERY MORNING AND AT NOON, AND TAKE 2 CAPSULES EVERY EVENING    guaifenesin (MUCINEX) 600 mg 12 hr tablet Take 1,200 mg by mouth 2 (two) times daily as needed.     ibuprofen (ADVIL,MOTRIN) 200 MG tablet Take 200 mg by mouth every 6 (six) hours as needed for Pain.    ibuprofen/diphenhydramine cit (ADVIL PM ORAL) Take 1 tablet by mouth every evening.    irbesartan (AVAPRO) 300 MG tablet TAKE 1 TABLET EVERY EVENING    ketoconazole (NIZORAL) 2 % cream AAA bid (facial redness and scaling)    ketoconazole (NIZORAL) 2 % shampoo Wash hair with medicated shampoo at least 2x/week - let sit on scalp at least 5 minutes prior to rinsing    melatonin 10 mg Cap Take 1 capsule by mouth nightly as needed.     methocarbamol (ROBAXIN) 500 MG Tab 3 (three) times daily as needed.     omeprazole (PRILOSEC) 20 MG capsule TAKE 1 CAPSULE EVERY DAY    potassium chloride (MICRO-K) 10 MEQ CpSR TAKE 3 CAPSULES ONE TIME DAILY    pravastatin (PRAVACHOL) 20 MG tablet TAKE 1 TABLET EVERY DAY    tamsulosin (FLOMAX) 0.4 mg Cap Take 1 capsule (0.4 mg total) by mouth once daily.     Family History       Problem  Relation (Age of Onset)    Cancer Mother    Heart disease Mother    Hyperlipidemia Mother    Hypertension Mother, Maternal Grandmother    Kidney disease Mother    Kidney failure Mother    No Known Problems Daughter, Sister, Brother, Son, Brother, Son          Tobacco Use    Smoking status: Never Smoker    Smokeless tobacco: Former User     Types: Chew    Tobacco comment: Chewed tobacco once per day for 4-5 years when a    Substance and Sexual Activity    Alcohol use: No    Drug use: No    Sexual activity: Not Currently     Partners: Female     Review of Systems   Constitutional:  Positive for fatigue. Negative for activity change, appetite change, chills and fever.   HENT:  Negative for congestion, hearing loss and rhinorrhea.    Eyes:  Negative for discharge, itching and visual disturbance.   Respiratory:  Negative for apnea, cough and shortness of breath.    Cardiovascular:  Negative for chest pain, palpitations and leg swelling.   Gastrointestinal:  Negative for abdominal distention, abdominal pain, constipation, diarrhea, nausea and vomiting.   Endocrine: Negative for cold intolerance and heat intolerance.   Genitourinary:  Positive for scrotal swelling and testicular pain. Negative for dysuria and hematuria.   Musculoskeletal:  Negative for back pain, neck pain and neck stiffness.   Skin:  Negative for rash and wound.   Neurological:  Positive for dizziness. Negative for seizures, light-headedness and headaches.   Psychiatric/Behavioral:  Negative for agitation, confusion and suicidal ideas.    Objective:     Vital Signs (Most Recent):  Temp: 99.5 °F (37.5 °C) (06/18/22 0000)  Pulse: 94 (06/18/22 0000)  Resp: 20 (06/18/22 0000)  BP: 118/61 (06/18/22 0000)  SpO2: 96 % (06/18/22 0000) Vital Signs (24h Range):  Temp:  [99.5 °F (37.5 °C)-101.8 °F (38.8 °C)] 99.5 °F (37.5 °C)  Pulse:  [] 94  Resp:  [18-22] 20  SpO2:  [94 %-96 %] 96 %  BP: (114-143)/(50-76) 118/61     Weight: 103 kg (227  lb)  Body mass index is 31.66 kg/m².    Physical Exam  Vitals reviewed.   Constitutional:       General: He is not in acute distress.     Appearance: He is well-developed.   HENT:      Head: Normocephalic and atraumatic.      Nose: Nose normal. No rhinorrhea.      Mouth/Throat:      Mouth: Mucous membranes are moist.   Eyes:      General: No scleral icterus.        Right eye: No discharge.         Left eye: No discharge.      Pupils: Pupils are equal, round, and reactive to light.   Neck:      Vascular: No JVD.   Cardiovascular:      Rate and Rhythm: Normal rate and regular rhythm.      Heart sounds: Normal heart sounds. No murmur heard.    No friction rub.   Pulmonary:      Effort: Pulmonary effort is normal. No respiratory distress.      Breath sounds: Normal breath sounds. No wheezing.   Abdominal:      General: Bowel sounds are normal. There is no distension.      Palpations: Abdomen is soft.      Tenderness: There is no abdominal tenderness.   Musculoskeletal:         General: No deformity. Normal range of motion.      Cervical back: Normal range of motion and neck supple.   Skin:     General: Skin is warm and dry.   Neurological:      General: No focal deficit present.      Mental Status: He is alert and oriented to person, place, and time.   Psychiatric:         Mood and Affect: Mood normal.         Behavior: Behavior normal.         CRANIAL NERVES     CN III, IV, VI   Pupils are equal, round, and reactive to light.     Significant Labs: All pertinent labs within the past 24 hours have been reviewed.  CBC:   Recent Labs   Lab 06/17/22  2210   WBC 16.17*   HGB 12.1*   HCT 36.6*        CMP:   Recent Labs   Lab 06/17/22  2210      K 3.4*      CO2 27   *   BUN 14   CREATININE 1.0   CALCIUM 9.0   PROT 7.2   ALBUMIN 3.3*   BILITOT 0.7   ALKPHOS 96   AST 26   ALT 20   ANIONGAP 10   EGFRNONAA >60.0       Significant Imaging: I have reviewed all pertinent imaging results/findings within the  "past 24 hours.    Assessment/Plan:     * Epididymo-orchitis  Continue abx  Urology consult  U/s findings "  Hyperemic left testicle and enlarged hyperemic left epididymis.  Findings consistent with epididymo-orchitis.  Small to moderate in somewhat complex in appearance left-sided hydrocele with some septations and low level echoes present.  Possible pyocele in the setting of suspected epididymo-orchitis.  Consider urology consultation or follow-up, as clinically indicated.  2. Bilateral testes contain intratesticular cysts as above.  Majority of these appear stable but there are some that are slightly more complex than previously.  Consider short-term follow-up with re-evaluation in 2-3 months."    Class 1 obesity due to excess calories with serious comorbidity and body mass index (BMI) of 31.0 to 31.9 in adult  Body mass index is 31.66 kg/m². Morbid obesity complicates all aspects of disease management from diagnostic modalities to treatment. Weight loss encouraged and health benefits explained to patient.         Obstructive chronic bronchitis without exacerbation  Chronic, controlled, duonebs PRN      GERD (gastroesophageal reflux disease)  Chornic, controlled, continue home PPI      Mixed hyperlipidemia  Chronic, controlled, continue statin    Essential hypertension  Chronic, controlled, continue home medications        VTE Risk Mitigation (From admission, onward)         Ordered     Reason for No Pharmacological VTE Prophylaxis  Once        Question:  Reasons:  Answer:  IV Heparin w/in 24 hrs. Pre or Post-Op    06/18/22 0352     IP VTE HIGH RISK PATIENT  Once         06/18/22 0352     Place sequential compression device  Until discontinued         06/18/22 0352                   Kole Cifuentes MD  Department of Hospital Medicine   [facility] - Emergency Dept  "

## 2022-06-18 NOTE — SUBJECTIVE & OBJECTIVE
Past Medical History:   Diagnosis Date    Allergy     Aneurysm of artery of lower extremity     Chalazion of left eye     CKD (chronic kidney disease), stage II 4/1/2019    Diabetes mellitus type II     Diabetes with neurologic complications     Enlarged aorta 8/2/2016    Noted on pharmacological stress echo 2/28/2014.      GERD (gastroesophageal reflux disease)     egd 2008    Hyperlipidemia     Hypertension     Jock itch 7/19/2018    MGD (meibomian gland dysfunction)     Osteopenia     Spinal stenosis     Spondylosis without myelopathy 10/23/2015    Vitamin D deficiency disease        Past Surgical History:   Procedure Laterality Date    CHALAZION EXCISION Left 8/18/13    CYST REMOVAL  2013    Back of neck    FLEXIBLE CYSTOSCOPY N/A 12/7/2021    Procedure: CYSTOSCOPY, FLEXIBLE;  Surgeon: Beatrice Vaca MD;  Location: [facility] OR [address] [place] [address];  Service: Urology;  Laterality: N/A;    FLUOROSCOPIC URODYNAMIC STUDY N/A 12/7/2021    Procedure: URODYNAMIC STUDY, FLUOROSCOPIC;  Surgeon: Beatrice Vaca MD;  Location: [facility] OR [address] [place] [address];  Service: Urology;  Laterality: N/A;  90 minutes     SPINE SURGERY  2007    x2 (2000, 2007)       Review of patient's allergies indicates:  No Known Allergies    Family History       Problem Relation (Age of Onset)    Cancer Mother    Heart disease Mother    Hyperlipidemia Mother    Hypertension Mother, Maternal Grandmother    Kidney disease Mother    Kidney failure Mother    No Known Problems Daughter, Sister, Brother, Son, Brother, Son            Tobacco Use    Smoking status: Never Smoker    Smokeless tobacco: Former User     Types: Chew    Tobacco comment: Chewed tobacco once per day for 4-5 years when a    Substance and Sexual Activity    Alcohol use: No    Drug use: No    Sexual activity: Not Currently     Partners: Female       Review of Systems    Objective:     Temp:  [97.6 °F (36.4 °C)-101.8 °F (38.8 °C)] 97.6 °F (36.4 °C)  Pulse:  [] 80  Resp:  [16-22] 18  SpO2:  [94  %-96 %] 96 %  BP: (114-143)/(50-76) 136/75     Body mass index is 31.66 kg/m².           Drains       None                   Physical Exam  Constitutional:       General: He is not in acute distress.     Appearance: Normal appearance.   Cardiovascular:      Rate and Rhythm: Normal rate.   Abdominal:      General: Abdomen is flat.      Tenderness: There is no right CVA tenderness or left CVA tenderness.   Genitourinary:     Comments: Penis circumcised  Swollen scrotum with a right large spermatocele and left epididymal cyst with reactive hydrocele on the left. No erythema or purulence present.  Musculoskeletal:      Cervical back: Normal range of motion.   Skin:     General: Skin is warm.   Neurological:      General: No focal deficit present.      Mental Status: He is alert and oriented to person, place, and time.   Psychiatric:         Mood and Affect: Mood normal.       Significant Labs:    BMP:  Recent Labs   Lab 06/17/22 2210 06/18/22  0655    134*   K 3.4* 3.7    98   CO2 27 23   BUN 14 16   CREATININE 1.0 1.2   CALCIUM 9.0 8.4*       CBC:  Recent Labs   Lab 06/17/22 2210 06/18/22  0655   WBC 16.17* 17.05*   HGB 12.1* 12.0*   HCT 36.6* 35.9*    185       All pertinent labs results from the past 24 hours have been reviewed.    Significant Imaging:  U/S: I have reviewed all results within the past 24 hours and my personal findings are:  Pertinent info in HPI

## 2022-06-18 NOTE — CONSULTS
[facility] Surg  Urology  Consult Note    Patient Name: Tito Menard  MRN: [id number]  Admission Date: 6/17/2022  Hospital Length of Stay: 0   Code Status: Full Code   Attending Provider: Etelvina Cassidy MD   Consulting Provider: Henri Cheung MD  Primary Care Physician: Amanda Brown MD  Principal Problem:Epididymo-orchitis    Inpatient consult to Urology  Consult performed by: Henri Cheung MD  Consult ordered by: Kole Cifuentes MD  Reason for consult: epydidymitis          Subjective:     HPI:  Tito Menard is a 75 yo male with HTN, HPLD, AAA, restrictive lung disease, RBBB, GERD, DM presenting for fatigue and a fall today. He states for the past 2 weeks he has been feeling fatigued and tired. He has also had complaints of left testicular pain and swelling for the last week that has progressively worsened to the point he was unable to walk due to the discomfort. He also spiked a fever to 101.8 on admission. He has had no dysuria or hematuria. Denies N/V. Urology consulted for possible pyocele.    On exam, patient AFVSS. WBC 17, Hgb 12,  Cr 1.2 @ baseline. UA 8 RBC, 46 WBC, Many bacteria. Current abx Zosyn and Vanc. Scrotal US showing epididymitis of the left testicle. Left hydrocele, but normal in appearance. Does not appear to be a pyocele. Physical exam his scrotum was swollen with a right large likely spermatocele and left epididymal cyst with reactive hydrocele on the left.  No tenderness of the right testicle, minimal tenderness left testicle        Past Medical History:   Diagnosis Date    Allergy     Aneurysm of artery of lower extremity     Chalazion of left eye     CKD (chronic kidney disease), stage II 4/1/2019    Diabetes mellitus type II     Diabetes with neurologic complications     Enlarged aorta 8/2/2016    Noted on pharmacological stress echo 2/28/2014.      GERD (gastroesophageal reflux disease)     egd 2008    Hyperlipidemia     Hypertension     Jock itch 7/19/2018    MGD (meibomian  gland dysfunction)     Osteopenia     Spinal stenosis     Spondylosis without myelopathy 10/23/2015    Vitamin D deficiency disease        Past Surgical History:   Procedure Laterality Date    CHALAZION EXCISION Left 8/18/13    CYST REMOVAL  2013    Back of neck    FLEXIBLE CYSTOSCOPY N/A 12/7/2021    Procedure: CYSTOSCOPY, FLEXIBLE;  Surgeon: Beatrice Vaca MD;  Location: [facility] OR [address] [place] [address];  Service: Urology;  Laterality: N/A;    FLUOROSCOPIC URODYNAMIC STUDY N/A 12/7/2021    Procedure: URODYNAMIC STUDY, FLUOROSCOPIC;  Surgeon: Beatrice Vaca MD;  Location: [facility] OR [address] [place] [address];  Service: Urology;  Laterality: N/A;  90 minutes     SPINE SURGERY  2007    x2 (2000, 2007)       Review of patient's allergies indicates:  No Known Allergies    Family History       Problem Relation (Age of Onset)    Cancer Mother    Heart disease Mother    Hyperlipidemia Mother    Hypertension Mother, Maternal Grandmother    Kidney disease Mother    Kidney failure Mother    No Known Problems Daughter, Sister, Brother, Son, Brother, Son            Tobacco Use    Smoking status: Never Smoker    Smokeless tobacco: Former User     Types: Chew    Tobacco comment: Chewed tobacco once per day for 4-5 years when a    Substance and Sexual Activity    Alcohol use: No    Drug use: No    Sexual activity: Not Currently     Partners: Female       Review of Systems    Objective:     Temp:  [97.6 °F (36.4 °C)-101.8 °F (38.8 °C)] 97.6 °F (36.4 °C)  Pulse:  [] 80  Resp:  [16-22] 18  SpO2:  [94 %-96 %] 96 %  BP: (114-143)/(50-76) 136/75     Body mass index is 31.66 kg/m².           Drains       None                   Physical Exam  Constitutional:       General: He is not in acute distress.     Appearance: Normal appearance.   Cardiovascular:      Rate and Rhythm: Normal rate.   Abdominal:      General: Abdomen is flat.      Tenderness: There is no right CVA tenderness or left CVA tenderness.   Genitourinary:     Comments: Penis  circumcised  Swollen scrotum with a right large spermatocele and left epididymal cyst with reactive hydrocele on the left. No erythema or purulence present.  Musculoskeletal:      Cervical back: Normal range of motion.   Skin:     General: Skin is warm.   Neurological:      General: No focal deficit present.      Mental Status: He is alert and oriented to person, place, and time.   Psychiatric:         Mood and Affect: Mood normal.       Significant Labs:    BMP:  Recent Labs   Lab 06/17/22 2210 06/18/22  0655    134*   K 3.4* 3.7    98   CO2 27 23   BUN 14 16   CREATININE 1.0 1.2   CALCIUM 9.0 8.4*       CBC:  Recent Labs   Lab 06/17/22 2210 06/18/22  0655   WBC 16.17* 17.05*   HGB 12.1* 12.0*   HCT 36.6* 35.9*    185       All pertinent labs results from the past 24 hours have been reviewed.    Significant Imaging:  U/S: I have reviewed all results within the past 24 hours and my personal findings are:  Pertinent info in HPI                      Assessment and Plan:     * Epididymo-orchitis  - Continue inpatient abx per primary. Can likely de-escalate Vanc    - Recommend scrotal support and intermittent ice packs  - NSAIDS for scrotal pain to alleviate inflammation  - On discharge, recommend 10 day course of po Levaquin  - Pain should decrease, but can take a month to resolve  - Please call Urology with any other questions or if his scrotal exam acutely worsens          VTE Risk Mitigation (From admission, onward)           Ordered     Reason for No Pharmacological VTE Prophylaxis  Once        Question:  Reasons:  Answer:  IV Heparin w/in 24 hrs. Pre or Post-Op    06/18/22 0352     IP VTE HIGH RISK PATIENT  Once         06/18/22 0352     Place sequential compression device  Until discontinued         06/18/22 0352                    Thank you for your consult.    Henri Cheung MD  Urology  [facility] S[facility]    Patient seen and examined at the bedside.  He is well known to me.  Agree with the  above note.  In addition, he give the history of urge incontinence.  Recommend an antimuscarinic while in house and he can start a beta 3 agonist as an outpatient as it has a better side effect profile.

## 2022-06-18 NOTE — HPI
Tito Menard is a 77 yo male with HTN, HPLD, AAA, restrictive lung disease, RBBB, GERD, DM presenting for fatigue and a fall today. He states for the past 2 weeks he has been feeling fatigued and tired. He has also had complaints of left testicular pain and swelling for the last week that has progressively worsened to the point he was unable to walk due to the discomfort. He also spiked a fever to 101.8 on admission. He has had no dysuria or hematuria. Denies N/V. Urology consulted for possible pyocele.    On exam, patient AFVSS. WBC 17, Hgb 12,  Cr 1.2 @ baseline. UA 8 RBC, 46 WBC, Many bacteria. Current abx Zosyn and Vanc. Scrotal US showing epididymitis of the left testicle. Left hydrocele, but normal in appearance. Does not appear to be a pyocele. Physical exam his scrotum was swollen with a right large likely spermatocele and left epididymal cyst with reactive hydrocele on the left.  No tenderness of the right testicle, minimal tenderness left testicle

## 2022-06-18 NOTE — PROGRESS NOTES
Therapy with Vancomycin discontinued by provider.  Pharmacy will sign off, please re-consult as needed.    Devon Handy, parish D

## 2022-06-18 NOTE — HOSPITAL COURSE
Urology evaluated patient- no pyelocele. He was started on broad antibiotics- zosyn and vanc. Started on oxybutynin per urology. 1/4 Bcx +iv for GNR. Antibiotics changed to doxy and rocephin. Repeat Bcx 06/18 no growth to date. Doxy stopped 06/20. Rocephin increased to 2g qday. ID consulted. Bcx and Ucx + E. Coli- pansensitive. ID consulted- plan for IV Rocephin- 2g q24h for 2wk- EOT 07/02/22. Midline placed. HH arranged. F/u apts made with urology. Bedside commode provided for patient.     Pt deemed appropriate for discharge. Plan discussed with pt, who was agreeable and amenable; medications were discussed and reviewed, outpatient follow-up scheduled, ER precautions were given, all questions were answered to the pt's satisfaction, and Mr. Francis was subsequently discharged.    Physical Exam  Gen: in NAD, appears stated age  Neuro: AAOx3, motor, sensory, and strength grossly intact BL  HEENT: NTNC, EOMI, PERRL, MMM  CVS: RRR, no m/r/g; S1/S2 auscultated with no S3 or S4; capillary refill < 2 sec  Resp: lungs CTAB, no w/r/r; no belabored breathing or accessory muscle use appreciated   Abd: BS+ in all 4 quadrants; NTND, soft to palpation; no organomegaly appreciated   : scrotal edema- TTP of Lt testicle- hyperpigmented skin  Extrem: pulses full, equal, and regular over all 4 extremities; no UE or LE edema BL

## 2022-06-18 NOTE — CARE UPDATE
Interval History: Patient was seen and evaluated this am. He reports pain within groin. No nausea, vomiting, fevers, chills, night sweats, chest pain, shortness of breath.     Objective:     Vital Signs (Most Recent):  Temp: 98.2 °F (36.8 °C) (06/18/22 1130)  Pulse: 77 (06/18/22 1130)  Resp: 18 (06/18/22 1130)  BP: 134/76 (06/18/22 1130)  SpO2: (!) 94 % (06/18/22 1130)   Vital Signs (24h Range):  Temp:  [97.6 °F (36.4 °C)-101.8 °F (38.8 °C)] 98.2 °F (36.8 °C)  Pulse:  [] 77  Resp:  [16-22] 18  SpO2:  [94 %-96 %] 94 %  BP: (114-143)/(50-76) 134/76     Weight: 103 kg (227 lb)  Body mass index is 31.66 kg/m².    Intake/Output Summary (Last 24 hours) at 6/18/2022 1507  Last data filed at 6/18/2022 1046  Gross per 24 hour   Intake 499.84 ml   Output 425 ml   Net 74.84 ml      Physical Exam  Gen: in NAD, appears stated age  Neuro: AAOx3, motor, sensory, and strength grossly intact BL  HEENT: NTNC, EOMI, PERRL, MMM  CVS: RRR, no m/r/g; S1/S2 auscultated with no S3 or S4; capillary refill < 2 sec  Resp: lungs CTAB, no w/r/r; no belabored breathing or accessory muscle use appreciated   Abd: BS+ in all 4 quadrants; NTND, soft to palpation; no organomegaly appreciated   : scrotal edema- TTP of Lt testicle- hyperpigmented skin  Extrem: pulses full, equal, and regular over all 4 extremities; no UE or LE edema BL    Significant Labs: All pertinent labs within the past 24 hours have been reviewed.  Blood Culture:   Recent Labs   Lab 06/17/22 2210 06/17/22 2211   LABBLOO No Growth to date Gram stain peggy bottle: Gram negative rods   Results called to and read back by: Lupillo Leach 06/18/2022  14:17     CBC:   Recent Labs   Lab 06/17/22 2210 06/18/22  0655   WBC 16.17* 17.05*   HGB 12.1* 12.0*   HCT 36.6* 35.9*    185     CMP:   Recent Labs   Lab 06/17/22 2210 06/18/22  0655    134*   K 3.4* 3.7    98   CO2 27 23   * 101   BUN 14 16   CREATININE 1.0 1.2   CALCIUM 9.0 8.4*   PROT 7.2 6.0    ALBUMIN 3.3* 3.0*   BILITOT 0.7 1.0   ALKPHOS 96 86   AST 26 29   ALT 20 18   ANIONGAP 10 13   EGFRNONAA >60.0 58.0*     Urine Culture: No results for input(s): LABURIN in the last 48 hours.  Urine Studies:   Recent Labs   Lab 06/18/22  0145   COLORU Yellow   APPEARANCEUA Hazy*   PHUR 5.0   SPECGRAV 1.020   PROTEINUA 1+*   GLUCUA Negative   KETONESU Trace*   BILIRUBINUA Negative   OCCULTUA 2+*   NITRITE Positive*   LEUKOCYTESUR 2+*   RBCUA 8*   WBCUA 46*   BACTERIA Many*   SQUAMEPITHEL 0   HYALINECASTS 0       Significant Imaging: I have reviewed all pertinent imaging results/findings within the past 24 hours.    US Scrotum and Testicles:  FINDINGS:  Right Testicle:     *Size: 4.6 x 2.4 x 2.8 cm  *Appearance: Few cystic appearing lesions in the testis with the largest measuring 1.2 x 1.0 x 1.2 cm, similar to prior.  9.1 x 4.8 x 6.4 cm anechoic lesion adjacent to the testicle, similar to prior and likely spermatocele or tunica albuginea cyst.  *Flow: Normal arterial and venous flow  *Epididymis: Normal.  *Hydrocele: Small to moderate.  *Varicocele: None.  .     Left Testicle:     *Size: 4.5 x 3.5 x 3.0 cm  *Appearance: Few small cystic appearing lesions in the testis..  *Flow: Hyperemia with preserved arterial and venous flow.  *Epididymis: Enlarged with hyperemia.  Cyst in or adjacent to the epididymal head measuring 1.5 x 1.8 x 1.5 cm.  Additional epididymal head cyst measuring 9 mm.  *Hydrocele: Small to moderate and somewhat complex in appearance with some septations and low-level echoes present.  *Varicocele: None.  .     Other findings: None.     Impression:     1. Hyperemic left testicle and enlarged hyperemic left epididymis.  Findings consistent with epididymo-orchitis.  Small to moderate in somewhat complex in appearance left-sided hydrocele with some septations and low level echoes present.  Possible pyocele in the setting of suspected epididymo-orchitis.  Consider urology consultation or follow-up, as  clinically indicated.  2. Bilateral testes contain intratesticular cysts as above.  Majority of these appear stable but there are some that are slightly more complex than previously.  Consider short-term follow-up with re-evaluation in 2-3 months.  This report was flagged in Epic as abnormal.    A&P:  Mr. Menard is a pleasant 76yoM w/PMHx of HTN, HPLD, AAA, restrictive lung disease, RBBB, GERD, DM who presented to [facility] w/fatigue and scrotal pain. Found to have epididymo-orchitis. Started on broad antibiotics- de-escalated to rocephin and doxy. Urology consulted- no pylocele.     - Stop vanc and zosyn- begin rocephin and doxy  - elevate testicles, ice packs to area  - BCx 1/4 +iv for GNR  - Repeat BCx today  - Will need 2wk course of antibiotics- awaiting final culture results and susceptibilities  - No further urological interventions/recommendations        Etelvina Cassidy MD  Department of Hospital Medicine  Department of Pediatrics  6/18/2022

## 2022-06-18 NOTE — SUBJECTIVE & OBJECTIVE
Interval History: Patient was seen and evaluated this am. Pain still present within groin. Mild improvement with ice packs and elevation. Still with poor PO intake. Good UOP; however, poor fluid intake. Encouraged increased fluid intake. No nausea, vomiting, fevers, chills, night sweats, chest pain, shortness of breath.     Objective:     Vital Signs (Most Recent):  Temp: 98.2 °F (36.8 °C) (06/18/22 1130)  Pulse: 77 (06/18/22 1130)  Resp: 18 (06/18/22 1130)  BP: 134/76 (06/18/22 1130)  SpO2: (!) 94 % (06/18/22 1130)   Vital Signs (24h Range):  Temp:  [97.6 °F (36.4 °C)-101.8 °F (38.8 °C)] 98.2 °F (36.8 °C)  Pulse:  [] 77  Resp:  [16-22] 18  SpO2:  [94 %-96 %] 94 %  BP: (114-143)/(50-76) 134/76     Weight: 103 kg (227 lb)  Body mass index is 31.66 kg/m².    Intake/Output Summary (Last 24 hours) at 6/18/2022 1507  Last data filed at 6/18/2022 1046  Gross per 24 hour   Intake 499.84 ml   Output 425 ml   Net 74.84 ml      Physical Exam  Gen: in NAD, appears stated age  Neuro: AAOx3, motor, sensory, and strength grossly intact BL  HEENT: NTNC, EOMI, PERRL, MMM  CVS: RRR, no m/r/g; S1/S2 auscultated with no S3 or S4; capillary refill < 2 sec  Resp: lungs CTAB, no w/r/r; no belabored breathing or accessory muscle use appreciated   Abd: BS+ in all 4 quadrants; NTND, soft to palpation; no organomegaly appreciated   : scrotal edema- TTP of Lt testicle- hyperpigmented skin  Extrem: pulses full, equal, and regular over all 4 extremities; no UE or LE edema BL    Significant Labs: All pertinent labs within the past 24 hours have been reviewed.  Blood Culture:   Recent Labs   Lab 06/17/22 2210 06/17/22 2211   LABBLOO No Growth to date Gram stain peggy bottle: Gram negative rods   Results called to and read back by: Lupillo Leach 06/18/2022  14:17     CBC:   Recent Labs   Lab 06/17/22 2210 06/18/22  0655   WBC 16.17* 17.05*   HGB 12.1* 12.0*   HCT 36.6* 35.9*    185     CMP:   Recent Labs   Lab 06/17/22 2210  06/18/22  0655    134*   K 3.4* 3.7    98   CO2 27 23   * 101   BUN 14 16   CREATININE 1.0 1.2   CALCIUM 9.0 8.4*   PROT 7.2 6.0   ALBUMIN 3.3* 3.0*   BILITOT 0.7 1.0   ALKPHOS 96 86   AST 26 29   ALT 20 18   ANIONGAP 10 13   EGFRNONAA >60.0 58.0*     Urine Culture: No results for input(s): LABURIN in the last 48 hours.  Urine Studies:   Recent Labs   Lab 06/18/22  0145   COLORU Yellow   APPEARANCEUA Hazy*   PHUR 5.0   SPECGRAV 1.020   PROTEINUA 1+*   GLUCUA Negative   KETONESU Trace*   BILIRUBINUA Negative   OCCULTUA 2+*   NITRITE Positive*   LEUKOCYTESUR 2+*   RBCUA 8*   WBCUA 46*   BACTERIA Many*   SQUAMEPITHEL 0   HYALINECASTS 0       Significant Imaging: I have reviewed all pertinent imaging results/findings within the past 24 hours.    US Scrotum and Testicles:  FINDINGS:  Right Testicle:     *Size: 4.6 x 2.4 x 2.8 cm  *Appearance: Few cystic appearing lesions in the testis with the largest measuring 1.2 x 1.0 x 1.2 cm, similar to prior.  9.1 x 4.8 x 6.4 cm anechoic lesion adjacent to the testicle, similar to prior and likely spermatocele or tunica albuginea cyst.  *Flow: Normal arterial and venous flow  *Epididymis: Normal.  *Hydrocele: Small to moderate.  *Varicocele: None.  .     Left Testicle:     *Size: 4.5 x 3.5 x 3.0 cm  *Appearance: Few small cystic appearing lesions in the testis..  *Flow: Hyperemia with preserved arterial and venous flow.  *Epididymis: Enlarged with hyperemia.  Cyst in or adjacent to the epididymal head measuring 1.5 x 1.8 x 1.5 cm.  Additional epididymal head cyst measuring 9 mm.  *Hydrocele: Small to moderate and somewhat complex in appearance with some septations and low-level echoes present.  *Varicocele: None.  .     Other findings: None.     Impression:     1. Hyperemic left testicle and enlarged hyperemic left epididymis.  Findings consistent with epididymo-orchitis.  Small to moderate in somewhat complex in appearance left-sided hydrocele with some  septations and low level echoes present.  Possible pyocele in the setting of suspected epididymo-orchitis.  Consider urology consultation or follow-up, as clinically indicated.  2. Bilateral testes contain intratesticular cysts as above.  Majority of these appear stable but there are some that are slightly more complex than previously.  Consider short-term follow-up with re-evaluation in 2-3 months.  This report was flagged in Epic as abnormal.

## 2022-06-18 NOTE — ASSESSMENT & PLAN NOTE
"Continue abx  Urology consult  U/s findings "  Hyperemic left testicle and enlarged hyperemic left epididymis.  Findings consistent with epididymo-orchitis.  Small to moderate in somewhat complex in appearance left-sided hydrocele with some septations and low level echoes present.  Possible pyocele in the setting of suspected epididymo-orchitis.  Consider urology consultation or follow-up, as clinically indicated.  2. Bilateral testes contain intratesticular cysts as above.  Majority of these appear stable but there are some that are slightly more complex than previously.  Consider short-term follow-up with re-evaluation in 2-3 months."  "

## 2022-06-18 NOTE — ED NOTES
Patient verbalizes understanding of need of urine sample, patient unable to urinate at this time. Sample cup at bedside. Will continue to monitor.

## 2022-06-18 NOTE — HPI
"Mr. Francis is a 76yoM  w/PMHx HTN, HPLD, AAA, restrictive lung disease, RBBB, GERD, DM presenting for fatigue and a fall today. He states for the past 2 weeks he has been feeling fatigued and tired. Today while he was getting up from sitting he slid off and fell gently to the floor. He did not lose consciousness or hit his head. He was not able to get up because of it. He states that he has been having some testicle pain during this time as well, he has not had any dysuria or hematuria but did not his left hurt more this his right and that his right was more swollen and he was having trouble walking. He also states over the past couple of months he states he has fallen and has had some trouble walking unrelated to his testicle pain.     In ED, found to have u/s findings of, "Hyperemic left testicle and enlarged hyperemic left epididymis.  Findings consistent with epididymo-orchitis.  Small to moderate in somewhat complex in appearance left-sided hydrocele with some septations and low level echoes present.  Possible pyocele in the setting of suspected epididymo-orchitis.  Consider urology consultation or follow-up, as clinically indicated.  2. Bilateral testes contain intratesticular cysts as above.  Majority of these appear stable but there are some that are slightly more complex than previously.  Consider short-term follow-up with re-evaluation in 2-3 months." Abx started. Medicine called for admission.       "

## 2022-06-18 NOTE — SUBJECTIVE & OBJECTIVE
Past Medical History:   Diagnosis Date    Allergy     Aneurysm of artery of lower extremity     Chalazion of left eye     CKD (chronic kidney disease), stage II 4/1/2019    Diabetes mellitus type II     Diabetes with neurologic complications     Enlarged aorta 8/2/2016    Noted on pharmacological stress echo 2/28/2014.      GERD (gastroesophageal reflux disease)     egd 2008    Hyperlipidemia     Hypertension     Jock itch 7/19/2018    MGD (meibomian gland dysfunction)     Osteopenia     Spinal stenosis     Spondylosis without myelopathy 10/23/2015    Vitamin D deficiency disease        Past Surgical History:   Procedure Laterality Date    CHALAZION EXCISION Left 8/18/13    CYST REMOVAL  2013    Back of neck    FLEXIBLE CYSTOSCOPY N/A 12/7/2021    Procedure: CYSTOSCOPY, FLEXIBLE;  Surgeon: Beatrice Vaca MD;  Location: [facility] OR [address] [place] [address];  Service: Urology;  Laterality: N/A;    FLUOROSCOPIC URODYNAMIC STUDY N/A 12/7/2021    Procedure: URODYNAMIC STUDY, FLUOROSCOPIC;  Surgeon: Beatrice Vaca MD;  Location: [facility] OR [address] [place] [address];  Service: Urology;  Laterality: N/A;  90 minutes     SPINE SURGERY  2007    x2 (2000, 2007)       Review of patient's allergies indicates:  No Known Allergies    No current facility-administered medications on file prior to encounter.     Current Outpatient Medications on File Prior to Encounter   Medication Sig    acetaminophen (TYLENOL) 650 MG TbSR Take 1,300 mg by mouth every 8 (eight) hours as needed.     albuterol 90 mcg/actuation inhaler Inhale 1-2 puffs into the lungs every 6 (six) hours as needed for Wheezing.    amLODIPine (NORVASC) 5 MG tablet TAKE 1 TABLET EVERY DAY    aspirin (ECOTRIN) 81 MG EC tablet TAKE 1 TABLET EVERY DAY    azelastine (ASTELIN) 137 mcg (0.1 %) nasal spray 1 spray (137 mcg total) by Nasal route 2 (two) times daily.    benzonatate (TESSALON) 200 MG capsule TAKE 1 CAPSULE THREE TIMES DAILY AS NEEDED FOR COUGH    chlorthalidone (HYGROTEN) 25 MG Tab TAKE 1/2 TABLET  EVERY DAY    cholecalciferol, vitamin D3, (VITAMIN D3) 50 mcg (2,000 unit) Cap Take 1 capsule by mouth once daily.    clotrimazole-betamethasone 1-0.05% (LOTRISONE) cream APPLY TOPICALLY 2 (TWO) TIMES DAILY.    docusate sodium (COLACE) 50 MG capsule Take by mouth 2 (two) times daily.    DULoxetine (CYMBALTA) 60 MG capsule TAKE 1 CAPSULE EVERY DAY FOR PAIN CONTROL    finasteride (PROSCAR) 5 mg tablet Take 1 tablet (5 mg total) by mouth once daily.    fluticasone propionate (FLONASE) 50 mcg/actuation nasal spray USE 1 SPRAY NASALLY ONE TIME DAILY    gabapentin (NEURONTIN) 300 MG capsule TAKE 1 CAPSULE EVERY MORNING AND AT NOON, AND TAKE 2 CAPSULES EVERY EVENING    guaifenesin (MUCINEX) 600 mg 12 hr tablet Take 1,200 mg by mouth 2 (two) times daily as needed.     ibuprofen (ADVIL,MOTRIN) 200 MG tablet Take 200 mg by mouth every 6 (six) hours as needed for Pain.    ibuprofen/diphenhydramine cit (ADVIL PM ORAL) Take 1 tablet by mouth every evening.    irbesartan (AVAPRO) 300 MG tablet TAKE 1 TABLET EVERY EVENING    ketoconazole (NIZORAL) 2 % cream AAA bid (facial redness and scaling)    ketoconazole (NIZORAL) 2 % shampoo Wash hair with medicated shampoo at least 2x/week - let sit on scalp at least 5 minutes prior to rinsing    melatonin 10 mg Cap Take 1 capsule by mouth nightly as needed.     methocarbamol (ROBAXIN) 500 MG Tab 3 (three) times daily as needed.     omeprazole (PRILOSEC) 20 MG capsule TAKE 1 CAPSULE EVERY DAY    potassium chloride (MICRO-K) 10 MEQ CpSR TAKE 3 CAPSULES ONE TIME DAILY    pravastatin (PRAVACHOL) 20 MG tablet TAKE 1 TABLET EVERY DAY    tamsulosin (FLOMAX) 0.4 mg Cap Take 1 capsule (0.4 mg total) by mouth once daily.     Family History       Problem Relation (Age of Onset)    Cancer Mother    Heart disease Mother    Hyperlipidemia Mother    Hypertension Mother, Maternal Grandmother    Kidney disease Mother    Kidney failure Mother    No Known Problems Daughter, Sister, Brother, Son, Brother, Son           Tobacco Use    Smoking status: Never Smoker    Smokeless tobacco: Former User     Types: Chew    Tobacco comment: Chewed tobacco once per day for 4-5 years when a    Substance and Sexual Activity    Alcohol use: No    Drug use: No    Sexual activity: Not Currently     Partners: Female     Review of Systems   Constitutional:  Positive for fatigue. Negative for activity change, appetite change, chills and fever.   HENT:  Negative for congestion, hearing loss and rhinorrhea.    Eyes:  Negative for discharge, itching and visual disturbance.   Respiratory:  Negative for apnea, cough and shortness of breath.    Cardiovascular:  Negative for chest pain, palpitations and leg swelling.   Gastrointestinal:  Negative for abdominal distention, abdominal pain, constipation, diarrhea, nausea and vomiting.   Endocrine: Negative for cold intolerance and heat intolerance.   Genitourinary:  Positive for scrotal swelling and testicular pain. Negative for dysuria and hematuria.   Musculoskeletal:  Negative for back pain, neck pain and neck stiffness.   Skin:  Negative for rash and wound.   Neurological:  Positive for dizziness. Negative for seizures, light-headedness and headaches.   Psychiatric/Behavioral:  Negative for agitation, confusion and suicidal ideas.    Objective:     Vital Signs (Most Recent):  Temp: 99.5 °F (37.5 °C) (06/18/22 0000)  Pulse: 94 (06/18/22 0000)  Resp: 20 (06/18/22 0000)  BP: 118/61 (06/18/22 0000)  SpO2: 96 % (06/18/22 0000) Vital Signs (24h Range):  Temp:  [99.5 °F (37.5 °C)-101.8 °F (38.8 °C)] 99.5 °F (37.5 °C)  Pulse:  [] 94  Resp:  [18-22] 20  SpO2:  [94 %-96 %] 96 %  BP: (114-143)/(50-76) 118/61     Weight: 103 kg (227 lb)  Body mass index is 31.66 kg/m².    Physical Exam  Vitals reviewed.   Constitutional:       General: He is not in acute distress.     Appearance: He is well-developed.   HENT:      Head: Normocephalic and atraumatic.      Nose: Nose normal. No rhinorrhea.       Mouth/Throat:      Mouth: Mucous membranes are moist.   Eyes:      General: No scleral icterus.        Right eye: No discharge.         Left eye: No discharge.      Pupils: Pupils are equal, round, and reactive to light.   Neck:      Vascular: No JVD.   Cardiovascular:      Rate and Rhythm: Normal rate and regular rhythm.      Heart sounds: Normal heart sounds. No murmur heard.    No friction rub.   Pulmonary:      Effort: Pulmonary effort is normal. No respiratory distress.      Breath sounds: Normal breath sounds. No wheezing.   Abdominal:      General: Bowel sounds are normal. There is no distension.      Palpations: Abdomen is soft.      Tenderness: There is no abdominal tenderness.   Musculoskeletal:         General: No deformity. Normal range of motion.      Cervical back: Normal range of motion and neck supple.   Skin:     General: Skin is warm and dry.   Neurological:      General: No focal deficit present.      Mental Status: He is alert and oriented to person, place, and time.   Psychiatric:         Mood and Affect: Mood normal.         Behavior: Behavior normal.         CRANIAL NERVES     CN III, IV, VI   Pupils are equal, round, and reactive to light.     Significant Labs: All pertinent labs within the past 24 hours have been reviewed.  CBC:   Recent Labs   Lab 06/17/22  2210   WBC 16.17*   HGB 12.1*   HCT 36.6*        CMP:   Recent Labs   Lab 06/17/22  2210      K 3.4*      CO2 27   *   BUN 14   CREATININE 1.0   CALCIUM 9.0   PROT 7.2   ALBUMIN 3.3*   BILITOT 0.7   ALKPHOS 96   AST 26   ALT 20   ANIONGAP 10   EGFRNONAA >60.0       Significant Imaging: I have reviewed all pertinent imaging results/findings within the past 24 hours.

## 2022-06-18 NOTE — PLAN OF CARE
Uneventful day. Patient medicated a ordered for pain. Optimal relief achieved. Plan of care reviewed with patient. Questions answered. Safety measures and fall preventions in place.       Problem: Adult Inpatient Plan of Care  Goal: Plan of Care Review  6/18/2022 1659 by Shelley Leach LPN  Outcome: Ongoing, Progressing  Flowsheets (Taken 6/18/2022 1659)  Plan of Care Reviewed With:   patient   daughter  6/18/2022 1659 by Shelley Leach LPN  Outcome: Ongoing, Progressing  Goal: Patient-Specific Goal (Individualized)  6/18/2022 1659 by Shelley Leach LPN  Outcome: Ongoing, Progressing  Flowsheets (Taken 6/18/2022 1659)  Anxieties, Fears or Concerns: None  Individualized Care Needs: Consistent pain management, optimal level of comfort  Patient-Specific Goals (Include Timeframe): Become healthy enough for discharge  6/18/2022 1659 by Shelley Leach LPN  Outcome: Ongoing, Progressing  Goal: Absence of Hospital-Acquired Illness or Injury  6/18/2022 1659 by Shelley Leach LPN  Outcome: Ongoing, Progressing  6/18/2022 1659 by Shelley Leach LPN  Outcome: Ongoing, Progressing  Intervention: Identify and Manage Fall Risk  Flowsheets (Taken 6/18/2022 1659)  Safety Promotion/Fall Prevention:   assistive device/personal item within reach   nonskid shoes/socks when out of bed   Fall Risk reviewed with patient/family   instructed to call staff for mobility  Intervention: Prevent Skin Injury  Flowsheets (Taken 6/18/2022 1659)  Body Position:   position changed independently   30 degrees  Intervention: Prevent and Manage VTE (Venous Thromboembolism) Risk  Flowsheets (Taken 6/18/2022 1659)  Activity Management: Up in chair - L3  VTE Prevention/Management: ambulation promoted  Goal: Optimal Comfort and Wellbeing  6/18/2022 1659 by Shelley Leach LPN  Outcome: Ongoing, Progressing  6/18/2022 1659 by Shelley Leach LPN  Outcome: Ongoing, Progressing  Intervention: Monitor Pain and Promote Comfort  Flowsheets (Taken 6/18/2022  1659)  Pain Management Interventions:   medication offered   quiet environment facilitated   pillow support provided  Intervention: Provide Person-Centered Care  Flowsheets (Taken 6/18/2022 1659)  Trust Relationship/Rapport:   care explained   choices provided   emotional support provided   empathic listening provided   thoughts/feelings acknowledged  Goal: Readiness for Transition of Care  Outcome: Ongoing, Progressing     Problem: Diabetes Comorbidity  Goal: Blood Glucose Level Within Targeted Range  Outcome: Ongoing, Progressing  Intervention: Monitor and Manage Glycemia  Flowsheets (Taken 6/18/2022 1659)  Glycemic Management: blood glucose monitored     Problem: Fall Injury Risk  Goal: Absence of Fall and Fall-Related Injury  Outcome: Ongoing, Progressing     Problem: Pain Acute  Goal: Acceptable Pain Control and Functional Ability  Outcome: Ongoing, Progressing  Intervention: Develop Pain Management Plan  Flowsheets (Taken 6/18/2022 1659)  Pain Management Interventions:   medication offered   quiet environment facilitated   pillow support provided  Intervention: Prevent or Manage Pain  Flowsheets (Taken 6/18/2022 1659)  Sleep/Rest Enhancement:   awakenings minimized   room darkened  Sensory Stimulation Regulation: care clustered  Bowel Elimination Promotion: ambulation promoted  Medication Review/Management: medications reviewed  Intervention: Optimize Psychosocial Wellbeing  Flowsheets (Taken 6/18/2022 1659)  Supportive Measures:   active listening utilized   decision-making supported   self-care encouraged   verbalization of feelings encouraged  Diversional Activities: television     Problem: Infection  Goal: Absence of Infection Signs and Symptoms  Outcome: Ongoing, Progressing  Intervention: Prevent or Manage Infection  Flowsheets (Taken 6/18/2022 1659)  Fever Reduction/Comfort Measures: ice pack(s) applied

## 2022-06-18 NOTE — ED PROVIDER NOTES
Source of History:  Patient  Wife  Chart    Chief complaint:  Fall (Pt had a fall tonight and was left on the floor for an hour before he called for help. Per family Pt has been increasingly fatigued and weak over the last day. -head trauma. -bloodthinners. Pt is AAOx4, GCS of 15 at this time. )      HPI:  Tito Menard is a 76 y.o. male with history of hypertension, hyperlipidemia, emphysema, thoracic and abdominal aortic aneurysm, BPH, restrictive lung disease due to scoliosis, right bundle-branch block, diastolic dysfunction, obesity, GERD, diabetes presenting to emergency department with complaint of fatigue, weakness, fall.    Patient was trying to rise from a sitting position off of his bed earlier today, and instead slid to the ground.  He fell gently onto his bottom, did not hit his head or lose consciousness.  He was unable to get off of the ground for approximately 1 hour.  He has been feeling fatigued and weak for 1 day.  He did not know that he had a fever.  No cough.  No vomiting or diarrhea.  He complains of a mild headache but has no photophobia, vomiting, vision changes, or neck stiffness.  No back pain beyond his baseline.  No abdominal pain.  No dysuria or hematuria, but he did note that he has pain in his left testicle over the past several days as well.  He has not noted rashes anywhere.    ROS: As per HPI and below:  Review of Systems   Constitutional: Negative for fever.   HENT: Negative for sore throat.    Eyes: Negative for double vision.   Respiratory: Negative for cough and shortness of breath.    Cardiovascular: Negative for chest pain.   Gastrointestinal: Negative for abdominal pain and vomiting.   Genitourinary: Negative for dysuria.   Musculoskeletal: Positive for falls. Negative for back pain and joint pain.   Skin: Negative for rash.   Neurological: Positive for seizures and headaches. Negative for loss of consciousness.       Review of patient's allergies indicates:  No Known  Allergies    No current facility-administered medications on file prior to encounter.     Current Outpatient Medications on File Prior to Encounter   Medication Sig Dispense Refill    acetaminophen (TYLENOL) 650 MG TbSR Take 1,300 mg by mouth every 8 (eight) hours as needed.       albuterol 90 mcg/actuation inhaler Inhale 1-2 puffs into the lungs every 6 (six) hours as needed for Wheezing. 1 Inhaler 0    amLODIPine (NORVASC) 5 MG tablet TAKE 1 TABLET EVERY DAY 90 tablet 3    aspirin (ECOTRIN) 81 MG EC tablet TAKE 1 TABLET EVERY DAY 90 tablet 3    azelastine (ASTELIN) 137 mcg (0.1 %) nasal spray 1 spray (137 mcg total) by Nasal route 2 (two) times daily. 90 mL 3    benzonatate (TESSALON) 200 MG capsule TAKE 1 CAPSULE THREE TIMES DAILY AS NEEDED FOR COUGH 30 capsule 0    chlorthalidone (HYGROTEN) 25 MG Tab TAKE 1/2 TABLET EVERY DAY 45 tablet 2    cholecalciferol, vitamin D3, (VITAMIN D3) 50 mcg (2,000 unit) Cap Take 1 capsule by mouth once daily.      clotrimazole-betamethasone 1-0.05% (LOTRISONE) cream APPLY TOPICALLY 2 (TWO) TIMES DAILY. 45 g 0    docusate sodium (COLACE) 50 MG capsule Take by mouth 2 (two) times daily.      DULoxetine (CYMBALTA) 60 MG capsule TAKE 1 CAPSULE EVERY DAY FOR PAIN CONTROL 90 capsule 3    finasteride (PROSCAR) 5 mg tablet Take 1 tablet (5 mg total) by mouth once daily. 90 tablet 3    fluticasone propionate (FLONASE) 50 mcg/actuation nasal spray USE 1 SPRAY NASALLY ONE TIME DAILY 32 g 6    gabapentin (NEURONTIN) 300 MG capsule TAKE 1 CAPSULE EVERY MORNING AND AT NOON, AND TAKE 2 CAPSULES EVERY EVENING 120 capsule 3    guaifenesin (MUCINEX) 600 mg 12 hr tablet Take 1,200 mg by mouth 2 (two) times daily as needed.       ibuprofen (ADVIL,MOTRIN) 200 MG tablet Take 200 mg by mouth every 6 (six) hours as needed for Pain.      ibuprofen/diphenhydramine cit (ADVIL PM ORAL) Take 1 tablet by mouth every evening.      irbesartan (AVAPRO) 300 MG tablet TAKE 1 TABLET EVERY EVENING  90 tablet 3    ketoconazole (NIZORAL) 2 % cream AAA bid (facial redness and scaling) 60 g 3    ketoconazole (NIZORAL) 2 % shampoo Wash hair with medicated shampoo at least 2x/week - let sit on scalp at least 5 minutes prior to rinsing 120 mL 5    melatonin 10 mg Cap Take 1 capsule by mouth nightly as needed.       methocarbamol (ROBAXIN) 500 MG Tab 3 (three) times daily as needed.       omeprazole (PRILOSEC) 20 MG capsule TAKE 1 CAPSULE EVERY DAY 90 capsule 1    potassium chloride (MICRO-K) 10 MEQ CpSR TAKE 3 CAPSULES ONE TIME DAILY 270 capsule 3    pravastatin (PRAVACHOL) 20 MG tablet TAKE 1 TABLET EVERY DAY 90 tablet 3    tamsulosin (FLOMAX) 0.4 mg Cap Take 1 capsule (0.4 mg total) by mouth once daily. 90 capsule 3       PMH:  As per HPI and below:  Past Medical History:   Diagnosis Date    Allergy     Aneurysm of artery of lower extremity     Chalazion of left eye     CKD (chronic kidney disease), stage II 4/1/2019    Diabetes mellitus type II     Diabetes with neurologic complications     Enlarged aorta 8/2/2016    Noted on pharmacological stress echo 2/28/2014.      GERD (gastroesophageal reflux disease)     egd 2008    Hyperlipidemia     Hypertension     Jock itch 7/19/2018    MGD (meibomian gland dysfunction)     Osteopenia     Spinal stenosis     Spondylosis without myelopathy 10/23/2015    Vitamin D deficiency disease      Past Surgical History:   Procedure Laterality Date    CHALAZION EXCISION Left 8/18/13    CYST REMOVAL  2013    Back of neck    FLEXIBLE CYSTOSCOPY N/A 12/7/2021    Procedure: CYSTOSCOPY, FLEXIBLE;  Surgeon: Beatrice Vaca MD;  Location: [facility] OR [address] [place] [address];  Service: Urology;  Laterality: N/A;    FLUOROSCOPIC URODYNAMIC STUDY N/A 12/7/2021    Procedure: URODYNAMIC STUDY, FLUOROSCOPIC;  Surgeon: Beatrice Vaca MD;  Location: [facility] OR [address] [place] [address];  Service: Urology;  Laterality: N/A;  90 minutes     SPINE SURGERY  2007    x2 (2000, 2007)       Social History      Socioeconomic History    Marital status:    Occupational History    Occupation: Retired     Comment:    Tobacco Use    Smoking status: Never Smoker    Smokeless tobacco: Former User     Types: Chew    Tobacco comment: Chewed tobacco once per day for 4-5 years when a    Substance and Sexual Activity    Alcohol use: No    Drug use: No    Sexual activity: Not Currently     Partners: Female   Social History Narrative        Colon 2007       Family History   Problem Relation Age of Onset    Hyperlipidemia Mother     Hypertension Mother     Kidney disease Mother     Cancer Mother     Heart disease Mother     Kidney failure Mother     No Known Problems Daughter     No Known Problems Sister     No Known Problems Brother     No Known Problems Son     No Known Problems Brother     No Known Problems Son     Hypertension Maternal Grandmother     Amblyopia Neg Hx     Blindness Neg Hx     Cataracts Neg Hx     Diabetes Neg Hx     Glaucoma Neg Hx     Macular degeneration Neg Hx     Retinal detachment Neg Hx     Strabismus Neg Hx     Stroke Neg Hx     Thyroid disease Neg Hx     Melanoma Neg Hx     Psoriasis Neg Hx     Lupus Neg Hx     Eczema Neg Hx        Physical Exam:      Vitals:    06/18/22 0000   BP: 118/61   Pulse: 94   Resp: 20   Temp: 99.5 °F (37.5 °C)     Gen: No acute distress.  Nontoxic.  Well appearing.  Mental Status:  Alert and oriented .  Appropriate, conversant.  Skin: Warm, dry. No rashes seen.  Eyes: No conjunctival injection.  Pulm: No increased work of breathing.  No significant tachypnea.  No audible stridor or wheezing.  No conversational dyspnea.    CV: Regular rate. Regular rhythm.   Abd: Soft.  Not distended.  Nontender.   :  Both testicles are swollen and tender.  No crepitus.  Difficult to appreciate overlying skin change given his baseline skin coloration.  No obvious erythema.  No induration.  MSK: Good range of motion  all joints.  No deformities.    Neuro: Awake. Speech normal. No focal neuro deficit observed.      Laboratory Studies:  Labs Reviewed   CBC W/ AUTO DIFFERENTIAL - Abnormal; Notable for the following components:       Result Value    WBC 16.17 (*)     RBC 4.14 (*)     Hemoglobin 12.1 (*)     Hematocrit 36.6 (*)     Gran # (ANC) 13.7 (*)     Immature Grans (Abs) 0.08 (*)     Mono # 1.1 (*)     Gran % 84.8 (*)     Lymph % 7.7 (*)     All other components within normal limits   COMPREHENSIVE METABOLIC PANEL - Abnormal; Notable for the following components:    Potassium 3.4 (*)     Glucose 122 (*)     Albumin 3.3 (*)     All other components within normal limits   URINALYSIS, REFLEX TO URINE CULTURE - Abnormal; Notable for the following components:    Appearance, UA Hazy (*)     Protein, UA 1+ (*)     Ketones, UA Trace (*)     Occult Blood UA 2+ (*)     Nitrite, UA Positive (*)     Leukocytes, UA 2+ (*)     All other components within normal limits    Narrative:     Specimen Source->Urine   PROCALCITONIN - Abnormal; Notable for the following components:    Procalcitonin 0.26 (*)     All other components within normal limits   URINALYSIS MICROSCOPIC - Abnormal; Notable for the following components:    RBC, UA 8 (*)     WBC, UA 46 (*)     Bacteria Many (*)     All other components within normal limits    Narrative:     Specimen Source->Urine   CULTURE, BLOOD   CULTURE, BLOOD   CULTURE, URINE   LACTIC ACID, PLASMA   LACTIC ACID, PLASMA   POCT INFLUENZA A/B MOLECULAR   SARS-COV-2 RDRP GENE       EKG (independently interpreted by me):  Normal sinus rhythm, rate 97. No STEMI.   milliseconds.   milliseconds.    Chart reviewed.     Imaging Results           US Scrotum And Testicles (Final result)  Result time 06/18/22 03:19:56    Final result by Florentino Reyna MD (06/18/22 03:19:56)                 Impression:      1. Hyperemic left testicle and enlarged hyperemic left epididymis.  Findings consistent with  epididymo-orchitis.  Small to moderate in somewhat complex in appearance left-sided hydrocele with some septations and low level echoes present.  Possible pyocele in the setting of suspected epididymo-orchitis.  Consider urology consultation or follow-up, as clinically indicated.  2. Bilateral testes contain intratesticular cysts as above.  Majority of these appear stable but there are some that are slightly more complex than previously.  Consider short-term follow-up with re-evaluation in 2-3 months.  This report was flagged in Epic as abnormal.    Electronically signed by resident: Timi Sadler  Date:    06/18/2022  Time:    02:43    Electronically signed by: Florentino Reyna MD  Date:    06/18/2022  Time:    03:19             Narrative:    EXAMINATION:  US SCROTUM AND TESTICLES    CLINICAL HISTORY:  Left testicular pain    TECHNIQUE:  Sonography of the scrotum and testes.    COMPARISON:  Ultrasound scrotum and testicles 11/24/2021    FINDINGS:  Right Testicle:    *Size: 4.6 x 2.4 x 2.8 cm  *Appearance: Few cystic appearing lesions in the testis with the largest measuring 1.2 x 1.0 x 1.2 cm, similar to prior.  9.1 x 4.8 x 6.4 cm anechoic lesion adjacent to the testicle, similar to prior and likely spermatocele or tunica albuginea cyst.  *Flow: Normal arterial and venous flow  *Epididymis: Normal.  *Hydrocele: Small to moderate.  *Varicocele: None.  .    Left Testicle:    *Size: 4.5 x 3.5 x 3.0 cm  *Appearance: Few small cystic appearing lesions in the testis..  *Flow: Hyperemia with preserved arterial and venous flow.  *Epididymis: Enlarged with hyperemia.  Cyst in or adjacent to the epididymal head measuring 1.5 x 1.8 x 1.5 cm.  Additional epididymal head cyst measuring 9 mm.  *Hydrocele: Small to moderate and somewhat complex in appearance with some septations and low-level echoes present.  *Varicocele: None.  .    Other findings: None.                               X-Ray Chest AP Portable (Final result)  Result  time 06/18/22 00:26:58    Final result by Tal Bates,  (06/18/22 00:26:58)                 Impression:      Right basilar atelectasis and volume loss with elevation of the right hemidiaphragm.  Otherwise no acute cardiopulmonary abnormality.      Electronically signed by: Tal Bates  Date:    06/18/2022  Time:    00:26             Narrative:    EXAMINATION:  XR CHEST AP PORTABLE    CLINICAL HISTORY:  Sepsis;    TECHNIQUE:  Single frontal view of the chest was performed.    COMPARISON:  01/04/2017.    FINDINGS:  There is nonspecific elevation of the right hemidiaphragm.  There is a bandlike opacity in the right lung base, likely representing atelectasis.  Mild subtle bandlike opacities in the left infrahilar region, also likely atelectasis or scarring.  There is no large focal consolidation.  The pleural spaces are clear.    The cardiac silhouette is unremarkable.    The visualized osseous structures are intact.  There is dextroconvex scoliosis of the thoracic spine.                                Medications Given:  Medications   vancomycin 2 g in dextrose 5 % 500 mL IVPB (2,000 mg Intravenous New Bag 6/18/22 0130)   acetaminophen tablet 1,000 mg (1,000 mg Oral Given 6/17/22 2327)   piperacillin-tazobactam 4.5 g in sodium chloride 0.9% 100 mL IVPB (ready to mix system) (0 g Intravenous Stopped 6/18/22 0003)   morphine injection 6 mg (6 mg Intravenous Given 6/17/22 2344)     Discussed with: MANSOOR    MDM:    76 y.o. male with complaint of generalized weakness, found to be febrile and tachycardic, as well as mildly hypoxic.  He has tenderness to palpation and pain of his left hemiscrotum.    Differential diagnosis including but not limited to:  Sepsis, bacteremia, epididymo-orchitis, scrotal abscess, COVID, influenza    Clinically this seems less consistent with a Timo's gangrene, there is no crepitus in the perineum or testicular region.    Will obtain labs, switch COVID and flu swabs, urinalysis, chest  x-ray, ultrasound of the scrotum.  He received antibiotics.    Labs remarkable for leukocytosis with a neutrophilic predominance.  Procalcitonin 0.26.  Lactate 1.4.  COVID and flu negative.  Blood cultures were drawn.    Ultrasound of the testicles shows evidence a more tightest and possible pyocele.  Plan admission to Internal Medicine with Urology consultation.    Diagnostic Impression:    1. Generalized weakness    2. Tachycardia    3. Acute febrile illness    4. Testicular pain, left    5. Testicular pain, left         ED Disposition Condition    Admit              Patient and family understand the plan and is in agreement, verbalized understanding, questions answered    Saundra Irizarry MD  Emergency Medicine       Saundra Irizarry MD  06/18/22 0000

## 2022-06-19 PROBLEM — R78.81 BACTEREMIA: Status: ACTIVE | Noted: 2022-06-19

## 2022-06-19 LAB
ALBUMIN SERPL BCP-MCNC: 2.8 G/DL (ref 3.5–5.2)
ALP SERPL-CCNC: 81 U/L (ref 55–135)
ALT SERPL W/O P-5'-P-CCNC: 21 U/L (ref 10–44)
ANION GAP SERPL CALC-SCNC: 13 MMOL/L (ref 8–16)
AST SERPL-CCNC: 35 U/L (ref 10–40)
BASOPHILS # BLD AUTO: 0.01 K/UL (ref 0–0.2)
BASOPHILS NFR BLD: 0.1 % (ref 0–1.9)
BILIRUB SERPL-MCNC: 0.6 MG/DL (ref 0.1–1)
BUN SERPL-MCNC: 25 MG/DL (ref 8–23)
CALCIUM SERPL-MCNC: 9 MG/DL (ref 8.7–10.5)
CHLORIDE SERPL-SCNC: 99 MMOL/L (ref 95–110)
CO2 SERPL-SCNC: 24 MMOL/L (ref 23–29)
CREAT SERPL-MCNC: 1.3 MG/DL (ref 0.5–1.4)
DIFFERENTIAL METHOD: ABNORMAL
EOSINOPHIL # BLD AUTO: 0 K/UL (ref 0–0.5)
EOSINOPHIL NFR BLD: 0.2 % (ref 0–8)
ERYTHROCYTE [DISTWIDTH] IN BLOOD BY AUTOMATED COUNT: 13 % (ref 11.5–14.5)
EST. GFR  (AFRICAN AMERICAN): >60 ML/MIN/1.73 M^2
EST. GFR  (NON AFRICAN AMERICAN): 52.6 ML/MIN/1.73 M^2
GLUCOSE SERPL-MCNC: 93 MG/DL (ref 70–110)
HCT VFR BLD AUTO: 34.5 % (ref 40–54)
HGB BLD-MCNC: 11.4 G/DL (ref 14–18)
IMM GRANULOCYTES # BLD AUTO: 0.05 K/UL (ref 0–0.04)
IMM GRANULOCYTES NFR BLD AUTO: 0.4 % (ref 0–0.5)
LYMPHOCYTES # BLD AUTO: 1.5 K/UL (ref 1–4.8)
LYMPHOCYTES NFR BLD: 11.8 % (ref 18–48)
MAGNESIUM SERPL-MCNC: 1.8 MG/DL (ref 1.6–2.6)
MCH RBC QN AUTO: 30.2 PG (ref 27–31)
MCHC RBC AUTO-ENTMCNC: 33 G/DL (ref 32–36)
MCV RBC AUTO: 92 FL (ref 82–98)
MONOCYTES # BLD AUTO: 0.8 K/UL (ref 0.3–1)
MONOCYTES NFR BLD: 6.5 % (ref 4–15)
NEUTROPHILS # BLD AUTO: 10.3 K/UL (ref 1.8–7.7)
NEUTROPHILS NFR BLD: 81 % (ref 38–73)
NRBC BLD-RTO: 0 /100 WBC
PHOSPHATE SERPL-MCNC: 3.4 MG/DL (ref 2.7–4.5)
PLATELET # BLD AUTO: 204 K/UL (ref 150–450)
PMV BLD AUTO: 9.8 FL (ref 9.2–12.9)
POTASSIUM SERPL-SCNC: 3.6 MMOL/L (ref 3.5–5.1)
PROT SERPL-MCNC: 6.5 G/DL (ref 6–8.4)
RBC # BLD AUTO: 3.77 M/UL (ref 4.6–6.2)
SODIUM SERPL-SCNC: 136 MMOL/L (ref 136–145)
WBC # BLD AUTO: 12.69 K/UL (ref 3.9–12.7)

## 2022-06-19 PROCEDURE — 80053 COMPREHEN METABOLIC PANEL: CPT | Performed by: INTERNAL MEDICINE

## 2022-06-19 PROCEDURE — 85025 COMPLETE CBC W/AUTO DIFF WBC: CPT | Performed by: INTERNAL MEDICINE

## 2022-06-19 PROCEDURE — 25000003 PHARM REV CODE 250: Performed by: INTERNAL MEDICINE

## 2022-06-19 PROCEDURE — 99233 PR SUBSEQUENT HOSPITAL CARE,LEVL III: ICD-10-PCS | Mod: ,,, | Performed by: STUDENT IN AN ORGANIZED HEALTH CARE EDUCATION/TRAINING PROGRAM

## 2022-06-19 PROCEDURE — 83735 ASSAY OF MAGNESIUM: CPT | Performed by: INTERNAL MEDICINE

## 2022-06-19 PROCEDURE — 11000001 HC ACUTE MED/SURG PRIVATE ROOM

## 2022-06-19 PROCEDURE — 84100 ASSAY OF PHOSPHORUS: CPT | Performed by: INTERNAL MEDICINE

## 2022-06-19 PROCEDURE — 25000003 PHARM REV CODE 250: Performed by: STUDENT IN AN ORGANIZED HEALTH CARE EDUCATION/TRAINING PROGRAM

## 2022-06-19 PROCEDURE — 36415 COLL VENOUS BLD VENIPUNCTURE: CPT | Performed by: INTERNAL MEDICINE

## 2022-06-19 PROCEDURE — 99233 SBSQ HOSP IP/OBS HIGH 50: CPT | Mod: ,,, | Performed by: STUDENT IN AN ORGANIZED HEALTH CARE EDUCATION/TRAINING PROGRAM

## 2022-06-19 PROCEDURE — 63600175 PHARM REV CODE 636 W HCPCS: Performed by: STUDENT IN AN ORGANIZED HEALTH CARE EDUCATION/TRAINING PROGRAM

## 2022-06-19 RX ORDER — OXYCODONE HYDROCHLORIDE 5 MG/1
5 TABLET ORAL EVERY 4 HOURS PRN
Status: DISCONTINUED | OUTPATIENT
Start: 2022-06-19 | End: 2022-06-21 | Stop reason: HOSPADM

## 2022-06-19 RX ORDER — OXYCODONE HYDROCHLORIDE 5 MG/1
5 TABLET ORAL EVERY 6 HOURS PRN
Status: DISCONTINUED | OUTPATIENT
Start: 2022-06-19 | End: 2022-06-19

## 2022-06-19 RX ORDER — SODIUM CHLORIDE 9 MG/ML
INJECTION, SOLUTION INTRAVENOUS CONTINUOUS
Status: ACTIVE | OUTPATIENT
Start: 2022-06-19 | End: 2022-06-20

## 2022-06-19 RX ORDER — OXYBUTYNIN CHLORIDE 10 MG/1
10 TABLET, EXTENDED RELEASE ORAL DAILY
Status: DISCONTINUED | OUTPATIENT
Start: 2022-06-20 | End: 2022-06-21 | Stop reason: HOSPADM

## 2022-06-19 RX ORDER — OXYCODONE HYDROCHLORIDE 10 MG/1
10 TABLET ORAL EVERY 6 HOURS PRN
Status: DISCONTINUED | OUTPATIENT
Start: 2022-06-19 | End: 2022-06-21 | Stop reason: HOSPADM

## 2022-06-19 RX ADMIN — OXYCODONE 5 MG: 5 TABLET ORAL at 07:06

## 2022-06-19 RX ADMIN — DULOXETINE 60 MG: 60 CAPSULE, DELAYED RELEASE ORAL at 07:06

## 2022-06-19 RX ADMIN — SIMETHICONE 80 MG: 80 TABLET, CHEWABLE ORAL at 10:06

## 2022-06-19 RX ADMIN — LOSARTAN POTASSIUM 100 MG: 50 TABLET, FILM COATED ORAL at 07:06

## 2022-06-19 RX ADMIN — ACETAMINOPHEN 650 MG: 325 TABLET ORAL at 01:06

## 2022-06-19 RX ADMIN — Medication 6 MG: at 09:06

## 2022-06-19 RX ADMIN — OXYCODONE 5 MG: 5 TABLET ORAL at 01:06

## 2022-06-19 RX ADMIN — ACETAMINOPHEN 650 MG: 325 TABLET ORAL at 07:06

## 2022-06-19 RX ADMIN — DOXYCYCLINE HYCLATE 100 MG: 100 TABLET, COATED ORAL at 09:06

## 2022-06-19 RX ADMIN — SODIUM CHLORIDE: 0.9 INJECTION, SOLUTION INTRAVENOUS at 05:06

## 2022-06-19 RX ADMIN — OXYBUTYNIN CHLORIDE 5 MG: 5 TABLET, EXTENDED RELEASE ORAL at 07:06

## 2022-06-19 RX ADMIN — AMLODIPINE BESYLATE 5 MG: 5 TABLET ORAL at 07:06

## 2022-06-19 RX ADMIN — ASPIRIN 81 MG: 81 TABLET, COATED ORAL at 07:06

## 2022-06-19 RX ADMIN — PANTOPRAZOLE SODIUM 40 MG: 40 TABLET, DELAYED RELEASE ORAL at 07:06

## 2022-06-19 RX ADMIN — OXYCODONE HYDROCHLORIDE 10 MG: 10 TABLET ORAL at 05:06

## 2022-06-19 RX ADMIN — CHLORTHALIDONE 12.5 MG: 25 TABLET ORAL at 07:06

## 2022-06-19 RX ADMIN — TAMSULOSIN HYDROCHLORIDE 0.4 MG: 0.4 CAPSULE ORAL at 07:06

## 2022-06-19 RX ADMIN — GABAPENTIN 300 MG: 300 CAPSULE ORAL at 09:06

## 2022-06-19 RX ADMIN — CEFTRIAXONE 1 G: 1 INJECTION, SOLUTION INTRAVENOUS at 10:06

## 2022-06-19 RX ADMIN — GABAPENTIN 300 MG: 300 CAPSULE ORAL at 07:06

## 2022-06-19 RX ADMIN — PRAVASTATIN SODIUM 20 MG: 20 TABLET ORAL at 07:06

## 2022-06-19 RX ADMIN — DOXYCYCLINE HYCLATE 100 MG: 100 TABLET, COATED ORAL at 07:06

## 2022-06-19 NOTE — PROGRESS NOTES
"[facility] Medicine  Progress Note    Patient Name: Tito Menard  MRN: [id number]  Patient Class: IP- Inpatient   Admission Date: 6/17/2022  Length of Stay: 1 days  Attending Physician: Etelvina Cassidy MD  Primary Care Provider: Amanda Brown MD        Subjective:     Principal Problem:Epididymo-orchitis        HPI:  Mr. Francis is a 76yoM  w/PMHx HTN, HPLD, AAA, restrictive lung disease, RBBB, GERD, DM presenting for fatigue and a fall today. He states for the past 2 weeks he has been feeling fatigued and tired. Today while he was getting up from sitting he slid off and fell gently to the floor. He did not lose consciousness or hit his head. He was not able to get up because of it. He states that he has been having some testicle pain during this time as well, he has not had any dysuria or hematuria but did not his left hurt more this his right and that his right was more swollen and he was having trouble walking. He also states over the past couple of months he states he has fallen and has had some trouble walking unrelated to his testicle pain.     In ED, found to have u/s findings of, "Hyperemic left testicle and enlarged hyperemic left epididymis.  Findings consistent with epididymo-orchitis.  Small to moderate in somewhat complex in appearance left-sided hydrocele with some septations and low level echoes present.  Possible pyocele in the setting of suspected epididymo-orchitis.  Consider urology consultation or follow-up, as clinically indicated.  2. Bilateral testes contain intratesticular cysts as above.  Majority of these appear stable but there are some that are slightly more complex than previously.  Consider short-term follow-up with re-evaluation in 2-3 months." Abx started. Medicine called for admission.           Overview/Hospital Course:  Urology evaluated patient- no pyelocele. He was started on broad antibiotics- zosyn and vanc. Started on oxybutynin per urology. 1/4 Bcx " +iv for GNR. Antibiotics changed to doxy and rocephin. Repeat Bcx ordered for 06/18.       Interval History: Patient was seen and evaluated this am. Pain still present within groin. Mild improvement with ice packs and elevation. Still with poor PO intake. Good UOP; however, poor fluid intake. Encouraged increased fluid intake. No nausea, vomiting, fevers, chills, night sweats, chest pain, shortness of breath.     Objective:     Vital Signs (Most Recent):  Temp: 98.2 °F (36.8 °C) (06/18/22 1130)  Pulse: 77 (06/18/22 1130)  Resp: 18 (06/18/22 1130)  BP: 134/76 (06/18/22 1130)  SpO2: (!) 94 % (06/18/22 1130)   Vital Signs (24h Range):  Temp:  [97.6 °F (36.4 °C)-101.8 °F (38.8 °C)] 98.2 °F (36.8 °C)  Pulse:  [] 77  Resp:  [16-22] 18  SpO2:  [94 %-96 %] 94 %  BP: (114-143)/(50-76) 134/76     Weight: 103 kg (227 lb)  Body mass index is 31.66 kg/m².    Intake/Output Summary (Last 24 hours) at 6/18/2022 1507  Last data filed at 6/18/2022 1046  Gross per 24 hour   Intake 499.84 ml   Output 425 ml   Net 74.84 ml      Physical Exam  Gen: in NAD, appears stated age  Neuro: AAOx3, motor, sensory, and strength grossly intact BL  HEENT: NTNC, EOMI, PERRL, MMM  CVS: RRR, no m/r/g; S1/S2 auscultated with no S3 or S4; capillary refill < 2 sec  Resp: lungs CTAB, no w/r/r; no belabored breathing or accessory muscle use appreciated   Abd: BS+ in all 4 quadrants; NTND, soft to palpation; no organomegaly appreciated   : scrotal edema- TTP of Lt testicle- hyperpigmented skin  Extrem: pulses full, equal, and regular over all 4 extremities; no UE or LE edema BL    Significant Labs: All pertinent labs within the past 24 hours have been reviewed.  Blood Culture:   Recent Labs   Lab 06/17/22 2210 06/17/22 2211   LABBLOO No Growth to date Gram stain peggy bottle: Gram negative rods   Results called to and read back by: Lupillo Leach 06/18/2022  14:17     CBC:   Recent Labs   Lab 06/17/22 2210 06/18/22  0655   WBC 16.17* 17.05*   HGB  12.1* 12.0*   HCT 36.6* 35.9*    185     CMP:   Recent Labs   Lab 06/17/22  2210 06/18/22  0655    134*   K 3.4* 3.7    98   CO2 27 23   * 101   BUN 14 16   CREATININE 1.0 1.2   CALCIUM 9.0 8.4*   PROT 7.2 6.0   ALBUMIN 3.3* 3.0*   BILITOT 0.7 1.0   ALKPHOS 96 86   AST 26 29   ALT 20 18   ANIONGAP 10 13   EGFRNONAA >60.0 58.0*     Urine Culture: No results for input(s): LABURIN in the last 48 hours.  Urine Studies:   Recent Labs   Lab 06/18/22  0145   COLORU Yellow   APPEARANCEUA Hazy*   PHUR 5.0   SPECGRAV 1.020   PROTEINUA 1+*   GLUCUA Negative   KETONESU Trace*   BILIRUBINUA Negative   OCCULTUA 2+*   NITRITE Positive*   LEUKOCYTESUR 2+*   RBCUA 8*   WBCUA 46*   BACTERIA Many*   SQUAMEPITHEL 0   HYALINECASTS 0       Significant Imaging: I have reviewed all pertinent imaging results/findings within the past 24 hours.    US Scrotum and Testicles:  FINDINGS:  Right Testicle:     *Size: 4.6 x 2.4 x 2.8 cm  *Appearance: Few cystic appearing lesions in the testis with the largest measuring 1.2 x 1.0 x 1.2 cm, similar to prior.  9.1 x 4.8 x 6.4 cm anechoic lesion adjacent to the testicle, similar to prior and likely spermatocele or tunica albuginea cyst.  *Flow: Normal arterial and venous flow  *Epididymis: Normal.  *Hydrocele: Small to moderate.  *Varicocele: None.  .     Left Testicle:     *Size: 4.5 x 3.5 x 3.0 cm  *Appearance: Few small cystic appearing lesions in the testis..  *Flow: Hyperemia with preserved arterial and venous flow.  *Epididymis: Enlarged with hyperemia.  Cyst in or adjacent to the epididymal head measuring 1.5 x 1.8 x 1.5 cm.  Additional epididymal head cyst measuring 9 mm.  *Hydrocele: Small to moderate and somewhat complex in appearance with some septations and low-level echoes present.  *Varicocele: None.  .     Other findings: None.     Impression:     1. Hyperemic left testicle and enlarged hyperemic left epididymis.  Findings consistent with epididymo-orchitis.   Small to moderate in somewhat complex in appearance left-sided hydrocele with some septations and low level echoes present.  Possible pyocele in the setting of suspected epididymo-orchitis.  Consider urology consultation or follow-up, as clinically indicated.  2. Bilateral testes contain intratesticular cysts as above.  Majority of these appear stable but there are some that are slightly more complex than previously.  Consider short-term follow-up with re-evaluation in 2-3 months.  This report was flagged in Epic as abnormal.      Assessment/Plan:      * Epididymo-orchitis  - continue rocephin and doxy  - Urology following- scrotal support- ice packs, tight fitting underwear, scrotal elevation  - Per Uro- for overactive bladder symptoms- ditropan xL ipg- mirabegron on dc (written by uro)  - Will arrange urology follow-up at time of discharge  - Pain management: tylenol 650mg q6h prn for mild pain, oxy 5mg q6h prn for mod pain, oxy 10mg q6h prn for severe pain  - Begin NS at 100mL/hr for further hydration- for 10hrs    Bacteremia  - 1/4 BCx from 06/17 +iv GNR- pending  - Repeat BCx 06/18- NGTD  - Continue rocephin  - likely 2/2 UTI, epididymo-orchitis    Class 1 obesity due to excess calories with serious comorbidity and body mass index (BMI) of 31.0 to 31.9 in adult  Body mass index is 31.66 kg/m². Morbid obesity complicates all aspects of disease management from diagnostic modalities to treatment. Weight loss encouraged and health benefits explained to patient.     Obstructive chronic bronchitis without exacerbation  - continue duonebs PRN    GERD (gastroesophageal reflux disease)  - continue PPI    Mixed hyperlipidemia  - Continue home statin regimen    Essential hypertension  - BP currently well-controlled  - Continue home regimen of chlorthalidone 12.5mg qday, losartan 100mg qday, amlodipine 5mg qday   - Will continue to monitor and further titrate antihypertensives as clinically indicated       VTE Risk  Mitigation (From admission, onward)         Ordered     Reason for No Pharmacological VTE Prophylaxis  Once        Question:  Reasons:  Answer:  IV Heparin w/in 24 hrs. Pre or Post-Op    06/18/22 0352     IP VTE HIGH RISK PATIENT  Once         06/18/22 0352     Place sequential compression device  Until discontinued         06/18/22 0352                Discharge Planning   ELLEN: 6/20/2022     Code Status: Full Code   Is the patient medically ready for discharge?: No    Reason for patient still in hospital (select all that apply): Patient trending condition                   Etelvina Cassidy MD  Department of Hospital Medicine   [facility]

## 2022-06-19 NOTE — ASSESSMENT & PLAN NOTE
- Antibiotics per primary team for medical management of epididymo-orchitis  - Recommend scrotal support with tight fitting underwear and intermittent ice packs  - NSAIDS for scrotal pain to alleviate inflammation  - For OAB symptoms: ditropan xL inpatient, we wrote for mirabegron on discharge  - Please call Urology with any other questions or if his scrotal exam acutely worsens

## 2022-06-19 NOTE — SUBJECTIVE & OBJECTIVE
Interval History: No acute events overnight. WBC improved. Afebrile, vitals stable.    Objective:     Temp:  [97.7 °F (36.5 °C)-98.5 °F (36.9 °C)] 98.2 °F (36.8 °C)  Pulse:  [62-81] 73  Resp:  [16-18] 18  SpO2:  [94 %-96 %] 95 %  BP: (121-137)/(61-76) 121/64     Body mass index is 31.66 kg/m².           Drains       None                   Physical Exam  Vitals and nursing note reviewed.   Constitutional:       Appearance: Normal appearance.   HENT:      Head: Normocephalic and atraumatic.      Nose: Nose normal.      Mouth/Throat:      Mouth: Mucous membranes are moist.   Eyes:      Pupils: Pupils are equal, round, and reactive to light.   Cardiovascular:      Rate and Rhythm: Normal rate.   Pulmonary:      Effort: Pulmonary effort is normal. No respiratory distress.   Abdominal:      General: Abdomen is flat. There is no distension.      Tenderness: There is no abdominal tenderness.   Genitourinary:     Comments: Penis circumcised  Swollen scrotum with a right large spermatocele and left epididymal cyst with reactive hydrocele on the left. Not tender to palpation. No erythema.  Skin:     General: Skin is warm and dry.   Neurological:      General: No focal deficit present.      Mental Status: He is alert and oriented to person, place, and time.   Psychiatric:         Mood and Affect: Mood normal.         Behavior: Behavior normal.         Thought Content: Thought content normal.         Judgment: Judgment normal.       Significant Labs:    BMP:  Recent Labs   Lab 06/17/22 2210 06/18/22  0655 06/19/22  0621    134* 136   K 3.4* 3.7 3.6    98 99   CO2 27 23 24   BUN 14 16 25*   CREATININE 1.0 1.2 1.3   CALCIUM 9.0 8.4* 9.0       CBC:   Recent Labs   Lab 06/17/22 2210 06/18/22  0655 06/19/22  0621   WBC 16.17* 17.05* 12.69   HGB 12.1* 12.0* 11.4*   HCT 36.6* 35.9* 34.5*    185 204       All pertinent labs results from the past 24 hours have been reviewed.    Significant Imaging:  All pertinent  imaging results/findings from the past 24 hours have been reviewed.

## 2022-06-19 NOTE — ASSESSMENT & PLAN NOTE
- 1/4 BCx from 06/17 +iv GNR- pending  - Repeat BCx 06/18- NGTD  - Continue rocephin  - likely 2/2 UTI, epididymo-orchitis

## 2022-06-19 NOTE — PROGRESS NOTES
[facility]  Urology  Progress Note    Patient Name: Tito Menard  MRN: [id number]  Admission Date: 6/17/2022  Hospital Length of Stay: 1 days  Code Status: Full Code   Attending Provider: Etelvina Cassidy MD   Primary Care Physician: Amanda Brown MD    Subjective:     HPI:  Tito Menard is a 75 yo male with HTN, HPLD, AAA, restrictive lung disease, RBBB, GERD, DM presenting for fatigue and a fall today. He states for the past 2 weeks he has been feeling fatigued and tired. He has also had complaints of left testicular pain and swelling for the last week that has progressively worsened to the point he was unable to walk due to the discomfort. He also spiked a fever to 101.8 on admission. He has had no dysuria or hematuria. Denies N/V. Urology consulted for possible pyocele.    On exam, patient AFVSS. WBC 17, Hgb 12,  Cr 1.2 @ baseline. UA 8 RBC, 46 WBC, Many bacteria. Current abx Zosyn and Vanc. Scrotal US showing epididymitis of the left testicle. Left hydrocele, but normal in appearance. Does not appear to be a pyocele. Physical exam his scrotum was swollen with a right large likely spermatocele and left epididymal cyst with reactive hydrocele on the left.  No tenderness of the right testicle, minimal tenderness left testicle        Interval History: No acute events overnight. WBC improved. Afebrile, vitals stable.    Objective:     Temp:  [97.7 °F (36.5 °C)-98.5 °F (36.9 °C)] 98.2 °F (36.8 °C)  Pulse:  [62-81] 73  Resp:  [16-18] 18  SpO2:  [94 %-96 %] 95 %  BP: (121-137)/(61-76) 121/64     Body mass index is 31.66 kg/m².           Drains       None                   Physical Exam  Vitals and nursing note reviewed.   Constitutional:       Appearance: Normal appearance.   HENT:      Head: Normocephalic and atraumatic.      Nose: Nose normal.      Mouth/Throat:      Mouth: Mucous membranes are moist.   Eyes:      Pupils: Pupils are equal, round, and reactive to light.   Cardiovascular:      Rate  and Rhythm: Normal rate.   Pulmonary:      Effort: Pulmonary effort is normal. No respiratory distress.   Abdominal:      General: Abdomen is flat. There is no distension.      Tenderness: There is no abdominal tenderness.   Genitourinary:     Comments: Penis circumcised  Swollen scrotum with a right large spermatocele and left epididymal cyst with reactive hydrocele on the left. Not tender to palpation. No erythema.  Skin:     General: Skin is warm and dry.   Neurological:      General: No focal deficit present.      Mental Status: He is alert and oriented to person, place, and time.   Psychiatric:         Mood and Affect: Mood normal.         Behavior: Behavior normal.         Thought Content: Thought content normal.         Judgment: Judgment normal.       Significant Labs:    BMP:  Recent Labs   Lab 06/17/22 2210 06/18/22 0655 06/19/22 0621    134* 136   K 3.4* 3.7 3.6    98 99   CO2 27 23 24   BUN 14 16 25*   CREATININE 1.0 1.2 1.3   CALCIUM 9.0 8.4* 9.0       CBC:   Recent Labs   Lab 06/17/22 2210 06/18/22 0655 06/19/22  0621   WBC 16.17* 17.05* 12.69   HGB 12.1* 12.0* 11.4*   HCT 36.6* 35.9* 34.5*    185 204       All pertinent labs results from the past 24 hours have been reviewed.    Significant Imaging:  All pertinent imaging results/findings from the past 24 hours have been reviewed.                    Assessment/Plan:     * Epididymo-orchitis  - Antibiotics per primary team for medical management of epididymo-orchitis  - Recommend scrotal support with tight fitting underwear and intermittent ice packs  - NSAIDS for scrotal pain to alleviate inflammation  - For OAB symptoms: ditropan xL inpatient, we wrote for mirabegron on discharge  - Please call Urology with any other questions or if his scrotal exam acutely worsens          VTE Risk Mitigation (From admission, onward)         Ordered     Reason for No Pharmacological VTE Prophylaxis  Once        Question:  Reasons:  Answer:  IV  Heparin w/in 24 hrs. Pre or Post-Op    06/18/22 0352     IP VTE HIGH RISK PATIENT  Once         06/18/22 0352     Place sequential compression device  Until discontinued         06/18/22 0352                Rad Sue MD  Urology  [facility] S[facility]

## 2022-06-19 NOTE — ASSESSMENT & PLAN NOTE
- continue rocephin and doxy  - Urology following- scrotal support- ice packs, tight fitting underwear, scrotal elevation  - Per Uro- for overactive bladder symptoms- ditropan xL ipg- mirabegron on dc (written by uro)  - Will arrange urology follow-up at time of discharge  - Pain management: tylenol 650mg q6h prn for mild pain, oxy 5mg q6h prn for mod pain, oxy 10mg q6h prn for severe pain

## 2022-06-19 NOTE — ASSESSMENT & PLAN NOTE
- BP currently well-controlled  - Continue home regimen of chlorthalidone 12.5mg qday, losartan 100mg qday, amlodipine 5mg qday   - Will continue to monitor and further titrate antihypertensives as clinically indicated

## 2022-06-20 LAB
ALBUMIN SERPL BCP-MCNC: 2.7 G/DL (ref 3.5–5.2)
ALP SERPL-CCNC: 95 U/L (ref 55–135)
ALT SERPL W/O P-5'-P-CCNC: 21 U/L (ref 10–44)
ANION GAP SERPL CALC-SCNC: 11 MMOL/L (ref 8–16)
AST SERPL-CCNC: 34 U/L (ref 10–40)
BACTERIA BLD CULT: ABNORMAL
BACTERIA UR CULT: ABNORMAL
BASOPHILS # BLD AUTO: 0.01 K/UL (ref 0–0.2)
BASOPHILS NFR BLD: 0.1 % (ref 0–1.9)
BILIRUB SERPL-MCNC: 0.5 MG/DL (ref 0.1–1)
BUN SERPL-MCNC: 23 MG/DL (ref 8–23)
CALCIUM SERPL-MCNC: 8.6 MG/DL (ref 8.7–10.5)
CHLORIDE SERPL-SCNC: 99 MMOL/L (ref 95–110)
CO2 SERPL-SCNC: 26 MMOL/L (ref 23–29)
CREAT SERPL-MCNC: 1.2 MG/DL (ref 0.5–1.4)
DIFFERENTIAL METHOD: ABNORMAL
EOSINOPHIL # BLD AUTO: 0 K/UL (ref 0–0.5)
EOSINOPHIL NFR BLD: 0.3 % (ref 0–8)
ERYTHROCYTE [DISTWIDTH] IN BLOOD BY AUTOMATED COUNT: 13 % (ref 11.5–14.5)
EST. GFR  (AFRICAN AMERICAN): >60 ML/MIN/1.73 M^2
EST. GFR  (NON AFRICAN AMERICAN): 58 ML/MIN/1.73 M^2
GLUCOSE SERPL-MCNC: 94 MG/DL (ref 70–110)
HCT VFR BLD AUTO: 33.8 % (ref 40–54)
HGB BLD-MCNC: 11.1 G/DL (ref 14–18)
IMM GRANULOCYTES # BLD AUTO: 0.06 K/UL (ref 0–0.04)
IMM GRANULOCYTES NFR BLD AUTO: 0.7 % (ref 0–0.5)
LYMPHOCYTES # BLD AUTO: 1.4 K/UL (ref 1–4.8)
LYMPHOCYTES NFR BLD: 14.8 % (ref 18–48)
MAGNESIUM SERPL-MCNC: 1.7 MG/DL (ref 1.6–2.6)
MCH RBC QN AUTO: 29.3 PG (ref 27–31)
MCHC RBC AUTO-ENTMCNC: 32.8 G/DL (ref 32–36)
MCV RBC AUTO: 89 FL (ref 82–98)
MONOCYTES # BLD AUTO: 0.7 K/UL (ref 0.3–1)
MONOCYTES NFR BLD: 8 % (ref 4–15)
NEUTROPHILS # BLD AUTO: 6.9 K/UL (ref 1.8–7.7)
NEUTROPHILS NFR BLD: 76.1 % (ref 38–73)
NRBC BLD-RTO: 0 /100 WBC
PHOSPHATE SERPL-MCNC: 2.5 MG/DL (ref 2.7–4.5)
PLATELET # BLD AUTO: 204 K/UL (ref 150–450)
PMV BLD AUTO: 9.7 FL (ref 9.2–12.9)
POTASSIUM SERPL-SCNC: 3.6 MMOL/L (ref 3.5–5.1)
PROT SERPL-MCNC: 6.4 G/DL (ref 6–8.4)
RBC # BLD AUTO: 3.79 M/UL (ref 4.6–6.2)
SODIUM SERPL-SCNC: 136 MMOL/L (ref 136–145)
WBC # BLD AUTO: 9.11 K/UL (ref 3.9–12.7)

## 2022-06-20 PROCEDURE — 99233 SBSQ HOSP IP/OBS HIGH 50: CPT | Mod: ,,, | Performed by: STUDENT IN AN ORGANIZED HEALTH CARE EDUCATION/TRAINING PROGRAM

## 2022-06-20 PROCEDURE — 97116 GAIT TRAINING THERAPY: CPT

## 2022-06-20 PROCEDURE — 83735 ASSAY OF MAGNESIUM: CPT | Performed by: INTERNAL MEDICINE

## 2022-06-20 PROCEDURE — 84100 ASSAY OF PHOSPHORUS: CPT | Performed by: INTERNAL MEDICINE

## 2022-06-20 PROCEDURE — 25000003 PHARM REV CODE 250: Performed by: STUDENT IN AN ORGANIZED HEALTH CARE EDUCATION/TRAINING PROGRAM

## 2022-06-20 PROCEDURE — 63600175 PHARM REV CODE 636 W HCPCS: Performed by: STUDENT IN AN ORGANIZED HEALTH CARE EDUCATION/TRAINING PROGRAM

## 2022-06-20 PROCEDURE — 25000003 PHARM REV CODE 250: Performed by: INTERNAL MEDICINE

## 2022-06-20 PROCEDURE — 85025 COMPLETE CBC W/AUTO DIFF WBC: CPT | Performed by: INTERNAL MEDICINE

## 2022-06-20 PROCEDURE — 99233 PR SUBSEQUENT HOSPITAL CARE,LEVL III: ICD-10-PCS | Mod: ,,, | Performed by: STUDENT IN AN ORGANIZED HEALTH CARE EDUCATION/TRAINING PROGRAM

## 2022-06-20 PROCEDURE — 97530 THERAPEUTIC ACTIVITIES: CPT

## 2022-06-20 PROCEDURE — 94761 N-INVAS EAR/PLS OXIMETRY MLT: CPT

## 2022-06-20 PROCEDURE — 97161 PT EVAL LOW COMPLEX 20 MIN: CPT

## 2022-06-20 PROCEDURE — 36415 COLL VENOUS BLD VENIPUNCTURE: CPT | Performed by: INTERNAL MEDICINE

## 2022-06-20 PROCEDURE — 80053 COMPREHEN METABOLIC PANEL: CPT | Performed by: INTERNAL MEDICINE

## 2022-06-20 PROCEDURE — 11000001 HC ACUTE MED/SURG PRIVATE ROOM

## 2022-06-20 RX ADMIN — AMLODIPINE BESYLATE 5 MG: 5 TABLET ORAL at 08:06

## 2022-06-20 RX ADMIN — GABAPENTIN 300 MG: 300 CAPSULE ORAL at 08:06

## 2022-06-20 RX ADMIN — CHLORTHALIDONE 12.5 MG: 25 TABLET ORAL at 08:06

## 2022-06-20 RX ADMIN — TAMSULOSIN HYDROCHLORIDE 0.4 MG: 0.4 CAPSULE ORAL at 08:06

## 2022-06-20 RX ADMIN — PANTOPRAZOLE SODIUM 40 MG: 40 TABLET, DELAYED RELEASE ORAL at 08:06

## 2022-06-20 RX ADMIN — LOSARTAN POTASSIUM 100 MG: 50 TABLET, FILM COATED ORAL at 08:06

## 2022-06-20 RX ADMIN — OXYBUTYNIN CHLORIDE 10 MG: 10 TABLET, EXTENDED RELEASE ORAL at 09:06

## 2022-06-20 RX ADMIN — ASPIRIN 81 MG: 81 TABLET, COATED ORAL at 08:06

## 2022-06-20 RX ADMIN — DULOXETINE 60 MG: 60 CAPSULE, DELAYED RELEASE ORAL at 08:06

## 2022-06-20 RX ADMIN — PRAVASTATIN SODIUM 20 MG: 20 TABLET ORAL at 09:06

## 2022-06-20 RX ADMIN — DOXYCYCLINE HYCLATE 100 MG: 100 TABLET, COATED ORAL at 08:06

## 2022-06-20 RX ADMIN — CEFTRIAXONE 2 G: 2 INJECTION, SOLUTION INTRAVENOUS at 11:06

## 2022-06-20 NOTE — PLAN OF CARE
[facility] Med Surg  Initial Discharge Assessment       Primary Care Provider: Amanda Brown MD    Admission Diagnosis: Epididymoorchitis [N45.3]  Tachycardia [R00.0]  Testicular pain, left [N50.812]  Acute febrile illness [R50.9]  Generalized weakness [R53.1]  Chest pain [R07.9]    Admission Date: 6/17/2022  Expected Discharge Date: 6/24/2022    Discharge Barriers Identified: None    Payor: HU[employer] MANAGED MEDICARE / Plan: [employer] MEDICARE HMO / Product Type: Capitation /     Extended Emergency Contact Information  Primary Emergency Contact: Guillermina Menard  Address: [address] [place] [address] [facility]  Home Phone: [phone]  Mobile Phone: [phone]  Relation: Spouse    Discharge Plan A: Home with family  Discharge Plan B: Home Health, Home with family      [facility] (Now [facility]) - [place], [place] - [address] [facility]  [address] [facility] [address]  Phone: [phone] Fax: [phone]    [facility] [address] [place] [address] [address] [facility]  [address] [facility] [address]  Phone: [phone] Fax: [phone]      Initial Assessment (most recent)     Adult Discharge Assessment - 06/20/22 1112        Discharge Assessment    Assessment Type Discharge Planning Assessment     Confirmed/corrected address, phone number and insurance Yes     Confirmed Demographics Correct on Facesheet     Source of Information patient     Communicated ELLEN with patient/caregiver Yes     Lives With spouse;child(ashley), adult     Do you expect to return to your current living situation? Yes     Do you have help at home or someone to help you manage your care at home? Yes     Who are your caregiver(s) and their phone number(s)? Spouse     Prior to hospitilization cognitive status: Alert/Oriented     Current cognitive status: Alert/Oriented     Home Layout Able to live on 1st floor     Equipment Currently Used at Home cane,  straight;cane, quad;rollator     Do you currently have service(s) that help you manage your care at home? No     Do you take prescription medications? Yes     Do you have prescription coverage? Yes     Do you have any problems affording any of your prescribed medications? No     Is the patient taking medications as prescribed? yes     How do you get to doctors appointments? car, drives self     Are you on dialysis? No     Do you take coumadin? No     Discharge Plan A Home with family     Discharge Plan B Home Health;Home with family     DME Needed Upon Discharge  none     Discharge Plan discussed with: Patient     Discharge Barriers Identified None               Patient lives with his wife and 33 y/o son in a single story home with one entry step in rear. Patient expects to be dc'd home. Pending therapy evaluation. Patient wife will provide transportation home.

## 2022-06-20 NOTE — ASSESSMENT & PLAN NOTE
Body mass index is 31.82 kg/m². Morbid obesity complicates all aspects of disease management from diagnostic modalities to treatment. Weight loss encouraged and health benefits explained to patient.

## 2022-06-20 NOTE — PLAN OF CARE
PT evaluation complete - see note for details. POC and goals established.    Problem: Physical Therapy  Goal: Physical Therapy Goal  Description: Goals to be met by: 22     Patient will increase functional independence with mobility by performin. Supine to sit with Modified [place]  2. Sit to stand transfer with Supervision  3. Bed to chair transfer with Supervision using LRAD  4. Gait  x 150 feet with Supervision using LRAD  5. Ascend/descend 3 stairs with bilateral Handrails Stand-by Assistance using No Assistive Device.   6. Lower extremity exercise program x30 reps per handout, with independence    Outcome: Ongoing, Progressing     2022

## 2022-06-20 NOTE — PROGRESS NOTES
"[facility] Medicine  Progress Note    Patient Name: Tito Menard  MRN: [id number]  Patient Class: IP- Inpatient   Admission Date: 6/17/2022  Length of Stay: 2 days  Attending Physician: Etelvina Cassidy MD  Primary Care Provider: Amanda Brown MD        Subjective:     Principal Problem:Epididymo-orchitis        HPI:  Mr. Francis is a 76yoM  w/PMHx HTN, HPLD, AAA, restrictive lung disease, RBBB, GERD, DM presenting for fatigue and a fall today. He states for the past 2 weeks he has been feeling fatigued and tired. Today while he was getting up from sitting he slid off and fell gently to the floor. He did not lose consciousness or hit his head. He was not able to get up because of it. He states that he has been having some testicle pain during this time as well, he has not had any dysuria or hematuria but did not his left hurt more this his right and that his right was more swollen and he was having trouble walking. He also states over the past couple of months he states he has fallen and has had some trouble walking unrelated to his testicle pain.     In ED, found to have u/s findings of, "Hyperemic left testicle and enlarged hyperemic left epididymis.  Findings consistent with epididymo-orchitis.  Small to moderate in somewhat complex in appearance left-sided hydrocele with some septations and low level echoes present.  Possible pyocele in the setting of suspected epididymo-orchitis.  Consider urology consultation or follow-up, as clinically indicated.  2. Bilateral testes contain intratesticular cysts as above.  Majority of these appear stable but there are some that are slightly more complex than previously.  Consider short-term follow-up with re-evaluation in 2-3 months." Abx started. Medicine called for admission.           Overview/Hospital Course:  Urology evaluated patient- no pyelocele. He was started on broad antibiotics- zosyn and vanc. Started on oxybutynin per urology. 1/4 Bcx " +iv for GNR. Antibiotics changed to doxy and rocephin. Repeat Bcx 06/18 no growth to date. Doxy stopped 06/20. Rocephin increased to 2g qday. ID consulted. Bcx and Ucx + E. Coli- pansensitive.       Interval History: Patient was seen and evaluated this am. Pain still present- difficulty with ambulating 2/2 scrotal pain- condom cath placed overnight. Good UOP.  Encouraged increased fluid intake. No nausea, vomiting, fevers, chills, night sweats, chest pain, shortness of breath.     Objective:     Vital Signs (Most Recent):  Temp: 98.1 °F (36.7 °C) (06/20/22 1538)  Pulse: 82 (06/20/22 1538)  Resp: 12 (06/20/22 1538)  BP: (!) 154/81 (06/20/22 1538)  SpO2: 96 % (06/20/22 1538)   Vital Signs (24h Range):  Temp:  [97.8 °F (36.6 °C)-99.5 °F (37.5 °C)] 98.1 °F (36.7 °C)  Pulse:  [66-91] 82  Resp:  [12-20] 12  SpO2:  [94 %-96 %] 96 %  BP: (134-154)/(72-81) 154/81     Weight: 103.5 kg (228 lb 2.8 oz)  Body mass index is 31.82 kg/m².    Intake/Output Summary (Last 24 hours) at 6/20/2022 1819  Last data filed at 6/20/2022 0625  Gross per 24 hour   Intake 250 ml   Output 600 ml   Net -350 ml      Physical Exam  Gen: in NAD, appears stated age  Neuro: AAOx3, motor, sensory, and strength grossly intact BL  HEENT: NTNC, EOMI, PERRL, MMM  CVS: RRR, no m/r/g; S1/S2 auscultated with no S3 or S4; capillary refill < 2 sec  Resp: lungs CTAB, no w/r/r; no belabored breathing or accessory muscle use appreciated   Abd: BS+ in all 4 quadrants; NTND, soft to palpation; no organomegaly appreciated   : scrotal edema- TTP of Lt testicle- hyperpigmented skin  Extrem: pulses full, equal, and regular over all 4 extremities; no UE or LE edema BL    Significant Labs: All pertinent labs within the past 24 hours have been reviewed.  Blood Culture:   No results for input(s): LABBLOO in the last 48 hours.    CBC:   Recent Labs   Lab 06/19/22  0621 06/20/22  0236   WBC 12.69 9.11   HGB 11.4* 11.1*   HCT 34.5* 33.8*    204     CMP:   Recent Labs    Lab 06/19/22  0621 06/20/22  0236    136   K 3.6 3.6   CL 99 99   CO2 24 26   GLU 93 94   BUN 25* 23   CREATININE 1.3 1.2   CALCIUM 9.0 8.6*   PROT 6.5 6.4   ALBUMIN 2.8* 2.7*   BILITOT 0.6 0.5   ALKPHOS 81 95   AST 35 34   ALT 21 21   ANIONGAP 13 11   EGFRNONAA 52.6* 58.0*     Urine Culture: No results for input(s): LABURIN in the last 48 hours.  Urine Studies:   No results for input(s): COLORU, APPEARANCEUA, PHUR, SPECGRAV, PROTEINUA, GLUCUA, KETONESU, BILIRUBINUA, OCCULTUA, NITRITE, UROBILINOGEN, LEUKOCYTESUR, RBCUA, WBCUA, BACTERIA, SQUAMEPITHEL, HYALINECASTS in the last 48 hours.    Invalid input(s): WRIGHTSUR      Significant Imaging: I have reviewed all pertinent imaging results/findings within the past 24 hours.    US Scrotum and Testicles:  FINDINGS:  Right Testicle:     *Size: 4.6 x 2.4 x 2.8 cm  *Appearance: Few cystic appearing lesions in the testis with the largest measuring 1.2 x 1.0 x 1.2 cm, similar to prior.  9.1 x 4.8 x 6.4 cm anechoic lesion adjacent to the testicle, similar to prior and likely spermatocele or tunica albuginea cyst.  *Flow: Normal arterial and venous flow  *Epididymis: Normal.  *Hydrocele: Small to moderate.  *Varicocele: None.  .     Left Testicle:     *Size: 4.5 x 3.5 x 3.0 cm  *Appearance: Few small cystic appearing lesions in the testis..  *Flow: Hyperemia with preserved arterial and venous flow.  *Epididymis: Enlarged with hyperemia.  Cyst in or adjacent to the epididymal head measuring 1.5 x 1.8 x 1.5 cm.  Additional epididymal head cyst measuring 9 mm.  *Hydrocele: Small to moderate and somewhat complex in appearance with some septations and low-level echoes present.  *Varicocele: None.  .     Other findings: None.     Impression:     1. Hyperemic left testicle and enlarged hyperemic left epididymis.  Findings consistent with epididymo-orchitis.  Small to moderate in somewhat complex in appearance left-sided hydrocele with some septations and low level echoes  present.  Possible pyocele in the setting of suspected epididymo-orchitis.  Consider urology consultation or follow-up, as clinically indicated.  2. Bilateral testes contain intratesticular cysts as above.  Majority of these appear stable but there are some that are slightly more complex than previously.  Consider short-term follow-up with re-evaluation in 2-3 months.  This report was flagged in Epic as abnormal.      Assessment/Plan:      * Epididymo-orchitis  - continue rocephin- increased to 2g qday   - UCx w/E coli- pansensitive  - Stop doxy  - Urology following- scrotal support- ice packs, tight fitting underwear, scrotal elevation  - Per Uro- for overactive bladder symptoms- ditropan xL ipg- mirabegron on dc (written by uro)  - Will arrange urology follow-up at time of discharge  - Pain management: tylenol 650mg q6h prn for mild pain, oxy 5mg q6h prn for mod pain, oxy 10mg q6h prn for severe pain    Bacteremia  - 1/4 BCx from 06/17 E coli- pansensitive  - Plan for 2wks of IV rocephin 2g qday  - Spoke with ID- recommended consultation to ensure follow-up   - Midline placed today   - Planning for EOT- 07/02/22  - Repeat BCx 06/18- NGTD  - likely 2/2 UTI, epididymo-orchitis    Class 1 obesity due to excess calories with serious comorbidity and body mass index (BMI) of 31.0 to 31.9 in adult  Body mass index is 31.82 kg/m². Morbid obesity complicates all aspects of disease management from diagnostic modalities to treatment. Weight loss encouraged and health benefits explained to patient.     Obstructive chronic bronchitis without exacerbation  - continue duonebs PRN    GERD (gastroesophageal reflux disease)  - continue PPI    Mixed hyperlipidemia  - Continue home statin regimen    Essential hypertension  - BP currently well-controlled  - Continue home regimen of chlorthalidone 12.5mg qday, losartan 100mg qday, amlodipine 5mg qday   - Will continue to monitor and further titrate antihypertensives as clinically  indicated     VTE Risk Mitigation (From admission, onward)         Ordered     Reason for No Pharmacological VTE Prophylaxis  Once        Question:  Reasons:  Answer:  IV Heparin w/in 24 hrs. Pre or Post-Op    06/18/22 0352     IP VTE HIGH RISK PATIENT  Once         06/18/22 0352     Place sequential compression device  Until discontinued         06/18/22 0352                Discharge Planning   ELLEN: 6/24/2022     Code Status: Full Code   Is the patient medically ready for discharge?: No    Reason for patient still in hospital (select all that apply): Patient trending condition  Discharge Plan A: Home with family                    Etelvina Cassidy MD  Department of Hospital Medicine   [facility]

## 2022-06-20 NOTE — PLAN OF CARE
Went over plan - all questions answered. No falls or injuries. Pt voiced difficulty getting out of bed to void. He will accidentally urinate on his gown . We placed a condom cath on pt and he states he really likes it and can now get some good rest. Will continue to monitor

## 2022-06-20 NOTE — ASSESSMENT & PLAN NOTE
- 1/4 BCx from 06/17 E coli- pansensitive  - Plan for 2wks of IV rocephin 2g qday  - Spoke with ID- recommended consultation to ensure follow-up   - Midline placed today   - Planning for EOT- 07/02/22  - Repeat BCx 06/18- NGTD  - likely 2/2 UTI, epididymo-orchitis

## 2022-06-20 NOTE — PT/OT/SLP EVAL
Physical Therapy Evaluation and Treatment     Patient Name:  Tito Menard  MRN: [id number]    Admit Date: 6/17/2022  Admitting Diagnosis:  Epididymo-orchitis  Length of Stay: 2 days  Recent Surgery: * No surgery found *      Recommendations:     Discharge Recommendations: Home Health PT  Equipment recommendations: bedside commode  Barriers to discharge: None Identified     Assessment:     Tito Menard is a 76 y.o. male admitted to [facility] on 6/17/2022 with medical diagnosis of Epididymo-orchitis. Pt presents with weakness, impaired endurance, impaired self care skills, impaired functional mobilty, gait instability, impaired balance, pain, impaired skin. These deficits effect their roles and responsibilities in which they were able to complete prior to admit. PTA, pt was ambulating with no AD at home and a rollator in the community. Pt reports multiple falls over the past few months. States that falls occur due to sudden feelings of weakness. Tito Menard would benefit from acute PT intervention to improve quality of life, focus on recovery of impairments, provide patient/caregiver education, reduce fall risk, and maximize (I) and safety with functional mobility. Once medically stable, recommending pt discharge to Home Health PT.      Rehab Prognosis: Good    Plan:     During this hospitalization, patient to be seen 3 x/week to address the identified rehab impairments via gait training, therapeutic activities, therapeutic exercises, neuromuscular re-education and progress towards stated goals.     Plan of Care Expires:  07/20/22  Plan of Care reviewed with: patient    This plan of care has been discussed with the patient/caregiver, who was included in its development and is in agreement with the identified goals and treatment plan.     Subjective     Communicated with RN prior to session.  Patient found supine upon PT entry to room, agreeable to evaluation. Pt alone during session.    Chief Complaint: Groin pain  "during movement      Patient/Family Comments/goals: "Sometimes I just feel weak when I am walking and then I fall"    Pain/Comfort:  · Pain Rating 1: 6/10  · Location - Orientation 1: generalized  · Location 1: head  · Pain Addressed 1: Reposition, Cessation of Activity, Distraction  · Pain Rating Post-Intervention 1:  (pt did not rate)  · Pain Rating 2:  (pt did not rate)  · Location 2: groin  · Pain Addressed 2: Reposition, Distraction, Cessation of Activity  · Pain Rating Post-Intervention 2:  (pt did not rate)    Patients cultural, spiritual, Spiritism conflicts given the current situation: None identified     Patient History: information obtained from pt     Living Environment: Pt lives with wife in single level home  with 3 POONAM (ariane HR, can reach both at same time). Bathroom set-up: tub/shower combo  Prior Level of Function: modified (I) for mobility and ADLs using rollator as AD in community; states he has a cane by front and back door at home in case he needs one  DME owned: single point cane, shower chair and rollator  Support Available/Caregiver Assistance: wife and pt are caregivers for each other    Objective:     Patient found with: peripheral IV, telemetry, Condom Catheter    Recent Surgery: * No surgery found *    General Precautions: Standard, fall   Orthopedic Precautions:N/A   Braces: N/A   Oxygen Device: room air      Exams:     Cognition:  · Alert  · Command following: Follows multistep verbal commands  · Communication: clear/fluent     Sensation:   o Light touch sensation: Intact BLEs     Gross Motor Coordination: No deficits noted during functional mobility tasks      Edema/Skin Integrity: swollen groin; Visible skin intact     Postural examination/scapula alignment: Rounded shoulder, Head forward and increased trunk flexion     Lower Extremity Range of Motion:  o Right Lower Extremity: WFL  o Left Lower Extremity: WFL     Lower Extremity Strength:  o Right Lower Extremity: grossly " 4/5  o Left Lower Extremity: grossly 4/5    Functional Mobility:    Bed Mobility: increased time for mobility 2/2 groin pain  · Scooting with stand by assistance  · Supine > Sit with stand by assistance    Transfers:   · Sit <> Stand Transfer: Contact Guard Assistance x 2 trials from eob with RW AD   · Bed <> Chair: Contact Guard Assistance with RW AD              Gait:  · Distance: 20 ft  · Assistance level: Contact Guard Assistance  · Assistive Device: rolling walker  · Gait Assessment: decreased gait speed; increased forward trunk flexion while ambulating  · V/c to lift head up and look forward towards target    Balance:  · Dynamic Sitting: FAIR+: Maintains balance through MINIMAL excursions of active trunk motion  · Standing:  · Static: FAIR+: Takes MINIMAL challenges from all directions   · Dynamic: FAIR: Needs CONTACT GUARD during gait    Outcome Measure: AM-PAC 6 CLICK MOBILITY  Total Score:19     Patient/Caregiver Education:   Pt is safe to t/f with RW with nsg/PCT    Therapist educated pt/caregiver regarding:    PT POC and goals for therapy    Safety with mobility and fall risk    Instruction on use of call button and importance of calling nursing staff for assistance with mobility    Time provided for therapeutic counseling and discussion of current health disposition. All questions answered to satisfaction, within scope of PT practice     Patient/caregiver able to verbalize understanding and expressed no further questions this visit; will follow-up with pt/caregiver during current admit for additional questions/concerns within scope of practice.     White board updated.     Patient left up in chair with all lines intact, call button in reach and nsg/pct notified that condom catheter fell off during gait trial. Urinal left near pt.    GOALS:   Multidisciplinary Problems     Physical Therapy Goals        Problem: Physical Therapy    Goal Priority Disciplines Outcome Goal Variances Interventions    Physical Therapy Goal     PT, PT/OT Ongoing, Progressing     Description: Goals to be met by: 22     Patient will increase functional independence with mobility by performin. Supine to sit with Modified [place]  2. Sit to stand transfer with Supervision  3. Bed to chair transfer with Supervision using LRAD  4. Gait  x 150 feet with Supervision using LRAD  5. Ascend/descend 3 stairs with bilateral Handrails Stand-by Assistance using No Assistive Device.   6. Lower extremity exercise program x30 reps per handout, with independence                       History:     Past Medical History:   Diagnosis Date    Allergy     Aneurysm of artery of lower extremity     Chalazion of left eye     CKD (chronic kidney disease), stage II 2019    Diabetes mellitus type II     Diabetes with neurologic complications     Enlarged aorta 2016    Noted on pharmacological stress echo 2014.      GERD (gastroesophageal reflux disease)     egd     Hyperlipidemia     Hypertension     Jock itch 2018    MGD (meibomian gland dysfunction)     Osteopenia     Spinal stenosis     Spondylosis without myelopathy 10/23/2015    Vitamin D deficiency disease        Past Surgical History:   Procedure Laterality Date    CHALAZION EXCISION Left 13    CYST REMOVAL      Back of neck    FLEXIBLE CYSTOSCOPY N/A 2021    Procedure: CYSTOSCOPY, FLEXIBLE;  Surgeon: Beatrice Vaca MD;  Location: [facility] OR [address] [place] [address];  Service: Urology;  Laterality: N/A;    FLUOROSCOPIC URODYNAMIC STUDY N/A 2021    Procedure: URODYNAMIC STUDY, FLUOROSCOPIC;  Surgeon: Beatrice Vaca MD;  Location: [facility] OR [address] [place] [address];  Service: Urology;  Laterality: N/A;  90 minutes     SPINE SURGERY  2007    x2 (, )       Time Tracking:     PT Received On: 22  PT Start Time: 1116     PT Stop Time: 1155  PT Total Time (min): 39 min     Billable Minutes: Evaluation 10, Gait Training 14 and Therapeutic Activity  15    06/20/2022

## 2022-06-20 NOTE — SUBJECTIVE & OBJECTIVE
Interval History: Patient was seen and evaluated this am. Pain still present- difficulty with ambulating 2/2 scrotal pain- condom cath placed overnight. Good UOP.  Encouraged increased fluid intake. No nausea, vomiting, fevers, chills, night sweats, chest pain, shortness of breath.     Objective:     Vital Signs (Most Recent):  Temp: 98.1 °F (36.7 °C) (06/20/22 1538)  Pulse: 82 (06/20/22 1538)  Resp: 12 (06/20/22 1538)  BP: (!) 154/81 (06/20/22 1538)  SpO2: 96 % (06/20/22 1538)   Vital Signs (24h Range):  Temp:  [97.8 °F (36.6 °C)-99.5 °F (37.5 °C)] 98.1 °F (36.7 °C)  Pulse:  [66-91] 82  Resp:  [12-20] 12  SpO2:  [94 %-96 %] 96 %  BP: (134-154)/(72-81) 154/81     Weight: 103.5 kg (228 lb 2.8 oz)  Body mass index is 31.82 kg/m².    Intake/Output Summary (Last 24 hours) at 6/20/2022 1819  Last data filed at 6/20/2022 0625  Gross per 24 hour   Intake 250 ml   Output 600 ml   Net -350 ml      Physical Exam  Gen: in NAD, appears stated age  Neuro: AAOx3, motor, sensory, and strength grossly intact BL  HEENT: NTNC, EOMI, PERRL, MMM  CVS: RRR, no m/r/g; S1/S2 auscultated with no S3 or S4; capillary refill < 2 sec  Resp: lungs CTAB, no w/r/r; no belabored breathing or accessory muscle use appreciated   Abd: BS+ in all 4 quadrants; NTND, soft to palpation; no organomegaly appreciated   : scrotal edema- TTP of Lt testicle- hyperpigmented skin  Extrem: pulses full, equal, and regular over all 4 extremities; no UE or LE edema BL    Significant Labs: All pertinent labs within the past 24 hours have been reviewed.  Blood Culture:   No results for input(s): LABBLOO in the last 48 hours.    CBC:   Recent Labs   Lab 06/19/22  0621 06/20/22  0236   WBC 12.69 9.11   HGB 11.4* 11.1*   HCT 34.5* 33.8*    204     CMP:   Recent Labs   Lab 06/19/22  0621 06/20/22  0236    136   K 3.6 3.6   CL 99 99   CO2 24 26   GLU 93 94   BUN 25* 23   CREATININE 1.3 1.2   CALCIUM 9.0 8.6*   PROT 6.5 6.4   ALBUMIN 2.8* 2.7*   BILITOT 0.6  0.5   ALKPHOS 81 95   AST 35 34   ALT 21 21   ANIONGAP 13 11   EGFRNONAA 52.6* 58.0*     Urine Culture: No results for input(s): LABURIN in the last 48 hours.  Urine Studies:   No results for input(s): COLORU, APPEARANCEUA, PHUR, SPECGRAV, PROTEINUA, GLUCUA, KETONESU, BILIRUBINUA, OCCULTUA, NITRITE, UROBILINOGEN, LEUKOCYTESUR, RBCUA, WBCUA, BACTERIA, SQUAMEPITHEL, HYALINECASTS in the last 48 hours.    Invalid input(s): WRIGHTSUR      Significant Imaging: I have reviewed all pertinent imaging results/findings within the past 24 hours.    US Scrotum and Testicles:  FINDINGS:  Right Testicle:     *Size: 4.6 x 2.4 x 2.8 cm  *Appearance: Few cystic appearing lesions in the testis with the largest measuring 1.2 x 1.0 x 1.2 cm, similar to prior.  9.1 x 4.8 x 6.4 cm anechoic lesion adjacent to the testicle, similar to prior and likely spermatocele or tunica albuginea cyst.  *Flow: Normal arterial and venous flow  *Epididymis: Normal.  *Hydrocele: Small to moderate.  *Varicocele: None.  .     Left Testicle:     *Size: 4.5 x 3.5 x 3.0 cm  *Appearance: Few small cystic appearing lesions in the testis..  *Flow: Hyperemia with preserved arterial and venous flow.  *Epididymis: Enlarged with hyperemia.  Cyst in or adjacent to the epididymal head measuring 1.5 x 1.8 x 1.5 cm.  Additional epididymal head cyst measuring 9 mm.  *Hydrocele: Small to moderate and somewhat complex in appearance with some septations and low-level echoes present.  *Varicocele: None.  .     Other findings: None.     Impression:     1. Hyperemic left testicle and enlarged hyperemic left epididymis.  Findings consistent with epididymo-orchitis.  Small to moderate in somewhat complex in appearance left-sided hydrocele with some septations and low level echoes present.  Possible pyocele in the setting of suspected epididymo-orchitis.  Consider urology consultation or follow-up, as clinically indicated.  2. Bilateral testes contain intratesticular cysts as  above.  Majority of these appear stable but there are some that are slightly more complex than previously.  Consider short-term follow-up with re-evaluation in 2-3 months.  This report was flagged in Epic as abnormal.

## 2022-06-20 NOTE — ASSESSMENT & PLAN NOTE
- continue rocephin- increased to 2g qday   - UCx w/E coli- pansensitive  - Stop doxy  - Urology following- scrotal support- ice packs, tight fitting underwear, scrotal elevation  - Per Uro- for overactive bladder symptoms- ditropan xL ipg- mirabegron on dc (written by uro)  - Will arrange urology follow-up at time of discharge  - Pain management: tylenol 650mg q6h prn for mild pain, oxy 5mg q6h prn for mod pain, oxy 10mg q6h prn for severe pain

## 2022-06-21 VITALS
WEIGHT: 229.25 LBS | SYSTOLIC BLOOD PRESSURE: 144 MMHG | OXYGEN SATURATION: 97 % | RESPIRATION RATE: 16 BRPM | HEART RATE: 76 BPM | TEMPERATURE: 99 F | BODY MASS INDEX: 32.1 KG/M2 | HEIGHT: 71 IN | DIASTOLIC BLOOD PRESSURE: 76 MMHG

## 2022-06-21 LAB
ALBUMIN SERPL BCP-MCNC: 2.7 G/DL (ref 3.5–5.2)
ALP SERPL-CCNC: 82 U/L (ref 55–135)
ALT SERPL W/O P-5'-P-CCNC: 22 U/L (ref 10–44)
ANION GAP SERPL CALC-SCNC: 9 MMOL/L (ref 8–16)
AST SERPL-CCNC: 32 U/L (ref 10–40)
BASOPHILS # BLD AUTO: 0.02 K/UL (ref 0–0.2)
BASOPHILS NFR BLD: 0.3 % (ref 0–1.9)
BILIRUB SERPL-MCNC: 0.6 MG/DL (ref 0.1–1)
BUN SERPL-MCNC: 19 MG/DL (ref 8–23)
CALCIUM SERPL-MCNC: 9.2 MG/DL (ref 8.7–10.5)
CHLORIDE SERPL-SCNC: 99 MMOL/L (ref 95–110)
CO2 SERPL-SCNC: 27 MMOL/L (ref 23–29)
CREAT SERPL-MCNC: 1.1 MG/DL (ref 0.5–1.4)
DIFFERENTIAL METHOD: ABNORMAL
EOSINOPHIL # BLD AUTO: 0.1 K/UL (ref 0–0.5)
EOSINOPHIL NFR BLD: 0.9 % (ref 0–8)
ERYTHROCYTE [DISTWIDTH] IN BLOOD BY AUTOMATED COUNT: 12.9 % (ref 11.5–14.5)
EST. GFR  (AFRICAN AMERICAN): >60 ML/MIN/1.73 M^2
EST. GFR  (NON AFRICAN AMERICAN): >60 ML/MIN/1.73 M^2
GLUCOSE SERPL-MCNC: 96 MG/DL (ref 70–110)
HCT VFR BLD AUTO: 34.2 % (ref 40–54)
HGB BLD-MCNC: 11.8 G/DL (ref 14–18)
IMM GRANULOCYTES # BLD AUTO: 0.02 K/UL (ref 0–0.04)
IMM GRANULOCYTES NFR BLD AUTO: 0.3 % (ref 0–0.5)
LYMPHOCYTES # BLD AUTO: 1.5 K/UL (ref 1–4.8)
LYMPHOCYTES NFR BLD: 21.8 % (ref 18–48)
MCH RBC QN AUTO: 29.9 PG (ref 27–31)
MCHC RBC AUTO-ENTMCNC: 34.5 G/DL (ref 32–36)
MCV RBC AUTO: 87 FL (ref 82–98)
MONOCYTES # BLD AUTO: 0.8 K/UL (ref 0.3–1)
MONOCYTES NFR BLD: 11.6 % (ref 4–15)
NEUTROPHILS # BLD AUTO: 4.5 K/UL (ref 1.8–7.7)
NEUTROPHILS NFR BLD: 65.1 % (ref 38–73)
NRBC BLD-RTO: 0 /100 WBC
PLATELET # BLD AUTO: 205 K/UL (ref 150–450)
PMV BLD AUTO: 8.8 FL (ref 9.2–12.9)
POTASSIUM SERPL-SCNC: 3.4 MMOL/L (ref 3.5–5.1)
PROT SERPL-MCNC: 6.7 G/DL (ref 6–8.4)
RBC # BLD AUTO: 3.94 M/UL (ref 4.6–6.2)
SODIUM SERPL-SCNC: 135 MMOL/L (ref 136–145)
WBC # BLD AUTO: 6.92 K/UL (ref 3.9–12.7)

## 2022-06-21 PROCEDURE — 80053 COMPREHEN METABOLIC PANEL: CPT | Performed by: STUDENT IN AN ORGANIZED HEALTH CARE EDUCATION/TRAINING PROGRAM

## 2022-06-21 PROCEDURE — 85025 COMPLETE CBC W/AUTO DIFF WBC: CPT | Performed by: STUDENT IN AN ORGANIZED HEALTH CARE EDUCATION/TRAINING PROGRAM

## 2022-06-21 PROCEDURE — 99239 PR HOSPITAL DISCHARGE DAY,>30 MIN: ICD-10-PCS | Mod: ,,, | Performed by: STUDENT IN AN ORGANIZED HEALTH CARE EDUCATION/TRAINING PROGRAM

## 2022-06-21 PROCEDURE — 36415 COLL VENOUS BLD VENIPUNCTURE: CPT | Performed by: STUDENT IN AN ORGANIZED HEALTH CARE EDUCATION/TRAINING PROGRAM

## 2022-06-21 PROCEDURE — 99239 HOSP IP/OBS DSCHRG MGMT >30: CPT | Mod: ,,, | Performed by: STUDENT IN AN ORGANIZED HEALTH CARE EDUCATION/TRAINING PROGRAM

## 2022-06-21 PROCEDURE — C1751 CATH, INF, PER/CENT/MIDLINE: HCPCS

## 2022-06-21 PROCEDURE — 99223 1ST HOSP IP/OBS HIGH 75: CPT | Mod: ,,, | Performed by: INTERNAL MEDICINE

## 2022-06-21 PROCEDURE — 99499 NO LOS: ICD-10-PCS | Mod: ,,, | Performed by: PHYSICIAN ASSISTANT

## 2022-06-21 PROCEDURE — 1111F DSCHRG MED/CURRENT MED MERGE: CPT | Mod: CPTII,,, | Performed by: STUDENT IN AN ORGANIZED HEALTH CARE EDUCATION/TRAINING PROGRAM

## 2022-06-21 PROCEDURE — 1111F PR DISCHARGE MEDS RECONCILED W/ CURRENT OUTPATIENT MED LIST: ICD-10-PCS | Mod: CPTII,,, | Performed by: STUDENT IN AN ORGANIZED HEALTH CARE EDUCATION/TRAINING PROGRAM

## 2022-06-21 PROCEDURE — 63600175 PHARM REV CODE 636 W HCPCS: Performed by: STUDENT IN AN ORGANIZED HEALTH CARE EDUCATION/TRAINING PROGRAM

## 2022-06-21 PROCEDURE — 25000003 PHARM REV CODE 250: Performed by: STUDENT IN AN ORGANIZED HEALTH CARE EDUCATION/TRAINING PROGRAM

## 2022-06-21 PROCEDURE — 76937 US GUIDE VASCULAR ACCESS: CPT

## 2022-06-21 PROCEDURE — 99223 PR INITIAL HOSPITAL CARE,LEVL III: ICD-10-PCS | Mod: ,,, | Performed by: INTERNAL MEDICINE

## 2022-06-21 PROCEDURE — 25000003 PHARM REV CODE 250: Performed by: INTERNAL MEDICINE

## 2022-06-21 PROCEDURE — 36410 VNPNXR 3YR/> PHY/QHP DX/THER: CPT

## 2022-06-21 PROCEDURE — 99499 UNLISTED E&M SERVICE: CPT | Mod: ,,, | Performed by: PHYSICIAN ASSISTANT

## 2022-06-21 RX ORDER — OXYCODONE HYDROCHLORIDE 10 MG/1
10 TABLET ORAL EVERY 6 HOURS PRN
Qty: 28 TABLET | Refills: 0 | Status: SHIPPED | OUTPATIENT
Start: 2022-06-21 | End: 2022-06-28

## 2022-06-21 RX ORDER — OXYBUTYNIN CHLORIDE 10 MG/1
10 TABLET, EXTENDED RELEASE ORAL DAILY
Qty: 30 TABLET | Refills: 2 | OUTPATIENT
Start: 2022-06-22 | End: 2022-07-09

## 2022-06-21 RX ADMIN — ACETAMINOPHEN 650 MG: 325 TABLET ORAL at 05:06

## 2022-06-21 RX ADMIN — ASPIRIN 81 MG: 81 TABLET, COATED ORAL at 08:06

## 2022-06-21 RX ADMIN — CHLORTHALIDONE 12.5 MG: 25 TABLET ORAL at 08:06

## 2022-06-21 RX ADMIN — TAMSULOSIN HYDROCHLORIDE 0.4 MG: 0.4 CAPSULE ORAL at 08:06

## 2022-06-21 RX ADMIN — LOSARTAN POTASSIUM 100 MG: 50 TABLET, FILM COATED ORAL at 08:06

## 2022-06-21 RX ADMIN — DULOXETINE 60 MG: 60 CAPSULE, DELAYED RELEASE ORAL at 08:06

## 2022-06-21 RX ADMIN — CEFTRIAXONE 2 G: 2 INJECTION, SOLUTION INTRAVENOUS at 11:06

## 2022-06-21 RX ADMIN — PANTOPRAZOLE SODIUM 40 MG: 40 TABLET, DELAYED RELEASE ORAL at 08:06

## 2022-06-21 RX ADMIN — OXYBUTYNIN CHLORIDE 10 MG: 10 TABLET, EXTENDED RELEASE ORAL at 08:06

## 2022-06-21 RX ADMIN — PRAVASTATIN SODIUM 20 MG: 20 TABLET ORAL at 08:06

## 2022-06-21 RX ADMIN — AMLODIPINE BESYLATE 5 MG: 5 TABLET ORAL at 08:06

## 2022-06-21 RX ADMIN — GABAPENTIN 300 MG: 300 CAPSULE ORAL at 08:06

## 2022-06-21 NOTE — PLAN OF CARE
Problem: Adult Inpatient Plan of Care  Goal: Plan of Care Review  Outcome: Ongoing, Progressing  Goal: Patient-Specific Goal (Individualized)  Outcome: Ongoing, Progressing  Goal: Absence of Hospital-Acquired Illness or Injury  Outcome: Ongoing, Progressing  Goal: Optimal Comfort and Wellbeing  Outcome: Ongoing, Progressing  Goal: Readiness for Transition of Care  Outcome: Ongoing, Progressing     Problem: Diabetes Comorbidity  Goal: Blood Glucose Level Within Targeted Range  Outcome: Ongoing, Progressing     Problem: Fall Injury Risk  Goal: Absence of Fall and Fall-Related Injury  Outcome: Ongoing, Progressing     Problem: Pain Acute  Goal: Acceptable Pain Control and Functional Ability  Outcome: Ongoing, Progressing     Problem: Infection  Goal: Absence of Infection Signs and Symptoms  Outcome: Ongoing, Progressing

## 2022-06-21 NOTE — PLAN OF CARE
[facility]    Referral received for home IV antibiotics. Pending final ID recommendations, orders, line, education, and home health. Will cont to follow.     [facility]  Thelma Carvajal Rn, Clinical Educator  Cell [phone]  Office [phone]  Fax [phone]

## 2022-06-21 NOTE — NURSING
Discharge paperwork provided and explained to both pt and spouse at the bedside. Opportunity for all questions allowed. IV removed with cannula intact. Pt to leave with midline in place for the administration of IV antibiotics. Bedside commode delivered to bedside. Pt transported off unit via wheelchair with all belongings for discharge.

## 2022-06-21 NOTE — ASSESSMENT & PLAN NOTE
75 y/o male with HTN, AA, GERD, DMII, presented to ED with fatigue and falls at home. Pt admitted with sepsis secondary to E.coli UTI.  He complained of having scrotal swelling and pain. U/s notable for epididymitis of left testicle, left hydrocele. No pyocele. He was seen by urology. No intervention indicated at this time. He has been on ceftriaxone with much improvement.     Recommendations  1. Continue ceftriaxone [id number]  EOC 7/2/22  2. Anticipate 14 days of IV abx therapy. Would avoid flouroquinolones at this time as with hx of AAA and risk of aortic rupture.   3. Weekly cbc cmp sent to ID clinic at .   4. F/u with ID at end of care. If with recurrent symptoms would r/o prostatitis   5. May place midline prior to discharge

## 2022-06-21 NOTE — SUBJECTIVE & OBJECTIVE
Past Medical History:   Diagnosis Date    Allergy     Aneurysm of artery of lower extremity     Chalazion of left eye     CKD (chronic kidney disease), stage II 4/1/2019    Diabetes mellitus type II     Diabetes with neurologic complications     Enlarged aorta 8/2/2016    Noted on pharmacological stress echo 2/28/2014.      GERD (gastroesophageal reflux disease)     egd 2008    Hyperlipidemia     Hypertension     Jock itch 7/19/2018    MGD (meibomian gland dysfunction)     Osteopenia     Spinal stenosis     Spondylosis without myelopathy 10/23/2015    Vitamin D deficiency disease        Past Surgical History:   Procedure Laterality Date    CHALAZION EXCISION Left 8/18/13    CYST REMOVAL  2013    Back of neck    FLEXIBLE CYSTOSCOPY N/A 12/7/2021    Procedure: CYSTOSCOPY, FLEXIBLE;  Surgeon: Beatrice Vaca MD;  Location: [facility] OR [address] [place] [address];  Service: Urology;  Laterality: N/A;    FLUOROSCOPIC URODYNAMIC STUDY N/A 12/7/2021    Procedure: URODYNAMIC STUDY, FLUOROSCOPIC;  Surgeon: Beatrice Vaca MD;  Location: [facility] OR [address] [place] [address];  Service: Urology;  Laterality: N/A;  90 minutes     SPINE SURGERY  2007    x2 (2000, 2007)       Review of patient's allergies indicates:  No Known Allergies    Medications:  Medications Prior to Admission   Medication Sig    DULoxetine (CYMBALTA) 60 MG capsule TAKE 1 CAPSULE EVERY DAY FOR PAIN CONTROL    gabapentin (NEURONTIN) 300 MG capsule TAKE 1 CAPSULE EVERY MORNING AND AT NOON, AND TAKE 2 CAPSULES EVERY EVENING    guaifenesin (MUCINEX) 600 mg 12 hr tablet Take 1,200 mg by mouth 2 (two) times daily as needed.     ibuprofen (ADVIL,MOTRIN) 200 MG tablet Take 200 mg by mouth every 6 (six) hours as needed for Pain.    acetaminophen (TYLENOL) 650 MG TbSR Take 1,300 mg by mouth every 8 (eight) hours as needed.     albuterol 90 mcg/actuation inhaler Inhale 1-2 puffs into the lungs every 6 (six) hours as needed for Wheezing.    amLODIPine (NORVASC) 5 MG tablet TAKE 1 TABLET EVERY DAY    aspirin  (ECOTRIN) 81 MG EC tablet TAKE 1 TABLET EVERY DAY    azelastine (ASTELIN) 137 mcg (0.1 %) nasal spray 1 spray (137 mcg total) by Nasal route 2 (two) times daily.    benzonatate (TESSALON) 200 MG capsule TAKE 1 CAPSULE THREE TIMES DAILY AS NEEDED FOR COUGH    chlorthalidone (HYGROTEN) 25 MG Tab TAKE 1/2 TABLET EVERY DAY    cholecalciferol, vitamin D3, (VITAMIN D3) 50 mcg (2,000 unit) Cap Take 1 capsule by mouth once daily.    clotrimazole-betamethasone 1-0.05% (LOTRISONE) cream APPLY TOPICALLY 2 (TWO) TIMES DAILY.    docusate sodium (COLACE) 50 MG capsule Take by mouth 2 (two) times daily.    fluticasone propionate (FLONASE) 50 mcg/actuation nasal spray USE 1 SPRAY NASALLY ONE TIME DAILY    irbesartan (AVAPRO) 300 MG tablet TAKE 1 TABLET EVERY EVENING    ketoconazole (NIZORAL) 2 % cream AAA bid (facial redness and scaling)    ketoconazole (NIZORAL) 2 % shampoo Wash hair with medicated shampoo at least 2x/week - let sit on scalp at least 5 minutes prior to rinsing    melatonin 10 mg Cap Take 1 capsule by mouth nightly as needed.     methocarbamol (ROBAXIN) 500 MG Tab 3 (three) times daily as needed.     omeprazole (PRILOSEC) 20 MG capsule TAKE 1 CAPSULE EVERY DAY    potassium chloride (MICRO-K) 10 MEQ CpSR TAKE 3 CAPSULES ONE TIME DAILY    pravastatin (PRAVACHOL) 20 MG tablet TAKE 1 TABLET EVERY DAY    tamsulosin (FLOMAX) 0.4 mg Cap Take 1 capsule (0.4 mg total) by mouth once daily.    [DISCONTINUED] finasteride (PROSCAR) 5 mg tablet Take 1 tablet (5 mg total) by mouth once daily.    [DISCONTINUED] ibuprofen/diphenhydramine cit (ADVIL PM ORAL) Take 1 tablet by mouth every evening.     Antibiotics (From admission, onward)                Start     Stop Route Frequency Ordered    06/20/22 1100  cefTRIAXone (ROCEPHIN) 2 g/50 mL D5W IVPB         -- IV Every 24 hours (non-standard times) 06/20/22 0907          Antifungals (From admission, onward)                None          Antivirals (From admission, onward)      None              Immunization History   Administered Date(s) Administered    COVID-19, MRNA, LN-S, PF (MODERNA FULL 0.5 ML DOSE) 02/05/2021, 03/05/2021, 11/13/2021    Influenza 12/18/2007, 10/26/2011, 10/23/2015    Influenza (FLUAD) - Quadrivalent - Adjuvanted - PF *Preferred* (65+) 01/27/2022    Influenza - High Dose - PF (65 years and older) 10/09/2012, 11/07/2013, 10/23/2015, 01/04/2017, 01/14/2019, 10/10/2019    Influenza - Trivalent (ADULT) 12/18/2007, 10/26/2011    Pneumococcal Conjugate - 13 Valent 05/25/2015    Pneumococcal Polysaccharide - 23 Valent 01/27/2010, 08/02/2016    Td (ADULT) 07/22/2011    Tdap 12/18/2007       Family History       Problem Relation (Age of Onset)    Cancer Mother    Heart disease Mother    Hyperlipidemia Mother    Hypertension Mother, Maternal Grandmother    Kidney disease Mother    Kidney failure Mother    No Known Problems Daughter, Sister, Brother, Son, Brother, Son          Social History     Socioeconomic History    Marital status:    Occupational History    Occupation: Retired     Comment:    Tobacco Use    Smoking status: Never Smoker    Smokeless tobacco: Former User     Types: Chew    Tobacco comment: Chewed tobacco once per day for 4-5 years when a    Substance and Sexual Activity    Alcohol use: No    Drug use: No    Sexual activity: Not Currently     Partners: Female   Social History Narrative        Colon 2007     Review of Systems   Constitutional:  Negative for chills, diaphoresis, fatigue and fever.   HENT:  Negative for congestion, ear pain, nosebleeds, rhinorrhea and sore throat.    Eyes:  Negative for pain.   Respiratory:  Negative for cough, shortness of breath and wheezing.    Cardiovascular:  Negative for chest pain and leg swelling.   Gastrointestinal:  Negative for abdominal pain, constipation, diarrhea, nausea and vomiting.   Genitourinary:  Positive for difficulty urinating, frequency, scrotal swelling and testicular  pain. Negative for dysuria and urgency.   Musculoskeletal:  Negative for arthralgias, back pain and neck pain.   Neurological:  Negative for weakness, numbness and headaches.   Objective:     Vital Signs (Most Recent):  Temp: 98.1 °F (36.7 °C) (06/21/22 1111)  Pulse: 73 (06/21/22 1111)  Resp: 12 (06/21/22 1111)  BP: 133/71 (06/21/22 1111)  SpO2: 97 % (06/21/22 1111) Vital Signs (24h Range):  Temp:  [97.7 °F (36.5 °C)-98.3 °F (36.8 °C)] 98.1 °F (36.7 °C)  Pulse:  [71-83] 73  Resp:  [12-16] 12  SpO2:  [90 %-97 %] 97 %  BP: (133-156)/(68-85) 133/71     Weight: 104 kg (229 lb 4.5 oz)  Body mass index is 31.98 kg/m².    Estimated Creatinine Clearance: 69 mL/min (based on SCr of 1.1 mg/dL).    Physical Exam  Vitals and nursing note reviewed. Exam conducted with a chaperone present.   Constitutional:       General: He is not in acute distress.     Appearance: He is well-developed. He is not diaphoretic.   HENT:      Head: Normocephalic and atraumatic.   Eyes:      Pupils: Pupils are equal, round, and reactive to light.   Cardiovascular:      Rate and Rhythm: Normal rate and regular rhythm.      Heart sounds: Normal heart sounds. No murmur heard.    No friction rub. No gallop.   Pulmonary:      Effort: Pulmonary effort is normal. No respiratory distress.      Breath sounds: Normal breath sounds. No wheezing or rales.   Chest:      Chest wall: No tenderness.   Abdominal:      General: Bowel sounds are normal. There is no distension.      Palpations: There is no mass.      Tenderness: There is no abdominal tenderness. There is no guarding.   Genitourinary:     Testes:         Right: Tenderness and swelling present.         Left: Tenderness and swelling present.      Comments: Condom cath in place  Musculoskeletal:         General: No deformity. Normal range of motion.      Cervical back: Normal range of motion and neck supple.   Skin:     General: Skin is warm and dry.      Findings: No erythema or rash.   Neurological:       Mental Status: He is alert and oriented to person, place, and time.   Psychiatric:         Behavior: Behavior normal.         Thought Content: Thought content normal.         Judgment: Judgment normal.   All pertinent labs within the past 24 hours have been reviewed.    Significant Labs: All pertinent labs within the past 24 hours have been reviewed.    Significant Imaging: I have reviewed all pertinent imaging results/findings within the past 24 hours.

## 2022-06-21 NOTE — PLAN OF CARE
[facility]    Initial introductory visit made to patient on infusion services. Patient states his wife will administer IV antibiotics at home. Guillermina (wife) [phone] called and informed will educate once orders and line in place. She picks up her grandchildren and will be available prior to 2 pm and after 3 pm. Once above in place will contact and meet her for education. Will cont to follow.     [facility]  Thelma Carvajal Rn, Clinical Educator  Cell [phone]  Office [phone]  Fax [phone]

## 2022-06-21 NOTE — HPI
75 y/o male with HTN, AA, GERD, DMII, presented to ED with fatigue and falls at home. He was found to have E.coli bacteremia likely from urinary source. He complained of having scrotal swelling and pain. U/s notable for epididymitis of left testicle, left hydrocele. No pyocele. He was seen by urology. No intervention indicated at this time. He has been on ceftriaxone with much improvement.

## 2022-06-21 NOTE — PLAN OF CARE
[facility] - Med Surg      HOME HEALTH ORDERS  FACE TO FACE ENCOUNTER    Patient Name: Tito Menard  YOB: 1945    PCP: Amanda Brown MD   PCP Address: [address] [place], [place] [address] [address] [address]  PCP Phone Number: [phone]  PCP Fax: [phone]    Encounter Date: 6/17/22    Admit to Home Health    Diagnoses:  Active Hospital Problems    Diagnosis  POA    *Epididymo-orchitis [N45.3]  Yes     Priority: 1 - High    Bacteremia [R78.81]  Unknown     Priority: 2     Class 1 obesity due to excess calories with serious comorbidity and body mass index (BMI) of 31.0 to 31.9 in adult [E66.09, Z68.31]  Not Applicable     Chronic    Obstructive chronic bronchitis without exacerbation [J44.9]  Yes    Essential hypertension [I10]  Yes     Chronic     Goal BP <120 systolic due to history of mild fusiform dilation of Ascending Aorta, stable since 2014.   Syncopal episode 2-19, goal SBP < 130.  12/20 pt tx with amlodipine 10 mg , cholthalidone stopped and irbesartan increased due to urinary frequency        Mixed hyperlipidemia [E78.2]  Yes     Chronic    GERD (gastroesophageal reflux disease) [K21.9]  Yes      Resolved Hospital Problems   No resolved problems to display.       Follow Up Appointments:  Future Appointments   Date Time Provider Department Center   8/18/2022  1:00 PM Tito Palafox OD [facility]       Allergies:Review of patient's allergies indicates:  No Known Allergies    Medications: Review discharge medications with patient and family and provide education.    Current Facility-Administered Medications   Medication Dose Route Frequency Provider Last Rate Last Admin    acetaminophen tablet 650 mg  650 mg Oral Q8H PRN Kole Cifuentes MD   650 mg at 06/21/22 0555    albuterol-ipratropium 2.5 mg-0.5 mg/3 mL nebulizer solution 3 mL  3 mL Nebulization Q6H PRN Kole Cifuentes MD        amLODIPine tablet 5 mg  5 mg Oral Daily Kole Cifuentes MD   5 mg at 06/21/22 0843    aspirin EC  tablet 81 mg  81 mg Oral Daily Kole Cifuentes MD   81 mg at 06/21/22 0842    cefTRIAXone (ROCEPHIN) 2 g/50 mL D5W IVPB  2 g Intravenous Q24H Etelvina Cassidy MD   Stopped at 06/20/22 1228    chlorthalidone split tablet 12.5 mg  12.5 mg Oral Daily Kole Cifuentes MD   12.5 mg at 06/21/22 0843    dextrose 10% bolus 125 mL  12.5 g Intravenous PRN Kole Cifuentes MD        dextrose 10% bolus 250 mL  25 g Intravenous PRN Kole Cifuentes MD        DULoxetine DR capsule 60 mg  60 mg Oral Daily Kole Cifuentes MD   60 mg at 06/21/22 0843    gabapentin capsule 300 mg  300 mg Oral BID Kole Cifuentes MD   300 mg at 06/21/22 0843    glucagon (human recombinant) injection 1 mg  1 mg Intramuscular PRN Kole Cifuentes MD        glucose chewable tablet 16 g  16 g Oral PRN Kole Cifuentes MD        glucose chewable tablet 24 g  24 g Oral PRN Kole Cifuentes MD        losartan tablet 100 mg  100 mg Oral Daily Kole Cifuentes MD   100 mg at 06/21/22 0843    melatonin tablet 6 mg  6 mg Oral Nightly PRN Kole Cifuentes MD   6 mg at 06/19/22 2147    naloxone 0.4 mg/mL injection 0.02 mg  0.02 mg Intravenous PRN Kole Cifuentes MD        ondansetron disintegrating tablet 4 mg  4 mg Oral Q8H PRN Kole Cifuentes MD        oxybutynin 24 hr tablet 10 mg  10 mg Oral Daily Rad Sue MD   10 mg at 06/21/22 0843    oxyCODONE immediate release tablet 5 mg  5 mg Oral Q4H PRN Etelvina Cassidy MD        oxyCODONE immediate release tablet Tab 10 mg  10 mg Oral Q6H PRN Etelvina Cassidy MD   10 mg at 06/19/22 1723    pantoprazole EC tablet 40 mg  40 mg Oral Daily Kole Cifuentes MD   40 mg at 06/21/22 0843    polyethylene glycol packet 17 g  17 g Oral BID PRN Kole Cifuentes MD        pravastatin tablet 20 mg  20 mg Oral Daily Kole Cifuentes MD   20 mg at 06/21/22 0842    prochlorperazine injection Soln 5 mg  5 mg Intravenous Q6H PRN Kole Cifuentes MD        simethicone chewable tablet 80 mg  1 tablet Oral QID PRN Kole Cifuentes MD   80 mg at 06/19/22 1009     sodium chloride 0.9% flush 10 mL  10 mL Intravenous PRN Kole Cifuentes MD        tamsulosin 24 hr capsule 0.4 mg  1 capsule Oral Daily Kole Cifuentes MD   0.4 mg at 06/21/22 0842     Current Discharge Medication List      START taking these medications    Details   cefTRIAXone (ROCEPHIN) 2 g/50 mL PgBk IVPB Inject 50 mLs (2 g total) into the vein once daily at 6am. for 11 days      mirabegron (MYRBETRIQ) 50 mg Tb24 Take 1 tablet (50 mg total) by mouth once daily.  Qty: 30 tablet, Refills: 11      oxybutynin (DITROPAN-XL) 10 MG 24 hr tablet Take 1 tablet (10 mg total) by mouth once daily.  Qty: 30 tablet, Refills: 2      oxyCODONE (ROXICODONE) 10 mg Tab immediate release tablet Take 1 tablet (10 mg total) by mouth every 6 (six) hours as needed for Pain.  Qty: 28 tablet, Refills: 0    Comments: Quantity prescribed more than 7 day supply? No         CONTINUE these medications which have NOT CHANGED    Details   DULoxetine (CYMBALTA) 60 MG capsule TAKE 1 CAPSULE EVERY DAY FOR PAIN CONTROL  Qty: 90 capsule, Refills: 3      gabapentin (NEURONTIN) 300 MG capsule TAKE 1 CAPSULE EVERY MORNING AND AT NOON, AND TAKE 2 CAPSULES EVERY EVENING  Qty: 120 capsule, Refills: 3    Associated Diagnoses: Diabetic polyneuropathy associated with type 2 diabetes mellitus      guaifenesin (MUCINEX) 600 mg 12 hr tablet Take 1,200 mg by mouth 2 (two) times daily as needed.       ibuprofen (ADVIL,MOTRIN) 200 MG tablet Take 200 mg by mouth every 6 (six) hours as needed for Pain.      acetaminophen (TYLENOL) 650 MG TbSR Take 1,300 mg by mouth every 8 (eight) hours as needed.       albuterol 90 mcg/actuation inhaler Inhale 1-2 puffs into the lungs every 6 (six) hours as needed for Wheezing.  Qty: 1 Inhaler, Refills: 0      amLODIPine (NORVASC) 5 MG tablet TAKE 1 TABLET EVERY DAY  Qty: 90 tablet, Refills: 3      aspirin (ECOTRIN) 81 MG EC tablet TAKE 1 TABLET EVERY DAY  Qty: 90 tablet, Refills: 3    Associated Diagnoses: Mild aortic regurgitation  with left ventricular systolic dysfunction by prior echocardiogram      azelastine (ASTELIN) 137 mcg (0.1 %) nasal spray 1 spray (137 mcg total) by Nasal route 2 (two) times daily.  Qty: 90 mL, Refills: 3    Associated Diagnoses: Seasonal allergic rhinitis, unspecified trigger      benzonatate (TESSALON) 200 MG capsule TAKE 1 CAPSULE THREE TIMES DAILY AS NEEDED FOR COUGH  Qty: 30 capsule, Refills: 0      chlorthalidone (HYGROTEN) 25 MG Tab TAKE 1/2 TABLET EVERY DAY  Qty: 45 tablet, Refills: 2      cholecalciferol, vitamin D3, (VITAMIN D3) 50 mcg (2,000 unit) Cap Take 1 capsule by mouth once daily.      clotrimazole-betamethasone 1-0.05% (LOTRISONE) cream APPLY TOPICALLY 2 (TWO) TIMES DAILY.  Qty: 45 g, Refills: 0    Associated Diagnoses: Jock itch      docusate sodium (COLACE) 50 MG capsule Take by mouth 2 (two) times daily.      fluticasone propionate (FLONASE) 50 mcg/actuation nasal spray USE 1 SPRAY NASALLY ONE TIME DAILY  Qty: 32 g, Refills: 6      irbesartan (AVAPRO) 300 MG tablet TAKE 1 TABLET EVERY EVENING  Qty: 90 tablet, Refills: 3    Associated Diagnoses: Essential hypertension      ketoconazole (NIZORAL) 2 % cream AAA bid (facial redness and scaling)  Qty: 60 g, Refills: 3      ketoconazole (NIZORAL) 2 % shampoo Wash hair with medicated shampoo at least 2x/week - let sit on scalp at least 5 minutes prior to rinsing  Qty: 120 mL, Refills: 5      melatonin 10 mg Cap Take 1 capsule by mouth nightly as needed.       methocarbamol (ROBAXIN) 500 MG Tab 3 (three) times daily as needed.       omeprazole (PRILOSEC) 20 MG capsule TAKE 1 CAPSULE EVERY DAY  Qty: 90 capsule, Refills: 1    Associated Diagnoses: Gastroesophageal reflux disease with esophagitis      potassium chloride (MICRO-K) 10 MEQ CpSR TAKE 3 CAPSULES ONE TIME DAILY  Qty: 270 capsule, Refills: 3      pravastatin (PRAVACHOL) 20 MG tablet TAKE 1 TABLET EVERY DAY  Qty: 90 tablet, Refills: 3    Associated Diagnoses: Hyperlipidemia, unspecified  hyperlipidemia type      tamsulosin (FLOMAX) 0.4 mg Cap Take 1 capsule (0.4 mg total) by mouth once daily.  Qty: 90 capsule, Refills: 3    Associated Diagnoses: Benign prostatic hyperplasia with urinary obstruction         STOP taking these medications       finasteride (PROSCAR) 5 mg tablet Comments:   Reason for Stopping:         ibuprofen/diphenhydramine cit (ADVIL PM ORAL) Comments:   Reason for Stopping:                 I have seen and examined this patient within the last 30 days. My clinical findings that support the need for the home health skilled services and home bound status are the following:no   Weakness/numbness causing balance and gait disturbance due to Infection and Weakness/Debility making it taxing to leave home.  Medical restrictions requiring assistance of another human to leave home due to  IV infusion Needs.     Diet:   diabetic diet 2000 calorie    Labs:  SN to perform labs:  CBC: Weekly; 2 week(s) and CMP: Weekly; 2 week(s) and Report Lab results to PCP.     Obtain CBC, CMP on Mondays- send results to infectious disease    Referrals/ Consults  Physical Therapy to evaluate and treat. Evaluate for home safety and equipment needs; Establish/upgrade home exercise program. Perform / instruct on therapeutic exercises, gait training, transfer training, and Range of Motion.  Occupational Therapy to evaluate and treat. Evaluate home environment for safety and equipment needs. Perform/Instruct on transfers, ADL training, ROM, and therapeutic exercises.  Aide to provide assistance with personal care, ADLs, and vital signs.    Activities:   activity as tolerated    Nursing:   Agency to admit patient within 24 hours of hospital discharge unless specified on physician order or at patient request    SN to complete comprehensive assessment including routine vital signs. Instruct on disease process and s/s of complications to report to MD. Review/verify medication list sent home with the patient at time of  discharge  and instruct patient/caregiver as needed. Frequency may be adjusted depending on start of care date.     Skilled nurse to perform up to 3 visits PRN for symptoms related to diagnosis    Notify MD if SBP > 160 or < 90; DBP > 90 or < 50; HR > 120 or < 50; Temp > 101; O2 < 88%; Other:       Ok to schedule additional visits based on staff availability and patient request on consecutive days within the home health episode.    When multiple disciplines ordered:    Start of Care occurs on Sunday - Wednesday schedule remaining discipline evaluations as ordered on separate consecutive days following the start of care.    Thursday SOC -schedule subsequent evaluations Friday and Monday the following week.     Friday - Saturday SOC - schedule subsequent discipline evaluations on consecutive days starting Monday of the following week.    For all post-discharge communication and subsequent orders please contact patient's primary care physician. If unable to reach primary care physician or do not receive response within 30 minutes, please contact Etelvina Cassidy MD for clinical staff order clarification    Miscellaneous   Home Infusion Therapy:   SN to perform Infusion Therapy/Central Line Care.  Review Central Line Care & Central Line Flush with patient.    Administer (drug and dose): Ceftriaxone 2g/50mL D5W IVPB  2g, Intravenous, at 50mL/hr, every 24hrs  End of Therapy 07/02/22    Last dose given: 06/21/22 @1100               Home dose due: 06/22/22 @@1100    Scrub the Hub: Prior to accessing the line, always perform a 30 second alcohol scrub  Each lumen of the central line is to be flushed at least daily with 10 mL Normal Saline and 3 mL Heparin flush (10 units/mL)  Skilled Nurse (SN) may draw blood from IV access  Blood Draw Procedure:   - Aspirate at least 5 mL of blood   - Discard   - Obtain specimen   - Change injection cap   - Flush with 20 mL Normal Saline followed by a                 3-5 mL Heparin flush (10  units/mL)  Central :   - Sterile dressing changes are done weekly and as needed.   - Use chlor-hexadine scrub to cleanse site, apply Biopatch to insertion site,       apply securement device dressing   - Injection caps are changed weekly and after EVERY lab draw.   - If sterile gauze is under dressing to control oozing,                 dressing change must be performed every 24 hours until gauze is not needed.    Home Health Aide:  Physical Therapy Three times weekly, Occupational Therapy Three times weekly and Home Health Aide Three times weekly    Wound Care Orders  no    I certify that this patient is confined to his home and needs physical therapy and occupational therapy.        Etelvina Cassidy MD  Department of Hospital Medicine  Department of Pediatrics  6/21/2022

## 2022-06-21 NOTE — CONSULTS
Single lumen 18G x 8CM midline placed left brachial vein. Max dwell date 7/20/22, Lot# [id number].  Needle advanced into the vessel under real time ultrasound guidance.  Image recorded and saved.

## 2022-06-21 NOTE — PLAN OF CARE
[facility]    [facility] educator met with patient discussed discharge plan for home IVABX. Tito Menard will dc home with family support. Patient will infuse medication via Elastomeric Pump. patient and spouse on S.A.S.H procedure. S.A.S.H mat provided.  Patient education checklist reviewed and acknowledged by above person(s) above and are agreeable to discharge with home infusion plan of care. IV administration process using aspetic technique was reviewed with successful return demonstration. Patient feels comfortable with infusion. Patient will dc home with ceftriaxone 2 GM IV q 24 hours for estimated end of therapy date 7/2/22 Dosing schedule time in pm.No Extension need to single lumen 18 g x 8 cm midline placed 6/21/22. [facility] will follow patient for weekly dressing changes and lab draws. Time allotted for questions. Patients nurse and case management team notified teaching has been completed.     Medication delivery will be made to home    Pending report to [facility]  Thelma Carvajal Rn, Clinical Educator  Cell [phone]  Office [phone]  Fax [phone]

## 2022-06-21 NOTE — PLAN OF CARE
Problem: Adult Inpatient Plan of Care  Goal: Absence of Hospital-Acquired Illness or Injury  Outcome: Met  Goal: Optimal Comfort and Wellbeing  Outcome: Met

## 2022-06-21 NOTE — PT/OT/SLP PROGRESS
Occupational Therapy  Missed Evaluation    Name: Tito Menard  MRN: [id number]    Attempt Time(s): 1056, 1400    Pt not seen 2/2 sleeping and not rousing to therapist greeting on 1056 attempt. Provider present in room speaking with pt on 1400 attempt. Will follow-up as able.

## 2022-06-21 NOTE — CONSULTS
[facility] Surg  Infectious Disease  Consult Note    Patient Name: Tito Menard  MRN: [id number]  Admission Date: 6/17/2022  Hospital Length of Stay: 3 days  Attending Physician: Etelvina Cassidy MD  Primary Care Provider: Amanda Brown MD     Isolation Status: No active isolations      Inpatient consult to Infectious Diseases  Consult performed by: Nacho Villalba PA-C  Consult ordered by: Etelvina Cassidy MD        Assessment/Plan:     * Epididymo-orchitis  75 y/o male with HTN, AA, GERD, DMII, presented to ED with fatigue and falls at home. Pt admitted with sepsis secondary to E.coli UTI.  He complained of having scrotal swelling and pain. U/s notable for epididymitis of left testicle, left hydrocele. No pyocele. He was seen by urology. No intervention indicated at this time. He has been on ceftriaxone with much improvement.     Recommendations  1. Continue ceftriaxone 6ud65mm  EOC 7/2/22  2. Anticipate 14 days of IV abx therapy. Would avoid flouroquinolones at this time as with hx of AAA and risk of aortic rupture.   3. Weekly cbc cmp sent to ID clinic at .   4. F/u with ID at end of care. If with recurrent symptoms would r/o prostatitis   5. May place midline prior to discharge        Thank you for your consult. I will sign off. Please contact us if you have any additional questions.    Nacho Villalba PA-C  Infectious Disease  [facility]    Subjective:     Principal Problem: Epididymo-orchitis    HPI: 75 y/o male with HTN, AA, GERD, DMII, presented to ED with fatigue and falls at home. He was found to have E.coli bacteremia likely from urinary source. He complained of having scrotal swelling and pain. U/s notable for epididymitis of left testicle, left hydrocele. No pyocele. He was seen by urology. No intervention indicated at this time. He has been on ceftriaxone with much improvement.           Past Medical History:   Diagnosis Date    Allergy     Aneurysm of artery of lower  extremity     Chalazion of left eye     CKD (chronic kidney disease), stage II 4/1/2019    Diabetes mellitus type II     Diabetes with neurologic complications     Enlarged aorta 8/2/2016    Noted on pharmacological stress echo 2/28/2014.      GERD (gastroesophageal reflux disease)     egd 2008    Hyperlipidemia     Hypertension     Jock itch 7/19/2018    MGD (meibomian gland dysfunction)     Osteopenia     Spinal stenosis     Spondylosis without myelopathy 10/23/2015    Vitamin D deficiency disease        Past Surgical History:   Procedure Laterality Date    CHALAZION EXCISION Left 8/18/13    CYST REMOVAL  2013    Back of neck    FLEXIBLE CYSTOSCOPY N/A 12/7/2021    Procedure: CYSTOSCOPY, FLEXIBLE;  Surgeon: Beatrice Vaca MD;  [facility];  Service: Urology;  Laterality: N/A;    FLUOROSCOPIC URODYNAMIC STUDY N/A 12/7/2021    Procedure: URODYNAMIC STUDY, FLUOROSCOPIC;  Surgeon: Beatrice Vaca MD;  Location: [facility] OR [address] [place] [address];  Service: Urology;  Laterality: N/A;  90 minutes     SPINE SURGERY  2007    x2 (2000, 2007)       Review of patient's allergies indicates:  No Known Allergies    Medications:  Medications Prior to Admission   Medication Sig    DULoxetine (CYMBALTA) 60 MG capsule TAKE 1 CAPSULE EVERY DAY FOR PAIN CONTROL    gabapentin (NEURONTIN) 300 MG capsule TAKE 1 CAPSULE EVERY MORNING AND AT NOON, AND TAKE 2 CAPSULES EVERY EVENING    guaifenesin (MUCINEX) 600 mg 12 hr tablet Take 1,200 mg by mouth 2 (two) times daily as needed.     ibuprofen (ADVIL,MOTRIN) 200 MG tablet Take 200 mg by mouth every 6 (six) hours as needed for Pain.    acetaminophen (TYLENOL) 650 MG TbSR Take 1,300 mg by mouth every 8 (eight) hours as needed.     albuterol 90 mcg/actuation inhaler Inhale 1-2 puffs into the lungs every 6 (six) hours as needed for Wheezing.    amLODIPine (NORVASC) 5 MG tablet TAKE 1 TABLET EVERY DAY    aspirin (ECOTRIN) 81 MG EC tablet TAKE 1 TABLET EVERY DAY     azelastine (ASTELIN) 137 mcg (0.1 %) nasal spray 1 spray (137 mcg total) by Nasal route 2 (two) times daily.    benzonatate (TESSALON) 200 MG capsule TAKE 1 CAPSULE THREE TIMES DAILY AS NEEDED FOR COUGH    chlorthalidone (HYGROTEN) 25 MG Tab TAKE 1/2 TABLET EVERY DAY    cholecalciferol, vitamin D3, (VITAMIN D3) 50 mcg (2,000 unit) Cap Take 1 capsule by mouth once daily.    clotrimazole-betamethasone 1-0.05% (LOTRISONE) cream APPLY TOPICALLY 2 (TWO) TIMES DAILY.    docusate sodium (COLACE) 50 MG capsule Take by mouth 2 (two) times daily.    fluticasone propionate (FLONASE) 50 mcg/actuation nasal spray USE 1 SPRAY NASALLY ONE TIME DAILY    irbesartan (AVAPRO) 300 MG tablet TAKE 1 TABLET EVERY EVENING    ketoconazole (NIZORAL) 2 % cream AAA bid (facial redness and scaling)    ketoconazole (NIZORAL) 2 % shampoo Wash hair with medicated shampoo at least 2x/week - let sit on scalp at least 5 minutes prior to rinsing    melatonin 10 mg Cap Take 1 capsule by mouth nightly as needed.     methocarbamol (ROBAXIN) 500 MG Tab 3 (three) times daily as needed.     omeprazole (PRILOSEC) 20 MG capsule TAKE 1 CAPSULE EVERY DAY    potassium chloride (MICRO-K) 10 MEQ CpSR TAKE 3 CAPSULES ONE TIME DAILY    pravastatin (PRAVACHOL) 20 MG tablet TAKE 1 TABLET EVERY DAY    tamsulosin (FLOMAX) 0.4 mg Cap Take 1 capsule (0.4 mg total) by mouth once daily.    [DISCONTINUED] finasteride (PROSCAR) 5 mg tablet Take 1 tablet (5 mg total) by mouth once daily.    [DISCONTINUED] ibuprofen/diphenhydramine cit (ADVIL PM ORAL) Take 1 tablet by mouth every evening.     Antibiotics (From admission, onward)                Start     Stop Route Frequency Ordered    06/20/22 1100  cefTRIAXone (ROCEPHIN) 2 g/50 mL D5W IVPB         -- IV Every 24 hours (non-standard times) 06/20/22 0907          Antifungals (From admission, onward)                None          Antivirals (From admission, onward)      None             Immunization History    Administered Date(s) Administered    COVID-19, MRNA, LN-S, PF (MODERNA FULL 0.5 ML DOSE) 02/05/2021, 03/05/2021, 11/13/2021    Influenza 12/18/2007, 10/26/2011, 10/23/2015    Influenza (FLUAD) - Quadrivalent - Adjuvanted - PF *Preferred* (65+) 01/27/2022    Influenza - High Dose - PF (65 years and older) 10/09/2012, 11/07/2013, 10/23/2015, 01/04/2017, 01/14/2019, 10/10/2019    Influenza - Trivalent (ADULT) 12/18/2007, 10/26/2011    Pneumococcal Conjugate - 13 Valent 05/25/2015    Pneumococcal Polysaccharide - 23 Valent 01/27/2010, 08/02/2016    Td (ADULT) 07/22/2011    Tdap 12/18/2007       Family History       Problem Relation (Age of Onset)    Cancer Mother    Heart disease Mother    Hyperlipidemia Mother    Hypertension Mother, Maternal Grandmother    Kidney disease Mother    Kidney failure Mother    No Known Problems Daughter, Sister, Brother, Son, Brother, Son          Social History     Socioeconomic History    Marital status:    Occupational History    Occupation: Retired     Comment:    Tobacco Use    Smoking status: Never Smoker    Smokeless tobacco: Former User     Types: Chew    Tobacco comment: Chewed tobacco once per day for 4-5 years when a    Substance and Sexual Activity    Alcohol use: No    Drug use: No    Sexual activity: Not Currently     Partners: Female   Social History Narrative        Colon 2007     Review of Systems   Constitutional:  Negative for chills, diaphoresis, fatigue and fever.   HENT:  Negative for congestion, ear pain, nosebleeds, rhinorrhea and sore throat.    Eyes:  Negative for pain.   Respiratory:  Negative for cough, shortness of breath and wheezing.    Cardiovascular:  Negative for chest pain and leg swelling.   Gastrointestinal:  Negative for abdominal pain, constipation, diarrhea, nausea and vomiting.   Genitourinary:  Positive for difficulty urinating, frequency, scrotal swelling and testicular pain. Negative  for dysuria and urgency.   Musculoskeletal:  Negative for arthralgias, back pain and neck pain.   Neurological:  Negative for weakness, numbness and headaches.   Objective:     Vital Signs (Most Recent):  Temp: 98.1 °F (36.7 °C) (06/21/22 1111)  Pulse: 73 (06/21/22 1111)  Resp: 12 (06/21/22 1111)  BP: 133/71 (06/21/22 1111)  SpO2: 97 % (06/21/22 1111) Vital Signs (24h Range):  Temp:  [97.7 °F (36.5 °C)-98.3 °F (36.8 °C)] 98.1 °F (36.7 °C)  Pulse:  [71-83] 73  Resp:  [12-16] 12  SpO2:  [90 %-97 %] 97 %  BP: (133-156)/(68-85) 133/71     Weight: 104 kg (229 lb 4.5 oz)  Body mass index is 31.98 kg/m².    Estimated Creatinine Clearance: 69 mL/min (based on SCr of 1.1 mg/dL).    Physical Exam  Vitals and nursing note reviewed. Exam conducted with a chaperone present.   Constitutional:       General: He is not in acute distress.     Appearance: He is well-developed. He is not diaphoretic.   HENT:      Head: Normocephalic and atraumatic.   Eyes:      Pupils: Pupils are equal, round, and reactive to light.   Cardiovascular:      Rate and Rhythm: Normal rate and regular rhythm.      Heart sounds: Normal heart sounds. No murmur heard.    No friction rub. No gallop.   Pulmonary:      Effort: Pulmonary effort is normal. No respiratory distress.      Breath sounds: Normal breath sounds. No wheezing or rales.   Chest:      Chest wall: No tenderness.   Abdominal:      General: Bowel sounds are normal. There is no distension.      Palpations: There is no mass.      Tenderness: There is no abdominal tenderness. There is no guarding.   Genitourinary:     Testes:         Right: Tenderness and swelling present.         Left: Tenderness and swelling present.      Comments: Condom cath in place  Musculoskeletal:         General: No deformity. Normal range of motion.      Cervical back: Normal range of motion and neck supple.   Skin:     General: Skin is warm and dry.      Findings: No erythema or rash.   Neurological:      Mental Status:  He is alert and oriented to person, place, and time.   Psychiatric:         Behavior: Behavior normal.         Thought Content: Thought content normal.         Judgment: Judgment normal.   All pertinent labs within the past 24 hours have been reviewed.    Significant Labs: All pertinent labs within the past 24 hours have been reviewed.    Significant Imaging: I have reviewed all pertinent imaging results/findings within the past 24 hours.

## 2022-06-21 NOTE — PLAN OF CARE
Pt going home with iv abx with [facility].  [facility] is following and will come by the house tomorrow.  Wife available to take patient home.  Follow-up appointment made.     Sara Murry Rn [facility] [phone]

## 2022-06-21 NOTE — DISCHARGE SUMMARY
"[facility] Medicine  Discharge Summary      Patient Name: Tito Menard  MRN: [id number]  Patient Class: IP- Inpatient  Admission Date: 6/17/2022  Hospital Length of Stay: 3 days  Discharge Date and Time:  06/21/2022 2:24 PM  Attending Physician: Etelvina Cassidy MD   Discharging Provider: Etelvina Cassidy MD  Primary Care Provider: Amanda Brown MD  [facility] Medicine Team: [facility] MED  Etelvina Cassidy MD    HPI:   Mr. Francis is a 76yoM  w/PMHx HTN, HPLD, AAA, restrictive lung disease, RBBB, GERD, DM presenting for fatigue and a fall today. He states for the past 2 weeks he has been feeling fatigued and tired. Today while he was getting up from sitting he slid off and fell gently to the floor. He did not lose consciousness or hit his head. He was not able to get up because of it. He states that he has been having some testicle pain during this time as well, he has not had any dysuria or hematuria but did not his left hurt more this his right and that his right was more swollen and he was having trouble walking. He also states over the past couple of months he states he has fallen and has had some trouble walking unrelated to his testicle pain.     In ED, found to have u/s findings of, "Hyperemic left testicle and enlarged hyperemic left epididymis.  Findings consistent with epididymo-orchitis.  Small to moderate in somewhat complex in appearance left-sided hydrocele with some septations and low level echoes present.  Possible pyocele in the setting of suspected epididymo-orchitis.  Consider urology consultation or follow-up, as clinically indicated.  2. Bilateral testes contain intratesticular cysts as above.  Majority of these appear stable but there are some that are slightly more complex than previously.  Consider short-term follow-up with re-evaluation in 2-3 months." Abx started. Medicine called for admission.         Hospital Course:   Urology evaluated patient- no pyelocele. He " was started on broad antibiotics- zosyn and vanc. Started on oxybutynin per urology. 1/4 Bcx +iv for GNR. Antibiotics changed to doxy and rocephin. Repeat Bcx 06/18 no growth to date. Doxy stopped 06/20. Rocephin increased to 2g qday. ID consulted. Bcx and Ucx + E. Coli- pansensitive. ID consulted- plan for IV Rocephin- 2g q24h for 2wk- EOT 07/02/22. Midline placed. HH arranged. F/u apts made with urology. Bedside commode provided for patient.     Pt deemed appropriate for discharge. Plan discussed with pt, who was agreeable and amenable; medications were discussed and reviewed, outpatient follow-up scheduled, ER precautions were given, all questions were answered to the pt's satisfaction, and Mr. Francis was subsequently discharged.    Physical Exam  Gen: in NAD, appears stated age  Neuro: AAOx3, motor, sensory, and strength grossly intact BL  HEENT: NTNC, EOMI, PERRL, MMM  CVS: RRR, no m/r/g; S1/S2 auscultated with no S3 or S4; capillary refill < 2 sec  Resp: lungs CTAB, no w/r/r; no belabored breathing or accessory muscle use appreciated   Abd: BS+ in all 4 quadrants; NTND, soft to palpation; no organomegaly appreciated   : scrotal edema- TTP of Lt testicle- hyperpigmented skin  Extrem: pulses full, equal, and regular over all 4 extremities; no UE or LE edema BL      Goals of Care Treatment Preferences:  Code Status: Full Code    Consults:   Consults (From admission, onward)        Status Ordering Provider     Inpatient consult to Infectious Diseases  Once        Provider:  (Not yet assigned)    Acknowledged LIONEL MASON     Inpatient consult to Midline team  Once        Provider:  (Not yet assigned)    Acknowledged LIONEL MASON     Inpatient consult to Urology  Once        Provider:  (Not yet assigned)    EDISON Patel new Assessment & Plan notes have been filed under this hospital service since the last note was generated.  Service: Hospital Medicine    Final Active Diagnoses:  "   Diagnosis Date Noted POA    PRINCIPAL PROBLEM:  Epididymo-orchitis [N45.3] 06/18/2022 Yes    Bacteremia [R78.81] 06/19/2022 Unknown    Class 1 obesity due to excess calories with serious comorbidity and body mass index (BMI) of 31.0 to 31.9 in adult [E66.09, Z68.31] 06/17/2021 Not Applicable     Chronic    Obstructive chronic bronchitis without exacerbation [J44.9] 05/25/2015 Yes    Essential hypertension [I10]  Yes     Chronic    Mixed hyperlipidemia [E78.2]  Yes     Chronic    GERD (gastroesophageal reflux disease) [K21.9]  Yes      Problems Resolved During this Admission:       Discharged Condition: good    Disposition: Home-Health Care [facility]    Follow Up:   Follow-up Information     [facility] Follow up.    Why: home infusion: they will be handling your iv infusions.  Please call them if you have any questions or concerns.  thanks           [facility] Follow up.    Specialty: Home Health Services  Why: home health Please call if you have any questions or concerns.  Contact information:  Anjali Shay [facility] [id number] [phone]           [facility] UROLOGY Follow up.    Specialty: Urology  Contact information:  Lisa Nicole  [facility] [address]  [phone]                     Patient Instructions:      COMMODE FOR HOME USE     Order Specific Question Answer Comments   Type: Standard    Height: 5' 11" (1.803 m)    Weight: 104 kg (229 lb 4.5 oz)    Does patient have medical equipment at home? shower chair tripod cane / tripod cane / tripod cane / tripod cane   Does patient have medical equipment at home? rollator    Does patient have medical equipment at home? grab bar    Does patient have medical equipment at home? cane, straight    Length of need (1-99 months): 12      Ambulatory referral/consult to Urology   Standing Status: Future   Referral Priority: Routine Referral Type: Consultation   Referral Reason: Specialty Services Required "   Requested Specialty: Urology   Number of Visits Requested: 1     Diet Cardiac     Notify your health care provider if you experience any of the following:  severe uncontrolled pain     Notify your health care provider if you experience any of the following:  redness, tenderness, or signs of infection (pain, swelling, redness, odor or green/yellow discharge around incision site)     Activity as tolerated       Significant Diagnostic Studies: Labs:   CMP   Recent Labs   Lab 06/20/22  0236 06/21/22  0840    135*   K 3.6 3.4*   CL 99 99   CO2 26 27   GLU 94 96   BUN 23 19   CREATININE 1.2 1.1   CALCIUM 8.6* 9.2   PROT 6.4 6.7   ALBUMIN 2.7* 2.7*   BILITOT 0.5 0.6   ALKPHOS 95 82   AST 34 32   ALT 21 22   ANIONGAP 11 9   ESTGFRAFRICA >60.0 >60.0   EGFRNONAA 58.0* >60.0   , CBC   Recent Labs   Lab 06/20/22  0236 06/21/22  0840   WBC 9.11 6.92   HGB 11.1* 11.8*   HCT 33.8* 34.2*    205    and All labs within the past 24 hours have been reviewed  Microbiology:   Blood Culture   Lab Results   Component Value Date    LABBLOO No Growth to date 06/18/2022    LABBLOO No Growth to date 06/18/2022    LABBLOO No Growth to date 06/18/2022    LABBLOO No Growth to date 06/18/2022    LABBLOO No Growth to date 06/18/2022    LABBLOO No Growth to date 06/18/2022    and Urine Culture    Lab Results   Component Value Date    LABURIN ESCHERICHIA COLI  10,000 - 49,999 cfu/ml   (A) 06/18/2022        US Scrotum and Testicles:  FINDINGS:  Right Testicle:     *Size: 4.6 x 2.4 x 2.8 cm  *Appearance: Few cystic appearing lesions in the testis with the largest measuring 1.2 x 1.0 x 1.2 cm, similar to prior.  9.1 x 4.8 x 6.4 cm anechoic lesion adjacent to the testicle, similar to prior and likely spermatocele or tunica albuginea cyst.  *Flow: Normal arterial and venous flow  *Epididymis: Normal.  *Hydrocele: Small to moderate.  *Varicocele: None.  .     Left Testicle:     *Size: 4.5 x 3.5 x 3.0 cm  *Appearance: Few small cystic  appearing lesions in the testis..  *Flow: Hyperemia with preserved arterial and venous flow.  *Epididymis: Enlarged with hyperemia.  Cyst in or adjacent to the epididymal head measuring 1.5 x 1.8 x 1.5 cm.  Additional epididymal head cyst measuring 9 mm.  *Hydrocele: Small to moderate and somewhat complex in appearance with some septations and low-level echoes present.  *Varicocele: None.  .     Other findings: None.     Impression:     1. Hyperemic left testicle and enlarged hyperemic left epididymis.  Findings consistent with epididymo-orchitis.  Small to moderate in somewhat complex in appearance left-sided hydrocele with some septations and low level echoes present.  Possible pyocele in the setting of suspected epididymo-orchitis.  Consider urology consultation or follow-up, as clinically indicated.  2. Bilateral testes contain intratesticular cysts as above.  Majority of these appear stable but there are some that are slightly more complex than previously.  Consider short-term follow-up with re-evaluation in 2-3 months.  This report was flagged in Epic as abnormal.       Medications:  Reconciled Home Medications:      Medication List      START taking these medications    cefTRIAXone 2 g/50 mL Pgbk IVPB  Commonly known as: ROCEPHIN  Inject 50 mLs (2 g total) into the vein once daily at 6am. for 11 days     MYRBETRIQ 50 mg Tb24  Generic drug: mirabegron  Take 1 tablet (50 mg total) by mouth once daily.     oxybutynin 10 MG 24 hr tablet  Commonly known as: DITROPAN-XL  Take 1 tablet (10 mg total) by mouth once daily.  Start taking on: June 22, 2022     oxyCODONE 10 mg Tab immediate release tablet  Commonly known as: ROXICODONE  Take 1 tablet (10 mg total) by mouth every 6 (six) hours as needed for Pain.        CONTINUE taking these medications    acetaminophen 650 MG Tbsr  Commonly known as: TYLENOL  Take 1,300 mg by mouth every 8 (eight) hours as needed.     albuterol 90 mcg/actuation inhaler  Commonly known as:  PROVENTIL/VENTOLIN HFA  Inhale 1-2 puffs into the lungs every 6 (six) hours as needed for Wheezing.     amLODIPine 5 MG tablet  Commonly known as: NORVASC  TAKE 1 TABLET EVERY DAY     aspirin 81 MG EC tablet  Commonly known as: ECOTRIN  TAKE 1 TABLET EVERY DAY     azelastine 137 mcg (0.1 %) nasal spray  Commonly known as: ASTELIN  1 spray (137 mcg total) by Nasal route 2 (two) times daily.     benzonatate 200 MG capsule  Commonly known as: TESSALON  TAKE 1 CAPSULE THREE TIMES DAILY AS NEEDED FOR COUGH     chlorthalidone 25 MG Tab  Commonly known as: HYGROTEN  TAKE 1/2 TABLET EVERY DAY     cholecalciferol (vitamin D3) 50 mcg (2,000 unit) Cap capsule  Commonly known as: VITAMIN D3  Take 1 capsule by mouth once daily.     clotrimazole-betamethasone 1-0.05% cream  Commonly known as: LOTRISONE  APPLY TOPICALLY 2 (TWO) TIMES DAILY.     docusate sodium 50 MG capsule  Commonly known as: COLACE  Take by mouth 2 (two) times daily.     DULoxetine 60 MG capsule  Commonly known as: CYMBALTA  TAKE 1 CAPSULE EVERY DAY FOR PAIN CONTROL     fluticasone propionate 50 mcg/actuation nasal spray  Commonly known as: FLONASE  USE 1 SPRAY NASALLY ONE TIME DAILY     gabapentin 300 MG capsule  Commonly known as: NEURONTIN  TAKE 1 CAPSULE EVERY MORNING AND AT NOON, AND TAKE 2 CAPSULES EVERY EVENING     guaiFENesin 600 mg 12 hr tablet  Commonly known as: MUCINEX  Take 1,200 mg by mouth 2 (two) times daily as needed.     ibuprofen 200 MG tablet  Commonly known as: ADVIL,MOTRIN  Take 200 mg by mouth every 6 (six) hours as needed for Pain.     irbesartan 300 MG tablet  Commonly known as: AVAPRO  TAKE 1 TABLET EVERY EVENING     * ketoconazole 2 % shampoo  Commonly known as: NIZORAL  Wash hair with medicated shampoo at least 2x/week - let sit on scalp at least 5 minutes prior to rinsing     * ketoconazole 2 % cream  Commonly known as: NIZORAL  AAA bid (facial redness and scaling)     melatonin 10 mg Cap  Take 1 capsule by mouth nightly as needed.      methocarbamoL 500 MG Tab  Commonly known as: ROBAXIN  3 (three) times daily as needed.     omeprazole 20 MG capsule  Commonly known as: PRILOSEC  TAKE 1 CAPSULE EVERY DAY     potassium chloride 10 MEQ Cpsr  Commonly known as: MICRO-K  TAKE 3 CAPSULES ONE TIME DAILY     pravastatin 20 MG tablet  Commonly known as: PRAVACHOL  TAKE 1 TABLET EVERY DAY     tamsulosin 0.4 mg Cap  Commonly known as: FLOMAX  Take 1 capsule (0.4 mg total) by mouth once daily.         * This list has 2 medication(s) that are the same as other medications prescribed for you. Read the directions carefully, and ask your doctor or other care provider to review them with you.            STOP taking these medications    ADVIL PM ORAL     finasteride 5 mg tablet  Commonly known as: PROSCAR            Indwelling Lines/Drains at time of discharge:   Lines/Drains/Airways     Drain  Duration           Male External Urinary Catheter 06/20/22 0239 Medium 1 day                Time spent on the discharge of patient: 45 minutes           Etelvina Cassidy MD  Department of Hospital Medicine  [facility] Surg

## 2022-06-21 NOTE — PLAN OF CARE
Problem: Adult Inpatient Plan of Care  Goal: Plan of Care Review  Outcome: Met  Goal: Patient-Specific Goal (Individualized)  Outcome: Met  Goal: Absence of Hospital-Acquired Illness or Injury  Outcome: Met  Goal: Optimal Comfort and Wellbeing  Outcome: Met  Goal: Readiness for Transition of Care  Outcome: Met     Problem: Diabetes Comorbidity  Goal: Blood Glucose Level Within Targeted Range  Outcome: Met     Problem: Fall Injury Risk  Goal: Absence of Fall and Fall-Related Injury  Outcome: Met     Problem: Pain Acute  Goal: Acceptable Pain Control and Functional Ability  Outcome: Met     Problem: Infection  Goal: Absence of Infection Signs and Symptoms  Outcome: Met     Problem: Physical Therapy  Goal: Physical Therapy Goal  Description: Goals to be met by: 22     Patient will increase functional independence with mobility by performin. Supine to sit with Modified [place]  2. Sit to stand transfer with Supervision  3. Bed to chair transfer with Supervision using LRAD  4. Gait  x 150 feet with Supervision using LRAD  5. Ascend/descend 3 stairs with bilateral Handrails Stand-by Assistance using No Assistive Device.   6. Lower extremity exercise program x30 reps per handout, with independence    Outcome: Met

## 2022-06-22 ENCOUNTER — TELEPHONE (OUTPATIENT)
Dept: INTERNAL MEDICINE | Facility: CLINIC | Age: 77
End: 2022-06-22
Payer: MEDICARE

## 2022-06-22 NOTE — PLAN OF CARE
[facility] - Med Surg  Discharge Final Note    Primary Care Provider: Amanda Brown MD    Expected Discharge Date: 6/21/2022    Final Discharge Note (most recent)     Final Note - 06/22/22 0800        Final Note    Assessment Type Final Discharge Note     Anticipated Dis[place] Disposition [place] [facility] [place]ospital Resources/Appts/Education Provided Provided patient/caregiver with written discharge plan information;Appointments scheduled and added to AVS        Post-Acute Status    Post-Acute Authorization Home Health;IV Infusion     Home Health Status Set-up Complete/Auth obtained     IV Infusion Status Set-up Complete/Auth obtained     Discharge Delays None known at this time                 Important Message from Medicare  Important Message from Medicare regarding Discharge Appeal Rights: Given to patient/caregiver, Explained to patient/caregiver, Signed/date by patient/caregiver     Date IMM was signed: 06/21/22  Time IMM was signed: 1328    Contact Info     [facility]        Next Steps: Follow up    Instructions: home infusion: they will be handling your iv infusions.  Please call them if you have any questions or concerns.  thanks    [facility]   Specialty: Urology    [address]  [facility] [address]   Phone: [phone]       Next Steps: Follow up    [facility]   Specialty: Home Health Services    [address]  [address]  [facility] [address]   Phone: [phone]       Next Steps: Follow up    Instructions: They will be coming out tomorrow to assist with your home antibiotics.  Please call them if you have not heard back.  thanks    Ronny Jimenes MD   Specialty: Family Medicine    [address]  [facility] [address]   Phone: [phone]       Next Steps: Follow up on 6/28/2022    Instructions: You have a follow up appointment with dr. jimenes at [facility] internal medicine location on June 28, at 2pm.  thanks              Pt went home with home health with [facility] and home abx with [facility].  Teaching was made before dc.  Home health will be able to come out tomorrow.  BSC was delivered to bedside before dc.  Follow-up appointments were made.  No other anticipated needs at this time.     DCP: home health/home iv    Sara Murry RN [facility] [phone]

## 2022-06-22 NOTE — TELEPHONE ENCOUNTER
[employer] Transition of Care form to be completed/reviewed by PCP, uploaded as back up. Placed on doctor's desk for review/completion

## 2022-06-23 LAB
BACTERIA BLD CULT: NORMAL

## 2022-06-23 NOTE — TELEPHONE ENCOUNTER
----- Message from Bettina Washburn sent at 6/23/2022 12:46 PM CDT -----  Contact: Wife Guillermina [phone]  .Name of Caller Wife Guillermina   Reason for Visit/Symptoms Hospital Follow up-Discharged Tues., 06-21-22  Best Contact Number or Confirm if Mychart Preferred [phone]  Preferred Date/Time of Appointment On Tues., 06-28-22 or Before    Interested in Virtual Visit (yes/no) NO  Additional Information Patient is scheduled to see Dr SAMANTHA Jimenes on 06-28-22. Would like to know if there's any way he can get in to see Dr Brown? Please call.

## 2022-06-23 NOTE — TELEPHONE ENCOUNTER
Informed pt that there is no sooner appt for a hospital f/u with Dr. Brown at this time and pt should keep his appt with Dr. Jimenes on 6/28/22. Pt verbalized understanding. Pt has no further questions or concerns.

## 2022-06-27 ENCOUNTER — LAB VISIT (OUTPATIENT)
Dept: LAB | Facility: HOSPITAL | Age: 77
End: 2022-06-27
Attending: PHYSICIAN ASSISTANT
Payer: MEDICARE

## 2022-06-27 DIAGNOSIS — N45.3 EPIDIDYMO-ORCHITIS WITHOUT ABSCESS: Primary | ICD-10-CM

## 2022-06-27 LAB
ALBUMIN SERPL BCP-MCNC: 3 G/DL (ref 3.5–5.2)
ALP SERPL-CCNC: 85 U/L (ref 55–135)
ALT SERPL W/O P-5'-P-CCNC: 43 U/L (ref 10–44)
ANION GAP SERPL CALC-SCNC: 12 MMOL/L (ref 8–16)
AST SERPL-CCNC: 39 U/L (ref 10–40)
BASOPHILS # BLD AUTO: 0.01 K/UL (ref 0–0.2)
BASOPHILS NFR BLD: 0.2 % (ref 0–1.9)
BILIRUB SERPL-MCNC: 0.3 MG/DL (ref 0.1–1)
BUN SERPL-MCNC: 20 MG/DL (ref 8–23)
CALCIUM SERPL-MCNC: 9 MG/DL (ref 8.7–10.5)
CHLORIDE SERPL-SCNC: 105 MMOL/L (ref 95–110)
CO2 SERPL-SCNC: 24 MMOL/L (ref 23–29)
CREAT SERPL-MCNC: 1 MG/DL (ref 0.5–1.4)
DIFFERENTIAL METHOD: ABNORMAL
EOSINOPHIL # BLD AUTO: 0.3 K/UL (ref 0–0.5)
EOSINOPHIL NFR BLD: 5 % (ref 0–8)
ERYTHROCYTE [DISTWIDTH] IN BLOOD BY AUTOMATED COUNT: 13.1 % (ref 11.5–14.5)
EST. GFR  (AFRICAN AMERICAN): >60 ML/MIN/1.73 M^2
EST. GFR  (NON AFRICAN AMERICAN): >60 ML/MIN/1.73 M^2
GLUCOSE SERPL-MCNC: 112 MG/DL (ref 70–110)
HCT VFR BLD AUTO: 36.5 % (ref 40–54)
HGB BLD-MCNC: 11.8 G/DL (ref 14–18)
IMM GRANULOCYTES # BLD AUTO: 0.03 K/UL (ref 0–0.04)
IMM GRANULOCYTES NFR BLD AUTO: 0.5 % (ref 0–0.5)
LYMPHOCYTES # BLD AUTO: 1.9 K/UL (ref 1–4.8)
LYMPHOCYTES NFR BLD: 29.6 % (ref 18–48)
MCH RBC QN AUTO: 28.8 PG (ref 27–31)
MCHC RBC AUTO-ENTMCNC: 32.3 G/DL (ref 32–36)
MCV RBC AUTO: 89 FL (ref 82–98)
MONOCYTES # BLD AUTO: 0.3 K/UL (ref 0.3–1)
MONOCYTES NFR BLD: 4.7 % (ref 4–15)
NEUTROPHILS # BLD AUTO: 3.9 K/UL (ref 1.8–7.7)
NEUTROPHILS NFR BLD: 60 % (ref 38–73)
NRBC BLD-RTO: 0 /100 WBC
PLATELET # BLD AUTO: 310 K/UL (ref 150–450)
PMV BLD AUTO: 9.3 FL (ref 9.2–12.9)
POTASSIUM SERPL-SCNC: 4.1 MMOL/L (ref 3.5–5.1)
PROT SERPL-MCNC: 7.1 G/DL (ref 6–8.4)
RBC # BLD AUTO: 4.1 M/UL (ref 4.6–6.2)
SODIUM SERPL-SCNC: 141 MMOL/L (ref 136–145)
WBC # BLD AUTO: 6.41 K/UL (ref 3.9–12.7)

## 2022-06-27 PROCEDURE — 85025 COMPLETE CBC W/AUTO DIFF WBC: CPT | Performed by: PHYSICIAN ASSISTANT

## 2022-06-27 PROCEDURE — 80053 COMPREHEN METABOLIC PANEL: CPT | Performed by: PHYSICIAN ASSISTANT

## 2022-06-28 ENCOUNTER — OFFICE VISIT (OUTPATIENT)
Dept: INTERNAL MEDICINE | Facility: CLINIC | Age: 77
End: 2022-06-28
Attending: FAMILY MEDICINE
Payer: MEDICARE

## 2022-06-28 VITALS
WEIGHT: 222.31 LBS | DIASTOLIC BLOOD PRESSURE: 80 MMHG | OXYGEN SATURATION: 97 % | HEIGHT: 71 IN | SYSTOLIC BLOOD PRESSURE: 138 MMHG | HEART RATE: 76 BPM | BODY MASS INDEX: 31.12 KG/M2

## 2022-06-28 DIAGNOSIS — R78.81 BACTEREMIA: ICD-10-CM

## 2022-06-28 DIAGNOSIS — E11.42 DIABETIC POLYNEUROPATHY ASSOCIATED WITH TYPE 2 DIABETES MELLITUS: ICD-10-CM

## 2022-06-28 DIAGNOSIS — N45.3 EPIDIDYMO-ORCHITIS: Primary | ICD-10-CM

## 2022-06-28 DIAGNOSIS — I10 ESSENTIAL HYPERTENSION: ICD-10-CM

## 2022-06-28 PROCEDURE — 1159F MED LIST DOCD IN RCRD: CPT | Mod: CPTII,S$GLB,, | Performed by: FAMILY MEDICINE

## 2022-06-28 PROCEDURE — 3075F SYST BP GE 130 - 139MM HG: CPT | Mod: CPTII,S$GLB,, | Performed by: FAMILY MEDICINE

## 2022-06-28 PROCEDURE — 3072F LOW RISK FOR RETINOPATHY: CPT | Mod: CPTII,S$GLB,, | Performed by: FAMILY MEDICINE

## 2022-06-28 PROCEDURE — 3079F PR MOST RECENT DIASTOLIC BLOOD PRESSURE 80-89 MM HG: ICD-10-PCS | Mod: CPTII,S$GLB,, | Performed by: FAMILY MEDICINE

## 2022-06-28 PROCEDURE — 3079F DIAST BP 80-89 MM HG: CPT | Mod: CPTII,S$GLB,, | Performed by: FAMILY MEDICINE

## 2022-06-28 PROCEDURE — 1100F PR PT FALLS ASSESS DOC 2+ FALLS/FALL W/INJURY/YR: ICD-10-PCS | Mod: CPTII,S$GLB,, | Performed by: FAMILY MEDICINE

## 2022-06-28 PROCEDURE — 1160F PR REVIEW ALL MEDS BY PRESCRIBER/CLIN PHARMACIST DOCUMENTED: ICD-10-PCS | Mod: CPTII,S$GLB,, | Performed by: FAMILY MEDICINE

## 2022-06-28 PROCEDURE — 1125F PR PAIN SEVERITY QUANTIFIED, PAIN PRESENT: ICD-10-PCS | Mod: CPTII,S$GLB,, | Performed by: FAMILY MEDICINE

## 2022-06-28 PROCEDURE — 1100F PTFALLS ASSESS-DOCD GE2>/YR: CPT | Mod: CPTII,S$GLB,, | Performed by: FAMILY MEDICINE

## 2022-06-28 PROCEDURE — 3072F PR LOW RISK FOR RETINOPATHY: ICD-10-PCS | Mod: CPTII,S$GLB,, | Performed by: FAMILY MEDICINE

## 2022-06-28 PROCEDURE — 1160F RVW MEDS BY RX/DR IN RCRD: CPT | Mod: CPTII,S$GLB,, | Performed by: FAMILY MEDICINE

## 2022-06-28 PROCEDURE — 1111F PR DISCHARGE MEDS RECONCILED W/ CURRENT OUTPATIENT MED LIST: ICD-10-PCS | Mod: CPTII,S$GLB,, | Performed by: FAMILY MEDICINE

## 2022-06-28 PROCEDURE — 3288F FALL RISK ASSESSMENT DOCD: CPT | Mod: CPTII,S$GLB,, | Performed by: FAMILY MEDICINE

## 2022-06-28 PROCEDURE — 1159F PR MEDICATION LIST DOCUMENTED IN MEDICAL RECORD: ICD-10-PCS | Mod: CPTII,S$GLB,, | Performed by: FAMILY MEDICINE

## 2022-06-28 PROCEDURE — 1111F DSCHRG MED/CURRENT MED MERGE: CPT | Mod: CPTII,S$GLB,, | Performed by: FAMILY MEDICINE

## 2022-06-28 PROCEDURE — 3075F PR MOST RECENT SYSTOLIC BLOOD PRESS GE 130-139MM HG: ICD-10-PCS | Mod: CPTII,S$GLB,, | Performed by: FAMILY MEDICINE

## 2022-06-28 PROCEDURE — 1125F AMNT PAIN NOTED PAIN PRSNT: CPT | Mod: CPTII,S$GLB,, | Performed by: FAMILY MEDICINE

## 2022-06-28 PROCEDURE — 3288F PR FALLS RISK ASSESSMENT DOCUMENTED: ICD-10-PCS | Mod: CPTII,S$GLB,, | Performed by: FAMILY MEDICINE

## 2022-06-28 PROCEDURE — 99214 OFFICE O/P EST MOD 30 MIN: CPT | Mod: S$GLB,,, | Performed by: FAMILY MEDICINE

## 2022-06-28 PROCEDURE — 99214 PR OFFICE/OUTPT VISIT, EST, LEVL IV, 30-39 MIN: ICD-10-PCS | Mod: S$GLB,,, | Performed by: FAMILY MEDICINE

## 2022-06-28 PROCEDURE — 99999 PR PBB SHADOW E&M-EST. PATIENT-LVL III: ICD-10-PCS | Mod: PBBFAC,,, | Performed by: FAMILY MEDICINE

## 2022-06-28 PROCEDURE — 99999 PR PBB SHADOW E&M-EST. PATIENT-LVL III: CPT | Mod: PBBFAC,,, | Performed by: FAMILY MEDICINE

## 2022-06-28 NOTE — PROGRESS NOTES
"CHIEF COMPLAINT:  Hospital follow-up    HISTORY OF PRESENT ILLNESS: The patient is a chronically ill 76 year-old BM.  The patient has recently been discharged after a short hospitalization for epydidimo-orchitis due to E coli.  He was discharged with a PICC line and a total of 14 days of Rocephin 2 g IV q.d. he is feeling much better but not 100% naturally.    He has diet-controlled diabetes    The patient has a history of stable hypertension on current medications.  Patient denies chest pain or shortness of breath today.    REVIEW OF SYSTEMS:  GENERAL: No fever, chills, fatigability or weight loss.  SKIN: No rashes, itching or changes in color or texture of skin.  HEAD: No headaches or recent head trauma.  EYES: Visual acuity fine. No photophobia, ocular pain or diplopia.  EARS: Denies ear pain, discharge or vertigo.  NOSE: No loss of smell, no epistaxis or postnasal drip.  MOUTH & THROAT: No hoarseness or change in voice. No excessive gum bleeding.  NODES: Denies swollen glands.  CHEST: Denies ZUNIGA, cyanosis, wheezing, cough and sputum production.  CARDIOVASCULAR: Denies chest pain, PND, orthopnea or reduced exercise tolerance.  ABDOMEN: Appetite fine. No weight loss. Denies diarrhea, abdominal pain, hematemesis or blood in stool.  URINARY: No flank pain, dysuria or residual hematuria.  PERIPHERAL VASCULAR: No claudication or cyanosis.  MUSCULOSKELETAL: No joint stiffness or swelling. Denies back pain.  NEUROLOGIC: No history of seizures, paralysis.  He does have alteration of gait and coordination.    SOCIAL HISTORY: The patient does not smoke.  The patient does not consume alcohol.  The patient is .    PHYSICAL EXAMINATION:   Blood pressure 138/80, pulse 76, height 5' 11" (1.803 m), weight 100.8 kg (222 lb 5.3 oz), SpO2 97 %.    APPEARANCE: Well nourished, well developed, in no acute distress.    HEAD: Normocephalic, atraumatic.  EYES: PERRL. EOMI.  Conjunctivae without injection and  Anicteric  NOSE: " Mucosa pink. Airway clear.  MOUTH & THROAT: No tonsillar enlargement. No pharyngeal erythema or exudate. No stridor.  NECK: Supple.   NODES: No cervical, axillary or inguinal lymph node enlargement.  CHEST: Lungs clear to auscultation.  No retractions are noted.  No rales or rhonchi are present.  CARDIOVASCULAR: Normal S1, S2. No rubs, murmurs or gallops.  ABDOMEN: Bowel sounds normal. Not distended. Soft. No tenderness or masses.  No ascites is noted.  MUSCULOSKELETAL:  There is no clubbing, cyanosis, or edema of the extremities x4.  There is full range of motion of the lumbar spine.  There is full range of motion of the extremities x4.  There is no deformity noted.    NEUROLOGIC:       Normal speech development.      Hearing normal.      Uses a walker.      Motor and sensory exams grossly normal.  PSYCHIATRIC: Patient is alert and oriented x3.  Thought processes are all normal.  There is no homicidality.  There is no suicidality.  There is no evidence of psychosis.    LABORATORY/RADIOLOGY:   Chart reviewed today.    ASSESSMENT:   4 day stay for epididymal orchitis  BCx+ E Coli  Diet-controlled type 2 diabetes  Hypertension    PLAN:  Follow up PCP.  Complete Rocephin

## 2022-07-01 ENCOUNTER — OFFICE VISIT (OUTPATIENT)
Dept: INFECTIOUS DISEASES | Facility: CLINIC | Age: 77
End: 2022-07-01
Payer: MEDICARE

## 2022-07-01 VITALS
SYSTOLIC BLOOD PRESSURE: 148 MMHG | HEART RATE: 99 BPM | HEIGHT: 71 IN | WEIGHT: 221.81 LBS | DIASTOLIC BLOOD PRESSURE: 82 MMHG | BODY MASS INDEX: 31.05 KG/M2 | TEMPERATURE: 98 F

## 2022-07-01 DIAGNOSIS — N45.3 EPIDIDYMO-ORCHITIS: Primary | ICD-10-CM

## 2022-07-01 PROCEDURE — 3079F DIAST BP 80-89 MM HG: CPT | Mod: CPTII,S$GLB,, | Performed by: PHYSICIAN ASSISTANT

## 2022-07-01 PROCEDURE — 1111F PR DISCHARGE MEDS RECONCILED W/ CURRENT OUTPATIENT MED LIST: ICD-10-PCS | Mod: CPTII,S$GLB,, | Performed by: PHYSICIAN ASSISTANT

## 2022-07-01 PROCEDURE — 1159F MED LIST DOCD IN RCRD: CPT | Mod: CPTII,S$GLB,, | Performed by: PHYSICIAN ASSISTANT

## 2022-07-01 PROCEDURE — 99214 OFFICE O/P EST MOD 30 MIN: CPT | Mod: S$GLB,,, | Performed by: PHYSICIAN ASSISTANT

## 2022-07-01 PROCEDURE — 1111F DSCHRG MED/CURRENT MED MERGE: CPT | Mod: CPTII,S$GLB,, | Performed by: PHYSICIAN ASSISTANT

## 2022-07-01 PROCEDURE — 99999 PR PBB SHADOW E&M-EST. PATIENT-LVL II: ICD-10-PCS | Mod: PBBFAC,,, | Performed by: PHYSICIAN ASSISTANT

## 2022-07-01 PROCEDURE — 1126F PR PAIN SEVERITY QUANTIFIED, NO PAIN PRESENT: ICD-10-PCS | Mod: CPTII,S$GLB,, | Performed by: PHYSICIAN ASSISTANT

## 2022-07-01 PROCEDURE — 99999 PR PBB SHADOW E&M-EST. PATIENT-LVL II: CPT | Mod: PBBFAC,,, | Performed by: PHYSICIAN ASSISTANT

## 2022-07-01 PROCEDURE — 3072F LOW RISK FOR RETINOPATHY: CPT | Mod: CPTII,S$GLB,, | Performed by: PHYSICIAN ASSISTANT

## 2022-07-01 PROCEDURE — 1159F PR MEDICATION LIST DOCUMENTED IN MEDICAL RECORD: ICD-10-PCS | Mod: CPTII,S$GLB,, | Performed by: PHYSICIAN ASSISTANT

## 2022-07-01 PROCEDURE — 1100F PR PT FALLS ASSESS DOC 2+ FALLS/FALL W/INJURY/YR: ICD-10-PCS | Mod: CPTII,S$GLB,, | Performed by: PHYSICIAN ASSISTANT

## 2022-07-01 PROCEDURE — 3288F FALL RISK ASSESSMENT DOCD: CPT | Mod: CPTII,S$GLB,, | Performed by: PHYSICIAN ASSISTANT

## 2022-07-01 PROCEDURE — 1126F AMNT PAIN NOTED NONE PRSNT: CPT | Mod: CPTII,S$GLB,, | Performed by: PHYSICIAN ASSISTANT

## 2022-07-01 PROCEDURE — 3077F PR MOST RECENT SYSTOLIC BLOOD PRESSURE >= 140 MM HG: ICD-10-PCS | Mod: CPTII,S$GLB,, | Performed by: PHYSICIAN ASSISTANT

## 2022-07-01 PROCEDURE — 3077F SYST BP >= 140 MM HG: CPT | Mod: CPTII,S$GLB,, | Performed by: PHYSICIAN ASSISTANT

## 2022-07-01 PROCEDURE — 99214 PR OFFICE/OUTPT VISIT, EST, LEVL IV, 30-39 MIN: ICD-10-PCS | Mod: S$GLB,,, | Performed by: PHYSICIAN ASSISTANT

## 2022-07-01 PROCEDURE — 3072F PR LOW RISK FOR RETINOPATHY: ICD-10-PCS | Mod: CPTII,S$GLB,, | Performed by: PHYSICIAN ASSISTANT

## 2022-07-01 PROCEDURE — 3079F PR MOST RECENT DIASTOLIC BLOOD PRESSURE 80-89 MM HG: ICD-10-PCS | Mod: CPTII,S$GLB,, | Performed by: PHYSICIAN ASSISTANT

## 2022-07-01 PROCEDURE — 1100F PTFALLS ASSESS-DOCD GE2>/YR: CPT | Mod: CPTII,S$GLB,, | Performed by: PHYSICIAN ASSISTANT

## 2022-07-01 PROCEDURE — 3288F PR FALLS RISK ASSESSMENT DOCUMENTED: ICD-10-PCS | Mod: CPTII,S$GLB,, | Performed by: PHYSICIAN ASSISTANT

## 2022-07-01 NOTE — PROGRESS NOTES
Subjective:       Patient ID: Tito Menard is a 76 y.o. male.    Chief Complaint: Follow-up    HPI     75 y/o male with HTN, AA, GERD, DMII, presented to ED with fatigue and falls at home. Pt admitted with sepsis secondary to E.coli UTI.  He complained of having scrotal swelling and pain. U/s notable for epididymitis of left testicle, left hydrocele. No pyocele. He was seen by urology. No intervention indicated at that time. He has been on ceftriaxone with much improvement. He is seen today for end of care. He is to complete ceftriaxone for 14 days, EOC 7/2/22. He is seen with his wife ab bedside. He has no fevers. tolerating abx well. Has f/u with urology on 7/13. Reports having urgency but otherwise doing well. Swelling and pain much improved.     Review of Systems   Constitutional: Negative for activity change, appetite change, chills, diaphoresis, fatigue and fever.   Respiratory: Negative for cough and shortness of breath.    Gastrointestinal: Negative for abdominal pain, diarrhea, nausea and vomiting.   Genitourinary: Negative for dysuria.   Musculoskeletal: Negative for arthralgias, back pain and joint swelling.   Integumentary:  Negative for rash.   Neurological: Negative for dizziness and headaches.         Objective:      Physical Exam  Constitutional:       Appearance: Normal appearance. He is well-developed. He is not diaphoretic.   HENT:      Head: Normocephalic.      Mouth/Throat:      Mouth: No lacerations or oral lesions.      Dentition: Normal dentition. Does not have dentures. No dental caries or dental abscesses.      Pharynx: Uvula midline.   Eyes:      General: Lids are normal.      Pupils: Pupils are equal, round, and reactive to light.   Cardiovascular:      Rate and Rhythm: Normal rate and regular rhythm.      Heart sounds: No murmur heard.  Pulmonary:      Effort: Pulmonary effort is normal. No respiratory distress.      Breath sounds: Normal breath sounds. No decreased breath sounds,  wheezing, rhonchi or rales.   Abdominal:      General: Bowel sounds are normal. There is no distension.      Palpations: Abdomen is soft. There is no mass.   Musculoskeletal:      Cervical back: Neck supple.   Skin:     General: Skin is warm and dry.      Coloration: Skin is not pale.      Findings: No erythema, lesion or rash.   Neurological:      Mental Status: He is alert and oriented to person, place, and time.      Cranial Nerves: No cranial nerve deficit.   Psychiatric:         Behavior: Behavior normal.         Assessment:       Problem List Items Addressed This Visit        Renal/    Epididymo-orchitis - Primary          Plan:         1. Complete ceftriaxone tomorrow on 7/2. Midline removed with HH tomorrow. Discussed with [facility]  2. F/u with urology as scheduled    3. If with recurrent symptoms would repeat UA and imaging studies. Consider prostatitis if with recurrences     All questions answered. pt verbalized understanding    >35 minutes of total time spent on the encounter, which includes face to face time and non-face to face time preparing to see the patient (eg, review of tests), Obtaining and/or reviewing separately obtained history, Documenting clinical information in the electronic or other health record, Independently interpreting results (not separately reported) and communicating results to the patient/family/caregiver, or Care coordination (not separately reported).

## 2022-07-08 ENCOUNTER — TELEPHONE (OUTPATIENT)
Dept: INTERNAL MEDICINE | Facility: CLINIC | Age: 77
End: 2022-07-08
Payer: MEDICARE

## 2022-07-08 ENCOUNTER — TELEPHONE (OUTPATIENT)
Dept: UROLOGY | Facility: CLINIC | Age: 77
End: 2022-07-08
Payer: MEDICARE

## 2022-07-08 DIAGNOSIS — J44.89 OBSTRUCTIVE CHRONIC BRONCHITIS WITHOUT EXACERBATION: ICD-10-CM

## 2022-07-08 DIAGNOSIS — R60.0 LOWER EXTREMITY EDEMA: ICD-10-CM

## 2022-07-08 DIAGNOSIS — I67.82 SUBCORTICAL MICROVASCULAR ISCHEMIC OCCLUSIVE DISEASE: ICD-10-CM

## 2022-07-08 DIAGNOSIS — Z78.9 IMPAIRED INSTRUMENTAL ACTIVITIES OF DAILY LIVING (IADL): ICD-10-CM

## 2022-07-08 DIAGNOSIS — R26.89 BALANCE PROBLEM: ICD-10-CM

## 2022-07-08 DIAGNOSIS — J98.4 RESTRICTIVE LUNG DISEASE DUE TO KYPHOSCOLIOSIS: Primary | ICD-10-CM

## 2022-07-08 DIAGNOSIS — M48.00 SPINAL STENOSIS, UNSPECIFIED SPINAL REGION: ICD-10-CM

## 2022-07-08 DIAGNOSIS — M47.12 SPONDYLOSIS OF CERVICAL JOINT WITH MYELOPATHY: ICD-10-CM

## 2022-07-08 DIAGNOSIS — M41.9 RESTRICTIVE LUNG DISEASE DUE TO KYPHOSCOLIOSIS: Primary | ICD-10-CM

## 2022-07-08 DIAGNOSIS — Z87.898 HISTORY OF SYNCOPE: ICD-10-CM

## 2022-07-08 DIAGNOSIS — R26.9 GAIT DISORDER: ICD-10-CM

## 2022-07-08 DIAGNOSIS — J43.9 PULMONARY EMPHYSEMA, UNSPECIFIED EMPHYSEMA TYPE: ICD-10-CM

## 2022-07-08 DIAGNOSIS — R26.89 DECREASED FUNCTIONAL MOBILITY: ICD-10-CM

## 2022-07-08 NOTE — TELEPHONE ENCOUNTER
----- Message from Yury Neumann sent at 7/8/2022  2:39 PM CDT -----  [facility]  Pt requesting call back RE: PT states that he having trouble urinating and scrotum feels heavy, and lower abdomen pain please call to discuss,      Confirmed contact below:  Contact Name:Tito Menard  Phone Number: [phone] [facility] health nurse  [phone]

## 2022-07-08 NOTE — TELEPHONE ENCOUNTER
----- Message from Richie Carlisle sent at 7/8/2022  8:37 AM CDT -----  Name of Who is Calling: Maribeth ([facility] )         What is the request in detail:Maribeth is calling to request an order for a  walker with a seat 6 ft 2 iches tall to be fax to [facility] fax #[phone]. Please advise          Can the clinic reply by [facility]: No         What Number to Call Back if not in [facility]: [phone]

## 2022-07-08 NOTE — TELEPHONE ENCOUNTER
Pt states he has trouble urinating for past 2 -3 days with burning. Says he couldn't finish therapy today bc of the lower abdominal pressure and scrotal swelling. Thinks he started feeling with urinary issue after starting Myrbetriq and Oxybutynin prescribed during hospital stay. Pt continues to take Tamsulosin. Advised to hold one or both medications to see if symptoms improve. Pt agrees stating he wanted to check with clinic before stopping these. Notified that urine needs to be checked for burning. States not intense, biggest issue is trouble starting urinary stream and feeling of incomplete emptying.

## 2022-07-09 ENCOUNTER — HOSPITAL ENCOUNTER (EMERGENCY)
Facility: HOSPITAL | Age: 77
Discharge: HOME | End: 2022-07-09
Attending: EMERGENCY MEDICINE
Payer: MEDICARE

## 2022-07-09 VITALS
RESPIRATION RATE: 16 BRPM | TEMPERATURE: 99 F | DIASTOLIC BLOOD PRESSURE: 85 MMHG | WEIGHT: 210 LBS | SYSTOLIC BLOOD PRESSURE: 148 MMHG | HEIGHT: 71 IN | OXYGEN SATURATION: 98 % | HEART RATE: 99 BPM | BODY MASS INDEX: 29.4 KG/M2

## 2022-07-09 DIAGNOSIS — R33.9 URINARY RETENTION: Primary | ICD-10-CM

## 2022-07-09 LAB
BILIRUB UR QL STRIP: NEGATIVE
BUN BLD-MCNC: 13 MG/DL (ref 4–21)
BUN SERPL-MCNC: 13 MG/DL (ref 6–30)
CALCIUM BLD-MCNC: 1.18 MMOL/L
CHLORIDE SERPL-SCNC: 100 MMOL/L (ref 95–110)
CHLORIDE SERPL-SCNC: 100 MMOL/L (ref 99–108)
CLARITY UR REFRACT.AUTO: CLEAR
CO2 TOTAL: 27
COLOR UR AUTO: ABNORMAL
CREAT SERPL-MCNC: 0.9 MG/DL (ref 0.5–1.4)
CREATININE: 0.9 MG/DL
GLUCOSE SERPL-MCNC: 102 MG/DL (ref 70–110)
GLUCOSE UR QL STRIP: NEGATIVE
GLUCOSE: 102 MG/DL
HCT VFR BLD AUTO: 38 % (ref 41–53)
HCT VFR BLD CALC: 38 %PCV (ref 36–54)
HGB UR QL STRIP: ABNORMAL
KETONES UR QL STRIP: NEGATIVE
LEUKOCYTE ESTERASE UR QL STRIP: NEGATIVE
MICROSCOPIC COMMENT: ABNORMAL
NITRITE UR QL STRIP: NEGATIVE
PH UR STRIP: 7 [PH] (ref 5–8)
POC IONIZED CALCIUM: 1.18 MMOL/L (ref 1.06–1.42)
POC TCO2 (MEASURED): 27 MMOL/L (ref 23–29)
POTASSIUM BLD-SCNC: 3.8 MMOL/L (ref 3.5–5.1)
POTASSIUM SERPL-SCNC: 3.8 MMOL/L (ref 3.4–5.3)
PROT UR QL STRIP: NEGATIVE
RBC #/AREA URNS AUTO: >100 /HPF (ref 0–4)
SAMPLE: NORMAL
SODIUM BLD-SCNC: 138 MMOL/L (ref 136–145)
SODIUM BLD-SCNC: 138 MMOL/L (ref 137–147)
SP GR UR STRIP: 1.01 (ref 1–1.03)
URN SPEC COLLECT METH UR: ABNORMAL
WBC #/AREA URNS AUTO: 2 /HPF (ref 0–5)

## 2022-07-09 PROCEDURE — 80047 BASIC METABLC PNL IONIZED CA: CPT

## 2022-07-09 PROCEDURE — 99283 EMERGENCY DEPT VISIT LOW MDM: CPT | Mod: 25

## 2022-07-09 PROCEDURE — 81001 URINALYSIS AUTO W/SCOPE: CPT | Performed by: EMERGENCY MEDICINE

## 2022-07-09 PROCEDURE — 99282 EMERGENCY DEPT VISIT SF MDM: CPT | Mod: ,,, | Performed by: EMERGENCY MEDICINE

## 2022-07-09 PROCEDURE — 51702 INSERT TEMP BLADDER CATH: CPT | Mod: 59

## 2022-07-09 PROCEDURE — 99282 PR EMERGENCY DEPT VISIT,LEVEL II: ICD-10-PCS | Mod: ,,, | Performed by: EMERGENCY MEDICINE

## 2022-07-09 PROCEDURE — 51798 US URINE CAPACITY MEASURE: CPT

## 2022-07-09 NOTE — ED NOTES
Patient identifiers for Tito Menard 76 y.o. male checked and correct.  Chief Complaint   Patient presents with    Urinary Retention     +dribbling. Last fully emptied bladder 2 days ago. -hematuria.       Past Medical History:   Diagnosis Date    Allergy     Aneurysm of artery of lower extremity     Chalazion of left eye     CKD (chronic kidney disease), stage II 4/1/2019    Diabetes mellitus type II     Diabetes with neurologic complications     Enlarged aorta 8/2/2016    Noted on pharmacological stress echo 2/28/2014.      GERD (gastroesophageal reflux disease)     egd 2008    Hyperlipidemia     Hypertension     Jock itch 7/19/2018    MGD (meibomian gland dysfunction)     Osteopenia     Spinal stenosis     Spondylosis without myelopathy 10/23/2015    Vitamin D deficiency disease      Allergies reported: Review of patient's allergies indicates:  No Known Allergies      LOC: Patient is awake, alert, and aware of environment with an appropriate affect. Patient is oriented x 4 and speaking appropriately.  APPEARANCE: Patient resting comfortably and in no acute distress. Patient is clean and well groomed, patient's clothing is properly fastened.  HEENT: Pt presents with surgical mask on.   SKIN: The skin is warm and dry. Patient has normal skin turgor and moist mucus membranes.   MUSKULOSKELETAL: Patient is moving all extremities well, no obvious deformities noted. Pulses intact.   RESPIRATORY: Airway is open and patent. Respirations are spontaneous and non-labored with normal effort and rate.  CARDIAC: Patient has a normal rate and rhythm. 75 on cardiac monitor. No peripheral edema noted.   ABDOMEN: No distention noted. Soft and non-tender upon palpation.  NEUROLOGICAL: pupils 4mm, PERRL. Facial expression is symmetrical. Hand grasps are equal bilaterally. Normal sensation in all extremities when touched with finger.

## 2022-07-09 NOTE — ED TRIAGE NOTES
The pt is a 76-year-old male who presents to the ED from home via personal transportation. The pt's chief complaint is urinary retention and abdominal pain and fullness. He hasn't been able to fully void for the last few days.

## 2022-07-09 NOTE — ED PROVIDER NOTES
Encounter Date: 7/9/2022       History     Chief Complaint   Patient presents with    Urinary Retention     +dribbling. Last fully emptied bladder 2 days ago. -hematuria.       77 y/o m, h/o DM, HTN, CKD, recent admit for orchitis c/b E. Coli bacteremia, completed 14 day course of ceftriaxone last week, c/o urinary sx x 3 days.  Reports urgency/frequency/dysuria but doesn't feel he's dribbling/not completely emptying bladder.  Reports scrotal swelling about the same, not worse.  No fevers at home though said he did feel warm on the way to the ER this morning.  No n/v/d.     The history is provided by the patient.     Review of patient's allergies indicates:  No Known Allergies  Past Medical History:   Diagnosis Date    Allergy     Aneurysm of artery of lower extremity     Chalazion of left eye     CKD (chronic kidney disease), stage II 4/1/2019    Diabetes mellitus type II     Diabetes with neurologic complications     Enlarged aorta 8/2/2016    Noted on pharmacological stress echo 2/28/2014.      GERD (gastroesophageal reflux disease)     egd 2008    Hyperlipidemia     Hypertension     Jock itch 7/19/2018    MGD (meibomian gland dysfunction)     Osteopenia     Spinal stenosis     Spondylosis without myelopathy 10/23/2015    Vitamin D deficiency disease      Past Surgical History:   Procedure Laterality Date    CHALAZION EXCISION Left 8/18/13    CYST REMOVAL  2013    Back of neck    FLEXIBLE CYSTOSCOPY N/A 12/7/2021    Procedure: CYSTOSCOPY, FLEXIBLE;  Surgeon: Beatrice Vaca MD;  Location: [address];  Service: Urology;  Laterality: N/A;    FLUOROSCOPIC URODYNAMIC STUDY N/A 12/7/2021    Procedure: URODYNAMIC STUDY, FLUOROSCOPIC;  Surgeon: Beatrice Vaca MD;  Location: [facility] OR [address] [place] [address];  Service: Urology;  Laterality: N/A;  90 minutes     SPINE SURGERY  2007    x2 (2000, 2007)     Family History   Problem Relation Age of Onset    Hyperlipidemia Mother     Hypertension Mother      Kidney disease Mother     Cancer Mother     Heart disease Mother     Kidney failure Mother     No Known Problems Daughter     No Known Problems Sister     No Known Problems Brother     No Known Problems Son     No Known Problems Brother     No Known Problems Son     Hypertension Maternal Grandmother     Amblyopia Neg Hx     Blindness Neg Hx     Cataracts Neg Hx     Diabetes Neg Hx     Glaucoma Neg Hx     Macular degeneration Neg Hx     Retinal detachment Neg Hx     Strabismus Neg Hx     Stroke Neg Hx     Thyroid disease Neg Hx     Melanoma Neg Hx     Psoriasis Neg Hx     Lupus Neg Hx     Eczema Neg Hx      Social History     Tobacco Use    Smoking status: Never Smoker    Smokeless tobacco: Former User     Types: Chew    Tobacco comment: Chewed tobacco once per day for 4-5 years when a    Substance Use Topics    Alcohol use: No    Drug use: No     Review of Systems   Constitutional: Negative for fever.   Respiratory: Negative for shortness of breath.    Genitourinary: Positive for decreased urine volume, difficulty urinating, dysuria, frequency, scrotal swelling and urgency. Negative for flank pain, hematuria, penile pain and penile swelling.   All other systems reviewed and are negative.      Physical Exam     Initial Vitals [07/09/22 1020]   BP Pulse Resp Temp SpO2   (!) 148/85 99 16 98.5 °F (36.9 °C) 98 %      MAP       --         Physical Exam    Nursing note and vitals reviewed.  Constitutional: Vital signs are normal. He appears well-developed and well-nourished. He is not diaphoretic.  Non-toxic appearance. He does not appear ill. No distress.   HENT:   Head: Normocephalic and atraumatic.   Mouth/Throat: Mucous membranes are normal. Mucous membranes are not dry.   Eyes: Conjunctivae and lids are normal.   Neck: Neck supple.   Normal range of motion.  Cardiovascular: Normal rate.   Pulmonary/Chest: No respiratory distress.   Genitourinary: Right testis shows swelling.  Right testis shows no tenderness. Left testis shows swelling. Left testis shows no tenderness.   Musculoskeletal:      Cervical back: Normal range of motion and neck supple.     Neurological: He is alert and oriented to person, place, and time.   Skin: Skin is dry and intact. No pallor.   Psychiatric: He has a normal mood and affect. His speech is normal and behavior is normal.         ED Course   Procedures  Labs Reviewed   URINALYSIS, REFLEX TO URINE CULTURE - Abnormal; Notable for the following components:       Result Value    Occult Blood UA 3+ (*)     All other components within normal limits    Narrative:     Specimen Source->Urine   URINALYSIS MICROSCOPIC - Abnormal; Notable for the following components:    RBC, UA >100 (*)     All other components within normal limits    Narrative:     Specimen Source->Urine   ISTAT CHEM8 - Abnormal; Notable for the following components:    Hematocrit 38 (*)     All other components within normal limits   ISTAT PROCEDURE          Imaging Results    None          Medications - No data to display  Medical Decision Making:   History:   Old Medical Records: I decided to obtain old medical records.  Initial Assessment:   Acute urinary retention  VSS  No clinical signs of recurrent sepsis    In terms of trigger, will r/o recurrent UTI.  Other concern is pt on ditropan which causes urinary retention.  Will need to stop this medication.  Clinical Tests:   Lab Tests: Ordered and Reviewed  ED Management:  UA  ISTAT    1:48 PM  Velasquez drained about 600 cc urine c/w urinary retention  UA with blood though no signs of infection  ISTAT shows normal renal function  Will d/c home with leg bag in place, d/c ditropan  Already has f/u with urology in 4 days                      Clinical Impression:   Final diagnoses:  [R33.9] Urinary retention (Primary)          ED Disposition Condition    Discharge Stable        ED Prescriptions     None        Follow-up Information     Follow up With  Specialties Details Why Contact Info    Beatrice Vaca MD Urology Go to   [address]  [place] [address]  [phone]             Saundra Chaparro MD  07/09/22 9130

## 2022-07-13 ENCOUNTER — OFFICE VISIT (OUTPATIENT)
Dept: UROLOGY | Facility: CLINIC | Age: 77
End: 2022-07-13
Payer: MEDICARE

## 2022-07-13 VITALS
HEART RATE: 83 BPM | DIASTOLIC BLOOD PRESSURE: 71 MMHG | BODY MASS INDEX: 30.19 KG/M2 | SYSTOLIC BLOOD PRESSURE: 139 MMHG | WEIGHT: 215.63 LBS | HEIGHT: 71 IN

## 2022-07-13 DIAGNOSIS — N45.3 EPIDIDYMO-ORCHITIS: ICD-10-CM

## 2022-07-13 DIAGNOSIS — N40.1 BPH WITH OBSTRUCTION/LOWER URINARY TRACT SYMPTOMS: Chronic | ICD-10-CM

## 2022-07-13 DIAGNOSIS — R33.9 URINARY RETENTION: Primary | ICD-10-CM

## 2022-07-13 DIAGNOSIS — N13.8 BPH WITH OBSTRUCTION/LOWER URINARY TRACT SYMPTOMS: Chronic | ICD-10-CM

## 2022-07-13 DIAGNOSIS — N32.81 OVERACTIVE BLADDER: ICD-10-CM

## 2022-07-13 PROCEDURE — 1159F MED LIST DOCD IN RCRD: CPT | Mod: CPTII,S$GLB,, | Performed by: UROLOGY

## 2022-07-13 PROCEDURE — 3072F PR LOW RISK FOR RETINOPATHY: ICD-10-PCS | Mod: CPTII,S$GLB,, | Performed by: UROLOGY

## 2022-07-13 PROCEDURE — 99214 OFFICE O/P EST MOD 30 MIN: CPT | Mod: S$GLB,,, | Performed by: UROLOGY

## 2022-07-13 PROCEDURE — 1159F PR MEDICATION LIST DOCUMENTED IN MEDICAL RECORD: ICD-10-PCS | Mod: CPTII,S$GLB,, | Performed by: UROLOGY

## 2022-07-13 PROCEDURE — 1125F PR PAIN SEVERITY QUANTIFIED, PAIN PRESENT: ICD-10-PCS | Mod: CPTII,S$GLB,, | Performed by: UROLOGY

## 2022-07-13 PROCEDURE — 99214 PR OFFICE/OUTPT VISIT, EST, LEVL IV, 30-39 MIN: ICD-10-PCS | Mod: S$GLB,,, | Performed by: UROLOGY

## 2022-07-13 PROCEDURE — 3078F PR MOST RECENT DIASTOLIC BLOOD PRESSURE < 80 MM HG: ICD-10-PCS | Mod: CPTII,S$GLB,, | Performed by: UROLOGY

## 2022-07-13 PROCEDURE — 1125F AMNT PAIN NOTED PAIN PRSNT: CPT | Mod: CPTII,S$GLB,, | Performed by: UROLOGY

## 2022-07-13 PROCEDURE — 3078F DIAST BP <80 MM HG: CPT | Mod: CPTII,S$GLB,, | Performed by: UROLOGY

## 2022-07-13 PROCEDURE — 3075F PR MOST RECENT SYSTOLIC BLOOD PRESS GE 130-139MM HG: ICD-10-PCS | Mod: CPTII,S$GLB,, | Performed by: UROLOGY

## 2022-07-13 PROCEDURE — 99999 PR PBB SHADOW E&M-EST. PATIENT-LVL V: CPT | Mod: PBBFAC,,, | Performed by: UROLOGY

## 2022-07-13 PROCEDURE — 1111F DSCHRG MED/CURRENT MED MERGE: CPT | Mod: CPTII,S$GLB,, | Performed by: UROLOGY

## 2022-07-13 PROCEDURE — 1111F PR DISCHARGE MEDS RECONCILED W/ CURRENT OUTPATIENT MED LIST: ICD-10-PCS | Mod: CPTII,S$GLB,, | Performed by: UROLOGY

## 2022-07-13 PROCEDURE — 3075F SYST BP GE 130 - 139MM HG: CPT | Mod: CPTII,S$GLB,, | Performed by: UROLOGY

## 2022-07-13 PROCEDURE — 99999 PR PBB SHADOW E&M-EST. PATIENT-LVL V: ICD-10-PCS | Mod: PBBFAC,,, | Performed by: UROLOGY

## 2022-07-13 PROCEDURE — 3072F LOW RISK FOR RETINOPATHY: CPT | Mod: CPTII,S$GLB,, | Performed by: UROLOGY

## 2022-07-13 RX ORDER — OXYBUTYNIN CHLORIDE 15 MG/1
5 TABLET, EXTENDED RELEASE ORAL DAILY
COMMUNITY
End: 2022-07-13 | Stop reason: SINTOL

## 2022-07-13 NOTE — PROGRESS NOTES
CHIEF COMPLAINT:    Mr. Menard is a 76 y.o. male presenting for a follow up on urinary retention    PRESENTING ILLNESS:    Ttio Menard is a 76 y.o. male who states he had increasing difficulty urinating on the 7th, went into urinary retention and went to the ER on 7/9/2022 where a catheter was placed. Six hundred cc was drained at the time.  He has since been noting the drainage with the leg bag.  He has been having bowel movements and they are log like but not copious.  He generally has to take a stool softener but has not been taking it recently.  He was discharged on both oxybutynin and Myrbetriq and he discontinued both medications after he went to the ER.      He was in the hospital with orchitis and had a condom catheter on.  He noted that he had more urine output with the condom cath.  (was being given IV fluids) so we discussed the difference between the indwelling yarbrough and a condom catheter.  He also had questions about the leg bag and the drainage.     REVIEW OF SYSTEMS:    Review of Systems   Constitutional: Negative.    HENT: Negative.    Eyes: Negative.    Respiratory: Negative.    Cardiovascular: Negative.    Gastrointestinal: Negative.    Musculoskeletal:        Presents in a wheel chair.    Skin: Negative.    Neurological: Positive for weakness.   Psychiatric/Behavioral: Negative.        PATIENT HISTORY:    Past Medical History:   Diagnosis Date    Allergy     Aneurysm of artery of lower extremity     Chalazion of left eye     CKD (chronic kidney disease), stage II 4/1/2019    Diabetes mellitus type II     Diabetes with neurologic complications     Enlarged aorta 8/2/2016    Noted on pharmacological stress echo 2/28/2014.      GERD (gastroesophageal reflux disease)     egd 2008    Hyperlipidemia     Hypertension     Jock itch 7/19/2018    MGD (meibomian gland dysfunction)     Osteopenia     Spinal stenosis     Spondylosis without myelopathy 10/23/2015    Vitamin D deficiency  disease        Past Surgical History:   Procedure Laterality Date    CHALAZION EXCISION Left 8/18/13    CYST REMOVAL  2013    Back of neck    FLEXIBLE CYSTOSCOPY N/A 12/7/2021    Procedure: CYSTOSCOPY, FLEXIBLE;  Surgeon: Beatrice Vaca MD;  Location: [facility] OR [address] [place] [address];  Service: Urology;  Laterality: N/A;    FLUOROSCOPIC URODYNAMIC STUDY N/A 12/7/2021    Procedure: URODYNAMIC STUDY, FLUOROSCOPIC;  Surgeon: Beatrice Vaca MD;  Location: [facility] OR [address] [place] [address];  Service: Urology;  Laterality: N/A;  90 minutes     SPINE SURGERY  2007    x2 (2000, 2007)       Family History   Problem Relation Age of Onset    Hyperlipidemia Mother     Hypertension Mother     Kidney disease Mother     Cancer Mother     Heart disease Mother     Kidney failure Mother     Hypertension Maternal Grandmother        Social History     Socioeconomic History    Marital status:    Occupational History    Occupation: Retired     Comment:    Tobacco Use    Smoking status: Never Smoker    Smokeless tobacco: Former User     Types: Chew    Tobacco comment: Chewed tobacco once per day for 4-5 years when a    Substance and Sexual Activity    Alcohol use: No    Drug use: No    Sexual activity: Not Currently     Partners: Female   Social History Narrative        Colon 2007       Allergies:  Patient has no known allergies.    Medications:  Outpatient Encounter Medications as of 7/13/2022   Medication Sig Dispense Refill    acetaminophen (TYLENOL) 650 MG TbSR Take 1,300 mg by mouth every 8 (eight) hours as needed.       albuterol 90 mcg/actuation inhaler Inhale 1-2 puffs into the lungs every 6 (six) hours as needed for Wheezing. 1 Inhaler 0    amLODIPine (NORVASC) 5 MG tablet TAKE 1 TABLET EVERY DAY 90 tablet 3    aspirin (ECOTRIN) 81 MG EC tablet TAKE 1 TABLET EVERY DAY 90 tablet 3    azelastine (ASTELIN) 137 mcg (0.1 %) nasal spray 1 spray (137 mcg total) by Nasal route 2 (two) times daily.  90 mL 3    benzonatate (TESSALON) 200 MG capsule TAKE 1 CAPSULE THREE TIMES DAILY AS NEEDED FOR COUGH 30 capsule 0    chlorthalidone (HYGROTEN) 25 MG Tab TAKE 1/2 TABLET EVERY DAY 45 tablet 2    cholecalciferol, vitamin D3, (VITAMIN D3) 50 mcg (2,000 unit) Cap Take 1 capsule by mouth once daily.      clotrimazole-betamethasone 1-0.05% (LOTRISONE) cream APPLY TOPICALLY 2 (TWO) TIMES DAILY. 45 g 0    docusate sodium (COLACE) 50 MG capsule Take by mouth 2 (two) times daily.      DULoxetine (CYMBALTA) 60 MG capsule TAKE 1 CAPSULE EVERY DAY FOR PAIN CONTROL 90 capsule 3    fluticasone propionate (FLONASE) 50 mcg/actuation nasal spray USE 1 SPRAY NASALLY ONE TIME DAILY 32 g 6    gabapentin (NEURONTIN) 300 MG capsule TAKE 1 CAPSULE EVERY MORNING AND AT NOON, AND TAKE 2 CAPSULES EVERY EVENING 120 capsule 3    guaifenesin (MUCINEX) 600 mg 12 hr tablet Take 1,200 mg by mouth 2 (two) times daily as needed.       ibuprofen (ADVIL,MOTRIN) 200 MG tablet Take 200 mg by mouth every 6 (six) hours as needed for Pain.      irbesartan (AVAPRO) 300 MG tablet TAKE 1 TABLET EVERY EVENING 90 tablet 3    ketoconazole (NIZORAL) 2 % cream AAA bid (facial redness and scaling) 60 g 3    ketoconazole (NIZORAL) 2 % shampoo Wash hair with medicated shampoo at least 2x/week - let sit on scalp at least 5 minutes prior to rinsing 120 mL 5    melatonin 10 mg Cap Take 1 capsule by mouth nightly as needed.       methocarbamol (ROBAXIN) 500 MG Tab 3 (three) times daily as needed.       omeprazole (PRILOSEC) 20 MG capsule TAKE 1 CAPSULE EVERY DAY 90 capsule 1    potassium chloride (MICRO-K) 10 MEQ CpSR TAKE 3 CAPSULES ONE TIME DAILY 270 capsule 3    pravastatin (PRAVACHOL) 20 MG tablet TAKE 1 TABLET EVERY DAY 90 tablet 3    tamsulosin (FLOMAX) 0.4 mg Cap Take 1 capsule (0.4 mg total) by mouth once daily. 90 capsule 3    [DISCONTINUED] mirabegron (MYRBETRIQ) 50 mg Tb24 Take 1 tablet (50 mg total) by mouth once daily. 30 tablet 11     [DISCONTINUED] oxybutynin (DITROPAN XL) 15 MG TR24 Take 5 mg by mouth once daily.      [DISCONTINUED] finasteride (PROSCAR) 5 mg tablet Take 1 tablet (5 mg total) by mouth once daily. 90 tablet 3    [DISCONTINUED] oxybutynin (DITROPAN-XL) 10 MG 24 hr tablet Take 1 tablet (10 mg total) by mouth once daily. 30 tablet 2     No facility-administered encounter medications on file as of 7/13/2022.         PHYSICAL EXAMINATION:    The patient generally appears in good health, is appropriately interactive, and is in no apparent distress.    Skin: No lesions.    Mental: Cooperative with normal affect.    Neuro: Grossly intact.    HEENT: Normal. No evidence of lymphadenopathy.    Chest:  normal inspiratory effort.    Abdomen: Soft, non-tender. No masses or organomegaly. Bladder is not palpable. No evidence of flank discomfort. No evidence of inguinal hernia.    Extremities: No clubbing, cyanosis, or edema    Circumcised,, yarbrough draining scotty colored urine.     LABS:    Lab Results   Component Value Date    BUN 13 07/09/2022    CREATININE 0.9 07/09/2022       IMPRESSION:    Encounter Diagnoses   Name Primary?    Urinary retention Yes    Epididymo-orchitis     Overactive bladder     BPH with obstruction/lower urinary tract symptoms        PLAN:    1.  Do not take the myrbetriq and oxybutynin  2.  Encouraged to take MOM and Colace to have a good bowel movement  3.  Follow up on Monday for catheter removal (will have him come in during the 8 o'clock hour for removal so if he goes into retention, he can come to the office for catheter placement rather than the ER.)   4.  velcro strap given and a large drainage bag to use at night.  Pt instructed on how to change it.

## 2022-07-14 ENCOUNTER — OFFICE VISIT (OUTPATIENT)
Dept: UROLOGY | Facility: CLINIC | Age: 77
End: 2022-07-14
Payer: MEDICARE

## 2022-07-14 VITALS
HEIGHT: 71 IN | WEIGHT: 217.81 LBS | BODY MASS INDEX: 30.49 KG/M2 | DIASTOLIC BLOOD PRESSURE: 71 MMHG | HEART RATE: 81 BPM | SYSTOLIC BLOOD PRESSURE: 132 MMHG

## 2022-07-14 DIAGNOSIS — R33.9 URINARY RETENTION: ICD-10-CM

## 2022-07-14 DIAGNOSIS — T83.021A DISPLACEMENT OF FOLEY CATHETER, INITIAL ENCOUNTER: Primary | ICD-10-CM

## 2022-07-14 PROCEDURE — 3288F PR FALLS RISK ASSESSMENT DOCUMENTED: ICD-10-PCS | Mod: CPTII,S$GLB,, | Performed by: UROLOGY

## 2022-07-14 PROCEDURE — 3072F LOW RISK FOR RETINOPATHY: CPT | Mod: CPTII,S$GLB,, | Performed by: UROLOGY

## 2022-07-14 PROCEDURE — 3078F PR MOST RECENT DIASTOLIC BLOOD PRESSURE < 80 MM HG: ICD-10-PCS | Mod: CPTII,S$GLB,, | Performed by: UROLOGY

## 2022-07-14 PROCEDURE — 51702 PR INSERTION OF TEMPORARY INDWELLING BLADDER CATHETER, SIMPLE: ICD-10-PCS | Mod: S$GLB,,, | Performed by: UROLOGY

## 2022-07-14 PROCEDURE — 1126F AMNT PAIN NOTED NONE PRSNT: CPT | Mod: CPTII,S$GLB,, | Performed by: UROLOGY

## 2022-07-14 PROCEDURE — 3075F PR MOST RECENT SYSTOLIC BLOOD PRESS GE 130-139MM HG: ICD-10-PCS | Mod: CPTII,S$GLB,, | Performed by: UROLOGY

## 2022-07-14 PROCEDURE — 99999 PR PBB SHADOW E&M-EST. PATIENT-LVL IV: CPT | Mod: PBBFAC,,, | Performed by: UROLOGY

## 2022-07-14 PROCEDURE — 1101F PR PT FALLS ASSESS DOC 0-1 FALLS W/OUT INJ PAST YR: ICD-10-PCS | Mod: CPTII,S$GLB,, | Performed by: UROLOGY

## 2022-07-14 PROCEDURE — 1126F PR PAIN SEVERITY QUANTIFIED, NO PAIN PRESENT: ICD-10-PCS | Mod: CPTII,S$GLB,, | Performed by: UROLOGY

## 2022-07-14 PROCEDURE — 1111F DSCHRG MED/CURRENT MED MERGE: CPT | Mod: CPTII,S$GLB,, | Performed by: UROLOGY

## 2022-07-14 PROCEDURE — 51702 INSERT TEMP BLADDER CATH: CPT | Mod: S$GLB,,, | Performed by: UROLOGY

## 2022-07-14 PROCEDURE — 99214 OFFICE O/P EST MOD 30 MIN: CPT | Mod: 25,S$GLB,, | Performed by: UROLOGY

## 2022-07-14 PROCEDURE — 99214 PR OFFICE/OUTPT VISIT, EST, LEVL IV, 30-39 MIN: ICD-10-PCS | Mod: 25,S$GLB,, | Performed by: UROLOGY

## 2022-07-14 PROCEDURE — 3288F FALL RISK ASSESSMENT DOCD: CPT | Mod: CPTII,S$GLB,, | Performed by: UROLOGY

## 2022-07-14 PROCEDURE — 1159F PR MEDICATION LIST DOCUMENTED IN MEDICAL RECORD: ICD-10-PCS | Mod: CPTII,S$GLB,, | Performed by: UROLOGY

## 2022-07-14 PROCEDURE — 99999 PR PBB SHADOW E&M-EST. PATIENT-LVL IV: ICD-10-PCS | Mod: PBBFAC,,, | Performed by: UROLOGY

## 2022-07-14 PROCEDURE — 1159F MED LIST DOCD IN RCRD: CPT | Mod: CPTII,S$GLB,, | Performed by: UROLOGY

## 2022-07-14 PROCEDURE — 1101F PT FALLS ASSESS-DOCD LE1/YR: CPT | Mod: CPTII,S$GLB,, | Performed by: UROLOGY

## 2022-07-14 PROCEDURE — 1111F PR DISCHARGE MEDS RECONCILED W/ CURRENT OUTPATIENT MED LIST: ICD-10-PCS | Mod: CPTII,S$GLB,, | Performed by: UROLOGY

## 2022-07-14 PROCEDURE — 3075F SYST BP GE 130 - 139MM HG: CPT | Mod: CPTII,S$GLB,, | Performed by: UROLOGY

## 2022-07-14 PROCEDURE — 3078F DIAST BP <80 MM HG: CPT | Mod: CPTII,S$GLB,, | Performed by: UROLOGY

## 2022-07-14 PROCEDURE — 3072F PR LOW RISK FOR RETINOPATHY: ICD-10-PCS | Mod: CPTII,S$GLB,, | Performed by: UROLOGY

## 2022-07-14 RX ORDER — SULFAMETHOXAZOLE AND TRIMETHOPRIM 800; 160 MG/1; MG/1
1 TABLET ORAL 2 TIMES DAILY
Qty: 14 TABLET | Refills: 0 | Status: SHIPPED | OUTPATIENT
Start: 2022-07-14 | End: 2022-07-21

## 2022-07-14 NOTE — PROGRESS NOTES
CHIEF COMPLAINT:    Mr. Menard is a 76 y.o. male presenting for an urgent appointment for gross hematuria and pain    PRESENTING ILLNESS:    Tito Menard is a 76 y.o. male whose wife called this morning stating that he had gross hematuria overnight and was reporting pain in the perineum and suprapubically.  He had emptied the bag earlier this morning and there was no urine draining.  He was offered an appointment this morning.     When he arrived he was light headed and had perineal and suprapubic pain.  The bag was empty and there was a residual of bloody urine in the 2 liter bag.     REVIEW OF SYSTEMS:    Review of Systems   Constitutional: Negative.    HENT: Negative.    Eyes: Negative.    Respiratory: Negative.    Cardiovascular: Negative.    Gastrointestinal: Positive for abdominal pain.   Genitourinary:        Catheter is not draining   Musculoskeletal: Negative.         Presents in a wheel chair   Skin: Negative.    Neurological:        Light headed   Psychiatric/Behavioral: Negative.        PATIENT HISTORY:    Past Medical History:   Diagnosis Date    Allergy     Aneurysm of artery of lower extremity     Chalazion of left eye     CKD (chronic kidney disease), stage II 4/1/2019    Diabetes mellitus type II     Diabetes with neurologic complications     Enlarged aorta 8/2/2016    Noted on pharmacological stress echo 2/28/2014.      GERD (gastroesophageal reflux disease)     egd 2008    Hyperlipidemia     Hypertension     Jock itch 7/19/2018    MGD (meibomian gland dysfunction)     Osteopenia     Spinal stenosis     Spondylosis without myelopathy 10/23/2015    Vitamin D deficiency disease        Past Surgical History:   Procedure Laterality Date    CHALAZION EXCISION Left 8/18/13    CYST REMOVAL  2013    Back of neck    FLEXIBLE CYSTOSCOPY N/A 12/7/2021    Procedure: CYSTOSCOPY, FLEXIBLE;  Surgeon: Beatrice Vaca MD;  Location: [facility] OR [address] [place] [address];  Service: Urology;  Laterality: N/A;     FLUOROSCOPIC URODYNAMIC STUDY N/A 12/7/2021    Procedure: URODYNAMIC STUDY, FLUOROSCOPIC;  Surgeon: Beatrice Vaca MD;  Location: [facility] OR [address] [place] [address];  Service: Urology;  Laterality: N/A;  90 minutes     SPINE SURGERY  2007    x2 (2000, 2007)       Family History   Problem Relation Age of Onset    Hyperlipidemia Mother     Hypertension Mother     Kidney disease Mother     Cancer Mother     Heart disease Mother     Kidney failure Mother        Social History     Socioeconomic History    Marital status:    Occupational History    Occupation: Retired     Comment:    Tobacco Use    Smoking status: Never Smoker    Smokeless tobacco: Former User     Types: Chew    Tobacco comment: Chewed tobacco once per day for 4-5 years when a    Substance and Sexual Activity    Alcohol use: No    Drug use: No    Sexual activity: Not Currently     Partners: Female   Social History Narrative        Colon 2007       Allergies:  Patient has no known allergies.    Medications:  Outpatient Encounter Medications as of 7/14/2022   Medication Sig Dispense Refill    acetaminophen (TYLENOL) 650 MG TbSR Take 1,300 mg by mouth every 8 (eight) hours as needed.       albuterol 90 mcg/actuation inhaler Inhale 1-2 puffs into the lungs every 6 (six) hours as needed for Wheezing. 1 Inhaler 0    amLODIPine (NORVASC) 5 MG tablet TAKE 1 TABLET EVERY DAY 90 tablet 3    aspirin (ECOTRIN) 81 MG EC tablet TAKE 1 TABLET EVERY DAY 90 tablet 3    azelastine (ASTELIN) 137 mcg (0.1 %) nasal spray 1 spray (137 mcg total) by Nasal route 2 (two) times daily. 90 mL 3    benzonatate (TESSALON) 200 MG capsule TAKE 1 CAPSULE THREE TIMES DAILY AS NEEDED FOR COUGH 30 capsule 0    chlorthalidone (HYGROTEN) 25 MG Tab TAKE 1/2 TABLET EVERY DAY 45 tablet 2    cholecalciferol, vitamin D3, (VITAMIN D3) 50 mcg (2,000 unit) Cap Take 1 capsule by mouth once daily.      clotrimazole-betamethasone 1-0.05% (LOTRISONE)  cream APPLY TOPICALLY 2 (TWO) TIMES DAILY. 45 g 0    docusate sodium (COLACE) 50 MG capsule Take by mouth 2 (two) times daily.      DULoxetine (CYMBALTA) 60 MG capsule TAKE 1 CAPSULE EVERY DAY FOR PAIN CONTROL 90 capsule 3    fluticasone propionate (FLONASE) 50 mcg/actuation nasal spray USE 1 SPRAY NASALLY ONE TIME DAILY 32 g 6    gabapentin (NEURONTIN) 300 MG capsule TAKE 1 CAPSULE EVERY MORNING AND AT NOON, AND TAKE 2 CAPSULES EVERY EVENING 120 capsule 3    guaifenesin (MUCINEX) 600 mg 12 hr tablet Take 1,200 mg by mouth 2 (two) times daily as needed.       ibuprofen (ADVIL,MOTRIN) 200 MG tablet Take 200 mg by mouth every 6 (six) hours as needed for Pain.      irbesartan (AVAPRO) 300 MG tablet TAKE 1 TABLET EVERY EVENING 90 tablet 3    ketoconazole (NIZORAL) 2 % cream AAA bid (facial redness and scaling) 60 g 3    ketoconazole (NIZORAL) 2 % shampoo Wash hair with medicated shampoo at least 2x/week - let sit on scalp at least 5 minutes prior to rinsing 120 mL 5    melatonin 10 mg Cap Take 1 capsule by mouth nightly as needed.       methocarbamol (ROBAXIN) 500 MG Tab 3 (three) times daily as needed.       omeprazole (PRILOSEC) 20 MG capsule TAKE 1 CAPSULE EVERY DAY 90 capsule 1    potassium chloride (MICRO-K) 10 MEQ CpSR TAKE 3 CAPSULES ONE TIME DAILY 270 capsule 3    pravastatin (PRAVACHOL) 20 MG tablet TAKE 1 TABLET EVERY DAY 90 tablet 3    sulfamethoxazole-trimethoprim 800-160mg (BACTRIM DS) 800-160 mg Tab Take 1 tablet by mouth 2 (two) times daily. for 7 days 14 tablet 0    tamsulosin (FLOMAX) 0.4 mg Cap Take 1 capsule (0.4 mg total) by mouth once daily. 90 capsule 3    [DISCONTINUED] finasteride (PROSCAR) 5 mg tablet Take 1 tablet (5 mg total) by mouth once daily. 90 tablet 3     No facility-administered encounter medications on file as of 7/14/2022.         PHYSICAL EXAMINATION:    The patient generally appears in good health, is appropriately interactive.  In some distress that resolved once  the bladder was drained.      Skin: No lesions. Not clammy.  Skin is warm and dry.    Mental: Cooperative with normal affect.    Neuro: Grossly intact.    HEENT: Normal. No evidence of lymphadenopathy.    Chest:  normal inspiratory effort.    Extremities: No clubbing, cyanosis, or edema    :  Circumcised, 16 Fr silicone catheter, flushes but will not aspirate.  Perineal pain had improved.      LABS:    Lab Results   Component Value Date    BUN 13 07/09/2022    CREATININE 0.9 07/09/2022       IMPRESSION:    Encounter Diagnoses   Name Primary?    Displacement of Yarbrough catheter, initial encounter Yes    Urinary retention        PLAN:    1. A 24 fr 3 way yarbrough catheter was placed after removing the old catheter and prepping with betadine.  He had rapid drainage of 700 ml tea colored urine.  The balloon was filled to 20 ml and irrigated with 300 ml of sterile saline and it was pale pink color. There were no clots.  I believe the catheter had become dislodged into the prostate and did not drain well.  Was attached to his leg with a velcro fixation device.   2.  It was placed to a new 2 liter bag.  A catheter plug placed on the in port of the 3 way.   3.  Bactrim DS was given empirically, based on previous culture when he had epididymo orchitis.   4.  Has a follow up on Monday morning for catheter removal.   5.  Before he was discharged his vitals were rechecked and he stated that the light headedness had resolved.     I spent 30 minutes with the patient of which more than half was spent in direct consultation with the patient in regards to our treatment and plan.

## 2022-07-18 ENCOUNTER — CLINICAL SUPPORT (OUTPATIENT)
Dept: UROLOGY | Facility: CLINIC | Age: 77
End: 2022-07-18

## 2022-07-18 ENCOUNTER — TELEPHONE (OUTPATIENT)
Dept: UROLOGY | Facility: CLINIC | Age: 77
End: 2022-07-18

## 2022-07-18 NOTE — TELEPHONE ENCOUNTER
----- Message from Elli Garcia sent at 7/18/2022  1:18 PM CDT -----  Regarding: pt advice  Contact: Guillermina (wife) @ [phone] or  [phone]  Caller asking to speak with someone in Dr. Vaca's office, says she was to call Maria Teresa back to update on patient's condition. Please call.

## 2022-07-18 NOTE — TELEPHONE ENCOUNTER
States his first void was only dribbling. Second void, he had a full stream but could not make it the bathroom to empty so he went into his depend diaper. Had a void, right before and had no pain, denies bladder pressure/fullness. Will keep up with output with urinal he has since he cannot move fast enough to make it to the bathroom. States he will see how he does tonight and will return call to clinic if he has any issues.

## 2022-07-18 NOTE — PROGRESS NOTES
Pt identified using two identifiers (name and ). 24 FR yarbrough catheter in place, noted draining clear, yellow urine to overnight catheter bag. Balloon deflated, 20mL of water removed. 24FR yarbrough cath d/c with no problems. Pt instructed to drink plenty of fluids and to call back to clinic at no later than 2:00pm to report on urination. Pt voiced understanding.

## 2022-07-20 ENCOUNTER — DOCUMENT SCAN (OUTPATIENT)
Dept: HOME HEALTH SERVICES | Facility: HOSPITAL | Age: 77
End: 2022-07-20
Payer: MEDICARE

## 2022-07-26 ENCOUNTER — TELEPHONE (OUTPATIENT)
Dept: INTERNAL MEDICINE | Facility: CLINIC | Age: 77
End: 2022-07-26
Payer: MEDICARE

## 2022-07-26 NOTE — TELEPHONE ENCOUNTER
----- Message from Camille Carlisle sent at 7/26/2022  9:23 AM CDT -----  Contact: [facility] ([place]) [phone]  Pt bp is 161/85.

## 2022-07-26 NOTE — TELEPHONE ENCOUNTER
Becky with [facility] was calling to inform Dr. Brown of patients blood pressure. She states that patient blood pressure was 161/85. Routed to Dr. Brown for further instructions. Thanks.

## 2022-07-27 ENCOUNTER — TELEPHONE (OUTPATIENT)
Dept: INTERNAL MEDICINE | Facility: CLINIC | Age: 77
End: 2022-07-27
Payer: MEDICARE

## 2022-07-27 NOTE — TELEPHONE ENCOUNTER
----- Message from Sravani Rajan sent at 7/27/2022  9:59 AM CDT -----  Name of Who is Calling:   ASHLEY FISHER [[id number]]          What is the request in detail: Patient is requesting call back about previous message from portal              Can the clinic reply by [facility]: no              What [facility] to [facility]: [phone]-dauz anthony

## 2022-07-27 NOTE — TELEPHONE ENCOUNTER
Spoke with pt and wife. Pt states he did not feel bad yesterday when HH took his B/P of 161/85. He has been taking all of his medications except for the one he was on in the hospital for urination.     Pt states his B/P was not that high earlier in the day. Pt and wife keeps a log.    7/27 AM    B/P 115/67 HR 81  7/26 AM    B/P 114/72 HR 83  7/24 noon B/P 126/71 HR 65  7/23 PM    B/P 133/83 HR 68  7/22 AM    B/P 130/81 HR 75  7/21 noon B/P 115/70 HR 74

## 2022-07-27 NOTE — TELEPHONE ENCOUNTER
Called and left VM on both phone #'s (pt and wife) asking for recent blood pressure readings and if pt taking HTN meds as prescribed? Also sent portal message as it shows portal active for pt as of today.

## 2022-07-27 NOTE — TELEPHONE ENCOUNTER
Becky states that he was asymptomatic before his blood pressure reading and states that she will not see patient for another two weeks. She suggest that I call the wife for additional blood pressure reading as she has the readings written down.     LVM for patient wife to return call to office. Please see message below for regards. Thanks.     Please ask wife patients blood pressure reading and inform Hannah Dudley PA-C and or Dr. Brown. Thanks.

## 2022-08-02 DIAGNOSIS — I10 ESSENTIAL HYPERTENSION: ICD-10-CM

## 2022-08-02 RX ORDER — IRBESARTAN 300 MG/1
300 TABLET ORAL NIGHTLY
Qty: 90 TABLET | Refills: 0 | Status: SHIPPED | OUTPATIENT
Start: 2022-08-02 | End: 2022-10-06

## 2022-08-02 NOTE — TELEPHONE ENCOUNTER
Care Due:                  Date            Visit Type   Department     Provider  --------------------------------------------------------------------------------                                [facility] INTERNAL  Ronny Neumann  Last Visit: 06-      FOLLOW UP    MEDICINE       [facility]  Next Visit: None Scheduled  None         None Found                                                            Last  Test          Frequency    Reason                     Performed    Due Date  --------------------------------------------------------------------------------    Lipid Panel.  12 months..  pravastatin..............  07- 07-    [facility] Embedded Care Gaps. Reference number: [id number]. 8/02/2022   12:15:43 PM CDT

## 2022-08-24 DIAGNOSIS — K21.00 GASTROESOPHAGEAL REFLUX DISEASE WITH ESOPHAGITIS: ICD-10-CM

## 2022-08-24 RX ORDER — OMEPRAZOLE 20 MG/1
CAPSULE, DELAYED RELEASE ORAL
Qty: 90 CAPSULE | Refills: 1 | Status: SHIPPED | OUTPATIENT
Start: 2022-08-24 | End: 2023-05-31

## 2022-08-24 NOTE — TELEPHONE ENCOUNTER
Refill Routing Note   Medication(s) are not appropriate for processing by [facility] for the following reason(s):      - Medication not previously prescribed by PCP    [facility] action(s):  Defer          Medication reconciliation completed: No     Appointments  past 12m or future 3m with PCP    Date Provider   Last Visit   12/29/2020 Giovanna Solano MD   Next Visit   Visit date not found Giovanna Solano MD   ED visits in past 90 days: 1        Note composed:4:39 PM 08/24/2022

## 2022-08-24 NOTE — TELEPHONE ENCOUNTER
Omeprazole at [facility] 6-28-22 with Dr. Jimenes  Allergies reviewed    Pended and routed

## 2022-08-24 NOTE — TELEPHONE ENCOUNTER
No new care gaps identified.  [facility] Embedded Care Gaps. Reference number: [id number]. 8/24/2022   1:51:24 PM CDT

## 2022-09-21 ENCOUNTER — DOCUMENT SCAN (OUTPATIENT)
Dept: HOME HEALTH SERVICES | Facility: HOSPITAL | Age: 77
End: 2022-09-21
Payer: MEDICARE

## 2022-09-22 ENCOUNTER — EXTERNAL HOME HEALTH (OUTPATIENT)
Dept: HOME HEALTH SERVICES | Facility: HOSPITAL | Age: 77
End: 2022-09-22
Payer: MEDICARE

## 2022-10-25 ENCOUNTER — PATIENT MESSAGE (OUTPATIENT)
Dept: ADMINISTRATIVE | Facility: HOSPITAL | Age: 77
End: 2022-10-25
Payer: MEDICARE

## 2022-10-26 ENCOUNTER — TELEPHONE (OUTPATIENT)
Dept: INTERNAL MEDICINE | Facility: CLINIC | Age: 77
End: 2022-10-26
Payer: MEDICARE

## 2022-10-26 DIAGNOSIS — R09.89 UNEQUAL BLOOD PRESSURES IN PAIRED EXTREMITIES: Primary | ICD-10-CM

## 2022-10-26 NOTE — TELEPHONE ENCOUNTER
Please arrange for pt to have apt with perry to have BP in both arms evaluated asap - pt noticed difference in bp of arms, hx of Aortic aneurysm   Schedule cardiology ultrasound of upper extemities please

## 2022-10-27 NOTE — TELEPHONE ENCOUNTER
Called pt, scheduled ultrasound for tomorrow at 9:15. Will keep an eye on tomorrow's schedule for openings for BP check of both arms per Dr. Brown - pt prefers earlier in the day.

## 2022-10-28 ENCOUNTER — HOSPITAL ENCOUNTER (OUTPATIENT)
Dept: CARDIOLOGY | Facility: OTHER | Age: 77
Discharge: HOME | End: 2022-10-28
Attending: INTERNAL MEDICINE
Payer: MEDICARE

## 2022-10-28 ENCOUNTER — HOSPITAL ENCOUNTER (OUTPATIENT)
Dept: RADIOLOGY | Facility: OTHER | Age: 77
Discharge: HOME | End: 2022-10-28
Attending: INTERNAL MEDICINE
Payer: MEDICARE

## 2022-10-28 DIAGNOSIS — R09.89 UNEQUAL BLOOD PRESSURES IN PAIRED EXTREMITIES: ICD-10-CM

## 2022-10-28 PROCEDURE — 93930 US UPPER EXTREMITY ARTERIES BILATERAL: ICD-10-PCS | Mod: 26,,, | Performed by: RADIOLOGY

## 2022-10-28 PROCEDURE — 93930 UPPER EXTREMITY STUDY: CPT | Mod: 26,,, | Performed by: RADIOLOGY

## 2022-10-28 PROCEDURE — 93930 UPPER EXTREMITY STUDY: CPT | Mod: TC

## 2022-10-31 ENCOUNTER — OFFICE VISIT (OUTPATIENT)
Dept: INTERNAL MEDICINE | Facility: CLINIC | Age: 77
End: 2022-10-31
Payer: MEDICARE

## 2022-10-31 VITALS
SYSTOLIC BLOOD PRESSURE: 142 MMHG | BODY MASS INDEX: 29.87 KG/M2 | HEART RATE: 79 BPM | OXYGEN SATURATION: 96 % | HEIGHT: 71 IN | WEIGHT: 213.38 LBS | DIASTOLIC BLOOD PRESSURE: 84 MMHG

## 2022-10-31 DIAGNOSIS — K21.9 GASTROESOPHAGEAL REFLUX DISEASE, UNSPECIFIED WHETHER ESOPHAGITIS PRESENT: ICD-10-CM

## 2022-10-31 DIAGNOSIS — R07.9 CHEST PAIN, UNSPECIFIED TYPE: Primary | ICD-10-CM

## 2022-10-31 DIAGNOSIS — I71.21 ANEURYSM OF ASCENDING AORTA WITHOUT RUPTURE: ICD-10-CM

## 2022-10-31 PROCEDURE — 1101F PR PT FALLS ASSESS DOC 0-1 FALLS W/OUT INJ PAST YR: ICD-10-PCS | Mod: CPTII,S$GLB,, | Performed by: STUDENT IN AN ORGANIZED HEALTH CARE EDUCATION/TRAINING PROGRAM

## 2022-10-31 PROCEDURE — 3077F PR MOST RECENT SYSTOLIC BLOOD PRESSURE >= 140 MM HG: ICD-10-PCS | Mod: CPTII,S$GLB,, | Performed by: STUDENT IN AN ORGANIZED HEALTH CARE EDUCATION/TRAINING PROGRAM

## 2022-10-31 PROCEDURE — 1159F PR MEDICATION LIST DOCUMENTED IN MEDICAL RECORD: ICD-10-PCS | Mod: CPTII,S$GLB,, | Performed by: STUDENT IN AN ORGANIZED HEALTH CARE EDUCATION/TRAINING PROGRAM

## 2022-10-31 PROCEDURE — 3079F PR MOST RECENT DIASTOLIC BLOOD PRESSURE 80-89 MM HG: ICD-10-PCS | Mod: CPTII,S$GLB,, | Performed by: STUDENT IN AN ORGANIZED HEALTH CARE EDUCATION/TRAINING PROGRAM

## 2022-10-31 PROCEDURE — 1125F AMNT PAIN NOTED PAIN PRSNT: CPT | Mod: CPTII,S$GLB,, | Performed by: STUDENT IN AN ORGANIZED HEALTH CARE EDUCATION/TRAINING PROGRAM

## 2022-10-31 PROCEDURE — 99214 PR OFFICE/OUTPT VISIT, EST, LEVL IV, 30-39 MIN: ICD-10-PCS | Mod: S$GLB,,, | Performed by: STUDENT IN AN ORGANIZED HEALTH CARE EDUCATION/TRAINING PROGRAM

## 2022-10-31 PROCEDURE — 1101F PT FALLS ASSESS-DOCD LE1/YR: CPT | Mod: CPTII,S$GLB,, | Performed by: STUDENT IN AN ORGANIZED HEALTH CARE EDUCATION/TRAINING PROGRAM

## 2022-10-31 PROCEDURE — 3288F PR FALLS RISK ASSESSMENT DOCUMENTED: ICD-10-PCS | Mod: CPTII,S$GLB,, | Performed by: STUDENT IN AN ORGANIZED HEALTH CARE EDUCATION/TRAINING PROGRAM

## 2022-10-31 PROCEDURE — 3072F LOW RISK FOR RETINOPATHY: CPT | Mod: CPTII,S$GLB,, | Performed by: STUDENT IN AN ORGANIZED HEALTH CARE EDUCATION/TRAINING PROGRAM

## 2022-10-31 PROCEDURE — 99999 PR PBB SHADOW E&M-EST. PATIENT-LVL V: CPT | Mod: PBBFAC,,, | Performed by: STUDENT IN AN ORGANIZED HEALTH CARE EDUCATION/TRAINING PROGRAM

## 2022-10-31 PROCEDURE — 3077F SYST BP >= 140 MM HG: CPT | Mod: CPTII,S$GLB,, | Performed by: STUDENT IN AN ORGANIZED HEALTH CARE EDUCATION/TRAINING PROGRAM

## 2022-10-31 PROCEDURE — 99214 OFFICE O/P EST MOD 30 MIN: CPT | Mod: S$GLB,,, | Performed by: STUDENT IN AN ORGANIZED HEALTH CARE EDUCATION/TRAINING PROGRAM

## 2022-10-31 PROCEDURE — 93010 EKG 12-LEAD: ICD-10-PCS | Mod: S$GLB,,, | Performed by: INTERNAL MEDICINE

## 2022-10-31 PROCEDURE — 99999 PR PBB SHADOW E&M-EST. PATIENT-LVL V: ICD-10-PCS | Mod: PBBFAC,,, | Performed by: STUDENT IN AN ORGANIZED HEALTH CARE EDUCATION/TRAINING PROGRAM

## 2022-10-31 PROCEDURE — 93005 EKG 12-LEAD: ICD-10-PCS | Mod: S$GLB,,, | Performed by: STUDENT IN AN ORGANIZED HEALTH CARE EDUCATION/TRAINING PROGRAM

## 2022-10-31 PROCEDURE — 93010 ELECTROCARDIOGRAM REPORT: CPT | Mod: S$GLB,,, | Performed by: INTERNAL MEDICINE

## 2022-10-31 PROCEDURE — 3079F DIAST BP 80-89 MM HG: CPT | Mod: CPTII,S$GLB,, | Performed by: STUDENT IN AN ORGANIZED HEALTH CARE EDUCATION/TRAINING PROGRAM

## 2022-10-31 PROCEDURE — 3072F PR LOW RISK FOR RETINOPATHY: ICD-10-PCS | Mod: CPTII,S$GLB,, | Performed by: STUDENT IN AN ORGANIZED HEALTH CARE EDUCATION/TRAINING PROGRAM

## 2022-10-31 PROCEDURE — 1159F MED LIST DOCD IN RCRD: CPT | Mod: CPTII,S$GLB,, | Performed by: STUDENT IN AN ORGANIZED HEALTH CARE EDUCATION/TRAINING PROGRAM

## 2022-10-31 PROCEDURE — 93005 ELECTROCARDIOGRAM TRACING: CPT | Mod: S$GLB,,, | Performed by: STUDENT IN AN ORGANIZED HEALTH CARE EDUCATION/TRAINING PROGRAM

## 2022-10-31 PROCEDURE — 3288F FALL RISK ASSESSMENT DOCD: CPT | Mod: CPTII,S$GLB,, | Performed by: STUDENT IN AN ORGANIZED HEALTH CARE EDUCATION/TRAINING PROGRAM

## 2022-10-31 PROCEDURE — 1125F PR PAIN SEVERITY QUANTIFIED, PAIN PRESENT: ICD-10-PCS | Mod: CPTII,S$GLB,, | Performed by: STUDENT IN AN ORGANIZED HEALTH CARE EDUCATION/TRAINING PROGRAM

## 2022-10-31 RX ORDER — PANTOPRAZOLE SODIUM 40 MG/1
40 TABLET, DELAYED RELEASE ORAL DAILY
Qty: 30 TABLET | Refills: 0 | Status: SHIPPED | OUTPATIENT
Start: 2022-10-31 | End: 2023-05-31

## 2022-10-31 RX ORDER — SUCRALFATE 1 G/1
1 TABLET ORAL
Qty: 56 TABLET | Refills: 0 | Status: SHIPPED | OUTPATIENT
Start: 2022-10-31 | End: 2022-10-31

## 2022-10-31 RX ORDER — SUCRALFATE 1 G/1
1 TABLET ORAL
Qty: 56 TABLET | Refills: 0 | Status: SHIPPED | OUTPATIENT
Start: 2022-10-31 | End: 2022-11-14

## 2022-10-31 RX ORDER — PANTOPRAZOLE SODIUM 40 MG/1
40 TABLET, DELAYED RELEASE ORAL DAILY
Qty: 30 TABLET | Refills: 0 | Status: SHIPPED | OUTPATIENT
Start: 2022-10-31 | End: 2022-10-31

## 2022-10-31 NOTE — PROGRESS NOTES
"Subjective:       Patient ID: Tito Menard is a 76 y.o. male.    Chief Complaint: Chest pain, unspecified type [R07.9]    Patient is new to me, PCP is Dr. Brown.    Intermittent CP occurring for past 1-2 weeks, worsening over that time  Endorses dull ache/burning in central chest  Non-radiating  Taking omeprazole 20 mg BID routinely  Taking Tums since yesterday with short term improvement in pain  Endorses belching more, pain feels better after belching  Pain worse after eating  Denies pain with exertion or strenuous activity  Denies SOB, palpitations  Endorses episode of constipation, took two stool softeners with resolution of symptoms   Last EGD in 2008    History of thoracic aortic aneurysm  CT chest [id number] with:  "Stable fusiform dilatation of the mid ascending thoracic aorta measuring 4.1 cm in maximum dimension."  U/s of BUE arterial done on Friday:  "No evidence for focal hemodynamically significant stenosis of the upper extremity arteries as above"  Stress echo JULY2021:   "The ECG portion of this study is negative for myocardial ischemia.  The stress echo portion of this study is negative for myocardial ischemia."  Denies history of heart attack or heart failure    Review of Systems   Constitutional:  Positive for appetite change. Negative for fever and unexpected weight change.   Respiratory:  Negative for cough and shortness of breath.    Cardiovascular:  Positive for chest pain. Negative for palpitations.   Gastrointestinal:  Positive for abdominal pain and constipation. Negative for blood in stool, diarrhea, nausea and vomiting.   Genitourinary:  Negative for difficulty urinating and dysuria.       Current Outpatient Medications   Medication Instructions    acetaminophen (TYLENOL) 1,300 mg, Oral, Every 8 hours PRN    albuterol 90 mcg/actuation inhaler 1-2 puffs, Inhalation, Every 6 hours PRN    amLODIPine (NORVASC) 5 MG tablet TAKE 1 TABLET EVERY DAY    aspirin (ECOTRIN) 81 MG EC tablet TAKE 1 " "TABLET EVERY DAY    azelastine (ASTELIN) 137 mcg, Nasal, 2 times daily    benzonatate (TESSALON) 200 MG capsule TAKE 1 CAPSULE THREE TIMES DAILY AS NEEDED FOR COUGH    chlorthalidone (HYGROTEN) 25 MG Tab TAKE 1/2 TABLET EVERY DAY    cholecalciferol, vitamin D3, (VITAMIN D3) 50 mcg (2,000 unit) Cap 1 capsule, Oral, Daily    clotrimazole-betamethasone 1-0.05% (LOTRISONE) cream Topical (Top), 2 times daily    docusate sodium (COLACE) 50 MG capsule Oral, 2 times daily    DULoxetine (CYMBALTA) 60 MG capsule TAKE 1 CAPSULE EVERY DAY FOR PAIN CONTROL    fluticasone propionate (FLONASE) 50 mcg/actuation nasal spray USE 1 SPRAY NASALLY ONE TIME DAILY    gabapentin (NEURONTIN) 300 MG capsule Take 1 capsule every morning and at noon, and take 2 capsules every evening (4 total daily)    guaiFENesin (MUCINEX) 1,200 mg, Oral, 2 times daily PRN    ibuprofen (ADVIL,MOTRIN) 200 mg, Oral, Every 6 hours PRN    irbesartan (AVAPRO) 300 MG tablet TAKE 1 TABLET EVERY EVENING    ketoconazole (NIZORAL) 2 % cream AAA bid (facial redness and scaling)    ketoconazole (NIZORAL) 2 % shampoo Wash hair with medicated shampoo at least 2x/week - let sit on scalp at least 5 minutes prior to rinsing    melatonin 10 mg Cap 1 capsule, Oral, Nightly PRN    methocarbamol (ROBAXIN) 500 MG Tab 3 times daily PRN    omeprazole (PRILOSEC) 20 MG capsule TAKE 1 CAPSULE EVERY DAY    pantoprazole (PROTONIX) 40 mg, Oral, Daily    potassium chloride (MICRO-K) 10 MEQ CpSR TAKE 3 CAPSULES ONE TIME DAILY    pravastatin (PRAVACHOL) 20 MG tablet TAKE 1 TABLET EVERY DAY    sucralfate (CARAFATE) 1 g, Oral, Before meals & nightly    tamsulosin (FLOMAX) 0.4 mg, Oral, Daily     Objective:      Vitals:    10/31/22 0954   BP: (!) 142/84   Pulse: 79   SpO2: 96%   Weight: 96.8 kg (213 lb 6.5 oz)   Height: 5' 11" (1.803 m)   PainSc:   6   PainLoc: Back     Body mass index is 29.76 kg/m².    Physical Exam  Vitals reviewed.   Constitutional:       General: He is not in acute " distress.     Appearance: Normal appearance. He is not ill-appearing or diaphoretic.      Comments: Ambulatory with walker   HENT:      Head: Normocephalic and atraumatic.   Eyes:      Conjunctiva/sclera: Conjunctivae normal.   Cardiovascular:      Rate and Rhythm: Normal rate and regular rhythm.      Heart sounds: Normal heart sounds. No murmur heard.    No friction rub. No gallop.   Pulmonary:      Effort: Pulmonary effort is normal. No respiratory distress.      Breath sounds: Normal breath sounds. No stridor. No wheezing, rhonchi or rales.   Abdominal:      General: Bowel sounds are normal. There is no distension.      Palpations: Abdomen is soft. There is no mass.      Tenderness: There is no abdominal tenderness. There is no guarding or rebound.   Skin:     General: Skin is warm and dry.      Comments: Color WNL   Neurological:      Mental Status: He is alert. Mental status is at baseline.   Psychiatric:         Mood and Affect: Mood normal.         Behavior: Behavior normal.       Assessment:       1. Chest pain, unspecified type    2. Aneurysm of ascending aorta without rupture    3. Gastroesophageal reflux disease, unspecified whether esophagitis present        Plan:       Chest pain, unspecified type  EKG in office sinus rhythm with RBBB, no acute ST changes  Per history, sounds more likely GI in etiology  Given history of aneurysm and risk factors, will refer to cardiology for further evaluation  Discussed ED precautions and signs/symptoms that would prompt emergent evaluation in the ED  Patient understanding of warning signs/symptoms  -     IN OFFICE EKG 12-LEAD (to [place])  -     Ambulatory referral/consult to Cardiology; Future  -     CT Chest Without Contrast; Future    Aneurysm of ascending aorta without rupture  Will repeat CT to evaluate thoracic aortic aneurysm   -     CT Chest Without Contrast; Future    Gastroesophageal reflux disease, unspecified whether esophagitis present  Hold omeprazole for 1  month  Start pantoprazole for 1 month  Start carafate qAC and qHS for 1-2 weeks  Can restart omeprazole when completes 1 month course of pantoprazole  If symptoms unimproved with treatment in 1-2 weeks, contact office  -     pantoprazole (PROTONIX) 40 MG tablet; Take 1 tablet (40 mg total) by mouth once daily.  -     sucralfate (CARAFATE) 1 gram tablet; Take 1 tablet (1 g total) by mouth 4 (four) times daily before meals and nightly. for 14 days      Saskia Carrasco MD  10/31/2022

## 2022-10-31 NOTE — PATIENT INSTRUCTIONS
Stop taking the omeprazole for 30 days.  Start taking the pantoprazole for the next 30 days.  Once you finish the pantoprazole then you can restart the omeprazole.   Take sucralfate three times daily with meals and once before bedtime for the next 1-2 weeks.

## 2022-11-02 ENCOUNTER — OFFICE VISIT (OUTPATIENT)
Dept: CARDIOLOGY | Facility: CLINIC | Age: 77
End: 2022-11-02
Payer: MEDICARE

## 2022-11-02 ENCOUNTER — TELEPHONE (OUTPATIENT)
Dept: GASTROENTEROLOGY | Facility: CLINIC | Age: 77
End: 2022-11-02
Payer: MEDICARE

## 2022-11-02 ENCOUNTER — HOSPITAL ENCOUNTER (OUTPATIENT)
Dept: CARDIOLOGY | Facility: CLINIC | Age: 77
Discharge: HOME | End: 2022-11-02
Payer: MEDICARE

## 2022-11-02 ENCOUNTER — HOSPITAL ENCOUNTER (OUTPATIENT)
Dept: RADIOLOGY | Facility: HOSPITAL | Age: 77
Discharge: HOME | End: 2022-11-02
Attending: STUDENT IN AN ORGANIZED HEALTH CARE EDUCATION/TRAINING PROGRAM
Payer: MEDICARE

## 2022-11-02 ENCOUNTER — OFFICE VISIT (OUTPATIENT)
Dept: TRANSPLANT | Facility: CLINIC | Age: 77
End: 2022-11-02
Payer: MEDICARE

## 2022-11-02 VITALS
BODY MASS INDEX: 30 KG/M2 | HEART RATE: 69 BPM | OXYGEN SATURATION: 96 % | DIASTOLIC BLOOD PRESSURE: 79 MMHG | HEIGHT: 71 IN | SYSTOLIC BLOOD PRESSURE: 140 MMHG | WEIGHT: 214.31 LBS

## 2022-11-02 DIAGNOSIS — R07.9 CHEST PAIN, UNSPECIFIED TYPE: ICD-10-CM

## 2022-11-02 DIAGNOSIS — I10 ESSENTIAL HYPERTENSION: ICD-10-CM

## 2022-11-02 DIAGNOSIS — I51.89 GRADE I DIASTOLIC DYSFUNCTION: ICD-10-CM

## 2022-11-02 DIAGNOSIS — Z86.39 HISTORY OF DIABETES MELLITUS, TYPE II: ICD-10-CM

## 2022-11-02 DIAGNOSIS — I45.10 RIGHT BUNDLE BRANCH BLOCK: Chronic | ICD-10-CM

## 2022-11-02 DIAGNOSIS — E78.2 MIXED HYPERLIPIDEMIA: Chronic | ICD-10-CM

## 2022-11-02 DIAGNOSIS — I71.21 ANEURYSM OF ASCENDING AORTA WITHOUT RUPTURE: Chronic | ICD-10-CM

## 2022-11-02 DIAGNOSIS — R07.89 CHEST DISCOMFORT: Primary | ICD-10-CM

## 2022-11-02 DIAGNOSIS — M41.9 RESTRICTIVE LUNG DISEASE DUE TO KYPHOSCOLIOSIS: Chronic | ICD-10-CM

## 2022-11-02 DIAGNOSIS — I71.21 ANEURYSM OF ASCENDING AORTA WITHOUT RUPTURE: ICD-10-CM

## 2022-11-02 DIAGNOSIS — J44.89 OBSTRUCTIVE CHRONIC BRONCHITIS WITHOUT EXACERBATION: ICD-10-CM

## 2022-11-02 DIAGNOSIS — I51.89 GRADE I DIASTOLIC DYSFUNCTION: Primary | ICD-10-CM

## 2022-11-02 DIAGNOSIS — R55 SYNCOPE, UNSPECIFIED SYNCOPE TYPE: ICD-10-CM

## 2022-11-02 DIAGNOSIS — J98.4 RESTRICTIVE LUNG DISEASE DUE TO KYPHOSCOLIOSIS: Chronic | ICD-10-CM

## 2022-11-02 DIAGNOSIS — I10 ESSENTIAL HYPERTENSION: Chronic | ICD-10-CM

## 2022-11-02 PROBLEM — I35.1: Status: RESOLVED | Noted: 2020-02-13 | Resolved: 2022-11-02

## 2022-11-02 PROBLEM — I51.9: Status: RESOLVED | Noted: 2020-02-13 | Resolved: 2022-11-02

## 2022-11-02 PROBLEM — R94.31 NONSPECIFIC ABNORMAL ELECTROCARDIOGRAM (ECG) (EKG): Status: RESOLVED | Noted: 2018-07-17 | Resolved: 2022-11-02

## 2022-11-02 PROCEDURE — 3077F PR MOST RECENT SYSTOLIC BLOOD PRESSURE >= 140 MM HG: ICD-10-PCS | Mod: CPTII,S$GLB,, | Performed by: INTERNAL MEDICINE

## 2022-11-02 PROCEDURE — 1159F MED LIST DOCD IN RCRD: CPT | Mod: CPTII,S$GLB,, | Performed by: INTERNAL MEDICINE

## 2022-11-02 PROCEDURE — 3078F PR MOST RECENT DIASTOLIC BLOOD PRESSURE < 80 MM HG: ICD-10-PCS | Mod: CPTII,S$GLB,, | Performed by: INTERNAL MEDICINE

## 2022-11-02 PROCEDURE — 99499 UNLISTED E&M SERVICE: CPT | Mod: S$GLB,,, | Performed by: INTERNAL MEDICINE

## 2022-11-02 PROCEDURE — 71250 CT THORAX DX C-: CPT | Mod: 26,,, | Performed by: RADIOLOGY

## 2022-11-02 PROCEDURE — 1159F PR MEDICATION LIST DOCUMENTED IN MEDICAL RECORD: ICD-10-PCS | Mod: CPTII,S$GLB,, | Performed by: INTERNAL MEDICINE

## 2022-11-02 PROCEDURE — 1101F PT FALLS ASSESS-DOCD LE1/YR: CPT | Mod: CPTII,S$GLB,, | Performed by: INTERNAL MEDICINE

## 2022-11-02 PROCEDURE — 99999 PR PBB SHADOW E&M-EST. PATIENT-LVL V: ICD-10-PCS | Mod: PBBFAC,,, | Performed by: INTERNAL MEDICINE

## 2022-11-02 PROCEDURE — 1126F AMNT PAIN NOTED NONE PRSNT: CPT | Mod: CPTII,S$GLB,, | Performed by: INTERNAL MEDICINE

## 2022-11-02 PROCEDURE — 3072F LOW RISK FOR RETINOPATHY: CPT | Mod: CPTII,S$GLB,, | Performed by: INTERNAL MEDICINE

## 2022-11-02 PROCEDURE — 1126F PR PAIN SEVERITY QUANTIFIED, NO PAIN PRESENT: ICD-10-PCS | Mod: CPTII,S$GLB,, | Performed by: INTERNAL MEDICINE

## 2022-11-02 PROCEDURE — 99499 RISK ADDL DX/OHS AUDIT: ICD-10-PCS | Mod: S$GLB,,, | Performed by: INTERNAL MEDICINE

## 2022-11-02 PROCEDURE — 99214 OFFICE O/P EST MOD 30 MIN: CPT | Mod: S$GLB,,, | Performed by: INTERNAL MEDICINE

## 2022-11-02 PROCEDURE — 71250 CT THORAX DX C-: CPT | Mod: TC

## 2022-11-02 PROCEDURE — 3072F PR LOW RISK FOR RETINOPATHY: ICD-10-PCS | Mod: CPTII,S$GLB,, | Performed by: INTERNAL MEDICINE

## 2022-11-02 PROCEDURE — 93000 EKG 12-LEAD: ICD-10-PCS | Mod: S$GLB,,, | Performed by: INTERNAL MEDICINE

## 2022-11-02 PROCEDURE — 1101F PR PT FALLS ASSESS DOC 0-1 FALLS W/OUT INJ PAST YR: ICD-10-PCS | Mod: CPTII,S$GLB,, | Performed by: INTERNAL MEDICINE

## 2022-11-02 PROCEDURE — 99499 NO LOS: ICD-10-PCS | Mod: S$GLB,,, | Performed by: INTERNAL MEDICINE

## 2022-11-02 PROCEDURE — 3078F DIAST BP <80 MM HG: CPT | Mod: CPTII,S$GLB,, | Performed by: INTERNAL MEDICINE

## 2022-11-02 PROCEDURE — 3288F PR FALLS RISK ASSESSMENT DOCUMENTED: ICD-10-PCS | Mod: CPTII,S$GLB,, | Performed by: INTERNAL MEDICINE

## 2022-11-02 PROCEDURE — 3077F SYST BP >= 140 MM HG: CPT | Mod: CPTII,S$GLB,, | Performed by: INTERNAL MEDICINE

## 2022-11-02 PROCEDURE — 71250 CT CHEST WITHOUT CONTRAST: ICD-10-PCS | Mod: 26,,, | Performed by: RADIOLOGY

## 2022-11-02 PROCEDURE — 3288F FALL RISK ASSESSMENT DOCD: CPT | Mod: CPTII,S$GLB,, | Performed by: INTERNAL MEDICINE

## 2022-11-02 PROCEDURE — 99214 PR OFFICE/OUTPT VISIT, EST, LEVL IV, 30-39 MIN: ICD-10-PCS | Mod: S$GLB,,, | Performed by: INTERNAL MEDICINE

## 2022-11-02 PROCEDURE — 99999 PR PBB SHADOW E&M-EST. PATIENT-LVL V: CPT | Mod: PBBFAC,,, | Performed by: INTERNAL MEDICINE

## 2022-11-02 PROCEDURE — 93000 ELECTROCARDIOGRAM COMPLETE: CPT | Mod: S$GLB,,, | Performed by: INTERNAL MEDICINE

## 2022-11-02 NOTE — TELEPHONE ENCOUNTER
----- Message from Isabel Richardson sent at 11/2/2022  9:38 AM CDT -----  Contact: pt @ [phone]  ASHLEY FISHER calling regarding Appointment Access  (message) for #schedule appt for Chest discomfort [R07.89] pt has referrel in chart pt will like to be seen earlier then 1/3 asking for call back

## 2022-11-02 NOTE — PROGRESS NOTES
Cardiology Clinic Note  Reason for Visit: CP    HPI:     Tito Menard is a 76 y.o. M, who presents for chest pain.    He reports that he has central chest discomfort for the past two weeks after taking pills or eating.  Happening almost daily. Most foods cause this issue. No issue with liquids.  Last occurrence on 10/31, symptoms lasted all day.  Last EGD in 2013.    He feels short of breath with exertion. No chest pain with exertion.  His back limits exercise. Had two prior surgeries.  He walks in his driveway, but he is not very active.  He had a stress echo in 7/2021 for the same symptoms. Stress was normal.   PFTs 2018 with restrictive lung disease.    Medical: HTN, HLD, h/o DM2 (controlled off meds), CKD 2, enlarged aorta (CT 2/2020 - mid ascending aorta 4.1cm; 4.1cm on echo), RBBB  Surgical: Reviewed, as below.  Family: Reviewed, as below. Mother with heart disease.  Social: Reviewed, as below. 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    ROS:    Pertinent ROS included in HPI and below.  PMH:     Past Medical History:   Diagnosis Date    Allergy     Aneurysm of artery of lower extremity     Chalazion of left eye     CKD (chronic kidney disease), stage II 4/1/2019    Diabetes mellitus type II     Diabetes with neurologic complications     Enlarged aorta 8/2/2016    Noted on pharmacological stress echo 2/28/2014.      GERD (gastroesophageal reflux disease)     egd 2008    Hyperlipidemia     Hypertension     Jock itch 7/19/2018    MGD (meibomian gland dysfunction)     Osteopenia     Spinal stenosis     Spondylosis without myelopathy 10/23/2015    Vitamin D deficiency disease      Past Surgical History:   Procedure Laterality Date    CHALAZION EXCISION Left 8/18/13    CYST REMOVAL  2013    Back of neck    FLEXIBLE CYSTOSCOPY N/A 12/7/2021    Procedure: CYSTOSCOPY, FLEXIBLE;  Surgeon: Beatrice Vaca MD;  Location: [facility] OR [address] [place] [address];  Service: Urology;  Laterality: N/A;    FLUOROSCOPIC URODYNAMIC STUDY  FOLLOW-UP VISIT    Leonides is here today for follow-up and reevaluation of right knee. He was last seen 1 year ago at which time we decided to proceed with reconstruction of the medial patellofemoral ligament. For a variety of reasons, he decided to hold off on surgical intervention. However, on 7/6/18 he woke up and was getting out of bed and heard the right knee \"pop\". The patella had come out of place again. He presented to the emergency room and has been treated with a brace since then. He has been off work since then. He works as a  at [employer].  He is wearing a short knee immobilizer. He feels that the knee is slowly getting better.    PHYSICAL EXAMINATION:  In general, He is alert and oriented and in no acute distress. Leonides is well-groomed and cooperative with the exam. Affect and mood are appropriate. Skin is intact. No lesions noted.     On physical examination of the right knee, there is no atrophy or deformity. Skin is intact. Q angle is within normal limits.  Range of motion is from a few degrees of recurvatum to flexion 120° with mild patellofemoral crepitation. There is trace effusion.  On palpation, there is no significant medial joint line tenderness and no tenderness noted over the lateral joint line.    There is no tenderness noted over the calf or popliteal area. There is no tenderness noted over the lateral retinaculum.   [place]'s sign is very positive. There is no tenderness noted over the patellar tendon.  Varus and valgus laxity testing in terminal extension and 30° of flexion is unremarkable. Lachman, anterior drawer, and pivot shift are negative. Modified Alexandrea's exam is deferred.   There is no calf or popliteal tenderness or swelling. There is no cyanosis, clubbing, or edema distally. Sensory and motor exams are intact. Distal pulses are intact. Gait is mildly antalgic.   Brief exam of the left knee: Palpation, range of motion assessment, and strength are within normal  N/A 12/7/2021    Procedure: URODYNAMIC STUDY, FLUOROSCOPIC;  Surgeon: Beatrice Vaca MD;  Location: [facility] OR [address] [place] [address];  Service: Urology;  Laterality: N/A;  90 minutes     SPINE SURGERY  2007    x2 (2000, 2007)     Allergies:   Review of patient's allergies indicates:  No Known Allergies  Medications:     Current Outpatient Medications on File Prior to Visit   Medication Sig Dispense Refill    acetaminophen (TYLENOL) 650 MG TbSR Take 1,300 mg by mouth every 8 (eight) hours as needed.       albuterol 90 mcg/actuation inhaler Inhale 1-2 puffs into the lungs every 6 (six) hours as needed for Wheezing. 1 Inhaler 0    amLODIPine (NORVASC) 5 MG tablet TAKE 1 TABLET EVERY DAY 90 tablet 3    aspirin (ECOTRIN) 81 MG EC tablet TAKE 1 TABLET EVERY DAY 90 tablet 3    azelastine (ASTELIN) 137 mcg (0.1 %) nasal spray 1 spray (137 mcg total) by Nasal route 2 (two) times daily. 90 mL 3    benzonatate (TESSALON) 200 MG capsule TAKE 1 CAPSULE THREE TIMES DAILY AS NEEDED FOR COUGH 30 capsule 0    chlorthalidone (HYGROTEN) 25 MG Tab TAKE 1/2 TABLET EVERY DAY 45 tablet 2    cholecalciferol, vitamin D3, (VITAMIN D3) 50 mcg (2,000 unit) Cap Take 1 capsule by mouth once daily.      clotrimazole-betamethasone 1-0.05% (LOTRISONE) cream APPLY TOPICALLY 2 (TWO) TIMES DAILY. 45 g 0    docusate sodium (COLACE) 50 MG capsule Take by mouth 2 (two) times daily.      DULoxetine (CYMBALTA) 60 MG capsule TAKE 1 CAPSULE EVERY DAY FOR PAIN CONTROL 90 capsule 3    fluticasone propionate (FLONASE) 50 mcg/actuation nasal spray USE 1 SPRAY NASALLY ONE TIME DAILY 32 g 6    gabapentin (NEURONTIN) 300 MG capsule Take 1 capsule every morning and at noon, and take 2 capsules every evening (4 total daily) 360 capsule 0    guaifenesin (MUCINEX) 600 mg 12 hr tablet Take 1,200 mg by mouth 2 (two) times daily as needed.       ibuprofen (ADVIL,MOTRIN) 200 MG tablet Take 200 mg by mouth every 6 (six) hours as needed for Pain.      irbesartan (AVAPRO) 300 MG tablet  limits.      IMAGING:  His prior MRI scan from 1 year ago is reviewed once again with him today. 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    PLAN:  I had a very extensive conversation with him today regarding treatment alternatives. I explained that his patella is going to continue to come out of place on an unpredictable basis for the foreseeable future. We talked about his job and its impact on his job and advancement in his career. If he misses a month or more of work every time his patella comes out of place, it is going to make it increasingly difficult for him to have a better job. Not to mention the days on crutches and unable to walk. We discussed what caused him to not proceed with surgery as recommended one year ago. We went over some of those things I think they're resolved now. The plan will be the followin. Repeat MRI scan primarily to evaluate for worsening of patellar articular cartilage deficit.  2. Initiate some physical therapy for quadriceps strengthening  3. Patellar stabilization brace that he may wear to work.  4. Return to work on 18 with the following restrictions:  May return with work duties including:  -Ability to alternate sitting/standing  -No lifting over 20 lbs  -No kneeling, squatting, crouching  5. I recommend we begin scheduling his surgery once again. I would like to see the knee quiet down and have some physical therapy first. We will hopefully schedule surgery for approximately 3-6 weeks from now, obtain a new MRI scan and review that prior to surgery so that I can determine whether cartilage transplantation is something that we need to consider or not for the patella.  Follow-up in 2 weeks.           TAKE 1 TABLET EVERY EVENING 90 tablet 0    ketoconazole (NIZORAL) 2 % cream AAA bid (facial redness and scaling) 60 g 3    ketoconazole (NIZORAL) 2 % shampoo Wash hair with medicated shampoo at least 2x/week - let sit on scalp at least 5 minutes prior to rinsing 360 mL 0    melatonin 10 mg Cap Take 1 capsule by mouth nightly as needed.       methocarbamol (ROBAXIN) 500 MG Tab 3 (three) times daily as needed.       omeprazole (PRILOSEC) 20 MG capsule TAKE 1 CAPSULE EVERY DAY 90 capsule 1    pantoprazole (PROTONIX) 40 MG tablet Take 1 tablet (40 mg total) by mouth once daily. 30 tablet 0    potassium chloride (MICRO-K) 10 MEQ CpSR TAKE 3 CAPSULES ONE TIME DAILY 270 capsule 3    pravastatin (PRAVACHOL) 20 MG tablet TAKE 1 TABLET EVERY DAY 90 tablet 3    sucralfate (CARAFATE) 1 gram tablet Take 1 tablet (1 g total) by mouth 4 (four) times daily before meals and nightly. for 14 days 56 tablet 0    tamsulosin (FLOMAX) 0.4 mg Cap Take 1 capsule (0.4 mg total) by mouth once daily. 90 capsule 3    [DISCONTINUED] finasteride (PROSCAR) 5 mg tablet Take 1 tablet (5 mg total) by mouth once daily. 90 tablet 3     No current facility-administered medications on file prior to visit.     Social History:     Social History     Tobacco Use    Smoking status: Never    Smokeless tobacco: Former     Types: Chew    Tobacco comments:     Chewed tobacco once per day for 4-5 years when a    Substance Use Topics    Alcohol use: No     Family History:     Family History   Problem Relation Age of Onset    Hyperlipidemia Mother     Hypertension Mother     Kidney disease Mother     Cancer Mother     Heart disease Mother     Kidney failure Mother     No Known Problems Daughter     No Known Problems Sister     No Known Problems Brother     No Known Problems Son     No Known Problems Brother     No Known Problems Son     Hypertension Maternal Grandmother     Amblyopia Neg Hx     Blindness Neg Hx     Cataracts Neg Hx     Diabetes Neg Hx   "   Glaucoma Neg Hx     Macular degeneration Neg Hx     Retinal detachment Neg Hx     Strabismus Neg Hx     Stroke Neg Hx     Thyroid disease Neg Hx     Melanoma Neg Hx     Psoriasis Neg Hx     Lupus Neg Hx     Eczema Neg Hx      Physical Exam:   BP (!) 140/79 (BP Location: Left arm, Patient Position: Sitting, BP Method: Large (Automatic))   Pulse 69   Ht 5' 11" (1.803 m)   Wt 97.2 kg (214 lb 4.6 oz)   SpO2 96%   BMI 29.89 kg/m²      Constitutional: No apparent distress, conversant  Neck: No jugular venous distension, no carotid bruits  CV: Regular rate and rhythm, no murmurs, normal S1/S2  Pulm: Clear to auscultation bilaterally  Extremities: No lower extremity edema, warm with palpable pulses, compressions in place    Labs:     Blood Tests:  Lab Results   Component Value Date    BNP <10 07/09/2018     07/09/2022    K 3.8 07/09/2022     07/09/2022    CO2 24 06/27/2022    BUN 13 07/09/2022    CREATININE 0.9 07/09/2022     07/09/2022    HGBA1C 5.8 (H) 01/27/2022    MG 1.7 06/20/2022    AST 39 06/27/2022    ALT 43 06/27/2022    ALBUMIN 3.0 (L) 06/27/2022    PROT 7.1 06/27/2022    BILITOT 0.3 06/27/2022    WBC 6.41 06/27/2022    HGB 11.8 (L) 06/27/2022    HCT 38 (A) 07/09/2022    HCT 38 07/09/2022    MCV 89 06/27/2022     06/27/2022    INR 1.0 04/26/2021    PSA 2.2 11/11/2021    TSH 1.023 10/03/2014       Lab Results   Component Value Date    CHOL 153 07/16/2021    HDL 46 07/16/2021    TRIG 82 07/16/2021       Lab Results   Component Value Date    LDLCALC 90.6 07/16/2021       Urine Tests:  Lab Results   Component Value Date    COLORU Straw 07/09/2022    APPEARANCEUA Clear 07/09/2022    PHUR 7.0 07/09/2022    SPECGRAV 1.010 07/09/2022    PROTEINUA Negative 07/09/2022    GLUCUA Negative 07/09/2022    KETONESU Negative 07/09/2022    BILIRUBINUA Negative 07/09/2022    OCCULTUA 3+ (A) 07/09/2022    NITRITE Negative 07/09/2022    UROBILINOGEN normal 12/29/2020    UROBILINOGEN 0.2 10/09/2012    " LEUKOCYTESUR Negative 2022    CREATRANDUR 155.0 06/10/2021       Imaging:     Echocardiogram  TTE 21  The left ventricle is normal in size with normal systolic function. The estimated ejection fraction is 70%  Grade I left ventricular diastolic dysfunction.  Normal right ventricular size with normal right ventricular systolic function.    Stress testing  CHLOE 21  2D Echo only  The ECG portion of this study is negative for myocardial ischemia.  The stress echo portion of this study is negative for myocardial ischemia.  The patient reached the end of the protocol.  During stress, the following significant arrhythmias were observed: rare PACs, occasional PVCs.  The estimated ejection fraction is 60%.  The left ventricle is normal in size with normal systolic function.  Normal left ventricular diastolic function.  Normal right ventricular size with normal right ventricular systolic function.  Mild pulmonic regurgitation.  Mild tricuspid regurgitation.  The estimated PA systolic pressure is 28 mmHg.  Normal central venous pressure (3 mmHg).  The ascending aorta is mildly dilated at 4.1 cm.    Cath Lab  None    Other  Event 21  Negative event monitor with no clinical arrhythmias.  Symptoms corresponding with normal sinus rhythm.    CT chest 20  There is a left-sided aortic arch with 2 branch vessels.  There is persistent dilatation of the ascending aorta with measurements as follows:  *Sinotubular junction: 3.7 cm.  *Proximal ascending thoracic aorta: 3.8 cm.  *Mid ascending thoracic aorta: 4.1 cm (previously 4.1 cm).  *Distal ascending thoracic aorta: 3.7 cm.  *Aortic arch: 2.9 cm.  *Aortic isthmus: 2.9 cm.  *Proximal descending thoracic aorta: 3 cm.  Pulmonary arteries distribute normally.  There are 4 pulmonary veins.    Arterial US 16  Duplex ultrasound of the bilateral Popliteal arteries reveals no evidence of an aneurysm.     EK/2/22 - NSR, RBBB (personally  reviewed)    Assessment:     1. Chest discomfort    2. Obstructive chronic bronchitis without exacerbation    3. Essential hypertension    4. Mixed hyperlipidemia    5. Aneurysm of ascending aorta without rupture    6. Right bundle branch block    7. History of diabetes mellitus, type II    8. Restrictive lung disease due to kyphoscoliosis        Plan:     Chest discomfort  GI in etiology by history  Refer to GI for dysphagia    Essential hypertension  Enrolled in digital HTN program  BP at goal recently  Continue meds  Low salt    Mixed hyperlipidemia  Continue statin    Aneurysm of ascending aorta without rupture  Stable, 4.1cm on echo  Awaiting repeat CT chest    Right bundle branch block  Stable    History of diabetes mellitus, type II  A1c at goal  Off meds    Restrictive lung disease due to kyphoscoliosis  Obstructive chronic bronchitis without exacerbation  Stable  On PFTs in 2018    Signed:  Mao Driscoll MD  Cardiology     11/2/2022 9:30 AM    Follow-up:     Future Appointments   Date Time Provider Department Center   11/2/2022  8:45 AM [facility]-CT[facility] 500 LB LIMIT [facility]   11/2/2022  9:00 AM Thien Driscoll III, MD [facility] CARDIO [facility]   12/1/2022  9:20 AM Tito Palafox OD [facility] OPTOMTY [facility]

## 2022-11-11 DIAGNOSIS — I35.1 MILD AORTIC REGURGITATION WITH LEFT VENTRICULAR SYSTOLIC DYSFUNCTION BY PRIOR ECHOCARDIOGRAM: ICD-10-CM

## 2022-11-11 DIAGNOSIS — I51.9 MILD AORTIC REGURGITATION WITH LEFT VENTRICULAR SYSTOLIC DYSFUNCTION BY PRIOR ECHOCARDIOGRAM: ICD-10-CM

## 2022-11-11 RX ORDER — KETOCONAZOLE 20 MG/ML
SHAMPOO, SUSPENSION TOPICAL
Qty: 360 ML | Refills: 0 | Status: ON HOLD | OUTPATIENT
Start: 2022-11-11 | End: 2023-01-02 | Stop reason: HOSPADM

## 2022-11-11 RX ORDER — ASPIRIN 81 MG/1
TABLET ORAL
Qty: 90 TABLET | Refills: 3 | Status: SHIPPED | OUTPATIENT
Start: 2022-11-11 | End: 2023-12-12

## 2022-12-01 ENCOUNTER — OFFICE VISIT (OUTPATIENT)
Dept: OPTOMETRY | Facility: CLINIC | Age: 77
End: 2022-12-01
Payer: COMMERCIAL

## 2022-12-01 DIAGNOSIS — H52.03 HYPEROPIA OF BOTH EYES WITH ASTIGMATISM: ICD-10-CM

## 2022-12-01 DIAGNOSIS — Z01.00 ENCOUNTER FOR ROUTINE EYE AND VISION EXAMINATION: Primary | ICD-10-CM

## 2022-12-01 DIAGNOSIS — H52.203 HYPEROPIA OF BOTH EYES WITH ASTIGMATISM: ICD-10-CM

## 2022-12-01 PROCEDURE — 92014 PR EYE EXAM, EST PATIENT,COMPREHESV: ICD-10-PCS | Mod: S$GLB,,, | Performed by: OPTOMETRIST

## 2022-12-01 PROCEDURE — 99999 PR PBB SHADOW E&M-EST. PATIENT-LVL II: CPT | Mod: PBBFAC,,, | Performed by: OPTOMETRIST

## 2022-12-01 PROCEDURE — 92015 PR REFRACTION: ICD-10-PCS | Mod: S$GLB,,, | Performed by: OPTOMETRIST

## 2022-12-01 PROCEDURE — 99999 PR PBB SHADOW E&M-EST. PATIENT-LVL II: ICD-10-PCS | Mod: PBBFAC,,, | Performed by: OPTOMETRIST

## 2022-12-01 PROCEDURE — 92014 COMPRE OPH EXAM EST PT 1/>: CPT | Mod: S$GLB,,, | Performed by: OPTOMETRIST

## 2022-12-01 PROCEDURE — 92015 DETERMINE REFRACTIVE STATE: CPT | Mod: S$GLB,,, | Performed by: OPTOMETRIST

## 2022-12-01 NOTE — PROGRESS NOTES
HPI    Patient here for routine eye exam  Patient states he has been struggling with FBS/gritty feeling in OS and   intermittent sharp pain OS for last couple of months.   Eyes are always dry and teary.  Notices bump on LLL unsure how long it has been there / no pain or   discomfort   VA seems cloudy all the day   Refresh PRN    Last edited by Elba Flannery on 12/1/2022  9:22 AM.            Assessment /Plan     For exam results, see Encounter Report.    Encounter for routine eye and vision examination  -No retinopathy noted today.  Continued control with primary care physician and annual comprehensive eye exam.  -Educated patient on presence of cataracts at today's exam, monitor at annual dilated fundus exam. 2-3 years surgical estimate.    Hyperopia of both eyes with astigmatism  Eyeglass Final Rx       Eyeglass Final Rx         Sphere Cylinder Axis Dist VA Add    Right +1.25 +0.50 180 20/20 +2.50    Left +1.00 +0.50 015 20/20 +2.50      Type: PAL    Expiration Date: 12/1/2023                  Possible Eyemed?      RTC 1 yr

## 2022-12-31 ENCOUNTER — HOSPITAL ENCOUNTER (OUTPATIENT)
Facility: HOSPITAL | Age: 77
Discharge: HOME | End: 2023-01-02
Attending: EMERGENCY MEDICINE | Admitting: HOSPITALIST
Payer: MEDICARE

## 2022-12-31 DIAGNOSIS — R55 SYNCOPE AND COLLAPSE: ICD-10-CM

## 2022-12-31 DIAGNOSIS — R07.9 CHEST PAIN: ICD-10-CM

## 2022-12-31 DIAGNOSIS — R55 SYNCOPE: ICD-10-CM

## 2022-12-31 LAB
ALBUMIN SERPL BCP-MCNC: 3.6 G/DL (ref 3.5–5.2)
ALP SERPL-CCNC: 100 U/L (ref 55–135)
ALT SERPL W/O P-5'-P-CCNC: 18 U/L (ref 10–44)
ANION GAP SERPL CALC-SCNC: 14 MMOL/L (ref 8–16)
AST SERPL-CCNC: 21 U/L (ref 10–40)
BASOPHILS # BLD AUTO: 0.03 K/UL (ref 0–0.2)
BASOPHILS NFR BLD: 0.4 % (ref 0–1.9)
BILIRUB SERPL-MCNC: 0.3 MG/DL (ref 0.1–1)
BILIRUB UR QL STRIP: NEGATIVE
BNP SERPL-MCNC: <10 PG/ML (ref 0–99)
BUN SERPL-MCNC: 13 MG/DL (ref 8–23)
BUN SERPL-MCNC: 14 MG/DL (ref 6–30)
CALCIUM SERPL-MCNC: 8.6 MG/DL (ref 8.7–10.5)
CHLORIDE SERPL-SCNC: 101 MMOL/L (ref 95–110)
CHLORIDE SERPL-SCNC: 102 MMOL/L (ref 95–110)
CLARITY UR REFRACT.AUTO: CLEAR
CO2 SERPL-SCNC: 24 MMOL/L (ref 23–29)
COLOR UR AUTO: YELLOW
CREAT SERPL-MCNC: 1 MG/DL (ref 0.5–1.4)
CREAT SERPL-MCNC: 1 MG/DL (ref 0.5–1.4)
DIFFERENTIAL METHOD: ABNORMAL
EOSINOPHIL # BLD AUTO: 0.5 K/UL (ref 0–0.5)
EOSINOPHIL NFR BLD: 6.4 % (ref 0–8)
ERYTHROCYTE [DISTWIDTH] IN BLOOD BY AUTOMATED COUNT: 13.5 % (ref 11.5–14.5)
EST. GFR  (NO RACE VARIABLE): >60 ML/MIN/1.73 M^2
GLUCOSE SERPL-MCNC: 86 MG/DL (ref 70–110)
GLUCOSE SERPL-MCNC: 88 MG/DL (ref 70–110)
GLUCOSE UR QL STRIP: NEGATIVE
HCT VFR BLD AUTO: 39.9 % (ref 40–54)
HCT VFR BLD CALC: 44 %PCV (ref 36–54)
HCV AB SERPL QL IA: NORMAL
HGB BLD-MCNC: 13 G/DL (ref 14–18)
HGB UR QL STRIP: NEGATIVE
HIV 1+2 AB+HIV1 P24 AG SERPL QL IA: NORMAL
IMM GRANULOCYTES # BLD AUTO: 0.02 K/UL (ref 0–0.04)
IMM GRANULOCYTES NFR BLD AUTO: 0.3 % (ref 0–0.5)
KETONES UR QL STRIP: NEGATIVE
LEUKOCYTE ESTERASE UR QL STRIP: NEGATIVE
LYMPHOCYTES # BLD AUTO: 2.4 K/UL (ref 1–4.8)
LYMPHOCYTES NFR BLD: 33.4 % (ref 18–48)
MCH RBC QN AUTO: 28.8 PG (ref 27–31)
MCHC RBC AUTO-ENTMCNC: 32.6 G/DL (ref 32–36)
MCV RBC AUTO: 88 FL (ref 82–98)
MONOCYTES # BLD AUTO: 0.6 K/UL (ref 0.3–1)
MONOCYTES NFR BLD: 7.8 % (ref 4–15)
NEUTROPHILS # BLD AUTO: 3.6 K/UL (ref 1.8–7.7)
NEUTROPHILS NFR BLD: 51.7 % (ref 38–73)
NITRITE UR QL STRIP: NEGATIVE
NRBC BLD-RTO: 0 /100 WBC
PH UR STRIP: 6 [PH] (ref 5–8)
PLATELET # BLD AUTO: 211 K/UL (ref 150–450)
PMV BLD AUTO: 9.3 FL (ref 9.2–12.9)
POC IONIZED CALCIUM: 1.15 MMOL/L (ref 1.06–1.42)
POC TCO2 (MEASURED): 27 MMOL/L (ref 23–29)
POTASSIUM BLD-SCNC: 3.3 MMOL/L (ref 3.5–5.1)
POTASSIUM SERPL-SCNC: 3.4 MMOL/L (ref 3.5–5.1)
PROT SERPL-MCNC: 7 G/DL (ref 6–8.4)
PROT UR QL STRIP: NEGATIVE
RBC # BLD AUTO: 4.52 M/UL (ref 4.6–6.2)
SAMPLE: ABNORMAL
SARS-COV-2 RDRP RESP QL NAA+PROBE: NEGATIVE
SODIUM BLD-SCNC: 141 MMOL/L (ref 136–145)
SODIUM SERPL-SCNC: 140 MMOL/L (ref 136–145)
SP GR UR STRIP: 1.02 (ref 1–1.03)
TROPONIN I SERPL DL<=0.01 NG/ML-MCNC: 0.01 NG/ML (ref 0–0.03)
URN SPEC COLLECT METH UR: NORMAL
WBC # BLD AUTO: 7.03 K/UL (ref 3.9–12.7)

## 2022-12-31 PROCEDURE — 80047 BASIC METABLC PNL IONIZED CA: CPT

## 2022-12-31 PROCEDURE — 81003 URINALYSIS AUTO W/O SCOPE: CPT | Performed by: EMERGENCY MEDICINE

## 2022-12-31 PROCEDURE — 96361 HYDRATE IV INFUSION ADD-ON: CPT

## 2022-12-31 PROCEDURE — 87389 HIV-1 AG W/HIV-1&-2 AB AG IA: CPT | Performed by: PHYSICIAN ASSISTANT

## 2022-12-31 PROCEDURE — 86803 HEPATITIS C AB TEST: CPT | Performed by: PHYSICIAN ASSISTANT

## 2022-12-31 PROCEDURE — 83880 ASSAY OF NATRIURETIC PEPTIDE: CPT | Performed by: EMERGENCY MEDICINE

## 2022-12-31 PROCEDURE — 93005 ELECTROCARDIOGRAM TRACING: CPT

## 2022-12-31 PROCEDURE — 63600175 PHARM REV CODE 636 W HCPCS: Performed by: EMERGENCY MEDICINE

## 2022-12-31 PROCEDURE — 99284 EMERGENCY DEPT VISIT MOD MDM: CPT | Mod: CS,,, | Performed by: EMERGENCY MEDICINE

## 2022-12-31 PROCEDURE — 84484 ASSAY OF TROPONIN QUANT: CPT | Performed by: EMERGENCY MEDICINE

## 2022-12-31 PROCEDURE — 25000003 PHARM REV CODE 250: Performed by: EMERGENCY MEDICINE

## 2022-12-31 PROCEDURE — 82565 ASSAY OF CREATININE: CPT

## 2022-12-31 PROCEDURE — 93010 EKG 12-LEAD: ICD-10-PCS | Mod: ,,, | Performed by: INTERNAL MEDICINE

## 2022-12-31 PROCEDURE — 93010 ELECTROCARDIOGRAM REPORT: CPT | Mod: ,,, | Performed by: INTERNAL MEDICINE

## 2022-12-31 PROCEDURE — 99284 PR EMERGENCY DEPT VISIT,LEVEL IV: ICD-10-PCS | Mod: CS,,, | Performed by: EMERGENCY MEDICINE

## 2022-12-31 PROCEDURE — U0002 COVID-19 LAB TEST NON-CDC: HCPCS | Performed by: EMERGENCY MEDICINE

## 2022-12-31 PROCEDURE — 80053 COMPREHEN METABOLIC PANEL: CPT | Performed by: EMERGENCY MEDICINE

## 2022-12-31 PROCEDURE — 96374 THER/PROPH/DIAG INJ IV PUSH: CPT

## 2022-12-31 PROCEDURE — 85025 COMPLETE CBC W/AUTO DIFF WBC: CPT | Performed by: EMERGENCY MEDICINE

## 2022-12-31 PROCEDURE — 99285 EMERGENCY DEPT VISIT HI MDM: CPT | Mod: 25,CS

## 2022-12-31 PROCEDURE — 94761 N-INVAS EAR/PLS OXIMETRY MLT: CPT

## 2022-12-31 PROCEDURE — 82330 ASSAY OF CALCIUM: CPT

## 2022-12-31 RX ORDER — ONDANSETRON 2 MG/ML
4 INJECTION INTRAMUSCULAR; INTRAVENOUS ONCE
Status: COMPLETED | OUTPATIENT
Start: 2022-12-31 | End: 2022-12-31

## 2022-12-31 RX ADMIN — ONDANSETRON 4 MG: 2 INJECTION INTRAMUSCULAR; INTRAVENOUS at 11:12

## 2022-12-31 RX ADMIN — SODIUM CHLORIDE 500 ML: 0.9 INJECTION, SOLUTION INTRAVENOUS at 11:12

## 2023-01-01 PROBLEM — E87.6 HYPOKALEMIA: Status: ACTIVE | Noted: 2023-01-01

## 2023-01-01 LAB
ALBUMIN SERPL BCP-MCNC: 3.2 G/DL (ref 3.5–5.2)
ALBUMIN SERPL BCP-MCNC: 3.3 G/DL (ref 3.5–5.2)
ALP SERPL-CCNC: 89 U/L (ref 55–135)
ALP SERPL-CCNC: 92 U/L (ref 55–135)
ALT SERPL W/O P-5'-P-CCNC: 17 U/L (ref 10–44)
ALT SERPL W/O P-5'-P-CCNC: 18 U/L (ref 10–44)
ANION GAP SERPL CALC-SCNC: 11 MMOL/L (ref 8–16)
ANION GAP SERPL CALC-SCNC: 9 MMOL/L (ref 8–16)
AST SERPL-CCNC: 20 U/L (ref 10–40)
AST SERPL-CCNC: 20 U/L (ref 10–40)
BASOPHILS # BLD AUTO: 0.03 K/UL (ref 0–0.2)
BASOPHILS NFR BLD: 0.5 % (ref 0–1.9)
BILIRUB SERPL-MCNC: 0.4 MG/DL (ref 0.1–1)
BILIRUB SERPL-MCNC: 0.5 MG/DL (ref 0.1–1)
BUN SERPL-MCNC: 13 MG/DL (ref 8–23)
BUN SERPL-MCNC: 13 MG/DL (ref 8–23)
CALCIUM SERPL-MCNC: 8.7 MG/DL (ref 8.7–10.5)
CALCIUM SERPL-MCNC: 8.8 MG/DL (ref 8.7–10.5)
CHLORIDE SERPL-SCNC: 105 MMOL/L (ref 95–110)
CHLORIDE SERPL-SCNC: 105 MMOL/L (ref 95–110)
CO2 SERPL-SCNC: 23 MMOL/L (ref 23–29)
CO2 SERPL-SCNC: 24 MMOL/L (ref 23–29)
CREAT SERPL-MCNC: 0.9 MG/DL (ref 0.5–1.4)
CREAT SERPL-MCNC: 1 MG/DL (ref 0.5–1.4)
DIFFERENTIAL METHOD: ABNORMAL
EOSINOPHIL # BLD AUTO: 0.3 K/UL (ref 0–0.5)
EOSINOPHIL NFR BLD: 5.2 % (ref 0–8)
ERYTHROCYTE [DISTWIDTH] IN BLOOD BY AUTOMATED COUNT: 13.5 % (ref 11.5–14.5)
EST. GFR  (NO RACE VARIABLE): >60 ML/MIN/1.73 M^2
EST. GFR  (NO RACE VARIABLE): >60 ML/MIN/1.73 M^2
GLUCOSE SERPL-MCNC: 89 MG/DL (ref 70–110)
GLUCOSE SERPL-MCNC: 93 MG/DL (ref 70–110)
HCT VFR BLD AUTO: 39.2 % (ref 40–54)
HGB BLD-MCNC: 12.4 G/DL (ref 14–18)
IMM GRANULOCYTES # BLD AUTO: 0.01 K/UL (ref 0–0.04)
IMM GRANULOCYTES NFR BLD AUTO: 0.2 % (ref 0–0.5)
LYMPHOCYTES # BLD AUTO: 1.7 K/UL (ref 1–4.8)
LYMPHOCYTES NFR BLD: 28.8 % (ref 18–48)
MAGNESIUM SERPL-MCNC: 1.5 MG/DL (ref 1.6–2.6)
MAGNESIUM SERPL-MCNC: 1.6 MG/DL (ref 1.6–2.6)
MCH RBC QN AUTO: 28.6 PG (ref 27–31)
MCHC RBC AUTO-ENTMCNC: 31.6 G/DL (ref 32–36)
MCV RBC AUTO: 91 FL (ref 82–98)
MONOCYTES # BLD AUTO: 0.4 K/UL (ref 0.3–1)
MONOCYTES NFR BLD: 7.6 % (ref 4–15)
NEUTROPHILS # BLD AUTO: 3.3 K/UL (ref 1.8–7.7)
NEUTROPHILS NFR BLD: 57.7 % (ref 38–73)
NRBC BLD-RTO: 0 /100 WBC
PHOSPHATE SERPL-MCNC: 3 MG/DL (ref 2.7–4.5)
PHOSPHATE SERPL-MCNC: 3.2 MG/DL (ref 2.7–4.5)
PLATELET # BLD AUTO: 179 K/UL (ref 150–450)
PMV BLD AUTO: 9.2 FL (ref 9.2–12.9)
POCT GLUCOSE: 104 MG/DL (ref 70–110)
POCT GLUCOSE: 79 MG/DL (ref 70–110)
POCT GLUCOSE: 81 MG/DL (ref 70–110)
POCT GLUCOSE: 94 MG/DL (ref 70–110)
POTASSIUM SERPL-SCNC: 3.9 MMOL/L (ref 3.5–5.1)
POTASSIUM SERPL-SCNC: 4.2 MMOL/L (ref 3.5–5.1)
PROT SERPL-MCNC: 6.4 G/DL (ref 6–8.4)
PROT SERPL-MCNC: 6.5 G/DL (ref 6–8.4)
RBC # BLD AUTO: 4.33 M/UL (ref 4.6–6.2)
SODIUM SERPL-SCNC: 138 MMOL/L (ref 136–145)
SODIUM SERPL-SCNC: 139 MMOL/L (ref 136–145)
WBC # BLD AUTO: 5.79 K/UL (ref 3.9–12.7)

## 2023-01-01 PROCEDURE — 83735 ASSAY OF MAGNESIUM: CPT | Mod: 91 | Performed by: HOSPITALIST

## 2023-01-01 PROCEDURE — 80053 COMPREHEN METABOLIC PANEL: CPT | Mod: 91 | Performed by: HOSPITALIST

## 2023-01-01 PROCEDURE — 25000003 PHARM REV CODE 250

## 2023-01-01 PROCEDURE — 99223 PR INITIAL HOSPITAL CARE,LEVL III: ICD-10-PCS | Mod: ,,,

## 2023-01-01 PROCEDURE — 80053 COMPREHEN METABOLIC PANEL: CPT

## 2023-01-01 PROCEDURE — 96372 THER/PROPH/DIAG INJ SC/IM: CPT | Mod: 59

## 2023-01-01 PROCEDURE — 99232 SBSQ HOSP IP/OBS MODERATE 35: CPT | Mod: ,,, | Performed by: HOSPITALIST

## 2023-01-01 PROCEDURE — 83735 ASSAY OF MAGNESIUM: CPT

## 2023-01-01 PROCEDURE — G0378 HOSPITAL OBSERVATION PER HR: HCPCS

## 2023-01-01 PROCEDURE — 84100 ASSAY OF PHOSPHORUS: CPT

## 2023-01-01 PROCEDURE — 25500020 PHARM REV CODE 255: Performed by: EMERGENCY MEDICINE

## 2023-01-01 PROCEDURE — 99223 1ST HOSP IP/OBS HIGH 75: CPT | Mod: ,,,

## 2023-01-01 PROCEDURE — 85025 COMPLETE CBC W/AUTO DIFF WBC: CPT | Performed by: HOSPITALIST

## 2023-01-01 PROCEDURE — 36415 COLL VENOUS BLD VENIPUNCTURE: CPT | Performed by: HOSPITALIST

## 2023-01-01 PROCEDURE — 99232 PR SUBSEQUENT HOSPITAL CARE,LEVL II: ICD-10-PCS | Mod: ,,, | Performed by: HOSPITALIST

## 2023-01-01 PROCEDURE — 84100 ASSAY OF PHOSPHORUS: CPT | Mod: 91 | Performed by: HOSPITALIST

## 2023-01-01 PROCEDURE — 63600175 PHARM REV CODE 636 W HCPCS

## 2023-01-01 RX ORDER — DULOXETIN HYDROCHLORIDE 60 MG/1
60 CAPSULE, DELAYED RELEASE ORAL DAILY
Status: DISCONTINUED | OUTPATIENT
Start: 2023-01-01 | End: 2023-01-02 | Stop reason: HOSPADM

## 2023-01-01 RX ORDER — ONDANSETRON 8 MG/1
8 TABLET, ORALLY DISINTEGRATING ORAL EVERY 8 HOURS PRN
Status: DISCONTINUED | OUTPATIENT
Start: 2023-01-01 | End: 2023-01-02 | Stop reason: HOSPADM

## 2023-01-01 RX ORDER — PRAVASTATIN SODIUM 10 MG/1
20 TABLET ORAL DAILY
Status: DISCONTINUED | OUTPATIENT
Start: 2023-01-01 | End: 2023-01-02 | Stop reason: HOSPADM

## 2023-01-01 RX ORDER — TALC
6 POWDER (GRAM) TOPICAL NIGHTLY PRN
Status: DISCONTINUED | OUTPATIENT
Start: 2023-01-01 | End: 2023-01-02 | Stop reason: HOSPADM

## 2023-01-01 RX ORDER — PANTOPRAZOLE SODIUM 40 MG/1
40 TABLET, DELAYED RELEASE ORAL DAILY
Status: DISCONTINUED | OUTPATIENT
Start: 2023-01-01 | End: 2023-01-02 | Stop reason: HOSPADM

## 2023-01-01 RX ORDER — POTASSIUM CHLORIDE 20 MEQ/1
40 TABLET, EXTENDED RELEASE ORAL ONCE
Status: COMPLETED | OUTPATIENT
Start: 2023-01-01 | End: 2023-01-01

## 2023-01-01 RX ORDER — BISACODYL 10 MG
10 SUPPOSITORY, RECTAL RECTAL DAILY PRN
Status: DISCONTINUED | OUTPATIENT
Start: 2023-01-01 | End: 2023-01-02 | Stop reason: HOSPADM

## 2023-01-01 RX ORDER — SODIUM CHLORIDE 0.9 % (FLUSH) 0.9 %
10 SYRINGE (ML) INJECTION
Status: DISCONTINUED | OUTPATIENT
Start: 2023-01-01 | End: 2023-01-02 | Stop reason: HOSPADM

## 2023-01-01 RX ORDER — AZELASTINE 1 MG/ML
1 SPRAY, METERED NASAL 2 TIMES DAILY
Status: DISCONTINUED | OUTPATIENT
Start: 2023-01-01 | End: 2023-01-02 | Stop reason: HOSPADM

## 2023-01-01 RX ORDER — IBUPROFEN 200 MG
16 TABLET ORAL
Status: DISCONTINUED | OUTPATIENT
Start: 2023-01-01 | End: 2023-01-02 | Stop reason: HOSPADM

## 2023-01-01 RX ORDER — INSULIN ASPART 100 [IU]/ML
0-5 INJECTION, SOLUTION INTRAVENOUS; SUBCUTANEOUS
Status: DISCONTINUED | OUTPATIENT
Start: 2023-01-01 | End: 2023-01-02 | Stop reason: HOSPADM

## 2023-01-01 RX ORDER — IBUPROFEN 200 MG
24 TABLET ORAL
Status: DISCONTINUED | OUTPATIENT
Start: 2023-01-01 | End: 2023-01-02 | Stop reason: HOSPADM

## 2023-01-01 RX ORDER — PROMETHAZINE HYDROCHLORIDE 12.5 MG/1
25 TABLET ORAL EVERY 6 HOURS PRN
Status: DISCONTINUED | OUTPATIENT
Start: 2023-01-01 | End: 2023-01-02 | Stop reason: HOSPADM

## 2023-01-01 RX ORDER — TAMSULOSIN HYDROCHLORIDE 0.4 MG/1
1 CAPSULE ORAL DAILY
Status: DISCONTINUED | OUTPATIENT
Start: 2023-01-01 | End: 2023-01-02 | Stop reason: HOSPADM

## 2023-01-01 RX ORDER — GABAPENTIN 300 MG/1
300 CAPSULE ORAL 2 TIMES DAILY
Status: DISCONTINUED | OUTPATIENT
Start: 2023-01-01 | End: 2023-01-02 | Stop reason: HOSPADM

## 2023-01-01 RX ORDER — ACETAMINOPHEN 325 MG/1
650 TABLET ORAL EVERY 4 HOURS PRN
Status: DISCONTINUED | OUTPATIENT
Start: 2023-01-01 | End: 2023-01-02 | Stop reason: HOSPADM

## 2023-01-01 RX ORDER — LOSARTAN POTASSIUM 50 MG/1
100 TABLET ORAL DAILY
Status: DISCONTINUED | OUTPATIENT
Start: 2023-01-01 | End: 2023-01-02 | Stop reason: HOSPADM

## 2023-01-01 RX ORDER — POTASSIUM CHLORIDE 750 MG/1
30 CAPSULE, EXTENDED RELEASE ORAL DAILY
Status: DISCONTINUED | OUTPATIENT
Start: 2023-01-01 | End: 2023-01-02 | Stop reason: HOSPADM

## 2023-01-01 RX ORDER — GLUCAGON 1 MG
1 KIT INJECTION
Status: DISCONTINUED | OUTPATIENT
Start: 2023-01-01 | End: 2023-01-02 | Stop reason: HOSPADM

## 2023-01-01 RX ORDER — ASPIRIN 81 MG/1
81 TABLET ORAL DAILY
Status: DISCONTINUED | OUTPATIENT
Start: 2023-01-01 | End: 2023-01-02 | Stop reason: HOSPADM

## 2023-01-01 RX ORDER — AMLODIPINE BESYLATE 5 MG/1
5 TABLET ORAL DAILY
Status: DISCONTINUED | OUTPATIENT
Start: 2023-01-01 | End: 2023-01-02 | Stop reason: HOSPADM

## 2023-01-01 RX ORDER — IPRATROPIUM BROMIDE AND ALBUTEROL SULFATE 2.5; .5 MG/3ML; MG/3ML
3 SOLUTION RESPIRATORY (INHALATION) EVERY 4 HOURS PRN
Status: DISCONTINUED | OUTPATIENT
Start: 2023-01-01 | End: 2023-01-02 | Stop reason: HOSPADM

## 2023-01-01 RX ORDER — POLYETHYLENE GLYCOL 3350 17 G/17G
17 POWDER, FOR SOLUTION ORAL DAILY PRN
Status: DISCONTINUED | OUTPATIENT
Start: 2023-01-01 | End: 2023-01-02 | Stop reason: HOSPADM

## 2023-01-01 RX ORDER — ENOXAPARIN SODIUM 100 MG/ML
40 INJECTION SUBCUTANEOUS EVERY 24 HOURS
Status: DISCONTINUED | OUTPATIENT
Start: 2023-01-01 | End: 2023-01-02 | Stop reason: HOSPADM

## 2023-01-01 RX ADMIN — AZELASTINE HYDROCHLORIDE 137 MCG: 137 SPRAY, METERED NASAL at 09:01

## 2023-01-01 RX ADMIN — LOSARTAN POTASSIUM 100 MG: 50 TABLET, FILM COATED ORAL at 09:01

## 2023-01-01 RX ADMIN — IOHEXOL 100 ML: 350 INJECTION, SOLUTION INTRAVENOUS at 12:01

## 2023-01-01 RX ADMIN — TAMSULOSIN HYDROCHLORIDE 0.4 MG: 0.4 CAPSULE ORAL at 09:01

## 2023-01-01 RX ADMIN — DULOXETINE HYDROCHLORIDE 60 MG: 60 CAPSULE, DELAYED RELEASE ORAL at 09:01

## 2023-01-01 RX ADMIN — ENOXAPARIN SODIUM 40 MG: 40 INJECTION SUBCUTANEOUS at 05:01

## 2023-01-01 RX ADMIN — POTASSIUM CHLORIDE 40 MEQ: 1500 TABLET, EXTENDED RELEASE ORAL at 05:01

## 2023-01-01 RX ADMIN — PANTOPRAZOLE SODIUM 40 MG: 40 TABLET, DELAYED RELEASE ORAL at 09:01

## 2023-01-01 RX ADMIN — ASPIRIN 81 MG: 81 TABLET, COATED ORAL at 09:01

## 2023-01-01 RX ADMIN — Medication 6 MG: at 09:01

## 2023-01-01 RX ADMIN — GABAPENTIN 300 MG: 300 CAPSULE ORAL at 09:01

## 2023-01-01 RX ADMIN — CHLORTHALIDONE 12.5 MG: 25 TABLET ORAL at 09:01

## 2023-01-01 RX ADMIN — POTASSIUM CHLORIDE 30 MEQ: 10 CAPSULE, COATED, EXTENDED RELEASE ORAL at 09:01

## 2023-01-01 RX ADMIN — AMLODIPINE BESYLATE 5 MG: 5 TABLET ORAL at 09:01

## 2023-01-01 NOTE — HPI
Tito Menard is a 77 y.o. male wit a hx of diabetes, CKD, GERD, HTN, and aortic anuerysm presents to the ED for a syncopal event. Patient states he was not feeling well today with generalized weakness and lightheadedness. Patient endorsed taking a bath this morning then felt lightheaded and woke up on the ground. Wife found him in the bathroom seconds later where he was not responsive to questions for a few seconds. + LOC but denies head trauma. States he feels when he stands up too quickly he feels dizzy occasionally. He was unable to stand off of the floor so EMS was called. After the syncopal episode, he had an episode of emesis but felt relief afterwards. Patient reports on arrival he had a mild headache that has since subsided. Patient endorses blurry vision but this has been unchanged for a few weeks and feels it is likely due to his current eye glass prescription. Of note, patient endorses two previous syncopal episodes a few years back but does not remember if there was a cause for the episode. Otherwise has no complaints at this time. Denies Chest pain, SOB, n/v currently, abdominal pain, urinary symptoms and constipation/ diarrhea.     ED: AF, /78. Hgb 13.0. K 3.4. BNP <10. Tn neg. UA not infectious. Glucose 86. CTH showed no CT evidence of acute intracranial abnormality. CTA C/A/P showed interval decrease in ascending aortic dilatation now measuring 3.5 cm at the sinus of Valsalva and mid ascending segment. EKG showed NSR with RBBB without significant change. Given zofran x1 and 500 mL NS bolus in ED.

## 2023-01-01 NOTE — ASSESSMENT & PLAN NOTE
Patient presenting with a syncopal episode after gettig out of the tub this AM. +LOC, - head trauma  - Afebrile, no leukocytosis  - Tn neg, BNP neg  - CTH w/o intracranial abnormality  - 2D ECHO ordered   - EKG with NSR and RBBB, unchanged from previous  - orthostatic vitals ordered  - Glucose 88 on admit, will obtain CBC, CMP daily   - tele

## 2023-01-01 NOTE — H&P
[facility]  [facility]  History & Physical    Patient Name: Tito Menard  MRN: [id number]  Patient Class: OP- Observation  Admission Date: 12/31/2022  Attending Physician: India Gilmore MD   Primary Care Provider: Amanda Brown MD         Patient information was obtained from patient and ER records.     Subjective:     Principal Problem:Syncope    Chief Complaint:   Chief Complaint   Patient presents with    Loss of Consciousness     Pt had a syncopal episode in the shower today. Pt family found him and states he was out for approx 20 min. EMS states when they sat him up his BP went 80/40s. Denies head or neck pain. No obvious trauma. Denies blood thinner use.         HPI: Tito Menard is a 77 y.o. male wit a hx of diabetes, CKD, GERD, HTN, and aortic anuerysm presents to the ED for a syncopal event. Patient states he was not feeling well today with generalized weakness and lightheadedness. Patient endorsed taking a bath this morning then felt lightheaded and woke up on the ground. Wife found him in the bathroom seconds later where he was not responsive to questions for a few seconds. + LOC but denies head trauma. States he feels when he stands up too quickly he feels dizzy occasionally. He was unable to stand off of the floor so EMS was called. After the syncopal episode, he had an episode of emesis but felt relief afterwards. Patient reports on arrival he had a mild headache that has since subsided. Patient endorses blurry vision but this has been unchanged for a few weeks and feels it is likely due to his current eye glass prescription. Of note, patient endorses two previous syncopal episodes a few years back but does not remember if there was a cause for the episode. Otherwise has no complaints at this time. Denies Chest pain, SOB, n/v currently, abdominal pain, urinary symptoms and constipation/ diarrhea.     ED: AF, /78. Hgb 13.0. K 3.4. BNP <10. Tn neg. UA not  infectious. Glucose 86. CTH showed no CT evidence of acute intracranial abnormality. CTA C/A/P showed interval decrease in ascending aortic dilatation now measuring 3.5 cm at the sinus of Valsalva and mid ascending segment. EKG showed NSR with RBBB without significant change. Given zofran x1 and 500 mL NS bolus in ED.      Past Medical History:   Diagnosis Date    Allergy     Aneurysm of artery of lower extremity     Chalazion of left eye     CKD (chronic kidney disease), stage II 4/1/2019    Diabetes mellitus type II     Diabetes with neurologic complications     Enlarged aorta 8/2/2016    Noted on pharmacological stress echo 2/28/2014.      GERD (gastroesophageal reflux disease)     egd 2008    Hyperlipidemia     Hypertension     Jock itch 7/19/2018    MGD (meibomian gland dysfunction)     Osteopenia     Spinal stenosis     Spondylosis without myelopathy 10/23/2015    Vitamin D deficiency disease        Past Surgical History:   Procedure Laterality Date    CHALAZION EXCISION Left 8/18/13    CYST REMOVAL  2013    Back of neck    FLEXIBLE CYSTOSCOPY N/A 12/7/2021    Procedure: CYSTOSCOPY, FLEXIBLE;  Surgeon: Beatrice Vaca MD;  Location: [facility] OR [address] [place] [address];  Service: Urology;  Laterality: N/A;    FLUOROSCOPIC URODYNAMIC STUDY N/A 12/7/2021    Procedure: URODYNAMIC STUDY, FLUOROSCOPIC;  Surgeon: Beatrice Vaca MD;  Location: [facility] OR [address] [place] [address];  Service: Urology;  Laterality: N/A;  90 minutes     SPINE SURGERY  2007    x2 (2000, 2007)       Review of patient's allergies indicates:  No Known Allergies    No current facility-administered medications on file prior to encounter.     Current Outpatient Medications on File Prior to Encounter   Medication Sig    albuterol 90 mcg/actuation inhaler Inhale 1-2 puffs into the lungs every 6 (six) hours as needed for Wheezing.    amLODIPine (NORVASC) 5 MG tablet TAKE 1 TABLET EVERY DAY    aspirin (ECOTRIN) 81 MG EC tablet TAKE 1 TABLET EVERY DAY     azelastine (ASTELIN) 137 mcg (0.1 %) nasal spray 1 spray (137 mcg total) by Nasal route 2 (two) times daily.    benzonatate (TESSALON) 200 MG capsule TAKE 1 CAPSULE THREE TIMES DAILY AS NEEDED FOR COUGH    chlorthalidone (HYGROTEN) 25 MG Tab TAKE 1/2 TABLET EVERY DAY    clotrimazole-betamethasone 1-0.05% (LOTRISONE) cream APPLY TOPICALLY 2 (TWO) TIMES DAILY.    DULoxetine (CYMBALTA) 60 MG capsule TAKE 1 CAPSULE EVERY DAY FOR PAIN CONTROL    fluticasone propionate (FLONASE) 50 mcg/actuation nasal spray USE 1 SPRAY NASALLY ONE TIME DAILY    gabapentin (NEURONTIN) 300 MG capsule Take 1 capsule every morning and at noon, and take 2 capsules every evening (4 total daily)    irbesartan (AVAPRO) 300 MG tablet TAKE 1 TABLET EVERY EVENING    ketoconazole (NIZORAL) 2 % cream AAA bid (facial redness and scaling)    ketoconazole (NIZORAL) 2 % shampoo WASH HAIR WITH MEDICATED SHAMPOO AT LEAST TWICE A WEEK - LET SIT ON SCALP AT LEAST 5 MINUTES PRIOR TO RINSING    pantoprazole (PROTONIX) 40 MG tablet Take 1 tablet (40 mg total) by mouth once daily.    potassium chloride (MICRO-K) 10 MEQ CpSR TAKE 3 CAPSULES ONE TIME DAILY    pravastatin (PRAVACHOL) 20 MG tablet TAKE 1 TABLET EVERY DAY    tamsulosin (FLOMAX) 0.4 mg Cap Take 1 capsule (0.4 mg total) by mouth once daily.    acetaminophen (TYLENOL) 650 MG TbSR Take 1,300 mg by mouth every 8 (eight) hours as needed.     cholecalciferol, vitamin D3, (VITAMIN D3) 50 mcg (2,000 unit) Cap Take 1 capsule by mouth once daily.    docusate sodium (COLACE) 50 MG capsule Take by mouth 2 (two) times daily.    guaifenesin (MUCINEX) 600 mg 12 hr tablet Take 1,200 mg by mouth 2 (two) times daily as needed.     ibuprofen (ADVIL,MOTRIN) 200 MG tablet Take 200 mg by mouth every 6 (six) hours as needed for Pain.    melatonin 10 mg Cap Take 1 capsule by mouth nightly as needed.     methocarbamol (ROBAXIN) 500 MG Tab 3 (three) times daily as needed.     omeprazole (PRILOSEC) 20 MG  capsule TAKE 1 CAPSULE EVERY DAY    [DISCONTINUED] finasteride (PROSCAR) 5 mg tablet Take 1 tablet (5 mg total) by mouth once daily.     Family History       Problem Relation (Age of Onset)    Cancer Mother    Heart disease Mother    Hyperlipidemia Mother    Hypertension Mother, Maternal Grandmother    Kidney disease Mother    Kidney failure Mother    No Known Problems Daughter, Sister, Brother, Son, Brother, Son          Tobacco Use    Smoking status: Never    Smokeless tobacco: Former     Types: Chew    Tobacco comments:     Chewed tobacco once per day for 4-5 years when a    Substance and Sexual Activity    Alcohol use: No    Drug use: No    Sexual activity: Not Currently     Partners: Female     Review of Systems   Constitutional:  Negative for activity change, chills, fatigue and fever.   HENT:  Negative for congestion, sinus pressure, sinus pain and trouble swallowing.    Respiratory:  Negative for chest tightness, shortness of breath and wheezing.    Cardiovascular:  Negative for chest pain, palpitations and leg swelling.   Gastrointestinal:  Positive for nausea and vomiting. Negative for abdominal pain and constipation.   Genitourinary:  Negative for dysuria, frequency and urgency.   Neurological:  Positive for dizziness and syncope. Negative for weakness, light-headedness, numbness and headaches.   Objective:     Vital Signs (Most Recent):  Temp: 98.2 °F (36.8 °C) (01/01/23 0223)  Pulse: 65 (01/01/23 0223)  Resp: 17 (01/01/23 0030)  BP: (!) 144/77 (01/01/23 0223)  SpO2: 96 % (01/01/23 0223)   Vital Signs (24h Range):  Temp:  [97.5 °F (36.4 °C)-98.7 °F (37.1 °C)] 98.2 °F (36.8 °C)  Pulse:  [60-91] 65  Resp:  [13-17] 17  SpO2:  [94 %-96 %] 96 %  BP: (121-151)/(60-79) 144/77     Weight: 98 kg (216 lb)  Body mass index is 30.13 kg/m².    Physical Exam  Vitals and nursing note reviewed.   Constitutional:       General: He is not in acute distress.     Appearance: He is well-developed.   HENT:       Head: Normocephalic and atraumatic.      Mouth/Throat:      Pharynx: No oropharyngeal exudate.   Eyes:      Conjunctiva/sclera: Conjunctivae normal.      Pupils: Pupils are equal, round, and reactive to light.   Cardiovascular:      Rate and Rhythm: Normal rate and regular rhythm.      Heart sounds: Normal heart sounds.   Pulmonary:      Effort: Pulmonary effort is normal. No respiratory distress.      Breath sounds: Normal breath sounds. No wheezing.   Abdominal:      General: Bowel sounds are normal. There is no distension.      Palpations: Abdomen is soft.      Tenderness: There is no abdominal tenderness.   Musculoskeletal:         General: No tenderness. Normal range of motion.      Cervical back: Normal range of motion and neck supple.   Lymphadenopathy:      Cervical: No cervical adenopathy.   Skin:     General: Skin is warm and dry.      Capillary Refill: Capillary refill takes less than 2 seconds.      Findings: No rash.   Neurological:      General: No focal deficit present.      Mental Status: He is alert and oriented to person, place, and time. Mental status is at baseline.      Cranial Nerves: No cranial nerve deficit.      Sensory: No sensory deficit.      Coordination: Coordination normal.   Psychiatric:         Behavior: Behavior normal.         Thought Content: Thought content normal.         Judgment: Judgment normal.         CRANIAL NERVES     CN III, IV, VI   Pupils are equal, round, and reactive to light.     Significant Labs: All pertinent labs within the past 24 hours have been reviewed.  BMP:   Recent Labs   Lab 12/31/22 2214   GLU 86      K 3.4*      CO2 24   BUN 13   CREATININE 1.0   CALCIUM 8.6*     CBC:   Recent Labs   Lab 12/31/22 2214 12/31/22 2223   WBC 7.03  --    HGB 13.0*  --    HCT 39.9* 44     --      Troponin:   Recent Labs   Lab 12/31/22 2214   TROPONINI 0.013       Significant Imaging: I have reviewed all pertinent imaging results/findings within the  past 24 hours.    Imaging Results              CT Head Without Contrast (Final result)  Result time 01/01/23 00:57:37      Final result by Hakan Calhoun MD (01/01/23 00:57:37)                   Impression:      No CT evidence of acute intracranial abnormality.    Generalized cerebral volume loss and chronic ischemic changes.    Chronic appearing paranasal sinus disease.      Electronically signed by: Hakan Calhoun MD  Date:    01/01/2023  Time:    00:57               Narrative:    EXAMINATION:  CT HEAD WITHOUT CONTRAST    CLINICAL HISTORY:  Headache, new or worsening (Age >= 50y);    TECHNIQUE:  Low dose axial images were obtained through the head.  Coronal and sagittal reformations were also performed. Contrast was not administered.    COMPARISON:  04/26/2021.    FINDINGS:  Generalized cerebral volume loss with ex vacuo dilation of the ventricles and sulci.  Mild periventricular white matter hypoattenuation suggestive of chronic microvascular ischemic change.    No evidence of acute territorial infarct, hemorrhage, mass effect, or midline shift.    Ventricles are normal in size and configuration.    No displaced calvarial fracture.  Scarring again noted in the left posterior scalp.    Patchy mucosal thickening throughout the right frontal, ethmoid, sphenoid, and maxillary sinuses.  No air-fluid levels.  Mastoid air cells are essentially clear.                                       CTA Chest Abdomen Pelvis (Final result)  Result time 01/01/23 01:44:53      Final result by Florentino Reyna MD (01/01/23 01:44:53)                   Impression:      1. Interval decrease in ascending aortic dilatation now measuring 3.5 cm at the sinus of Valsalva and mid ascending segment.  2. Additional findings as above.    Electronically signed by resident: Hector Edwards  Date:    01/01/2023  Time:    00:55    Electronically signed by: Florentino Reyna MD  Date:    01/01/2023  Time:    01:44               Narrative:     EXAMINATION:  CTA CHEST ABDOMEN PELVIS    CLINICAL HISTORY:  Aortic aneurysm, known or suspected;    TECHNIQUE:  CTA images were obtained from the thoracic inlet to the pubic symphysis.  Noncontrast, arterial, and delayed phase images were acquired. 100 mL of intravenous Omnipaque 350 was administered.  Oral contrast was not administered. Axial MIP, sagittal, and coronal reformats were obtained.    COMPARISON:  None.    CT chest 11/02/2022, 02/20/2020    FINDINGS:  THORACIC SOFT TISSUES:  No axillary or subpectoral lymphadenopathy. The visualized thyroid gland is unremarkable.    HEART & MEDIASTINUM: Stable elevation of the right-sided hemidiaphragm.  No mediastinal or hilar lymphadenopathy. Pulmonary arteries are patent to the segmental level.    Heart and pericardium are otherwise within normal limits.    PLEURA:  No pleural effusion or pneumothorax.    LUNGS & AIRWAYS:  Airways are patent. Bibasilar subsegmental atelectasis.  Right basilar interlobular septal thickening.    HEPATOBILIARY:  No focal hepatic lesions. No biliary ductal dilatation. Gallbladder is contracted.  Portal vein is patent.    SPLEEN:  No splenomegaly.    PANCREAS:  No focal masses or ductal dilatation.    ADRENALS:  No adrenal nodules.    KIDNEYS/URETERS: Kidneys enhance symmetrically.  Exophytic right renal simple cyst.  No hydronephrosis, stones or solid mass lesions. Ureters are unremarkable.    PELVIC ORGANS/BLADDER:  Bladder is moderately distended without focal wall thickening.    PERITONEUM / RETROPERITONEUM:  No free air. No free fluid.    LYMPH NODES:  No lymphadenopathy.    VESSELS: Left-sided 3 vessel aortic arch.  Visualized portions of the arch vessels are patent.    Thoracic aortic measurements include sinus of Valsalva at 3.5 cm (sagittal series 602, image 126; previously 4.1 cm), mid ascending 3.5 cm (coronal series 601, image 130; previously 4.1 cm), mid arch 2.9 cm (sagittal series 602, image 132; previously 3.1 cm),  isthmus 2.8 cm (sagittal series 602, image 141; previously 3.1 cm), mid descending 2.4 cm (sagittal series 602, image 143; previously 2.4 cm) and juxta hiatal 2.5 cm (sagittal series 602, image 138; previously 2.3 cm).    Abdominal aortic measurements include suprarenal 2.4 cm (axial series 2, image 496), infrarenal 1.8 cm (axial series 2, image 570) aortic bifurcation 1.9 cm (axial series 2, image 682) and both left and right common iliac arteries measure 1.2 cm (axial series 2, image 706).    The celiac, superior mesenteric and inferior mesenteric arteries are patent.    There are single renal arteries bilaterally. Renal arteries are patent without significant stenosis.    GI TRACT:  No distension or wall thickening. Normal appendix.    BONES AND ABDOMINAL SOFT TISSUES: 0.7 cm fat-containing umbilical hernia.  Bilateral hydroceles, right greater than left.  No acute fractures or aggressive osseous lesions.  Degenerative spondylosis and facet arthropathy in the mid to lower lumbar spine.                                        Assessment/Plan:     * Syncope  Patient presenting with a syncopal episode after gettig out of the tub this AM. +LOC, - head trauma  - Afebrile, no leukocytosis  - Tn neg, BNP neg  - CTH w/o intracranial abnormality  - 2D ECHO ordered   - EKG with NSR and RBBB, unchanged from previous  - orthostatic vitals ordered  - Glucose 88 on admit, will obtain CBC, CMP daily   - tele    Hypokalemia  K 3.4  - replaced  - continue home supplements      Thoracic aortic aneurysm without rupture  Chronic  - CTA showed interval decrease in ascending aortic dilatation now measuring 3.5 cm at the sinus of Valsalva and mid ascending segment  - BP controlled on admission    History of diabetes mellitus, type II  Last A1C 5.8  - diet controlled  - has not been on medications    BPH with obstruction/lower urinary tract symptoms  - continue flomax  - follows with urologist OP    GERD (gastroesophageal reflux  disease)  - continue protonix, pt takes prilosec at home  - pt asking for referral to GI at discharge for recurrent GERD symtpoms    Mixed hyperlipidemia  - continue statin    Essential hypertension  Controlled on admission  - continue amlodipine, chlorthalidone and losartan (home med irbesartan not on formulary)        VTE Risk Mitigation (From admission, onward)         Ordered     enoxaparin injection 40 mg  Daily         01/01/23 0246     IP VTE HIGH RISK PATIENT  Once         01/01/23 0246                   Yashira Allen PA-C  Department of Hospital Medicine   [facility] - Emergency Dept

## 2023-01-01 NOTE — ASSESSMENT & PLAN NOTE
- continue protonix, pt takes prilosec at home  - pt asking for referral to GI at discharge for recurrent GERD symtpoms

## 2023-01-01 NOTE — ED PROVIDER NOTES
Encounter Date: 12/31/2022       History     Chief Complaint   Patient presents with    Loss of Consciousness     Pt had a syncopal episode in the shower today. Pt family found him and states he was out for approx 20 min. EMS states when they sat him up his BP went 80/40s. Denies head or neck pain. No obvious trauma. Denies blood thinner use.      Tito Menard is a 77 M hx of diabetes, CKD, GERD, aortic aneurysm presenting today for syncope.  Patient states he was feeling unwell today, reported generalized weakness and wanted to lay down most of the day.  His wife checked on him after [employer], states that he did not look like he was feeling well.  She asked him what was wrong and he said he was tired.  He then went to the bathroom to take a bath.  He got out and was drying himself off when he felt dizzy and then passed out.  The wife said she heard a sound, went to the bathroom and found him lying next to the bathtub with his head near a cabinet.  His eyes were open, he was breathing but he was not answering questions.  He then started vomiting.   Her son tried to help him get up but was unable to.  They had called EMS for assistance.  Per EMS, BP was 80/40.  He currently feels nauseated, denies chest pain or shortness of breath.  No recent fevers or chills.  Of note, patient has reported a mild headache located behind his eyes for the past 2 days.  It is associated with mild blurred vision but no unilateral weakness, facial droop, slurred speech.  Wife at bedside states patient has had a syncopal episode last year.     Review of patient's allergies indicates:  No Known Allergies  Past Medical History:   Diagnosis Date    Allergy     Aneurysm of artery of lower extremity     Chalazion of left eye     CKD (chronic kidney disease), stage II 4/1/2019    Diabetes mellitus type II     Diabetes with neurologic complications     Enlarged aorta 8/2/2016    Noted on pharmacological stress echo 2/28/2014.      GERD  (gastroesophageal reflux disease)     egd 2008    Hyperlipidemia     Hypertension     Jock itch 7/19/2018    MGD (meibomian gland dysfunction)     Osteopenia     Spinal stenosis     Spondylosis without myelopathy 10/23/2015    Vitamin D deficiency disease      Past Surgical History:   Procedure Laterality Date    CHALAZION EXCISION Left 8/18/13    CYST REMOVAL  2013    Back of neck    FLEXIBLE CYSTOSCOPY N/A 12/7/2021    Procedure: CYSTOSCOPY, FLEXIBLE;  Surgeon: Beatrice Vaca MD;  Location: [facility] OR [address] [place] [address];  Service: Urology;  Laterality: N/A;    FLUOROSCOPIC URODYNAMIC STUDY N/A 12/7/2021    Procedure: URODYNAMIC STUDY, FLUOROSCOPIC;  Surgeon: Beatrice Vaca MD;  Location: [facility] OR [address] [place] [address];  Service: Urology;  Laterality: N/A;  90 minutes     SPINE SURGERY  2007    x2 (2000, 2007)     Family History   Problem Relation Age of Onset    Hyperlipidemia Mother     Hypertension Mother     Kidney disease Mother     Cancer Mother     Heart disease Mother     Kidney failure Mother     No Known Problems Daughter     No Known Problems Sister     No Known Problems Brother     No Known Problems Son     No Known Problems Brother     No Known Problems Son     Hypertension Maternal Grandmother     Amblyopia Neg Hx     Blindness Neg Hx     Cataracts Neg Hx     Diabetes Neg Hx     Glaucoma Neg Hx     Macular degeneration Neg Hx     Retinal detachment Neg Hx     Strabismus Neg Hx     Stroke Neg Hx     Thyroid disease Neg Hx     Melanoma Neg Hx     Psoriasis Neg Hx     Lupus Neg Hx     Eczema Neg Hx      Social History     Tobacco Use    Smoking status: Never    Smokeless tobacco: Former     Types: Chew    Tobacco comments:     Chewed tobacco once per day for 4-5 years when a    Substance Use Topics    Alcohol use: No    Drug use: No     Review of Systems   Constitutional:  Negative for chills and fever.   HENT:  Negative for sore throat.    Respiratory:  Negative for  cough and shortness of breath.    Cardiovascular:  Negative for chest pain, palpitations and leg swelling.   Gastrointestinal:  Positive for nausea and vomiting. Negative for abdominal pain.   Genitourinary:  Negative for dysuria and flank pain.   Musculoskeletal:  Negative for back pain.   Skin:  Negative for rash.   Neurological:  Positive for dizziness, syncope and weakness. Negative for speech difficulty.   Hematological:  Does not bruise/bleed easily.     Physical Exam     Initial Vitals [12/31/22 2131]   BP Pulse Resp Temp SpO2   121/78 60 16 97.5 °F (36.4 °C) 95 %      MAP       --         Physical Exam    Nursing note and vitals reviewed.  Constitutional: He appears well-developed and well-nourished. He is diaphoretic. No distress.   + clammy   HENT:   Head: Normocephalic and atraumatic.   Mouth/Throat: Oropharynx is clear and moist.   Eyes: Conjunctivae and EOM are normal. Pupils are equal, round, and reactive to light.   Neck: Neck supple.   No midline cervical tenderness, full range of motion the neck   Cardiovascular:  Normal rate, regular rhythm and intact distal pulses.           Pulmonary/Chest: Breath sounds normal. He has no wheezes. He has no rales.   Abdominal: Abdomen is soft. Bowel sounds are normal. There is no abdominal tenderness.   Musculoskeletal:         General: No tenderness or edema. Normal range of motion.      Cervical back: Neck supple.     Lymphadenopathy:     He has no cervical adenopathy.   Neurological: He is alert and oriented to person, place, and time. He has normal strength. No cranial nerve deficit or sensory deficit.   No drift in the extremities  5/5 strength in upper extremities, left lower extremity  4+ out of 5 strength in the right lower extremity- chronic per patient   Skin: No rash noted.   Psychiatric: He has a normal mood and affect.       ED Course   Procedures  Labs Reviewed   ISTAT PROCEDURE - Abnormal; Notable for the following components:       Result Value     POC Potassium 3.3 (*)     All other components within normal limits   HIV 1 / 2 ANTIBODY   HEPATITIS C ANTIBODY   CBC W/ AUTO DIFFERENTIAL   COMPREHENSIVE METABOLIC PANEL   B-TYPE NATRIURETIC PEPTIDE   TROPONIN I   URINALYSIS, REFLEX TO URINE CULTURE   SARS-COV-2 RNA AMPLIFICATION, QUAL   ISTAT CHEM8     EKG Readings: (Independently Interpreted)   EKG:  Sinus rhythm at 61, right bundle branch block, no ischemic changes     Imaging Results    None       X-Rays:   Independently Interpreted Readings:   Other Readings:  CT head  Medications   ondansetron injection 4 mg (has no administration in time range)   sodium chloride 0.9% bolus 500 mL 500 mL (has no administration in time range)     Medical Decision Making:   History:   Old Medical Records: I decided to obtain old medical records.  Initial Assessment:   Emergent evaluation a 77-year-old male here today after a syncopal episode, noted to be hypotensive per EMS.  Vital signs currently stable  Neuro intact, still reports mild persistent headache.  Differential Diagnosis:   Syncope secondary to arrhythmia, orthostatic hypotension, hypoglycemia, aortic dissection, dehydration, infectious process  Independently Interpreted Test(s):   I have ordered and independently interpreted X-rays - see prior notes.  I have ordered and independently interpreted EKG Reading(s) - see prior notes  Clinical Tests:   Lab Tests: Reviewed and Ordered  Radiological Study: Ordered and Reviewed  Medical Tests: Ordered and Reviewed  ED Management:  - labs  - CT head, CTA chest/abd/pelvis to evaluate for aortic pathology  - EKG  - cardiac monitor  - orthostatic  - pulse ox           ED Course as of 01/01/23 0152   Sun Jan 01, 2023   0137 Labs reviewed with no leukocytosis.  Hemoglobin stable.  BNP and troponin negative.  EKG without ischemia.  UA negative [GM]   0137 CT head reviewed with no acute process. [GM]   0137 Vital signs stable [GM]   0148 Impression:     1. Interval decrease in  ascending aortic dilatation now measuring 3.5 cm at the sinus of Valsalva and mid ascending segment.  2. Additional findings as above.     Electronically signed by resident: Hector Edwards  Date:                                            01/01/2023  Time:                                           00:55     Electronically signed by: Florentino Reyna MD  Date:                                            01/01/2023  Time:                                           01:44 [GM]      ED Course User Index  [GM] Vanesa Karimi MD                 Clinical Impression:   Final diagnoses:  [R55] Syncope

## 2023-01-01 NOTE — ASSESSMENT & PLAN NOTE
Controlled on admission  - continue amlodipine, chlorthalidone and losartan (home med irbesartan not on formulary)

## 2023-01-01 NOTE — ED NOTES
I-STAT Chem-8+ Results:   Value Reference Range   Sodium 141 136-145 mmol/L   Potassium  3.3 3.5-5.1 mmol/L   Chloride 101  mmol/L   Ionized Calcium 1.15 1.06-1.42 mmol/L   CO2 (measured) 27 23-29 mmol/L   Glucose 88  mg/dL   BUN 14 6-30 mg/dL   Creatinine 1 0.5-1.4 mg/dL   Hematocrit 44 36-54%

## 2023-01-01 NOTE — ASSESSMENT & PLAN NOTE
Chronic  - CTA showed interval decrease in ascending aortic dilatation now measuring 3.5 cm at the sinus of Valsalva and mid ascending segment  - BP controlled on admission

## 2023-01-01 NOTE — ED PROVIDER NOTES
Encounter Date: 12/31/2022       History     Chief Complaint   Patient presents with    Loss of Consciousness     Pt had a syncopal episode in the shower today. Pt family found him and states he was out for approx 20 min. EMS states when they sat him up his BP went 80/40s. Denies head or neck pain. No obvious trauma. Denies blood thinner use.      Tito Menard is a 77 M hx of diabetes, CKD, GERD, aortic aneurysm presenting today for syncope.  Patient states he was feeling unwell today, reported generalized weakness and wanted to lay down most of the day.  His wife checked on him after [employer], states that he did not look like he was feeling well.  She asked him what was wrong and he said he was tired.  He then went to the bathroom to take a bath.  He got out and was drying himself off when he felt dizzy and then passed out.  The wife said she heard a sound, went to the bathroom and found him lying next to the bathtub with his head near a cabinet.  His eyes were open, he was breathing but he was not answering questions.  He then started vomiting.   Her son tried to help him get up but was unable to.  They had called EMS for assistance.  Per EMS, BP was 80/40.  He currently feels nauseated, denies chest pain or shortness of breath.  No recent fevers or chills.  Of note, patient has reported a mild headache located behind his eyes for the past 2 days.  It is associated with mild blurred vision but no unilateral weakness, facial droop, slurred speech.  Wife at bedside states patient has had a syncopal episode last year.     Review of patient's allergies indicates:  No Known Allergies  Past Medical History:   Diagnosis Date    Allergy     Aneurysm of artery of lower extremity     Chalazion of left eye     CKD (chronic kidney disease), stage II 4/1/2019    Diabetes mellitus type II     Diabetes with neurologic complications     Enlarged aorta 8/2/2016    Noted on pharmacological stress echo 2/28/2014.      GERD  (gastroesophageal reflux disease)     egd 2008    Hyperlipidemia     Hypertension     Jock itch 7/19/2018    MGD (meibomian gland dysfunction)     Osteopenia     Spinal stenosis     Spondylosis without myelopathy 10/23/2015    Vitamin D deficiency disease      Past Surgical History:   Procedure Laterality Date    CHALAZION EXCISION Left 8/18/13    CYST REMOVAL  2013    Back of neck    FLEXIBLE CYSTOSCOPY N/A 12/7/2021    Procedure: CYSTOSCOPY, FLEXIBLE;  Surgeon: Beatrice Vaca MD;  Location: [facility] OR [address] [place] [address];  Service: Urology;  Laterality: N/A;    FLUOROSCOPIC URODYNAMIC STUDY N/A 12/7/2021    Procedure: URODYNAMIC STUDY, FLUOROSCOPIC;  Surgeon: Beatrice Vaca MD;  Location: [facility] OR [address] [place] [address];  Service: Urology;  Laterality: N/A;  90 minutes     SPINE SURGERY  2007    x2 (2000, 2007)     Family History   Problem Relation Age of Onset    Hyperlipidemia Mother     Hypertension Mother     Kidney disease Mother     Cancer Mother     Heart disease Mother     Kidney failure Mother     No Known Problems Daughter     No Known Problems Sister     No Known Problems Brother     No Known Problems Son     No Known Problems Brother     No Known Problems Son     Hypertension Maternal Grandmother     Amblyopia Neg Hx     Blindness Neg Hx     Cataracts Neg Hx     Diabetes Neg Hx     Glaucoma Neg Hx     Macular degeneration Neg Hx     Retinal detachment Neg Hx     Strabismus Neg Hx     Stroke Neg Hx     Thyroid disease Neg Hx     Melanoma Neg Hx     Psoriasis Neg Hx     Lupus Neg Hx     Eczema Neg Hx      Social History     Tobacco Use    Smoking status: Never    Smokeless tobacco: Former     Types: Chew    Tobacco comments:     Chewed tobacco once per day for 4-5 years when a    Substance Use Topics    Alcohol use: No    Drug use: No     Review of Systems   Constitutional:  Negative for chills and fever.   HENT:  Negative for sore throat.    Respiratory:  Negative for cough and shortness of breath.     Cardiovascular:  Negative for chest pain, palpitations and leg swelling.   Gastrointestinal:  Positive for nausea and vomiting. Negative for abdominal pain.   Genitourinary:  Negative for dysuria and flank pain.   Musculoskeletal:  Negative for back pain.   Skin:  Negative for rash.   Neurological:  Positive for dizziness, syncope and weakness. Negative for speech difficulty.   Hematological:  Does not bruise/bleed easily.     Physical Exam     Initial Vitals [12/31/22 2131]   BP Pulse Resp Temp SpO2   121/78 60 16 97.5 °F (36.4 °C) 95 %      MAP       --         Physical Exam    Nursing note and vitals reviewed.  Constitutional: He appears well-developed and well-nourished. He is diaphoretic. No distress.   + clammy   HENT:   Head: Normocephalic and atraumatic.   Mouth/Throat: Oropharynx is clear and moist.   Eyes: Conjunctivae and EOM are normal. Pupils are equal, round, and reactive to light.   Neck: Neck supple.   No midline cervical tenderness, full range of motion the neck   Cardiovascular:  Normal rate, regular rhythm and intact distal pulses.           Pulmonary/Chest: Breath sounds normal. He has no wheezes. He has no rales.   Abdominal: Abdomen is soft. Bowel sounds are normal. There is no abdominal tenderness.   Musculoskeletal:         General: No tenderness or edema. Normal range of motion.      Cervical back: Neck supple.     Lymphadenopathy:     He has no cervical adenopathy.   Neurological: He is alert and oriented to person, place, and time. He has normal strength. No cranial nerve deficit or sensory deficit.   No drift in the extremities  5/5 strength in upper extremities, left lower extremity  4+ out of 5 strength in the right lower extremity- chronic per patient   Skin: No rash noted.   Psychiatric: He has a normal mood and affect.       ED Course   Procedures  Labs Reviewed   ISTAT PROCEDURE - Abnormal; Notable for the following components:       Result Value    POC Potassium 3.3 (*)     All  other components within normal limits   HIV 1 / 2 ANTIBODY   HEPATITIS C ANTIBODY   CBC W/ AUTO DIFFERENTIAL   COMPREHENSIVE METABOLIC PANEL   B-TYPE NATRIURETIC PEPTIDE   TROPONIN I   URINALYSIS, REFLEX TO URINE CULTURE   SARS-COV-2 RNA AMPLIFICATION, QUAL   ISTAT CHEM8     EKG Readings: (Independently Interpreted)   EKG:  Sinus rhythm at 61, right bundle branch block, no ischemic changes     Imaging Results    None       X-Rays:   Independently Interpreted Readings:   Other Readings:  CT head:  No acute process  Medications   ondansetron injection 4 mg (has no administration in time range)   sodium chloride 0.9% bolus 500 mL 500 mL (has no administration in time range)     Medical Decision Making:   History:   Old Medical Records: I decided to obtain old medical records.  Initial Assessment:   Emergent evaluation a 77-year-old male here today after a syncopal episode, noted to be hypotensive per EMS.  Vital signs currently stable  Neuro intact, still reports mild persistent headache.  Differential Diagnosis:   Syncope secondary to arrhythmia, orthostatic hypotension, hypoglycemia, aortic dissection, dehydration, infectious process  Independently Interpreted Test(s):   I have ordered and independently interpreted X-rays - see prior notes.  I have ordered and independently interpreted EKG Reading(s) - see prior notes  Clinical Tests:   Lab Tests: Reviewed and Ordered  Radiological Study: Ordered and Reviewed  Medical Tests: Ordered and Reviewed  ED Management:  - labs  - CT head, CTA chest/abd/pelvis to evaluate for aortic pathology  - EKG  - cardiac monitor  - orthostatic  - pulse ox           ED Course as of 01/01/23 0358   Sun Jan 01, 2023   0137 Labs reviewed with no leukocytosis.  Hemoglobin stable.  BNP and troponin negative.  EKG without ischemia.  UA negative [GM]   0137 CT head reviewed with no acute process. [GM]   0137 Vital signs stable [GM]   0148 Impression:     1. Interval decrease in ascending aortic  dilatation now measuring 3.5 cm at the sinus of Valsalva and mid ascending segment.  2. Additional findings as above.     Electronically signed by resident: Hector Edwards  Date:                                            01/01/2023  Time:                                           00:55     Electronically signed by: Florentino Reyna MD  Date:                                            01/01/2023  Time:                                           01:44 [GM]      ED Course User Index  [GM] Vanesa Karimi MD          Given his significant comorbidities, hypotension with EMS and generalized weakness today, will plan to place in observation for continued cardiac monitoring.  I have discussed this plan with patient and family at bedside, they agree.  Discussed with hospital medicine, obs to Dr. Gilmore  for further treatment and evaluation.       Clinical Impression:   Final diagnoses:  [R55] Syncope               Vanesa Karimi MD  01/01/23 0400

## 2023-01-01 NOTE — ED TRIAGE NOTES
Tito Menard, a 77 y.o. male presents to the ED w/ complaint of syncopal episode prior to arrival. Patient felt somewhat lightheaded earlier in the day which is not abnormal for him according to his wife. He took a bath before bed and when he got out of the bed he synopsized. This wasn't witnessed but his wife came in right after and said he was breathing but unresponsive. Patient denies fever, chest pain, shortness of breath, abdominal pain. Patient was nauseated and vomited en route. EMS gave 700mL NS.    Triage note:  Chief Complaint   Patient presents with    Loss of Consciousness     Pt had a syncopal episode in the shower today. Pt family found him and states he was out for approx 20 min. EMS states when they sat him up his BP went 80/40s. Denies head or neck pain. No obvious trauma. Denies blood thinner use.      Review of patient's allergies indicates:  No Known Allergies  Past Medical History:   Diagnosis Date    Allergy     Aneurysm of artery of lower extremity     Chalazion of left eye     CKD (chronic kidney disease), stage II 4/1/2019    Diabetes mellitus type II     Diabetes with neurologic complications     Enlarged aorta 8/2/2016    Noted on pharmacological stress echo 2/28/2014.      GERD (gastroesophageal reflux disease)     egd 2008    Hyperlipidemia     Hypertension     Jock itch 7/19/2018    MGD (meibomian gland dysfunction)     Osteopenia     Spinal stenosis     Spondylosis without myelopathy 10/23/2015    Vitamin D deficiency disease

## 2023-01-01 NOTE — PROGRESS NOTES
Patient admitted, AAOx 3, denies any distress, slight headache. RA, on Tele, NSR, VS stable, labs reordered.

## 2023-01-01 NOTE — NURSING
Patient resting in bed, eyes open. Appears in no acute distress. Took orthostatic VS, assisted patient back to bed.Tolerated well. VS entered into the EMR, physician alerted of this information.

## 2023-01-01 NOTE — NURSING
Assisted patient with dialing phone to call his wife. Patient appears in no acute distress. VS stable. Continue to monitor.

## 2023-01-01 NOTE — PLAN OF CARE
Problem: Adult Inpatient Plan of Care  Goal: Plan of Care Review  Outcome: Ongoing, Progressing  Goal: Patient-Specific Goal (Individualized)  Outcome: Ongoing, Progressing  Goal: Absence of Hospital-Acquired Illness or Injury  Outcome: Ongoing, Progressing  Goal: Optimal Comfort and Wellbeing  Outcome: Ongoing, Progressing  Goal: Readiness for Transition of Care  Outcome: Ongoing, Progressing     Problem: Infection  Goal: Absence of Infection Signs and Symptoms  Outcome: Ongoing, Progressing     Problem: Diabetes Comorbidity  Goal: Blood Glucose Level Within Targeted Range  Outcome: Ongoing, Progressing     Problem: Skin Injury Risk Increased  Goal: Skin Health and Integrity  Outcome: Ongoing, Progressing

## 2023-01-01 NOTE — SUBJECTIVE & OBJECTIVE
Past Medical History:   Diagnosis Date    Allergy     Aneurysm of artery of lower extremity     Chalazion of left eye     CKD (chronic kidney disease), stage II 4/1/2019    Diabetes mellitus type II     Diabetes with neurologic complications     Enlarged aorta 8/2/2016    Noted on pharmacological stress echo 2/28/2014.      GERD (gastroesophageal reflux disease)     egd 2008    Hyperlipidemia     Hypertension     Jock itch 7/19/2018    MGD (meibomian gland dysfunction)     Osteopenia     Spinal stenosis     Spondylosis without myelopathy 10/23/2015    Vitamin D deficiency disease        Past Surgical History:   Procedure Laterality Date    CHALAZION EXCISION Left 8/18/13    CYST REMOVAL  2013    Back of neck    FLEXIBLE CYSTOSCOPY N/A 12/7/2021    Procedure: CYSTOSCOPY, FLEXIBLE;  Surgeon: Beatrice Vaca MD;  Location: [facility] OR [address] [place] [address];  Service: Urology;  Laterality: N/A;    FLUOROSCOPIC URODYNAMIC STUDY N/A 12/7/2021    Procedure: URODYNAMIC STUDY, FLUOROSCOPIC;  Surgeon: Beatrice Vaca MD;  Location: [facility] OR [address] [place] [address];  Service: Urology;  Laterality: N/A;  90 minutes     SPINE SURGERY  2007    x2 (2000, 2007)       Review of patient's allergies indicates:  No Known Allergies    No current facility-administered medications on file prior to encounter.     Current Outpatient Medications on File Prior to Encounter   Medication Sig    albuterol 90 mcg/actuation inhaler Inhale 1-2 puffs into the lungs every 6 (six) hours as needed for Wheezing.    amLODIPine (NORVASC) 5 MG tablet TAKE 1 TABLET EVERY DAY    aspirin (ECOTRIN) 81 MG EC tablet TAKE 1 TABLET EVERY DAY    azelastine (ASTELIN) 137 mcg (0.1 %) nasal spray 1 spray (137 mcg total) by Nasal route 2 (two) times daily.    benzonatate (TESSALON) 200 MG capsule TAKE 1 CAPSULE THREE TIMES DAILY AS NEEDED FOR COUGH    chlorthalidone (HYGROTEN) 25 MG Tab TAKE 1/2 TABLET EVERY DAY    clotrimazole-betamethasone 1-0.05% (LOTRISONE) cream APPLY TOPICALLY 2 (TWO) TIMES  DAILY.    DULoxetine (CYMBALTA) 60 MG capsule TAKE 1 CAPSULE EVERY DAY FOR PAIN CONTROL    fluticasone propionate (FLONASE) 50 mcg/actuation nasal spray USE 1 SPRAY NASALLY ONE TIME DAILY    gabapentin (NEURONTIN) 300 MG capsule Take 1 capsule every morning and at noon, and take 2 capsules every evening (4 total daily)    irbesartan (AVAPRO) 300 MG tablet TAKE 1 TABLET EVERY EVENING    ketoconazole (NIZORAL) 2 % cream AAA bid (facial redness and scaling)    ketoconazole (NIZORAL) 2 % shampoo WASH HAIR WITH MEDICATED SHAMPOO AT LEAST TWICE A WEEK - LET SIT ON SCALP AT LEAST 5 MINUTES PRIOR TO RINSING    pantoprazole (PROTONIX) 40 MG tablet Take 1 tablet (40 mg total) by mouth once daily.    potassium chloride (MICRO-K) 10 MEQ CpSR TAKE 3 CAPSULES ONE TIME DAILY    pravastatin (PRAVACHOL) 20 MG tablet TAKE 1 TABLET EVERY DAY    tamsulosin (FLOMAX) 0.4 mg Cap Take 1 capsule (0.4 mg total) by mouth once daily.    acetaminophen (TYLENOL) 650 MG TbSR Take 1,300 mg by mouth every 8 (eight) hours as needed.     cholecalciferol, vitamin D3, (VITAMIN D3) 50 mcg (2,000 unit) Cap Take 1 capsule by mouth once daily.    docusate sodium (COLACE) 50 MG capsule Take by mouth 2 (two) times daily.    guaifenesin (MUCINEX) 600 mg 12 hr tablet Take 1,200 mg by mouth 2 (two) times daily as needed.     ibuprofen (ADVIL,MOTRIN) 200 MG tablet Take 200 mg by mouth every 6 (six) hours as needed for Pain.    melatonin 10 mg Cap Take 1 capsule by mouth nightly as needed.     methocarbamol (ROBAXIN) 500 MG Tab 3 (three) times daily as needed.     omeprazole (PRILOSEC) 20 MG capsule TAKE 1 CAPSULE EVERY DAY    [DISCONTINUED] finasteride (PROSCAR) 5 mg tablet Take 1 tablet (5 mg total) by mouth once daily.     Family History       Problem Relation (Age of Onset)    Cancer Mother    Heart disease Mother    Hyperlipidemia Mother    Hypertension Mother, Maternal Grandmother    Kidney disease Mother    Kidney failure Mother    No Known Problems  Daughter, Sister, Brother, Son, Brother, Son          Tobacco Use    Smoking status: Never    Smokeless tobacco: Former     Types: Chew    Tobacco comments:     Chewed tobacco once per day for 4-5 years when a    Substance and Sexual Activity    Alcohol use: No    Drug use: No    Sexual activity: Not Currently     Partners: Female     Review of Systems   Constitutional:  Negative for activity change, chills, fatigue and fever.   HENT:  Negative for congestion, sinus pressure, sinus pain and trouble swallowing.    Respiratory:  Negative for chest tightness, shortness of breath and wheezing.    Cardiovascular:  Negative for chest pain, palpitations and leg swelling.   Gastrointestinal:  Positive for nausea and vomiting. Negative for abdominal pain and constipation.   Genitourinary:  Negative for dysuria, frequency and urgency.   Neurological:  Positive for dizziness and syncope. Negative for weakness, light-headedness, numbness and headaches.   Objective:     Vital Signs (Most Recent):  Temp: 98.2 °F (36.8 °C) (01/01/23 0223)  Pulse: 65 (01/01/23 0223)  Resp: 17 (01/01/23 0030)  BP: (!) 144/77 (01/01/23 0223)  SpO2: 96 % (01/01/23 0223)   Vital Signs (24h Range):  Temp:  [97.5 °F (36.4 °C)-98.7 °F (37.1 °C)] 98.2 °F (36.8 °C)  Pulse:  [60-91] 65  Resp:  [13-17] 17  SpO2:  [94 %-96 %] 96 %  BP: (121-151)/(60-79) 144/77     Weight: 98 kg (216 lb)  Body mass index is 30.13 kg/m².    Physical Exam  Vitals and nursing note reviewed.   Constitutional:       General: He is not in acute distress.     Appearance: He is well-developed.   HENT:      Head: Normocephalic and atraumatic.      Mouth/Throat:      Pharynx: No oropharyngeal exudate.   Eyes:      Conjunctiva/sclera: Conjunctivae normal.      Pupils: Pupils are equal, round, and reactive to light.   Cardiovascular:      Rate and Rhythm: Normal rate and regular rhythm.      Heart sounds: Normal heart sounds.   Pulmonary:      Effort: Pulmonary effort is  normal. No respiratory distress.      Breath sounds: Normal breath sounds. No wheezing.   Abdominal:      General: Bowel sounds are normal. There is no distension.      Palpations: Abdomen is soft.      Tenderness: There is no abdominal tenderness.   Musculoskeletal:         General: No tenderness. Normal range of motion.      Cervical back: Normal range of motion and neck supple.   Lymphadenopathy:      Cervical: No cervical adenopathy.   Skin:     General: Skin is warm and dry.      Capillary Refill: Capillary refill takes less than 2 seconds.      Findings: No rash.   Neurological:      General: No focal deficit present.      Mental Status: He is alert and oriented to person, place, and time. Mental status is at baseline.      Cranial Nerves: No cranial nerve deficit.      Sensory: No sensory deficit.      Coordination: Coordination normal.   Psychiatric:         Behavior: Behavior normal.         Thought Content: Thought content normal.         Judgment: Judgment normal.         CRANIAL NERVES     CN III, IV, VI   Pupils are equal, round, and reactive to light.     Significant Labs: All pertinent labs within the past 24 hours have been reviewed.  BMP:   Recent Labs   Lab 12/31/22 2214   GLU 86      K 3.4*      CO2 24   BUN 13   CREATININE 1.0   CALCIUM 8.6*     CBC:   Recent Labs   Lab 12/31/22 2214 12/31/22 2223   WBC 7.03  --    HGB 13.0*  --    HCT 39.9* 44     --      Troponin:   Recent Labs   Lab 12/31/22 2214   TROPONINI 0.013       Significant Imaging: I have reviewed all pertinent imaging results/findings within the past 24 hours.    Imaging Results              CT Head Without Contrast (Final result)  Result time 01/01/23 00:57:37      Final result by Hakan Calhoun MD (01/01/23 00:57:37)                   Impression:      No CT evidence of acute intracranial abnormality.    Generalized cerebral volume loss and chronic ischemic changes.    Chronic appearing paranasal sinus  disease.      Electronically signed by: Hakan Calhoun MD  Date:    01/01/2023  Time:    00:57               Narrative:    EXAMINATION:  CT HEAD WITHOUT CONTRAST    CLINICAL HISTORY:  Headache, new or worsening (Age >= 50y);    TECHNIQUE:  Low dose axial images were obtained through the head.  Coronal and sagittal reformations were also performed. Contrast was not administered.    COMPARISON:  04/26/2021.    FINDINGS:  Generalized cerebral volume loss with ex vacuo dilation of the ventricles and sulci.  Mild periventricular white matter hypoattenuation suggestive of chronic microvascular ischemic change.    No evidence of acute territorial infarct, hemorrhage, mass effect, or midline shift.    Ventricles are normal in size and configuration.    No displaced calvarial fracture.  Scarring again noted in the left posterior scalp.    Patchy mucosal thickening throughout the right frontal, ethmoid, sphenoid, and maxillary sinuses.  No air-fluid levels.  Mastoid air cells are essentially clear.                                       CTA Chest Abdomen Pelvis (Final result)  Result time 01/01/23 01:44:53      Final result by Florentino Reyna MD (01/01/23 01:44:53)                   Impression:      1. Interval decrease in ascending aortic dilatation now measuring 3.5 cm at the sinus of Valsalva and mid ascending segment.  2. Additional findings as above.    Electronically signed by resident: Hector Edwards  Date:    01/01/2023  Time:    00:55    Electronically signed by: Florentino Reyna MD  Date:    01/01/2023  Time:    01:44               Narrative:    EXAMINATION:  CTA CHEST ABDOMEN PELVIS    CLINICAL HISTORY:  Aortic aneurysm, known or suspected;    TECHNIQUE:  CTA images were obtained from the thoracic inlet to the pubic symphysis.  Noncontrast, arterial, and delayed phase images were acquired. 100 mL of intravenous Omnipaque 350 was administered.  Oral contrast was not administered. Axial MIP, sagittal, and  coronal reformats were obtained.    COMPARISON:  None.    CT chest 11/02/2022, 02/20/2020    FINDINGS:  THORACIC SOFT TISSUES:  No axillary or subpectoral lymphadenopathy. The visualized thyroid gland is unremarkable.    HEART & MEDIASTINUM: Stable elevation of the right-sided hemidiaphragm.  No mediastinal or hilar lymphadenopathy. Pulmonary arteries are patent to the segmental level.    Heart and pericardium are otherwise within normal limits.    PLEURA:  No pleural effusion or pneumothorax.    LUNGS & AIRWAYS:  Airways are patent. Bibasilar subsegmental atelectasis.  Right basilar interlobular septal thickening.    HEPATOBILIARY:  No focal hepatic lesions. No biliary ductal dilatation. Gallbladder is contracted.  Portal vein is patent.    SPLEEN:  No splenomegaly.    PANCREAS:  No focal masses or ductal dilatation.    ADRENALS:  No adrenal nodules.    KIDNEYS/URETERS: Kidneys enhance symmetrically.  Exophytic right renal simple cyst.  No hydronephrosis, stones or solid mass lesions. Ureters are unremarkable.    PELVIC ORGANS/BLADDER:  Bladder is moderately distended without focal wall thickening.    PERITONEUM / RETROPERITONEUM:  No free air. No free fluid.    LYMPH NODES:  No lymphadenopathy.    VESSELS: Left-sided 3 vessel aortic arch.  Visualized portions of the arch vessels are patent.    Thoracic aortic measurements include sinus of Valsalva at 3.5 cm (sagittal series 602, image 126; previously 4.1 cm), mid ascending 3.5 cm (coronal series 601, image 130; previously 4.1 cm), mid arch 2.9 cm (sagittal series 602, image 132; previously 3.1 cm), isthmus 2.8 cm (sagittal series 602, image 141; previously 3.1 cm), mid descending 2.4 cm (sagittal series 602, image 143; previously 2.4 cm) and juxta hiatal 2.5 cm (sagittal series 602, image 138; previously 2.3 cm).    Abdominal aortic measurements include suprarenal 2.4 cm (axial series 2, image 496), infrarenal 1.8 cm (axial series 2, image 570) aortic bifurcation  1.9 cm (axial series 2, image 682) and both left and right common iliac arteries measure 1.2 cm (axial series 2, image 706).    The celiac, superior mesenteric and inferior mesenteric arteries are patent.    There are single renal arteries bilaterally. Renal arteries are patent without significant stenosis.    GI TRACT:  No distension or wall thickening. Normal appendix.    BONES AND ABDOMINAL SOFT TISSUES: 0.7 cm fat-containing umbilical hernia.  Bilateral hydroceles, right greater than left.  No acute fractures or aggressive osseous lesions.  Degenerative spondylosis and facet arthropathy in the mid to lower lumbar spine.

## 2023-01-02 VITALS
DIASTOLIC BLOOD PRESSURE: 87 MMHG | OXYGEN SATURATION: 97 % | HEIGHT: 71 IN | WEIGHT: 216 LBS | BODY MASS INDEX: 30.24 KG/M2 | RESPIRATION RATE: 16 BRPM | SYSTOLIC BLOOD PRESSURE: 155 MMHG | HEART RATE: 64 BPM | TEMPERATURE: 98 F

## 2023-01-02 LAB
ALBUMIN SERPL BCP-MCNC: 3 G/DL (ref 3.5–5.2)
ALP SERPL-CCNC: 83 U/L (ref 55–135)
ALT SERPL W/O P-5'-P-CCNC: 17 U/L (ref 10–44)
ANION GAP SERPL CALC-SCNC: 6 MMOL/L (ref 8–16)
ASCENDING AORTA: 3.77 CM
AST SERPL-CCNC: 18 U/L (ref 10–40)
AV INDEX (PROSTH): 0.71
AV MEAN GRADIENT: 3 MMHG
AV PEAK GRADIENT: 6 MMHG
AV VALVE AREA: 2.75 CM2
AV VELOCITY RATIO: 0.68
BASOPHILS # BLD AUTO: 0.03 K/UL (ref 0–0.2)
BASOPHILS NFR BLD: 0.6 % (ref 0–1.9)
BILIRUB SERPL-MCNC: 0.6 MG/DL (ref 0.1–1)
BSA FOR ECHO PROCEDURE: 2.22 M2
BUN SERPL-MCNC: 13 MG/DL (ref 8–23)
CALCIUM SERPL-MCNC: 8.7 MG/DL (ref 8.7–10.5)
CHLORIDE SERPL-SCNC: 104 MMOL/L (ref 95–110)
CO2 SERPL-SCNC: 28 MMOL/L (ref 23–29)
CREAT SERPL-MCNC: 0.9 MG/DL (ref 0.5–1.4)
CV ECHO LV RWT: 0.39 CM
DIFFERENTIAL METHOD: ABNORMAL
DOP CALC AO PEAK VEL: 1.2 M/S
DOP CALC AO VTI: 28.51 CM
DOP CALC LVOT AREA: 3.9 CM2
DOP CALC LVOT DIAMETER: 2.23 CM
DOP CALC LVOT PEAK VEL: 0.81 M/S
DOP CALC LVOT STROKE VOLUME: 78.5 CM3
DOP CALCLVOT PEAK VEL VTI: 20.11 CM
E WAVE DECELERATION TIME: 266.77 MSEC
E/A RATIO: 0.78
E/E' RATIO: 7.76 M/S
ECHO LV POSTERIOR WALL: 0.97 CM (ref 0.6–1.1)
EJECTION FRACTION: 55 %
EOSINOPHIL # BLD AUTO: 0.4 K/UL (ref 0–0.5)
EOSINOPHIL NFR BLD: 8.4 % (ref 0–8)
ERYTHROCYTE [DISTWIDTH] IN BLOOD BY AUTOMATED COUNT: 13.6 % (ref 11.5–14.5)
EST. GFR  (NO RACE VARIABLE): >60 ML/MIN/1.73 M^2
ESTIMATED AVG GLUCOSE: 120 MG/DL (ref 68–131)
FRACTIONAL SHORTENING: 30 % (ref 28–44)
GLUCOSE SERPL-MCNC: 90 MG/DL (ref 70–110)
HBA1C MFR BLD: 5.8 % (ref 4–5.6)
HCT VFR BLD AUTO: 36 % (ref 40–54)
HGB BLD-MCNC: 11.6 G/DL (ref 14–18)
IMM GRANULOCYTES # BLD AUTO: 0.01 K/UL (ref 0–0.04)
IMM GRANULOCYTES NFR BLD AUTO: 0.2 % (ref 0–0.5)
INTERVENTRICULAR SEPTUM: 0.85 CM (ref 0.6–1.1)
IVRT: 122.74 MSEC
LA MAJOR: 5.6 CM
LA MINOR: 5.8 CM
LA WIDTH: 4.18 CM
LEFT ATRIUM SIZE: 3.91 CM
LEFT ATRIUM VOLUME INDEX MOD: 43.7 ML/M2
LEFT ATRIUM VOLUME INDEX: 36.3 ML/M2
LEFT ATRIUM VOLUME MOD: 95.32 CM3
LEFT ATRIUM VOLUME: 79.16 CM3
LEFT INTERNAL DIMENSION IN SYSTOLE: 3.49 CM (ref 2.1–4)
LEFT VENTRICLE DIASTOLIC VOLUME INDEX: 54.14 ML/M2
LEFT VENTRICLE DIASTOLIC VOLUME: 118.03 ML
LEFT VENTRICLE MASS INDEX: 74 G/M2
LEFT VENTRICLE SYSTOLIC VOLUME INDEX: 23.1 ML/M2
LEFT VENTRICLE SYSTOLIC VOLUME: 50.35 ML
LEFT VENTRICULAR INTERNAL DIMENSION IN DIASTOLE: 5 CM (ref 3.5–6)
LEFT VENTRICULAR MASS: 160.52 G
LV LATERAL E/E' RATIO: 6 M/S
LV SEPTAL E/E' RATIO: 11 M/S
LYMPHOCYTES # BLD AUTO: 2.2 K/UL (ref 1–4.8)
LYMPHOCYTES NFR BLD: 44.3 % (ref 18–48)
MAGNESIUM SERPL-MCNC: 1.4 MG/DL (ref 1.6–2.6)
MCH RBC QN AUTO: 29.3 PG (ref 27–31)
MCHC RBC AUTO-ENTMCNC: 32.2 G/DL (ref 32–36)
MCV RBC AUTO: 91 FL (ref 82–98)
MONOCYTES # BLD AUTO: 0.4 K/UL (ref 0.3–1)
MONOCYTES NFR BLD: 8.8 % (ref 4–15)
MV PEAK A VEL: 0.85 M/S
MV PEAK E VEL: 0.66 M/S
MV STENOSIS PRESSURE HALF TIME: 77.36 MS
MV VALVE AREA P 1/2 METHOD: 2.84 CM2
NEUTROPHILS # BLD AUTO: 1.9 K/UL (ref 1.8–7.7)
NEUTROPHILS NFR BLD: 37.7 % (ref 38–73)
NRBC BLD-RTO: 0 /100 WBC
PHOSPHATE SERPL-MCNC: 2.9 MG/DL (ref 2.7–4.5)
PISA TR MAX VEL: 3 M/S
PLATELET # BLD AUTO: 188 K/UL (ref 150–450)
PMV BLD AUTO: 9.1 FL (ref 9.2–12.9)
POCT GLUCOSE: 88 MG/DL (ref 70–110)
POCT GLUCOSE: 98 MG/DL (ref 70–110)
POCT GLUCOSE: 98 MG/DL (ref 70–110)
POTASSIUM SERPL-SCNC: 3.5 MMOL/L (ref 3.5–5.1)
PROT SERPL-MCNC: 6.1 G/DL (ref 6–8.4)
QEF: 57 %
RA MAJOR: 3.94 CM
RA PRESSURE: 8 MMHG
RA WIDTH: 2.97 CM
RBC # BLD AUTO: 3.96 M/UL (ref 4.6–6.2)
RV TISSUE DOPPLER FREE WALL SYSTOLIC VELOCITY 1 (APICAL 4 CHAMBER VIEW): 11.89 CM/S
SINUS: 3.6 CM
SODIUM SERPL-SCNC: 138 MMOL/L (ref 136–145)
STJ: 3.15 CM
TDI LATERAL: 0.11 M/S
TDI SEPTAL: 0.06 M/S
TDI: 0.09 M/S
TR MAX PG: 36 MMHG
TRICUSPID ANNULAR PLANE SYSTOLIC EXCURSION: 1.48 CM
TV REST PULMONARY ARTERY PRESSURE: 44 MMHG
WBC # BLD AUTO: 5.01 K/UL (ref 3.9–12.7)

## 2023-01-02 PROCEDURE — 85025 COMPLETE CBC W/AUTO DIFF WBC: CPT | Performed by: HOSPITALIST

## 2023-01-02 PROCEDURE — 96372 THER/PROPH/DIAG INJ SC/IM: CPT

## 2023-01-02 PROCEDURE — 80053 COMPREHEN METABOLIC PANEL: CPT | Performed by: HOSPITALIST

## 2023-01-02 PROCEDURE — 36415 COLL VENOUS BLD VENIPUNCTURE: CPT | Performed by: HOSPITALIST

## 2023-01-02 PROCEDURE — 63600175 PHARM REV CODE 636 W HCPCS

## 2023-01-02 PROCEDURE — 83735 ASSAY OF MAGNESIUM: CPT | Performed by: HOSPITALIST

## 2023-01-02 PROCEDURE — 25000003 PHARM REV CODE 250

## 2023-01-02 PROCEDURE — 94761 N-INVAS EAR/PLS OXIMETRY MLT: CPT

## 2023-01-02 PROCEDURE — 25000003 PHARM REV CODE 250: Performed by: HOSPITALIST

## 2023-01-02 PROCEDURE — 84100 ASSAY OF PHOSPHORUS: CPT | Performed by: HOSPITALIST

## 2023-01-02 PROCEDURE — 99238 PR HOSPITAL DISCHARGE DAY,<30 MIN: ICD-10-PCS | Mod: ,,, | Performed by: HOSPITALIST

## 2023-01-02 PROCEDURE — 99238 HOSP IP/OBS DSCHRG MGMT 30/<: CPT | Mod: ,,, | Performed by: HOSPITALIST

## 2023-01-02 PROCEDURE — G0378 HOSPITAL OBSERVATION PER HR: HCPCS

## 2023-01-02 PROCEDURE — 83036 HEMOGLOBIN GLYCOSYLATED A1C: CPT | Performed by: HOSPITALIST

## 2023-01-02 RX ORDER — LANOLIN ALCOHOL/MO/W.PET/CERES
400 CREAM (GRAM) TOPICAL 2 TIMES DAILY
Status: DISCONTINUED | OUTPATIENT
Start: 2023-01-02 | End: 2023-01-02 | Stop reason: HOSPADM

## 2023-01-02 RX ADMIN — Medication 400 MG: at 08:01

## 2023-01-02 RX ADMIN — AMLODIPINE BESYLATE 5 MG: 5 TABLET ORAL at 08:01

## 2023-01-02 RX ADMIN — POTASSIUM CHLORIDE 30 MEQ: 10 CAPSULE, COATED, EXTENDED RELEASE ORAL at 08:01

## 2023-01-02 RX ADMIN — ASPIRIN 81 MG: 81 TABLET, COATED ORAL at 08:01

## 2023-01-02 RX ADMIN — PANTOPRAZOLE SODIUM 40 MG: 40 TABLET, DELAYED RELEASE ORAL at 08:01

## 2023-01-02 RX ADMIN — DULOXETINE HYDROCHLORIDE 60 MG: 60 CAPSULE, DELAYED RELEASE ORAL at 08:01

## 2023-01-02 RX ADMIN — AZELASTINE HYDROCHLORIDE 137 MCG: 137 SPRAY, METERED NASAL at 09:01

## 2023-01-02 RX ADMIN — LOSARTAN POTASSIUM 100 MG: 50 TABLET, FILM COATED ORAL at 08:01

## 2023-01-02 RX ADMIN — CHLORTHALIDONE 12.5 MG: 25 TABLET ORAL at 08:01

## 2023-01-02 RX ADMIN — ENOXAPARIN SODIUM 40 MG: 40 INJECTION SUBCUTANEOUS at 04:01

## 2023-01-02 RX ADMIN — GABAPENTIN 300 MG: 300 CAPSULE ORAL at 08:01

## 2023-01-02 NOTE — DISCHARGE INSTRUCTIONS
Take your time standing up.  If you fee light headed or dizzy, lie down if possible until the feeling passes.   Feel free to take you blood pressure and pulse rate during the episode if blood pressure cuff is readily available.   Recommend outpatient tilt table test, however was not avail to order in the [facility].  Your PCP may be able to order it.

## 2023-01-02 NOTE — PROGRESS NOTES
[facility] - Int[facility] ([place] [address])  [facility]  Progress Note    Patient Name: Tito Menard  MRN: [id number]  Patient Class: OP- Observation   Admission Date: 12/31/2022  Length of Stay: 0 days  Attending Physician: India Gilmore MD  Primary Care Provider: Amanda Brown MD        Subjective:     Principal Problem:Syncope        HPI:  Tito Menard is a 77 y.o. male wit a hx of diabetes, CKD, GERD, HTN, and aortic anuerysm presents to the ED for a syncopal event. Patient states he was not feeling well today with generalized weakness and lightheadedness. Patient endorsed taking a bath this morning then felt lightheaded and woke up on the ground. Wife found him in the bathroom seconds later where he was not responsive to questions for a few seconds. + LOC but denies head trauma. States he feels when he stands up too quickly he feels dizzy occasionally. He was unable to stand off of the floor so EMS was called. After the syncopal episode, he had an episode of emesis but felt relief afterwards. Patient reports on arrival he had a mild headache that has since subsided. Patient endorses blurry vision but this has been unchanged for a few weeks and feels it is likely due to his current eye glass prescription. Of note, patient endorses two previous syncopal episodes a few years back but does not remember if there was a cause for the episode. Otherwise has no complaints at this time. Denies Chest pain, SOB, n/v currently, abdominal pain, urinary symptoms and constipation/ diarrhea.     ED: AF, /78. Hgb 13.0. K 3.4. BNP <10. Tn neg. UA not infectious. Glucose 86. CTH showed no CT evidence of acute intracranial abnormality. CTA C/A/P showed interval decrease in ascending aortic dilatation now measuring 3.5 cm at the sinus of Valsalva and mid ascending segment. EKG showed NSR with RBBB without significant change. Given zofran x1 and 500 mL NS bolus in ED.      Overview/Hospital Course:  Patient  admitted to Hospital medicine for Syncope.  This is the patient's third episode in the last two years.  His mother had syncopal episodes as well.  The patient wore halter monitor after one of the episodes but demonstrated no arrhythmias.  Orthostatics here were negative. CT brain w/o negative for acute findings.  CTA of chest/ab/pelvis negative for dissection. Echocardiogram pending       Interval History: No events overnight.  The patient is feeling better today.  He has a lot of chronic medical problems at well such as neck and back pain with neurological deficits (he has seen NS as OP).  Chest pain when swallowing (saw cards as OP and had normal stress test; has appt with GI), SOB with exertion (PFT +RLD).     Review of Systems   Constitutional:  Negative for activity change and appetite change.   HENT:  Positive for trouble swallowing.    Respiratory:  Positive for shortness of breath. Negative for cough.    Cardiovascular:  Negative for chest pain and palpitations.   Gastrointestinal:  Negative for abdominal pain, nausea and vomiting.   Genitourinary:  Positive for difficulty urinating.   Musculoskeletal:  Positive for back pain and neck pain.   Neurological:  Negative for weakness and light-headedness.   Psychiatric/Behavioral:  Negative for decreased concentration and sleep disturbance.    Objective:     Vital Signs (Most Recent):  Temp: 98 °F (36.7 °C) (01/01/23 1715)  Pulse: 78 (01/01/23 1715)  Resp: 18 (01/01/23 1715)  BP: (!) 141/79 (01/01/23 1715)  SpO2: 96 % (01/01/23 1715)   Vital Signs (24h Range):  Temp:  [97.5 °F (36.4 °C)-98.7 °F (37.1 °C)] 98 °F (36.7 °C)  Pulse:  [58-91] 78  Resp:  [13-18] 18  SpO2:  [94 %-96 %] 96 %  BP: (121-158)/(60-82) 141/79     Weight: 98 kg (216 lb)  Body mass index is 30.13 kg/m².    Intake/Output Summary (Last 24 hours) at 1/1/2023 1825  Last data filed at 1/1/2023 1735  Gross per 24 hour   Intake 720 ml   Output 950 ml   Net -230 ml      Physical Exam  Vitals reviewed.    Constitutional:       General: He is awake. He is not in acute distress.     Appearance: Normal appearance. He is well-developed.   HENT:      Head: Normocephalic and atraumatic.      Nose: Nose normal.      Mouth/Throat:      Mouth: Mucous membranes are moist.      Pharynx: Oropharynx is clear.   Eyes:      General: Lids are normal. Vision grossly intact. Gaze aligned appropriately.      Conjunctiva/sclera: Conjunctivae normal.   Cardiovascular:      Rate and Rhythm: Normal rate and regular rhythm.      Heart sounds: No murmur heard.  Pulmonary:      Effort: Pulmonary effort is normal. No respiratory distress.      Breath sounds: Normal breath sounds and air entry.   Abdominal:      General: Bowel sounds are normal.      Palpations: Abdomen is soft.   Musculoskeletal:         General: No deformity. Normal range of motion.      Right lower leg: No edema.      Left lower leg: No edema.   Skin:     General: Skin is warm and dry.   Neurological:      General: No focal deficit present.      Mental Status: He is alert and oriented to person, place, and time.   Psychiatric:         Attention and Perception: Attention normal.         Mood and Affect: Mood and affect normal.         Behavior: Behavior normal. Behavior is cooperative.       Significant Labs: All pertinent labs within the past 24 hours have been reviewed.    Significant Imaging: I have reviewed all pertinent imaging results/findings within the past 24 hours.      Assessment/Plan:      * Syncope  Patient presenting with a syncopal episode after gettig out of the tub this AM. +LOC, - head trauma  - Tn neg, BNP neg  - CTH w/o intracranial abnormality  - 2D ECHO ordered   - EKG with NSR and RBBB, unchanged from previous  - orthostatic vitals neg   - tele  - most consistent with reflex syncope    Hypokalemia  K 3.4  - replaced  - continue home supplements      Thoracic aortic aneurysm without rupture  Chronic  - CTA showed interval decrease in ascending aortic  dilatation now measuring 3.5 cm at the sinus of Valsalva and mid ascending segment  - BP controlled on admission    History of diabetes mellitus, type II  Last A1C 5.8  - diet controlled  - has not been on medications    BPH with obstruction/lower urinary tract symptoms  - continue flomax  - follows with urologist OP    GERD (gastroesophageal reflux disease)  - continue protonix, pt takes prilosec at home  - pt asking for referral to GI at discharge for recurrent GERD symtpoms    Mixed hyperlipidemia  - continue statin    Essential hypertension  Controlled on admission  - continue amlodipine, chlorthalidone and losartan (home med irbesartan not on formulary)        VTE Risk Mitigation (From admission, onward)         Ordered     enoxaparin injection 40 mg  Daily         01/01/23 0246     IP VTE HIGH RISK PATIENT  Once         01/01/23 0246                Discharge Planning   ELLEN: 1/2/2023     Code Status: Full Code   Is the patient medically ready for discharge?: No    Reason for patient still in hospital (select all that apply): Patient trending condition, Laboratory test and Imaging                     India Gilmore MD  Department of Hospital Medicine   [facility] - Intensive Care ([place])

## 2023-01-02 NOTE — ASSESSMENT & PLAN NOTE
Patient presenting with a syncopal episode after gettig out of the tub this AM. +LOC, - head trauma  - Tn neg, BNP neg  - CTH w/o intracranial abnormality  - 2D ECHO ordered   - EKG with NSR and RBBB, unchanged from previous  - orthostatic vitals neg   - tele  - most consistent with reflex syncope

## 2023-01-02 NOTE — HOSPITAL COURSE
Patient admitted to Hospital medicine for Syncope.  This is the patient's third episode in the last two years.  His mother had syncopal episodes as well.  The patient wore halter monitor after one of the episodes but demonstrated no arrhythmias.  Orthostatics here were negative. CT brain w/o negative for acute findings.  CTA of chest/ab/pelvis negative for dissection. Echocardiogram demonstrates normal LVEF with grade I diastolic dysfx.  However, mild right ventricular enlargement with low normal right ventricular systolic function The estimated PA systolic pressure is 44 mmHg.   This is not related to his syncopal episode but should be followed as OP. 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  Outpatient tilt table test may be beneficial.

## 2023-01-02 NOTE — DISCHARGE SUMMARY
[facility] - Intensive Care ([place] [address])  [facility] Medicine  Discharge Summary      Patient Name: Tito Menard  MRN: [id number]  ESTER: [id number]  Patient Class: OP- Observation  Admission Date: 12/31/2022  Hospital Length of Stay: 0 days  Discharge Date and Time:  01/02/2023 5:48 PM  Attending Physician: India Gilmore MD   Discharging Provider: India Gilmore MD  Primary Care Provider: Amanda Brown MD  [facility] Medicine Team: [facility] India Gilmore MD  Primary Care Team: [facility]    HPI:   Tito Menard is a 77 y.o. male wit a hx of diabetes, CKD, GERD, HTN, and aortic anuerysm presents to the ED for a syncopal event. Patient states he was not feeling well today with generalized weakness and lightheadedness. Patient endorsed taking a bath this morning then felt lightheaded and woke up on the ground. Wife found him in the bathroom seconds later where he was not responsive to questions for a few seconds. + LOC but denies head trauma. States he feels when he stands up too quickly he feels dizzy occasionally. He was unable to stand off of the floor so EMS was called. After the syncopal episode, he had an episode of emesis but felt relief afterwards. Patient reports on arrival he had a mild headache that has since subsided. Patient endorses blurry vision but this has been unchanged for a few weeks and feels it is likely due to his current eye glass prescription. Of note, patient endorses two previous syncopal episodes a few years back but does not remember if there was a cause for the episode. Otherwise has no complaints at this time. Denies Chest pain, SOB, n/v currently, abdominal pain, urinary symptoms and constipation/ diarrhea.     ED: AF, /78. Hgb 13.0. K 3.4. BNP <10. Tn neg. UA not infectious. Glucose 86. CTH showed no CT evidence of acute intracranial abnormality. CTA C/A/P showed interval decrease in ascending aortic dilatation now measuring 3.5 cm at the sinus of Valsalva  and mid ascending segment. EKG showed NSR with RBBB without significant change. Given zofran x1 and 500 mL NS bolus in ED.      * No surgery found *      Hospital Course:   Patient admitted to Hospital medicine for Syncope.  This is the patient's third episode in the last two years.  His mother had syncopal episodes as well.  The patient wore halter monitor after one of the episodes but demonstrated no arrhythmias.  Orthostatics here were negative. CT brain w/o negative for acute findings.  CTA of chest/ab/pelvis negative for dissection. Echocardiogram demonstrates normal LVEF with grade I diastolic dysfx.  However, mild right ventricular enlargement with low normal right ventricular systolic function The estimated PA systolic pressure is 44 mmHg.   This is not related to his syncopal episode but should be followed as OP. 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  Outpatient tilt table test may be beneficial.        Goals of Care Treatment Preferences:  Code Status: Full Code      Consults:     * Syncope  Patient presenting with a syncopal episode after gettig out of the tub this AM. +LOC, - head trauma  - Tn neg, BNP neg  - CTH w/o intracranial abnormality  - 2D ECHO ordered   - EKG with NSR and RBBB, unchanged from previous  - orthostatic vitals neg   - tele  - most consistent with reflex syncope      Final Active Diagnoses:    Diagnosis Date Noted POA    PRINCIPAL PROBLEM:  Syncope [R55] 04/26/2021 Yes    Hypokalemia [E87.6] 01/01/2023 Yes    Thoracic aortic aneurysm without rupture [I71.20] 07/19/2021 Yes     Chronic    History of diabetes mellitus, type II [Z86.39] 07/01/2019 Yes    BPH with obstruction/lower urinary tract symptoms [N40.1, N13.8] 07/19/2018 Yes     Chronic    Essential hypertension [I10]  Yes     Chronic    Mixed hyperlipidemia [E78.2]  Yes     Chronic    GERD (gastroesophageal reflux disease) [K21.9]  Yes      Problems Resolved During this Admission:        Discharged Condition: stable    Disposition: Home or Self Care    Follow Up:   Follow-up Information     Thien Driscoll MD Follow up in 1 month(s).    Specialties: Cardiovascular Disease, Cardiology  Contact information:  [address]  [facility] [address]  [phone]             Saskia Carrasco MD Follow up.    Specialty: Internal Medicine  Contact information:  [address]  [address]  [facility] [id number] [phone]                       Patient Instructions:   No discharge procedures on file.    Significant Diagnostic Studies: Labs: All labs within the past 24 hours have been reviewed    Pending Diagnostic Studies:     None         Medications:  Reconciled Home Medications:      Medication List      CHANGE how you take these medications    ketoconazole 2 % cream  Commonly known as: NIZORAL  AAA bid (facial redness and scaling)  What changed: Another medication with the same name was removed. Continue taking this medication, and follow the directions you see here.        CONTINUE taking these medications    acetaminophen 650 MG Tbsr  Commonly known as: TYLENOL  Take 1,300 mg by mouth every 8 (eight) hours as needed.     albuterol 90 mcg/actuation inhaler  Commonly known as: PROVENTIL/VENTOLIN HFA  Inhale 1-2 puffs into the lungs every 6 (six) hours as needed for Wheezing.     amLODIPine 5 MG tablet  Commonly known as: NORVASC  TAKE 1 TABLET EVERY DAY     aspirin 81 MG EC tablet  Commonly known as: ECOTRIN  TAKE 1 TABLET EVERY DAY     azelastine 137 mcg (0.1 %) nasal spray  Commonly known as: ASTELIN  1 spray (137 mcg total) by Nasal route 2 (two) times daily.     benzonatate 200 MG capsule  Commonly known as: TESSALON  TAKE 1 CAPSULE THREE TIMES DAILY AS NEEDED FOR COUGH     chlorthalidone 25 MG Tab  Commonly known as: HYGROTEN  TAKE 1/2 TABLET EVERY DAY     cholecalciferol (vitamin D3) 50 mcg (2,000 unit) Cap capsule  Commonly known as: VITAMIN D3  Take 1 capsule by mouth once daily.      clotrimazole-betamethasone 1-0.05% cream  Commonly known as: LOTRISONE  APPLY TOPICALLY 2 (TWO) TIMES DAILY.     docusate sodium 50 MG capsule  Commonly known as: COLACE  Take by mouth 2 (two) times daily.     DULoxetine 60 MG capsule  Commonly known as: CYMBALTA  TAKE 1 CAPSULE EVERY DAY FOR PAIN CONTROL     fluticasone propionate 50 mcg/actuation nasal spray  Commonly known as: FLONASE  USE 1 SPRAY NASALLY ONE TIME DAILY     gabapentin 300 MG capsule  Commonly known as: NEURONTIN  Take 1 capsule every morning and at noon, and take 2 capsules every evening (4 total daily)     guaiFENesin 600 mg 12 hr tablet  Commonly known as: MUCINEX  Take 1,200 mg by mouth 2 (two) times daily as needed.     ibuprofen 200 MG tablet  Commonly known as: ADVIL,MOTRIN  Take 200 mg by mouth every 6 (six) hours as needed for Pain.     irbesartan 300 MG tablet  Commonly known as: AVAPRO  TAKE 1 TABLET EVERY EVENING     melatonin 10 mg Cap  Take 1 capsule by mouth nightly as needed.     methocarbamoL 500 MG Tab  Commonly known as: ROBAXIN  3 (three) times daily as needed.     omeprazole 20 MG capsule  Commonly known as: PRILOSEC  TAKE 1 CAPSULE EVERY DAY     pantoprazole 40 MG tablet  Commonly known as: PROTONIX  Take 1 tablet (40 mg total) by mouth once daily.     potassium chloride 10 MEQ Cpsr  Commonly known as: MICRO-K  TAKE 3 CAPSULES ONE TIME DAILY     pravastatin 20 MG tablet  Commonly known as: PRAVACHOL  TAKE 1 TABLET EVERY DAY     tamsulosin 0.4 mg Cap  Commonly known as: FLOMAX  Take 1 capsule (0.4 mg total) by mouth once daily.            Indwelling Lines/Drains at time of discharge:   Lines/Drains/Airways     None                 Time spent on the discharge of patient: 25 minutes         India Gilmore MD  Department of Hospital Medicine  [facility] Intensive [facility] ([place]-[facility])

## 2023-01-02 NOTE — SUBJECTIVE & OBJECTIVE
Interval History: No events overnight.  The patient is feeling better today.  He has a lot of chronic medical problems at well such as neck and back pain with neurological deficits (he has seen NS as OP).  Chest pain when swallowing (saw cards as OP and had normal stress test; has appt with GI), SOB with exertion (PFT +RLD).     Review of Systems   Constitutional:  Negative for activity change and appetite change.   HENT:  Positive for trouble swallowing.    Respiratory:  Positive for shortness of breath. Negative for cough.    Cardiovascular:  Negative for chest pain and palpitations.   Gastrointestinal:  Negative for abdominal pain, nausea and vomiting.   Genitourinary:  Positive for difficulty urinating.   Musculoskeletal:  Positive for back pain and neck pain.   Neurological:  Negative for weakness and light-headedness.   Psychiatric/Behavioral:  Negative for decreased concentration and sleep disturbance.    Objective:     Vital Signs (Most Recent):  Temp: 98 °F (36.7 °C) (01/01/23 1715)  Pulse: 78 (01/01/23 1715)  Resp: 18 (01/01/23 1715)  BP: (!) 141/79 (01/01/23 1715)  SpO2: 96 % (01/01/23 1715)   Vital Signs (24h Range):  Temp:  [97.5 °F (36.4 °C)-98.7 °F (37.1 °C)] 98 °F (36.7 °C)  Pulse:  [58-91] 78  Resp:  [13-18] 18  SpO2:  [94 %-96 %] 96 %  BP: (121-158)/(60-82) 141/79     Weight: 98 kg (216 lb)  Body mass index is 30.13 kg/m².    Intake/Output Summary (Last 24 hours) at 1/1/2023 1825  Last data filed at 1/1/2023 1735  Gross per 24 hour   Intake 720 ml   Output 950 ml   Net -230 ml      Physical Exam  Vitals reviewed.   Constitutional:       General: He is awake. He is not in acute distress.     Appearance: Normal appearance. He is well-developed.   HENT:      Head: Normocephalic and atraumatic.      Nose: Nose normal.      Mouth/Throat:      Mouth: Mucous membranes are moist.      Pharynx: Oropharynx is clear.   Eyes:      General: Lids are normal. Vision grossly intact. Gaze aligned appropriately.       Conjunctiva/sclera: Conjunctivae normal.   Cardiovascular:      Rate and Rhythm: Normal rate and regular rhythm.      Heart sounds: No murmur heard.  Pulmonary:      Effort: Pulmonary effort is normal. No respiratory distress.      Breath sounds: Normal breath sounds and air entry.   Abdominal:      General: Bowel sounds are normal.      Palpations: Abdomen is soft.   Musculoskeletal:         General: No deformity. Normal range of motion.      Right lower leg: No edema.      Left lower leg: No edema.   Skin:     General: Skin is warm and dry.   Neurological:      General: No focal deficit present.      Mental Status: He is alert and oriented to person, place, and time.   Psychiatric:         Attention and Perception: Attention normal.         Mood and Affect: Mood and affect normal.         Behavior: Behavior normal. Behavior is cooperative.       Significant Labs: All pertinent labs within the past 24 hours have been reviewed.    Significant Imaging: I have reviewed all pertinent imaging results/findings within the past 24 hours.

## 2023-01-13 ENCOUNTER — OFFICE VISIT (OUTPATIENT)
Dept: NEUROLOGY | Facility: CLINIC | Age: 78
End: 2023-01-13
Payer: MEDICARE

## 2023-01-13 VITALS
HEIGHT: 71 IN | SYSTOLIC BLOOD PRESSURE: 144 MMHG | HEART RATE: 73 BPM | DIASTOLIC BLOOD PRESSURE: 80 MMHG | WEIGHT: 216 LBS | BODY MASS INDEX: 30.24 KG/M2

## 2023-01-13 DIAGNOSIS — R55 SYNCOPE, UNSPECIFIED SYNCOPE TYPE: Primary | ICD-10-CM

## 2023-01-13 PROCEDURE — 99499 UNLISTED E&M SERVICE: CPT | Mod: S$GLB,,, | Performed by: STUDENT IN AN ORGANIZED HEALTH CARE EDUCATION/TRAINING PROGRAM

## 2023-01-13 PROCEDURE — 3079F PR MOST RECENT DIASTOLIC BLOOD PRESSURE 80-89 MM HG: ICD-10-PCS | Mod: CPTII,GC,S$GLB, | Performed by: STUDENT IN AN ORGANIZED HEALTH CARE EDUCATION/TRAINING PROGRAM

## 2023-01-13 PROCEDURE — 1101F PT FALLS ASSESS-DOCD LE1/YR: CPT | Mod: CPTII,GC,S$GLB, | Performed by: STUDENT IN AN ORGANIZED HEALTH CARE EDUCATION/TRAINING PROGRAM

## 2023-01-13 PROCEDURE — 99204 OFFICE O/P NEW MOD 45 MIN: CPT | Mod: GC,S$GLB,, | Performed by: STUDENT IN AN ORGANIZED HEALTH CARE EDUCATION/TRAINING PROGRAM

## 2023-01-13 PROCEDURE — 99204 PR OFFICE/OUTPT VISIT, NEW, LEVL IV, 45-59 MIN: ICD-10-PCS | Mod: GC,S$GLB,, | Performed by: STUDENT IN AN ORGANIZED HEALTH CARE EDUCATION/TRAINING PROGRAM

## 2023-01-13 PROCEDURE — 3077F SYST BP >= 140 MM HG: CPT | Mod: CPTII,GC,S$GLB, | Performed by: STUDENT IN AN ORGANIZED HEALTH CARE EDUCATION/TRAINING PROGRAM

## 2023-01-13 PROCEDURE — 3072F LOW RISK FOR RETINOPATHY: CPT | Mod: CPTII,GC,S$GLB, | Performed by: STUDENT IN AN ORGANIZED HEALTH CARE EDUCATION/TRAINING PROGRAM

## 2023-01-13 PROCEDURE — 3077F PR MOST RECENT SYSTOLIC BLOOD PRESSURE >= 140 MM HG: ICD-10-PCS | Mod: CPTII,GC,S$GLB, | Performed by: STUDENT IN AN ORGANIZED HEALTH CARE EDUCATION/TRAINING PROGRAM

## 2023-01-13 PROCEDURE — 99499 RISK ADDL DX/OHS AUDIT: ICD-10-PCS | Mod: S$GLB,,, | Performed by: STUDENT IN AN ORGANIZED HEALTH CARE EDUCATION/TRAINING PROGRAM

## 2023-01-13 PROCEDURE — 1101F PR PT FALLS ASSESS DOC 0-1 FALLS W/OUT INJ PAST YR: ICD-10-PCS | Mod: CPTII,GC,S$GLB, | Performed by: STUDENT IN AN ORGANIZED HEALTH CARE EDUCATION/TRAINING PROGRAM

## 2023-01-13 PROCEDURE — 3079F DIAST BP 80-89 MM HG: CPT | Mod: CPTII,GC,S$GLB, | Performed by: STUDENT IN AN ORGANIZED HEALTH CARE EDUCATION/TRAINING PROGRAM

## 2023-01-13 PROCEDURE — 1125F PR PAIN SEVERITY QUANTIFIED, PAIN PRESENT: ICD-10-PCS | Mod: CPTII,GC,S$GLB, | Performed by: STUDENT IN AN ORGANIZED HEALTH CARE EDUCATION/TRAINING PROGRAM

## 2023-01-13 PROCEDURE — 99999 PR PBB SHADOW E&M-EST. PATIENT-LVL IV: ICD-10-PCS | Mod: PBBFAC,GC,, | Performed by: STUDENT IN AN ORGANIZED HEALTH CARE EDUCATION/TRAINING PROGRAM

## 2023-01-13 PROCEDURE — 1125F AMNT PAIN NOTED PAIN PRSNT: CPT | Mod: CPTII,GC,S$GLB, | Performed by: STUDENT IN AN ORGANIZED HEALTH CARE EDUCATION/TRAINING PROGRAM

## 2023-01-13 PROCEDURE — 3072F PR LOW RISK FOR RETINOPATHY: ICD-10-PCS | Mod: CPTII,GC,S$GLB, | Performed by: STUDENT IN AN ORGANIZED HEALTH CARE EDUCATION/TRAINING PROGRAM

## 2023-01-13 PROCEDURE — 3288F FALL RISK ASSESSMENT DOCD: CPT | Mod: CPTII,GC,S$GLB, | Performed by: STUDENT IN AN ORGANIZED HEALTH CARE EDUCATION/TRAINING PROGRAM

## 2023-01-13 PROCEDURE — 99999 PR PBB SHADOW E&M-EST. PATIENT-LVL IV: CPT | Mod: PBBFAC,GC,, | Performed by: STUDENT IN AN ORGANIZED HEALTH CARE EDUCATION/TRAINING PROGRAM

## 2023-01-13 PROCEDURE — 3288F PR FALLS RISK ASSESSMENT DOCUMENTED: ICD-10-PCS | Mod: CPTII,GC,S$GLB, | Performed by: STUDENT IN AN ORGANIZED HEALTH CARE EDUCATION/TRAINING PROGRAM

## 2023-01-13 NOTE — PROGRESS NOTES
[facility] - NEUROLOGY 7TH [place] [facility], [place]    Date: 1/13/23  Patient Name: Tito Menard   MRN: [id number]   PCP: Amanda Brown  Referring Provider: No ref. provider found    Assessment:   Tito Menard is a 77 y.o. male presenting to clinic following recent hospitalization after a syncopal event. See HPI for full detail. Has had some workup completed in the past including MRI/A in 2021 and recent echo and EKG. Reviewed results and MRI independently. The patients symptoms with preceding LH/dizziness is less liklely to be primarily neurologic given clinical course. Will order and review EEG to help r/o epilepsy. Will need cardiologic workup for further evaluation.    Plan:     Reviewed MRI/A  Reviewed EKG and TTE  EEG ordered, will review result  Can follow up if EEG abnormal or if has another event with clinical concern for seizure  No AEDs recommended at this time  Referral to cardiology placed, appreciate assistance  Patient to message or call with any questions or concerns  He voiced understanding of and agreement with this plan as outlined    Problem List Items Addressed This Visit          Other    Syncope - Primary    Relevant Orders    Ambulatory referral/consult to Cardiology    EEG,w/awake & asleep record       Henok Castorena MD    Patient note was created using MModal Dictation.  Any errors in syntax or even information may not have been identified and edited on initial review prior to signing this note.  Subjective:   Patient seen in consultation at the request of No ref. provider found for the evaluation of syncopal event. A copy of this note will be sent to the referring physician.        HPI:   Mr. Tito Menard is a 77 y.o. male with a diabetes, CKD, GERD, HTN, and aortic anuerysm who is presenting to clinic as a follow up following a syncopal event for which he presented to the ED. Patient has had LOC appx three times in total. First event occurred  in 2020. Each one has occurred appx one year apast. Most recent event was in 12/22 for which he went to ED. He states that with every instance, he has felt dizzy and LH prior to having LOC. He states that each time this has occurred, he feels as though he loses his voice, his fingers become stiff (making them difficult to move), and that he has difficulties speaking prior LOC. Patient additionally states that his speech has been slurred after awakening as well. He denies any significant sleepiness, fatigue or confusion after these events. No witnesses seizure activity during these. He denies any associated incontinence or tongue biting with these events. Denies any palpitations associated with the events. Patient states that most, if not all, events have occurred while seated. Patient states that he has episodes of LH appx once per month.     MRI/MRA 2021 reviewed.    EKG 12/31/22: Normal Sinus Rythym with noted RBBB    TTE 01/1/23:   The left ventricle is normal in size with normal systolic function.  The estimated ejection fraction is 55%.  Grade I left ventricular diastolic dysfunction.  Mild left atrial enlargement.  The ascending aorta is mildly dilated.  Mild aortic regurgitation.  Mild right ventricular enlargement with low normal right ventricular systolic function.  There is abnormal septal wall motion.  The quantitatively derived ejection fraction is 57%.  Mild tricuspid regurgitation.  Intermediate central venous pressure (8 mmHg).  The estimated PA systolic pressure is 44 mmHg.  There is pulmonary hypertension.    PAST MEDICAL HISTORY:  Past Medical History:   Diagnosis Date    Allergy     Aneurysm of artery of lower extremity     Chalazion of left eye     CKD (chronic kidney disease), stage II 4/1/2019    Diabetes mellitus type II     Diabetes with neurologic complications     Enlarged aorta 8/2/2016    Noted on pharmacological stress echo 2/28/2014.      GERD (gastroesophageal reflux disease)     egd  2008    Hyperlipidemia     Hypertension     Jock itch 7/19/2018    MGD (meibomian gland dysfunction)     Osteopenia     Spinal stenosis     Spondylosis without myelopathy 10/23/2015    Vitamin D deficiency disease        PAST SURGICAL HISTORY:  Past Surgical History:   Procedure Laterality Date    CHALAZION EXCISION Left 8/18/13    CYST REMOVAL  2013    Back of neck    FLEXIBLE CYSTOSCOPY N/A 12/7/2021    Procedure: CYSTOSCOPY, FLEXIBLE;  Surgeon: Beatrice Vaca MD;  Location: [facility] OR 1ST FLR;  Service: Urology;  Laterality: N/A;    FLUOROSCOPIC URODYNAMIC STUDY N/A 12/7/2021    Procedure: URODYNAMIC STUDY, FLUOROSCOPIC;  Surgeon: Beatrice Vaca MD;  Location: [facility] OR 1ST FLR;  Service: Urology;  Laterality: N/A;  90 minutes     SPINE SURGERY  2007    x2 (2000, 2007)       CURRENT MEDS:  Current Outpatient Medications   Medication Sig Dispense Refill    acetaminophen (TYLENOL) 650 MG TbSR Take 1,300 mg by mouth every 8 (eight) hours as needed.       albuterol 90 mcg/actuation inhaler Inhale 1-2 puffs into the lungs every 6 (six) hours as needed for Wheezing. 1 Inhaler 0    amLODIPine (NORVASC) 5 MG tablet TAKE 1 TABLET EVERY DAY 90 tablet 3    aspirin (ECOTRIN) 81 MG EC tablet TAKE 1 TABLET EVERY DAY 90 tablet 3    azelastine (ASTELIN) 137 mcg (0.1 %) nasal spray 1 spray (137 mcg total) by Nasal route 2 (two) times daily. 90 mL 3    benzonatate (TESSALON) 200 MG capsule TAKE 1 CAPSULE THREE TIMES DAILY AS NEEDED FOR COUGH 30 capsule 0    chlorthalidone (HYGROTEN) 25 MG Tab TAKE 1/2 TABLET EVERY DAY 45 tablet 2    cholecalciferol, vitamin D3, (VITAMIN D3) 50 mcg (2,000 unit) Cap Take 1 capsule by mouth once daily.      clotrimazole-betamethasone 1-0.05% (LOTRISONE) cream APPLY TOPICALLY 2 (TWO) TIMES DAILY. 45 g 0    docusate sodium (COLACE) 50 MG capsule Take by mouth 2 (two) times daily.      DULoxetine (CYMBALTA) 60 MG capsule TAKE 1 CAPSULE EVERY DAY FOR PAIN CONTROL 90 capsule 3    fluticasone propionate  (FLONASE) 50 mcg/actuation nasal spray USE 1 SPRAY NASALLY ONE TIME DAILY 32 g 6    gabapentin (NEURONTIN) 300 MG capsule Take 1 capsule every morning and at noon, and take 2 capsules every evening (4 total daily) 360 capsule 0    guaifenesin (MUCINEX) 600 mg 12 hr tablet Take 1,200 mg by mouth 2 (two) times daily as needed.       ibuprofen (ADVIL,MOTRIN) 200 MG tablet Take 200 mg by mouth every 6 (six) hours as needed for Pain.      irbesartan (AVAPRO) 300 MG tablet TAKE 1 TABLET EVERY EVENING 90 tablet 0    ketoconazole (NIZORAL) 2 % cream AAA bid (facial redness and scaling) 60 g 3    melatonin 10 mg Cap Take 1 capsule by mouth nightly as needed.       methocarbamol (ROBAXIN) 500 MG Tab 3 (three) times daily as needed.       omeprazole (PRILOSEC) 20 MG capsule TAKE 1 CAPSULE EVERY DAY 90 capsule 1    pantoprazole (PROTONIX) 40 MG tablet Take 1 tablet (40 mg total) by mouth once daily. 30 tablet 0    potassium chloride (MICRO-K) 10 MEQ CpSR TAKE 3 CAPSULES ONE TIME DAILY 270 capsule 3    pravastatin (PRAVACHOL) 20 MG tablet TAKE 1 TABLET EVERY DAY 90 tablet 3    tamsulosin (FLOMAX) 0.4 mg Cap Take 1 capsule (0.4 mg total) by mouth once daily. 90 capsule 3     No current facility-administered medications for this visit.       ALLERGIES:  Review of patient's allergies indicates:  No Known Allergies    FAMILY HISTORY:  Family History   Problem Relation Age of Onset    Hyperlipidemia Mother     Hypertension Mother     Kidney disease Mother     Cancer Mother     Heart disease Mother     Kidney failure Mother     No Known Problems Daughter     No Known Problems Sister     No Known Problems Brother     No Known Problems Son     No Known Problems Brother     No Known Problems Son     Hypertension Maternal Grandmother     Amblyopia Neg Hx     Blindness Neg Hx     Cataracts Neg Hx     Diabetes Neg Hx     Glaucoma Neg Hx     Macular degeneration Neg Hx     Retinal detachment Neg Hx     Strabismus Neg Hx     Stroke Neg Hx      Thyroid disease Neg Hx     Melanoma Neg Hx     Psoriasis Neg Hx     Lupus Neg Hx     Eczema Neg Hx        SOCIAL HISTORY:  Social History     Tobacco Use    Smoking status: Never    Smokeless tobacco: Former     Types: Chew    Tobacco comments:     Chewed tobacco once per day for 4-5 years when a    Substance Use Topics    Alcohol use: No    Drug use: No       Review of Systems:  12 system review of systems is negative except for the symptoms mentioned in HPI.      Objective:   There were no vitals filed for this visit.  General: NAD, well nourished   Eyes: no tearing, discharge, no erythema   ENT: moist mucous membranes of the oral cavity, nares patent    Neck: Supple, full range of motion  Cardiovascular: Warm and well perfused, pulses equal and symmetrical  Lungs: Normal work of breathing, normal chest wall excursions  Skin: No rash, lesions, or breakdown on exposed skin  Psychiatry: Mood and affect are appropriate   Abdomen: soft, non tender, non distended  Extremeties: No cyanosis, clubbing or edema.  +syncopal event    Neurological   MENTAL STATUS: Alert and oriented to person, place, and time. Attention and concentration within normal limits. Speech without dysarthria, able to name and repeat without difficulty. Recent and remote memory within normal limits   CRANIAL NERVES: Visual fields intact. PERRL. EOMI. Facial sensation intact. Face symmetrical. Hearing grossly intact. Full shoulder shrug bilaterally. Tongue protrudes midline   SENSORY: Sensation is intact to light touch throughout.  Joint position perception intact. Negative Romberg.   MOTOR: Normal bulk and tone. No pronator drift.  5/5 deltoid, biceps, triceps, interosseous, hand  bilaterally. 5/5 iliopsoas, knee flexion, foot dorsi/plantarflexion bilaterally.  5/5 knee extension on L. 4+/5 on R.   REFLEXES: Symmetric and 2+ throughout. Toes down going bilaterally.   CEREBELLAR/COORDINATION/GAIT: Gait steady with normal arm swing and  stride length.  Heel to shin intact. Finger to nose intact. Normal rapid alternating movements.

## 2023-02-07 DIAGNOSIS — Z00.00 ENCOUNTER FOR MEDICARE ANNUAL WELLNESS EXAM: ICD-10-CM

## 2023-02-09 DIAGNOSIS — Z00.00 ENCOUNTER FOR MEDICARE ANNUAL WELLNESS EXAM: ICD-10-CM

## 2023-02-20 ENCOUNTER — PES CALL (OUTPATIENT)
Dept: ADMINISTRATIVE | Facility: CLINIC | Age: 78
End: 2023-02-20
Payer: MEDICARE

## 2023-03-08 DIAGNOSIS — E11.42 DIABETIC POLYNEUROPATHY ASSOCIATED WITH TYPE 2 DIABETES MELLITUS: ICD-10-CM

## 2023-03-08 RX ORDER — GABAPENTIN 300 MG/1
CAPSULE ORAL
Qty: 360 CAPSULE | Refills: 0 | Status: SHIPPED | OUTPATIENT
Start: 2023-03-08 | End: 2023-07-21

## 2023-03-08 NOTE — TELEPHONE ENCOUNTER
----- Message from Monse Rodríguez sent at 3/8/2023 10:38 AM CST -----  Can the clinic reply in [facility]:              Please refill the medication(s) listed below. Please call the patient when the prescription(s) is ready for  at this phone [phone]                       Medication #1     gabapentin (NEURONTIN) 300 MG capsule              Medication #2              Preferred Pharmacy:[facility] - [place], [place] - [address]

## 2023-03-08 NOTE — TELEPHONE ENCOUNTER
Refill Encounter    PCP Visits: Recent Visits  No visits were found meeting these conditions.  Showing recent visits within past 360 days and meeting all other requirements  Future Appointments  Date Type Provider Dept   05/31/23 Appointment Amanda Brown MD [facility] Internal Medicine   Showing future appointments within next 720 days and meeting all other requirements     Last 3 Blood Pressure:   BP Readings from Last 3 Encounters:   01/13/23 (!) 144/80   01/02/23 (!) 155/87   11/02/22 (!) 140/79     Preferred Pharmacy:   [facility] - [place], [place] - [address]  [address] [address]  Phone: [phone] Fax: [phone]    [facility] [address]  [address]  [address] [address]  Phone: [phone] Fax: [phone]    Requested RX:  Requested Prescriptions     Pending Prescriptions Disp Refills    gabapentin (NEURONTIN) 300 MG capsule 360 capsule 0     Sig: Take 1 capsule every morning and at noon, and take 2 capsules every evening (4 total daily)      RX Route: Normal

## 2023-03-08 NOTE — TELEPHONE ENCOUNTER
No new care gaps identified.  [facility] Embedded Care Gaps. Reference number: [id number]. 3/08/2023   12:17:35 PM CST

## 2023-03-17 ENCOUNTER — OFFICE VISIT (OUTPATIENT)
Dept: UROLOGY | Facility: CLINIC | Age: 78
End: 2023-03-17
Payer: MEDICARE

## 2023-03-17 VITALS
SYSTOLIC BLOOD PRESSURE: 134 MMHG | BODY MASS INDEX: 29.32 KG/M2 | HEART RATE: 108 BPM | HEIGHT: 71 IN | WEIGHT: 209.44 LBS | DIASTOLIC BLOOD PRESSURE: 81 MMHG

## 2023-03-17 DIAGNOSIS — N40.1 BENIGN PROSTATIC HYPERPLASIA WITH URINARY OBSTRUCTION: Primary | ICD-10-CM

## 2023-03-17 DIAGNOSIS — N13.8 BENIGN PROSTATIC HYPERPLASIA WITH URINARY OBSTRUCTION: Primary | ICD-10-CM

## 2023-03-17 PROCEDURE — 99999 PR PBB SHADOW E&M-EST. PATIENT-LVL IV: CPT | Mod: PBBFAC,,, | Performed by: UROLOGY

## 2023-03-17 PROCEDURE — 3288F PR FALLS RISK ASSESSMENT DOCUMENTED: ICD-10-PCS | Mod: CPTII,S$GLB,, | Performed by: UROLOGY

## 2023-03-17 PROCEDURE — 51798 PR MEAS,POST-VOID RES,US,NON-IMAGING: ICD-10-PCS | Mod: S$GLB,,, | Performed by: UROLOGY

## 2023-03-17 PROCEDURE — 1125F AMNT PAIN NOTED PAIN PRSNT: CPT | Mod: CPTII,S$GLB,, | Performed by: UROLOGY

## 2023-03-17 PROCEDURE — 81002 PR URINALYSIS NONAUTO W/O SCOPE: ICD-10-PCS | Mod: S$GLB,,, | Performed by: UROLOGY

## 2023-03-17 PROCEDURE — 3288F FALL RISK ASSESSMENT DOCD: CPT | Mod: CPTII,S$GLB,, | Performed by: UROLOGY

## 2023-03-17 PROCEDURE — 1159F PR MEDICATION LIST DOCUMENTED IN MEDICAL RECORD: ICD-10-PCS | Mod: CPTII,S$GLB,, | Performed by: UROLOGY

## 2023-03-17 PROCEDURE — 1101F PT FALLS ASSESS-DOCD LE1/YR: CPT | Mod: CPTII,S$GLB,, | Performed by: UROLOGY

## 2023-03-17 PROCEDURE — 99214 OFFICE O/P EST MOD 30 MIN: CPT | Mod: S$GLB,,, | Performed by: UROLOGY

## 2023-03-17 PROCEDURE — 1159F MED LIST DOCD IN RCRD: CPT | Mod: CPTII,S$GLB,, | Performed by: UROLOGY

## 2023-03-17 PROCEDURE — 3075F PR MOST RECENT SYSTOLIC BLOOD PRESS GE 130-139MM HG: ICD-10-PCS | Mod: CPTII,S$GLB,, | Performed by: UROLOGY

## 2023-03-17 PROCEDURE — 99214 PR OFFICE/OUTPT VISIT, EST, LEVL IV, 30-39 MIN: ICD-10-PCS | Mod: S$GLB,,, | Performed by: UROLOGY

## 2023-03-17 PROCEDURE — 3079F PR MOST RECENT DIASTOLIC BLOOD PRESSURE 80-89 MM HG: ICD-10-PCS | Mod: CPTII,S$GLB,, | Performed by: UROLOGY

## 2023-03-17 PROCEDURE — 3079F DIAST BP 80-89 MM HG: CPT | Mod: CPTII,S$GLB,, | Performed by: UROLOGY

## 2023-03-17 PROCEDURE — 3075F SYST BP GE 130 - 139MM HG: CPT | Mod: CPTII,S$GLB,, | Performed by: UROLOGY

## 2023-03-17 PROCEDURE — 51798 US URINE CAPACITY MEASURE: CPT | Mod: S$GLB,,, | Performed by: UROLOGY

## 2023-03-17 PROCEDURE — 3072F PR LOW RISK FOR RETINOPATHY: ICD-10-PCS | Mod: CPTII,S$GLB,, | Performed by: UROLOGY

## 2023-03-17 PROCEDURE — 99999 PR PBB SHADOW E&M-EST. PATIENT-LVL IV: ICD-10-PCS | Mod: PBBFAC,,, | Performed by: UROLOGY

## 2023-03-17 PROCEDURE — 3072F LOW RISK FOR RETINOPATHY: CPT | Mod: CPTII,S$GLB,, | Performed by: UROLOGY

## 2023-03-17 PROCEDURE — 1101F PR PT FALLS ASSESS DOC 0-1 FALLS W/OUT INJ PAST YR: ICD-10-PCS | Mod: CPTII,S$GLB,, | Performed by: UROLOGY

## 2023-03-17 PROCEDURE — 81002 URINALYSIS NONAUTO W/O SCOPE: CPT | Mod: S$GLB,,, | Performed by: UROLOGY

## 2023-03-17 PROCEDURE — 1125F PR PAIN SEVERITY QUANTIFIED, PAIN PRESENT: ICD-10-PCS | Mod: CPTII,S$GLB,, | Performed by: UROLOGY

## 2023-03-17 NOTE — PROGRESS NOTES
CHIEF COMPLAINT:    Mr. Menard is a 77 y.o. male presenting for a follow up on LUTS, history of orchitis  PRESENTING ILLNESS:    Tito Menard is a 77 y.o. male who is presents with a history of epididymo orchitis, and urgency, urge incontinence.  He was on myrbetriq and oxybutynin and went into urinary retention.  The medications were discontinued and he had a catheter.  Unfortunately, it had gotten dislodged in the prostate and he had gross hematuria.  Since the catheter was removed in July, he has had no further issues with urinary retention.  He has occasional small volume urge incontinence and he wears a male shield and lines it with tissue paper or toilet.  He states that the pad is changed once a day and the tissue twice a day.  He is able to manage pretty well.  He walks with a walker.  Had an ER visit for a fall.     He states that the right scrotum is enlarged, there is a history of right epididymo orchitis in which he was hospitalized.  He now has a hydrocele.      REVIEW OF SYSTEMS:    Review of Systems   Constitutional: Negative.    HENT: Negative.     Eyes: Negative.    Respiratory:  Positive for shortness of breath.         Dyspnea on exertion   Cardiovascular: Negative.    Gastrointestinal: Negative.    Genitourinary:  Positive for urgency.   Musculoskeletal: Negative.    Skin: Negative.    Neurological: Negative.    Endo/Heme/Allergies: Negative.    Psychiatric/Behavioral: Negative.       PATIENT HISTORY:    Past Medical History:   Diagnosis Date    Allergy     Aneurysm of artery of lower extremity     Chalazion of left eye     CKD (chronic kidney disease), stage II 4/1/2019    Diabetes mellitus type II     Diabetes with neurologic complications     Enlarged aorta 8/2/2016    Noted on pharmacological stress echo 2/28/2014.      GERD (gastroesophageal reflux disease)     egd 2008    Hyperlipidemia     Hypertension     Jock itch 7/19/2018    MGD (meibomian gland dysfunction)     Osteopenia      Spinal stenosis     Spondylosis without myelopathy 10/23/2015    Vitamin D deficiency disease        Past Surgical History:   Procedure Laterality Date    CHALAZION EXCISION Left 8/18/13    CYST REMOVAL  2013    Back of neck    FLEXIBLE CYSTOSCOPY N/A 12/7/2021    Procedure: CYSTOSCOPY, FLEXIBLE;  Surgeon: Beatrice Vaca MD;  Location: [facility] OR [address] [place] [address];  Service: Urology;  Laterality: N/A;    FLUOROSCOPIC URODYNAMIC STUDY N/A 12/7/2021    Procedure: URODYNAMIC STUDY, FLUOROSCOPIC;  Surgeon: Beatrice Vaca MD;  Location: [facility] OR [address] [place] [address];  Service: Urology;  Laterality: N/A;  90 minutes     SPINE SURGERY  2007    x2 (2000, 2007)       Family History   Problem Relation Age of Onset    Hyperlipidemia Mother     Hypertension Mother     Kidney disease Mother     Cancer Mother     Heart disease Mother     Kidney failure Mother     Hypertension Maternal Grandmother      Social History     Socioeconomic History    Marital status:    Occupational History    Occupation: Retired     Comment:    Tobacco Use    Smoking status: Never    Smokeless tobacco: Former     Types: Chew    Tobacco comments:     Chewed tobacco once per day for 4-5 years when a    Substance and Sexual Activity    Alcohol use: No    Drug use: No    Sexual activity: Not Currently     Partners: Female   Social History Narrative        Colon 2007       Allergies:  Patient has no known allergies.    Medications:  Outpatient Encounter Medications as of 3/17/2023   Medication Sig Dispense Refill    acetaminophen (TYLENOL) 650 MG TbSR Take 1,300 mg by mouth every 8 (eight) hours as needed.       albuterol 90 mcg/actuation inhaler Inhale 1-2 puffs into the lungs every 6 (six) hours as needed for Wheezing. 1 Inhaler 0    amLODIPine (NORVASC) 5 MG tablet TAKE 1 TABLET EVERY DAY 90 tablet 3    aspirin (ECOTRIN) 81 MG EC tablet TAKE 1 TABLET EVERY DAY 90 tablet 3    azelastine (ASTELIN) 137 mcg (0.1 %) nasal spray USE 1  SPRAY IN EACH NOSTRIL TWICE DAILY 60 mL 0    benzonatate (TESSALON) 200 MG capsule TAKE 1 CAPSULE THREE TIMES DAILY AS NEEDED FOR COUGH 30 capsule 0    chlorthalidone (HYGROTEN) 25 MG Tab TAKE 1/2 TABLET EVERY DAY 45 tablet 2    cholecalciferol, vitamin D3, (VITAMIN D3) 50 mcg (2,000 unit) Cap Take 1 capsule by mouth once daily.      clotrimazole-betamethasone 1-0.05% (LOTRISONE) cream APPLY TOPICALLY 2 (TWO) TIMES DAILY. 45 g 0    docusate sodium (COLACE) 50 MG capsule Take by mouth 2 (two) times daily.      DULoxetine (CYMBALTA) 60 MG capsule TAKE 1 CAPSULE EVERY DAY FOR PAIN CONTROL 90 capsule 3    fluticasone propionate (FLONASE) 50 mcg/actuation nasal spray USE 1 SPRAY NASALLY ONE TIME DAILY 32 g 6    gabapentin (NEURONTIN) 300 MG capsule Take 1 capsule every morning and at noon, and take 2 capsules every evening (4 total daily) 360 capsule 0    guaifenesin (MUCINEX) 600 mg 12 hr tablet Take 1,200 mg by mouth 2 (two) times daily as needed.       ibuprofen (ADVIL,MOTRIN) 200 MG tablet Take 200 mg by mouth every 6 (six) hours as needed for Pain.      irbesartan (AVAPRO) 300 MG tablet TAKE 1 TABLET EVERY EVENING 90 tablet 0    ketoconazole (NIZORAL) 2 % cream AAA bid (facial redness and scaling) 60 g 3    melatonin 10 mg Cap Take 1 capsule by mouth nightly as needed.       methocarbamol (ROBAXIN) 500 MG Tab 3 (three) times daily as needed.       omeprazole (PRILOSEC) 20 MG capsule TAKE 1 CAPSULE EVERY DAY 90 capsule 1    pantoprazole (PROTONIX) 40 MG tablet Take 1 tablet (40 mg total) by mouth once daily. 30 tablet 0    potassium chloride (MICRO-K) 10 MEQ CpSR TAKE 3 CAPSULES ONE TIME DAILY 270 capsule 3    pravastatin (PRAVACHOL) 20 MG tablet TAKE 1 TABLET EVERY DAY 90 tablet 3    tamsulosin (FLOMAX) 0.4 mg Cap Take 1 capsule (0.4 mg total) by mouth once daily. 90 capsule 3     No facility-administered encounter medications on file as of 3/17/2023.         PHYSICAL EXAMINATION:    The patient generally appears in  good health, is appropriately interactive, and is in no apparent distress.    Skin: No lesions.    Mental: Cooperative with normal affect.    Neuro: Grossly intact.    HEENT: Normal. No evidence of lymphadenopathy.    Chest:  has mild labored breathing    Abdomen: Soft, non-tender. No masses or organomegaly. Bladder is not palpable. No evidence of flank discomfort. No evidence of inguinal hernia.    Scrotum showed no rashes or lesions. Left Testicle showed no masses or tenderness. He has a right sided large hydrocele, transilluminates.  Epididymis showed no masses or tenderness.  Penis was circumcised. No meatal stenosis. No penile discharge.  No inguinal hernias.  No inguinal lymphadenopathy.    IRINA:  50 g prostate without nodularity.  Normal rectal tone, no anal masses.  Bladder scan before he voided was 161 ml (voided 80 ml)    LABS:    Lab Results   Component Value Date    BUN 13 01/02/2023    CREATININE 0.9 01/02/2023       Lab Results   Component Value Date    PSA 2.2 11/11/2021    PSA 1.6 09/02/2020    PSA 1.9 08/29/2019    PSADIAG 2.1 07/19/2018    PSADIAG 1.6 06/12/2017    PSADIAG 1.9 05/15/2015     CTA chest abd pelvis 1/1/2023, showed simple right renal cyst.      UA 1.010, pH 6, otherwise, negative.     IMPRESSION:    Urge incontinence  hydrocele    PLAN:    1.  Continue tamsulosin  2.  Discussed the hydrocele.  Discussed the process and that it is likely a sequelae of epididymo orchitis. While it can be removed, would need to get him cleared.  He is using tight shorts or sometimes an athletic support.  This provides him with relief of the heaviness.     I spent 30 minutes with the patient of which more than half was spent in direct consultation with the patient in regards to our treatment and plan.      Copy to:  Dr. Amanda Brown

## 2023-03-29 ENCOUNTER — TELEPHONE (OUTPATIENT)
Dept: INTERNAL MEDICINE | Facility: CLINIC | Age: 78
End: 2023-03-29
Payer: MEDICARE

## 2023-03-29 NOTE — TELEPHONE ENCOUNTER
Called pt to get update. He's speaking in clear, easy sentences now. Pt states he gets SOB with walking or any exertion. That's been ongoing, but now he's having balance issues at times - which he states are not associated with SOB. He says his neurologist mentioned that he might need to get therapy for that.     Made appt for pt to be seen tomorrow in office.  Pt agreed and is appreciative.

## 2023-03-29 NOTE — TELEPHONE ENCOUNTER
----- Message from Amanda Brown MD sent at 3/28/2023  6:14 PM CDT -----  Regarding: follow up  Please contact pt and se if he is having SOB as when he saw Dr Vaca.  She mentioned he was not doing well.  If symptoms have continued please schedule urgent clinic visit with me, perry or available provider.

## 2023-03-30 ENCOUNTER — OFFICE VISIT (OUTPATIENT)
Dept: INTERNAL MEDICINE | Facility: CLINIC | Age: 78
End: 2023-03-30
Payer: MEDICARE

## 2023-03-30 ENCOUNTER — LAB VISIT (OUTPATIENT)
Dept: LAB | Facility: OTHER | Age: 78
End: 2023-03-30
Attending: PHYSICIAN ASSISTANT
Payer: MEDICARE

## 2023-03-30 VITALS
WEIGHT: 210.31 LBS | HEIGHT: 71 IN | OXYGEN SATURATION: 97 % | DIASTOLIC BLOOD PRESSURE: 60 MMHG | BODY MASS INDEX: 29.44 KG/M2 | HEART RATE: 77 BPM | SYSTOLIC BLOOD PRESSURE: 124 MMHG

## 2023-03-30 DIAGNOSIS — R06.02 SOB (SHORTNESS OF BREATH): Primary | ICD-10-CM

## 2023-03-30 DIAGNOSIS — R06.02 SOB (SHORTNESS OF BREATH): ICD-10-CM

## 2023-03-30 LAB
ALBUMIN SERPL BCP-MCNC: 3.7 G/DL (ref 3.5–5.2)
ALP SERPL-CCNC: 94 U/L (ref 55–135)
ALT SERPL W/O P-5'-P-CCNC: 20 U/L (ref 10–44)
ANION GAP SERPL CALC-SCNC: 10 MMOL/L (ref 8–16)
AST SERPL-CCNC: 26 U/L (ref 10–40)
BASOPHILS # BLD AUTO: 0.03 K/UL (ref 0–0.2)
BASOPHILS NFR BLD: 0.6 % (ref 0–1.9)
BILIRUB SERPL-MCNC: 0.4 MG/DL (ref 0.1–1)
BNP SERPL-MCNC: <10 PG/ML (ref 0–99)
BUN SERPL-MCNC: 13 MG/DL (ref 8–23)
CALCIUM SERPL-MCNC: 9.4 MG/DL (ref 8.7–10.5)
CHLORIDE SERPL-SCNC: 99 MMOL/L (ref 95–110)
CO2 SERPL-SCNC: 28 MMOL/L (ref 23–29)
CREAT SERPL-MCNC: 0.9 MG/DL (ref 0.5–1.4)
DIFFERENTIAL METHOD: ABNORMAL
EOSINOPHIL # BLD AUTO: 0.3 K/UL (ref 0–0.5)
EOSINOPHIL NFR BLD: 5.7 % (ref 0–8)
ERYTHROCYTE [DISTWIDTH] IN BLOOD BY AUTOMATED COUNT: 12.7 % (ref 11.5–14.5)
EST. GFR  (NO RACE VARIABLE): >60 ML/MIN/1.73 M^2
GLUCOSE SERPL-MCNC: 97 MG/DL (ref 70–110)
HCT VFR BLD AUTO: 40.6 % (ref 40–54)
HGB BLD-MCNC: 13.3 G/DL (ref 14–18)
IMM GRANULOCYTES # BLD AUTO: 0.01 K/UL (ref 0–0.04)
IMM GRANULOCYTES NFR BLD AUTO: 0.2 % (ref 0–0.5)
LYMPHOCYTES # BLD AUTO: 2.4 K/UL (ref 1–4.8)
LYMPHOCYTES NFR BLD: 46.6 % (ref 18–48)
MCH RBC QN AUTO: 29.1 PG (ref 27–31)
MCHC RBC AUTO-ENTMCNC: 32.8 G/DL (ref 32–36)
MCV RBC AUTO: 89 FL (ref 82–98)
MONOCYTES # BLD AUTO: 0.4 K/UL (ref 0.3–1)
MONOCYTES NFR BLD: 8 % (ref 4–15)
NEUTROPHILS # BLD AUTO: 2 K/UL (ref 1.8–7.7)
NEUTROPHILS NFR BLD: 38.9 % (ref 38–73)
NRBC BLD-RTO: 0 /100 WBC
PLATELET # BLD AUTO: 237 K/UL (ref 150–450)
PMV BLD AUTO: 8.9 FL (ref 9.2–12.9)
POTASSIUM SERPL-SCNC: 3.7 MMOL/L (ref 3.5–5.1)
PROT SERPL-MCNC: 7.3 G/DL (ref 6–8.4)
RBC # BLD AUTO: 4.57 M/UL (ref 4.6–6.2)
SODIUM SERPL-SCNC: 137 MMOL/L (ref 136–145)
WBC # BLD AUTO: 5.22 K/UL (ref 3.9–12.7)

## 2023-03-30 PROCEDURE — 3288F FALL RISK ASSESSMENT DOCD: CPT | Mod: CPTII,S$GLB,, | Performed by: PHYSICIAN ASSISTANT

## 2023-03-30 PROCEDURE — 3078F DIAST BP <80 MM HG: CPT | Mod: CPTII,S$GLB,, | Performed by: PHYSICIAN ASSISTANT

## 2023-03-30 PROCEDURE — 1159F PR MEDICATION LIST DOCUMENTED IN MEDICAL RECORD: ICD-10-PCS | Mod: CPTII,S$GLB,, | Performed by: PHYSICIAN ASSISTANT

## 2023-03-30 PROCEDURE — 3072F PR LOW RISK FOR RETINOPATHY: ICD-10-PCS | Mod: CPTII,S$GLB,, | Performed by: PHYSICIAN ASSISTANT

## 2023-03-30 PROCEDURE — 83880 ASSAY OF NATRIURETIC PEPTIDE: CPT | Performed by: PHYSICIAN ASSISTANT

## 2023-03-30 PROCEDURE — 1125F AMNT PAIN NOTED PAIN PRSNT: CPT | Mod: CPTII,S$GLB,, | Performed by: PHYSICIAN ASSISTANT

## 2023-03-30 PROCEDURE — 3072F LOW RISK FOR RETINOPATHY: CPT | Mod: CPTII,S$GLB,, | Performed by: PHYSICIAN ASSISTANT

## 2023-03-30 PROCEDURE — 1100F PTFALLS ASSESS-DOCD GE2>/YR: CPT | Mod: CPTII,S$GLB,, | Performed by: PHYSICIAN ASSISTANT

## 2023-03-30 PROCEDURE — 3078F PR MOST RECENT DIASTOLIC BLOOD PRESSURE < 80 MM HG: ICD-10-PCS | Mod: CPTII,S$GLB,, | Performed by: PHYSICIAN ASSISTANT

## 2023-03-30 PROCEDURE — 3288F PR FALLS RISK ASSESSMENT DOCUMENTED: ICD-10-PCS | Mod: CPTII,S$GLB,, | Performed by: PHYSICIAN ASSISTANT

## 2023-03-30 PROCEDURE — 36415 COLL VENOUS BLD VENIPUNCTURE: CPT | Performed by: PHYSICIAN ASSISTANT

## 2023-03-30 PROCEDURE — 3074F PR MOST RECENT SYSTOLIC BLOOD PRESSURE < 130 MM HG: ICD-10-PCS | Mod: CPTII,S$GLB,, | Performed by: PHYSICIAN ASSISTANT

## 2023-03-30 PROCEDURE — 85025 COMPLETE CBC W/AUTO DIFF WBC: CPT | Performed by: PHYSICIAN ASSISTANT

## 2023-03-30 PROCEDURE — 99999 PR PBB SHADOW E&M-EST. PATIENT-LVL V: CPT | Mod: PBBFAC,,, | Performed by: PHYSICIAN ASSISTANT

## 2023-03-30 PROCEDURE — 1100F PR PT FALLS ASSESS DOC 2+ FALLS/FALL W/INJURY/YR: ICD-10-PCS | Mod: CPTII,S$GLB,, | Performed by: PHYSICIAN ASSISTANT

## 2023-03-30 PROCEDURE — 1125F PR PAIN SEVERITY QUANTIFIED, PAIN PRESENT: ICD-10-PCS | Mod: CPTII,S$GLB,, | Performed by: PHYSICIAN ASSISTANT

## 2023-03-30 PROCEDURE — 80053 COMPREHEN METABOLIC PANEL: CPT | Performed by: PHYSICIAN ASSISTANT

## 2023-03-30 PROCEDURE — 3074F SYST BP LT 130 MM HG: CPT | Mod: CPTII,S$GLB,, | Performed by: PHYSICIAN ASSISTANT

## 2023-03-30 PROCEDURE — 1159F MED LIST DOCD IN RCRD: CPT | Mod: CPTII,S$GLB,, | Performed by: PHYSICIAN ASSISTANT

## 2023-03-30 PROCEDURE — 99214 OFFICE O/P EST MOD 30 MIN: CPT | Mod: S$GLB,,, | Performed by: PHYSICIAN ASSISTANT

## 2023-03-30 PROCEDURE — 99214 PR OFFICE/OUTPT VISIT, EST, LEVL IV, 30-39 MIN: ICD-10-PCS | Mod: S$GLB,,, | Performed by: PHYSICIAN ASSISTANT

## 2023-03-30 PROCEDURE — 99999 PR PBB SHADOW E&M-EST. PATIENT-LVL V: ICD-10-PCS | Mod: PBBFAC,,, | Performed by: PHYSICIAN ASSISTANT

## 2023-03-30 NOTE — PROGRESS NOTES
INTERNAL MEDICINE URGENT VISIT NOTE    CHIEF COMPLAINT     Chief Complaint   Patient presents with    Shortness of Breath       HPI     Tito Menard is a 77 y.o. male who presents for an urgent visit today.    PCP is Amanda Brown MD, patient is known to me.     He presents with complaints of SOB and ZUNIGA. He has fatigue at baseline. He was recently seen by Urology for routine follow-up. At that time he had some SOB so was told to report to PCP for further eval. He denies chest pain, No URI symptoms.     He has GERD and manages this with     BP is well controlled in office today  Prilosec - tolerating this well.   120's-130's home BP readings.     No cp, no orthopnea, no PND. No lower edema   No bleeding.     Past Medical History:  Past Medical History:   Diagnosis Date    Allergy     Aneurysm of artery of lower extremity     Chalazion of left eye     CKD (chronic kidney disease), stage II 4/1/2019    Diabetes mellitus type II     Diabetes with neurologic complications     Enlarged aorta 8/2/2016    Noted on pharmacological stress echo 2/28/2014.      GERD (gastroesophageal reflux disease)     egd 2008    Hyperlipidemia     Hypertension     Jock itch 7/19/2018    MGD (meibomian gland dysfunction)     Osteopenia     Spinal stenosis     Spondylosis without myelopathy 10/23/2015    Vitamin D deficiency disease        Home Medications:  Prior to Admission medications    Medication Sig Start Date End Date Taking? Authorizing Provider   acetaminophen (TYLENOL) 650 MG TbSR Take 1,300 mg by mouth every 8 (eight) hours as needed.    Yes Historical Provider   albuterol 90 mcg/actuation inhaler Inhale 1-2 puffs into the lungs every 6 (six) hours as needed for Wheezing. 10/27/16  Yes Tal Starr MD   amLODIPine (NORVASC) 5 MG tablet TAKE 1 TABLET EVERY DAY 2/8/23  Yes Hannah Dudley PA-C   aspirin (ECOTRIN) 81 MG EC tablet TAKE 1 TABLET EVERY DAY 11/11/22  Yes Giovanna Solano MD   azelastine (ASTELIN) 137  mcg (0.1 %) nasal spray USE 1 SPRAY IN EACH NOSTRIL TWICE DAILY 2/8/23  Yes Hannah Dudley PA-C   benzonatate (TESSALON) 200 MG capsule TAKE 1 CAPSULE THREE TIMES DAILY AS NEEDED FOR COUGH 9/20/21  Yes Luly Hurtado MD   chlorthalidone (HYGROTEN) 25 MG Tab TAKE 1/2 TABLET EVERY DAY 6/8/22  Yes Amanda Brown MD   cholecalciferol, vitamin D3, (VITAMIN D3) 50 mcg (2,000 unit) Cap Take 1 capsule by mouth once daily.   Yes Historical Provider   clotrimazole-betamethasone 1-0.05% (LOTRISONE) cream APPLY TOPICALLY 2 (TWO) TIMES DAILY. 10/21/20  Yes Beatrice Vaca MD   docusate sodium (COLACE) 50 MG capsule Take by mouth 2 (two) times daily.   Yes Historical Provider   DULoxetine (CYMBALTA) 60 MG capsule TAKE 1 CAPSULE EVERY DAY FOR PAIN CONTROL 5/4/22  Yes Giovanna Solano MD   fluticasone propionate (FLONASE) 50 mcg/actuation nasal spray USE 1 SPRAY NASALLY ONE TIME DAILY 3/20/23  Yes Ronny Jimenes MD   gabapentin (NEURONTIN) 300 MG capsule Take 1 capsule every morning and at noon, and take 2 capsules every evening (4 total daily) 3/8/23  Yes Giovanna Solano MD   guaifenesin (MUCINEX) 600 mg 12 hr tablet Take 1,200 mg by mouth 2 (two) times daily as needed.    Yes Historical Provider   ibuprofen (ADVIL,MOTRIN) 200 MG tablet Take 200 mg by mouth every 6 (six) hours as needed for Pain.   Yes Historical Provider   irbesartan (AVAPRO) 300 MG tablet TAKE 1 TABLET EVERY EVENING 3/6/23  Yes Ronny Jimenes MD   ketoconazole (NIZORAL) 2 % cream AAA bid (facial redness and scaling) 5/4/21  Yes Anny Fregoso MD   melatonin 10 mg Cap Take 1 capsule by mouth nightly as needed.    Yes Historical Provider   methocarbamol (ROBAXIN) 500 MG Tab 3 (three) times daily as needed.  4/30/15  Yes Historical Provider   omeprazole (PRILOSEC) 20 MG capsule TAKE 1 CAPSULE EVERY DAY 8/24/22  Yes Giovanna Solano MD   pantoprazole (PROTONIX) 40 MG tablet Take 1 tablet (40 mg total) by mouth once daily.  "10/31/22 10/31/23 Yes Saskia Carrasco MD   potassium chloride (MICRO-K) 10 MEQ CpSR TAKE 3 CAPSULES ONE TIME DAILY 2/8/23  Yes Hannah Dudley PA-C   pravastatin (PRAVACHOL) 20 MG tablet TAKE 1 TABLET EVERY DAY 8/24/22  Yes Giovanna Solano MD   tamsulosin (FLOMAX) 0.4 mg Cap Take 1 capsule (0.4 mg total) by mouth once daily. 10/3/22  Yes Beatrice Vaca MD   finasteride (PROSCAR) 5 mg tablet Take 1 tablet (5 mg total) by mouth once daily. 2/10/22 6/21/22  Beatrice Vaca MD       Review of Systems:  Review of Systems   Constitutional:  Negative for chills and fever.   HENT:  Negative for sore throat and trouble swallowing.    Eyes:  Negative for visual disturbance.   Respiratory:  Negative for cough and shortness of breath.    Cardiovascular:  Negative for chest pain.   Gastrointestinal:  Negative for abdominal pain, constipation, diarrhea, nausea and vomiting.   Genitourinary:  Negative for dysuria and flank pain.   Musculoskeletal:  Negative for back pain, neck pain and neck stiffness.   Skin:  Negative for rash.   Neurological:  Negative for dizziness, syncope, weakness and headaches.   Psychiatric/Behavioral:  Negative for confusion.      Health Maintainence:   Immunizations:  Health Maintenance         Date Due Completion Date    Shingles Vaccine (1 of 2) Never done ---    TETANUS VACCINE 07/22/2021 7/22/2011    COVID-19 Vaccine (4 - Booster for Moderna series) 01/08/2022 11/13/2021    Diabetes Urine Screening 06/10/2022 6/10/2021    Lipid Panel 07/16/2022 7/16/2021    Influenza Vaccine (1) 09/01/2022 1/27/2022    Override on 10/1/2014: Done (per pt)    Override on 11/7/2013: Done    Hemoglobin A1c 07/02/2023 1/2/2023    Override on 10/23/2015: Done    Eye Exam 12/01/2023 12/1/2022    Override on 6/14/2018: Done    Override on 8/5/2015: Done    Colonoscopy 02/03/2024 2/3/2021             PHYSICAL EXAM     /60   Pulse 77   Ht 5' 11" (1.803 m)   Wt 95.4 kg (210 lb 5.1 oz)   SpO2 97%   BMI " 29.33 kg/m²     Physical Exam  Vitals and nursing note reviewed.   Constitutional:       Appearance: Normal appearance.      Comments: Elderly male in NAD or apparent pain. He makes good eye contact, speaks in clear full sentences and ambulates with Rolator from home.    HENT:      Head: Normocephalic and atraumatic.      Nose: Nose normal.      Mouth/Throat:      Pharynx: Oropharynx is clear.   Eyes:      Conjunctiva/sclera: Conjunctivae normal.   Cardiovascular:      Rate and Rhythm: Normal rate and regular rhythm.      Pulses: Normal pulses.   Pulmonary:      Effort: No respiratory distress.   Abdominal:      Tenderness: There is no abdominal tenderness.   Musculoskeletal:         General: Normal range of motion.      Cervical back: No rigidity.      Comments: No lower edema    Skin:     General: Skin is warm and dry.      Capillary Refill: Capillary refill takes less than 2 seconds.      Findings: No rash.   Neurological:      General: No focal deficit present.      Mental Status: He is alert.      Gait: Gait normal.   Psychiatric:         Mood and Affect: Mood normal.       LABS     Lab Results   Component Value Date    HGBA1C 5.8 (H) 01/02/2023     CMP  Sodium   Date Value Ref Range Status   01/02/2023 138 136 - 145 mmol/L Final     Potassium   Date Value Ref Range Status   01/02/2023 3.5 3.5 - 5.1 mmol/L Final     Chloride   Date Value Ref Range Status   01/02/2023 104 95 - 110 mmol/L Final     CO2   Date Value Ref Range Status   01/02/2023 28 23 - 29 mmol/L Final     Glucose   Date Value Ref Range Status   01/02/2023 90 70 - 110 mg/dL Final   07/09/2022 102 mg/dL Final     BUN   Date Value Ref Range Status   01/02/2023 13 8 - 23 mg/dL Final   07/09/2022 13 4 - 21 mg/dL Final     Creatinine   Date Value Ref Range Status   01/02/2023 0.9 0.5 - 1.4 mg/dL Final   07/09/2022 0.9 mg/dL Final     Calcium   Date Value Ref Range Status   01/02/2023 8.7 8.7 - 10.5 mg/dL Final     Total Protein   Date Value Ref Range  Status   01/02/2023 6.1 6.0 - 8.4 g/dL Final     Albumin   Date Value Ref Range Status   01/02/2023 3.0 (L) 3.5 - 5.2 g/dL Final     Total Bilirubin   Date Value Ref Range Status   01/02/2023 0.6 0.1 - 1.0 mg/dL Final     Comment:     For infants and newborns, interpretation of results should be based  on gestational age, weight and in agreement with clinical  observations.    Premature Infant recommended reference ranges:  Up to 24 hours.............<8.0 mg/dL  Up to 48 hours............<12.0 mg/dL  3-5 days..................<15.0 mg/dL  6-29 days.................<15.0 mg/dL       Alkaline Phosphatase   Date Value Ref Range Status   01/02/2023 83 55 - 135 U/L Final     AST   Date Value Ref Range Status   01/02/2023 18 10 - 40 U/L Final     ALT   Date Value Ref Range Status   01/02/2023 17 10 - 44 U/L Final     Anion Gap   Date Value Ref Range Status   01/02/2023 6 (L) 8 - 16 mmol/L Final     eGFR if    Date Value Ref Range Status   06/27/2022 >60.0 >60 mL/min/1.73 m^2 Final     eGFR if non    Date Value Ref Range Status   06/27/2022 >60.0 >60 mL/min/1.73 m^2 Final     Comment:     Calculation used to obtain the estimated glomerular filtration  rate (eGFR) is the CKD-EPI equation.        Lab Results   Component Value Date    WBC 5.01 01/02/2023    HGB 11.6 (L) 01/02/2023    HCT 36.0 (L) 01/02/2023    MCV 91 01/02/2023     01/02/2023     Lab Results   Component Value Date    CHOL 153 07/16/2021    CHOL 151 07/31/2020    CHOL 171 07/24/2019     Lab Results   Component Value Date    HDL 46 07/16/2021    HDL 45 07/31/2020    HDL 47 07/24/2019     Lab Results   Component Value Date    LDLCALC 90.6 07/16/2021    LDLCALC 86.2 07/31/2020    LDLCALC 99.0 07/24/2019     Lab Results   Component Value Date    TRIG 82 07/16/2021    TRIG 99 07/31/2020    TRIG 125 07/24/2019     Lab Results   Component Value Date    CHOLHDL 30.1 07/16/2021    CHOLHDL 29.8 07/31/2020    CHOLHDL 27.5 07/24/2019      Lab Results   Component Value Date    TSH 1.023 10/03/2014       ASSESSMENT/PLAN     Tito Menard is a 77 y.o. male     Tito was seen today for shortness of breath. Asx on exam, no hypoxia. BP well controlled, clinically appears euvolemic. Will check labs and CXR and reassess.     Diagnoses and all orders for this visit:    SOB (shortness of breath)  -     COMPREHENSIVE METABOLIC PANEL; Future  -     CBC Auto Differential; Future  -     B-TYPE NATRIURETIC PEPTIDE; Future  -     X-Ray Chest PA And Lateral; Future    Patient was counseled on when and how to seek emergent care.       Hannah Dudley PA-C   Department of Internal Medicine - [facility]   12:27 PM

## 2023-03-31 ENCOUNTER — HOSPITAL ENCOUNTER (OUTPATIENT)
Dept: RADIOLOGY | Facility: OTHER | Age: 78
Discharge: HOME | End: 2023-03-31
Attending: PHYSICIAN ASSISTANT
Payer: MEDICARE

## 2023-03-31 DIAGNOSIS — R06.02 SOB (SHORTNESS OF BREATH): ICD-10-CM

## 2023-03-31 PROCEDURE — 71046 XR CHEST PA AND LATERAL: ICD-10-PCS | Mod: 26,,, | Performed by: RADIOLOGY

## 2023-03-31 PROCEDURE — 71046 X-RAY EXAM CHEST 2 VIEWS: CPT | Mod: TC,FY

## 2023-03-31 PROCEDURE — 71046 X-RAY EXAM CHEST 2 VIEWS: CPT | Mod: 26,,, | Performed by: RADIOLOGY

## 2023-05-22 DIAGNOSIS — K21.00 GASTROESOPHAGEAL REFLUX DISEASE WITH ESOPHAGITIS: ICD-10-CM

## 2023-05-22 NOTE — TELEPHONE ENCOUNTER
No care due was identified.  [facility] Embedded Care Due Messages. Reference number: [id number].   5/22/2023 1:02:16 PM CDT

## 2023-05-25 ENCOUNTER — TELEPHONE (OUTPATIENT)
Dept: INTERNAL MEDICINE | Facility: CLINIC | Age: 78
End: 2023-05-25
Payer: MEDICARE

## 2023-05-25 RX ORDER — OMEPRAZOLE 20 MG/1
20 CAPSULE, DELAYED RELEASE ORAL DAILY
Qty: 90 CAPSULE | Refills: 0 | OUTPATIENT
Start: 2023-05-25

## 2023-05-25 NOTE — TELEPHONE ENCOUNTER
Refill Routing Note   Medication(s) are not appropriate for processing by [facility] for the following reason(s):      Patient not seen by PCP within 15 months  Patient seen in ED/Hospital since LOV with PCP    [facility] action(s):  Defer None identified          Appointments  past 12m or future 3m with PCP    Date Provider   Last Visit   1/27/2022 Amanda Brown MD   Next Visit   5/25/2023 Amanda Brown MD   ED visits in past 90 days: 0        Note composed:11:59 AM 05/25/2023

## 2023-05-25 NOTE — TELEPHONE ENCOUNTER
----- Message from Clifton Ye sent at 5/25/2023 10:59 AM CDT -----  Can the clinic reply in [facility]: NO              Please refill the medication(s) listed below. Please call the patient when the prescription(s) is ready for  at this phone number   [phone]           Medication #1 omeprazole (PRILOSEC) 20 MG capsule         Medication #2           Preferred Pharmacy: [facility] - [facility] [address]

## 2023-05-25 NOTE — TELEPHONE ENCOUNTER
Refill Encounter    PCP Visits: Recent Visits  No visits were found meeting these conditions.  Showing recent visits within past 360 days and meeting all other requirements  Future Appointments  Date Type Provider Dept   05/31/23 Appointment Amanda Brown MD [facility] Internal Medicine   Showing future appointments within next 720 days and meeting all other requirements     Last 3 Blood Pressure:   BP Readings from Last 3 Encounters:   03/30/23 124/60   03/17/23 134/81   01/13/23 (!) 144/80     Preferred Pharmacy:   [facility] - [place], [place] - [address] [facility]  [address] [facility] [address] [facility] [address]  Phone: [phone] Fax: [phone]      Requested RX:  Requested Prescriptions      No prescriptions requested or ordered in this encounter      RX Route: Normal

## 2023-05-25 NOTE — TELEPHONE ENCOUNTER
Provider Staff- Action is required     If a refill request needs assessment, Please pend appropriate medication(s) for patient and route the refill request to the Centralized Refill Staff Pool for assessment.     If medication is already pended in a previous encounter, please add any call/ encounter notes to that previous encounter and route that encounter to the [facility] staff pool High Priority.     If medication is not pended as a Refill Request the data required for the Refill Center to assess will not generate and the medications will not be able to be assessed properly by the Refill Center.     Thank you for your assistance!   [facility]     Note composed:11:46 AM 05/25/2023

## 2023-05-25 NOTE — TELEPHONE ENCOUNTER
Ronny from pharmacy and I communicated via teams to clear up previous message. Information he sent is understood.

## 2023-05-31 ENCOUNTER — OFFICE VISIT (OUTPATIENT)
Dept: INTERNAL MEDICINE | Facility: CLINIC | Age: 78
End: 2023-05-31
Attending: INTERNAL MEDICINE
Payer: MEDICARE

## 2023-05-31 ENCOUNTER — TELEPHONE (OUTPATIENT)
Dept: INTERNAL MEDICINE | Facility: CLINIC | Age: 78
End: 2023-05-31
Payer: MEDICARE

## 2023-05-31 ENCOUNTER — LAB VISIT (OUTPATIENT)
Dept: LAB | Facility: OTHER | Age: 78
End: 2023-05-31
Attending: INTERNAL MEDICINE
Payer: MEDICARE

## 2023-05-31 VITALS
OXYGEN SATURATION: 98 % | SYSTOLIC BLOOD PRESSURE: 133 MMHG | HEART RATE: 71 BPM | DIASTOLIC BLOOD PRESSURE: 79 MMHG | WEIGHT: 201.63 LBS | BODY MASS INDEX: 28.12 KG/M2

## 2023-05-31 DIAGNOSIS — I50.32 CHRONIC DIASTOLIC HEART FAILURE: ICD-10-CM

## 2023-05-31 DIAGNOSIS — K21.00 GASTROESOPHAGEAL REFLUX DISEASE WITH ESOPHAGITIS WITHOUT HEMORRHAGE: ICD-10-CM

## 2023-05-31 DIAGNOSIS — R26.9 GAIT DISORDER: ICD-10-CM

## 2023-05-31 DIAGNOSIS — I87.2 VENOUS INSUFFICIENCY (CHRONIC) (PERIPHERAL): ICD-10-CM

## 2023-05-31 DIAGNOSIS — R06.09 DOE (DYSPNEA ON EXERTION): ICD-10-CM

## 2023-05-31 DIAGNOSIS — I70.0 AORTIC ATHEROSCLEROSIS: ICD-10-CM

## 2023-05-31 DIAGNOSIS — E11.42 DIABETIC POLYNEUROPATHY ASSOCIATED WITH TYPE 2 DIABETES MELLITUS: ICD-10-CM

## 2023-05-31 DIAGNOSIS — M48.07 SPINAL STENOSIS OF LUMBOSACRAL REGION: Primary | ICD-10-CM

## 2023-05-31 LAB
ALBUMIN SERPL BCP-MCNC: 3.8 G/DL (ref 3.5–5.2)
ALBUMIN/CREAT UR: 3.5 UG/MG (ref 0–30)
ALP SERPL-CCNC: 92 U/L (ref 55–135)
ALT SERPL W/O P-5'-P-CCNC: 23 U/L (ref 10–44)
ANION GAP SERPL CALC-SCNC: 9 MMOL/L (ref 8–16)
AST SERPL-CCNC: 26 U/L (ref 10–40)
BILIRUB SERPL-MCNC: 0.6 MG/DL (ref 0.1–1)
BUN SERPL-MCNC: 19 MG/DL (ref 8–23)
CALCIUM SERPL-MCNC: 9.4 MG/DL (ref 8.7–10.5)
CHLORIDE SERPL-SCNC: 104 MMOL/L (ref 95–110)
CHOLEST SERPL-MCNC: 167 MG/DL (ref 120–199)
CHOLEST/HDLC SERPL: 3.8 {RATIO} (ref 2–5)
CO2 SERPL-SCNC: 29 MMOL/L (ref 23–29)
CREAT SERPL-MCNC: 1 MG/DL (ref 0.5–1.4)
CREAT UR-MCNC: 143.5 MG/DL (ref 23–375)
EST. GFR  (NO RACE VARIABLE): >60 ML/MIN/1.73 M^2
GLUCOSE SERPL-MCNC: 100 MG/DL (ref 70–110)
HDLC SERPL-MCNC: 44 MG/DL (ref 40–75)
HDLC SERPL: 26.3 % (ref 20–50)
LDLC SERPL CALC-MCNC: 103.6 MG/DL (ref 63–159)
MICROALBUMIN UR DL<=1MG/L-MCNC: 5 UG/ML
NONHDLC SERPL-MCNC: 123 MG/DL
POTASSIUM SERPL-SCNC: 3.6 MMOL/L (ref 3.5–5.1)
PROT SERPL-MCNC: 7.5 G/DL (ref 6–8.4)
SODIUM SERPL-SCNC: 142 MMOL/L (ref 136–145)
TRIGL SERPL-MCNC: 97 MG/DL (ref 30–150)

## 2023-05-31 PROCEDURE — 1101F PR PT FALLS ASSESS DOC 0-1 FALLS W/OUT INJ PAST YR: ICD-10-PCS | Mod: CPTII,S$GLB,, | Performed by: INTERNAL MEDICINE

## 2023-05-31 PROCEDURE — 99215 PR OFFICE/OUTPT VISIT, EST, LEVL V, 40-54 MIN: ICD-10-PCS | Mod: S$GLB,,, | Performed by: INTERNAL MEDICINE

## 2023-05-31 PROCEDURE — 1125F PR PAIN SEVERITY QUANTIFIED, PAIN PRESENT: ICD-10-PCS | Mod: CPTII,S$GLB,, | Performed by: INTERNAL MEDICINE

## 2023-05-31 PROCEDURE — 1101F PT FALLS ASSESS-DOCD LE1/YR: CPT | Mod: CPTII,S$GLB,, | Performed by: INTERNAL MEDICINE

## 2023-05-31 PROCEDURE — 3078F DIAST BP <80 MM HG: CPT | Mod: CPTII,S$GLB,, | Performed by: INTERNAL MEDICINE

## 2023-05-31 PROCEDURE — 3288F FALL RISK ASSESSMENT DOCD: CPT | Mod: CPTII,S$GLB,, | Performed by: INTERNAL MEDICINE

## 2023-05-31 PROCEDURE — 1125F AMNT PAIN NOTED PAIN PRSNT: CPT | Mod: CPTII,S$GLB,, | Performed by: INTERNAL MEDICINE

## 2023-05-31 PROCEDURE — 3078F PR MOST RECENT DIASTOLIC BLOOD PRESSURE < 80 MM HG: ICD-10-PCS | Mod: CPTII,S$GLB,, | Performed by: INTERNAL MEDICINE

## 2023-05-31 PROCEDURE — 99215 OFFICE O/P EST HI 40 MIN: CPT | Mod: S$GLB,,, | Performed by: INTERNAL MEDICINE

## 2023-05-31 PROCEDURE — 99999 PR PBB SHADOW E&M-EST. PATIENT-LVL IV: ICD-10-PCS | Mod: PBBFAC,,, | Performed by: INTERNAL MEDICINE

## 2023-05-31 PROCEDURE — 3288F PR FALLS RISK ASSESSMENT DOCUMENTED: ICD-10-PCS | Mod: CPTII,S$GLB,, | Performed by: INTERNAL MEDICINE

## 2023-05-31 PROCEDURE — 3075F PR MOST RECENT SYSTOLIC BLOOD PRESS GE 130-139MM HG: ICD-10-PCS | Mod: CPTII,S$GLB,, | Performed by: INTERNAL MEDICINE

## 2023-05-31 PROCEDURE — 99999 PR PBB SHADOW E&M-EST. PATIENT-LVL IV: CPT | Mod: PBBFAC,,, | Performed by: INTERNAL MEDICINE

## 2023-05-31 PROCEDURE — 80061 LIPID PANEL: CPT | Performed by: INTERNAL MEDICINE

## 2023-05-31 PROCEDURE — 3075F SYST BP GE 130 - 139MM HG: CPT | Mod: CPTII,S$GLB,, | Performed by: INTERNAL MEDICINE

## 2023-05-31 PROCEDURE — 80053 COMPREHEN METABOLIC PANEL: CPT | Performed by: INTERNAL MEDICINE

## 2023-05-31 PROCEDURE — 36415 COLL VENOUS BLD VENIPUNCTURE: CPT | Performed by: INTERNAL MEDICINE

## 2023-05-31 PROCEDURE — 82570 ASSAY OF URINE CREATININE: CPT | Performed by: INTERNAL MEDICINE

## 2023-05-31 RX ORDER — OMEPRAZOLE 20 MG/1
40 CAPSULE, DELAYED RELEASE ORAL DAILY
Qty: 90 CAPSULE | Refills: 3 | Status: SHIPPED | OUTPATIENT
Start: 2023-05-31 | End: 2023-10-24 | Stop reason: SDUPTHER

## 2023-05-31 NOTE — TELEPHONE ENCOUNTER
Faxed order for compression socks to [facility] at roughly 1400. Hard copy in the top drawer of my cabinet behind my chair.

## 2023-05-31 NOTE — PROGRESS NOTES
Subjective:       Patient ID: Tito Menard is a 77 y.o. male.    Chief Complaint: Gastroesophageal Reflux     Tito Menard is a 77 y.o.  male who presents for Gastroesophageal Reflux  .  Main issues are pain from spinal stenosis effecting his mobililty.  Sitting on rolator recently clipping his hedges and rollator moved and he fell. No significant injury, bearing wt well.  Continues with trouble with is balance, using his rolator more often.     Gerd worsening, elevating head of bed helps, taking omeprazole 20 mg    Stopped his cymbalta- couple of months ago.      Hx of diastolic chf, has ZUNIGA, unchanged.BP well controlled.      Hx of diabetes, managed without medications.  On statin, ARB, asa.         Patient Active Problem List   Diagnosis    Essential hypertension    Mixed hyperlipidemia    Osteopenia    Spinal stenosis    Vitamin D deficiency disease    Obstructive chronic bronchitis without exacerbation    Diabetic polyneuropathy associated with type 2 diabetes mellitus    Spondylosis of cervical joint with myelopathy    Gait disorder    Chronic right-sided low back pain without sciatica    BPH with obstruction/lower urinary tract symptoms    Restrictive lung disease due to kyphoscoliosis    History of diabetes mellitus, type II    Decreased functional mobility    Alteration in performance of activities of daily living    Impaired instrumental activities of daily living (IADL)    Right bundle branch block    Hx of multiple pulmonary nodules    Chronically elevated hemidiaphragm    Pre-syncope    Syncope    Subcortical microvascular ischemic occlusive disease    Lower extremity edema    Class 1 obesity due to excess calories with serious comorbidity and body mass index (BMI) of 31.0 to 31.9 in adult    Thoracic aortic aneurysm without rupture    Epididymo-orchitis    Bacteremia    Chest discomfort    Hypokalemia    Aortic atherosclerosis    Gastroesophageal reflux disease with esophagitis    Acute neck  pain    Poor posture    Leg weakness, bilateral           Past Medical History:   Diagnosis Date    Allergy     Aneurysm of artery of lower extremity     Chalazion of left eye     CKD (chronic kidney disease), stage II 4/1/2019    Diabetes mellitus type II     Diabetes with neurologic complications     Enlarged aorta 8/2/2016    Noted on pharmacological stress echo 2/28/2014.      GERD (gastroesophageal reflux disease)     egd 2008    Hyperlipidemia     Hypertension     Jock itch 7/19/2018    MGD (meibomian gland dysfunction)     Osteopenia     Spinal stenosis     Spondylosis without myelopathy 10/23/2015    Vitamin D deficiency disease        Past Surgical History:   Procedure Laterality Date    CHALAZION EXCISION Left 8/18/13    CYST REMOVAL  2013    Back of neck    FLEXIBLE CYSTOSCOPY N/A 12/7/2021    Procedure: CYSTOSCOPY, FLEXIBLE;  Surgeon: Beatrice Vaca MD;  Location: [facility] [address] [place] [address];  Service: Urology;  Laterality: N/A;    FLUOROSCOPIC URODYNAMIC STUDY N/A 12/7/2021    Procedure: URODYNAMIC STUDY, FLUOROSCOPIC;  Surgeon: Beatrice Vaca MD;  Location: [facility] [address] [place] [address];  Service: Urology;  Laterality: N/A;  90 minutes     SPINE SURGERY  2007    x2 (2000, 2007)       Family History   Problem Relation Age of Onset    Hyperlipidemia Mother     Hypertension Mother     Kidney disease Mother     Cancer Mother     Heart disease Mother     Kidney failure Mother     No Known Problems Daughter     No Known Problems Sister     No Known Problems Brother     No Known Problems Son     No Known Problems Brother     No Known Problems Son     Hypertension Maternal Grandmother     Amblyopia Neg Hx     Blindness Neg Hx     Cataracts Neg Hx     Diabetes Neg Hx     Glaucoma Neg Hx     Macular degeneration Neg Hx     Retinal detachment Neg Hx     Strabismus Neg Hx     Stroke Neg Hx     Thyroid disease Neg Hx     Melanoma Neg Hx     Psoriasis Neg Hx     Lupus Neg Hx     Eczema Neg Hx        Social History     Tobacco Use     Smoking status: Never    Smokeless tobacco: Former     Types: Chew    Tobacco comments:     Chewed tobacco once per day for 4-5 years when a    Substance Use Topics    Alcohol use: No    Drug use: No         Review of Systems      Objective:   Blood pressure 133/79, pulse 71, weight 91.4 kg (201 lb 9.8 oz), SpO2 98 %.     Physical Exam  Constitutional:       Appearance: Normal appearance. He is well-developed. He is not ill-appearing.   HENT:      Head: Normocephalic and atraumatic.   Eyes:      General: No scleral icterus.     Conjunctiva/sclera: Conjunctivae normal.   Cardiovascular:      Rate and Rhythm: Normal rate.      Pulses: Normal pulses.      Heart sounds: Normal heart sounds. No murmur heard.     No friction rub. No gallop.      Comments: Good capillary refill bilateral great toes  Pulmonary:      Effort: Pulmonary effort is normal.      Breath sounds: Normal breath sounds. No wheezing or rales.   Chest:      Chest wall: No tenderness.   Musculoskeletal:         General: No tenderness or signs of injury.      Right lower leg: Edema present.      Left lower leg: Edema present.      Comments: To shins   Skin:     General: Skin is warm and dry.   Neurological:      Mental Status: He is alert and oriented to person, place, and time. Mental status is at baseline.   Psychiatric:         Behavior: Behavior normal.         Thought Content: Thought content normal.         Prior labs reviewed    Health Maintenance         Date Due Completion Date    COVID-19 Vaccine (4 - Moderna series) 01/08/2022 11/13/2021    Diabetes Urine Screening 06/10/2022 6/10/2021    Lipid Panel 07/16/2022 7/16/2021    Shingles Vaccine (2 of 2) 05/25/2023 3/30/2023    Hemoglobin A1c 07/02/2023 1/2/2023    Override on 10/23/2015: Done    Influenza Vaccine (Season Ended) 09/01/2023 1/27/2022    Override on 10/1/2014: Done (per pt)    Override on 11/7/2013: Done    Eye Exam 12/01/2023 12/1/2022    Override on 6/14/2018: Done     Override on 8/5/2015: Done    Colonoscopy 02/03/2024 2/3/2021    TETANUS VACCINE 03/30/2033 3/30/2023            Assessment/Plan:       1. Spinal stenosis of lumbosacral region  -     Ambulatory referral/consult to Physical Medicine Rehab; Future; Expected date: 06/07/2023    2. Gait disorder  -     Ambulatory referral/consult to Physical Medicine Rehab; Future; Expected date: 06/07/2023    3. Gastroesophageal reflux disease with esophagitis  -     increase to omeprazole (PRILOSEC) 20 MG capsule; Take 2 capsules (40 mg total) by mouth once daily.  Dispense: 90 capsule; Refill: 3  Lifestyle modifications    4. Aortic atherosclerosis  On statin, recommend heart healthy diet  Cont to keep diabetes controlled    5. Venous insufficiency (chronic) (peripheral)  -     COMPRESSION STOCKINGS    6. ZUNIGA (dyspnea on exertion)  -     Ambulatory referral/consult to Cardiology; Future; Expected date: 06/07/2023    7. Chronic diastolic heart failure  -     Ambulatory referral/consult to Cardiology; Future; Expected date: 06/07/2023    8. Diabetic polyneuropathy associated with type 2 diabetes mellitus  Overview:  DM had resolved off meds with lifestlye hanges but has returned to prediabetic levels  Manage as diabetes due to long history of DM increasing risk of complication     Orders:  -     Comprehensive Metabolic Panel; Future; Expected date: 05/31/2023  -     Lipid Panel; Future; Expected date: 05/31/2023  -     Microalbumin/Creatinine Ratio, Urine; Future    Elevate feet when seated  Slow movement from sitting to standing t   Refer to pmr for assistance with functional assistance/ DME recs  See cards re CHF ZUNIGA, lungs clear  Suspect venous pooling contributibng to his lightheadedness, recommend compression stockings   Diabetes doing well, labs lipids   Cont current meds    41 min spent in care of patient including history, physical, chart review, orders and coordination of care.         Medication List with Changes/Refills    New Medications    CARBIDOPA-LEVODOPA  MG (SINEMET)  MG PER TABLET    Take 1 tablet by mouth 3 (three) times daily. For the first 3 days take twice daily then take three times daily thereafter.    FINASTERIDE (PROSCAR) 5 MG TABLET    Take 1 tablet (5 mg total) by mouth once daily.    SPIRONOLACTONE (ALDACTONE) 25 MG TABLET    Take 0.5 tablets (12.5 mg total) by mouth once daily.   Current Medications    ACETAMINOPHEN/DIPHENHYDRAMINE (TYLENOL PM ORAL)    Take 2 capsules by mouth nightly.    AMLODIPINE (NORVASC) 5 MG TABLET    Take 5 mg by mouth once daily.    ASPIRIN (ECOTRIN) 81 MG EC TABLET    TAKE 1 TABLET EVERY DAY    ASPIRIN/SALICYLAMIDE/CAFFEINE (BC HEADACHE POWDER ORAL)    Take 1 packet by mouth daily as needed (for headache).    BENZONATATE (TESSALON) 200 MG CAPSULE    TAKE 1 CAPSULE THREE TIMES DAILY AS NEEDED FOR COUGH    CALCIUM CARBONATE (TUMS ORAL)    Take 1-2 tablets by mouth daily as needed (for acid reflux).    CHLORTHALIDONE (HYGROTEN) 25 MG TAB    TAKE 1/2 TABLET EVERY DAY    CHOLECALCIFEROL, VITAMIN D3, (VITAMIN D3) 50 MCG (2,000 UNIT) CAP    Take 1 capsule by mouth once daily.    CLOTRIMAZOLE-BETAMETHASONE 1-0.05% (LOTRISONE) CREAM    APPLY TOPICALLY 2 (TWO) TIMES DAILY.    DOCUSATE SODIUM (COLACE) 50 MG CAPSULE    Take 100 mg by mouth daily as needed for Constipation.    FLUTICASONE PROPIONATE (FLONASE) 50 MCG/ACTUATION NASAL SPRAY    USE 1 SPRAY NASALLY ONE TIME DAILY    GUAIFENESIN (MUCINEX) 600 MG 12 HR TABLET    Take 1,200 mg by mouth 2 (two) times daily as needed.     KETOCONAZOLE (NIZORAL) 2 % CREAM    AAA bid (facial redness and scaling)    LANOLIN/MINERAL OIL/PETROLATUM (ARTIFICIAL TEARS OPHT)    Place 1-2 drops into both eyes daily as needed (for dry eyes).    LIDOCAINE 4 % PTMD    Apply 1 patch topically daily as needed (for back pain).    METHOCARBAMOL (ROBAXIN) 500 MG TAB    Take 500 mg by mouth 4 (four) times daily.    POTASSIUM CHLORIDE (MICRO-K) 10 MEQ CPSR    TAKE 3  CAPSULES ONE TIME DAILY   Changed and/or Refilled Medications    Modified Medication Previous Medication    AZELASTINE (ASTELIN) 137 MCG (0.1 %) NASAL SPRAY azelastine (ASTELIN) 137 mcg (0.1 %) nasal spray       USE 1 SPRAY IN EACH NOSTRIL TWICE DAILY    USE 1 SPRAY IN EACH NOSTRIL TWICE DAILY    GABAPENTIN (NEURONTIN) 300 MG CAPSULE gabapentin (NEURONTIN) 300 MG capsule       TAKE 1 CAPSULE EVERY MORNING AND AT NOON, AND TAKE 2 CAPSULES EVERY EVENING (TOTAL 4 CAPSULES DAILY)    Take 1 capsule every morning and at noon, and take 2 capsules every evening (4 total daily)    IRBESARTAN (AVAPRO) 300 MG TABLET irbesartan (AVAPRO) 300 MG tablet       TAKE 1 TABLET EVERY EVENING    TAKE 1 TABLET EVERY EVENING    OMEPRAZOLE (PRILOSEC) 20 MG CAPSULE omeprazole (PRILOSEC) 20 MG capsule       Take 2 capsules (40 mg total) by mouth once daily.    TAKE 1 CAPSULE EVERY DAY    PRAVASTATIN (PRAVACHOL) 20 MG TABLET pravastatin (PRAVACHOL) 20 MG tablet       Take 1 tablet (20 mg total) by mouth once daily.    TAKE 1 TABLET EVERY DAY    TAMSULOSIN (FLOMAX) 0.4 MG CAP tamsulosin (FLOMAX) 0.4 mg Cap       Take 1 capsule (0.4 mg total) by mouth once daily.    Take 1 capsule (0.4 mg total) by mouth once daily.   Discontinued Medications    ACETAMINOPHEN (TYLENOL) 650 MG TBSR    Take 1,300 mg by mouth every 8 (eight) hours as needed.     ALBUTEROL 90 MCG/ACTUATION INHALER    Inhale 1-2 puffs into the lungs every 6 (six) hours as needed for Wheezing.    AMLODIPINE (NORVASC) 5 MG TABLET    TAKE 1 TABLET EVERY DAY    DULOXETINE (CYMBALTA) 60 MG CAPSULE    TAKE 1 CAPSULE EVERY DAY FOR PAIN CONTROL    IBUPROFEN (ADVIL,MOTRIN) 200 MG TABLET    Take 200 mg by mouth every 6 (six) hours as needed for Pain.    MELATONIN 10 MG CAP    Take 1 capsule by mouth nightly as needed.     METHOCARBAMOL (ROBAXIN) 500 MG TAB    3 (three) times daily as needed.     OMEPRAZOLE (PRILOSEC) 20 MG CAPSULE    Take 2 capsules (40 mg total) by mouth once daily.     PANTOPRAZOLE (PROTONIX) 40 MG TABLET    Take 1 tablet (40 mg total) by mouth once daily.    PRAVASTATIN (PRAVACHOL) 20 MG TABLET    TAKE 1 TABLET EVERY DAY       Recommend patient complete vaccinations as listed in the overdue health maintenance report. Pt may obtain vaccinations at their local pharmacy, any [facility] pharmacy or request vaccination in our clinic.  Please note that some insurances will only cover vaccination cost at specific locations.Please check with your insurance provider regarding your coverage.  Vaccines that are not covered are still recommended.

## 2023-06-01 ENCOUNTER — OFFICE VISIT (OUTPATIENT)
Dept: CARDIOLOGY | Facility: CLINIC | Age: 78
End: 2023-06-01
Payer: MEDICARE

## 2023-06-01 VITALS
SYSTOLIC BLOOD PRESSURE: 115 MMHG | WEIGHT: 211.19 LBS | DIASTOLIC BLOOD PRESSURE: 68 MMHG | HEART RATE: 70 BPM | BODY MASS INDEX: 29.56 KG/M2 | HEIGHT: 71 IN | OXYGEN SATURATION: 97 %

## 2023-06-01 DIAGNOSIS — E11.42 DIABETIC POLYNEUROPATHY ASSOCIATED WITH TYPE 2 DIABETES MELLITUS: Primary | ICD-10-CM

## 2023-06-01 DIAGNOSIS — R53.81 DEBILITY: ICD-10-CM

## 2023-06-01 DIAGNOSIS — I70.0 AORTIC ATHEROSCLEROSIS: ICD-10-CM

## 2023-06-01 DIAGNOSIS — I71.21 ANEURYSM OF ASCENDING AORTA WITHOUT RUPTURE: Chronic | ICD-10-CM

## 2023-06-01 DIAGNOSIS — E78.2 MIXED HYPERLIPIDEMIA: Chronic | ICD-10-CM

## 2023-06-01 DIAGNOSIS — I45.10 RIGHT BUNDLE BRANCH BLOCK: Chronic | ICD-10-CM

## 2023-06-01 DIAGNOSIS — I50.32 CHRONIC DIASTOLIC HEART FAILURE: ICD-10-CM

## 2023-06-01 DIAGNOSIS — R06.09 DOE (DYSPNEA ON EXERTION): ICD-10-CM

## 2023-06-01 DIAGNOSIS — Z86.39 HISTORY OF DIABETES MELLITUS, TYPE II: ICD-10-CM

## 2023-06-01 DIAGNOSIS — I10 ESSENTIAL HYPERTENSION: Chronic | ICD-10-CM

## 2023-06-01 PROCEDURE — 99999 PR PBB SHADOW E&M-EST. PATIENT-LVL V: CPT | Mod: PBBFAC,GC,, | Performed by: INTERNAL MEDICINE

## 2023-06-01 PROCEDURE — 1159F PR MEDICATION LIST DOCUMENTED IN MEDICAL RECORD: ICD-10-PCS | Mod: CPTII,GC,S$GLB, | Performed by: INTERNAL MEDICINE

## 2023-06-01 PROCEDURE — 3288F FALL RISK ASSESSMENT DOCD: CPT | Mod: CPTII,GC,S$GLB, | Performed by: INTERNAL MEDICINE

## 2023-06-01 PROCEDURE — 3072F PR LOW RISK FOR RETINOPATHY: ICD-10-PCS | Mod: CPTII,GC,S$GLB, | Performed by: INTERNAL MEDICINE

## 2023-06-01 PROCEDURE — 3078F DIAST BP <80 MM HG: CPT | Mod: CPTII,GC,S$GLB, | Performed by: INTERNAL MEDICINE

## 2023-06-01 PROCEDURE — 1101F PT FALLS ASSESS-DOCD LE1/YR: CPT | Mod: CPTII,GC,S$GLB, | Performed by: INTERNAL MEDICINE

## 2023-06-01 PROCEDURE — 3078F PR MOST RECENT DIASTOLIC BLOOD PRESSURE < 80 MM HG: ICD-10-PCS | Mod: CPTII,GC,S$GLB, | Performed by: INTERNAL MEDICINE

## 2023-06-01 PROCEDURE — 99214 PR OFFICE/OUTPT VISIT, EST, LEVL IV, 30-39 MIN: ICD-10-PCS | Mod: GC,S$GLB,, | Performed by: INTERNAL MEDICINE

## 2023-06-01 PROCEDURE — 99214 OFFICE O/P EST MOD 30 MIN: CPT | Mod: GC,S$GLB,, | Performed by: INTERNAL MEDICINE

## 2023-06-01 PROCEDURE — 1126F PR PAIN SEVERITY QUANTIFIED, NO PAIN PRESENT: ICD-10-PCS | Mod: CPTII,GC,S$GLB, | Performed by: INTERNAL MEDICINE

## 2023-06-01 PROCEDURE — 3074F PR MOST RECENT SYSTOLIC BLOOD PRESSURE < 130 MM HG: ICD-10-PCS | Mod: CPTII,GC,S$GLB, | Performed by: INTERNAL MEDICINE

## 2023-06-01 PROCEDURE — 99999 PR PBB SHADOW E&M-EST. PATIENT-LVL V: ICD-10-PCS | Mod: PBBFAC,GC,, | Performed by: INTERNAL MEDICINE

## 2023-06-01 PROCEDURE — 1160F PR REVIEW ALL MEDS BY PRESCRIBER/CLIN PHARMACIST DOCUMENTED: ICD-10-PCS | Mod: CPTII,GC,S$GLB, | Performed by: INTERNAL MEDICINE

## 2023-06-01 PROCEDURE — 3288F PR FALLS RISK ASSESSMENT DOCUMENTED: ICD-10-PCS | Mod: CPTII,GC,S$GLB, | Performed by: INTERNAL MEDICINE

## 2023-06-01 PROCEDURE — 1159F MED LIST DOCD IN RCRD: CPT | Mod: CPTII,GC,S$GLB, | Performed by: INTERNAL MEDICINE

## 2023-06-01 PROCEDURE — 1160F RVW MEDS BY RX/DR IN RCRD: CPT | Mod: CPTII,GC,S$GLB, | Performed by: INTERNAL MEDICINE

## 2023-06-01 PROCEDURE — 1101F PR PT FALLS ASSESS DOC 0-1 FALLS W/OUT INJ PAST YR: ICD-10-PCS | Mod: CPTII,GC,S$GLB, | Performed by: INTERNAL MEDICINE

## 2023-06-01 PROCEDURE — 3072F LOW RISK FOR RETINOPATHY: CPT | Mod: CPTII,GC,S$GLB, | Performed by: INTERNAL MEDICINE

## 2023-06-01 PROCEDURE — 3074F SYST BP LT 130 MM HG: CPT | Mod: CPTII,GC,S$GLB, | Performed by: INTERNAL MEDICINE

## 2023-06-01 PROCEDURE — 1126F AMNT PAIN NOTED NONE PRSNT: CPT | Mod: CPTII,GC,S$GLB, | Performed by: INTERNAL MEDICINE

## 2023-06-01 RX ORDER — SPIRONOLACTONE 25 MG/1
12.5 TABLET ORAL DAILY
Qty: 45 TABLET | Refills: 3 | Status: SHIPPED | OUTPATIENT
Start: 2023-06-01 | End: 2024-03-26 | Stop reason: SDUPTHER

## 2023-06-01 NOTE — PATIENT INSTRUCTIONS
Stop amlodipine   Start spironolactone 12.5 mg once daily by mouth  Start Jardiance 10 mg by mouth once daily  Blood work in 1 week to check kidney function and potassium   Patient with h/o SCC of the base of tongue and with progression of disease after carboplatin/ nab paclitaxel/ pembrolizumab. Patient saw [place]. Almanza [place] [facility] [place] for clinical trials.  Oncology recommendations appreciated

## 2023-06-01 NOTE — PROGRESS NOTES
Tito Menard is a 77 y.o. male here for follow-up for shortness of breath.     Patient reports that he had an episode of dizziness that led to a syncopal episode. After recovery from LOC witnessed by wife, patient reports that he had slurred speech and was taken to the hospital. Went to the ED 12/31/2022 and admitted 01/01/2023 for a 1 day hospital stay. He was discharged after negative CT scan and CT C/A/P that showed improvement of ascending aorta size to 3.5 cm. Patient has a host of other complaints, including nerve pain going down the right leg as well as indigestion that improved with an increased dose of Prilosec, inability to afford PT due to high costs. He is enrolled in digital [facility] for HTN management.     PFTs with restrictive lung disease.     Medical: HTN, HLD, h/o DM2 (controlled off meds), CKD 2, enlarged aorta (CT 2/2020 - mid ascending aorta 4.1cm; 4.1cm on echo), RBBB  Surgical: Reviewed, as below.  Family: Reviewed, as below. Mother with heart disease.  Social: Reviewed, as below. 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    The patient is not known to have coexisting coronary artery disease.     Cardiac Risk Factors  Age > 45-male, > 55-female:  YES  +1   Smoking:   NO   Sig. family hx of CHD*:  NO   Hypertension:   YES  +1   Diabetes:   YES  +1   HDL < 35:   NO   HDL > 59:   NO   Total: 3   *- Significant family history of Coronary Heart Disease per National Cholesterol Education Program = Myocardial Infarction or sudden death at less than 55 years old in   father or other 1st-degree male relative, or less than 65 years old in mother or   other 1st-degree female relative.    Active Ambulatory Problems     Diagnosis Date Noted    Essential hypertension     Mixed hyperlipidemia     Osteopenia     GERD (gastroesophageal reflux disease)     Spinal stenosis     Vitamin D deficiency disease 10/09/2014    Obstructive chronic bronchitis without exacerbation 05/25/2015    Diabetic polyneuropathy  associated with type 2 diabetes mellitus 10/23/2015    Spondylosis of cervical joint with myelopathy 11/22/2016    Gait disorder 11/22/2016    Chronic right-sided low back pain without sciatica 06/08/2017    BPH with obstruction/lower urinary tract symptoms 07/19/2018    Restrictive lung disease due to kyphoscoliosis 08/06/2018    History of diabetes mellitus, type II 07/01/2019    Decreased functional mobility 10/25/2019    Alteration in performance of activities of daily living 10/25/2019    Impaired instrumental activities of daily living (IADL) 10/25/2019    Right bundle branch block 02/13/2020    Hx of multiple pulmonary nodules 09/02/2020    Chronically elevated hemidiaphragm 09/02/2020    Pre-syncope 04/26/2021    Syncope 04/26/2021    Subcortical microvascular ischemic occlusive disease 05/03/2021    Lower extremity edema 06/17/2021    Class 1 obesity due to excess calories with serious comorbidity and body mass index (BMI) of 31.0 to 31.9 in adult 06/17/2021    Thoracic aortic aneurysm without rupture 07/19/2021    Epididymo-orchitis 06/18/2022    Bacteremia 06/19/2022    Chest discomfort 11/02/2022    Hypokalemia 01/01/2023    Aortic atherosclerosis 05/31/2023    Gastroesophageal reflux disease with esophagitis 05/31/2023     Resolved Ambulatory Problems     Diagnosis Date Noted    Diabetes mellitus type II     Stye 08/13/2013    Chalazion - Left Eye 08/13/2013    Pulmonary emphysema 04/16/2015    Enlarged aorta 08/02/2016    Abdominal aortic aneurysm (AAA) without rupture     Aneurysm of artery of lower extremity     ZUNIGA (dyspnea on exertion)     Nonspecific abnormal electrocardiogram (ECG) (EKG) 07/17/2018    Jock itch 07/19/2018    CKD (chronic kidney disease), stage II 04/01/2019    Mild left ventricular systolic dysfunction 02/13/2020    Mild aortic regurgitation with left ventricular systolic dysfunction by prior echocardiogram 02/13/2020    Prediabetes 09/02/2020    Grade I diastolic dysfunction  01/21/2021     Past Medical History:   Diagnosis Date    Allergy     Chalazion of left eye     Diabetes mellitus type II     Diabetes with neurologic complications     Hyperlipidemia     Hypertension     MGD (meibomian gland dysfunction)     Spondylosis without myelopathy 10/23/2015          Review of patient's allergies indicates:  No Known Allergies       Current Outpatient Medications   Medication Instructions    acetaminophen (TYLENOL) 1,300 mg, Oral, Every 8 hours PRN    albuterol 90 mcg/actuation inhaler 1-2 puffs, Inhalation, Every 6 hours PRN    amLODIPine (NORVASC) 5 MG tablet TAKE 1 TABLET EVERY DAY    aspirin (ECOTRIN) 81 MG EC tablet TAKE 1 TABLET EVERY DAY    azelastine (ASTELIN) 137 mcg (0.1 %) nasal spray USE 1 SPRAY IN EACH NOSTRIL TWICE DAILY    benzonatate (TESSALON) 200 MG capsule TAKE 1 CAPSULE THREE TIMES DAILY AS NEEDED FOR COUGH    chlorthalidone (HYGROTEN) 25 MG Tab TAKE 1/2 TABLET EVERY DAY    cholecalciferol, vitamin D3, (VITAMIN D3) 50 mcg (2,000 unit) Cap 1 capsule, Oral, Daily    clotrimazole-betamethasone 1-0.05% (LOTRISONE) cream Topical (Top), 2 times daily    docusate sodium (COLACE) 50 MG capsule Oral, 2 times daily    fluticasone propionate (FLONASE) 50 mcg/actuation nasal spray USE 1 SPRAY NASALLY ONE TIME DAILY    gabapentin (NEURONTIN) 300 MG capsule Take 1 capsule every morning and at noon, and take 2 capsules every evening (4 total daily)    guaiFENesin (MUCINEX) 1,200 mg, Oral, 2 times daily PRN    ibuprofen (ADVIL,MOTRIN) 200 mg, Oral, Every 6 hours PRN    irbesartan (AVAPRO) 300 MG tablet TAKE 1 TABLET EVERY EVENING    ketoconazole (NIZORAL) 2 % cream AAA bid (facial redness and scaling)    melatonin 10 mg Cap 1 capsule, Oral, Nightly PRN    methocarbamol (ROBAXIN) 500 MG Tab 3 times daily PRN    omeprazole (PRILOSEC) 40 mg, Oral, Daily    potassium chloride (MICRO-K) 10 MEQ CpSR TAKE 3 CAPSULES ONE TIME DAILY    pravastatin (PRAVACHOL) 20 MG tablet TAKE 1 TABLET EVERY DAY     tamsulosin (FLOMAX) 0.4 mg, Oral, Daily         Past Surgical History:   Procedure Laterality Date    CHALAZION EXCISION Left 8/18/13    CYST REMOVAL  2013    Back of neck    FLEXIBLE CYSTOSCOPY N/A 12/7/2021    Procedure: CYSTOSCOPY, FLEXIBLE;  Surgeon: Beatrice Vaca MD;  Location: [facility] OR [address] [place] [address];  Service: Urology;  Laterality: N/A;    FLUOROSCOPIC URODYNAMIC STUDY N/A 12/7/2021    Procedure: URODYNAMIC STUDY, FLUOROSCOPIC;  Surgeon: Beatrice Vaca MD;  Location: [facility] OR [address] [place] [address];  Service: Urology;  Laterality: N/A;  90 minutes     SPINE SURGERY  2007    x2 (2000, 2007)          Family History   Problem Relation Age of Onset    Hyperlipidemia Mother     Hypertension Mother     Kidney disease Mother     Cancer Mother     Heart disease Mother     Kidney failure Mother     No Known Problems Daughter     No Known Problems Sister     No Known Problems Brother     No Known Problems Son     No Known Problems Brother     No Known Problems Son     Hypertension Maternal Grandmother     Amblyopia Neg Hx     Blindness Neg Hx     Cataracts Neg Hx     Diabetes Neg Hx     Glaucoma Neg Hx     Macular degeneration Neg Hx     Retinal detachment Neg Hx     Strabismus Neg Hx     Stroke Neg Hx     Thyroid disease Neg Hx     Melanoma Neg Hx     Psoriasis Neg Hx     Lupus Neg Hx     Eczema Neg Hx           Social History     Socioeconomic History    Marital status:    Occupational History    Occupation: Retired     Comment:    Tobacco Use    Smoking status: Never    Smokeless tobacco: Former     Types: Chew    Tobacco comments:     Chewed tobacco once per day for 4-5 years when a    Substance and Sexual Activity    Alcohol use: No    Drug use: No    Sexual activity: Not Currently     Partners: Female   Social History Narrative        Colon 2007         The following portions of the patient's history were reviewed and updated as appropriate: allergies, current medications, past family  "history, past medical history, past social history, past surgical history, and problem list.     Review of Systems  Review of Systems   Constitutional:  Negative for chills, fever and malaise/fatigue.   HENT:  Negative for congestion and sore throat.    Respiratory:  Positive for shortness of breath (on exertion). Negative for cough.    Cardiovascular:  Negative for chest pain, palpitations, orthopnea, leg swelling and PND.   Gastrointestinal:  Negative for abdominal pain, constipation, diarrhea, nausea and vomiting.   Genitourinary:  Negative for frequency.   Neurological:  Negative for weakness and headaches.       Objective:     Vitals:    06/01/23 1042   BP: 115/68   Pulse: 70   SpO2: 97%   Weight: 95.8 kg (211 lb 3.2 oz)   Height: 5' 11" (1.803 m)   PainSc: 0-No pain        Physical Exam:  Physical Exam  Vitals reviewed.   Constitutional:       General: He is not in acute distress.     Appearance: Normal appearance. He is not ill-appearing or toxic-appearing.   HENT:      Head: Normocephalic and atraumatic.      Mouth/Throat:      Mouth: Mucous membranes are moist.   Neck:      Comments: Appreciated JVD in the middle of the neck at the level of 45*  Cardiovascular:      Rate and Rhythm: Normal rate and regular rhythm.      Heart sounds: No murmur heard.    No friction rub. No gallop.   Pulmonary:      Effort: Pulmonary effort is normal. No respiratory distress.      Breath sounds: Rales (very mild at bases) present.   Abdominal:      Palpations: Abdomen is soft.   Musculoskeletal:      Right lower leg: Edema (2+ to the tibias) present.      Left lower leg: Edema (2+ to the tibias) present.   Skin:     General: Skin is warm.   Neurological:      Mental Status: He is alert and oriented to person, place, and time. Mental status is at baseline.     Lab Review   Lab Results   Component Value Date     05/31/2023    K 3.6 05/31/2023     05/31/2023    CO2 29 05/31/2023    BUN 19 05/31/2023    BUN 13 " 07/09/2022    CREATININE 1.0 05/31/2023    CREATININE 0.9 07/09/2022    CALCIUM 9.4 05/31/2023     Lab Results   Component Value Date    WBC 5.22 03/30/2023    HGB 13.3 (L) 03/30/2023    HCT 40.6 03/30/2023    HCT 44 12/31/2022    MCV 89 03/30/2023     03/30/2023     Lab Results   Component Value Date    CHOL 167 05/31/2023    TRIG 97 05/31/2023    HDL 44 05/31/2023          Assessment:        1. Diabetic polyneuropathy associated with type 2 diabetes mellitus    2. ZUNIGA (dyspnea on exertion)    3. Chronic diastolic heart failure    4. Aortic atherosclerosis    5. Essential hypertension    6. Right bundle branch block    7. Mixed hyperlipidemia    8. Aneurysm of ascending aorta without rupture    9. History of diabetes mellitus, type II    10. Debility          Plan:     #HFpEF  #HTN  #Debility  #History of T2DM  Stop amlodipine  Start spirinolactone 12.5 mg PO daily  Start Jardiance 10 mg PO daily (sent to [facility] and explained to patient)  BMP in 1 week  Daily weights  Dietary sodium restriction.  Regular aerobic exercise.  Check blood pressures daily and record.   Patient reports physical therapy copays are too expensive thus cannot attend sessions    # Ascending aorta aneurysm  Unlikely to be a true aneurysm as opposed to dilation  CTA Chest Abd Pelv  01/2023: Interval decrease in ascending aortic dilatation now measuring 3.5 cm at the sinus of Valsalva and mid ascending segment    # HLD   mg /dL   Continue pravastatin 20 mg PO HS  for now    #Overweight  BMI 29.5 kg/m2  Weight loss with diet and exercise as tolerated    Staff: Dr Russell    Thank you for choosing [facility]. Please call with questions or concerns.     To Dawson MD  Cardiology PGY5  [facility]

## 2023-06-05 ENCOUNTER — TELEPHONE (OUTPATIENT)
Dept: OPTOMETRY | Facility: CLINIC | Age: 78
End: 2023-06-05
Payer: MEDICARE

## 2023-06-05 NOTE — TELEPHONE ENCOUNTER
No care due was identified.  [facility] Embedded Care Due Messages. Reference number: [id number].   6/05/2023 5:18:27 PM CDT

## 2023-06-05 NOTE — TELEPHONE ENCOUNTER
Pt was informed to use warm compresses and ocusoft lid scrubs.  Pt states he does not think that will help.

## 2023-06-06 ENCOUNTER — TELEPHONE (OUTPATIENT)
Dept: INTERNAL MEDICINE | Facility: CLINIC | Age: 78
End: 2023-06-06
Payer: MEDICARE

## 2023-06-06 RX ORDER — DULOXETIN HYDROCHLORIDE 60 MG/1
CAPSULE, DELAYED RELEASE ORAL
Qty: 90 CAPSULE | Refills: 3 | OUTPATIENT
Start: 2023-06-06

## 2023-06-06 NOTE — TELEPHONE ENCOUNTER
Refill Decision Note   Tito Menard  is requesting a refill authorization.  Brief Assessment and Rationale for Refill:  Quick Discontinue     Medication Therapy Plan:  discontinued on 5/31/2023 by Amanda Brown MD for the following reason: Patient no longer taking.    Medication Reconciliation Completed: No   Comments:     No Care Gaps recommended.     Note composed:10:45 AM 06/06/2023

## 2023-06-06 NOTE — TELEPHONE ENCOUNTER
Pt has growth on his eye. Attempted to get an appt with Dr. Palafox who he's seen in the past, and cant be seen until November. Its been lanced in the past and feels like it needs to be lanced again. Wants to know if Dr. Brown can help him be seen sooner.    Pt must stay in the [facility].    I got him scheduled on 8/3/23 and pt satisfied w that.

## 2023-06-06 NOTE — TELEPHONE ENCOUNTER
----- Message from Amita Bee sent at 6/6/2023 11:52 AM CDT -----  .Type: Patient Call Back    Who called:self     What is the request in detail: patient has questions and requesting a callback    Can the clinic reply by [facility]?call    Would the patient rather a call back or a response via [facility]? call     Best call back number: [phone]     Additional Information:

## 2023-06-08 ENCOUNTER — LAB VISIT (OUTPATIENT)
Dept: LAB | Facility: HOSPITAL | Age: 78
End: 2023-06-08
Payer: MEDICARE

## 2023-06-08 DIAGNOSIS — I10 ESSENTIAL HYPERTENSION: Chronic | ICD-10-CM

## 2023-06-08 DIAGNOSIS — I50.32 CHRONIC DIASTOLIC HEART FAILURE: ICD-10-CM

## 2023-06-08 DIAGNOSIS — Z86.39 HISTORY OF DIABETES MELLITUS, TYPE II: ICD-10-CM

## 2023-06-08 LAB
ANION GAP SERPL CALC-SCNC: 6 MMOL/L (ref 8–16)
BUN SERPL-MCNC: 13 MG/DL (ref 8–23)
CALCIUM SERPL-MCNC: 9.6 MG/DL (ref 8.7–10.5)
CHLORIDE SERPL-SCNC: 102 MMOL/L (ref 95–110)
CO2 SERPL-SCNC: 31 MMOL/L (ref 23–29)
CREAT SERPL-MCNC: 0.9 MG/DL (ref 0.5–1.4)
EST. GFR  (NO RACE VARIABLE): >60 ML/MIN/1.73 M^2
GLUCOSE SERPL-MCNC: 95 MG/DL (ref 70–110)
POTASSIUM SERPL-SCNC: 3.7 MMOL/L (ref 3.5–5.1)
SODIUM SERPL-SCNC: 139 MMOL/L (ref 136–145)

## 2023-06-08 PROCEDURE — 80048 BASIC METABOLIC PNL TOTAL CA: CPT | Performed by: INTERNAL MEDICINE

## 2023-06-08 PROCEDURE — 36415 COLL VENOUS BLD VENIPUNCTURE: CPT | Performed by: INTERNAL MEDICINE

## 2023-06-08 NOTE — PROGRESS NOTES
Patient's blood work shows normal Cr and K levels despite the initiation of spironolactone and Jardiance. Can continue the same for now to help treat HFpEF.     Please call with questions or concerns.     To Dawson MD  Cardiology PGY5  [facility]

## 2023-06-19 ENCOUNTER — HOSPITAL ENCOUNTER (OUTPATIENT)
Facility: HOSPITAL | Age: 78
Discharge: HOME | End: 2023-06-22
Attending: EMERGENCY MEDICINE | Admitting: STUDENT IN AN ORGANIZED HEALTH CARE EDUCATION/TRAINING PROGRAM
Payer: MEDICARE

## 2023-06-19 DIAGNOSIS — R41.82 ALTERED MENTAL STATUS, UNSPECIFIED ALTERED MENTAL STATUS TYPE: ICD-10-CM

## 2023-06-19 DIAGNOSIS — R55 SYNCOPE AND COLLAPSE: ICD-10-CM

## 2023-06-19 DIAGNOSIS — R07.9 CHEST PAIN: ICD-10-CM

## 2023-06-19 DIAGNOSIS — R55 SYNCOPE: ICD-10-CM

## 2023-06-19 DIAGNOSIS — R51.9 ACUTE NONINTRACTABLE HEADACHE, UNSPECIFIED HEADACHE TYPE: ICD-10-CM

## 2023-06-19 DIAGNOSIS — R55 SYNCOPE, UNSPECIFIED SYNCOPE TYPE: Primary | ICD-10-CM

## 2023-06-19 DIAGNOSIS — M54.2 NECK PAIN ON RIGHT SIDE: ICD-10-CM

## 2023-06-19 LAB
ALBUMIN SERPL BCP-MCNC: 3.4 G/DL (ref 3.5–5.2)
ALP SERPL-CCNC: 90 U/L (ref 55–135)
ALT SERPL W/O P-5'-P-CCNC: 19 U/L (ref 10–44)
ANION GAP SERPL CALC-SCNC: 12 MMOL/L (ref 8–16)
AST SERPL-CCNC: 19 U/L (ref 10–40)
BACTERIA #/AREA URNS AUTO: NORMAL /HPF
BASOPHILS # BLD AUTO: 0.01 K/UL (ref 0–0.2)
BASOPHILS NFR BLD: 0.1 % (ref 0–1.9)
BILIRUB SERPL-MCNC: 0.6 MG/DL (ref 0.1–1)
BILIRUB UR QL STRIP: NEGATIVE
BUN SERPL-MCNC: 17 MG/DL (ref 6–30)
BUN SERPL-MCNC: 17 MG/DL (ref 8–23)
CALCIUM SERPL-MCNC: 9.2 MG/DL (ref 8.7–10.5)
CHLORIDE SERPL-SCNC: 100 MMOL/L (ref 95–110)
CHLORIDE SERPL-SCNC: 103 MMOL/L (ref 95–110)
CLARITY UR REFRACT.AUTO: CLEAR
CO2 SERPL-SCNC: 24 MMOL/L (ref 23–29)
COLOR UR AUTO: YELLOW
CREAT SERPL-MCNC: 1 MG/DL (ref 0.5–1.4)
CREAT SERPL-MCNC: 1 MG/DL (ref 0.5–1.4)
DIFFERENTIAL METHOD: ABNORMAL
EOSINOPHIL # BLD AUTO: 0 K/UL (ref 0–0.5)
EOSINOPHIL NFR BLD: 0.5 % (ref 0–8)
ERYTHROCYTE [DISTWIDTH] IN BLOOD BY AUTOMATED COUNT: 13.7 % (ref 11.5–14.5)
EST. GFR  (NO RACE VARIABLE): >60 ML/MIN/1.73 M^2
GLUCOSE SERPL-MCNC: 115 MG/DL (ref 70–110)
GLUCOSE SERPL-MCNC: 116 MG/DL (ref 70–110)
GLUCOSE UR QL STRIP: ABNORMAL
HCT VFR BLD AUTO: 39.1 % (ref 40–54)
HCT VFR BLD CALC: 42 %PCV (ref 36–54)
HGB BLD-MCNC: 13 G/DL (ref 14–18)
HGB UR QL STRIP: NEGATIVE
IMM GRANULOCYTES # BLD AUTO: 0.02 K/UL (ref 0–0.04)
IMM GRANULOCYTES NFR BLD AUTO: 0.3 % (ref 0–0.5)
KETONES UR QL STRIP: NEGATIVE
LEUKOCYTE ESTERASE UR QL STRIP: NEGATIVE
LYMPHOCYTES # BLD AUTO: 1.3 K/UL (ref 1–4.8)
LYMPHOCYTES NFR BLD: 17.4 % (ref 18–48)
MCH RBC QN AUTO: 29.9 PG (ref 27–31)
MCHC RBC AUTO-ENTMCNC: 33.2 G/DL (ref 32–36)
MCV RBC AUTO: 90 FL (ref 82–98)
MICROSCOPIC COMMENT: NORMAL
MONOCYTES # BLD AUTO: 0.6 K/UL (ref 0.3–1)
MONOCYTES NFR BLD: 8.4 % (ref 4–15)
NEUTROPHILS # BLD AUTO: 5.5 K/UL (ref 1.8–7.7)
NEUTROPHILS NFR BLD: 73.3 % (ref 38–73)
NITRITE UR QL STRIP: NEGATIVE
NRBC BLD-RTO: 0 /100 WBC
PH UR STRIP: 8 [PH] (ref 5–8)
PLATELET # BLD AUTO: 189 K/UL (ref 150–450)
PMV BLD AUTO: 9.3 FL (ref 9.2–12.9)
POC IONIZED CALCIUM: 1.16 MMOL/L (ref 1.06–1.42)
POC TCO2 (MEASURED): 27 MMOL/L (ref 23–29)
POCT GLUCOSE: 98 MG/DL (ref 70–110)
POTASSIUM BLD-SCNC: 3.6 MMOL/L (ref 3.5–5.1)
POTASSIUM SERPL-SCNC: 3.7 MMOL/L (ref 3.5–5.1)
PROT SERPL-MCNC: 7.3 G/DL (ref 6–8.4)
PROT UR QL STRIP: NEGATIVE
RBC # BLD AUTO: 4.35 M/UL (ref 4.6–6.2)
RBC #/AREA URNS AUTO: 4 /HPF (ref 0–4)
SAMPLE: ABNORMAL
SODIUM BLD-SCNC: 139 MMOL/L (ref 136–145)
SODIUM SERPL-SCNC: 139 MMOL/L (ref 136–145)
SP GR UR STRIP: 1.02 (ref 1–1.03)
SQUAMOUS #/AREA URNS AUTO: 0 /HPF
TROPONIN I SERPL DL<=0.01 NG/ML-MCNC: 0.01 NG/ML (ref 0–0.03)
TROPONIN I SERPL DL<=0.01 NG/ML-MCNC: <0.006 NG/ML (ref 0–0.03)
URN SPEC COLLECT METH UR: ABNORMAL
WBC # BLD AUTO: 7.47 K/UL (ref 3.9–12.7)
WBC #/AREA URNS AUTO: 2 /HPF (ref 0–5)
YEAST UR QL AUTO: NORMAL

## 2023-06-19 PROCEDURE — 80053 COMPREHEN METABOLIC PANEL: CPT | Performed by: STUDENT IN AN ORGANIZED HEALTH CARE EDUCATION/TRAINING PROGRAM

## 2023-06-19 PROCEDURE — 99223 PR INITIAL HOSPITAL CARE,LEVL III: ICD-10-PCS | Mod: ,,, | Performed by: PHYSICIAN ASSISTANT

## 2023-06-19 PROCEDURE — 81001 URINALYSIS AUTO W/SCOPE: CPT | Performed by: STUDENT IN AN ORGANIZED HEALTH CARE EDUCATION/TRAINING PROGRAM

## 2023-06-19 PROCEDURE — 25500020 PHARM REV CODE 255: Performed by: EMERGENCY MEDICINE

## 2023-06-19 PROCEDURE — 63600175 PHARM REV CODE 636 W HCPCS: Performed by: PHYSICIAN ASSISTANT

## 2023-06-19 PROCEDURE — 25000003 PHARM REV CODE 250: Performed by: STUDENT IN AN ORGANIZED HEALTH CARE EDUCATION/TRAINING PROGRAM

## 2023-06-19 PROCEDURE — 85025 COMPLETE CBC W/AUTO DIFF WBC: CPT | Performed by: STUDENT IN AN ORGANIZED HEALTH CARE EDUCATION/TRAINING PROGRAM

## 2023-06-19 PROCEDURE — 93005 ELECTROCARDIOGRAM TRACING: CPT

## 2023-06-19 PROCEDURE — 99223 1ST HOSP IP/OBS HIGH 75: CPT | Mod: ,,, | Performed by: PHYSICIAN ASSISTANT

## 2023-06-19 PROCEDURE — G0378 HOSPITAL OBSERVATION PER HR: HCPCS

## 2023-06-19 PROCEDURE — 93010 ELECTROCARDIOGRAM REPORT: CPT | Mod: ,,, | Performed by: INTERNAL MEDICINE

## 2023-06-19 PROCEDURE — 99285 EMERGENCY DEPT VISIT HI MDM: CPT | Mod: ,,, | Performed by: EMERGENCY MEDICINE

## 2023-06-19 PROCEDURE — 82330 ASSAY OF CALCIUM: CPT

## 2023-06-19 PROCEDURE — 96372 THER/PROPH/DIAG INJ SC/IM: CPT | Performed by: PHYSICIAN ASSISTANT

## 2023-06-19 PROCEDURE — 99285 EMERGENCY DEPT VISIT HI MDM: CPT | Mod: 25

## 2023-06-19 PROCEDURE — 96361 HYDRATE IV INFUSION ADD-ON: CPT

## 2023-06-19 PROCEDURE — 93010 EKG 12-LEAD: ICD-10-PCS | Mod: ,,, | Performed by: INTERNAL MEDICINE

## 2023-06-19 PROCEDURE — 82962 GLUCOSE BLOOD TEST: CPT

## 2023-06-19 PROCEDURE — 80047 BASIC METABLC PNL IONIZED CA: CPT

## 2023-06-19 PROCEDURE — 84484 ASSAY OF TROPONIN QUANT: CPT | Mod: 91 | Performed by: STUDENT IN AN ORGANIZED HEALTH CARE EDUCATION/TRAINING PROGRAM

## 2023-06-19 PROCEDURE — 99285 PR EMERGENCY DEPT VISIT,LEVEL V: ICD-10-PCS | Mod: ,,, | Performed by: EMERGENCY MEDICINE

## 2023-06-19 RX ORDER — ACETAMINOPHEN 325 MG/1
650 TABLET ORAL EVERY 4 HOURS PRN
Status: DISCONTINUED | OUTPATIENT
Start: 2023-06-19 | End: 2023-06-22 | Stop reason: HOSPADM

## 2023-06-19 RX ORDER — ASPIRIN 81 MG/1
81 TABLET ORAL DAILY
Status: DISCONTINUED | OUTPATIENT
Start: 2023-06-20 | End: 2023-06-22 | Stop reason: HOSPADM

## 2023-06-19 RX ORDER — GLUCAGON 1 MG
1 KIT INJECTION
Status: DISCONTINUED | OUTPATIENT
Start: 2023-06-19 | End: 2023-06-22 | Stop reason: HOSPADM

## 2023-06-19 RX ORDER — ONDANSETRON 2 MG/ML
4 INJECTION INTRAMUSCULAR; INTRAVENOUS EVERY 8 HOURS PRN
Status: DISCONTINUED | OUTPATIENT
Start: 2023-06-19 | End: 2023-06-22 | Stop reason: HOSPADM

## 2023-06-19 RX ORDER — PRAVASTATIN SODIUM 20 MG/1
20 TABLET ORAL DAILY
Status: DISCONTINUED | OUTPATIENT
Start: 2023-06-20 | End: 2023-06-22 | Stop reason: HOSPADM

## 2023-06-19 RX ORDER — METHOCARBAMOL 500 MG/1
500 TABLET, FILM COATED ORAL 4 TIMES DAILY
Status: DISCONTINUED | OUTPATIENT
Start: 2023-06-20 | End: 2023-06-22 | Stop reason: HOSPADM

## 2023-06-19 RX ORDER — TALC
6 POWDER (GRAM) TOPICAL NIGHTLY PRN
Status: DISCONTINUED | OUTPATIENT
Start: 2023-06-19 | End: 2023-06-22 | Stop reason: HOSPADM

## 2023-06-19 RX ORDER — TAMSULOSIN HYDROCHLORIDE 0.4 MG/1
1 CAPSULE ORAL DAILY
Status: DISCONTINUED | OUTPATIENT
Start: 2023-06-20 | End: 2023-06-22 | Stop reason: HOSPADM

## 2023-06-19 RX ORDER — DEXTROSE 40 %
30 GEL (GRAM) ORAL
Status: DISCONTINUED | OUTPATIENT
Start: 2023-06-19 | End: 2023-06-22 | Stop reason: HOSPADM

## 2023-06-19 RX ORDER — DEXTROSE 40 %
15 GEL (GRAM) ORAL
Status: DISCONTINUED | OUTPATIENT
Start: 2023-06-19 | End: 2023-06-22 | Stop reason: HOSPADM

## 2023-06-19 RX ORDER — ONDANSETRON 8 MG/1
8 TABLET, ORALLY DISINTEGRATING ORAL EVERY 8 HOURS PRN
Status: DISCONTINUED | OUTPATIENT
Start: 2023-06-19 | End: 2023-06-22 | Stop reason: HOSPADM

## 2023-06-19 RX ORDER — LOSARTAN POTASSIUM 50 MG/1
100 TABLET ORAL NIGHTLY
Status: DISCONTINUED | OUTPATIENT
Start: 2023-06-20 | End: 2023-06-22 | Stop reason: HOSPADM

## 2023-06-19 RX ORDER — PANTOPRAZOLE SODIUM 40 MG/1
40 TABLET, DELAYED RELEASE ORAL DAILY
Status: DISCONTINUED | OUTPATIENT
Start: 2023-06-20 | End: 2023-06-22 | Stop reason: HOSPADM

## 2023-06-19 RX ORDER — ENOXAPARIN SODIUM 100 MG/ML
40 INJECTION SUBCUTANEOUS EVERY 24 HOURS
Status: DISCONTINUED | OUTPATIENT
Start: 2023-06-19 | End: 2023-06-22 | Stop reason: HOSPADM

## 2023-06-19 RX ORDER — POLYETHYLENE GLYCOL 3350 17 G/17G
17 POWDER, FOR SOLUTION ORAL DAILY PRN
Status: DISCONTINUED | OUTPATIENT
Start: 2023-06-19 | End: 2023-06-22 | Stop reason: HOSPADM

## 2023-06-19 RX ORDER — BISACODYL 10 MG
10 SUPPOSITORY, RECTAL RECTAL DAILY PRN
Status: DISCONTINUED | OUTPATIENT
Start: 2023-06-19 | End: 2023-06-22 | Stop reason: HOSPADM

## 2023-06-19 RX ADMIN — IOHEXOL 75 ML: 350 INJECTION, SOLUTION INTRAVENOUS at 06:06

## 2023-06-19 RX ADMIN — ENOXAPARIN SODIUM 40 MG: 40 INJECTION SUBCUTANEOUS at 10:06

## 2023-06-19 RX ADMIN — SODIUM CHLORIDE 1000 ML: 9 INJECTION, SOLUTION INTRAVENOUS at 02:06

## 2023-06-19 NOTE — ED NOTES
iSTAT Chem 8    Na+ 139   K+ 3.6   Cl 100   iCa 1.16   TCO2 27   Glu 115   BUN 17   Creat 1.0   Hct% 42

## 2023-06-19 NOTE — ED PROVIDER NOTES
Encounter Date: 6/19/2023       History     Chief Complaint   Patient presents with    Altered Mental Status     Arrived via ems from home with c/o difficult to arouse by family, on ems arrival pt awake alert not answering questions, en route pt more verbal and answering questions, c/o neck pain and headache, denies injury     77-year-old male with PMH of diabetes, CKD, and hypertension presents with altered mental status.  Patient's main complaint is a right-sided neck pain and headache that has been present for the past few days.  He remembers sitting on the porch but then the next thing he knows he was in an ambulance.  Patient's wife states that she went to check on when he was sitting outside and noticed that his head was slumped over and he was unresponsive, so she called 911.  EMS reported that he was alert but not answering any questions upon their arrival; EN route he began answering their questions and was oriented.  Patient denies any recent falls or injuries to the neck.  Wife reports a similar episode about 6 months ago.  Patient denies chest pain, shortness of breath, abdominal pain, nausea, vomiting, dysuria, hematuria, diarrhea, constipation, black/bloody stools.    The history is provided by the patient and the spouse.   Review of patient's allergies indicates:  No Known Allergies  Past Medical History:   Diagnosis Date    Allergy     Aneurysm of artery of lower extremity     Chalazion of left eye     CKD (chronic kidney disease), stage II 4/1/2019    Diabetes mellitus type II     Diabetes with neurologic complications     Enlarged aorta 8/2/2016    Noted on pharmacological stress echo 2/28/2014.      GERD (gastroesophageal reflux disease)     egd 2008    Hyperlipidemia     Hypertension     Jock itch 7/19/2018    MGD (meibomian gland dysfunction)     Osteopenia     Spinal stenosis     Spondylosis without myelopathy 10/23/2015    Vitamin D deficiency disease      Past Surgical History:   Procedure  Laterality Date    CHALAZION EXCISION Left 8/18/13    CYST REMOVAL  2013    Back of neck    FLEXIBLE CYSTOSCOPY N/A 12/7/2021    Procedure: CYSTOSCOPY, FLEXIBLE;  Surgeon: Beatrice Vaca MD;  Location: [address];  Service: Urology;  Laterality: N/A;    FLUOROSCOPIC URODYNAMIC STUDY N/A 12/7/2021    Procedure: URODYNAMIC STUDY, FLUOROSCOPIC;  Surgeon: Beatrice Vaca MD;  Location: [address];  Service: Urology;  Laterality: N/A;  90 minutes     SPINE SURGERY  2007    x2 (2000, 2007)     Family History   Problem Relation Age of Onset    Hyperlipidemia Mother     Hypertension Mother     Kidney disease Mother     Cancer Mother     Heart disease Mother     Kidney failure Mother     No Known Problems Daughter     No Known Problems Sister     No Known Problems Brother     No Known Problems Son     No Known Problems Brother     No Known Problems Son     Hypertension Maternal Grandmother     Amblyopia Neg Hx     Blindness Neg Hx     Cataracts Neg Hx     Diabetes Neg Hx     Glaucoma Neg Hx     Macular degeneration Neg Hx     Retinal detachment Neg Hx     Strabismus Neg Hx     Stroke Neg Hx     Thyroid disease Neg Hx     Melanoma Neg Hx     Psoriasis Neg Hx     Lupus Neg Hx     Eczema Neg Hx      Social History     Tobacco Use    Smoking status: Never    Smokeless tobacco: Former     Types: Chew    Tobacco comments:     Chewed tobacco once per day for 4-5 years when a    Substance Use Topics    Alcohol use: No    Drug use: No     Review of Systems    Physical Exam     Initial Vitals [06/19/23 1331]   BP Pulse Resp Temp SpO2   (!) 143/101 81 18 99.8 °F (37.7 °C) 99 %      MAP       --         Physical Exam    Nursing note and vitals reviewed.  Constitutional: He appears well-developed and well-nourished. He is not diaphoretic. No distress.   HENT:   Head: Normocephalic and atraumatic.   Eyes: Conjunctivae and EOM are normal. Pupils are equal, round, and reactive to light.   Neck: Neck supple.    Limited ROM 2/2 pain. TTP along right SCM muscle. No midline TTP   Cardiovascular:  Normal rate, regular rhythm, normal heart sounds and intact distal pulses.           No murmur heard.  Pulmonary/Chest: Breath sounds normal. No respiratory distress. He has no wheezes. He has no rhonchi. He has no rales.   No increased work of breathing or tachypnea   Abdominal: Abdomen is soft. He exhibits no distension. There is no abdominal tenderness. There is no rebound and no guarding.   Musculoskeletal:         General: No tenderness or edema.      Cervical back: Neck supple.     Neurological: He is alert and oriented to person, place, and time.   Skin: Skin is warm and dry. Capillary refill takes less than 2 seconds.       ED Course   Procedures  Labs Reviewed   CBC W/ AUTO DIFFERENTIAL - Abnormal; Notable for the following components:       Result Value    RBC 4.35 (*)     Hemoglobin 13.0 (*)     Hematocrit 39.1 (*)     Gran % 73.3 (*)     Lymph % 17.4 (*)     All other components within normal limits   COMPREHENSIVE METABOLIC PANEL - Abnormal; Notable for the following components:    Glucose 116 (*)     Albumin 3.4 (*)     All other components within normal limits   URINALYSIS, REFLEX TO URINE CULTURE - Abnormal; Notable for the following components:    Glucose, UA 4+ (*)     All other components within normal limits    Narrative:     Specimen Source->Urine   TROPONIN I   B-TYPE NATRIURETIC PEPTIDE   URINALYSIS MICROSCOPIC   POCT GLUCOSE   POCT TROPONIN   POCT TROPONIN   ISTAT CHEM8          Imaging Results              X-Ray Chest AP Portable (In process)                      Medications   sodium chloride 0.9% bolus 1,000 mL 1,000 mL (1,000 mLs Intravenous New Bag 6/19/23 9027)     Medical Decision Making:   Initial Assessment:   Febrile, hemodynamically stable 77-year-old male presents after an episode of syncope and unresponsiveness for an unknown amount of time in addition to right-sided neck/head pain  Differential  Diagnosis:   Carotid dissection, CVA, ICH, cardiogenic syncope, vasovagal syncope, dehydration, BRENDA, electrolyte abnormality, ACS, dysrhythmia  Clinical Tests:   Lab Tests: Ordered and Reviewed  Radiological Study: Ordered and Reviewed  Medical Tests: Ordered and Reviewed  ED Management:  Will give 1L NS. Patient care handed off to oncoming team at 3pm, pending labs, CTA, and admission.             ED Course as of 06/19/23 1534   Mon Jun 19, 2023   1358 POCT Glucose: 98  Normal [BD]   1428 EKG independently interpreted by me shows normal sinus rhythm at a rate of 76, no STEMI, right bundle-branch block (chronic), appears baseline [BD]   1524 Patient is signed out to me pending CT scan imaging as well as complete lab workup for syncopal episode.  Plan is for patient to be admitted [OO]      ED Course User Index  [BD] Miguel Carter MD  [OO] Britney Moreau MD                   Clinical Impression:   Final diagnoses:  [R55] Syncope, unspecified syncope type (Primary)  [R41.82] Altered mental status, unspecified altered mental status type  [M54.2] Neck pain on right side  [R51.9] Acute nonintractable headache, unspecified headache type               Miguel Carter MD  Resident  06/19/23 1534

## 2023-06-19 NOTE — ED TRIAGE NOTES
"Tito Menard, a 77 y.o. male presents to the ED w/ complaint of AMS from family. Pt states "I passed out and when I woke up I was in the ambulance". Pt reports severe neck pain as a 10/10. Denies sob/n/v/cp    Triage note:  Chief Complaint   Patient presents with    Altered Mental Status     Arrived via ems from home with c/o difficult to arouse by family, on ems arrival pt awake alert not answering questions, en route pt more verbal and answering questions, c/o neck pain and headache, denies injury     Review of patient's allergies indicates:  No Known Allergies  Past Medical History:   Diagnosis Date    Allergy     Aneurysm of artery of lower extremity     Chalazion of left eye     CKD (chronic kidney disease), stage II 4/1/2019    Diabetes mellitus type II     Diabetes with neurologic complications     Enlarged aorta 8/2/2016    Noted on pharmacological stress echo 2/28/2014.      GERD (gastroesophageal reflux disease)     egd 2008    Hyperlipidemia     Hypertension     Jock itch 7/19/2018    MGD (meibomian gland dysfunction)     Osteopenia     Spinal stenosis     Spondylosis without myelopathy 10/23/2015    Vitamin D deficiency disease        "

## 2023-06-20 PROBLEM — M54.2 ACUTE NECK PAIN: Status: ACTIVE | Noted: 2023-06-20

## 2023-06-20 LAB
ANION GAP SERPL CALC-SCNC: 9 MMOL/L (ref 8–16)
ASCENDING AORTA: 4.16 CM
AV INDEX (PROSTH): 0.7
AV MEAN GRADIENT: 4 MMHG
AV PEAK GRADIENT: 7 MMHG
AV VALVE AREA: 3.03 CM2
AV VELOCITY RATIO: 0.73
BASOPHILS # BLD AUTO: 0.01 K/UL (ref 0–0.2)
BASOPHILS NFR BLD: 0.1 % (ref 0–1.9)
BSA FOR ECHO PROCEDURE: 2.19 M2
BUN SERPL-MCNC: 16 MG/DL (ref 8–23)
CALCIUM SERPL-MCNC: 8.8 MG/DL (ref 8.7–10.5)
CHLORIDE SERPL-SCNC: 102 MMOL/L (ref 95–110)
CO2 SERPL-SCNC: 23 MMOL/L (ref 23–29)
CREAT SERPL-MCNC: 0.9 MG/DL (ref 0.5–1.4)
CRP SERPL-MCNC: 86.2 MG/L (ref 0–8.2)
CV ECHO LV RWT: 0.33 CM
DIFFERENTIAL METHOD: ABNORMAL
DOP CALC AO PEAK VEL: 1.29 M/S
DOP CALC AO VTI: 27.53 CM
DOP CALC LVOT AREA: 4.3 CM2
DOP CALC LVOT DIAMETER: 2.35 CM
DOP CALC LVOT PEAK VEL: 0.94 M/S
DOP CALC LVOT STROKE VOLUME: 83.5 CM3
DOP CALCLVOT PEAK VEL VTI: 19.26 CM
E WAVE DECELERATION TIME: 372.23 MSEC
E/A RATIO: 1.17
E/E' RATIO: 6.35 M/S
ECHO LV POSTERIOR WALL: 0.9 CM (ref 0.6–1.1)
EJECTION FRACTION: 55 %
EOSINOPHIL # BLD AUTO: 0 K/UL (ref 0–0.5)
EOSINOPHIL NFR BLD: 0.4 % (ref 0–8)
ERYTHROCYTE [DISTWIDTH] IN BLOOD BY AUTOMATED COUNT: 13.6 % (ref 11.5–14.5)
ERYTHROCYTE [SEDIMENTATION RATE] IN BLOOD BY PHOTOMETRIC METHOD: 83 MM/HR (ref 0–23)
EST. GFR  (NO RACE VARIABLE): >60 ML/MIN/1.73 M^2
ESTIMATED AVG GLUCOSE: 114 MG/DL (ref 68–131)
FRACTIONAL SHORTENING: 29 % (ref 28–44)
GLUCOSE SERPL-MCNC: 128 MG/DL (ref 70–110)
HBA1C MFR BLD: 5.6 % (ref 4–5.6)
HCT VFR BLD AUTO: 35.2 % (ref 40–54)
HGB BLD-MCNC: 12 G/DL (ref 14–18)
IMM GRANULOCYTES # BLD AUTO: 0.02 K/UL (ref 0–0.04)
IMM GRANULOCYTES NFR BLD AUTO: 0.3 % (ref 0–0.5)
INTERVENTRICULAR SEPTUM: 1 CM (ref 0.6–1.1)
LA MAJOR: 5.98 CM
LA WIDTH: 4.08 CM
LEFT ATRIUM SIZE: 3.87 CM
LEFT ATRIUM VOLUME INDEX MOD: 33.8 ML/M2
LEFT ATRIUM VOLUME MOD: 73 CM3
LEFT INTERNAL DIMENSION IN SYSTOLE: 3.88 CM (ref 2.1–4)
LEFT VENTRICLE DIASTOLIC VOLUME INDEX: 66.34 ML/M2
LEFT VENTRICLE DIASTOLIC VOLUME: 143.3 ML
LEFT VENTRICLE MASS INDEX: 90 G/M2
LEFT VENTRICLE SYSTOLIC VOLUME INDEX: 30.1 ML/M2
LEFT VENTRICLE SYSTOLIC VOLUME: 65.12 ML
LEFT VENTRICULAR INTERNAL DIMENSION IN DIASTOLE: 5.43 CM (ref 3.5–6)
LEFT VENTRICULAR MASS: 195.06 G
LV LATERAL E/E' RATIO: 4.91 M/S
LV SEPTAL E/E' RATIO: 9 M/S
LYMPHOCYTES # BLD AUTO: 1.6 K/UL (ref 1–4.8)
LYMPHOCYTES NFR BLD: 23.4 % (ref 18–48)
MAGNESIUM SERPL-MCNC: 1.5 MG/DL (ref 1.6–2.6)
MCH RBC QN AUTO: 30.2 PG (ref 27–31)
MCHC RBC AUTO-ENTMCNC: 34.1 G/DL (ref 32–36)
MCV RBC AUTO: 88 FL (ref 82–98)
MONOCYTES # BLD AUTO: 0.6 K/UL (ref 0.3–1)
MONOCYTES NFR BLD: 9 % (ref 4–15)
MV PEAK A VEL: 0.46 M/S
MV PEAK E VEL: 0.54 M/S
NEUTROPHILS # BLD AUTO: 4.6 K/UL (ref 1.8–7.7)
NEUTROPHILS NFR BLD: 66.8 % (ref 38–73)
NRBC BLD-RTO: 0 /100 WBC
PHOSPHATE SERPL-MCNC: 3.2 MG/DL (ref 2.7–4.5)
PISA TR MAX VEL: 2.27 M/S
PLATELET # BLD AUTO: 196 K/UL (ref 150–450)
PMV BLD AUTO: 9.6 FL (ref 9.2–12.9)
POCT GLUCOSE: 100 MG/DL (ref 70–110)
POCT GLUCOSE: 105 MG/DL (ref 70–110)
POCT GLUCOSE: 134 MG/DL (ref 70–110)
POTASSIUM SERPL-SCNC: 3.7 MMOL/L (ref 3.5–5.1)
PROCALCITONIN SERPL IA-MCNC: 0.05 NG/ML
RA MAJOR: 4.49 CM
RA PRESSURE: 3 MMHG
RA WIDTH: 2.45 CM
RBC # BLD AUTO: 3.98 M/UL (ref 4.6–6.2)
RIGHT VENTRICULAR END-DIASTOLIC DIMENSION: 4.4 CM
SINUS: 3.39 CM
SODIUM SERPL-SCNC: 134 MMOL/L (ref 136–145)
STJ: 3.49 CM
TDI LATERAL: 0.11 M/S
TDI SEPTAL: 0.06 M/S
TDI: 0.09 M/S
TR MAX PG: 21 MMHG
TRICUSPID ANNULAR PLANE SYSTOLIC EXCURSION: 1.67 CM
TSH SERPL DL<=0.005 MIU/L-ACNC: 0.66 UIU/ML (ref 0.4–4)
TV REST PULMONARY ARTERY PRESSURE: 24 MMHG
WBC # BLD AUTO: 6.88 K/UL (ref 3.9–12.7)

## 2023-06-20 PROCEDURE — 80048 BASIC METABOLIC PNL TOTAL CA: CPT | Performed by: PHYSICIAN ASSISTANT

## 2023-06-20 PROCEDURE — 99233 PR SUBSEQUENT HOSPITAL CARE,LEVL III: ICD-10-PCS | Mod: ,,,

## 2023-06-20 PROCEDURE — 96372 THER/PROPH/DIAG INJ SC/IM: CPT | Performed by: PHYSICIAN ASSISTANT

## 2023-06-20 PROCEDURE — 96374 THER/PROPH/DIAG INJ IV PUSH: CPT

## 2023-06-20 PROCEDURE — 83735 ASSAY OF MAGNESIUM: CPT | Performed by: PHYSICIAN ASSISTANT

## 2023-06-20 PROCEDURE — 63600175 PHARM REV CODE 636 W HCPCS: Performed by: PHYSICIAN ASSISTANT

## 2023-06-20 PROCEDURE — 84145 PROCALCITONIN (PCT): CPT | Performed by: PHYSICIAN ASSISTANT

## 2023-06-20 PROCEDURE — 25000003 PHARM REV CODE 250: Performed by: PHYSICIAN ASSISTANT

## 2023-06-20 PROCEDURE — G0378 HOSPITAL OBSERVATION PER HR: HCPCS

## 2023-06-20 PROCEDURE — 99233 SBSQ HOSP IP/OBS HIGH 50: CPT | Mod: ,,,

## 2023-06-20 PROCEDURE — 85025 COMPLETE CBC W/AUTO DIFF WBC: CPT | Performed by: PHYSICIAN ASSISTANT

## 2023-06-20 PROCEDURE — 84100 ASSAY OF PHOSPHORUS: CPT | Performed by: PHYSICIAN ASSISTANT

## 2023-06-20 PROCEDURE — 83036 HEMOGLOBIN GLYCOSYLATED A1C: CPT | Performed by: PHYSICIAN ASSISTANT

## 2023-06-20 PROCEDURE — 82962 GLUCOSE BLOOD TEST: CPT | Mod: 91

## 2023-06-20 PROCEDURE — 85652 RBC SED RATE AUTOMATED: CPT | Performed by: PHYSICIAN ASSISTANT

## 2023-06-20 PROCEDURE — 86140 C-REACTIVE PROTEIN: CPT | Performed by: PHYSICIAN ASSISTANT

## 2023-06-20 PROCEDURE — 25500020 PHARM REV CODE 255: Performed by: STUDENT IN AN ORGANIZED HEALTH CARE EDUCATION/TRAINING PROGRAM

## 2023-06-20 PROCEDURE — 84443 ASSAY THYROID STIM HORMONE: CPT | Performed by: PHYSICIAN ASSISTANT

## 2023-06-20 RX ORDER — HYDRALAZINE HYDROCHLORIDE 25 MG/1
25 TABLET, FILM COATED ORAL EVERY 8 HOURS PRN
Status: DISCONTINUED | OUTPATIENT
Start: 2023-06-20 | End: 2023-06-22 | Stop reason: HOSPADM

## 2023-06-20 RX ORDER — LIDOCAINE 560 MG/1
1 PATCH PERCUTANEOUS; TOPICAL; TRANSDERMAL DAILY PRN
COMMUNITY

## 2023-06-20 RX ORDER — KETOROLAC TROMETHAMINE 30 MG/ML
15 INJECTION, SOLUTION INTRAMUSCULAR; INTRAVENOUS ONCE
Status: COMPLETED | OUTPATIENT
Start: 2023-06-20 | End: 2023-06-20

## 2023-06-20 RX ADMIN — LOSARTAN POTASSIUM 100 MG: 50 TABLET, FILM COATED ORAL at 09:06

## 2023-06-20 RX ADMIN — KETOROLAC TROMETHAMINE 15 MG: 30 INJECTION, SOLUTION INTRAMUSCULAR; INTRAVENOUS at 02:06

## 2023-06-20 RX ADMIN — TAMSULOSIN HYDROCHLORIDE 0.4 MG: 0.4 CAPSULE ORAL at 08:06

## 2023-06-20 RX ADMIN — PANTOPRAZOLE SODIUM 40 MG: 40 TABLET, DELAYED RELEASE ORAL at 08:06

## 2023-06-20 RX ADMIN — METHOCARBAMOL 500 MG: 500 TABLET ORAL at 08:06

## 2023-06-20 RX ADMIN — METHOCARBAMOL 500 MG: 500 TABLET ORAL at 12:06

## 2023-06-20 RX ADMIN — Medication 6 MG: at 02:06

## 2023-06-20 RX ADMIN — PRAVASTATIN SODIUM 20 MG: 20 TABLET ORAL at 08:06

## 2023-06-20 RX ADMIN — ENOXAPARIN SODIUM 40 MG: 40 INJECTION SUBCUTANEOUS at 04:06

## 2023-06-20 RX ADMIN — METHOCARBAMOL 500 MG: 500 TABLET ORAL at 09:06

## 2023-06-20 RX ADMIN — LOSARTAN POTASSIUM 100 MG: 50 TABLET, FILM COATED ORAL at 02:06

## 2023-06-20 RX ADMIN — METHOCARBAMOL 500 MG: 500 TABLET ORAL at 04:06

## 2023-06-20 RX ADMIN — ASPIRIN 81 MG: 81 TABLET, COATED ORAL at 08:06

## 2023-06-20 RX ADMIN — ACETAMINOPHEN 650 MG: 325 TABLET ORAL at 12:06

## 2023-06-20 RX ADMIN — IOHEXOL 75 ML: 350 INJECTION, SOLUTION INTRAVENOUS at 01:06

## 2023-06-20 RX ADMIN — ACETAMINOPHEN 650 MG: 325 TABLET ORAL at 02:06

## 2023-06-20 RX ADMIN — METHOCARBAMOL 500 MG: 500 TABLET ORAL at 02:06

## 2023-06-20 NOTE — H&P
[facility] - Emergency Dept  [facility]  History & Physical    Patient Name: Tito Menard  MRN: [id number]  Patient Class: OP- Observation  Admission Date: 6/19/2023  Attending Physician: Celestino Baez MD   Primary Care Provider: Amanda Brown MD         Patient information was obtained from patient, spouse/SO, past medical records and ER records.     Subjective:     Principal Problem:Syncope    Chief Complaint:   Chief Complaint   Patient presents with    Altered Mental Status     Arrived via ems from home with c/o difficult to arouse by family, on ems arrival pt awake alert not answering questions, en route pt more verbal and answering questions, c/o neck pain and headache, denies injury        HPI: Tito Menard is a 77 y.o. male with a PMHx of diabetes, CKD, and hypertension who [place] [facility] [place]. Patient was sitting outside on his porch and next thing he remembers was being woken up by his wife. His wife told him that he was slumped over and unconsciousness when she found him. No fall or head trauma. Patient had a hard time finding his words when he regained consciousness and was grabbing for objects that were not there. Mental status returned to baseline shortly after this episode without any confusion. Patient complains of right/ posterior neck pain which began on Saturday after he slept wrong on his right shoulder/ neck. Pain worsens with movement and traps/ R sided neck is tender to touch. He has an associated headache which he attributes to the neck pain/ soreness. No recent trauma or injury. Patient had a similar syncopal episode in December for which he was admitted to the hospital but cause was never determined. He's also been having ongoing intermittent shortness of breath for many months. He was recently seen by cardiology for the SOB/LOC who stopped his amlodipine and started him on spironolactone and jardiance. Endorses full body weakness x many months for which  he was reffered to PT but his insurance does not cover therapy so he hasn't been able to go. Denies fever, chills, N/V, back pain, chest pain, lightheadedness/ dizziness, LE swelling, numnbness/tingling, focal weakness or facial droop.    ED: hypertensive to /90, vitals otherwise stable. No leukocytosis or electrolyte abnormalities. Trop negative x2. EKG without acute ST/T wave changes. CTA head/neck negative for acute findings.       Past Medical History:   Diagnosis Date    Allergy     Aneurysm of artery of lower extremity     Chalazion of left eye     CKD (chronic kidney disease), stage II 4/1/2019    Diabetes mellitus type II     Diabetes with neurologic complications     Enlarged aorta 8/2/2016    Noted on pharmacological stress echo 2/28/2014.      GERD (gastroesophageal reflux disease)     egd 2008    Hyperlipidemia     Hypertension     Jock itch 7/19/2018    MGD (meibomian gland dysfunction)     Osteopenia     Spinal stenosis     Spondylosis without myelopathy 10/23/2015    Vitamin D deficiency disease        Past Surgical History:   Procedure Laterality Date    CHALAZION EXCISION Left 8/18/13    CYST REMOVAL  2013    Back of neck    FLEXIBLE CYSTOSCOPY N/A 12/7/2021    Procedure: CYSTOSCOPY, FLEXIBLE;  Surgeon: Beatrice Vaca MD;  Location: [facility] OR [address] [place] [address];  Service: Urology;  Laterality: N/A;    FLUOROSCOPIC URODYNAMIC STUDY N/A 12/7/2021    Procedure: URODYNAMIC STUDY, FLUOROSCOPIC;  Surgeon: Beatrice Vaca MD;  Location: [facility] OR [address] [place] [address];  Service: Urology;  Laterality: N/A;  90 minutes     SPINE SURGERY  2007    x2 (2000, 2007)       Review of patient's allergies indicates:  No Known Allergies    No current facility-administered medications on file prior to encounter.     Current Outpatient Medications on File Prior to Encounter   Medication Sig    acetaminophen (TYLENOL) 650 MG TbSR Take 1,300 mg by mouth every 8 (eight) hours as needed.     albuterol 90 mcg/actuation inhaler Inhale 1-2  puffs into the lungs every 6 (six) hours as needed for Wheezing.    aspirin (ECOTRIN) 81 MG EC tablet TAKE 1 TABLET EVERY DAY    azelastine (ASTELIN) 137 mcg (0.1 %) nasal spray USE 1 SPRAY IN EACH NOSTRIL TWICE DAILY    benzonatate (TESSALON) 200 MG capsule TAKE 1 CAPSULE THREE TIMES DAILY AS NEEDED FOR COUGH    chlorthalidone (HYGROTEN) 25 MG Tab TAKE 1/2 TABLET EVERY DAY    cholecalciferol, vitamin D3, (VITAMIN D3) 50 mcg (2,000 unit) Cap Take 1 capsule by mouth once daily.    clotrimazole-betamethasone 1-0.05% (LOTRISONE) cream APPLY TOPICALLY 2 (TWO) TIMES DAILY.    docusate sodium (COLACE) 50 MG capsule Take by mouth 2 (two) times daily.    empagliflozin (JARDIANCE) 10 mg tablet Take 1 tablet (10 mg total) by mouth once daily.    fluticasone propionate (FLONASE) 50 mcg/actuation nasal spray USE 1 SPRAY NASALLY ONE TIME DAILY    gabapentin (NEURONTIN) 300 MG capsule Take 1 capsule every morning and at noon, and take 2 capsules every evening (4 total daily)    guaifenesin (MUCINEX) 600 mg 12 hr tablet Take 1,200 mg by mouth 2 (two) times daily as needed.     ibuprofen (ADVIL,MOTRIN) 200 MG tablet Take 200 mg by mouth every 6 (six) hours as needed for Pain.    irbesartan (AVAPRO) 300 MG tablet TAKE 1 TABLET EVERY EVENING    ketoconazole (NIZORAL) 2 % cream AAA bid (facial redness and scaling)    melatonin 10 mg Cap Take 1 capsule by mouth nightly as needed.     methocarbamol (ROBAXIN) 500 MG Tab 3 (three) times daily as needed.     omeprazole (PRILOSEC) 20 MG capsule Take 2 capsules (40 mg total) by mouth once daily.    potassium chloride (MICRO-K) 10 MEQ CpSR TAKE 3 CAPSULES ONE TIME DAILY    pravastatin (PRAVACHOL) 20 MG tablet TAKE 1 TABLET EVERY DAY    spironolactone (ALDACTONE) 25 MG tablet Take 0.5 tablets (12.5 mg total) by mouth once daily.    tamsulosin (FLOMAX) 0.4 mg Cap Take 1 capsule (0.4 mg total) by mouth once daily.    [DISCONTINUED] finasteride (PROSCAR) 5 mg tablet Take 1 tablet (5 mg total) by  mouth once daily.     Family History       Problem Relation (Age of Onset)    Cancer Mother    Heart disease Mother    Hyperlipidemia Mother    Hypertension Mother, Maternal Grandmother    Kidney disease Mother    Kidney failure Mother    No Known Problems Daughter, Sister, Brother, Son, Brother, Son          Tobacco Use    Smoking status: Never    Smokeless tobacco: Former     Types: Chew    Tobacco comments:     Chewed tobacco once per day for 4-5 years when a    Substance and Sexual Activity    Alcohol use: No    Drug use: No    Sexual activity: Not Currently     Partners: Female     Review of Systems   Constitutional:  Negative for activity change, chills and fever.   HENT:  Negative for trouble swallowing.    Eyes:  Negative for photophobia and visual disturbance.   Respiratory:  Negative for chest tightness, shortness of breath and wheezing.    Cardiovascular:  Negative for chest pain, palpitations and leg swelling.   Gastrointestinal:  Negative for abdominal pain, constipation, diarrhea, nausea and vomiting.   Genitourinary:  Negative for dysuria, frequency, hematuria and urgency.   Musculoskeletal:  Positive for arthralgias, neck pain and neck stiffness. Negative for back pain and gait problem.   Skin:  Negative for color change and rash.   Neurological:  Positive for syncope and weakness. Negative for dizziness, light-headedness, numbness and headaches.   Psychiatric/Behavioral:  Negative for agitation and confusion. The patient is not nervous/anxious.    Objective:     Vital Signs (Most Recent):  Temp: 99.8 °F (37.7 °C) (06/19/23 1331)  Pulse: 82 (06/19/23 2331)  Resp: 16 (06/19/23 2331)  BP: (!) 184/102 (06/19/23 2331)  SpO2: 98 % (06/19/23 2331) Vital Signs (24h Range):  Temp:  [99.8 °F (37.7 °C)] 99.8 °F (37.7 °C)  Pulse:  [75-86] 82  Resp:  [16-20] 16  SpO2:  [96 %-99 %] 98 %  BP: (143-186)/() 184/102     Weight: 95.7 kg (211 lb)  Body mass index is 29.43 kg/m².     Physical  Exam  Vitals and nursing note reviewed.   Constitutional:       General: He is not in acute distress.     Appearance: He is well-developed.   HENT:      Head: Normocephalic and atraumatic.      Mouth/Throat:      Pharynx: No oropharyngeal exudate.   Eyes:      General: No scleral icterus.     Conjunctiva/sclera: Conjunctivae normal.   Cardiovascular:      Rate and Rhythm: Normal rate and regular rhythm.      Heart sounds: Normal heart sounds.   Pulmonary:      Effort: Pulmonary effort is normal. No respiratory distress.      Breath sounds: Normal breath sounds. No wheezing.   Abdominal:      General: Bowel sounds are normal. There is no distension.      Palpations: Abdomen is soft.      Tenderness: There is no abdominal tenderness.   Musculoskeletal:         General: No tenderness. Normal range of motion.      Cervical back: Normal range of motion and neck supple.      Right lower leg: No edema.      Left lower leg: No edema.   Lymphadenopathy:      Cervical: No cervical adenopathy.   Skin:     General: Skin is warm and dry.      Capillary Refill: Capillary refill takes less than 2 seconds.      Findings: No rash.   Neurological:      General: No focal deficit present.      Mental Status: He is alert and oriented to person, place, and time.      Cranial Nerves: No cranial nerve deficit.      Sensory: No sensory deficit.      Coordination: Coordination normal.   Psychiatric:         Behavior: Behavior normal.         Thought Content: Thought content normal.         Judgment: Judgment normal.              Significant Labs: All pertinent labs within the past 24 hours have been reviewed.  CBC:   Recent Labs   Lab 06/19/23  1442 06/19/23  1547   WBC 7.47  --    HGB 13.0*  --    HCT 39.1* 42     --      CMP:   Recent Labs   Lab 06/19/23  1442      K 3.7      CO2 24   *   BUN 17   CREATININE 1.0   CALCIUM 9.2   PROT 7.3   ALBUMIN 3.4*   BILITOT 0.6   ALKPHOS 90   AST 19   ALT 19   ANIONGAP 12        Significant Imaging: I have reviewed all pertinent imaging results/findings within the past 24 hours.  CTA Head and Neck (xpd)  Narrative: EXAMINATION:  CTA HEAD AND NECK (XPD)    CLINICAL HISTORY:  Syncope, simple, abnormal neuro exam;    TECHNIQUE:  Non contrast low dose axial images were obtained through the head. CT angiogram was performed from the level of the terrie to the top of the head following the IV administration of 75mL of Omnipaque 350.   Sagittal and coronal reconstructions and maximum intensity projection reconstructions were performed. Arterial stenosis percentages are based on NASCET measurement criteria.    COMPARISON:  01/01/2023    FINDINGS:  Intracranial Compartment:    Ventricles and sulci are normal in size for age without evidence of hydrocephalus. No extra-axial blood or fluid collections.    Moderate involutional changes and chronic microvascular ischemic changes in the periventricular white matter.  No parenchymal mass, hemorrhage, edema, or major vascular distribution infarct.    Skull/Extracranial Contents (limited evaluation): No fracture. Severe bilateral ethmoid sinus disease and right frontal sinus disease.  Mild sphenoid and right maxillary sinus disease.    Non-Vascular Structures of the Neck/Thoracic Inlet (limited evaluation): Normal.    Aorta: Normal 3 vessel arch.    Extracranial carotid circulation: No hemodynamically significant stenosis, aneurysmal dilatation, or dissection.    Extracranial vertebral circulation: No hemodynamically significant stenosis, aneurysmal dilatation, or dissection.    Intracranial Arteries: No focal high-grade stenosis, occlusion, or aneurysm.  No significant abnormality of the [id number].    Venous structures (limited evaluation): Normal.  Impression: 1. No acute intracranial process.  2. Involutional changes with chronic microvascular ischemic changes.  3. Paranasal sinus disease.  4. No evidence of any flow-limiting  stenosis    Electronically signed by: Satinder Lakhani  Date:    06/19/2023  Time:    19:48  X-Ray Chest AP Portable  Narrative: EXAMINATION:  XR CHEST AP PORTABLE    CLINICAL HISTORY:  Chest Pain;    TECHNIQUE:  Single frontal view of the chest was performed.    COMPARISON:  Chest radiograph 03/31/2023 and CTA chest 01/01/2023    FINDINGS:  Patient is slightly rotated.  Monitoring leads overlie the chest.  Similar nonspecific elevation of the right hemidiaphragm with right upper quadrant colonic interposition.  Bibasilar platelike scarring versus atelectasis similar to prior.  The lungs are otherwise well expanded without large consolidation, pleural effusion or pneumothorax definitively seen.  Cardiomediastinal silhouette is midline and stable without convincing evidence of failure.  Acute osseous process seen.  PA and lateral views can be obtained.  Impression: Grossly stable chronic findings as above without detrimental change or radiographic acute intrathoracic process seen.    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 based on history and exam  - hx of spondylosis of C spine  - low suspicion for meningitis or infection at this time given no AMS and afebrile without leukocytosis  - neck XR ordered  - ESR, CRP, procal in AM  - start robaxin 500mg QID    Thoracic aortic aneurysm without rupture  - no reported chest or back pain  - CTA C/A/P ordered in the ED  - monitor BP and HR closely     Restrictive lung disease due to kyphoscoliosis  - no acute issues    BPH with  obstruction/lower urinary tract symptoms  - continue flomax    Mixed hyperlipidemia  - continue statin    Essential hypertension  - uncontrolled in the setting of missed medications  - continue spironactone 12.5mg daily  - substitute home irbesartan w/ formulary losartan 100mg daily  - PRN hydralazine       VTE Risk Mitigation (From admission, onward)           Ordered     enoxaparin injection 40 mg  Daily         06/19/23 2211     IP VTE HIGH RISK PATIENT  Once         06/19/23 2211                         On 06/20/2023, patient should be placed in hospital observation services under my care in collaboration with Dr. Spencer Rodriguez.      Vandana Saucedo PA-C  Department of Hospital Medicine  [facility] - Emergency Dept

## 2023-06-20 NOTE — ASSESSMENT & PLAN NOTE
- unclear cause-- broad DDx including vasovagal, cardiogenic, neurologic, orthostatic   - CTA head/neck negative for acute findings  - infectious work- up unremarkable thus far  - TTE with EF 55% and normal diastolic function  - EEG ordered by OP neuro (01/2023) but never done-- will order here  - orthostatic vitals q shift  - monitor on tele

## 2023-06-20 NOTE — ASSESSMENT & PLAN NOTE
- suspect MSKL in nature based on history and exam  - hx of spondylosis of C spine  - low suspicion for meningitis or infection at this time given no AMS and afebrile without leukocytosis  - neck XR ordered  - ESR, CRP, procal in AM  - start robaxin 500mg QID

## 2023-06-20 NOTE — PROGRESS NOTES
[facility] - Emergency Dept  [facility]  Progress Note    Patient Name: Tito Menard  MRN: [id number]  Patient Class: OP- Observation   Admission Date: 6/19/2023  Length of Stay: 0 days  Attending Physician: Celestino Baez MD  Primary Care Provider: Amanda Brown MD        Subjective:     Principal Problem:Syncope        HPI:  Tito Menard is a 77 y.o. male with a PMHx of diabetes, CKD, and hypertension who presents to [facility] [place] a syncopal episode. Patient was sitting outside on his porch and next thing he remembers was being woken up by his wife. His wife told him that he was slumped over and unconsciousness when she found him. No fall or head trauma. Patient had a hard time finding his words when he regained consciousness and was grabbing for objects that were not there. Mental status returned to baseline shortly after this episode without any confusion. Patient complains of right/ posterior neck pain which began on Saturday after he slept wrong on his right shoulder/ neck. Pain worsens with movement and traps/ R sided neck is tender to touch. He has an associated headache which he attributes to the neck pain/ soreness. No recent trauma or injury. Patient had a similar syncopal episode in December for which he was admitted to the hospital but cause was never determined. He's also been having ongoing intermittent shortness of breath for many months. He was recently seen by cardiology for the SOB/LOC who stopped his amlodipine and started him on spironolactone and jardiance. Endorses full body weakness x many months for which he was reffered to PT but his insurance does not cover therapy so he hasn't been able to go. Denies fever, chills, N/V, back pain, chest pain, lightheadedness/ dizziness, LE swelling, numnbness/tingling, focal weakness or facial droop.    ED: hypertensive to /90, vitals otherwise stable. No leukocytosis or electrolyte abnormalities. Trop negative x2. EKG without acute ST/T  wave changes. CTA head/neck negative for acute findings.       Overview/Hospital Course:  Tito Menard was admitted to hospital medicine for management of syncope. Infectious, anemic and metabolic workup grossly unremarkable. TTE showing EF If 55%, normal diastolic dysfunction. EEG pending       Interval History:  NAEON. AFVSS.  Patient evaluated at bedside. Ill-appearing, mildly diaphoretic and reclining in bed. Discussed negative workup so far. Patient with visible shaking, tremor like movements only to bilateral first and second digits. Plan to evaluate with EEG for any underlying seizure activity.     Review of Systems   Constitutional:  Negative for activity change, chills and fever.   HENT:  Negative for trouble swallowing.    Eyes:  Negative for photophobia and visual disturbance.   Respiratory:  Negative for chest tightness, shortness of breath and wheezing.    Cardiovascular:  Negative for chest pain, palpitations and leg swelling.   Gastrointestinal:  Negative for abdominal pain, constipation, diarrhea, nausea and vomiting.   Genitourinary:  Negative for dysuria, frequency, hematuria and urgency.   Musculoskeletal:  Positive for arthralgias, neck pain and neck stiffness. Negative for back pain and gait problem.   Skin:  Negative for color change and rash.   Neurological:  Positive for tremors, syncope and weakness. Negative for dizziness, light-headedness, numbness and headaches.   Psychiatric/Behavioral:  Negative for agitation and confusion. The patient is not nervous/anxious.    Objective:     Vital Signs (Most Recent):  Temp: 98.7 °F (37.1 °C) (06/20/23 1236)  Pulse: 62 (06/20/23 1236)  Resp: 18 (06/20/23 1236)  BP: (!) 156/84 (06/20/23 1236)  SpO2: 97 % (06/20/23 1236) Vital Signs (24h Range):  Temp:  [97.2 °F (36.2 °C)-98.7 °F (37.1 °C)] 98.7 °F (37.1 °C)  Pulse:  [62-86] 62  Resp:  [16-20] 18  SpO2:  [96 %-98 %] 97 %  BP: (137-186)/() 156/84     Weight: 95.7 kg (211 lb)  Body mass index is  29.43 kg/m².  No intake or output data in the 24 hours ending 06/20/23 1622      Physical Exam  Vitals and nursing note reviewed.   Constitutional:       General: He is not in acute distress.     Appearance: He is well-developed.   HENT:      Head: Normocephalic and atraumatic.   Eyes:      Extraocular Movements: Extraocular movements intact.   Cardiovascular:      Rate and Rhythm: Normal rate and regular rhythm.      Heart sounds: Normal heart sounds.   Pulmonary:      Effort: Pulmonary effort is normal. No respiratory distress.   Abdominal:      General: There is no distension.   Musculoskeletal:         General: No tenderness. Normal range of motion.   Skin:     General: Skin is warm and dry.      Findings: No rash.   Neurological:      Mental Status: He is alert and oriented to person, place, and time.      Comments: Resting tremors to bilateral first and second digits    Psychiatric:         Behavior: Behavior normal.         Thought Content: Thought content normal.         Judgment: Judgment normal.     Significant Labs: All pertinent labs within the past 24 hours have been reviewed.    Significant Imaging: I have reviewed all pertinent imaging results/findings within the past 24 hours.      Assessment/Plan:      * Syncope  - unclear cause-- broad DDx including vasovagal, cardiogenic, neurologic, orthostatic   - CTA head/neck negative for acute findings  - infectious work- up unremarkable thus far  - TTE with EF 55% and normal diastolic function  - EEG ordered by OP neuro (01/2023) but never done-- will order here  - orthostatic vitals q shift  - monitor on tele     Acute neck pain  - suspect MSKL in nature based on history and exam  - hx of spondylosis of C spine  - low suspicion for meningitis or infection at this time given no AMS and afebrile without leukocytosis  - neck XR with degenerative changes similar to prior  - ESR, CRP elevated   - start robaxin 500mg QID    Thoracic aortic aneurysm without  rupture  - no reported chest or back pain  - CTA C/A/P showing mild increased size of the ascending aortic dilatation at the sinus of Valsalva measure 3.8 cm.   - monitor BP and HR closely     Restrictive lung disease due to kyphoscoliosis  - no acute issues    BPH with obstruction/lower urinary tract symptoms  - continue flomax    Mixed hyperlipidemia  - continue statin    Essential hypertension  - uncontrolled in the setting of missed medications  - continue spironactone 12.5mg daily  - substitute home irbesartan w/ formulary losartan 100mg daily  - PRN hydralazine       VTE Risk Mitigation (From admission, onward)         Ordered     enoxaparin injection 40 mg  Daily         06/19/23 2211     IP VTE HIGH RISK PATIENT  Once         06/19/23 2211                Discharge Planning   ELLEN: 6/21/2023     Code Status: Full Code   Is the patient medically ready for discharge?: No    Reason for patient still in hospital (select all that apply): Patient trending condition and Imaging  Discharge Plan A: Home   Discharge Delays: None known at this time              Madhuri Kidd PA-C  Department of Hospital Medicine   [facility] - Emergency Dept

## 2023-06-20 NOTE — ASSESSMENT & PLAN NOTE
- no reported chest or back pain  - CTA C/A/P showing mild increased size of the ascending aortic dilatation at the sinus of Valsalva measure 3.8 cm.   - monitor BP and HR closely

## 2023-06-20 NOTE — PHARMACY MED REC
"  Admission Medication History     The home medication history was taken by Evelia Gan.    You may go to "Admission" then "Reconcile Home Medications" tabs to review and/or act upon these items.     The home medication list has been updated by the Pharmacy department.   Please read ALL comments highlighted in yellow.   Please address this information as you see fit.    Feel free to contact us if you have any questions or require assistance.      The medications listed below were removed from the home medication list. Please reorder if appropriate:  Patient reports no longer taking the following medication(s):  ALBUTEROL 90 MCG/ACT INH  IBUPROFEN 200 MG  MELATONIN 10 MG  METHOCARBAMOL 500 MG    Medications listed below were obtained from: Patient/family  Current Outpatient Medications on File Prior to Encounter   Medication Sig    acetaminophen/diphenhydramine (TYLENOL PM ORAL)   Take 2 capsules by mouth nightly.    aspirin (ECOTRIN) 81 MG EC tablet   TAKE 1 TABLET EVERY DAY    aspirin/salicylamide/caffeine (BC HEADACHE POWDER ORAL)   Take 1 packet by mouth daily as needed (for headache).    azelastine (ASTELIN) 137 mcg (0.1 %) nasal spray   USE 1 SPRAY IN EACH NOSTRIL TWICE DAILY    benzonatate (TESSALON) 200 MG capsule   TAKE 1 CAPSULE THREE TIMES DAILY AS NEEDED FOR COUGH    calcium carbonate (TUMS ORAL)   Take 1-2 tablets by mouth daily as needed (for acid reflux).    chlorthalidone (HYGROTEN) 25 MG Tab   TAKE 1/2 TABLET EVERY DAY    cholecalciferol, vitamin D3, (VITAMIN D3) 50 mcg (2,000 unit) Cap   Take 1 capsule by mouth once daily.    clotrimazole-betamethasone 1-0.05% (LOTRISONE) cream Apply topically 2 (two) times daily. Uses at bath-time as needed for redness between thighs.      docusate sodium (COLACE) 50 MG capsule   Take 100 mg by mouth daily as needed for Constipation.    empagliflozin (JARDIANCE) 10 mg tablet   Take 1 tablet (10 mg total) by mouth once daily.    fluticasone propionate (FLONASE) 50 " mcg/actuation nasal spray   USE 1 SPRAY NASALLY ONE TIME DAILY    gabapentin (NEURONTIN) 300 MG capsule Take 2 capsules every morning and 1 capsule every evening (3 total daily).      guaifenesin (MUCINEX) 600 mg 12 hr tablet   Take 1,200 mg by mouth 2 (two) times daily as needed.     irbesartan (AVAPRO) 300 MG tablet   TAKE 1 TABLET EVERY EVENING    ketoconazole (NIZORAL) 2 % cream Apply topically once weekly for facial redness and scaling.      lanolin/mineral oil/petrolatum (ARTIFICIAL TEARS OPHT)   Place 1-2 drops into both eyes daily as needed (for dry eyes).    LIDOcaine 4 % PtMd Apply 1 patch topically daily as needed (for back pain).      omeprazole (PRILOSEC) 20 MG capsule     Take 2 capsules (40 mg total) by mouth once daily.    potassium chlorde (MICRO-K) 10 MEQ CpSR   TAKE 3 CAPSULES ONE TIME DAILY    pravastatin (PRAVACHOL) 20 MG tablet   TAKE 1 TABLET EVERY DAY    spironolactone (ALDACTONE) 25 MG tablet   Take 0.5 tablets (12.5 mg total) by mouth once daily.    Tamsulosin (FLOMAX) 0.4 mg Cap   Take 1 capsule (0.4 mg total) by mouth once daily.               Evelia aGn  EXT [id number]                .

## 2023-06-20 NOTE — ED NOTES
Care assumed & bedside report received. Pt AAO x 4 w/ RR even and unlabored on RA. VSS. Denies CP or SOB. C/o neck pain 3/10. Pt resting comfortably in NAD in ED bed low/ locked w/ side rails up. Call light within reach. Instructed patient to call staff for assistance. Will continue to monitor.

## 2023-06-20 NOTE — MEDICAL/APP STUDENT
History     Chief Complaint   Patient presents with    Altered Mental Status     Arrived via ems from home with c/o difficult to arouse by family, on ems arrival pt awake alert not answering questions, en route pt more verbal and answering questions, c/o neck pain and headache, denies injury     Mr. Francis is a 77 y/old male with a PMH of CKD, DM II, HTN, thoracic aortic aneurysm presenting to ED via EMS for LOC. Pt states he went outside to get fresh air on his deck and the next thing he remembers is being woken up by his wife. His wife told him he was slumped over and unconsciousness when she found him, denies hitting his head. He states he had a hard time finding words when he was woken and was grabbing for objects that were not there. Pt denies dehydration and said he ate toast for breakfast prior to going outside. Pt main complaints is his neck pain that started on Saturday (denies any injury), tender to touch and says he may have slept on it wrong. Headache that he states is a 8/10 ongoing since Sat. Pt denies chest pain. He explained that a similar episode happened in December and they have been trying to figure out what happened but still unsure. He saw cardiology on 06/01 for ongoing SOB/LOC episode and they stopped his amlodipine, and began spironolactone and jardiance.     ED Course: Urinalysis, CBC, CMP, and vitals WNL. CXR stable. CTH stable, no changes since last scan. CT abdomen and chest pending. Troponin negative. EKG at baseline, with RBBB seen on prior ECG.     Altered Mental Status  Associated symptoms include headaches and neck pain. Pertinent negatives include no abdominal pain, chest pain, congestion, coughing, fever, joint swelling, nausea, numbness, sore throat, vomiting or weakness.     Past Medical History:   Diagnosis Date    Allergy     Aneurysm of artery of lower extremity     Chalazion of left eye     CKD (chronic kidney disease), stage II 4/1/2019    Diabetes mellitus type II      Diabetes with neurologic complications     Enlarged aorta 8/2/2016    Noted on pharmacological stress echo 2/28/2014.      GERD (gastroesophageal reflux disease)     egd 2008    Hyperlipidemia     Hypertension     Jock itch 7/19/2018    MGD (meibomian gland dysfunction)     Osteopenia     Spinal stenosis     Spondylosis without myelopathy 10/23/2015    Vitamin D deficiency disease        Past Surgical History:   Procedure Laterality Date    CHALAZION EXCISION Left 8/18/13    CYST REMOVAL  2013    Back of neck    FLEXIBLE CYSTOSCOPY N/A 12/7/2021    Procedure: CYSTOSCOPY, FLEXIBLE;  Surgeon: Beatrice Vaca MD;  Location: [facility] OR [address] [place] [address];  Service: Urology;  Laterality: N/A;    FLUOROSCOPIC URODYNAMIC STUDY N/A 12/7/2021    Procedure: URODYNAMIC STUDY, FLUOROSCOPIC;  Surgeon: Beatrice Vaca MD;  Location: [facility] OR [address] [place] [address];  Service: Urology;  Laterality: N/A;  90 minutes     SPINE SURGERY  2007    x2 (2000, 2007)       Family History   Problem Relation Age of Onset    Hyperlipidemia Mother     Hypertension Mother     Kidney disease Mother     Cancer Mother     Heart disease Mother     Kidney failure Mother     No Known Problems Daughter     No Known Problems Sister     No Known Problems Brother     No Known Problems Son     No Known Problems Brother     No Known Problems Son     Hypertension Maternal Grandmother     Amblyopia Neg Hx     Blindness Neg Hx     Cataracts Neg Hx     Diabetes Neg Hx     Glaucoma Neg Hx     Macular degeneration Neg Hx     Retinal detachment Neg Hx     Strabismus Neg Hx     Stroke Neg Hx     Thyroid disease Neg Hx     Melanoma Neg Hx     Psoriasis Neg Hx     Lupus Neg Hx     Eczema Neg Hx        Social History     Tobacco Use    Smoking status: Never    Smokeless tobacco: Former     Types: Chew    Tobacco comments:     Chewed tobacco once per day for 4-5 years when a    Substance Use Topics    Alcohol use: No    Drug use: No       Review of Systems   Constitutional:  Negative  "for activity change, appetite change and fever.   HENT:  Negative for congestion, ear pain, sinus pain and sore throat.    Respiratory:  Positive for shortness of breath. Negative for cough and wheezing.    Cardiovascular:  Positive for leg swelling. Negative for chest pain and palpitations.   Gastrointestinal:  Negative for abdominal pain, constipation, diarrhea, nausea and vomiting.   Genitourinary:  Negative for difficulty urinating, flank pain and frequency.   Musculoskeletal:  Positive for back pain, neck pain and neck stiffness. Negative for joint swelling.   Neurological:  Positive for headaches. Negative for dizziness, weakness, light-headedness and numbness.   Psychiatric/Behavioral:  Negative for agitation, confusion and decreased concentration.      Physical Exam   BP (!) 166/80 (BP Location: Right arm, Patient Position: Lying)   Pulse 82   Temp 99.8 °F (37.7 °C) (Oral)   Resp 20   Ht 5' 11" (1.803 m)   Wt 95.7 kg (211 lb)   SpO2 97%   BMI 29.43 kg/m²     Physical Exam    Constitutional: Vital signs are normal. He appears well-developed and well-nourished. He is not diaphoretic.  Non-toxic appearance.   HENT:   Head: Normocephalic and atraumatic.   Eyes: Conjunctivae and EOM are normal.   Cardiovascular:  Normal rate and regular rhythm.           Pulmonary/Chest: Effort normal and breath sounds normal. No respiratory distress.   Abdominal: Abdomen is soft and flat. Bowel sounds are normal. He exhibits no distension. There is no abdominal tenderness.   Musculoskeletal:         General: Normal range of motion.      Cervical back: Normal range of motion.     Neurological: He is alert and oriented to person, place, and time.   Skin: Skin is warm and dry.   Psychiatric: He has a normal mood and affect.       ED Course   LOC  -ECHO in AM to check heart function  -tropnin negative, ECG baseline  -CXR and CTH normal   -pending CT abdomen and chest   -CTA normal.   -afebrile, no leucocytosis  -CMP and CBC " WNL  - BMP pending  -tele monitoring   -check orthostatic     HTN  -continue home cardiac medicine, missed night time medications with elevation in BP in ER    Mixed hyperlipidemia  - continue statin    Neck Pain and Headache  -Rocephin for pain     BPH  -continue flomax     Thoracic Aoritc Aneurysm   -CT of abdomen and chest pending, will follow results  -cardiologist seen on 06/01 (aneurysm vs. Dilation)

## 2023-06-20 NOTE — ASSESSMENT & PLAN NOTE
- unclear cause-- broad DDx including vasovagal, cardiogenic, neurologic, orthostatic   - CTA head/neck negative for acute findings  - infectious work- up unremarkable thus far  - repeat TTE  - CTA C/A/P pendiner   - EEG ordered by OP neuro (01/2023) but never done-- will order here  - orthostatic vitals q shift  - monitor on tele

## 2023-06-20 NOTE — SUBJECTIVE & OBJECTIVE
Past Medical History:   Diagnosis Date    Allergy     Aneurysm of artery of lower extremity     Chalazion of left eye     CKD (chronic kidney disease), stage II 4/1/2019    Diabetes mellitus type II     Diabetes with neurologic complications     Enlarged aorta 8/2/2016    Noted on pharmacological stress echo 2/28/2014.      GERD (gastroesophageal reflux disease)     egd 2008    Hyperlipidemia     Hypertension     Jock itch 7/19/2018    MGD (meibomian gland dysfunction)     Osteopenia     Spinal stenosis     Spondylosis without myelopathy 10/23/2015    Vitamin D deficiency disease        Past Surgical History:   Procedure Laterality Date    CHALAZION EXCISION Left 8/18/13    CYST REMOVAL  2013    Back of neck    FLEXIBLE CYSTOSCOPY N/A 12/7/2021    Procedure: CYSTOSCOPY, FLEXIBLE;  Surgeon: Beatrice Vaca MD;  Location: [facility] OR [address] [place] [address];  Service: Urology;  Laterality: N/A;    FLUOROSCOPIC URODYNAMIC STUDY N/A 12/7/2021    Procedure: URODYNAMIC STUDY, FLUOROSCOPIC;  Surgeon: Beatrice Vaca MD;  Location: [facility] OR [address] [place] [address];  Service: Urology;  Laterality: N/A;  90 minutes     SPINE SURGERY  2007    x2 (2000, 2007)       Review of patient's allergies indicates:  No Known Allergies    No current facility-administered medications on file prior to encounter.     Current Outpatient Medications on File Prior to Encounter   Medication Sig    acetaminophen (TYLENOL) 650 MG TbSR Take 1,300 mg by mouth every 8 (eight) hours as needed.     albuterol 90 mcg/actuation inhaler Inhale 1-2 puffs into the lungs every 6 (six) hours as needed for Wheezing.    aspirin (ECOTRIN) 81 MG EC tablet TAKE 1 TABLET EVERY DAY    azelastine (ASTELIN) 137 mcg (0.1 %) nasal spray USE 1 SPRAY IN EACH NOSTRIL TWICE DAILY    benzonatate (TESSALON) 200 MG capsule TAKE 1 CAPSULE THREE TIMES DAILY AS NEEDED FOR COUGH    chlorthalidone (HYGROTEN) 25 MG Tab TAKE 1/2 TABLET EVERY DAY    cholecalciferol, vitamin D3, (VITAMIN D3) 50 mcg (2,000 unit) Cap  Take 1 capsule by mouth once daily.    clotrimazole-betamethasone 1-0.05% (LOTRISONE) cream APPLY TOPICALLY 2 (TWO) TIMES DAILY.    docusate sodium (COLACE) 50 MG capsule Take by mouth 2 (two) times daily.    empagliflozin (JARDIANCE) 10 mg tablet Take 1 tablet (10 mg total) by mouth once daily.    fluticasone propionate (FLONASE) 50 mcg/actuation nasal spray USE 1 SPRAY NASALLY ONE TIME DAILY    gabapentin (NEURONTIN) 300 MG capsule Take 1 capsule every morning and at noon, and take 2 capsules every evening (4 total daily)    guaifenesin (MUCINEX) 600 mg 12 hr tablet Take 1,200 mg by mouth 2 (two) times daily as needed.     ibuprofen (ADVIL,MOTRIN) 200 MG tablet Take 200 mg by mouth every 6 (six) hours as needed for Pain.    irbesartan (AVAPRO) 300 MG tablet TAKE 1 TABLET EVERY EVENING    ketoconazole (NIZORAL) 2 % cream AAA bid (facial redness and scaling)    melatonin 10 mg Cap Take 1 capsule by mouth nightly as needed.     methocarbamol (ROBAXIN) 500 MG Tab 3 (three) times daily as needed.     omeprazole (PRILOSEC) 20 MG capsule Take 2 capsules (40 mg total) by mouth once daily.    potassium chloride (MICRO-K) 10 MEQ CpSR TAKE 3 CAPSULES ONE TIME DAILY    pravastatin (PRAVACHOL) 20 MG tablet TAKE 1 TABLET EVERY DAY    spironolactone (ALDACTONE) 25 MG tablet Take 0.5 tablets (12.5 mg total) by mouth once daily.    tamsulosin (FLOMAX) 0.4 mg Cap Take 1 capsule (0.4 mg total) by mouth once daily.    [DISCONTINUED] finasteride (PROSCAR) 5 mg tablet Take 1 tablet (5 mg total) by mouth once daily.     Family History       Problem Relation (Age of Onset)    Cancer Mother    Heart disease Mother    Hyperlipidemia Mother    Hypertension Mother, Maternal Grandmother    Kidney disease Mother    Kidney failure Mother    No Known Problems Daughter, Sister, Brother, Son, Brother, Son          Tobacco Use    Smoking status: Never    Smokeless tobacco: Former     Types: Chew    Tobacco comments:     Chewed tobacco once per day  for 4-5 years when a    Substance and Sexual Activity    Alcohol use: No    Drug use: No    Sexual activity: Not Currently     Partners: Female     Review of Systems   Constitutional:  Negative for activity change, chills and fever.   HENT:  Negative for trouble swallowing.    Eyes:  Negative for photophobia and visual disturbance.   Respiratory:  Negative for chest tightness, shortness of breath and wheezing.    Cardiovascular:  Negative for chest pain, palpitations and leg swelling.   Gastrointestinal:  Negative for abdominal pain, constipation, diarrhea, nausea and vomiting.   Genitourinary:  Negative for dysuria, frequency, hematuria and urgency.   Musculoskeletal:  Positive for arthralgias, neck pain and neck stiffness. Negative for back pain and gait problem.   Skin:  Negative for color change and rash.   Neurological:  Positive for syncope and weakness. Negative for dizziness, light-headedness, numbness and headaches.   Psychiatric/Behavioral:  Negative for agitation and confusion. The patient is not nervous/anxious.    Objective:     Vital Signs (Most Recent):  Temp: 99.8 °F (37.7 °C) (06/19/23 1331)  Pulse: 82 (06/19/23 2331)  Resp: 16 (06/19/23 2331)  BP: (!) 184/102 (06/19/23 2331)  SpO2: 98 % (06/19/23 2331) Vital Signs (24h Range):  Temp:  [99.8 °F (37.7 °C)] 99.8 °F (37.7 °C)  Pulse:  [75-86] 82  Resp:  [16-20] 16  SpO2:  [96 %-99 %] 98 %  BP: (143-186)/() 184/102     Weight: 95.7 kg (211 lb)  Body mass index is 29.43 kg/m².     Physical Exam  Vitals and nursing note reviewed.   Constitutional:       General: He is not in acute distress.     Appearance: He is well-developed.   HENT:      Head: Normocephalic and atraumatic.      Mouth/Throat:      Pharynx: No oropharyngeal exudate.   Eyes:      General: No scleral icterus.     Conjunctiva/sclera: Conjunctivae normal.   Cardiovascular:      Rate and Rhythm: Normal rate and regular rhythm.      Heart sounds: Normal heart sounds.    Pulmonary:      Effort: Pulmonary effort is normal. No respiratory distress.      Breath sounds: Normal breath sounds. No wheezing.   Abdominal:      General: Bowel sounds are normal. There is no distension.      Palpations: Abdomen is soft.      Tenderness: There is no abdominal tenderness.   Musculoskeletal:         General: No tenderness. Normal range of motion.      Cervical back: Normal range of motion and neck supple.      Right lower leg: No edema.      Left lower leg: No edema.   Lymphadenopathy:      Cervical: No cervical adenopathy.   Skin:     General: Skin is warm and dry.      Capillary Refill: Capillary refill takes less than 2 seconds.      Findings: No rash.   Neurological:      General: No focal deficit present.      Mental Status: He is alert and oriented to person, place, and time.      Cranial Nerves: No cranial nerve deficit.      Sensory: No sensory deficit.      Coordination: Coordination normal.   Psychiatric:         Behavior: Behavior normal.         Thought Content: Thought content normal.         Judgment: Judgment normal.              Significant Labs: All pertinent labs within the past 24 hours have been reviewed.  CBC:   Recent Labs   Lab 06/19/23  1442 06/19/23  1547   WBC 7.47  --    HGB 13.0*  --    HCT 39.1* 42     --      CMP:   Recent Labs   Lab 06/19/23  1442      K 3.7      CO2 24   *   BUN 17   CREATININE 1.0   CALCIUM 9.2   PROT 7.3   ALBUMIN 3.4*   BILITOT 0.6   ALKPHOS 90   AST 19   ALT 19   ANIONGAP 12       Significant Imaging: I have reviewed all pertinent imaging results/findings within the past 24 hours.  CTA Head and Neck (xpd)  Narrative: EXAMINATION:  CTA HEAD AND NECK (XPD)    CLINICAL HISTORY:  Syncope, simple, abnormal neuro exam;    TECHNIQUE:  Non contrast low dose axial images were obtained through the head. CT angiogram was performed from the level of the terrie to the top of the head following the IV administration of 75mL of  Omnipaque 350.   Sagittal and coronal reconstructions and maximum intensity projection reconstructions were performed. Arterial stenosis percentages are based on NASCET measurement criteria.    COMPARISON:  01/01/2023    FINDINGS:  Intracranial Compartment:    Ventricles and sulci are normal in size for age without evidence of hydrocephalus. No extra-axial blood or fluid collections.    Moderate involutional changes and chronic microvascular ischemic changes in the periventricular white matter.  No parenchymal mass, hemorrhage, edema, or major vascular distribution infarct.    Skull/Extracranial Contents (limited evaluation): No fracture. Severe bilateral ethmoid sinus disease and right frontal sinus disease.  Mild sphenoid and right maxillary sinus disease.    Non-Vascular Structures of the Neck/Thoracic Inlet (limited evaluation): Normal.    Aorta: Normal 3 vessel arch.    Extracranial carotid circulation: No hemodynamically significant stenosis, aneurysmal dilatation, or dissection.    Extracranial vertebral circulation: No hemodynamically significant stenosis, aneurysmal dilatation, or dissection.    Intracranial Arteries: No focal high-grade stenosis, occlusion, or aneurysm.  No significant abnormality of the [id number].    Venous structures (limited evaluation): Normal.  Impression: 1. No acute intracranial process.  2. Involutional changes with chronic microvascular ischemic changes.  3. Paranasal sinus disease.  4. No evidence of any flow-limiting stenosis    Electronically signed by: Satinder Lakhani  Date:    06/19/2023  Time:    19:48  X-Ray Chest AP Portable  Narrative: EXAMINATION:  XR CHEST AP PORTABLE    CLINICAL HISTORY:  Chest Pain;    TECHNIQUE:  Single frontal view of the chest was performed.    COMPARISON:  Chest radiograph 03/31/2023 and CTA chest 01/01/2023    FINDINGS:  Patient is slightly rotated.  Monitoring leads overlie the chest.  Similar nonspecific elevation of the right  hemidiaphragm with right upper quadrant colonic interposition.  Bibasilar platelike scarring versus atelectasis similar to prior.  The lungs are otherwise well expanded without large consolidation, pleural effusion or pneumothorax definitively seen.  Cardiomediastinal silhouette is midline and stable without convincing evidence of failure.  Acute osseous process seen.  PA and lateral views can be obtained.  Impression: Grossly stable chronic findings as above without detrimental change or radiographic acute intrathoracic process seen.    Electronically signed by: Spencer Bello MD  Date:    06/19/2023  Time:    16:04

## 2023-06-20 NOTE — HOSPITAL COURSE
Tito Menard was admitted to [facility] medicine for management of syncope. Infectious, anemic and metabolic workup grossly unremarkable. Orthostatics ordered. CTA head/neck negative for acute findings. TTE showing EF of 55%, normal diastolic dysfunction. EEG showing normal awake and asleep EEG, unremarkable. Patient symptomatically improving throughout hospital course. Patient will follow up with PCP and Cardiology. Patient medically ready for discharge. Plan of care discussed with patient, patient agreeable to plan, and all questions answered.

## 2023-06-20 NOTE — ED PROVIDER NOTES
Signout Note    I received signout from the previous provider.     Chief complaint:  Altered Mental Status (Arrived via ems from home with c/o difficult to arouse by family, on ems arrival pt awake alert not answering questions, en route pt more verbal and answering questions, c/o neck pain and headache, denies injury)      Pertinent history &exam:  Tito Menard is a 77 y.o. male with pertinent PMH of diabetes, CKD, and hypertension presents with altered mental status.  Patient's main complaint is a right-sided neck pain and headache that has been present for the past few days.  He remembers sitting on the porch but then the next thing he knows he was in an ambulance.     Vitals:    06/20/23 1236   BP: (!) 156/84   Pulse: 62   Resp: 18   Temp: 98.7 °F (37.1 °C)       Imaging Studies:    CTA Chest Abdomen Pelvis   Final Result      Mild increased size of the ascending aortic dilatation at the sinus of Valsalva measure 3.8 cm.      Stable atelectatic consolidation of the right lung base.      Minimal dependent atelectatic ground-glass opacities.      Stable punctate hypodensity in the pancreatic body in comparison to CT chest 2020.      Additional findings as above.      Electronically signed by resident: Jennifer Barcenas   Date:    06/20/2023   Time:    07:35      Electronically signed by: Dinesh Arnett MD   Date:    06/20/2023   Time:    08:37      X-Ray Cervical Spine AP And Lateral   Final Result      Degenerative changes similar to prior.         Electronically signed by: Florentino Reyna MD   Date:    06/20/2023   Time:    02:02      CTA Head and Neck (xpd)   Final Result      1. No acute intracranial process.   2. Involutional changes with chronic microvascular ischemic changes.   3. Paranasal sinus disease.   4. No evidence of any flow-limiting stenosis         Electronically signed by: Satinder Lakhani   Date:    06/19/2023   Time:    19:48      X-Ray Chest AP Portable   Final Result      Grossly stable  chronic findings as above without detrimental change or radiographic acute intrathoracic process seen.         Electronically signed by: Spencer Bello MD   Date:    06/19/2023   Time:    16:04          Medications Given:  Medications   enoxaparin injection 40 mg (40 mg Subcutaneous Given 6/19/23 2227)   melatonin tablet 6 mg (6 mg Oral Given 6/20/23 0244)   ondansetron disintegrating tablet 8 mg (has no administration in time range)   polyethylene glycol packet 17 g (has no administration in time range)   bisacodyL suppository 10 mg (has no administration in time range)   acetaminophen tablet 650 mg (650 mg Oral Given 6/20/23 1258)   glucagon (human recombinant) injection 1 mg (has no administration in time range)   dextrose 10% bolus 125 mL 125 mL (has no administration in time range)   dextrose 10% bolus 250 mL 250 mL (has no administration in time range)   dextrose 40 % gel 15,000 mg (has no administration in time range)   dextrose 40 % gel 30,000 mg (has no administration in time range)   ondansetron injection 4 mg (has no administration in time range)   aspirin EC tablet 81 mg (81 mg Oral Given 6/20/23 0855)   losartan tablet 100 mg (100 mg Oral Given 6/20/23 0239)   methocarbamoL tablet 500 mg (500 mg Oral Given 6/20/23 1258)   pantoprazole EC tablet 40 mg (40 mg Oral Given 6/20/23 0853)   pravastatin tablet 20 mg (20 mg Oral Given 6/20/23 0855)   tamsulosin 24 hr capsule 0.4 mg (0.4 mg Oral Given 6/20/23 0854)   hydrALAZINE tablet 25 mg (has no administration in time range)   sodium chloride 0.9% bolus 1,000 mL 1,000 mL (0 mLs Intravenous Stopped 6/19/23 1610)   iohexoL (OMNIPAQUE 350) injection 75 mL (75 mLs Intravenous Given 6/19/23 1849)   ketorolac injection 15 mg (15 mg Intravenous Given 6/20/23 0240)   iohexoL (OMNIPAQUE 350) injection 75 mL (75 mLs Intravenous Given 6/20/23 0155)       Pending Items/ MDM:  77 y.o. male who was signed out to me pending a CTA of his head and neck patient's CTA was  successfully performed but did not show any significant findings except for chronic microvascular changes given that patient had a history of an aortic aneurysm a CTA of his chest abdomen and pelvis was also ordered to assess the aneurysm and be sure it has not increased in size.  Patient's aneurysm did show a mild increase in size but it did not show any findings that would explain patient's syncopal episode.  Discussion was had Hospital Medicine and patient was admitted for altered mental status workup.    Diagnostic Impression:    1. Syncope, unspecified syncope type    2. Altered mental status, unspecified altered mental status type    3. Neck pain on right side    4. Acute nonintractable headache, unspecified headache type    5. Chest pain    6. Syncope         ED Disposition Condition    Observation                Patient and/or family understands the plan and is in agreement, verbalized understanding, questions answered    ED Course as of 06/20/23 1647   Mon Jun 19, 2023   1358 POCT Glucose: 98  Normal [BD]   1428 EKG independently interpreted by me shows normal sinus rhythm at a rate of 76, no STEMI, right bundle-branch block (chronic), appears baseline [BD]   1524 Patient is signed out to me pending CT scan imaging as well as complete lab workup for syncopal episode.  Plan is for patient to be admitted [OO]      ED Course User Index  [BD] Miguel Carter MD  [OO] MD Britney Evans MD  Emergency Medicine         Britney Moreau MD  Resident  06/20/23 2657     Melolabial Interpolation Flap Division And Inset Text: Division and inset of the melolabial interpolation flap was performed to achieve optimal aesthetic result, restore normal anatomic appearance and avoid distortion of normal anatomy, expedite and facilitate wound healing, achieve optimal functional result and because linear closure either not possible or would produce suboptimal result. The patient was prepped and draped in the usual manner. The pedicle was infiltrated with local anesthesia. The pedicle was sectioned with a #15 blade. The pedicle was de-bulked and trimmed to match the shape of the defect. Hemostasis was achieved. The flap donor site and free margin of the flap were secured with deep buried sutures and the wound edges were re-approximated.

## 2023-06-20 NOTE — ASSESSMENT & PLAN NOTE
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

## 2023-06-20 NOTE — HPI
Tito Menard is a 77 y.o. male with a PMHx of diabetes, CKD, and hypertension who [place] [facility] [place]. Patient was sitting outside on his porch and next thing he remembers was being woken up by his wife. His wife told him that he was slumped over and unconsciousness when she found him. No fall or head trauma. Patient had a hard time finding his words when he regained consciousness and was grabbing for objects that were not there. Mental status returned to baseline shortly after this episode without any confusion. Patient complains of right/ posterior neck pain which began on Saturday after he slept wrong on his right shoulder/ neck. Pain worsens with movement and traps/ R sided neck is tender to touch. He has an associated headache which he attributes to the neck pain/ soreness. No recent trauma or injury. Patient had a similar syncopal episode in December for which he was admitted to the hospital but cause was never determined. He's also been having ongoing intermittent shortness of breath for many months. He was recently seen by cardiology for the SOB/LOC who stopped his amlodipine and started him on spironolactone and jardiance. Endorses full body weakness x many months for which he was reffered to PT but his insurance does not cover therapy so he hasn't been able to go. Denies fever, chills, N/V, back pain, chest pain, lightheadedness/ dizziness, LE swelling, numnbness/tingling, focal weakness or facial droop.    ED: hypertensive to /90, vitals otherwise stable. No leukocytosis or electrolyte abnormalities. Trop negative x2. EKG without acute ST/T wave changes. CTA head/neck negative for acute findings.

## 2023-06-20 NOTE — PLAN OF CARE
[facility] - Emergency Dept  Initial Discharge Assessment       Primary Care Provider: Amanda Brown MD    Admission Diagnosis: Syncope, unspecified syncope type [R55]    Admission Date: 6/19/2023  Expected Discharge Date: 6/21/2023    Pt stated he is independent with his ADL's and uses a rollator and straight cane to assist with ambulation.    Pt stated he had [facility] in the past and does not have any post acute needs at this time    Pt to d/c home with no needs when ready    Transition of Care Barriers: (P) None    Payor: [employer] MANAGED MEDICARE / Plan: [employer] MEDICARE HMO / Product Type: Capitation /     Extended Emergency Contact Information  Primary Emergency Contact: Guillermina Menard  Address: [address] [place] [address] [facility]  Home Phone: [phone]  Mobile Phone: [phone]  Relation: Spouse    Discharge Plan A: (P) Home  Discharge Plan B: (P) Home      [facility] - [facility] [address]  [address] [facility] [address]  Phone: [phone] Fax: [phone]    [facility] [address] [place] [address] [address] [facility]  [address]  [facility] [address]  Phone: [phone] Fax: [phone]      Initial Assessment (most recent)       Adult Discharge Assessment - 06/20/23 0929          Discharge Assessment    Assessment Type Discharge Planning Assessment (P)      Confirmed/corrected address, phone number and insurance Yes (P)      Confirmed Demographics Correct on Facesheet (P)      Source of Information patient (P)      Reason For Admission Syncope (P)      People in Home spouse (P)      Facility Arrived From: home (P)      Do you expect to return to your current living situation? Yes (P)      Do you have help at home or someone to help you manage your care at home? No (P)      Prior to hospitilization cognitive status: Alert/Oriented;No Deficits (P)      Current cognitive status: No  Deficits;Alert/Oriented (P)      Walking or Climbing Stairs ambulation difficulty, requires equipment (P)      Mobility Management rollator, straight cane (P)      Home Accessibility stairs to enter home (P)      Number of Stairs, Main Entrance four (P)      Home Layout Able to live on 1st floor (P)      Equipment Currently Used at Home cane, straight;rollator (P)      Patient currently being followed by outpatient case management? No (P)      Do you currently have service(s) that help you manage your care at home? No (P)      Do you have any problems affording any of your prescribed medications? No (P)      Is the patient taking medications as prescribed? yes (P)      Who is going to help you get home at discharge? family (P)      How do you get to doctors appointments? car, drives self;family or friend will provide (P)      Are you on dialysis? No (P)      Do you take coumadin? No (P)      Discharge Plan A Home (P)      Discharge Plan B Home (P)      DME Needed Upon Discharge  none (P)      Discharge Plan discussed with: Patient (P)      Transition of Care Barriers None (P)         OTHER    Name(s) of People in Home spouse Guillermina (P)                       Shelley Owen CD, MSW, LMSW, RSW   Case Management  [facility]  Email: [email or web address]

## 2023-06-20 NOTE — ED NOTES
Verified with NCC charge and ED charge that EEG monitoring ordered by Hospital Medicine team does not warrant an EEG in the ED. Epilepsy team or NCC pts are the ones allowed EEG monitoring in the ED.

## 2023-06-20 NOTE — ASSESSMENT & PLAN NOTE
- uncontrolled in the setting of missed medications  - continue spironactone 12.5mg daily  - substitute home irbesartan w/ formulary losartan 100mg daily  - PRN hydralazine

## 2023-06-20 NOTE — SUBJECTIVE & OBJECTIVE
Interval History:  NAEON. AFVSS.  Patient evaluated at bedside. Ill-appearing, mildly diaphoretic and reclining in bed. Discussed negative workup so far. Patient with visible shaking, tremor like movements only to bilateral first and second digits. Plan to evaluate with EEG for any underlying seizure activity.     Review of Systems   Constitutional:  Negative for activity change, chills and fever.   HENT:  Negative for trouble swallowing.    Eyes:  Negative for photophobia and visual disturbance.   Respiratory:  Negative for chest tightness, shortness of breath and wheezing.    Cardiovascular:  Negative for chest pain, palpitations and leg swelling.   Gastrointestinal:  Negative for abdominal pain, constipation, diarrhea, nausea and vomiting.   Genitourinary:  Negative for dysuria, frequency, hematuria and urgency.   Musculoskeletal:  Positive for arthralgias, neck pain and neck stiffness. Negative for back pain and gait problem.   Skin:  Negative for color change and rash.   Neurological:  Positive for tremors, syncope and weakness. Negative for dizziness, light-headedness, numbness and headaches.   Psychiatric/Behavioral:  Negative for agitation and confusion. The patient is not nervous/anxious.    Objective:     Vital Signs (Most Recent):  Temp: 98.7 °F (37.1 °C) (06/20/23 1236)  Pulse: 62 (06/20/23 1236)  Resp: 18 (06/20/23 1236)  BP: (!) 156/84 (06/20/23 1236)  SpO2: 97 % (06/20/23 1236) Vital Signs (24h Range):  Temp:  [97.2 °F (36.2 °C)-98.7 °F (37.1 °C)] 98.7 °F (37.1 °C)  Pulse:  [62-86] 62  Resp:  [16-20] 18  SpO2:  [96 %-98 %] 97 %  BP: (137-186)/() 156/84     Weight: 95.7 kg (211 lb)  Body mass index is 29.43 kg/m².  No intake or output data in the 24 hours ending 06/20/23 1622      Physical Exam  Vitals and nursing note reviewed.   Constitutional:       General: He is not in acute distress.     Appearance: He is well-developed.   HENT:      Head: Normocephalic and atraumatic.   Eyes:       Extraocular Movements: Extraocular movements intact.   Cardiovascular:      Rate and Rhythm: Normal rate and regular rhythm.      Heart sounds: Normal heart sounds.   Pulmonary:      Effort: Pulmonary effort is normal. No respiratory distress.   Abdominal:      General: There is no distension.   Musculoskeletal:         General: No tenderness. Normal range of motion.   Skin:     General: Skin is warm and dry.      Findings: No rash.   Neurological:      Mental Status: He is alert and oriented to person, place, and time.      Comments: Resting tremors to bilateral first and second digits    Psychiatric:         Behavior: Behavior normal.         Thought Content: Thought content normal.         Judgment: Judgment normal.     Significant Labs: All pertinent labs within the past 24 hours have been reviewed.    Significant Imaging: I have reviewed all pertinent imaging results/findings within the past 24 hours.

## 2023-06-21 ENCOUNTER — DOCUMENTATION ONLY (OUTPATIENT)
Dept: NEUROLOGY | Facility: CLINIC | Age: 78
End: 2023-06-21

## 2023-06-21 LAB
ANION GAP SERPL CALC-SCNC: 11 MMOL/L (ref 8–16)
BASOPHILS # BLD AUTO: 0.02 K/UL (ref 0–0.2)
BASOPHILS NFR BLD: 0.4 % (ref 0–1.9)
BUN SERPL-MCNC: 22 MG/DL (ref 8–23)
CALCIUM SERPL-MCNC: 9 MG/DL (ref 8.7–10.5)
CHLORIDE SERPL-SCNC: 101 MMOL/L (ref 95–110)
CO2 SERPL-SCNC: 25 MMOL/L (ref 23–29)
CREAT SERPL-MCNC: 1 MG/DL (ref 0.5–1.4)
DIFFERENTIAL METHOD: ABNORMAL
EOSINOPHIL # BLD AUTO: 0.1 K/UL (ref 0–0.5)
EOSINOPHIL NFR BLD: 2.8 % (ref 0–8)
ERYTHROCYTE [DISTWIDTH] IN BLOOD BY AUTOMATED COUNT: 13.4 % (ref 11.5–14.5)
EST. GFR  (NO RACE VARIABLE): >60 ML/MIN/1.73 M^2
GLUCOSE SERPL-MCNC: 87 MG/DL (ref 70–110)
HCT VFR BLD AUTO: 37.5 % (ref 40–54)
HGB BLD-MCNC: 12.2 G/DL (ref 14–18)
IMM GRANULOCYTES # BLD AUTO: 0.01 K/UL (ref 0–0.04)
IMM GRANULOCYTES NFR BLD AUTO: 0.2 % (ref 0–0.5)
LYMPHOCYTES # BLD AUTO: 1.7 K/UL (ref 1–4.8)
LYMPHOCYTES NFR BLD: 33.5 % (ref 18–48)
MAGNESIUM SERPL-MCNC: 1.7 MG/DL (ref 1.6–2.6)
MCH RBC QN AUTO: 29.6 PG (ref 27–31)
MCHC RBC AUTO-ENTMCNC: 32.5 G/DL (ref 32–36)
MCV RBC AUTO: 91 FL (ref 82–98)
MONOCYTES # BLD AUTO: 0.6 K/UL (ref 0.3–1)
MONOCYTES NFR BLD: 12.4 % (ref 4–15)
NEUTROPHILS # BLD AUTO: 2.6 K/UL (ref 1.8–7.7)
NEUTROPHILS NFR BLD: 50.7 % (ref 38–73)
NRBC BLD-RTO: 0 /100 WBC
PHOSPHATE SERPL-MCNC: 3.4 MG/DL (ref 2.7–4.5)
PLATELET # BLD AUTO: 197 K/UL (ref 150–450)
PMV BLD AUTO: 9.2 FL (ref 9.2–12.9)
POCT GLUCOSE: 103 MG/DL (ref 70–110)
POCT GLUCOSE: 127 MG/DL (ref 70–110)
POCT GLUCOSE: 90 MG/DL (ref 70–110)
POCT GLUCOSE: 95 MG/DL (ref 70–110)
POTASSIUM SERPL-SCNC: 3.9 MMOL/L (ref 3.5–5.1)
RBC # BLD AUTO: 4.12 M/UL (ref 4.6–6.2)
SODIUM SERPL-SCNC: 137 MMOL/L (ref 136–145)
WBC # BLD AUTO: 5.02 K/UL (ref 3.9–12.7)

## 2023-06-21 PROCEDURE — 83735 ASSAY OF MAGNESIUM: CPT | Performed by: PHYSICIAN ASSISTANT

## 2023-06-21 PROCEDURE — 95819 PR EEG,W/AWAKE & ASLEEP RECORD: ICD-10-PCS | Mod: 26,,, | Performed by: PSYCHIATRY & NEUROLOGY

## 2023-06-21 PROCEDURE — 99233 PR SUBSEQUENT HOSPITAL CARE,LEVL III: ICD-10-PCS | Mod: ,,,

## 2023-06-21 PROCEDURE — G0378 HOSPITAL OBSERVATION PER HR: HCPCS

## 2023-06-21 PROCEDURE — 63600175 PHARM REV CODE 636 W HCPCS: Performed by: PHYSICIAN ASSISTANT

## 2023-06-21 PROCEDURE — 96372 THER/PROPH/DIAG INJ SC/IM: CPT | Performed by: PHYSICIAN ASSISTANT

## 2023-06-21 PROCEDURE — 25000003 PHARM REV CODE 250

## 2023-06-21 PROCEDURE — 95819 EEG AWAKE AND ASLEEP: CPT | Mod: 26,,, | Performed by: PSYCHIATRY & NEUROLOGY

## 2023-06-21 PROCEDURE — 95819 EEG AWAKE AND ASLEEP: CPT

## 2023-06-21 PROCEDURE — 25000003 PHARM REV CODE 250: Performed by: PHYSICIAN ASSISTANT

## 2023-06-21 PROCEDURE — 99233 SBSQ HOSP IP/OBS HIGH 50: CPT | Mod: ,,,

## 2023-06-21 PROCEDURE — 84100 ASSAY OF PHOSPHORUS: CPT | Performed by: PHYSICIAN ASSISTANT

## 2023-06-21 PROCEDURE — 85025 COMPLETE CBC W/AUTO DIFF WBC: CPT | Performed by: PHYSICIAN ASSISTANT

## 2023-06-21 PROCEDURE — 94761 N-INVAS EAR/PLS OXIMETRY MLT: CPT

## 2023-06-21 PROCEDURE — 36415 COLL VENOUS BLD VENIPUNCTURE: CPT | Performed by: PHYSICIAN ASSISTANT

## 2023-06-21 PROCEDURE — 80048 BASIC METABOLIC PNL TOTAL CA: CPT | Performed by: PHYSICIAN ASSISTANT

## 2023-06-21 RX ORDER — FLUTICASONE PROPIONATE 50 MCG
2 SPRAY, SUSPENSION (ML) NASAL DAILY
Status: DISCONTINUED | OUTPATIENT
Start: 2023-06-21 | End: 2023-06-22 | Stop reason: HOSPADM

## 2023-06-21 RX ORDER — FLUTICASONE PROPIONATE 50 MCG
2 SPRAY, SUSPENSION (ML) NASAL DAILY
Status: DISCONTINUED | OUTPATIENT
Start: 2023-06-21 | End: 2023-06-21

## 2023-06-21 RX ORDER — GUAIFENESIN 600 MG/1
600 TABLET, EXTENDED RELEASE ORAL EVERY 12 HOURS PRN
Status: DISCONTINUED | OUTPATIENT
Start: 2023-06-21 | End: 2023-06-22 | Stop reason: HOSPADM

## 2023-06-21 RX ADMIN — PRAVASTATIN SODIUM 20 MG: 20 TABLET ORAL at 08:06

## 2023-06-21 RX ADMIN — Medication 6 MG: at 10:06

## 2023-06-21 RX ADMIN — ENOXAPARIN SODIUM 40 MG: 40 INJECTION SUBCUTANEOUS at 05:06

## 2023-06-21 RX ADMIN — METHOCARBAMOL 500 MG: 500 TABLET ORAL at 10:06

## 2023-06-21 RX ADMIN — ACETAMINOPHEN 650 MG: 325 TABLET ORAL at 01:06

## 2023-06-21 RX ADMIN — TAMSULOSIN HYDROCHLORIDE 0.4 MG: 0.4 CAPSULE ORAL at 08:06

## 2023-06-21 RX ADMIN — METHOCARBAMOL 500 MG: 500 TABLET ORAL at 01:06

## 2023-06-21 RX ADMIN — PANTOPRAZOLE SODIUM 40 MG: 40 TABLET, DELAYED RELEASE ORAL at 08:06

## 2023-06-21 RX ADMIN — METHOCARBAMOL 500 MG: 500 TABLET ORAL at 08:06

## 2023-06-21 RX ADMIN — METHOCARBAMOL 500 MG: 500 TABLET ORAL at 05:06

## 2023-06-21 RX ADMIN — LOSARTAN POTASSIUM 100 MG: 50 TABLET, FILM COATED ORAL at 10:06

## 2023-06-21 RX ADMIN — ASPIRIN 81 MG: 81 TABLET, COATED ORAL at 08:06

## 2023-06-21 RX ADMIN — GUAIFENESIN 600 MG: 600 TABLET, EXTENDED RELEASE ORAL at 01:06

## 2023-06-21 NOTE — PROCEDURES
Routine EEG Report    Tito Menard  [id number]  1945    DATE OF SERVICE:  06/21/2023  REASON FOR CONSULT:  77-year-old man with an episode of loss of consciousness with collapse.  Evaluate for evidence of epileptiform activity.    METHODOLOGY   Electroencephalographic (EEG) recording is with electrodes placed according to the International 10-20 placement system.  Thirty two (32) channels of digital signal (sampling rate of 512/sec) including T1 and T2 was simultaneously recorded from the scalp and may include  EKG, EMG, and/or eye monitors.  Recording band pass was 0.1 to 512 hz.  Digital video recording of the patient is simultaneously recorded with the EEG.  The patient is instructed report clinical symptoms which may occur during the recording session.  EEG and video recording is stored and archived in digital format. Activation procedures which include photic stimulation, hyperventilation and instructing patients to perform simple task are done in selected patients.    The EEG is displayed on a monitor screen and can be reviewed using different montages.  Computer assisted analysis is employed to detect spike and electrographic seizure activity.   The entire record is submitted for computer analysis.  The entire recording is visually reviewed and the times identified by computer analysis as being spikes or seizures are reviewed again.  Compresses spectral analysis (CSA) is also performed on the activity recorded from each individual channel.  This is displayed as a power display of frequencies from 0 to 30 Hz over time.   The CSA is reviewed looking for asymmetries in power between homologous areas of the scalp and then compared with the original EEG recording.     DataSync software is also utilized in the review of this study.  This software suite analyzes the EEG recording in multiple domains.  Coherence and rhythmicity is computed to identify EEG sections which may contain organized seizures.  Each  channel undergoes analysis to detect presence of spike and sharp waves which have special and morphological characteristic of epileptic activity.  The routine EEG recording is converted from spacial into frequency domain.  This is then displayed comparing homologous areas to identify areas of significant asymmetry.  Algorithm to identify non-cortically generated artifact is used to separate eye movement, EMG and other artifact from the EEG.      EEG FINDINGS  Background activity:   The waking background is continuous and symmetric with a well-formed 9.5 hz posterior dominant rhythm seen bilaterally.    Sleep:  The patient transitions from wakefulness to sleep with the appearance of sleep spindles, K complexes, and vertex waves.    Activation procedures:   The patient is able to follow simple commands and answers orientation questions correctly.     Cardiac Monitor:   Heart rate appears generally regular on a single lead EKG.    Impression:   This is a normal awake and asleep routine EEG.  There are no prominent focal findings, no epileptiform discharges, and no electrographic seizures.   Of note, a normal EEG does not rule out the diagnosis of epilepsy.  Clinical correlation is advised.    Sara Sheikh MD PhD [facility]  Neurology-Epilepsy  [facility].

## 2023-06-21 NOTE — ED NOTES
Pt care assumed. Report received by BRAD Arreola. Pt lying in stretcher in low and locked position and side rails raised x2. Call light, pt's belongings within pt's reach. Pt on continuous cardiac monitoring. Pt in NAD and verbalized no needs at this time.

## 2023-06-21 NOTE — ASSESSMENT & PLAN NOTE
- continue statin   Son called requesting a hospital bed to help patient breath better when sleeping. Can you please addend your office note from 3.11.20 to reflect the need. Office note needs to state:  Patient requires hospital bed to elevate head of bed more than 30 degrees due to CHF. Medical condition requires positioning not feasible with ordinary bed. I can place order is ok by you.

## 2023-06-21 NOTE — SUBJECTIVE & OBJECTIVE
Interval History:  NAEON. AFVSS.  Patient evaluated at bedside, NAD. Pending EEG to complete syncope workup.     Review of Systems   Constitutional:  Negative for activity change, chills and fever.   HENT:  Negative for trouble swallowing.    Eyes:  Negative for photophobia and visual disturbance.   Respiratory:  Negative for chest tightness, shortness of breath and wheezing.    Cardiovascular:  Negative for chest pain, palpitations and leg swelling.   Gastrointestinal:  Negative for abdominal pain, constipation, diarrhea, nausea and vomiting.   Genitourinary:  Negative for dysuria, frequency, hematuria and urgency.   Musculoskeletal:  Positive for arthralgias and neck pain. Negative for back pain, gait problem and neck stiffness.   Skin:  Negative for color change and rash.   Neurological:  Positive for tremors, syncope and weakness. Negative for dizziness, light-headedness, numbness and headaches.   Psychiatric/Behavioral:  Negative for agitation and confusion. The patient is not nervous/anxious.    Objective:     Vital Signs (Most Recent):  Temp: 98.2 °F (36.8 °C) (06/21/23 1514)  Pulse: 78 (06/21/23 1514)  Resp: 16 (06/21/23 1514)  BP: 127/77 (06/21/23 1514)  SpO2: 97 % (06/21/23 1514) Vital Signs (24h Range):  Temp:  [98.1 °F (36.7 °C)-98.7 °F (37.1 °C)] 98.2 °F (36.8 °C)  Pulse:  [56-91] 78  Resp:  [16-18] 16  SpO2:  [96 %-98 %] 97 %  BP: (124-168)/(62-94) 127/77     Weight: 94.2 kg (207 lb 11.2 oz)  Body mass index is 28.97 kg/m².    Intake/Output Summary (Last 24 hours) at 6/21/2023 1812  Last data filed at 6/21/2023 1543  Gross per 24 hour   Intake --   Output 800 ml   Net -800 ml         Physical Exam  Vitals and nursing note reviewed.   Constitutional:       General: He is not in acute distress.     Appearance: He is well-developed.   HENT:      Head: Normocephalic and atraumatic.   Eyes:      Extraocular Movements: Extraocular movements intact.   Cardiovascular:      Rate and Rhythm: Normal rate and  regular rhythm.      Heart sounds: Normal heart sounds.   Pulmonary:      Effort: Pulmonary effort is normal. No respiratory distress.   Abdominal:      General: There is no distension.   Musculoskeletal:         General: No tenderness. Normal range of motion.   Skin:     General: Skin is warm and dry.      Findings: No rash.   Neurological:      Mental Status: He is alert and oriented to person, place, and time.      Comments: Resting tremors to bilateral first and second digits    Psychiatric:         Behavior: Behavior normal.         Thought Content: Thought content normal.         Judgment: Judgment normal.           Significant Labs: All pertinent labs within the past 24 hours have been reviewed.    Significant Imaging: I have reviewed all pertinent imaging results/findings within the past 24 hours.

## 2023-06-21 NOTE — PROGRESS NOTES
[facility] - Observation [address] [place] [address] Medicine  Progress Note    Patient Name: Tito Menard  MRN: [id number]  Patient Class: OP- Observation   Admission Date: 6/19/2023  Length of Stay: 0 days  Attending Physician: Celestino Baez MD  Primary Care Provider: Amanda Brown MD        Subjective:     Principal Problem:Syncope        HPI:  Tito Menard is a 77 y.o. male with a PMHx of diabetes, CKD, and hypertension who [place] [facility] [place]opal episode. Patient was sitting outside on his porch and next thing he remembers was being woken up by his wife. His wife told him that he was slumped over and unconsciousness when she found him. No fall or head trauma. Patient had a hard time finding his words when he regained consciousness and was grabbing for objects that were not there. Mental status returned to baseline shortly after this episode without any confusion. Patient complains of right/ posterior neck pain which began on Saturday after he slept wrong on his right shoulder/ neck. Pain worsens with movement and traps/ R sided neck is tender to touch. He has an associated headache which he attributes to the neck pain/ soreness. No recent trauma or injury. Patient had a similar syncopal episode in December for which he was admitted to the hospital but cause was never determined. He's also been having ongoing intermittent shortness of breath for many months. He was recently seen by cardiology for the SOB/LOC who stopped his amlodipine and started him on spironolactone and jardiance. Endorses full body weakness x many months for which he was reffered to PT but his insurance does not cover therapy so he hasn't been able to go. Denies fever, chills, N/V, back pain, chest pain, lightheadedness/ dizziness, LE swelling, numnbness/tingling, focal weakness or facial droop.    ED: hypertensive to /90, vitals otherwise stable. No leukocytosis or electrolyte abnormalities. Trop negative x2. EKG without acute ST/T  wave changes. CTA head/neck negative for acute findings.       Overview/Hospital Course:  Tito Menard was admitted to hospital medicine for management of syncope. Infectious, anemic and metabolic workup grossly unremarkable. TTE showing EF If 55%, normal diastolic dysfunction. EEG pending       Interval History:  NAEON. AFVSS.  Patient evaluated at bedside, NAD. Pending EEG to complete syncope workup.     Review of Systems   Constitutional:  Negative for activity change, chills and fever.   HENT:  Negative for trouble swallowing.    Eyes:  Negative for photophobia and visual disturbance.   Respiratory:  Negative for chest tightness, shortness of breath and wheezing.    Cardiovascular:  Negative for chest pain, palpitations and leg swelling.   Gastrointestinal:  Negative for abdominal pain, constipation, diarrhea, nausea and vomiting.   Genitourinary:  Negative for dysuria, frequency, hematuria and urgency.   Musculoskeletal:  Positive for arthralgias and neck pain. Negative for back pain, gait problem and neck stiffness.   Skin:  Negative for color change and rash.   Neurological:  Positive for tremors, syncope and weakness. Negative for dizziness, light-headedness, numbness and headaches.   Psychiatric/Behavioral:  Negative for agitation and confusion. The patient is not nervous/anxious.    Objective:     Vital Signs (Most Recent):  Temp: 98.2 °F (36.8 °C) (06/21/23 1514)  Pulse: 78 (06/21/23 1514)  Resp: 16 (06/21/23 1514)  BP: 127/77 (06/21/23 1514)  SpO2: 97 % (06/21/23 1514) Vital Signs (24h Range):  Temp:  [98.1 °F (36.7 °C)-98.7 °F (37.1 °C)] 98.2 °F (36.8 °C)  Pulse:  [56-91] 78  Resp:  [16-18] 16  SpO2:  [96 %-98 %] 97 %  BP: (124-168)/(62-94) 127/77     Weight: 94.2 kg (207 lb 11.2 oz)  Body mass index is 28.97 kg/m².    Intake/Output Summary (Last 24 hours) at 6/21/2023 1812  Last data filed at 6/21/2023 1543  Gross per 24 hour   Intake --   Output 800 ml   Net -800 ml         Physical Exam  Vitals  and nursing note reviewed.   Constitutional:       General: He is not in acute distress.     Appearance: He is well-developed.   HENT:      Head: Normocephalic and atraumatic.   Eyes:      Extraocular Movements: Extraocular movements intact.   Cardiovascular:      Rate and Rhythm: Normal rate and regular rhythm.      Heart sounds: Normal heart sounds.   Pulmonary:      Effort: Pulmonary effort is normal. No respiratory distress.   Abdominal:      General: There is no distension.   Musculoskeletal:         General: No tenderness. Normal range of motion.   Skin:     General: Skin is warm and dry.      Findings: No rash.   Neurological:      Mental Status: He is alert and oriented to person, place, and time.      Comments: Resting tremors to bilateral first and second digits    Psychiatric:         Behavior: Behavior normal.         Thought Content: Thought content normal.         Judgment: Judgment normal.           Significant Labs: All pertinent labs within the past 24 hours have been reviewed.    Significant Imaging: I have reviewed all pertinent imaging results/findings within the past 24 hours.      Assessment/Plan:      * Syncope  - unclear cause-- broad DDx including vasovagal, cardiogenic, neurologic, orthostatic   - CTA head/neck negative for acute findings  - infectious work- up unremarkable thus far  - TTE with EF 55% and normal diastolic function  - EEG ordered by OP neuro (01/2023) but never done-- will order here  - orthostatic vitals q shift  - monitor on tele     Acute neck pain  - suspect MSKL in nature based on history and exam  - hx of spondylosis of C spine  - low suspicion for meningitis or infection at this time given no AMS and afebrile without leukocytosis  - neck XR with degenerative changes similar to prior  - ESR, CRP elevated   - start robaxin 500mg QID    Thoracic aortic aneurysm without rupture  - no reported chest or back pain  - CTA C/A/P showing mild increased size of the ascending  aortic dilatation at the sinus of Valsalva measure 3.8 cm.   - monitor BP and HR closely     Restrictive lung disease due to kyphoscoliosis  - no acute issues    BPH with obstruction/lower urinary tract symptoms  - continue flomax    Mixed hyperlipidemia  - continue statin    Essential hypertension  - uncontrolled in the setting of missed medications  - continue spironactone 12.5mg daily  - substitute home irbesartan w/ formulary losartan 100mg daily  - PRN hydralazine       VTE Risk Mitigation (From admission, onward)         Ordered     enoxaparin injection 40 mg  Daily         06/19/23 2211     IP VTE HIGH RISK PATIENT  Once         06/19/23 2211                Discharge Planning   ELLEN: 6/21/2023     Code Status: Full Code   Is the patient medically ready for discharge?: No    Reason for patient still in hospital (select all that apply): Laboratory test and Treatment  Discharge Plan A: Home   Discharge Delays: None known at this time              Maduhri Kidd PA-C  Department of Hospital Medicine   [facility] - [facility] 11H

## 2023-06-22 VITALS
WEIGHT: 207.69 LBS | HEIGHT: 71 IN | TEMPERATURE: 99 F | OXYGEN SATURATION: 93 % | SYSTOLIC BLOOD PRESSURE: 115 MMHG | HEART RATE: 75 BPM | RESPIRATION RATE: 16 BRPM | DIASTOLIC BLOOD PRESSURE: 71 MMHG | BODY MASS INDEX: 29.08 KG/M2

## 2023-06-22 LAB
ANION GAP SERPL CALC-SCNC: 11 MMOL/L (ref 8–16)
BASOPHILS # BLD AUTO: 0.01 K/UL (ref 0–0.2)
BASOPHILS NFR BLD: 0.2 % (ref 0–1.9)
BUN SERPL-MCNC: 20 MG/DL (ref 8–23)
CALCIUM SERPL-MCNC: 8.9 MG/DL (ref 8.7–10.5)
CHLORIDE SERPL-SCNC: 100 MMOL/L (ref 95–110)
CO2 SERPL-SCNC: 27 MMOL/L (ref 23–29)
CREAT SERPL-MCNC: 0.9 MG/DL (ref 0.5–1.4)
DIFFERENTIAL METHOD: ABNORMAL
EOSINOPHIL # BLD AUTO: 0.2 K/UL (ref 0–0.5)
EOSINOPHIL NFR BLD: 3.9 % (ref 0–8)
ERYTHROCYTE [DISTWIDTH] IN BLOOD BY AUTOMATED COUNT: 13.1 % (ref 11.5–14.5)
EST. GFR  (NO RACE VARIABLE): >60 ML/MIN/1.73 M^2
GLUCOSE SERPL-MCNC: 99 MG/DL (ref 70–110)
HCT VFR BLD AUTO: 36.9 % (ref 40–54)
HGB BLD-MCNC: 12.2 G/DL (ref 14–18)
IMM GRANULOCYTES # BLD AUTO: 0.01 K/UL (ref 0–0.04)
IMM GRANULOCYTES NFR BLD AUTO: 0.2 % (ref 0–0.5)
LYMPHOCYTES # BLD AUTO: 1.8 K/UL (ref 1–4.8)
LYMPHOCYTES NFR BLD: 37.7 % (ref 18–48)
MAGNESIUM SERPL-MCNC: 1.7 MG/DL (ref 1.6–2.6)
MCH RBC QN AUTO: 29.6 PG (ref 27–31)
MCHC RBC AUTO-ENTMCNC: 33.1 G/DL (ref 32–36)
MCV RBC AUTO: 90 FL (ref 82–98)
MONOCYTES # BLD AUTO: 0.6 K/UL (ref 0.3–1)
MONOCYTES NFR BLD: 12.8 % (ref 4–15)
NEUTROPHILS # BLD AUTO: 2.2 K/UL (ref 1.8–7.7)
NEUTROPHILS NFR BLD: 45.2 % (ref 38–73)
NRBC BLD-RTO: 0 /100 WBC
PHOSPHATE SERPL-MCNC: 3.3 MG/DL (ref 2.7–4.5)
PLATELET # BLD AUTO: 204 K/UL (ref 150–450)
PMV BLD AUTO: 8.9 FL (ref 9.2–12.9)
POCT GLUCOSE: 102 MG/DL (ref 70–110)
POCT GLUCOSE: 104 MG/DL (ref 70–110)
POTASSIUM SERPL-SCNC: 3.7 MMOL/L (ref 3.5–5.1)
RBC # BLD AUTO: 4.12 M/UL (ref 4.6–6.2)
SODIUM SERPL-SCNC: 138 MMOL/L (ref 136–145)
WBC # BLD AUTO: 4.83 K/UL (ref 3.9–12.7)

## 2023-06-22 PROCEDURE — 25000242 PHARM REV CODE 250 ALT 637 W/ HCPCS

## 2023-06-22 PROCEDURE — 84100 ASSAY OF PHOSPHORUS: CPT | Performed by: PHYSICIAN ASSISTANT

## 2023-06-22 PROCEDURE — 25000003 PHARM REV CODE 250: Performed by: PHYSICIAN ASSISTANT

## 2023-06-22 PROCEDURE — 99239 HOSP IP/OBS DSCHRG MGMT >30: CPT | Mod: ,,,

## 2023-06-22 PROCEDURE — 83735 ASSAY OF MAGNESIUM: CPT | Performed by: PHYSICIAN ASSISTANT

## 2023-06-22 PROCEDURE — 80048 BASIC METABOLIC PNL TOTAL CA: CPT | Performed by: PHYSICIAN ASSISTANT

## 2023-06-22 PROCEDURE — 99239 PR HOSPITAL DISCHARGE DAY,>30 MIN: ICD-10-PCS | Mod: ,,,

## 2023-06-22 PROCEDURE — 85025 COMPLETE CBC W/AUTO DIFF WBC: CPT | Performed by: PHYSICIAN ASSISTANT

## 2023-06-22 PROCEDURE — G0378 HOSPITAL OBSERVATION PER HR: HCPCS

## 2023-06-22 PROCEDURE — 36415 COLL VENOUS BLD VENIPUNCTURE: CPT | Performed by: PHYSICIAN ASSISTANT

## 2023-06-22 RX ORDER — METHOCARBAMOL 500 MG/1
500 TABLET, FILM COATED ORAL 4 TIMES DAILY
Qty: 40 TABLET | Refills: 0 | Status: SHIPPED | OUTPATIENT
Start: 2023-06-22 | End: 2023-07-02

## 2023-06-22 RX ADMIN — PANTOPRAZOLE SODIUM 40 MG: 40 TABLET, DELAYED RELEASE ORAL at 08:06

## 2023-06-22 RX ADMIN — METHOCARBAMOL 500 MG: 500 TABLET ORAL at 08:06

## 2023-06-22 RX ADMIN — TAMSULOSIN HYDROCHLORIDE 0.4 MG: 0.4 CAPSULE ORAL at 08:06

## 2023-06-22 RX ADMIN — ACETAMINOPHEN 650 MG: 325 TABLET ORAL at 05:06

## 2023-06-22 RX ADMIN — PRAVASTATIN SODIUM 20 MG: 20 TABLET ORAL at 08:06

## 2023-06-22 RX ADMIN — METHOCARBAMOL 500 MG: 500 TABLET ORAL at 01:06

## 2023-06-22 RX ADMIN — FLUTICASONE PROPIONATE 100 MCG: 50 SPRAY, METERED NASAL at 08:06

## 2023-06-22 RX ADMIN — ASPIRIN 81 MG: 81 TABLET, COATED ORAL at 08:06

## 2023-06-22 NOTE — DISCHARGE SUMMARY
[facility] - Observation [address] [place] [address] Medicine  Discharge Summary      Patient Name: Tito Menard  MRN: [id number]  ESTER: [id number]  Patient Class: OP- Observation  Admission Date: 6/19/2023  Hospital Length of Stay: 0 days  Discharge Date and Time:  06/22/2023 4:09 PM  Attending Physician: Ivet Velasquez MD   Discharging Provider: Donnell Conley PA-C  Primary Care Provider: Amanda Brown MD  Hospital Medicine Team: [facility] Donnell Conley PA-C  Primary Care Team: [facility]    HPI:   Tito Menard is a 77 y.o. male with a PMHx of diabetes, CKD, and hypertension who presents to [facility] after a syncopal episode. Patient was sitting outside on his porch and next thing he remembers was being woken up by his wife. His wife told him that he was slumped over and unconsciousness when she found him. No fall or head trauma. Patient had a hard time finding his words when he regained consciousness and was grabbing for objects that were not there. Mental status returned to baseline shortly after this episode without any confusion. Patient complains of right/ posterior neck pain which began on Saturday after he slept wrong on his right shoulder/ neck. Pain worsens with movement and traps/ R sided neck is tender to touch. He has an associated headache which he attributes to the neck pain/ soreness. No recent trauma or injury. Patient had a similar syncopal episode in December for which he was admitted to the hospital but cause was never determined. He's also been having ongoing intermittent shortness of breath for many months. He was recently seen by cardiology for the SOB/LOC who stopped his amlodipine and started him on spironolactone and jardiance. Endorses full body weakness x many months for which he was reffered to PT but his insurance does not cover therapy so he hasn't been able to go. Denies fever, chills, N/V, back pain, chest pain, lightheadedness/ dizziness, LE swelling, numnbness/tingling, focal  weakness or facial droop.    ED: hypertensive to /90, vitals otherwise stable. No leukocytosis or electrolyte abnormalities. Trop negative x2. EKG without acute ST/T wave changes. CTA head/neck negative for acute findings.       * No surgery found *      Hospital Course:   Tito Menard was admitted to hospital medicine for management of syncope. Infectious, anemic and metabolic workup grossly unremarkable. Orthostatics ordered. CTA head/neck negative for acute findings. TTE showing EF of 55%, normal diastolic dysfunction. EEG showing normal awake and asleep EEG, unremarkable. Patient symptomatically improving throughout hospital course. Patient will follow up with PCP and Cardiology. Patient medically ready for discharge. Plan of care discussed with patient, patient agreeable to plan, and all questions answered.           Goals of Care Treatment Preferences:  Code Status: Full Code      Consults:     No new Assessment & Plan notes have been filed under this hospital service since the last note was generated.  Service: Hospital Medicine    Final Active Diagnoses:    Diagnosis Date Noted POA    PRINCIPAL PROBLEM:  Syncope and collapse [R55] 04/26/2021 Yes    Acute neck pain [M54.2] 06/20/2023 Yes    Thoracic aortic aneurysm without rupture [I71.20] 07/19/2021 Yes     Chronic    Restrictive lung disease due to kyphoscoliosis [J98.4, M41.9] 08/06/2018 Yes     Chronic    BPH with obstruction/lower urinary tract symptoms [N40.1, N13.8] 07/19/2018 Yes     Chronic    Mixed hyperlipidemia [E78.2]  Yes     Chronic    Essential hypertension [I10]  Yes     Chronic      Problems Resolved During this Admission:       Discharged Condition: stable    Disposition: Home or Self Care    Follow Up:   Follow-up Information     mAanda Brown MD Follow up in 1 week(s).    Specialty: Internal Medicine  Contact information:  [address]  [facility] [address]  [place] [id number] [phone]                       Patient  Instructions:      Ambulatory referral/consult to Cardiology   Standing Status: Future   Referral Priority: Urgent Referral Type: Consultation   Referral Reason: Specialty Services Required   Requested Specialty: Cardiology   Number of Visits Requested: 1     Diet diabetic     Diet Cardiac     Notify your health care provider if you experience any of the following:  temperature >100.4     Notify your health care provider if you experience any of the following:  severe uncontrolled pain     Notify your health care provider if you experience any of the following:  difficulty breathing or increased cough     Notify your health care provider if you experience any of the following:  increased confusion or weakness     Activity as tolerated       Significant Diagnostic Studies: N/A    Pending Diagnostic Studies:     Procedure Component Value Units Date/Time    B-Type natriuretic peptide (BNP) [[id number]] Collected: 06/19/23 1442    Order Status: Sent Lab Status: In process Updated: 06/19/23 1442    Specimen: Blood          Medications:  Reconciled Home Medications:      Medication List      CHANGE how you take these medications    gabapentin 300 MG capsule  Commonly known as: NEURONTIN  Take 1 capsule every morning and at noon, and take 2 capsules every evening (4 total daily)  What changed: additional instructions     ketoconazole 2 % cream  Commonly known as: NIZORAL  AAA bid (facial redness and scaling)  What changed: additional instructions     methocarbamoL 500 MG Tab  Commonly known as: ROBAXIN  Take 1 tablet (500 mg total) by mouth 4 (four) times daily. for 10 days  What changed:   · how much to take  · how to take this  · when to take this  · reasons to take this        CONTINUE taking these medications    ARTIFICIAL TEARS OPHT  Place 1-2 drops into both eyes daily as needed (for dry eyes).     aspirin 81 MG EC tablet  Commonly known as: ECOTRIN  TAKE 1 TABLET EVERY DAY     azelastine 137 mcg (0.1 %) nasal  spray  Commonly known as: ASTELIN  USE 1 SPRAY IN EACH NOSTRIL TWICE DAILY     BC HEADACHE POWDER ORAL  Take 1 packet by mouth daily as needed (for headache).     benzonatate 200 MG capsule  Commonly known as: TESSALON  TAKE 1 CAPSULE THREE TIMES DAILY AS NEEDED FOR COUGH     chlorthalidone 25 MG Tab  Commonly known as: HYGROTEN  TAKE 1/2 TABLET EVERY DAY     cholecalciferol (vitamin D3) 50 mcg (2,000 unit) Cap capsule  Commonly known as: VITAMIN D3  Take 1 capsule by mouth once daily.     clotrimazole-betamethasone 1-0.05% cream  Commonly known as: LOTRISONE  APPLY TOPICALLY 2 (TWO) TIMES DAILY.     docusate sodium 50 MG capsule  Commonly known as: COLACE  Take 100 mg by mouth daily as needed for Constipation.     fluticasone propionate 50 mcg/actuation nasal spray  Commonly known as: FLONASE  USE 1 SPRAY NASALLY ONE TIME DAILY     guaiFENesin 600 mg 12 hr tablet  Commonly known as: MUCINEX  Take 1,200 mg by mouth 2 (two) times daily as needed.     irbesartan 300 MG tablet  Commonly known as: AVAPRO  TAKE 1 TABLET EVERY EVENING     JARDIANCE 10 mg tablet  Generic drug: empagliflozin  Take 1 tablet (10 mg total) by mouth once daily.     LIDOcaine 4 % Ptmd  Apply 1 patch topically daily as needed (for back pain).     omeprazole 20 MG capsule  Commonly known as: PRILOSEC  Take 2 capsules (40 mg total) by mouth once daily.     potassium chloride 10 MEQ Cpsr  Commonly known as: MICRO-K  TAKE 3 CAPSULES ONE TIME DAILY     pravastatin 20 MG tablet  Commonly known as: PRAVACHOL  TAKE 1 TABLET EVERY DAY     spironolactone 25 MG tablet  Commonly known as: ALDACTONE  Take 0.5 tablets (12.5 mg total) by mouth once daily.     tamsulosin 0.4 mg Cap  Commonly known as: FLOMAX  Take 1 capsule (0.4 mg total) by mouth once daily.     TUMS ORAL  Take 1-2 tablets by mouth daily as needed (for acid reflux).     TYLENOL PM ORAL  Take 2 capsules by mouth nightly.            Indwelling Lines/Drains at time of discharge:    Lines/Drains/Airways     None                 Time spent on the discharge of patient: 33 minutes         Donnell Conley PA-C  Department of Hospital Medicine  [facility] - Observation 11H

## 2023-06-22 NOTE — PLAN OF CARE
Patient rested quietly through out night. C/o headache this Am. Head is manage by PRN Tylenol . See MAR. VSS. No falls or syncope episodes noted. Will continue to monitor. Call light in reach.  Problem: Adult Inpatient Plan of Care  Goal: Plan of Care Review  Outcome: Ongoing, Progressing  Goal: Patient-Specific Goal (Individualized)  Outcome: Ongoing, Progressing  Goal: Absence of Hospital-Acquired Illness or Injury  Outcome: Ongoing, Progressing  Goal: Readiness for Transition of Care  Outcome: Ongoing, Progressing     Problem: Infection  Goal: Absence of Infection Signs and Symptoms  Outcome: Ongoing, Progressing     Problem: Diabetes Comorbidity  Goal: Blood Glucose Level Within Targeted Range  Outcome: Ongoing, Progressing

## 2023-06-22 NOTE — PLAN OF CARE
Cards appoint made for July 7th. All information on patient's discharge instruction sheet. Nayeli Livingston RN

## 2023-06-23 NOTE — PLAN OF CARE
06/23/23 0815   Final Note   Assessment Type Final Discharge Note   Anticipated Discharge Disposition Home     Patient d/c home w/ no needs. Nayeli Livingston RN

## 2023-07-06 DIAGNOSIS — J30.2 SEASONAL ALLERGIC RHINITIS, UNSPECIFIED TRIGGER: ICD-10-CM

## 2023-07-07 ENCOUNTER — PES CALL (OUTPATIENT)
Dept: ADMINISTRATIVE | Facility: CLINIC | Age: 78
End: 2023-07-07
Payer: MEDICARE

## 2023-07-07 ENCOUNTER — OFFICE VISIT (OUTPATIENT)
Dept: CARDIOLOGY | Facility: CLINIC | Age: 78
End: 2023-07-07
Payer: MEDICARE

## 2023-07-07 VITALS
DIASTOLIC BLOOD PRESSURE: 77 MMHG | SYSTOLIC BLOOD PRESSURE: 145 MMHG | BODY MASS INDEX: 29.26 KG/M2 | HEART RATE: 71 BPM | OXYGEN SATURATION: 98 % | WEIGHT: 209 LBS | HEIGHT: 71 IN

## 2023-07-07 DIAGNOSIS — R56.9 SEIZURE: ICD-10-CM

## 2023-07-07 DIAGNOSIS — R55 SYNCOPE AND COLLAPSE: ICD-10-CM

## 2023-07-07 DIAGNOSIS — E78.2 MIXED HYPERLIPIDEMIA: Chronic | ICD-10-CM

## 2023-07-07 DIAGNOSIS — I10 ESSENTIAL HYPERTENSION: Primary | Chronic | ICD-10-CM

## 2023-07-07 PROCEDURE — 3072F LOW RISK FOR RETINOPATHY: CPT | Mod: CPTII,GC,S$GLB, | Performed by: STUDENT IN AN ORGANIZED HEALTH CARE EDUCATION/TRAINING PROGRAM

## 2023-07-07 PROCEDURE — 99214 OFFICE O/P EST MOD 30 MIN: CPT | Mod: GC,S$GLB,, | Performed by: STUDENT IN AN ORGANIZED HEALTH CARE EDUCATION/TRAINING PROGRAM

## 2023-07-07 PROCEDURE — 3077F PR MOST RECENT SYSTOLIC BLOOD PRESSURE >= 140 MM HG: ICD-10-PCS | Mod: CPTII,GC,S$GLB, | Performed by: STUDENT IN AN ORGANIZED HEALTH CARE EDUCATION/TRAINING PROGRAM

## 2023-07-07 PROCEDURE — 3078F DIAST BP <80 MM HG: CPT | Mod: CPTII,GC,S$GLB, | Performed by: STUDENT IN AN ORGANIZED HEALTH CARE EDUCATION/TRAINING PROGRAM

## 2023-07-07 PROCEDURE — 3288F FALL RISK ASSESSMENT DOCD: CPT | Mod: CPTII,GC,S$GLB, | Performed by: STUDENT IN AN ORGANIZED HEALTH CARE EDUCATION/TRAINING PROGRAM

## 2023-07-07 PROCEDURE — 3288F PR FALLS RISK ASSESSMENT DOCUMENTED: ICD-10-PCS | Mod: CPTII,GC,S$GLB, | Performed by: STUDENT IN AN ORGANIZED HEALTH CARE EDUCATION/TRAINING PROGRAM

## 2023-07-07 PROCEDURE — 1159F MED LIST DOCD IN RCRD: CPT | Mod: CPTII,GC,S$GLB, | Performed by: STUDENT IN AN ORGANIZED HEALTH CARE EDUCATION/TRAINING PROGRAM

## 2023-07-07 PROCEDURE — 1159F PR MEDICATION LIST DOCUMENTED IN MEDICAL RECORD: ICD-10-PCS | Mod: CPTII,GC,S$GLB, | Performed by: STUDENT IN AN ORGANIZED HEALTH CARE EDUCATION/TRAINING PROGRAM

## 2023-07-07 PROCEDURE — 3078F PR MOST RECENT DIASTOLIC BLOOD PRESSURE < 80 MM HG: ICD-10-PCS | Mod: CPTII,GC,S$GLB, | Performed by: STUDENT IN AN ORGANIZED HEALTH CARE EDUCATION/TRAINING PROGRAM

## 2023-07-07 PROCEDURE — 99214 PR OFFICE/OUTPT VISIT, EST, LEVL IV, 30-39 MIN: ICD-10-PCS | Mod: GC,S$GLB,, | Performed by: STUDENT IN AN ORGANIZED HEALTH CARE EDUCATION/TRAINING PROGRAM

## 2023-07-07 PROCEDURE — 3077F SYST BP >= 140 MM HG: CPT | Mod: CPTII,GC,S$GLB, | Performed by: STUDENT IN AN ORGANIZED HEALTH CARE EDUCATION/TRAINING PROGRAM

## 2023-07-07 PROCEDURE — 1101F PT FALLS ASSESS-DOCD LE1/YR: CPT | Mod: CPTII,GC,S$GLB, | Performed by: STUDENT IN AN ORGANIZED HEALTH CARE EDUCATION/TRAINING PROGRAM

## 2023-07-07 PROCEDURE — 1160F PR REVIEW ALL MEDS BY PRESCRIBER/CLIN PHARMACIST DOCUMENTED: ICD-10-PCS | Mod: CPTII,GC,S$GLB, | Performed by: STUDENT IN AN ORGANIZED HEALTH CARE EDUCATION/TRAINING PROGRAM

## 2023-07-07 PROCEDURE — 1160F RVW MEDS BY RX/DR IN RCRD: CPT | Mod: CPTII,GC,S$GLB, | Performed by: STUDENT IN AN ORGANIZED HEALTH CARE EDUCATION/TRAINING PROGRAM

## 2023-07-07 PROCEDURE — 1101F PR PT FALLS ASSESS DOC 0-1 FALLS W/OUT INJ PAST YR: ICD-10-PCS | Mod: CPTII,GC,S$GLB, | Performed by: STUDENT IN AN ORGANIZED HEALTH CARE EDUCATION/TRAINING PROGRAM

## 2023-07-07 PROCEDURE — 99999 PR PBB SHADOW E&M-EST. PATIENT-LVL V: ICD-10-PCS | Mod: PBBFAC,GC,, | Performed by: STUDENT IN AN ORGANIZED HEALTH CARE EDUCATION/TRAINING PROGRAM

## 2023-07-07 PROCEDURE — 3072F PR LOW RISK FOR RETINOPATHY: ICD-10-PCS | Mod: CPTII,GC,S$GLB, | Performed by: STUDENT IN AN ORGANIZED HEALTH CARE EDUCATION/TRAINING PROGRAM

## 2023-07-07 PROCEDURE — 99999 PR PBB SHADOW E&M-EST. PATIENT-LVL V: CPT | Mod: PBBFAC,GC,, | Performed by: STUDENT IN AN ORGANIZED HEALTH CARE EDUCATION/TRAINING PROGRAM

## 2023-07-07 PROCEDURE — 1125F PR PAIN SEVERITY QUANTIFIED, PAIN PRESENT: ICD-10-PCS | Mod: CPTII,GC,S$GLB, | Performed by: STUDENT IN AN ORGANIZED HEALTH CARE EDUCATION/TRAINING PROGRAM

## 2023-07-07 PROCEDURE — 1125F AMNT PAIN NOTED PAIN PRSNT: CPT | Mod: CPTII,GC,S$GLB, | Performed by: STUDENT IN AN ORGANIZED HEALTH CARE EDUCATION/TRAINING PROGRAM

## 2023-07-07 RX ORDER — AZELASTINE 1 MG/ML
SPRAY, METERED NASAL
Qty: 60 ML | Refills: 0 | Status: SHIPPED | OUTPATIENT
Start: 2023-07-07 | End: 2023-12-12

## 2023-07-07 RX ORDER — METHOCARBAMOL 500 MG/1
500 TABLET, FILM COATED ORAL 4 TIMES DAILY
COMMUNITY

## 2023-07-07 NOTE — PROGRESS NOTES
PCP - Amanda Brown MD  Referring Physician:     Subjective:   Patient ID:  Tito Menard is a 77 y.o. y.o. male who presents for inpatient discharge referral for syncope.    Patient reports that he had a syncopal episode while sitting. He was post-ictal with slurred speech and and confused upon ED assessment. EEG done but no seizures during episode. CTA head and neck negative for LVO. Patient has a longstanding history of syncope that does not appear to have any cardiac cause. Multiple holter monitors with no arrhythmias, Afib, tachy-christofer syndrome. Echo with normal EF. Negative stress test. He is enrolled in [facility] for HTN management.      PFTs with restrictive lung disease.      Medical: HTN, HLD, h/o DM2 (controlled off meds), CKD 2, enlarged aorta (CT 2/2020 - mid ascending aorta 4.1cm; 4.1cm on echo), RBBB  Surgical: Reviewed, as below.  Family: Reviewed, as below. Mother with heart disease.  Social: Reviewed, as below. 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     The patient is not known to have coexisting coronary artery disease.     History:     Social History     Tobacco Use    Smoking status: Never    Smokeless tobacco: Former     Types: Chew    Tobacco comments:     Chewed tobacco once per day for 4-5 years when a    Substance Use Topics    Alcohol use: No     Meds:   Review of patient's allergies indicates:  No Known Allergies    Current Outpatient Medications:     acetaminophen/diphenhydramine (TYLENOL PM ORAL), Take 2 capsules by mouth nightly., Disp: , Rfl:     aspirin (ECOTRIN) 81 MG EC tablet, TAKE 1 TABLET EVERY DAY, Disp: 90 tablet, Rfl: 3    aspirin/salicylamide/caffeine (BC HEADACHE POWDER ORAL), Take 1 packet by mouth daily as needed (for headache)., Disp: , Rfl:     azelastine (ASTELIN) 137 mcg (0.1 %) nasal spray, USE 1 SPRAY IN EACH NOSTRIL TWICE DAILY, Disp: 60 mL, Rfl: 0    benzonatate (TESSALON) 200 MG capsule, TAKE 1 CAPSULE THREE TIMES DAILY AS NEEDED FOR  COUGH, Disp: 30 capsule, Rfl: 0    calcium carbonate (TUMS ORAL), Take 1-2 tablets by mouth daily as needed (for acid reflux)., Disp: , Rfl:     chlorthalidone (HYGROTEN) 25 MG Tab, TAKE 1/2 TABLET EVERY DAY, Disp: 45 tablet, Rfl: 2    cholecalciferol, vitamin D3, (VITAMIN D3) 50 mcg (2,000 unit) Cap, Take 1 capsule by mouth once daily., Disp: , Rfl:     clotrimazole-betamethasone 1-0.05% (LOTRISONE) cream, APPLY TOPICALLY 2 (TWO) TIMES DAILY. (Patient taking differently: Apply topically 2 (two) times daily. Uses at bath time as needed for redness between thighs.), Disp: 45 g, Rfl: 0    docusate sodium (COLACE) 50 MG capsule, Take 100 mg by mouth daily as needed for Constipation., Disp: , Rfl:     fluticasone propionate (FLONASE) 50 mcg/actuation nasal spray, USE 1 SPRAY NASALLY ONE TIME DAILY, Disp: 32 g, Rfl: 6    gabapentin (NEURONTIN) 300 MG capsule, Take 1 capsule every morning and at noon, and take 2 capsules every evening (4 total daily) (Patient taking differently: Take 2 capsules every morning and 1 capsule every evening (3 total daily).), Disp: 360 capsule, Rfl: 0    guaifenesin (MUCINEX) 600 mg 12 hr tablet, Take 1,200 mg by mouth 2 (two) times daily as needed. , Disp: , Rfl:     irbesartan (AVAPRO) 300 MG tablet, TAKE 1 TABLET EVERY EVENING, Disp: 90 tablet, Rfl: 0    ketoconazole (NIZORAL) 2 % cream, AAA bid (facial redness and scaling) (Patient taking differently: Apply topically once weekly for facial redness and scaling.), Disp: 60 g, Rfl: 3    lanolin/mineral oil/petrolatum (ARTIFICIAL TEARS OPHT), Place 1-2 drops into both eyes daily as needed (for dry eyes)., Disp: , Rfl:     LIDOcaine 4 % PtMd, Apply 1 patch topically daily as needed (for back pain)., Disp: , Rfl:     methocarbamoL (ROBAXIN) 500 MG Tab, Take 500 mg by mouth 4 (four) times daily., Disp: , Rfl:     omeprazole (PRILOSEC) 20 MG capsule, Take 2 capsules (40 mg total) by mouth once daily., Disp: 90 capsule, Rfl: 3    potassium chloride  "(MICRO-K) 10 MEQ CpSR, TAKE 3 CAPSULES ONE TIME DAILY, Disp: 270 capsule, Rfl: 3    pravastatin (PRAVACHOL) 20 MG tablet, TAKE 1 TABLET EVERY DAY, Disp: 90 tablet, Rfl: 3    spironolactone (ALDACTONE) 25 MG tablet, Take 0.5 tablets (12.5 mg total) by mouth once daily., Disp: 45 tablet, Rfl: 3    tamsulosin (FLOMAX) 0.4 mg Cap, Take 1 capsule (0.4 mg total) by mouth once daily., Disp: 90 capsule, Rfl: 3    Review of Systems   Constitutional:  Negative for chills, fever and malaise/fatigue.   HENT:  Negative for congestion and sore throat.    Respiratory:  Negative for cough and shortness of breath.    Cardiovascular:  Negative for chest pain, palpitations, orthopnea, leg swelling and PND.   Gastrointestinal:  Negative for abdominal pain, constipation, diarrhea, nausea and vomiting.   Genitourinary:  Negative for frequency.   Neurological:  Negative for weakness and headaches.     Objective:   BP (!) 145/77   Pulse 71   Ht 5' 11" (1.803 m)   Wt 94.8 kg (208 lb 15.9 oz)   SpO2 98%   BMI 29.15 kg/m²   Physical Exam  Constitutional:       Appearance: Normal appearance.   HENT:      Head: Normocephalic and atraumatic.   Eyes:      General: No scleral icterus.     Pupils: Pupils are equal, round, and reactive to light.   Cardiovascular:      Rate and Rhythm: Normal rate and regular rhythm.      Pulses: Normal pulses.      Heart sounds: No murmur heard.  Pulmonary:      Effort: Pulmonary effort is normal. No respiratory distress.      Breath sounds: No wheezing.   Abdominal:      General: Abdomen is flat. There is no distension.      Palpations: Abdomen is soft.      Tenderness: There is no abdominal tenderness.   Musculoskeletal:      Right lower leg: No edema.      Left lower leg: No edema.   Skin:     General: Skin is warm.   Neurological:      Mental Status: He is alert and oriented to person, place, and time.     Labs:     Lab Results   Component Value Date     06/22/2023    K 3.7 06/22/2023     " 06/22/2023    CO2 27 06/22/2023    BUN 20 06/22/2023    BUN 13 07/09/2022    CREATININE 0.9 06/22/2023    CREATININE 0.9 07/09/2022    ANIONGAP 11 06/22/2023     Lab Results   Component Value Date    HGBA1C 5.6 06/20/2023     Lab Results   Component Value Date    BNP <10 03/30/2023    BNP <10 12/31/2022    BNP <10 07/09/2018    Lab Results   Component Value Date    WBC 4.83 06/22/2023    HGB 12.2 (L) 06/22/2023    HCT 36.9 (L) 06/22/2023    HCT 42 06/19/2023     06/22/2023    GRAN 2.2 06/22/2023    GRAN 45.2 06/22/2023     Lab Results   Component Value Date    CHOL 167 05/31/2023    HDL 44 05/31/2023    LDLCALC 103.6 05/31/2023    TRIG 97 05/31/2023          Cardiovascular Imaging:     Echo:   EF   Date Value Ref Range Status   06/20/2023 55 % Final   01/01/2023 55 % Final   07/21/2021 60 % Final     Assessment & Plan:     1. Essential hypertension    2. Mixed hyperlipidemia    3. Syncope and collapse    4. Seizure      Patient with unexplained syncope that has been worked up extensively for cardiac cause without any identified cardiac explanation. Concerned for seizure given confusion and post-ictal state. Negative spot-EEG. Last seen by neuro 1/2023 but no comments on seizures or syncope.     Stop jardiance, continue aldactone for HTN. Jardiance started for HFpEF/diastolic dysfunction that has not been seen consistently and not seen on most recent echo.     Unless there are intervening problems, patient should return for re-evaluation in 1 year with Dr. Dawson.   Case discussed with Dr. Roa.    Signed:  Hector Beauchamp M.D.  Cardiovascular Fellow PGY-V  [facility]        0.21

## 2023-07-07 NOTE — TELEPHONE ENCOUNTER
Refill Routing Note   Medication(s) are not appropriate for processing by [facility] for the following reason(s):      Patient seen in ED/Hospital since LOV with PCP    [facility] action(s):  Defer None identified    Refill request routed to [facility] Review Pool for Pharmacist Review.       Pharmacist review requested: Yes     Appointments  past 12m or future 3m with PCP    Date Provider   Last Visit   5/31/2023 Amanda Brown MD   Next Visit   Visit date not found Amanda Brown MD   ED visits in past 90 days: 0        Note composed:12:17 AM 07/07/2023

## 2023-07-07 NOTE — TELEPHONE ENCOUNTER
No care due was identified.  [facility] Embedded Care Due Messages. Reference number: [id number].   7/06/2023 7:47:30 PM CDT

## 2023-07-13 NOTE — PROGRESS NOTES
I have reviewed the patient's chart and the fellow's clinic note, as well as discussed the case with the fellow. I agree with the findings, assessment, and plan. After review of his chart, there is no evidence the patient has had heart failure all BNP measurements <10), no evidence for ischemia, and no evidence for arrhythmia. At this time, there is nothing further to add to the evaluation of syncope in this patient from a cardiovascular point of view.       Isiah Roa MD  Consultative Cardiology - [facility]  .

## 2023-07-20 DIAGNOSIS — E11.42 DIABETIC POLYNEUROPATHY ASSOCIATED WITH TYPE 2 DIABETES MELLITUS: ICD-10-CM

## 2023-07-20 NOTE — TELEPHONE ENCOUNTER
No care due was identified.  [facility] Embedded Care Due Messages. Reference number: [id number].   7/20/2023 2:53:51 PM CDT

## 2023-07-21 RX ORDER — GABAPENTIN 300 MG/1
CAPSULE ORAL
Qty: 360 CAPSULE | Refills: 0 | Status: ON HOLD | OUTPATIENT
Start: 2023-07-21 | End: 2023-11-14

## 2023-08-01 ENCOUNTER — TELEPHONE (OUTPATIENT)
Dept: NEUROLOGY | Facility: CLINIC | Age: 78
End: 2023-08-01
Payer: MEDICARE

## 2023-08-01 NOTE — TELEPHONE ENCOUNTER
----- Message from Carlos Eduardo Holcomb sent at 8/1/2023 11:42 AM CDT -----  Regarding: Appt  Contact: [phone]  Patients wife is calling because she would like someone to confirm the patients appt on 8-4-2023 8:30am. The appt is not showing in the patients chart and the patients wife is upset and would like to speak with someone. If the appt is scheduled patient would like it uploaded to his portal. Please all and advise @[phone]

## 2023-08-03 ENCOUNTER — OFFICE VISIT (OUTPATIENT)
Dept: OPTOMETRY | Facility: CLINIC | Age: 78
End: 2023-08-03
Payer: MEDICARE

## 2023-08-03 ENCOUNTER — OFFICE VISIT (OUTPATIENT)
Dept: NEUROLOGY | Facility: CLINIC | Age: 78
End: 2023-08-03
Payer: MEDICARE

## 2023-08-03 VITALS
BODY MASS INDEX: 29.12 KG/M2 | HEART RATE: 69 BPM | WEIGHT: 208 LBS | HEIGHT: 71 IN | DIASTOLIC BLOOD PRESSURE: 80 MMHG | SYSTOLIC BLOOD PRESSURE: 148 MMHG

## 2023-08-03 DIAGNOSIS — Z01.00 ENCOUNTER FOR ROUTINE EYE AND VISION EXAMINATION: Primary | ICD-10-CM

## 2023-08-03 DIAGNOSIS — R56.9 SEIZURE: ICD-10-CM

## 2023-08-03 DIAGNOSIS — R55 SYNCOPE AND COLLAPSE: ICD-10-CM

## 2023-08-03 DIAGNOSIS — H52.203 HYPEROPIA OF BOTH EYES WITH ASTIGMATISM: ICD-10-CM

## 2023-08-03 DIAGNOSIS — H52.03 HYPEROPIA OF BOTH EYES WITH ASTIGMATISM: ICD-10-CM

## 2023-08-03 DIAGNOSIS — G20.C PARKINSONISM, UNSPECIFIED PARKINSONISM TYPE: Primary | ICD-10-CM

## 2023-08-03 PROCEDURE — 1159F MED LIST DOCD IN RCRD: CPT | Mod: CPTII,S$GLB,, | Performed by: OPTOMETRIST

## 2023-08-03 PROCEDURE — 99214 OFFICE O/P EST MOD 30 MIN: CPT | Mod: GC,S$GLB,,

## 2023-08-03 PROCEDURE — 92015 DETERMINE REFRACTIVE STATE: CPT | Mod: S$GLB,,, | Performed by: OPTOMETRIST

## 2023-08-03 PROCEDURE — 3077F PR MOST RECENT SYSTOLIC BLOOD PRESSURE >= 140 MM HG: ICD-10-PCS | Mod: CPTII,GC,S$GLB,

## 2023-08-03 PROCEDURE — 99999 PR PBB SHADOW E&M-EST. PATIENT-LVL V: CPT | Mod: PBBFAC,GC,,

## 2023-08-03 PROCEDURE — 1159F PR MEDICATION LIST DOCUMENTED IN MEDICAL RECORD: ICD-10-PCS | Mod: CPTII,S$GLB,, | Performed by: OPTOMETRIST

## 2023-08-03 PROCEDURE — 3079F PR MOST RECENT DIASTOLIC BLOOD PRESSURE 80-89 MM HG: ICD-10-PCS | Mod: CPTII,GC,S$GLB,

## 2023-08-03 PROCEDURE — 99999 PR PBB SHADOW E&M-EST. PATIENT-LVL V: ICD-10-PCS | Mod: PBBFAC,GC,,

## 2023-08-03 PROCEDURE — 1125F PR PAIN SEVERITY QUANTIFIED, PAIN PRESENT: ICD-10-PCS | Mod: CPTII,GC,S$GLB,

## 2023-08-03 PROCEDURE — 1125F AMNT PAIN NOTED PAIN PRSNT: CPT | Mod: CPTII,GC,S$GLB,

## 2023-08-03 PROCEDURE — 1126F PR PAIN SEVERITY QUANTIFIED, NO PAIN PRESENT: ICD-10-PCS | Mod: CPTII,S$GLB,, | Performed by: OPTOMETRIST

## 2023-08-03 PROCEDURE — 1101F PR PT FALLS ASSESS DOC 0-1 FALLS W/OUT INJ PAST YR: ICD-10-PCS | Mod: CPTII,GC,S$GLB,

## 2023-08-03 PROCEDURE — 92014 COMPRE OPH EXAM EST PT 1/>: CPT | Mod: S$GLB,,, | Performed by: OPTOMETRIST

## 2023-08-03 PROCEDURE — 1126F AMNT PAIN NOTED NONE PRSNT: CPT | Mod: CPTII,S$GLB,, | Performed by: OPTOMETRIST

## 2023-08-03 PROCEDURE — 92014 PR EYE EXAM, EST PATIENT,COMPREHESV: ICD-10-PCS | Mod: S$GLB,,, | Performed by: OPTOMETRIST

## 2023-08-03 PROCEDURE — 92015 PR REFRACTION: ICD-10-PCS | Mod: S$GLB,,, | Performed by: OPTOMETRIST

## 2023-08-03 PROCEDURE — 3288F FALL RISK ASSESSMENT DOCD: CPT | Mod: CPTII,GC,S$GLB,

## 2023-08-03 PROCEDURE — 3288F PR FALLS RISK ASSESSMENT DOCUMENTED: ICD-10-PCS | Mod: CPTII,GC,S$GLB,

## 2023-08-03 PROCEDURE — 3072F LOW RISK FOR RETINOPATHY: CPT | Mod: CPTII,GC,S$GLB,

## 2023-08-03 PROCEDURE — 3079F DIAST BP 80-89 MM HG: CPT | Mod: CPTII,GC,S$GLB,

## 2023-08-03 PROCEDURE — 1100F PTFALLS ASSESS-DOCD GE2>/YR: CPT | Mod: CPTII,S$GLB,, | Performed by: OPTOMETRIST

## 2023-08-03 PROCEDURE — 99214 PR OFFICE/OUTPT VISIT, EST, LEVL IV, 30-39 MIN: ICD-10-PCS | Mod: GC,S$GLB,,

## 2023-08-03 PROCEDURE — 99999 PR PBB SHADOW E&M-EST. PATIENT-LVL IV: CPT | Mod: PBBFAC,,, | Performed by: OPTOMETRIST

## 2023-08-03 PROCEDURE — 3288F FALL RISK ASSESSMENT DOCD: CPT | Mod: CPTII,S$GLB,, | Performed by: OPTOMETRIST

## 2023-08-03 PROCEDURE — 1101F PT FALLS ASSESS-DOCD LE1/YR: CPT | Mod: CPTII,GC,S$GLB,

## 2023-08-03 PROCEDURE — 99999 PR PBB SHADOW E&M-EST. PATIENT-LVL IV: ICD-10-PCS | Mod: PBBFAC,,, | Performed by: OPTOMETRIST

## 2023-08-03 PROCEDURE — 3288F PR FALLS RISK ASSESSMENT DOCUMENTED: ICD-10-PCS | Mod: CPTII,S$GLB,, | Performed by: OPTOMETRIST

## 2023-08-03 PROCEDURE — 3077F SYST BP >= 140 MM HG: CPT | Mod: CPTII,GC,S$GLB,

## 2023-08-03 PROCEDURE — 3072F PR LOW RISK FOR RETINOPATHY: ICD-10-PCS | Mod: CPTII,GC,S$GLB,

## 2023-08-03 PROCEDURE — 1100F PR PT FALLS ASSESS DOC 2+ FALLS/FALL W/INJURY/YR: ICD-10-PCS | Mod: CPTII,S$GLB,, | Performed by: OPTOMETRIST

## 2023-08-03 RX ORDER — CARBIDOPA AND LEVODOPA 25; 100 MG/1; MG/1
1 TABLET ORAL 3 TIMES DAILY
Qty: 90 TABLET | Refills: 2 | Status: SHIPPED | OUTPATIENT
Start: 2023-08-03 | End: 2023-11-15 | Stop reason: SDUPTHER

## 2023-08-03 NOTE — PROGRESS NOTES
[facility] - NEUROLOGY [address]TH [place] [facility], [place]    Date: 8/3/23  Patient Name: Tito Menard   MRN: [id number]   PCP: Amanda Brown  Referring Provider: No ref. provider found    Assessment:   Tito Menard is a 77 y.o. male with history of syncopal episodes, hypertension, diabetes, and spinal stenosis presenting with neck pain/stiffness. His symptoms have persisted for several years. He was recently discharged from the hospital 6/22 for a syncopal event which he has not experienced since. Cardiac and neurologic workup including ECHO, MRI/MRA, and EEG nonspecific. Exam findings are consistent with mild/moderate rigidity in upper extremities. There is resting tremor of the left index finger. There is moderate pain elicited on palpation of the neck with reduced range of motion. He is displaying parkinsonian features at this time. He is not taking any antidopaminergic medications. Recommend starting sinnemet, referring to the movement disorders clinic, and physical therapy.     Plan:     - Start Sinnemet  mg BID for first 3 days and then take TID thereafter; provided three month supply  - Placed referral for physical therapy  - Referral for movement disorders clinic   - Discussed the plan with the patient and addressed any questions or concerns   - F/u in 3 months         Problem List Items Addressed This Visit    None  Visit Diagnoses       Parkinsonism, unspecified Parkinsonism type    -  Primary    Relevant Orders    Ambulatory referral/consult to Physical/Occupational Therapy    Ambulatory referral/consult to Neurology            Sheldon James MD    Patient note was created using MModal Dictation.  Any errors in syntax or even information may not have been identified and edited on initial review prior to signing this note.  Subjective:   Patient seen in consultation at the request of No ref. provider found for the evaluation of neck pain/stiffness. A copy of this  note will be sent to the referring physician.        HPI: history: back surgeries   Mr. Tito Menard is a 77 y.o. male with history of spinal stenosis, diabetes, hypertension, and syncopal episodes (recently discharged 6/22) presenting with neck pain/stiffness. His symptoms have persisted for the last 4 years. The symptoms are more prominent on the right side. He notes decreased range of motion. There is no radiation of pain into the lower back. He denies any new syncopal episodes since being discharged. ECHO with EF 55% and MRI/MRA/EEG with nonspecific findings. He notes trouble getting out of the bathtub and uses a walker for a number of years. He went to physical therapy in the past but was lost to follow up due to trouble paying copays. Gabapentin and robaxin sometimes help with the neck pain. He also occassionally takes 2 tylenol PM before going to bed. He denies any chest pain, shortness of breath, palpitations, nausea, vomiting, or diarrhea. He does endorse constipation for the last 5 days requiring a laxative.       PAST MEDICAL HISTORY:  Past Medical History:   Diagnosis Date    Allergy     Aneurysm of artery of lower extremity     Chalazion of left eye     CKD (chronic kidney disease), stage II 4/1/2019    Diabetes mellitus type II     Diabetes with neurologic complications     Enlarged aorta 8/2/2016    Noted on pharmacological stress echo 2/28/2014.      GERD (gastroesophageal reflux disease)     egd 2008    Hyperlipidemia     Hypertension     Jock itch 7/19/2018    MGD (meibomian gland dysfunction)     Osteopenia     Spinal stenosis     Spondylosis without myelopathy 10/23/2015    Vitamin D deficiency disease        PAST SURGICAL HISTORY:  Past Surgical History:   Procedure Laterality Date    CHALAZION EXCISION Left 8/18/13    CYST REMOVAL  2013    Back of neck    FLEXIBLE CYSTOSCOPY N/A 12/7/2021    Procedure: CYSTOSCOPY, FLEXIBLE;  Surgeon: Beatrice Vaca MD;  Location: [facility] OR [address] [place] [address];   Service: Urology;  Laterality: N/A;    FLUOROSCOPIC URODYNAMIC STUDY N/A 12/7/2021    Procedure: URODYNAMIC STUDY, FLUOROSCOPIC;  Surgeon: Beatrice Vaca MD;  Location: [address];  Service: Urology;  Laterality: N/A;  90 minutes     SPINE SURGERY  2007    x2 (2000, 2007)       CURRENT MEDS:  Current Outpatient Medications   Medication Sig Dispense Refill    acetaminophen/diphenhydramine (TYLENOL PM ORAL) Take 2 capsules by mouth nightly.      aspirin (ECOTRIN) 81 MG EC tablet TAKE 1 TABLET EVERY DAY 90 tablet 3    aspirin/salicylamide/caffeine (BC HEADACHE POWDER ORAL) Take 1 packet by mouth daily as needed (for headache).      azelastine (ASTELIN) 137 mcg (0.1 %) nasal spray USE 1 SPRAY IN EACH NOSTRIL TWICE DAILY 60 mL 0    benzonatate (TESSALON) 200 MG capsule TAKE 1 CAPSULE THREE TIMES DAILY AS NEEDED FOR COUGH 30 capsule 0    calcium carbonate (TUMS ORAL) Take 1-2 tablets by mouth daily as needed (for acid reflux).      chlorthalidone (HYGROTEN) 25 MG Tab TAKE 1/2 TABLET EVERY DAY 45 tablet 2    cholecalciferol, vitamin D3, (VITAMIN D3) 50 mcg (2,000 unit) Cap Take 1 capsule by mouth once daily.      clotrimazole-betamethasone 1-0.05% (LOTRISONE) cream APPLY TOPICALLY 2 (TWO) TIMES DAILY. (Patient taking differently: Apply topically 2 (two) times daily. Uses at bath time as needed for redness between thighs.) 45 g 0    docusate sodium (COLACE) 50 MG capsule Take 100 mg by mouth daily as needed for Constipation.      fluticasone propionate (FLONASE) 50 mcg/actuation nasal spray USE 1 SPRAY NASALLY ONE TIME DAILY 32 g 6    gabapentin (NEURONTIN) 300 MG capsule TAKE 1 CAPSULE EVERY MORNING AND AT NOON, AND TAKE 2 CAPSULES EVERY EVENING (TOTAL 4 CAPSULES DAILY) 360 capsule 0    guaifenesin (MUCINEX) 600 mg 12 hr tablet Take 1,200 mg by mouth 2 (two) times daily as needed.       irbesartan (AVAPRO) 300 MG tablet TAKE 1 TABLET EVERY EVENING 90 tablet 0    ketoconazole (NIZORAL) 2 % cream AAA bid (facial  redness and scaling) (Patient taking differently: Apply topically once weekly for facial redness and scaling.) 60 g 3    lanolin/mineral oil/petrolatum (ARTIFICIAL TEARS OPHT) Place 1-2 drops into both eyes daily as needed (for dry eyes).      LIDOcaine 4 % PtMd Apply 1 patch topically daily as needed (for back pain).      methocarbamoL (ROBAXIN) 500 MG Tab Take 500 mg by mouth 4 (four) times daily.      omeprazole (PRILOSEC) 20 MG capsule Take 2 capsules (40 mg total) by mouth once daily. 90 capsule 3    potassium chloride (MICRO-K) 10 MEQ CpSR TAKE 3 CAPSULES ONE TIME DAILY 270 capsule 3    pravastatin (PRAVACHOL) 20 MG tablet TAKE 1 TABLET EVERY DAY 90 tablet 3    spironolactone (ALDACTONE) 25 MG tablet Take 0.5 tablets (12.5 mg total) by mouth once daily. 45 tablet 3    tamsulosin (FLOMAX) 0.4 mg Cap Take 1 capsule (0.4 mg total) by mouth once daily. 90 capsule 3     No current facility-administered medications for this visit.       ALLERGIES:  Review of patient's allergies indicates:  No Known Allergies    FAMILY HISTORY:  Family History   Problem Relation Age of Onset    Hyperlipidemia Mother     Hypertension Mother     Kidney disease Mother     Cancer Mother     Heart disease Mother     Kidney failure Mother     No Known Problems Daughter     No Known Problems Sister     No Known Problems Brother     No Known Problems Son     No Known Problems Brother     No Known Problems Son     Hypertension Maternal Grandmother     Amblyopia Neg Hx     Blindness Neg Hx     Cataracts Neg Hx     Diabetes Neg Hx     Glaucoma Neg Hx     Macular degeneration Neg Hx     Retinal detachment Neg Hx     Strabismus Neg Hx     Stroke Neg Hx     Thyroid disease Neg Hx     Melanoma Neg Hx     Psoriasis Neg Hx     Lupus Neg Hx     Eczema Neg Hx        SOCIAL HISTORY:  Social History     Tobacco Use    Smoking status: Never    Smokeless tobacco: Former     Types: Chew    Tobacco comments:     Chewed tobacco once per day for 4-5 years when  "a    Substance Use Topics    Alcohol use: No    Drug use: No       Review of Systems:  12 system review of systems is negative except for the symptoms mentioned in HPI.      Objective:     Vitals:    08/03/23 1003   BP: (!) 148/80   Pulse: 69   Weight: 94.3 kg (208 lb)   Height: 5' 11" (1.803 m)     General: NAD, well nourished   Eyes: no tearing, discharge, no erythema   ENT: moist mucous membranes of the oral cavity, nares patent    Neck: Supple, full range of motion  Cardiovascular: Warm and well perfused, pulses equal and symmetrical  Lungs: Normal work of breathing, normal chest wall excursions  Skin: No rash, lesions, or breakdown on exposed skin  Psychiatry: Mood and affect are appropriate   Abdomen: soft, non tender, non distended  Extremeties: No cyanosis, clubbing or edema.    Neurological   MENTAL STATUS: Slowing of speech but able to complete all sentences with fluency and without dysarthria. Alert and oriented to person, place, and time. Attention and concentration within normal limits. Recent and remote memory within normal limits.  CRANIAL NERVES: Visual fields intact. PERRL. EOMI. Facial sensation intact. Face symmetrical. Hearing grossly intact. Full shoulder shrug bilaterally. Tongue protrudes midline   SENSORY: Sensation is intact to light touch throughout.  Joint position perception intact. Negative Romberg.   MOTOR: Increased rigidity in the upper extremities. Visible resting tremor of the left index finger. Decreased range of motion in the neck on rotation, flexion, and extension. Slow gait with stooped posture. Difficulty with rapid tapping of index finger and thumb. Motor exam is concerning for parkinsonian features.  No pronator drift.  5/5 deltoid, biceps, triceps, interosseous, hand  bilaterally. 5/5 iliopsoas, knee extension/flexion, foot dorsi/plantarflexion in left lower extremity.  4/5 in right lower extremity.   REFLEXES: Symmetric and 2+ throughout. Toes down going " bilaterally.   CEREBELLAR/COORDINATION/GAIT:  Flexed with intermittent L thumb tremor appreciated when standing. Unable to initiate gait so examination terminated.

## 2023-08-03 NOTE — PROGRESS NOTES
HPI    Annual Exam   Pt states Blurred Vision   Growth on lower lid     Pt denies F/F   Pt denies Dry/ Itchy/ Burning/ gritty/ goopy   Gtt: Yes Visine as needed      DM Dx'ed   BS: Unknown   Hemoglobin A1C       Date                     Value               Ref Range             Status                06/20/2023               5.6                 4.0 - 5.6 %           Final                Last edited by Tito Palafox, OD on 8/3/2023  1:05 PM.            Assessment /Plan     For exam results, see Encounter Report.    Encounter for routine eye and vision examination  -No retinopathy noted today.  Continued control with primary care physician and annual comprehensive eye exam.  -Eyemed    Hyperopia of both eyes with astigmatism  Eyeglass Final Rx       Eyeglass Final Rx         Sphere Cylinder Axis Dist VA Add    Right +0.75 +0.50 180 20/25- +2.50    Left +0.75 +0.50 180 20/25- +2.50      Type: PAL    Expiration Date: 8/3/2024                      RTC 1 yr

## 2023-08-04 NOTE — PROGRESS NOTES
I have reviewed the notes, assessments, and/or procedures performed by Dr. James, I concur with her/his documentation of Tito Menard.

## 2023-08-07 DIAGNOSIS — I10 ESSENTIAL HYPERTENSION: ICD-10-CM

## 2023-08-07 NOTE — TELEPHONE ENCOUNTER
No care due was identified.  [facility] Embedded Care Due Messages. Reference number: [id number].   8/07/2023 10:44:29 AM CDT

## 2023-08-08 RX ORDER — IRBESARTAN 300 MG/1
TABLET ORAL
Qty: 90 TABLET | Refills: 2 | Status: SHIPPED | OUTPATIENT
Start: 2023-08-08

## 2023-08-08 NOTE — TELEPHONE ENCOUNTER
Refill Routing Note   Medication(s) are not appropriate for processing by [facility] for the following reason(s):      Patient seen in ED/Hospital since LOV with provider  Required vitals abnormal    [facility] action(s):  Defer Care Due:  None identified            Appointments  past 12m or future 3m with PCP    Date Provider   Last Visit   6/28/2022 Ronny Jimenes MD   Next Visit   Visit date not found Ronny iJmenes MD   ED visits in past 90 days: 0        Note composed:10:50 AM 08/08/2023

## 2023-08-15 ENCOUNTER — CLINICAL SUPPORT (OUTPATIENT)
Dept: REHABILITATION | Facility: HOSPITAL | Age: 78
End: 2023-08-15
Payer: MEDICARE

## 2023-08-15 DIAGNOSIS — R29.3 POOR POSTURE: ICD-10-CM

## 2023-08-15 DIAGNOSIS — R29.898 LEG WEAKNESS, BILATERAL: ICD-10-CM

## 2023-08-15 DIAGNOSIS — G20.C PARKINSONISM, UNSPECIFIED PARKINSONISM TYPE: ICD-10-CM

## 2023-08-15 PROCEDURE — 97162 PT EVAL MOD COMPLEX 30 MIN: CPT | Mod: PO

## 2023-08-15 NOTE — PLAN OF CARE
[facility]  Physical Therapy Neurological Rehabilitation Initial Evaluation     Name: Tito Menard  Clinic Number: [id number]    Therapy Diagnosis:   Encounter Diagnoses   Name Primary?    Parkinsonism, unspecified Parkinsonism type     Poor posture     Leg weakness, bilateral      Physician: Sheldon James MD    Physician Orders: PT Eval and Treat   Medical Diagnosis from Referral: Parkinsonism, unspecified type  Evaluation Date: 8/15/2023  Authorization Period Expiration: 8/2/24  Plan of Care Expiration: 10/10/23  Progress Note Due: 9/15/23  Visit # / Visits authorized: 1/ 1  FOTO: 1/3    Precautions: Standard, Fall, and occasional syncopal episodes    Time In: 0930  Time Out: 1025  Total Billable Time: 55 minutes    Subjective      Date of onset: past 2 years~ difficulty with balance     History of current condition - Tito reports:  he loses his balance quite frequently. He suffers with B shoulder and neck pain. He also feels his legs are weaker than they used to be. He has to use his hands to  his R leg when he gets into his vehicle. He also reports his hearing and vision are declining. L eye seems to be more bothersome. He was recently put on Sinemet after seeing Neurology and he states MD feels he is in the beginning stages of Parkinsonism. He also mentions that at times he has passed out and ended up in the hospital. He is unsure why these episodes occur.     Imaging, X-ray cervical spine: 6/20/23:       FINDINGS:  C1-C2: Pre-dens space is maintained.  Dens and lateral masses of C1 are unremarkable.     Alignment: Alignment is maintained.  Straightening of normal lordosis.     Vertebrae: Vertebral body heights are maintained.  Anterior osteophytosis, similar to prior.     Discs and facets: Multilevel degenerative disc space narrowing, similar to prior.  Degenerative facet changes, similar to prior.     Miscellaneous: No prevertebral soft tissue thickening.    "  Impression:     Degenerative changes similar to prior.       Prior Therapy: yes, here about 6 years ago for his back  Social History:  lives with his spouse and youngest son  Falls: at least 2 falls in the past 6 months   DME: Rollator, has upright walker with platform support for UEs, 3 canes, shower chair(tries not to use~ likes to sit in the tub)   Home Environment: 1 story home with 4-5 steps to enter   Exercise Routine / History: has a small stationary bicycle for feet only   Family Present at time of Eval: wife waits in car   Occupation: drove trucks until about [id number]  Prior Level of Function: Fully independent, not using AD, driving  Current Level of Function: uses AD for ambulation, not driving as much, feels weaker and more off balance    Pain:  Current 0/10, worst 3-4/10, best 0/10   Location: bilateral shoulders and back   Description: Ache  Aggravating Factors: nothing  Easing Factors: roll on pain meds, patches, occasionally wears back brace    Patient's goals: " get to a point where I can go outside and work in my yard."    Medical History:   Past Medical History:   Diagnosis Date    Allergy     Aneurysm of artery of lower extremity     Chalazion of left eye     CKD (chronic kidney disease), stage II 4/1/2019    Diabetes mellitus type II     Diabetes with neurologic complications     Enlarged aorta 8/2/2016    Noted on pharmacological stress echo 2/28/2014.      GERD (gastroesophageal reflux disease)     egd 2008    Hyperlipidemia     Hypertension     Jock itch 7/19/2018    MGD (meibomian gland dysfunction)     Osteopenia     Spinal stenosis     Spondylosis without myelopathy 10/23/2015    Vitamin D deficiency disease        Surgical History:   Tito Menard  has a past surgical history that includes Spine surgery (2007); Chalazion excision (Left, 8/18/13); Cyst Removal (2013); Fluoroscopic urodynamic study (N/A, 12/7/2021); and Flexible cystoscopy (N/A, 12/7/2021).    Medications:   Tito" has a current medication list which includes the following prescription(s): acetaminophen/diphenhydramine, aspirin, aspirin/salicylamide/caffeine, azelastine, benzonatate, calcium carbonate, carbidopa-levodopa  mg, chlorthalidone, cholecalciferol (vitamin d3), clotrimazole-betamethasone 1-0.05%, docusate sodium, fluticasone propionate, gabapentin, guaifenesin, irbesartan, ketoconazole, lanolin/mineral oil/petrolatum, lidocaine, methocarbamol, omeprazole, potassium chloride, pravastatin, spironolactone, tamsulosin, and [DISCONTINUED] finasteride.    Allergies:   Review of patient's allergies indicates:  No Known Allergies     Objective     Observation: pleasant and cooperative   Speech: clear    Mental status: alert, oriented to person, place, and time  Appearance: Casually dressed and Well groomed  Behavior:  calm, cooperative, and adequate rapport can be established  Attention Span and Concentration:  Normal    Posture Alignment : In sitting, pt demonstrates forward head, rounded shoulders, increased thoracic kyphosis, PPT, mild weight shift to R hip with L iliac crest situated slightly superior to R  In standing, pt demonstrates lateral shift of trunk to R of midline, forward flexed trunk/hips and flexion of B knees    Skin integrity:  Intact where visible    Visual/Auditory: describes a feeling like something is in his eye and eyes begin to water, reading harder; feels like his hearing is declining  Wears glasses to today's evaluation(progressive lenses).    ROM:   GROSS AROM/PROM  UPPER EXTREMITY  (R) UE: limited as follows: shoulder flexion to about 132 degrees  (L) UE: limited as follows: shoulder flexion to about 132 degrees  LOWER EXTREMITY  (R) LE: WFL  (L) LE: WFL       Lower Extremity Strength  Right LE  Left LE    Hip flexion:  3+/5* Hip flexion: 4-/5   Knee extension: 4/5 Knee extension: 4+/5   Knee flexion: 2-/5 Knee flexion: 4/5   Ankle dorsiflexion:  5/5 Ankle dorsiflexion: 5/5   Ankle  plantarflexion:  3-/5 Ankle plantarflexion: 3+/5   Hip abduction: 4-/5 Hip abduction: 4/5   Hip adduction: 4/5 Hip adduction 4/5   Hip extension: 4-/5 Hip extension: 4/5     Upper extremity strength: Not tested due to time constraints      Coordination:   Rapid Alternating Movements: at least minimal impairment to B UE  Point to Point:    -Finger to Nose: WFL B UE but very slight tremor noted to L UE   -Heel to Nicole: NT    Sensation: intact to B UE and LE for LT  Proprioception: NT    Tone/Spasticity: no abnormal tone or spasticity noted to B UE or LE    Functional Mobility (Bed mobility, transfers)  Pt performs supine<> sit mod I for increased time. Sit<> stand transfers with mod I. Stand pivot transfer from mat to gold chair with no AD and SBA.        Evaluation   Single Limb Stance R LE NT  (<10 sec = HIGH FALL RISK)   Single Limb Stance L LE NT  (<10 sec = HIGH FALL RISK)   30 second Chair Rise 4.5 completed with arms   5 times sit to stand NT   TUG 23 seconds with rollator   Self selected walking speed 0.46 m/sec (6m/13s) with rollator   Fast walking speed NT     30 second chair rise below average score:   Age  Men  Women  75-79  <14  <13    A below average score indicates a risk for fall.    5x sit to stand normative information:   >12 sec= fall risk for general elderly  >16 sec= fall risk for Parkinson's disease  >10 sec= balance/vestibular dysfunction (<59 y/o)  >14.2 sec= balance/vestibular dysfunction (>59 y/o)  >12 sec= fall risk for CVA    Postural control: Unable to formally assess static standing balance but will look to do so at a future therapy session.      Gait Assessment:   - AD used: rollator  - Assistance: mod I  - Stairs: NT due to time constraints    GAIT DEVIATIONS:   Tito displays the following deviations with ambulation: decreased ar, decreased step and stride length   Impairments contributing to deviations: decreased posture, decreased LE strength, decreased  endurance            Endurance Deficit: yes, at least minimal based on today's assessment, but could be more with more challenging therapy tasks     PT Evaluation Completed? Yes      Intake Outcome Measure for FOTO Degenerative CNS Disorders Survey    Therapist reviewed FOTO scores for Tito Menard on 8/15/2023.   FOTO documents entered into [id number] - see Media section.             Treatment     Total Treatment time separate from Evaluation: 0 minutes    No treatment performed; evaluation only.    Patient Education and Home Exercises     Education provided:   - Pt educated regarding evaluation findings, potential plan of care and scheduling process.      Written Home Exercises Provided: to be provided in a future session.      Assessment     Tito is a 77 y.o. male referred to outpatient Physical Therapy with a medical diagnosis of Parkinsonism, unspecified type. Patient presents with very compromised sitting and standing postures, decreased strength to B LE( R more so than L), reports of decreased balance and occasional syncopal episodes. Pt arrived to today's evaluation with a rollator which is clearly too low for him. He has one which is a bit taller, so PT asked that he bring it to his next appointment. With regard to today's objective findings, pt's R LE strength is more limited than his L. Weakest muscles here are hip flexion, knee flexion and ankle plantarflexion. Pt's 30 second chair rise is quite limited at 4.5 repetitions, indicating fall risk. His TUG score of 23 seconds also places him within the fall risk window. Pt's SSWS of 0.46 m/sec places him in the 60-79 % limited category with respect to independent, safe community ambulators. PT was unable to perform any formal balance tests today due to time constraints, so will look to do this at a future follow-up visit. Pt seems to be interested in addressing his balance and strength limitations in neuro PT. Therefore, recommend 2 x weekly visits for 8  weeks to address above limitations.     Patient prognosis is Fair.   Patient will benefit from skilled outpatient Physical Therapy to address the deficits stated above and in the chart below, provide patient/family education, and to maximize patient's level of independence.     Plan of care discussed with patient: Yes  Patient's spiritual, cultural and educational needs considered and patient is agreeable to the plan of care and goals as stated below:     Anticipated Barriers for therapy: pt does not drive as much as he used to; occasional syncopal episodes    Medical Necessity is demonstrated by the following  History  Co-morbidities and personal factors that may impact the plan of care [] LOW: no personal factors / co-morbidities  [] MODERATE: 1-2 personal factors / co-morbidities  [x] HIGH: 3+ personal factors / co-morbidities    Moderate / High Support Documentation: spinal stenosis, DM 2, polyneuropathy, lung disease, HTN, obesity, syncope, osteopenia     Examination  Body Structures and Functions, activity limitations and participation restrictions that may impact the plan of care [] LOW: addressing 1-2 elements  [] MODERATE: 3+ elements  [x] HIGH: 4+ elements (please support below)    Moderate / High Support Documentation: LE strength, balance, gait speed, posture     Clinical Presentation [] LOW: stable  [x] MODERATE: Evolving  [] HIGH: Unstable     Decision Making/ Complexity Score: moderate       Goals:  Short Term Goals: 4 weeks   Pt to be issued first installment of HEP and report at least partial compliance.  Pt to complete formal standing balance testing and PT to establish goals as needed  Pt to improve his 30 second chair rise to 5.5-6 reps to demonstrate improved LE functional strength and muscular endurance  Pt to improve his TUG time to 21 seconds or < for improved household ambulation with decreased fall risk  Pt to improve his SSWS to 0.50 m/sec for improved community ambulation with decreased  fall risk  Pt to improve at least 3 LE MMT grades by 1/3 for improved functional mobility and standing tolerance    Long Term Goals: 8 weeks   Pt to be at least partially compliant with finalized HEP to help maintain potential gains realized in PT  Pt to improve his 30 second chair rise to 6.5-7 reps to demonstrate improved LE functional strength and muscular endurance  Pt to improve his TUG time to 19 seconds or <  for improved household ambulation with decreased fall risk  Pt to improve his SSWS to 0.55 m/sec for improved community ambulation with decreased fall risk  Pt to improve at least 5 LE MMT grades by 1/3 for improved functional mobility and standing tolerance    Plan     Plan of care Certification: 8/15/2023 to 10/10/23.    Outpatient Physical Therapy 2 times weekly for 8 weeks to include the following interventions: Gait Training, Neuromuscular Re-ed, Patient Education, Therapeutic Activities, and Therapeutic Exercise.     Sheldon Corona, PT   8/15/2023

## 2023-08-29 NOTE — PROGRESS NOTES
I have reviewed the notes, assessments, and/or procedures performed by resident, I concur with her/his documentation.

## 2023-09-05 ENCOUNTER — DOCUMENTATION ONLY (OUTPATIENT)
Dept: REHABILITATION | Facility: HOSPITAL | Age: 78
End: 2023-09-05
Payer: MEDICARE

## 2023-09-05 NOTE — PROGRESS NOTES
PT/PTA met face to face to discuss pt's treatment plan and progress towards established goals.  Continue with current PT POC with focus on balance and strengthening.  Patient will be seen by physical therapist at least every sixth treatment or 30 days, whichever occurs first.    Michelle Hester, PTA  09/05/2023

## 2023-09-06 NOTE — PROGRESS NOTES
[facility]   Physical Therapy Treatment Note     Name: Tito Menard  Clinic Number: [id number]    Therapy Diagnosis:   Encounter Diagnoses   Name Primary?    Poor posture Yes    Leg weakness, bilateral      Physician: No ref. provider found    Visit Date: 9/7/2023  Physician: Sheldon James MD     Physician Orders: PT Eval and Treat   Medical Diagnosis from Referral: Parkinsonism, unspecified type  Evaluation Date: 8/15/2023  Authorization Period Expiration: 8/2/24  Plan of Care Expiration: 10/10/23  Progress Note Due: 9/15/23  Visit # / Visits authorized: 2/ 1 pending auth  FOTO: 1/3     Precautions: Standard, Fall, and occasional syncopal episodes     Time In: 11:00  Time Out: 11:45  Total Billable Time: 45 minutes       SUBJECTIVE     Pt reports: that he feels pretty good today and has no new complaints. He reports his neck and his back hurt every morning    He was given home exercise program 9/7/2023. Patient verbalizes/ demonstrates understanding.   Response to previous treatment: good  Functional change: ongoing     Pain: 4/10  Location:  shoulder and back pain      OBJECTIVE     Objective Measures updated at progress report unless specified.     Treatment     Tito received the treatments listed below:      therapeutic exercises to develop strength, endurance, ROM, flexibility, posture, and core stabilization for 10 minutes including:    10 minutes on SCIFIT seated elliptical for CV endurance and LE strength at level 2.0    neuromuscular re-education activities to improve: Balance, Coordination, Kinesthetic, Sense, Proprioception, and Posture for 0 minutes. The following activities were included:      therapeutic activities to improve functional performance for 35  minutes, including:    Patient performed 3 x 10 reps of the following exercises that are included in the patient's home exercise program. Patient demonstrates and verbalizes understanding of home exercise program.       Seated marches   Sit to stands (3 x 5 reps)  Heel slides   Standing heel raises  (attempted standing but patient was unable to perform and exercise was then performed in sitting with proper mechanics)     Time above includes time to ambulate to/from waiting room        Patient Education and Home Exercises     Home Exercises Provided and Patient Education Provided     Education provided:   - home exercise program     Written Home Exercises Provided: yes. Exercises were reviewed and Tito was able to demonstrate them prior to the end of the session.  Tito demonstrated good  understanding of the education provided. See EMR under Patient Instructions for exercises provided during therapy sessions    ASSESSMENT     Mr. Francis tolerated today's session well. The session focused primarily on performing home exercise program for LE strength and endurance. The patient verbalized/demonstrated understanding of home exercise program. Patient was unable to perform standing heel raise so patient was instructed to perform heel raises seated. The patient will benefit from continued physical therapy intervention to address remaining functional mobility deficits.     Tito Is progressing well towards his goals.   Pt prognosis is Good.     Pt will continue to benefit from skilled outpatient physical therapy to address the deficits listed in the problem list box on initial evaluation, provide pt/family education and to maximize pt's level of independence in the home and community environment.     Pt's spiritual, cultural and educational needs considered and pt agreeable to plan of care and goals.     Anticipated barriers to physical therapy: co-morbidities     Goals:  Short Term Goals: 4 weeks   Pt to be issued first installment of HEP and report at least partial compliance. Ongoing   Pt to complete formal standing balance testing and PT to establish goals as needed Ongoing  Pt to improve his 30 second chair rise to 5.5-6 reps  to demonstrate improved LE functional strength and muscular endurance Ongoing  Pt to improve his TUG time to 21 seconds or < for improved household ambulation with decreased fall risk. Ongoing  Pt to improve his SSWS to 0.50 m/sec for improved community ambulation with decreased fall risk Ongoing  Pt to improve at least 3 LE MMT grades by 1/3 for improved functional mobility and standing tolerance Ongoing     Long Term Goals: 8 weeks   Pt to be at least partially compliant with finalized HEP to help maintain potential gains realized in PT. Ongoing  Pt to improve his 30 second chair rise to 6.5-7 reps to demonstrate improved LE functional strength and muscular endurance. Ongoing  Pt to improve his TUG time to 19 seconds or <  for improved household ambulation with decreased fall risk. Ongoing  Pt to improve his SSWS to 0.55 m/sec for improved community ambulation with decreased fall risk. Ongoing  Pt to improve at least 5 LE MMT grades by 1/3 for improved functional mobility and standing tolerance. Ongoing    PLAN     Continue to progress strength, endurance and balance training as tolerated     Dana Harmon, PT

## 2023-09-07 ENCOUNTER — CLINICAL SUPPORT (OUTPATIENT)
Dept: REHABILITATION | Facility: HOSPITAL | Age: 78
End: 2023-09-07
Payer: MEDICARE

## 2023-09-07 DIAGNOSIS — R29.898 LEG WEAKNESS, BILATERAL: ICD-10-CM

## 2023-09-07 DIAGNOSIS — R29.3 POOR POSTURE: Primary | ICD-10-CM

## 2023-09-07 PROCEDURE — 97530 THERAPEUTIC ACTIVITIES: CPT | Mod: PO

## 2023-09-07 PROCEDURE — 97110 THERAPEUTIC EXERCISES: CPT | Mod: PO

## 2023-09-18 NOTE — PROGRESS NOTES
[facility]   Physical Therapy Treatment Note     Name: Tito Menard  Clinic Number: [id number]    Therapy Diagnosis:   Encounter Diagnoses   Name Primary?    Poor posture Yes    Leg weakness, bilateral        Physician: Sheldon James MD    Visit Date: 9/19/2023  Physician: Sheldon James MD     Physician Orders: PT Eval and Treat   Medical Diagnosis from Referral: Parkinsonism, unspecified type  Evaluation Date: 8/15/2023  Authorization Period Expiration: 8/2/24  Plan of Care Expiration: 10/10/23  Progress Note Due: 9/15/23  Visit # / Visits authorized: 2/ 20 (+eval)  FOTO: 1/3     Precautions: Standard, Fall, and occasional syncopal episodes     Time In: 11:15  Time Out: 12:00  Total Billable Time: 45 minutes       SUBJECTIVE     Pt reports: that his back has been bothering his but he otherwise feels fine.     He reports he was not compliant with home exercise program   Response to previous treatment: good  Functional change: ongoing     Pain: 4/10  Location:  shoulder and back pain      OBJECTIVE     Objective Measures updated at progress report unless specified.     Treatment     Tito received the treatments listed below:      therapeutic exercises to develop strength, endurance, ROM, flexibility, posture, and core stabilization for 20 minutes including:    10 minutes on SCIFIT seated elliptical for CV endurance and LE strength at level 2.0    2 x 5 reps BLE leading car legs over medium sized block    neuromuscular re-education activities to improve: Balance, Coordination, Kinesthetic, Sense, Proprioception, and Posture for 0 minutes. The following activities were included:      therapeutic activities to improve functional performance for 25 minutes, including:    2 x 10 reps sit to stands, BUE push off from mat     X 5 reps BLE leading forward/backwards stepping over half wedge with one UE  support  - increased difficulty when stepping over with the LLE due to weakness of RLE  and RLE knee buckling     X 5 reps side stepping over half wedge with BLE leading, CGA, BUE support     Time above includes time to ambulate to/from waiting room        Patient Education and Home Exercises     Home Exercises Provided and Patient Education Provided     Education provided:   - home exercise program     Written Home Exercises Provided: yes. Exercises were reviewed and Tito was able to demonstrate them prior to the end of the session.  Tito demonstrated good  understanding of the education provided. See EMR under Patient Instructions for exercises provided during therapy sessions    ASSESSMENT     Mr. Francis tolerated today's session well. Patient reports he was not compliant with his home exercise program. The patient was educated on the importance of performing home exercise program. The patient demonstrated very poor activity tolerance throughout all activities today and required frequent rest breaks.  The patient will benefit from continued physical therapy intervention to address remaining functional mobility deficits.     Tito Is progressing well towards his goals.   Pt prognosis is Good.     Pt will continue to benefit from skilled outpatient physical therapy to address the deficits listed in the problem list box on initial evaluation, provide pt/family education and to maximize pt's level of independence in the home and community environment.     Pt's spiritual, cultural and educational needs considered and pt agreeable to plan of care and goals.     Anticipated barriers to physical therapy: co-morbidities     Goals:  Short Term Goals: 4 weeks   Pt to be issued first installment of HEP and report at least partial compliance. Ongoing   Pt to complete formal standing balance testing and PT to establish goals as needed Ongoing  Pt to improve his 30 second chair rise to 5.5-6 reps to demonstrate improved LE functional strength and muscular endurance Ongoing  Pt to improve his TUG time to 21  seconds or < for improved household ambulation with decreased fall risk. Ongoing  Pt to improve his SSWS to 0.50 m/sec for improved community ambulation with decreased fall risk Ongoing  Pt to improve at least 3 LE MMT grades by 1/3 for improved functional mobility and standing tolerance Ongoing     Long Term Goals: 8 weeks   Pt to be at least partially compliant with finalized HEP to help maintain potential gains realized in PT. Ongoing  Pt to improve his 30 second chair rise to 6.5-7 reps to demonstrate improved LE functional strength and muscular endurance. Ongoing  Pt to improve his TUG time to 19 seconds or <  for improved household ambulation with decreased fall risk. Ongoing  Pt to improve his SSWS to 0.55 m/sec for improved community ambulation with decreased fall risk. Ongoing  Pt to improve at least 5 LE MMT grades by 1/3 for improved functional mobility and standing tolerance. Ongoing    PLAN     Continue to progress strength, endurance and balance training as tolerated     Dana Harmon, PT

## 2023-09-19 ENCOUNTER — CLINICAL SUPPORT (OUTPATIENT)
Dept: REHABILITATION | Facility: HOSPITAL | Age: 78
End: 2023-09-19
Payer: MEDICARE

## 2023-09-19 DIAGNOSIS — R29.898 LEG WEAKNESS, BILATERAL: ICD-10-CM

## 2023-09-19 DIAGNOSIS — R29.3 POOR POSTURE: Primary | ICD-10-CM

## 2023-09-19 PROCEDURE — 97110 THERAPEUTIC EXERCISES: CPT | Mod: PO

## 2023-09-19 PROCEDURE — 97530 THERAPEUTIC ACTIVITIES: CPT | Mod: PO

## 2023-09-22 ENCOUNTER — CLINICAL SUPPORT (OUTPATIENT)
Dept: REHABILITATION | Facility: HOSPITAL | Age: 78
End: 2023-09-22
Payer: MEDICARE

## 2023-09-22 ENCOUNTER — OFFICE VISIT (OUTPATIENT)
Dept: HOME HEALTH SERVICES | Facility: CLINIC | Age: 78
End: 2023-09-22
Payer: MEDICARE

## 2023-09-22 VITALS
BODY MASS INDEX: 28.98 KG/M2 | OXYGEN SATURATION: 96 % | DIASTOLIC BLOOD PRESSURE: 70 MMHG | HEIGHT: 71 IN | WEIGHT: 207 LBS | SYSTOLIC BLOOD PRESSURE: 124 MMHG | RESPIRATION RATE: 16 BRPM | TEMPERATURE: 97 F | HEART RATE: 74 BPM

## 2023-09-22 DIAGNOSIS — E78.2 MIXED HYPERLIPIDEMIA: Chronic | ICD-10-CM

## 2023-09-22 DIAGNOSIS — I70.0 AORTIC ATHEROSCLEROSIS: ICD-10-CM

## 2023-09-22 DIAGNOSIS — R29.898 LEG WEAKNESS, BILATERAL: ICD-10-CM

## 2023-09-22 DIAGNOSIS — G20.C PARKINSONISM, UNSPECIFIED PARKINSONISM TYPE: ICD-10-CM

## 2023-09-22 DIAGNOSIS — E11.42 DIABETIC POLYNEUROPATHY ASSOCIATED WITH TYPE 2 DIABETES MELLITUS: ICD-10-CM

## 2023-09-22 DIAGNOSIS — Z86.39 HISTORY OF DIABETES MELLITUS, TYPE II: ICD-10-CM

## 2023-09-22 DIAGNOSIS — J44.89 OBSTRUCTIVE CHRONIC BRONCHITIS WITHOUT EXACERBATION: ICD-10-CM

## 2023-09-22 DIAGNOSIS — N40.1 BPH WITH OBSTRUCTION/LOWER URINARY TRACT SYMPTOMS: Chronic | ICD-10-CM

## 2023-09-22 DIAGNOSIS — E55.9 VITAMIN D DEFICIENCY DISEASE: ICD-10-CM

## 2023-09-22 DIAGNOSIS — E66.09 CLASS 1 OBESITY DUE TO EXCESS CALORIES WITH SERIOUS COMORBIDITY AND BODY MASS INDEX (BMI) OF 31.0 TO 31.9 IN ADULT: Chronic | ICD-10-CM

## 2023-09-22 DIAGNOSIS — R29.3 POOR POSTURE: Primary | ICD-10-CM

## 2023-09-22 DIAGNOSIS — Z00.00 ENCOUNTER FOR MEDICARE ANNUAL WELLNESS EXAM: ICD-10-CM

## 2023-09-22 DIAGNOSIS — N13.8 BPH WITH OBSTRUCTION/LOWER URINARY TRACT SYMPTOMS: Chronic | ICD-10-CM

## 2023-09-22 DIAGNOSIS — K21.00 GASTROESOPHAGEAL REFLUX DISEASE WITH ESOPHAGITIS, UNSPECIFIED WHETHER HEMORRHAGE: ICD-10-CM

## 2023-09-22 DIAGNOSIS — I50.32 CHRONIC DIASTOLIC HEART FAILURE: ICD-10-CM

## 2023-09-22 DIAGNOSIS — R56.9 SEIZURE: ICD-10-CM

## 2023-09-22 DIAGNOSIS — I10 ESSENTIAL HYPERTENSION: Chronic | ICD-10-CM

## 2023-09-22 DIAGNOSIS — I71.21 ANEURYSM OF ASCENDING AORTA WITHOUT RUPTURE: Chronic | ICD-10-CM

## 2023-09-22 DIAGNOSIS — Z99.89 DEPENDENCE ON OTHER ENABLING MACHINES AND DEVICES: ICD-10-CM

## 2023-09-22 DIAGNOSIS — Z00.00 ENCOUNTER FOR PREVENTIVE HEALTH EXAMINATION: Primary | ICD-10-CM

## 2023-09-22 DIAGNOSIS — H91.93 BILATERAL HEARING LOSS, UNSPECIFIED HEARING LOSS TYPE: ICD-10-CM

## 2023-09-22 DIAGNOSIS — G95.9 CERVICAL MYELOPATHY: ICD-10-CM

## 2023-09-22 DIAGNOSIS — R26.9 ABNORMALITY OF GAIT AND MOBILITY: ICD-10-CM

## 2023-09-22 DIAGNOSIS — M85.80 OSTEOPENIA, UNSPECIFIED LOCATION: ICD-10-CM

## 2023-09-22 PROCEDURE — 1160F PR REVIEW ALL MEDS BY PRESCRIBER/CLIN PHARMACIST DOCUMENTED: ICD-10-PCS | Mod: CPTII,S$GLB,,

## 2023-09-22 PROCEDURE — 97530 THERAPEUTIC ACTIVITIES: CPT | Mod: PO,CQ

## 2023-09-22 PROCEDURE — 3288F PR FALLS RISK ASSESSMENT DOCUMENTED: ICD-10-PCS | Mod: CPTII,S$GLB,,

## 2023-09-22 PROCEDURE — 1125F AMNT PAIN NOTED PAIN PRSNT: CPT | Mod: CPTII,S$GLB,,

## 2023-09-22 PROCEDURE — G0439 PR MEDICARE ANNUAL WELLNESS SUBSEQUENT VISIT: ICD-10-PCS | Mod: S$GLB,,,

## 2023-09-22 PROCEDURE — 1100F PR PT FALLS ASSESS DOC 2+ FALLS/FALL W/INJURY/YR: ICD-10-PCS | Mod: CPTII,S$GLB,,

## 2023-09-22 PROCEDURE — 3288F FALL RISK ASSESSMENT DOCD: CPT | Mod: CPTII,S$GLB,,

## 2023-09-22 PROCEDURE — G0439 PPPS, SUBSEQ VISIT: HCPCS | Mod: S$GLB,,,

## 2023-09-22 PROCEDURE — 3074F SYST BP LT 130 MM HG: CPT | Mod: CPTII,S$GLB,,

## 2023-09-22 PROCEDURE — 1160F RVW MEDS BY RX/DR IN RCRD: CPT | Mod: CPTII,S$GLB,,

## 2023-09-22 PROCEDURE — 97110 THERAPEUTIC EXERCISES: CPT | Mod: PO,CQ

## 2023-09-22 PROCEDURE — 1125F PR PAIN SEVERITY QUANTIFIED, PAIN PRESENT: ICD-10-PCS | Mod: CPTII,S$GLB,,

## 2023-09-22 PROCEDURE — 1159F MED LIST DOCD IN RCRD: CPT | Mod: CPTII,S$GLB,,

## 2023-09-22 PROCEDURE — 1170F FXNL STATUS ASSESSED: CPT | Mod: CPTII,S$GLB,,

## 2023-09-22 PROCEDURE — 1170F PR FUNCTIONAL STATUS ASSESSED: ICD-10-PCS | Mod: CPTII,S$GLB,,

## 2023-09-22 PROCEDURE — 3074F PR MOST RECENT SYSTOLIC BLOOD PRESSURE < 130 MM HG: ICD-10-PCS | Mod: CPTII,S$GLB,,

## 2023-09-22 PROCEDURE — 1159F PR MEDICATION LIST DOCUMENTED IN MEDICAL RECORD: ICD-10-PCS | Mod: CPTII,S$GLB,,

## 2023-09-22 PROCEDURE — 1100F PTFALLS ASSESS-DOCD GE2>/YR: CPT | Mod: CPTII,S$GLB,,

## 2023-09-22 PROCEDURE — 3078F PR MOST RECENT DIASTOLIC BLOOD PRESSURE < 80 MM HG: ICD-10-PCS | Mod: CPTII,S$GLB,,

## 2023-09-22 PROCEDURE — 3078F DIAST BP <80 MM HG: CPT | Mod: CPTII,S$GLB,,

## 2023-09-22 NOTE — PROGRESS NOTES
"Tito Menard presented for a  Medicare AWV and comprehensive Health Risk Assessment today. The following components were reviewed and updated:    Medical history  Family History  Social history  Allergies and Current Medications  Health Risk Assessment  Health Maintenance  Care Team         ** See Completed Assessments for Annual Wellness Visit within the encounter summary.**         The following assessments were completed:  Living Situation  CAGE  Depression Screening  Timed Get Up and Go: Deferred, impaired mobility  Whisper Test: Deferred, hearing impaired  Cognitive Function Screening: Deferred, Parkinson's  Nutrition Screening  ADL Screening  PAQ Screening        Vitals:    09/22/23 0858   BP: 124/70   BP Location: Left arm   Patient Position: Sitting   Pulse: 74   Resp: 16   Temp: 97 °F (36.1 °C)   SpO2: 96%   Weight: 93.9 kg (207 lb)   Height: 5' 11" (1.803 m)     Body mass index is 28.87 kg/m².    Physical Exam  Vitals reviewed.   Constitutional:       General: He is not in acute distress.     Appearance: Normal appearance.   Cardiovascular:      Rate and Rhythm: Normal rate and regular rhythm.      Pulses: Normal pulses.      Heart sounds: No murmur heard.  Pulmonary:      Effort: Pulmonary effort is normal. No respiratory distress.      Breath sounds: Normal breath sounds.   Abdominal:      General: Bowel sounds are normal.   Musculoskeletal:      Right lower leg: No edema.      Left lower leg: No edema.   Neurological:      General: No focal deficit present.      Mental Status: He is alert and oriented to person, place, and time.      Motor: Tremor present.      Gait: Gait abnormal.   Psychiatric:         Mood and Affect: Mood normal.         Behavior: Behavior normal.               Diagnoses and health risks identified today and associated recommendations/orders:    1. Encounter for preventive health examination  Assessments completed.  HM recommendations reviewed.  F/u with PCP as instructed. "     Patient not on chronic opioids.   Risk factors reviewed for any potential opioid use disorder   Pain evaluated during visit.  Current treatment plan documented.  Will refer to specialist, as appropriate.      2. Obstructive chronic bronchitis without exacerbation  Chronic, stable on current regimen. Followed by PCP.     3. Aneurysm of ascending aorta without rupture  Chronic, stable on current regimen. Followed by PCP.     4. Aortic atherosclerosis  Chronic, stable on current statin regimen. Followed by PCP.     5. Chronic diastolic heart failure  Chronic, stable on current Aldactone regimen. Followed by PCP.     6. Seizure  Chronic, stable on current regimen. Followed by PCP.     7. Parkinsonism, unspecified Parkinsonism type  Chronic, stable on current Sinemet regimen. Followed by PCP.     8. Cervical myelopathy  Chronic, stable on current regimen. Followed by PCP.     9. History of diabetes mellitus, type II  Chronic, stable on current diet regimen. Followed by PCP.     10. Diabetic polyneuropathy associated with type 2 diabetes mellitus  Chronic, stable on current Gabapentin regimen. Followed by PCP.     11. Essential hypertension  Chronic, stable on current Avapro, Chlorthalidone regimen. Followed by PCP.     12. Mixed hyperlipidemia  Chronic, stable on current statin regimen. Followed by PCP.     13. BPH with obstruction/lower urinary tract symptoms  Chronic, stable on current Flomax regimen. Followed by PCP.     14. Class 1 obesity due to excess calories with serious comorbidity and body mass index (BMI) of 31.0 to 31.9 in adult  Chronic, stable on current regimen. Followed by PCP.     15. Vitamin D deficiency disease  Chronic, stable on current Vit D regimen. Followed by PCP.     16. Gastroesophageal reflux disease with esophagitis, unspecified whether hemorrhage  Chronic, stable on current Prilosec regimen. Followed by PCP.     17. Osteopenia, unspecified location  Chronic, stable on current Vit D  regimen. Followed by PCP.     18. Bilateral hearing loss, unspecified hearing loss type  Patient states he is having trouble hearing.   - Ambulatory referral/consult to Audiology; Future    19. Abnormality of gait and mobility  Uses wheeled walker.     20. Dependence on other enabling machines and devices  Uses wheeled walker.     21. Encounter for Medicare annual wellness exam  Referred by PCP.   - Ambulatory Referral/Consult to Enhanced Annual Wellness Visit (eAWV)      Provided Tito with a 5-10 year written screening schedule and personal prevention plan. Recommendations were developed using the USPSTF age appropriate recommendations. Education, counseling, and referrals were provided as needed. After Visit Summary printed and given to patient which includes a list of additional screenings\tests needed.    Follow up in about 1 year (around 9/22/2024) for Medicare AWV.    Diana Foley NP    I offered to discuss advanced care planning, including how to pick a person who would make decisions for you if you were unable to make them for yourself, called a health care power of , and what kind of decisions you might make such as use of life sustaining treatments such as ventilators and tube feeding when faced with a life limiting illness recorded on a living will that they will need to know. (How you want to be cared for as you near the end of your natural life)     X Patient is interested in learning more about how to make advanced directives.  I provided them paperwork and offered to discuss this with them.

## 2023-09-22 NOTE — PROGRESS NOTES
[facility]   Physical Therapy Treatment Note     Name: Tito Menard  Clinic Number: [id number]    Therapy Diagnosis:   Encounter Diagnoses   Name Primary?    Poor posture Yes    Leg weakness, bilateral        Physician: Sheldon James MD    Visit Date: 9/22/2023  Physician: Sheldon James MD     Physician Orders: PT Eval and Treat   Medical Diagnosis from Referral: Parkinsonism, unspecified type  Evaluation Date: 8/15/2023  Authorization Period Expiration: 8/2/24  Plan of Care Expiration: 10/10/23  Progress Note Due: 9/15/23  Visit # / Visits authorized: 3/ 20 (+eval)  FOTO: 1/3     Precautions: Standard, Fall, and occasional syncopal episodes     Time In: 10:15  Time Out: 11:00  Total Billable Time: 45 minutes       SUBJECTIVE     Pt reports: that his back has been bothering his but he otherwise feels fine.     He reports he was compliant with home exercise program   Response to previous treatment: good  Functional change: ongoing     Pain: 2-3/10  Location:  back pain      OBJECTIVE     Objective Measures updated at progress report unless specified.     Treatment     Tito received the treatments listed below:      therapeutic exercises to develop strength, endurance, ROM, flexibility, posture, and core stabilization for 20 minutes including:    10 minutes on SCIFIT seated elliptical for CV endurance and LE strength at level 2.0    EOM:   2 x 30 sec of B LE HS stretches with 3 inch box step     neuromuscular re-education activities to improve: Balance, Coordination, Kinesthetic, Sense, Proprioception, and Posture for 0 minutes. The following activities were included:      therapeutic activities to improve functional performance for 25 minutes, including:    1 x 5 reps of floor to ceiling stretches with 10 sec hold    1 x 10 reps sit to stands, BUE push off from mat     1 x 10 reps of B LE hip flexion tapping the top of the first step and then back down with 2 UE support  1 x 10  reps of B LE hip flexion tapping the top of the first step, then the second step and then back to the ground with 2 UE support        Patient Education and Home Exercises     Home Exercises Provided and Patient Education Provided     Education provided:   - home exercise program     Written Home Exercises Provided: yes. Exercises were reviewed and Tito was able to demonstrate them prior to the end of the session.  Tito demonstrated good  understanding of the education provided. See EMR under Patient Instructions for exercises provided during therapy sessions    ASSESSMENT     Mr. Francis tolerated today's session well and focused on stretching and functional mobility.  Mr. Francis required some verbal ceus for upright posture and was educated on some exercises to increase amplitude.   The patient will benefit from continued physical therapy intervention to address remaining functional mobility deficits.     Tito Is progressing well towards his goals.   Pt prognosis is Good.     Pt will continue to benefit from skilled outpatient physical therapy to address the deficits listed in the problem list box on initial evaluation, provide pt/family education and to maximize pt's level of independence in the home and community environment.     Pt's spiritual, cultural and educational needs considered and pt agreeable to plan of care and goals.     Anticipated barriers to physical therapy: co-morbidities     Goals:  Short Term Goals: 4 weeks   Pt to be issued first installment of HEP and report at least partial compliance. Ongoing   Pt to complete formal standing balance testing and PT to establish goals as needed Ongoing  Pt to improve his 30 second chair rise to 5.5-6 reps to demonstrate improved LE functional strength and muscular endurance Ongoing  Pt to improve his TUG time to 21 seconds or < for improved household ambulation with decreased fall risk. Ongoing  Pt to improve his SSWS to 0.50 m/sec for improved  community ambulation with decreased fall risk Ongoing  Pt to improve at least 3 LE MMT grades by 1/3 for improved functional mobility and standing tolerance Ongoing     Long Term Goals: 8 weeks   Pt to be at least partially compliant with finalized HEP to help maintain potential gains realized in PT. Ongoing  Pt to improve his 30 second chair rise to 6.5-7 reps to demonstrate improved LE functional strength and muscular endurance. Ongoing  Pt to improve his TUG time to 19 seconds or <  for improved household ambulation with decreased fall risk. Ongoing  Pt to improve his SSWS to 0.55 m/sec for improved community ambulation with decreased fall risk. Ongoing  Pt to improve at least 5 LE MMT grades by 1/3 for improved functional mobility and standing tolerance. Ongoing    PLAN     Continue to progress strength, endurance and balance training as tolerated     Michelle Hester, PTA

## 2023-09-22 NOTE — PATIENT INSTRUCTIONS
Counseling and Referral of Other Preventative  (Italic type indicates deductible and co-insurance are waived)    Patient Name: Tito Menard  Today's Date: 9/22/2023    Health Maintenance       Date Due Completion Date    COVID-19 Vaccine (4 - Moderna series) 01/08/2022 11/13/2021    Shingles Vaccine (2 of 2) 05/25/2023 3/30/2023    Influenza Vaccine (1) 09/01/2023 1/27/2022    Override on 10/1/2014: Done (per pt)    Override on 11/7/2013: Done    Hemoglobin A1c 12/20/2023 6/20/2023    Override on 10/23/2015: Done    Colonoscopy 02/03/2024 2/3/2021    Diabetes Urine Screening 05/31/2024 5/31/2023    Lipid Panel 05/31/2024 5/31/2023    Eye Exam 08/03/2024 8/3/2023    Override on 6/14/2018: Done    Override on 8/5/2015: Done    TETANUS VACCINE 03/30/2033 3/30/2023        Orders Placed This Encounter   Procedures    Ambulatory referral/consult to Audiology       The following information is provided to all patients.  This information is to help you find resources for any of the problems found today that may be affecting your health:                Living healthy guide: www.[facility].[place].gov      Understanding Diabetes: www.diabetes.org      Eating healthy: www.cdc.gov/healthyweight      [facility] home safety checklist: www.cdc.gov/steadi/patient.html      Agency on Aging: www.goea.[place].[facility]      Alcoholics anonymous (AA): www.aa.org      Physical Activity: www.geraldine.nih.gov/gn0gdtv      Tobacco use: www.quitwithusla.org

## 2023-09-25 ENCOUNTER — TELEPHONE (OUTPATIENT)
Dept: UROLOGY | Facility: CLINIC | Age: 78
End: 2023-09-25
Payer: MEDICARE

## 2023-09-25 DIAGNOSIS — N40.1 BENIGN PROSTATIC HYPERPLASIA WITH URINARY OBSTRUCTION: Primary | ICD-10-CM

## 2023-09-25 DIAGNOSIS — N13.8 BENIGN PROSTATIC HYPERPLASIA WITH URINARY OBSTRUCTION: Primary | ICD-10-CM

## 2023-09-25 RX ORDER — FINASTERIDE 5 MG/1
5 TABLET, FILM COATED ORAL DAILY
Qty: 90 TABLET | Refills: 3 | Status: SHIPPED | OUTPATIENT
Start: 2023-09-25 | End: 2024-09-24

## 2023-09-25 NOTE — PROGRESS NOTES
[facility]   Physical Therapy Treatment and Reassessment Note     Name: Tito Menard  Clinic Number: [id number]    Therapy Diagnosis:   Encounter Diagnoses   Name Primary?    Poor posture Yes    Leg weakness, bilateral        Physician: Sheldon James MD    Visit Date: 9/26/2023  Physician: Sheldon James MD     Physician Orders: PT Eval and Treat   Medical Diagnosis from Referral: Parkinsonism, unspecified type  Evaluation Date: 8/15/2023  Authorization Period Expiration: 8/2/24  Plan of Care Expiration: 10/10/23  Progress Note Due: 9/15/23  Visit # / Visits authorized: 4/ 20 (+eval)  FOTO: 1/3     Precautions: Standard, Fall, and occasional syncopal episodes     Time In: 11:05  Time Out: 1200  Total Billable Time: 45 minutes       SUBJECTIVE     Pt reports: that he feels pretty good today     He reports he was compliant with home exercise program   Response to previous treatment: good  Functional change: ongoing     Pain: 2-3/10  Location:  back pain      OBJECTIVE     Objective Measures updated at progress report unless specified.        Lower Extremity Strength  Right LE  Eval  9/26/2023 Left LE  Eval  9/26/2023   Hip flexion:  3+/5* 3-/5 Hip flexion: 4-/5 4/5   Knee extension: 4/5 4+/5 Knee extension: 4+/5 5/5   Knee flexion: 2-/5 2-/5 Knee flexion: 4/5 4+/5   Ankle dorsiflexion:  5/5 5/5 Ankle dorsiflexion: 5/5 5/5   Ankle plantarflexion:  3-/5 3/5 Ankle plantarflexion: 3+/5 4-/5   Hip abduction: 4-/5 4-/5 Hip abduction: 4/5 4/5   Hip adduction: 4/5 4+/5 Hip adduction 4/5 4+/5   Hip extension: 4-/5 4-/5 Hip extension: 4/5 4/5    Functional Mobility (Bed mobility, transfers)  Pt performs supine<> sit mod I for increased time. Sit<> stand transfers with mod I. Stand pivot transfer from mat to gold chair with no AD and SBA.           Evaluation 9/26/2023   30 second Chair Rise 4.5 completed with arms 5.5 completed with arms    5 times sit to stand NT    TUG 23 seconds with rollator  22 seconds with rollator    Self selected walking speed 0.46 m/sec (6m/13s) with rollator 0.54 m/sec (6m/11s) with rollator   Fast walking speed NT NT      30 second chair rise below average score:   Age                 Men                 Women  75-79               <14                  <13     A below average score indicates a risk for fall.     5x sit to stand normative information:   >12 sec= fall risk for general elderly  >16 sec= fall risk for Parkinson's disease  >10 sec= balance/vestibular dysfunction (<59 y/o)  >14.2 sec= balance/vestibular dysfunction (>59 y/o)  >12 sec= fall risk for CVA         Postural control:  MCTSIB:  1. Eyes Open/feet together/Firm: 30  seconds  2. Eyes Closed/feet together/Firm: 8 seconds  3. Eyes Open/feet together/Foam: 30 seconds mod sway   4. Eyes Closed/feet together/Foam: 8 seconds max sway     Treatment     Tito received the treatments listed below:      therapeutic exercises to develop strength, endurance, ROM, flexibility, posture, and core stabilization for 13 minutes including:    10 minutes on SCIFIT seated elliptical for CV endurance and LE strength at level 2.0    2 x 30 sec of B LE seated HS stretches on stool     neuromuscular re-education activities to improve: Balance, Coordination, Kinesthetic, Sense, Proprioception, and Posture for 2 minutes. The following activities were included:    3 x 30 seconds static standing on foam pad with eyes open, CGA    therapeutic activities to improve functional performance for 30  minutes, including:    1 x 10 reps sit to stands, BUE push off from mat     X 10 reps BLE leading forward/backwards stepping over half wedge with one UE  support  - increased difficulty when stepping over with the LLE due to weakness of RLE and RLE knee buckling       Time above includes time to complete objective measures         Patient Education and Home Exercises     Home Exercises Provided and Patient Education Provided     Education provided:   -  home exercise program     Written Home Exercises Provided: yes. Exercises were reviewed and Tito was able to demonstrate them prior to the end of the session.  Tito demonstrated good  understanding of the education provided. See EMR under Patient Instructions for exercises provided during therapy sessions    ASSESSMENT     Mr. Francis tolerated today's reassessment session fairly well despite no significant functional changes noted. The patient continues to be very limited due to back pain and impaired sitting/ standing posture. The patient continues to have limited BLE strength with more significant deficits noted on the LLE. Slight increase noted with the patient's hip adduction, ankle PF and knee extension strength on the left. The patient's TUG score continues to place the patient in the elevated fall risk category with no significant change since evaluation. The patient's SSWS places the patient in the household ambulator category. Poor static balance noted based on the patient's performance on the MCTSIB. The patient will benefit from continued physical therapy intervention to address remaining functional mobility deficits.       Tito Is progressing well towards his goals.   Pt prognosis is Good.     Pt will continue to benefit from skilled outpatient physical therapy to address the deficits listed in the problem list box on initial evaluation, provide pt/family education and to maximize pt's level of independence in the home and community environment.     Pt's spiritual, cultural and educational needs considered and pt agreeable to plan of care and goals.     Anticipated barriers to physical therapy: co-morbidities     Goals:  Short Term Goals: 4 weeks   Pt to be issued first installment of HEP and report at least partial compliance. MET 9/26/2023   Pt to improve his 30 second chair rise to 5.5-6 reps to demonstrate improved LE functional strength and muscular endurance MET 9/26/2023   Pt to improve his  TUG time to 21 seconds or < for improved household ambulation with decreased fall risk. Progressing   Pt to improve his SSWS to 0.50 m/sec for improved community ambulation with decreased fall risk MET 9/26/2023   Pt to improve at least 3 LE MMT grades by 1/3 for improved functional mobility and standing tolerance MET 9/26/2023      Long Term Goals: 8 weeks   Pt to be at least partially compliant with finalized HEP to help maintain potential gains realized in PT. Progressing   Pt to improve his 30 second chair rise to 6.5-7 reps to demonstrate improved LE functional strength and muscular endurance. Progressing   Pt to improve his TUG time to 19 seconds or <  for improved household ambulation with decreased fall risk. Progressing   Pt to improve his SSWS to 0.55 m/sec for improved community ambulation with decreased fall risk. progressing  Pt to improve at least 5 LE MMT grades by 1/3 for improved functional mobility and standing tolerance. Progressing     PLAN     Continue to progress strength, endurance and balance training as tolerated     Dana Harmon, PT

## 2023-09-25 NOTE — TELEPHONE ENCOUNTER
Received a message from Diana Foley NP that the patient's finasteride was discontinued on his chart.  This was done by another physician (Etelvina Cassidy MD) likely done by accident.      Called the patient and he is still taking it but did not have any refills.  He has an appointment with me on 10/13/2023.  Will go ahead and write a new Rx. This was done to [facility] per the patient's request.

## 2023-09-25 NOTE — TELEPHONE ENCOUNTER
----- Message from Diana Foley NP sent at 9/22/2023  8:54 AM CDT -----  Regarding: Proscar  I am doing the patient's Medicare AWV. I see the Finasteride has been d/c but the patient has still been taking it. Please discuss with patient.

## 2023-09-26 ENCOUNTER — PATIENT MESSAGE (OUTPATIENT)
Dept: INTERNAL MEDICINE | Facility: CLINIC | Age: 78
End: 2023-09-26
Payer: MEDICARE

## 2023-09-26 ENCOUNTER — CLINICAL SUPPORT (OUTPATIENT)
Dept: REHABILITATION | Facility: HOSPITAL | Age: 78
End: 2023-09-26
Payer: MEDICARE

## 2023-09-26 DIAGNOSIS — R29.898 LEG WEAKNESS, BILATERAL: ICD-10-CM

## 2023-09-26 DIAGNOSIS — R29.3 POOR POSTURE: Primary | ICD-10-CM

## 2023-09-26 PROCEDURE — 97110 THERAPEUTIC EXERCISES: CPT | Mod: PO

## 2023-09-26 PROCEDURE — 97112 NEUROMUSCULAR REEDUCATION: CPT | Mod: PO

## 2023-09-28 ENCOUNTER — OFFICE VISIT (OUTPATIENT)
Dept: PHYSICAL MEDICINE AND REHAB | Facility: CLINIC | Age: 78
End: 2023-09-28
Payer: MEDICARE

## 2023-09-28 VITALS — WEIGHT: 206.38 LBS | OXYGEN SATURATION: 100 % | BODY MASS INDEX: 28.89 KG/M2 | HEIGHT: 71 IN

## 2023-09-28 DIAGNOSIS — M48.061 SPINAL STENOSIS OF LUMBAR REGION WITHOUT NEUROGENIC CLAUDICATION: ICD-10-CM

## 2023-09-28 DIAGNOSIS — M47.26 OSTEOARTHRITIS OF SPINE WITH RADICULOPATHY, LUMBAR REGION: ICD-10-CM

## 2023-09-28 DIAGNOSIS — Z98.1 STATUS POST LUMBAR SPINAL FUSION: ICD-10-CM

## 2023-09-28 DIAGNOSIS — M54.2 CHRONIC NECK PAIN: Primary | ICD-10-CM

## 2023-09-28 DIAGNOSIS — M47.12 OSTEOARTHRITIS OF CERVICAL SPINE WITH MYELOPATHY: ICD-10-CM

## 2023-09-28 DIAGNOSIS — M48.07 SPINAL STENOSIS OF LUMBOSACRAL REGION: ICD-10-CM

## 2023-09-28 DIAGNOSIS — G89.29 CHRONIC BILATERAL LOW BACK PAIN WITH RIGHT-SIDED SCIATICA: ICD-10-CM

## 2023-09-28 DIAGNOSIS — G95.9 CERVICAL MYELOPATHY: ICD-10-CM

## 2023-09-28 DIAGNOSIS — M48.02 CERVICAL SPINAL STENOSIS: ICD-10-CM

## 2023-09-28 DIAGNOSIS — G89.29 CHRONIC NECK PAIN: Primary | ICD-10-CM

## 2023-09-28 DIAGNOSIS — Z74.09 IMPAIRED MOBILITY AND ADLS: ICD-10-CM

## 2023-09-28 DIAGNOSIS — R26.9 GAIT DISORDER: ICD-10-CM

## 2023-09-28 DIAGNOSIS — M48.061 NEURAL FORAMINAL STENOSIS OF LUMBAR SPINE: ICD-10-CM

## 2023-09-28 DIAGNOSIS — M54.41 CHRONIC BILATERAL LOW BACK PAIN WITH RIGHT-SIDED SCIATICA: ICD-10-CM

## 2023-09-28 DIAGNOSIS — Z78.9 IMPAIRED MOBILITY AND ADLS: ICD-10-CM

## 2023-09-28 PROCEDURE — 99205 PR OFFICE/OUTPT VISIT, NEW, LEVL V, 60-74 MIN: ICD-10-PCS | Mod: S$GLB,,, | Performed by: PHYSICAL MEDICINE & REHABILITATION

## 2023-09-28 PROCEDURE — 1125F PR PAIN SEVERITY QUANTIFIED, PAIN PRESENT: ICD-10-PCS | Mod: CPTII,S$GLB,, | Performed by: PHYSICAL MEDICINE & REHABILITATION

## 2023-09-28 PROCEDURE — 1101F PR PT FALLS ASSESS DOC 0-1 FALLS W/OUT INJ PAST YR: ICD-10-PCS | Mod: CPTII,S$GLB,, | Performed by: PHYSICAL MEDICINE & REHABILITATION

## 2023-09-28 PROCEDURE — 1159F MED LIST DOCD IN RCRD: CPT | Mod: CPTII,S$GLB,, | Performed by: PHYSICAL MEDICINE & REHABILITATION

## 2023-09-28 PROCEDURE — 3288F PR FALLS RISK ASSESSMENT DOCUMENTED: ICD-10-PCS | Mod: CPTII,S$GLB,, | Performed by: PHYSICAL MEDICINE & REHABILITATION

## 2023-09-28 PROCEDURE — 1125F AMNT PAIN NOTED PAIN PRSNT: CPT | Mod: CPTII,S$GLB,, | Performed by: PHYSICAL MEDICINE & REHABILITATION

## 2023-09-28 PROCEDURE — 99999 PR PBB SHADOW E&M-EST. PATIENT-LVL IV: CPT | Mod: PBBFAC,,, | Performed by: PHYSICAL MEDICINE & REHABILITATION

## 2023-09-28 PROCEDURE — 99999 PR PBB SHADOW E&M-EST. PATIENT-LVL IV: ICD-10-PCS | Mod: PBBFAC,,, | Performed by: PHYSICAL MEDICINE & REHABILITATION

## 2023-09-28 PROCEDURE — 3288F FALL RISK ASSESSMENT DOCD: CPT | Mod: CPTII,S$GLB,, | Performed by: PHYSICAL MEDICINE & REHABILITATION

## 2023-09-28 PROCEDURE — 1159F PR MEDICATION LIST DOCUMENTED IN MEDICAL RECORD: ICD-10-PCS | Mod: CPTII,S$GLB,, | Performed by: PHYSICAL MEDICINE & REHABILITATION

## 2023-09-28 PROCEDURE — 99205 OFFICE O/P NEW HI 60 MIN: CPT | Mod: S$GLB,,, | Performed by: PHYSICAL MEDICINE & REHABILITATION

## 2023-09-28 PROCEDURE — 1101F PT FALLS ASSESS-DOCD LE1/YR: CPT | Mod: CPTII,S$GLB,, | Performed by: PHYSICAL MEDICINE & REHABILITATION
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
## 2023-09-28 NOTE — PROGRESS NOTES
[facility]   Physical Therapy Treatment Note     Name: Tito Menard  Clinic Number: [id number]    Therapy Diagnosis:   Encounter Diagnoses   Name Primary?    Poor posture Yes    Leg weakness, bilateral          Physician: Sheldon James MD    Visit Date: 9/29/2023       Physician Orders: PT Eval and Treat   Medical Diagnosis from Referral: Parkinsonism, unspecified type  Evaluation Date: 8/15/2023  Authorization Period Expiration: 12/31/23  Plan of Care Expiration: 10/10/23  Progress Note Due: 9/15/23  Visit # / Visits authorized: 5/ 20 (+eval)  FOTO: 1/3     Precautions: Standard, Fall, and occasional syncopal episodes     Time In: 1100   Time Out: 1145   Total Billable Time: 45  minutes       SUBJECTIVE     Pt reports: that he feels pretty good today. Saw MD in physical medicine clinic yesterday for pain management.  He reports he was compliant with home exercise program. PT added one new exercise today.   Response to previous treatment: good  Functional change: ongoing     Pain: 3/10  Location:  low back and neck      OBJECTIVE     Objective Measures updated at progress report unless specified.        Treatment     Tito received the treatments listed below:      therapeutic exercises to develop strength, endurance, ROM, flexibility, posture, and core stabilization for 20 minutes including:    SpO2 95 % and HR 67 prior to SCI-FIT exercise    X 8 minutes on SCIFIT seated elliptical for CV endurance and LE strength at level 2.0    SpO2 95 % and HR 65 after SCI-FIT  exercise    Supine/Hook lying on mat:    X 2 minutes B LTR with LEs resting on orange physioball  2 x 10 B LE AA hamstring curls with feet resting on orange physioball  2 x 10 B clams against resistance of blue theraband~ added to HEP     Includes time to ambulate from lobby to gym.    neuromuscular re-education activities to improve: Balance, Coordination, Kinesthetic, Sense, Proprioception, and Posture for 10 minutes.  "The following activities were included:    Inside parallel bars:    1 x 10 B LE forward/backward step ups on foam fitter, 1 UE support, CGA  1 x 10 alternate single limb step overs on foam fitter, 1 UE support, CGA    3 x 30" static standing on firm ground with eyes closed, no UE support, CGA to slight min A~ occasional touching of bars    Pt requires 2 seated rest breaks to complete the above tasks.    therapeutic activities to improve functional performance for 15  minutes, including:    1 x 10 reps sit to stands, B UE push off from mat as needed, CGA    Sit> supine on mat with mod I for increased time to complete  Supine> sit with S~ PT provides cues for log roll technique to help pt protect his back    Several sit<> stand transfers from gold chair with foam pad in seat( during performance of balance activities)            Patient Education and Home Exercises     Home Exercises Provided and Patient Education Provided     Education provided:   - home exercise program     Written Home Exercises Provided: yes. Exercises were reviewed and Tito was able to demonstrate them prior to the end of the session.  Tito demonstrated good  understanding of the education provided. See EMR under Patient Instructions for exercises provided during therapy sessions    ASSESSMENT     Tito tolerated today's session well and seems to be managing with his pain. He is not interested in any surgical procedures at this time, though he indicated MD might offer an injection to help him. PT introduced some supine/hook lying exercises to help strengthen his LEs without stressing his back. Pt did well with all the exercises performed; PT added clams to pt's HEP. Some time was spent working on basic balance activities inside the parallel bars, though pt needed occasional seated rest breaks. He also moves fairly slowly, limiting the total number of interventions attempted. Tito remains pleasant and cooperative with his therapy plan. He " will benefit from continued physical therapy intervention to address remaining functional mobility deficits and to help with pain management.       Tito Is progressing well towards his goals.   Pt prognosis is Good.     Pt will continue to benefit from skilled outpatient physical therapy to address the deficits listed in the problem list box on initial evaluation, provide pt/family education and to maximize pt's level of independence in the home and community environment.     Pt's spiritual, cultural and educational needs considered and pt agreeable to plan of care and goals.     Anticipated barriers to physical therapy: co-morbidities     Goals:  Short Term Goals: 4 weeks   Pt to be issued first installment of HEP and report at least partial compliance. MET 9/26/2023   Pt to improve his 30 second chair rise to 5.5-6 reps to demonstrate improved LE functional strength and muscular endurance MET 9/26/2023   Pt to improve his TUG time to 21 seconds or < for improved household ambulation with decreased fall risk. Progressing   Pt to improve his SSWS to 0.50 m/sec for improved community ambulation with decreased fall risk MET 9/26/2023   Pt to improve at least 3 LE MMT grades by 1/3 for improved functional mobility and standing tolerance MET 9/26/2023      Long Term Goals: 8 weeks   Pt to be at least partially compliant with finalized HEP to help maintain potential gains realized in PT. Progressing   Pt to improve his 30 second chair rise to 6.5-7 reps to demonstrate improved LE functional strength and muscular endurance. Progressing   Pt to improve his TUG time to 19 seconds or <  for improved household ambulation with decreased fall risk. Progressing   Pt to improve his SSWS to 0.55 m/sec for improved community ambulation with decreased fall risk. progressing  Pt to improve at least 5 LE MMT grades by 1/3 for improved functional mobility and standing tolerance. Progressing     PLAN     Continue to progress strength,  endurance and balance training as tolerated     Sheldon Corona, PT    9/29/2023

## 2023-09-29 ENCOUNTER — CLINICAL SUPPORT (OUTPATIENT)
Dept: REHABILITATION | Facility: HOSPITAL | Age: 78
End: 2023-09-29
Payer: MEDICARE

## 2023-09-29 DIAGNOSIS — R29.3 POOR POSTURE: Primary | ICD-10-CM

## 2023-09-29 DIAGNOSIS — R29.898 LEG WEAKNESS, BILATERAL: ICD-10-CM

## 2023-09-29 PROCEDURE — 97110 THERAPEUTIC EXERCISES: CPT | Mod: PO

## 2023-09-29 PROCEDURE — 97530 THERAPEUTIC ACTIVITIES: CPT | Mod: PO

## 2023-09-29 PROCEDURE — 97112 NEUROMUSCULAR REEDUCATION: CPT | Mod: PO

## 2023-10-01 NOTE — PROGRESS NOTES
[facility]   Physical Therapy Treatment Note     Name: Tito Menard  Clinic Number: [id number]    Therapy Diagnosis:   Encounter Diagnoses   Name Primary?    Poor posture Yes    Leg weakness, bilateral            Physician: Sheldon James MD    Visit Date: 10/2/2023       Physician Orders: PT Eval and Treat   Medical Diagnosis from Referral: Parkinsonism, unspecified type  Evaluation Date: 8/15/2023  Authorization Period Expiration: 12/31/23  Plan of Care Expiration: 10/10/23  Progress Note Due: 9/15/23  Visit # / Visits authorized: 6/ 20 (+eval)  FOTO: 1/3     Precautions: Standard, Fall, and occasional syncopal episodes     Time In: 1111    Time Out: 1156    Total Billable Time: 45   minutes       SUBJECTIVE     Pt reports: he didn't do that much this weekend but did do a small project( worked on his [employer].)  He reports he was compliant with home exercise program, including clam exercise, which was added last session  Response to previous treatment: good  Functional change: ongoing     Pain: 3/10  Location:  low back and R shoulder    OBJECTIVE     Objective Measures updated at progress report unless specified.        Treatment     Tito received the treatments listed below:      therapeutic exercises to develop strength, endurance, ROM, flexibility, posture, and core stabilization for 20 minutes including:    SpO2 94 % and HR 71 prior to SCI-FIT exercise    X 8 minutes on SCIFIT seated elliptical for CV endurance and LE strength at level 2.0    SpO2 96 % and HR 71 after SCI-FIT  exercise    Supine/Hook lying on mat:    X 2 minutes B LTR with LEs resting on green physioball  1 x 10 mini bridges with LEs resting on green physioball  1 x 10 B clams( performed unilaterally) against resistance of blue theraband     Includes time to ambulate from lobby to gym.    neuromuscular re-education activities to improve: Balance, Coordination, Kinesthetic, Sense, Proprioception, and Posture  "for 15 minutes. The following activities were included:    Inside parallel bars:    1 x 10 B LE forward/backward step ups on foam fitter, 1 UE support, CGA  1 x 10 alternate single limb step overs on foam fitter, 1 UE support, CGA~ frequent cues for 1 UE support instead of 2  1 x 10 B LE split stance cone taps with front foot on foam fitter and rear foot on ground, B UE support, CGA    On wooden rocker board:  3 x 30 " working board in A/P directions, 1 UE support, CGA  3 x 30 " working board in M/L directions, 1 UE support, CGA    Pt requires 2 seated rest breaks to complete the above tasks.    therapeutic activities to improve functional performance for 10  minutes, including:    1 x 10 reps sit to stands, B UE push off from mat as needed, CGA~ feet on foam fitter and ergotron table placed anterior to pt    Sit> supine on mat with mod I for increased time to complete  Supine> sit with mod I~ pt uses logroll technique he was taught the previous session  Several sit<> stand transfers from gold chair with foam pad in seat( during performance of balance activities)            Patient Education and Home Exercises     Home Exercises Provided and Patient Education Provided     Education provided:   - home exercise program     Written Home Exercises Provided: yes. Exercises were reviewed and Tito was able to demonstrate them prior to the end of the session.  Tito demonstrated good  understanding of the education provided. See EMR under Patient Instructions for exercises provided during therapy sessions    ASSESSMENT     Tito tolerated today's session well and seems to be managing with his pain. PT continued some supine/hook lying exercises to help strengthen his LEs without stressing his back. Pt did well with all the exercises performed, including mini bridges with use of green physioball. Only 1 set was performed to avoid any additional back pain. Sit<> stand transitions were progressed with use of foam fitter. PT " had more time to spend on balance tasks inside the parallel bars. Pt was very challenged working rocker board in A/P direction, even with use of 1 UE support. Tito remains pleasant and cooperative with his therapy plan. He will benefit from continued physical therapy intervention to address remaining functional mobility deficits and to help with pain management.       Tito Is progressing well towards his goals.   Pt prognosis is Good.     Pt will continue to benefit from skilled outpatient physical therapy to address the deficits listed in the problem list box on initial evaluation, provide pt/family education and to maximize pt's level of independence in the home and community environment.     Pt's spiritual, cultural and educational needs considered and pt agreeable to plan of care and goals.     Anticipated barriers to physical therapy: co-morbidities     Goals:  Short Term Goals: 4 weeks   Pt to be issued first installment of HEP and report at least partial compliance. MET 9/26/2023   Pt to improve his 30 second chair rise to 5.5-6 reps to demonstrate improved LE functional strength and muscular endurance MET 9/26/2023   Pt to improve his TUG time to 21 seconds or < for improved household ambulation with decreased fall risk. Progressing   Pt to improve his SSWS to 0.50 m/sec for improved community ambulation with decreased fall risk MET 9/26/2023   Pt to improve at least 3 LE MMT grades by 1/3 for improved functional mobility and standing tolerance MET 9/26/2023      Long Term Goals: 8 weeks   Pt to be at least partially compliant with finalized HEP to help maintain potential gains realized in PT. Progressing   Pt to improve his 30 second chair rise to 6.5-7 reps to demonstrate improved LE functional strength and muscular endurance. Progressing   Pt to improve his TUG time to 19 seconds or <  for improved household ambulation with decreased fall risk. Progressing   Pt to improve his SSWS to 0.55 m/sec for  improved community ambulation with decreased fall risk. progressing  Pt to improve at least 5 LE MMT grades by 1/3 for improved functional mobility and standing tolerance. Progressing     PLAN     Evaluating PT to perform plan of care reassessment on 10/9/23.    Continue to progress strength, endurance and balance training as tolerated     Sheldon Corona, PT    10/2/2023

## 2023-10-02 ENCOUNTER — CLINICAL SUPPORT (OUTPATIENT)
Dept: REHABILITATION | Facility: HOSPITAL | Age: 78
End: 2023-10-02
Payer: MEDICARE

## 2023-10-02 DIAGNOSIS — R29.898 LEG WEAKNESS, BILATERAL: ICD-10-CM

## 2023-10-02 DIAGNOSIS — R29.3 POOR POSTURE: Primary | ICD-10-CM

## 2023-10-02 PROCEDURE — 97530 THERAPEUTIC ACTIVITIES: CPT | Mod: PO

## 2023-10-02 PROCEDURE — 97112 NEUROMUSCULAR REEDUCATION: CPT | Mod: PO

## 2023-10-02 PROCEDURE — 97110 THERAPEUTIC EXERCISES: CPT | Mod: PO

## 2023-10-03 NOTE — PROGRESS NOTES
"[facility]   Physical Therapy Treatment Note     Name: Tito Menard  Clinic Number: [id number]    Therapy Diagnosis:   Encounter Diagnoses   Name Primary?    Poor posture Yes    Leg weakness, bilateral        Physician: Sheldon James MD    Visit Date: 10/4/2023       Physician Orders: PT Eval and Treat   Medical Diagnosis from Referral: Parkinsonism, unspecified type  Evaluation Date: 8/15/2023  Authorization Period Expiration: 12/31/23  Plan of Care Expiration: 10/10/23  Progress Note Due: 9/15/23  Visit # / Visits authorized: 7/ 20 (+eval)  FOTO: 1/3     Precautions: Standard, Fall, and occasional syncopal episodes     Time In: 10:43, pt arrived 13 minutes late, PT unable to accommodate     Time Out: 11:15  Total Billable Time: 32   minutes       SUBJECTIVE     Pt reports: feeling weak and fatigued today. He reports that he was working outside yesterday, he needed to "crawl around" because he couldn't work standing up for that long- needed to start crawling after after 5 minutes of standing work. Reports he required something to pull up on to get off the floor and he felt like he was using a lot of stomach muscles.    He reports he was compliant with home exercise program.  Response to previous treatment: good  Functional change: ongoing     Pain: 3/10  Location:  low back and R shoulder    OBJECTIVE     HR 84, SPO2 97% seated RUE after x10 sit<>stand       Treatment     Tito received the treatments listed below:      therapeutic exercises to develop strength, endurance, ROM, flexibility, posture, and core stabilization for 17 minutes including:    X 8 minutes on SCIFIT seated elliptical for CV endurance and LE strength at level 2.0    Supine/Hook lying on mat:  X 2 minutes B LTR with LEs resting on red physioball  1 x 10 mini bridges   1 x 10 B clams( performed unilaterally) against resistance of blue theraband, head elevated on x2 pillows      therapeutic activities to improve " functional performance for 15  minutes, including:    2 x 5 reps sit to stands, B UE push off from mat as needed, reaching up with big arms upon standing with Vcs, CGA/Min A, required seated rest break between sets, required L knee block for second set    Sit> supine on mat with mod I for increased time to complete     Includes time to ambulate from lobby to gym.    Patient Education and Home Exercises     Home Exercises Provided and Patient Education Provided     Education provided:   - home exercise program     Written Home Exercises Provided: yes. Exercises were reviewed and Tito was able to demonstrate them prior to the end of the session.  Tito demonstrated good  understanding of the education provided. See EMR under Patient Instructions for exercises provided during therapy sessions    ASSESSMENT     Tito had fair tolerance for today's session; he displays increased CV and LE fatigue with all activities. PT continued some supine/hook lying exercises to help strengthen his LEs without stressing his back and to reduce fatigue with activity. Pt did well with supine exercises, but displayed significant difficulty with sit<>stand exercises requiring increased physical assistance and rest breaks to complete 10 reps. Tito remains pleasant and cooperative with his therapy plan. He will benefit from continued physical therapy intervention to address remaining functional mobility deficits and to help with pain management.        Tito Is progressing well towards his goals.   Pt prognosis is Good.     Pt will continue to benefit from skilled outpatient physical therapy to address the deficits listed in the problem list box on initial evaluation, provide pt/family education and to maximize pt's level of independence in the home and community environment.     Pt's spiritual, cultural and educational needs considered and pt agreeable to plan of care and goals.     Anticipated barriers to physical therapy:  co-morbidities     Goals:  Short Term Goals: 4 weeks   Pt to be issued first installment of HEP and report at least partial compliance. MET 9/26/2023   Pt to improve his 30 second chair rise to 5.5-6 reps to demonstrate improved LE functional strength and muscular endurance MET 9/26/2023   Pt to improve his TUG time to 21 seconds or < for improved household ambulation with decreased fall risk. Progressing   Pt to improve his SSWS to 0.50 m/sec for improved community ambulation with decreased fall risk MET 9/26/2023   Pt to improve at least 3 LE MMT grades by 1/3 for improved functional mobility and standing tolerance MET 9/26/2023      Long Term Goals: 8 weeks   Pt to be at least partially compliant with finalized HEP to help maintain potential gains realized in PT. Progressing   Pt to improve his 30 second chair rise to 6.5-7 reps to demonstrate improved LE functional strength and muscular endurance. Progressing   Pt to improve his TUG time to 19 seconds or <  for improved household ambulation with decreased fall risk. Progressing   Pt to improve his SSWS to 0.55 m/sec for improved community ambulation with decreased fall risk. progressing  Pt to improve at least 5 LE MMT grades by 1/3 for improved functional mobility and standing tolerance. Progressing     PLAN     Evaluating PT to perform plan of care reassessment on 10/9/23.  Resume standing activities if appropriate. Reassess fatigue/LE weakness next session.  Continue to progress strength, endurance and balance training as tolerated     Gricelda Garber, DOMINGO    I certify that I was present in the room directing the student in service delivery and guiding them using my skilled judgment. As the co-signing therapist I have reviewed the students documentation and am responsible for the treatment, assessment, and plan.    Dana Harmon, PT    10/4/2023

## 2023-10-04 ENCOUNTER — CLINICAL SUPPORT (OUTPATIENT)
Dept: REHABILITATION | Facility: HOSPITAL | Age: 78
End: 2023-10-04
Payer: MEDICARE

## 2023-10-04 DIAGNOSIS — R29.898 LEG WEAKNESS, BILATERAL: ICD-10-CM

## 2023-10-04 DIAGNOSIS — R29.3 POOR POSTURE: Primary | ICD-10-CM

## 2023-10-04 PROCEDURE — 97110 THERAPEUTIC EXERCISES: CPT | Mod: PO

## 2023-10-04 PROCEDURE — 97530 THERAPEUTIC ACTIVITIES: CPT | Mod: PO

## 2023-10-06 ENCOUNTER — DOCUMENTATION ONLY (OUTPATIENT)
Dept: REHABILITATION | Facility: HOSPITAL | Age: 78
End: 2023-10-06
Payer: MEDICARE

## 2023-10-06 NOTE — PROGRESS NOTES
PT/PTA met face to face to discuss pt's treatment plan and progress towards established goals.  Continue with current PT POC with focus on core strengthening, balance and endurance.  Patient will be seen by physical therapist at least every sixth treatment or 30 days, whichever occurs first.    Michelle Hester, PTA  10/06/2023

## 2023-10-08 NOTE — PROGRESS NOTES
[facility]   Physical Therapy Treatment and Plan of Care Reassessment     Name: Tito Menard  Clinic Number: [id number]    Therapy Diagnosis:   Encounter Diagnoses   Name Primary?    Poor posture Yes    Leg weakness, bilateral          Physician: Sheldon James MD    Visit Date: 10/9/2023       Physician Orders: PT Eval and Treat   Medical Diagnosis from Referral: Parkinsonism, unspecified type  Evaluation Date: 8/15/2023  Authorization Period Expiration: 8/2/24  Plan of Care Expiration: 10/10/23  Updated Plan of Care Expiration: 11/27/23  Progress Note Due: 11/9/23  Visit # / Visits authorized: 8/ 20 (+eval)  FOTO: 2/3     Precautions: Standard, Fall, and occasional syncopal episodes     Time In: 1115   Time Out: 1200   Total Billable Time: 45  minutes       SUBJECTIVE     Pt reports: that he feels pretty good today.    He reports he was compliant with home exercise program   Response to previous treatment: good  Functional change: ongoing     Pain: 3/10, 2/10  Location:  across shoulders, lower back    OBJECTIVE     Objective Measures updated at progress report unless specified.        Lower Extremity Strength  Right LE  Eval  9/26/2023 10/9/23 Left LE  Eval  9/26/2023 10/9/23   Hip flexion:  3+/5* 3-/5 3-/5 Hip flexion: 4-/5 4/5 4-/5   Knee extension: 4/5 4+/5 5/5 Knee extension: 4+/5 5/5 5/5   Knee flexion: 2-/5 2-/5 2-/5 Knee flexion: 4/5 4+/5 4-/5   Ankle dorsiflexion:  5/5 5/5 5/5 Ankle dorsiflexion: 5/5 5/5 5/5   Ankle plantarflexion:  3-/5 3/5 3-/5 Ankle plantarflexion: 3+/5 4-/5 4-/5   Hip abduction: 4-/5 4-/5 4-/5 Hip abduction: 4/5 4/5 4-/5   Hip adduction: 4/5 4+/5 4+/5 Hip adduction 4/5 4+/5 4+/5   Hip extension: 4-/5 4-/5 4-/5 Hip extension: 4/5 4/5 4+/5      Functional Mobility (Bed mobility, transfers)  Pt performs supine<> sit mod I for increased time. Sit<> stand transfers with mod I. Stand pivot transfer from mat to gold chair with no AD and SBA.            Evaluation 9/26/2023 10/9/23   30 second Chair Rise 4.5 completed with arms 5.5 completed with arms  5.5 completed with arms   5 times sit to stand NT NT NT   TUG 23 seconds with rollator 22 seconds with rollator  19 seconds with rollator   Self selected walking speed 0.46 m/sec (6m/13s) with rollator 0.54 m/sec (6m/11s) with rollator 0.43 m/sec (6m/14s) with rollator   Fast walking speed NT NT NT      30 second chair rise below average score:   Age                 Men                 Women  75-79               <14                  <13     A below average score indicates a risk for fall.     5x sit to stand normative information:   >12 sec= fall risk for general elderly  >16 sec= fall risk for Parkinson's disease  >10 sec= balance/vestibular dysfunction (<59 y/o)  >14.2 sec= balance/vestibular dysfunction (>59 y/o)  >12 sec= fall risk for CVA       9/26/23  Postural control:  MCTSIB:  1. Eyes Open/feet together/Firm: 30  seconds  2. Eyes Closed/feet together/Firm: 8 seconds  3. Eyes Open/feet together/Foam: 30 seconds mod sway   4. Eyes Closed/feet together/Foam: 8 seconds max sway     10/9/23  Postural control:  MCTSIB:  1. Eyes Open/feet together/Firm: 30  seconds  2. Eyes Closed/feet together/Firm: 14 seconds  3. Eyes Open/feet together/Foam: 30 seconds~ feet slightly wider  4. Eyes Closed/feet together/Foam: 14 seconds           Treatment     Tito received the treatments listed below:      therapeutic exercises to develop strength, endurance, ROM, flexibility, posture, and core stabilization for 15 minutes including:  (Includes time for above tests and measures)    X 8 minutes on SCIFIT seated elliptical for CV endurance and LE strength at level 2.0        neuromuscular re-education activities to improve: Balance, Coordination, Kinesthetic, Sense, Proprioception, and Posture for 5 minutes. The following activities were included:  (Includes time to perform mCTSIB testing)      therapeutic activities to improve  functional performance for 25  minutes, including:  (Time includes above tests and measures + completion of FOTO survey)        Patient Education and Home Exercises     Home Exercises Provided and Patient Education Provided     Education provided:   - home exercise program  -10/9/23: PT reviewed test results and discussed plan of care extension with pt. Pt is not sure he wants to work through the entire month of November and needs to discuss this with his wife. PT indicated he will set the plan of care extension through November and he will let his PT know his decision on Wednesday.    Written Home Exercises Provided: yes. Exercises were reviewed and Tito was able to demonstrate them prior to the end of the session.  Tito demonstrated good  understanding of the education provided. See EMR under Patient Instructions for exercises provided during therapy sessions    ASSESSMENT     Mr. Francis tolerated today's plan of care reassessment fairly well. He continues to make gradual progress in some areas, though the changes have not always been consistent. Pt's LE strength improved only for R knee extension and L hip extension. TUG score improved to 19 seconds, which is pt's long term goal. Unfortunately, pt's SSWS declined from the previous assessment. His 30 second chair rise remains unchanged from previous, but he did improve with his scores on conditions # 2 and 4 of the mCTSIB. PT would like to extend the current plan of care for another 7 weeks( until 11/27/23.)  Tito remains limited by pain and decreased posture, but he continues to work hard and is a consistent attendee at therapy. He will benefit from continued physical therapy intervention to address remaining functional mobility deficits. See below for most recent comments regarding goal achievement and any revisions made.      Tito Is progressing well towards his goals.   Pt prognosis is Good.     Pt will continue to benefit from skilled outpatient  physical therapy to address the deficits listed in the problem list box on initial evaluation, provide pt/family education and to maximize pt's level of independence in the home and community environment.     Pt's spiritual, cultural and educational needs considered and pt agreeable to plan of care and goals.     Anticipated barriers to physical therapy: co-morbidities     Goals:  Short Term Goals: 4 weeks   Pt to be issued first installment of HEP and report at least partial compliance. MET 9/26/2023   Pt to improve his 30 second chair rise to 5.5-6 reps to demonstrate improved LE functional strength and muscular endurance MET 9/26/2023   Pt to improve his TUG time to 21 seconds or < for improved household ambulation with decreased fall risk. Progressing, MET, 10/9/23   Pt to improve his SSWS to 0.50 m/sec for improved community ambulation with decreased fall risk MET 9/26/2023, NOT MET, 10/9/23  Pt to improve at least 3 LE MMT grades by 1/3 for improved functional mobility and standing tolerance MET 9/26/2023      Long Term Goals: 8 weeks   Pt to be at least partially compliant with finalized HEP to help maintain potential gains realized in PT. Progressing   Pt to improve his 30 second chair rise to 6.5-7 reps to demonstrate improved LE functional strength and muscular endurance. Progressing   Pt to improve his TUG time to 19 seconds or <  for improved household ambulation with decreased fall risk. Progressing, MET, 10/9/23   Pt to improve his SSWS to 0.55 m/sec for improved community ambulation with decreased fall risk. Not progressing  Pt to improve at least 5 LE MMT grades by 1/3 for improved functional mobility and standing tolerance. Progressing, MET, 10/9/23     PLAN     Continue outpatient physical therapy 2 x weekly under updated Plan of Care, 10/9/23 to 11/27/23, with treatment to include: pt education, HEP, therapeutic exercises, neuromuscular re-education/balance exercises, therapeutic activities, joint  mobilizations, and modalities PRN, to work towards established goals. Pt may be seen by PTA to carry out plan of care.       Continue to progress strength, endurance and balance training as tolerated     Sheldon Corona, PT    10/9/2023

## 2023-10-09 ENCOUNTER — CLINICAL SUPPORT (OUTPATIENT)
Dept: REHABILITATION | Facility: HOSPITAL | Age: 78
End: 2023-10-09
Payer: MEDICARE

## 2023-10-09 DIAGNOSIS — R29.3 POOR POSTURE: Primary | ICD-10-CM

## 2023-10-09 DIAGNOSIS — R29.898 LEG WEAKNESS, BILATERAL: ICD-10-CM

## 2023-10-09 PROCEDURE — 97530 THERAPEUTIC ACTIVITIES: CPT | Mod: PO

## 2023-10-09 PROCEDURE — 97110 THERAPEUTIC EXERCISES: CPT | Mod: PO

## 2023-10-09 NOTE — PLAN OF CARE
[facility]   Physical Therapy Treatment and Plan of Care Reassessment     Name: Tito Menard  Clinic Number: [id number]    Therapy Diagnosis:   Encounter Diagnoses   Name Primary?    Poor posture Yes    Leg weakness, bilateral          Physician: Sheldon James MD    Visit Date: 10/9/2023       Physician Orders: PT Eval and Treat   Medical Diagnosis from Referral: Parkinsonism, unspecified type  Evaluation Date: 8/15/2023  Authorization Period Expiration: 8/2/24  Plan of Care Expiration: 10/10/23  Updated Plan of Care Expiration: 11/27/23  Progress Note Due: 11/9/23  Visit # / Visits authorized: 8/ 20 (+eval)  FOTO: 2/3     Precautions: Standard, Fall, and occasional syncopal episodes     Time In: 1115   Time Out: 1200   Total Billable Time: 45  minutes       SUBJECTIVE     Pt reports: that he feels pretty good today.    He reports he was compliant with home exercise program   Response to previous treatment: good  Functional change: ongoing     Pain: 3/10, 2/10  Location:  across shoulders, lower back    OBJECTIVE     Objective Measures updated at progress report unless specified.        Lower Extremity Strength  Right LE  Eval  9/26/2023 10/9/23 Left LE  Eval  9/26/2023 10/9/23   Hip flexion:  3+/5* 3-/5 3-/5 Hip flexion: 4-/5 4/5 4-/5   Knee extension: 4/5 4+/5 5/5 Knee extension: 4+/5 5/5 5/5   Knee flexion: 2-/5 2-/5 2-/5 Knee flexion: 4/5 4+/5 4-/5   Ankle dorsiflexion:  5/5 5/5 5/5 Ankle dorsiflexion: 5/5 5/5 5/5   Ankle plantarflexion:  3-/5 3/5 3-/5 Ankle plantarflexion: 3+/5 4-/5 4-/5   Hip abduction: 4-/5 4-/5 4-/5 Hip abduction: 4/5 4/5 4-/5   Hip adduction: 4/5 4+/5 4+/5 Hip adduction 4/5 4+/5 4+/5   Hip extension: 4-/5 4-/5 4-/5 Hip extension: 4/5 4/5 4+/5      Functional Mobility (Bed mobility, transfers)  Pt performs supine<> sit mod I for increased time. Sit<> stand transfers with mod I. Stand pivot transfer from mat to gold chair with no AD and SBA.            Evaluation 9/26/2023 10/9/23   30 second Chair Rise 4.5 completed with arms 5.5 completed with arms  5.5 completed with arms   5 times sit to stand NT NT NT   TUG 23 seconds with rollator 22 seconds with rollator  19 seconds with rollator   Self selected walking speed 0.46 m/sec (6m/13s) with rollator 0.54 m/sec (6m/11s) with rollator 0.43 m/sec (6m/14s) with rollator   Fast walking speed NT NT NT      30 second chair rise below average score:   Age                 Men                 Women  75-79               <14                  <13     A below average score indicates a risk for fall.     5x sit to stand normative information:   >12 sec= fall risk for general elderly  >16 sec= fall risk for Parkinson's disease  >10 sec= balance/vestibular dysfunction (<59 y/o)  >14.2 sec= balance/vestibular dysfunction (>59 y/o)  >12 sec= fall risk for CVA       9/26/23  Postural control:  MCTSIB:  1. Eyes Open/feet together/Firm: 30  seconds  2. Eyes Closed/feet together/Firm: 8 seconds  3. Eyes Open/feet together/Foam: 30 seconds mod sway   4. Eyes Closed/feet together/Foam: 8 seconds max sway     10/9/23  Postural control:  MCTSIB:  1. Eyes Open/feet together/Firm: 30  seconds  2. Eyes Closed/feet together/Firm: 14 seconds  3. Eyes Open/feet together/Foam: 30 seconds~ feet slightly wider  4. Eyes Closed/feet together/Foam: 14 seconds           Treatment     Tito received the treatments listed below:      therapeutic exercises to develop strength, endurance, ROM, flexibility, posture, and core stabilization for 15 minutes including:  (Includes time for above tests and measures)    X 8 minutes on SCIFIT seated elliptical for CV endurance and LE strength at level 2.0        neuromuscular re-education activities to improve: Balance, Coordination, Kinesthetic, Sense, Proprioception, and Posture for 5 minutes. The following activities were included:  (Includes time to perform mCTSIB testing)      therapeutic activities to improve  functional performance for 25  minutes, including:  (Time includes above tests and measures + completion of FOTO survey)        Patient Education and Home Exercises     Home Exercises Provided and Patient Education Provided     Education provided:   - home exercise program  -10/9/23: PT reviewed test results and discussed plan of care extension with pt. Pt is not sure he wants to work through the entire month of November and needs to discuss this with his wife. PT indicated he will set the plan of care extension through November and he will let his PT know his decision on Wednesday.    Written Home Exercises Provided: yes. Exercises were reviewed and Tito was able to demonstrate them prior to the end of the session.  Tito demonstrated good  understanding of the education provided. See EMR under Patient Instructions for exercises provided during therapy sessions    ASSESSMENT     Mr. Francis tolerated today's plan of care reassessment fairly well. He continues to make gradual progress in some areas, though the changes have not always been consistent. Pt's LE strength improved only for R knee extension and L hip extension. TUG score improved to 19 seconds, which is pt's long term goal. Unfortunately, pt's SSWS declined from the previous assessment. His 30 second chair rise remains unchanged from previous, but he did improve with his scores on conditions # 2 and 4 of the mCTSIB. PT would like to extend the current plan of care for another 7 weeks( until 11/27/23.)  Tito remains limited by pain and decreased posture, but he continues to work hard and is a consistent attendee at therapy. He will benefit from continued physical therapy intervention to address remaining functional mobility deficits. See below for most recent comments regarding goal achievement and any revisions made.      Tito Is progressing well towards his goals.   Pt prognosis is Good.     Pt will continue to benefit from skilled outpatient  physical therapy to address the deficits listed in the problem list box on initial evaluation, provide pt/family education and to maximize pt's level of independence in the home and community environment.     Pt's spiritual, cultural and educational needs considered and pt agreeable to plan of care and goals.     Anticipated barriers to physical therapy: co-morbidities     Goals:  Short Term Goals: 4 weeks   Pt to be issued first installment of HEP and report at least partial compliance. MET 9/26/2023   Pt to improve his 30 second chair rise to 5.5-6 reps to demonstrate improved LE functional strength and muscular endurance MET 9/26/2023   Pt to improve his TUG time to 21 seconds or < for improved household ambulation with decreased fall risk. Progressing, MET, 10/9/23   Pt to improve his SSWS to 0.50 m/sec for improved community ambulation with decreased fall risk MET 9/26/2023, NOT MET, 10/9/23  Pt to improve at least 3 LE MMT grades by 1/3 for improved functional mobility and standing tolerance MET 9/26/2023      Long Term Goals: 8 weeks   Pt to be at least partially compliant with finalized HEP to help maintain potential gains realized in PT. Progressing   Pt to improve his 30 second chair rise to 6.5-7 reps to demonstrate improved LE functional strength and muscular endurance. Progressing   Pt to improve his TUG time to 19 seconds or <  for improved household ambulation with decreased fall risk. Progressing, MET, 10/9/23   Pt to improve his SSWS to 0.55 m/sec for improved community ambulation with decreased fall risk. Not progressing  Pt to improve at least 5 LE MMT grades by 1/3 for improved functional mobility and standing tolerance. Progressing, MET, 10/9/23     PLAN     Continue outpatient physical therapy 2 x weekly under updated Plan of Care, 10/9/23 to 11/27/23, with treatment to include: pt education, HEP, therapeutic exercises, neuromuscular re-education/balance exercises, therapeutic activities, joint  mobilizations, and modalities PRN, to work towards established goals. Pt may be seen by PTA to carry out plan of care.       Continue to progress strength, endurance and balance training as tolerated     Sheldon Corona, PT    10/9/2023

## 2023-10-11 ENCOUNTER — CLINICAL SUPPORT (OUTPATIENT)
Dept: REHABILITATION | Facility: HOSPITAL | Age: 78
End: 2023-10-11
Payer: MEDICARE

## 2023-10-11 DIAGNOSIS — R29.3 POOR POSTURE: Primary | ICD-10-CM

## 2023-10-11 DIAGNOSIS — R29.898 LEG WEAKNESS, BILATERAL: ICD-10-CM

## 2023-10-11 PROCEDURE — 97530 THERAPEUTIC ACTIVITIES: CPT | Mod: PO

## 2023-10-11 PROCEDURE — 97110 THERAPEUTIC EXERCISES: CPT | Mod: PO

## 2023-10-11 NOTE — PROGRESS NOTES
[facility]   Physical Therapy Treatment Note     Name: Tito Menard  Clinic Number: [id number]    Therapy Diagnosis:   Encounter Diagnoses   Name Primary?    Poor posture Yes    Leg weakness, bilateral        Physician: Sheldon James MD    Visit Date: 10/11/2023       Physician Orders: PT Eval and Treat   Medical Diagnosis from Referral: Parkinsonism, unspecified type  Evaluation Date: 8/15/2023  Authorization Period Expiration: 8/2/24  Plan of Care Expiration: 10/10/23  Updated Plan of Care Expiration: 11/27/23  Progress Note Due: 11/9/23  Visit # / Visits authorized: 9/ 20 (+eval)  FOTO: 2/3     Precautions: Standard, Fall, and occasional syncopal episodes     Time In: 1110  Time Out: 1200  Total Billable Time: 50 minutes       SUBJECTIVE     Pt reports: that he feels like he will be prepared for discharge at the end of this month.     He reports he was compliant with home exercise program   Response to previous treatment: good  Functional change: ongoing     Pain: 3/10, 2/10  Location:  across shoulders, lower back    OBJECTIVE     Objective Measures updated at progress report unless specified.       Treatment     Tito received the treatments listed below:      therapeutic exercises to develop strength, endurance, ROM, flexibility, posture, and core stabilization for 27  minutes including:  (Includes time for above tests and measures)    X 12 minutes on SCIFIT seated elliptical for CV endurance and LE strength at level 2.0     2 x 30 sec of B LE seated HS stretches on stool     2 x 5 reps seated rows with pink cook band  - max verbal and tactile cues for body mechanics and muscle activation with fair carry over   - decreased muscle activation noted on the R    10 reps standing chest press with 4.4# med ball, CGA      neuromuscular re-education activities to improve: Balance, Coordination, Kinesthetic, Sense, Proprioception, and Posture for 0 minutes. The following activities were  included:      therapeutic activities to improve functional performance for 23  minutes, including:    1 x 10 reps sit to stands, B UE push off from mat as needed, CGA~ feet on foam fitter and ergotron table placed anterior to pt    Inside parallel bars:  1 x 10 B LE forward/backward step ups on foam fitter, 2 light UE support, CGA  1 x 10 alternate single limb step overs on foam fitter, 2 light  UE support, CGA  1 x 10 alternate single limb side step overs on foam fitter, 2 light  UE support, CGA     Time above includes time to ambulate to/from the waiting room     Time above includes time to discuss discharge at the end of the month     Patient Education and Home Exercises     Home Exercises Provided and Patient Education Provided     Education provided:   - home exercise program  -10/9/23: PT reviewed test results and discussed plan of care extension with pt. Pt is not sure he wants to work through the entire month of November and needs to discuss this with his wife. PT indicated he will set the plan of care extension through November and he will let his PT know his decision on Wednesday.    Written Home Exercises Provided: yes. Exercises were reviewed and Tito was able to demonstrate them prior to the end of the session.  Tito demonstrated good  understanding of the education provided. See EMR under Patient Instructions for exercises provided during therapy sessions    ASSESSMENT     Mr. Francis tolerated today session well. Upcoming discharge at the end of the month was discussed with the patient and the patient verbalizes understanding. The patient uses BUE support throughout exercises in the // bars with minimal WB through BUE. 2 seated rest breaks required throughout session.  He will benefit from continued physical therapy intervention to address remaining functional mobility deficits.       Tito Is progressing well towards his goals.   Pt prognosis is Good.     Pt will continue to benefit from skilled  outpatient physical therapy to address the deficits listed in the problem list box on initial evaluation, provide pt/family education and to maximize pt's level of independence in the home and community environment.     Pt's spiritual, cultural and educational needs considered and pt agreeable to plan of care and goals.     Anticipated barriers to physical therapy: co-morbidities     Goals:  Short Term Goals: 4 weeks   Pt to be issued first installment of HEP and report at least partial compliance. MET 9/26/2023   Pt to improve his 30 second chair rise to 5.5-6 reps to demonstrate improved LE functional strength and muscular endurance MET 9/26/2023   Pt to improve his TUG time to 21 seconds or < for improved household ambulation with decreased fall risk. Progressing, MET, 10/9/23   Pt to improve his SSWS to 0.50 m/sec for improved community ambulation with decreased fall risk MET 9/26/2023, NOT MET, 10/9/23  Pt to improve at least 3 LE MMT grades by 1/3 for improved functional mobility and standing tolerance MET 9/26/2023      Long Term Goals: 8 weeks   Pt to be at least partially compliant with finalized HEP to help maintain potential gains realized in PT. Progressing   Pt to improve his 30 second chair rise to 6.5-7 reps to demonstrate improved LE functional strength and muscular endurance. Progressing   Pt to improve his TUG time to 19 seconds or <  for improved household ambulation with decreased fall risk. Progressing, MET, 10/9/23   Pt to improve his SSWS to 0.55 m/sec for improved community ambulation with decreased fall risk. Not progressing  Pt to improve at least 5 LE MMT grades by 1/3 for improved functional mobility and standing tolerance. Progressing, MET, 10/9/23     PLAN     Continue to progress strength, endurance and balance training as tolerated     Dana Harmon, PT    10/11/2023

## 2023-10-13 ENCOUNTER — OFFICE VISIT (OUTPATIENT)
Dept: UROLOGY | Facility: CLINIC | Age: 78
End: 2023-10-13
Payer: MEDICARE

## 2023-10-13 ENCOUNTER — TELEPHONE (OUTPATIENT)
Dept: UROLOGY | Facility: CLINIC | Age: 78
End: 2023-10-13

## 2023-10-13 ENCOUNTER — TELEPHONE (OUTPATIENT)
Dept: INTERNAL MEDICINE | Facility: CLINIC | Age: 78
End: 2023-10-13
Payer: MEDICARE

## 2023-10-13 ENCOUNTER — TELEPHONE (OUTPATIENT)
Dept: INTERNAL MEDICINE | Facility: CLINIC | Age: 78
End: 2023-10-13

## 2023-10-13 ENCOUNTER — HOSPITAL ENCOUNTER (OUTPATIENT)
Dept: RADIOLOGY | Facility: HOSPITAL | Age: 78
Discharge: HOME | End: 2023-10-13
Attending: UROLOGY
Payer: MEDICARE

## 2023-10-13 VITALS
DIASTOLIC BLOOD PRESSURE: 79 MMHG | WEIGHT: 205 LBS | BODY MASS INDEX: 28.7 KG/M2 | HEART RATE: 58 BPM | HEIGHT: 71 IN | SYSTOLIC BLOOD PRESSURE: 159 MMHG

## 2023-10-13 DIAGNOSIS — N40.1 BENIGN PROSTATIC HYPERPLASIA WITH URINARY OBSTRUCTION: ICD-10-CM

## 2023-10-13 DIAGNOSIS — Z01.818 PRE-OP EVALUATION: Primary | ICD-10-CM

## 2023-10-13 DIAGNOSIS — N50.812 PAIN IN LEFT TESTICLE: Primary | ICD-10-CM

## 2023-10-13 DIAGNOSIS — N43.3 RIGHT HYDROCELE: ICD-10-CM

## 2023-10-13 DIAGNOSIS — N13.8 BENIGN PROSTATIC HYPERPLASIA WITH URINARY OBSTRUCTION: ICD-10-CM

## 2023-10-13 DIAGNOSIS — N13.8 BPH WITH OBSTRUCTION/LOWER URINARY TRACT SYMPTOMS: ICD-10-CM

## 2023-10-13 DIAGNOSIS — N50.812 PAIN IN LEFT TESTICLE: ICD-10-CM

## 2023-10-13 DIAGNOSIS — N40.1 BPH WITH OBSTRUCTION/LOWER URINARY TRACT SYMPTOMS: ICD-10-CM

## 2023-10-13 DIAGNOSIS — N43.3 HYDROCELE, RIGHT: Primary | ICD-10-CM

## 2023-10-13 PROCEDURE — 99999 PR PBB SHADOW E&M-EST. PATIENT-LVL IV: CPT | Mod: PBBFAC,,, | Performed by: UROLOGY

## 2023-10-13 PROCEDURE — 1159F MED LIST DOCD IN RCRD: CPT | Mod: CPTII,S$GLB,, | Performed by: UROLOGY

## 2023-10-13 PROCEDURE — 99215 PR OFFICE/OUTPT VISIT, EST, LEVL V, 40-54 MIN: ICD-10-PCS | Mod: S$GLB,,, | Performed by: UROLOGY

## 2023-10-13 PROCEDURE — 81002 PR URINALYSIS NONAUTO W/O SCOPE: ICD-10-PCS | Mod: S$GLB,,, | Performed by: UROLOGY

## 2023-10-13 PROCEDURE — 3288F PR FALLS RISK ASSESSMENT DOCUMENTED: ICD-10-PCS | Mod: CPTII,S$GLB,, | Performed by: UROLOGY

## 2023-10-13 PROCEDURE — 99215 OFFICE O/P EST HI 40 MIN: CPT | Mod: S$GLB,,, | Performed by: UROLOGY

## 2023-10-13 PROCEDURE — 51798 PR MEAS,POST-VOID RES,US,NON-IMAGING: ICD-10-PCS | Mod: S$GLB,,, | Performed by: UROLOGY

## 2023-10-13 PROCEDURE — 99999 PR PBB SHADOW E&M-EST. PATIENT-LVL IV: ICD-10-PCS | Mod: PBBFAC,,, | Performed by: UROLOGY

## 2023-10-13 PROCEDURE — 76870 US EXAM SCROTUM: CPT | Mod: TC

## 2023-10-13 PROCEDURE — 76870 US EXAM SCROTUM: CPT | Mod: 26,,, | Performed by: RADIOLOGY

## 2023-10-13 PROCEDURE — 3077F SYST BP >= 140 MM HG: CPT | Mod: CPTII,S$GLB,, | Performed by: UROLOGY

## 2023-10-13 PROCEDURE — 1101F PT FALLS ASSESS-DOCD LE1/YR: CPT | Mod: CPTII,S$GLB,, | Performed by: UROLOGY

## 2023-10-13 PROCEDURE — 1126F PR PAIN SEVERITY QUANTIFIED, NO PAIN PRESENT: ICD-10-PCS | Mod: CPTII,S$GLB,, | Performed by: UROLOGY

## 2023-10-13 PROCEDURE — 3077F PR MOST RECENT SYSTOLIC BLOOD PRESSURE >= 140 MM HG: ICD-10-PCS | Mod: CPTII,S$GLB,, | Performed by: UROLOGY

## 2023-10-13 PROCEDURE — 3078F DIAST BP <80 MM HG: CPT | Mod: CPTII,S$GLB,, | Performed by: UROLOGY

## 2023-10-13 PROCEDURE — 1159F PR MEDICATION LIST DOCUMENTED IN MEDICAL RECORD: ICD-10-PCS | Mod: CPTII,S$GLB,, | Performed by: UROLOGY

## 2023-10-13 PROCEDURE — 76870 US SCROTUM AND TESTICLES: ICD-10-PCS | Mod: 26,,, | Performed by: RADIOLOGY

## 2023-10-13 PROCEDURE — 1101F PR PT FALLS ASSESS DOC 0-1 FALLS W/OUT INJ PAST YR: ICD-10-PCS | Mod: CPTII,S$GLB,, | Performed by: UROLOGY

## 2023-10-13 PROCEDURE — 3288F FALL RISK ASSESSMENT DOCD: CPT | Mod: CPTII,S$GLB,, | Performed by: UROLOGY

## 2023-10-13 PROCEDURE — 51798 US URINE CAPACITY MEASURE: CPT | Mod: S$GLB,,, | Performed by: UROLOGY

## 2023-10-13 PROCEDURE — 3078F PR MOST RECENT DIASTOLIC BLOOD PRESSURE < 80 MM HG: ICD-10-PCS | Mod: CPTII,S$GLB,, | Performed by: UROLOGY

## 2023-10-13 PROCEDURE — 1126F AMNT PAIN NOTED NONE PRSNT: CPT | Mod: CPTII,S$GLB,, | Performed by: UROLOGY

## 2023-10-13 PROCEDURE — 81002 URINALYSIS NONAUTO W/O SCOPE: CPT | Mod: S$GLB,,, | Performed by: UROLOGY

## 2023-10-13 RX ORDER — AMLODIPINE BESYLATE 5 MG/1
5 TABLET ORAL DAILY
COMMUNITY
Start: 2023-09-26 | End: 2024-02-27

## 2023-10-13 RX ORDER — TAMSULOSIN HYDROCHLORIDE 0.4 MG/1
1 CAPSULE ORAL DAILY
Qty: 90 CAPSULE | Refills: 3 | Status: SHIPPED | OUTPATIENT
Start: 2023-10-13

## 2023-10-13 NOTE — PROGRESS NOTES
CHIEF COMPLAINT:    Mr. Menard is a 77 y.o. male presenting for a 6 month follow up on LUTS, right hydrocele    PRESENTING ILLNESS:    Tito Menard is a 77 y.o. male who presents with a history of right epididymo orchitis which required hospitalization on 6/17/2022.  He developed a right hydrocele and it has been slowly enlarging over time.  He states it is not painful but he has to wear tight underwear as a scrotal support.  He notes that is it pulling more on his scrotum.  He also has a nagging sensation on the left side similar to what he had when he developed epididymo orchitis on the right side.     He was on myrbetriq and oxybutynin and went into urinary retention.  The medications were discontinued and he had a catheter.  Unfortunately, it had gotten dislodged in the prostate and he had gross hematuria.  Since the catheter was removed in July, he has had no further issues with urinary retention.  He has occasional small volume urge incontinence and he wears a male shield and lines it with tissue paper or toilet.  He states that the pad is changed once a day and the tissue twice a day.  He is able to manage pretty well.  He walks with a walker.  Had an ER visit for a fall.     REVIEW OF SYSTEMS:    Review of Systems   Constitutional: Negative.    HENT: Negative.     Eyes: Negative.    Respiratory: Negative.     Cardiovascular: Negative.    Gastrointestinal: Negative.    Genitourinary: Negative.    Musculoskeletal:         Walks with a walker   Skin: Negative.    Neurological:  Positive for weakness.   Endo/Heme/Allergies: Negative.    Psychiatric/Behavioral: Negative.         PATIENT HISTORY:    Past Medical History:   Diagnosis Date    Allergy     Aneurysm of artery of lower extremity     Chalazion of left eye     CKD (chronic kidney disease), stage II 4/1/2019    Diabetes mellitus type II     Diabetes with neurologic complications     Enlarged aorta 8/2/2016    Noted on pharmacological stress echo  2/28/2014.      GERD (gastroesophageal reflux disease)     egd 2008    Hyperlipidemia     Hypertension     Jock itch 7/19/2018    MGD (meibomian gland dysfunction)     Osteopenia     Spinal stenosis     Spondylosis without myelopathy 10/23/2015    Vitamin D deficiency disease        Past Surgical History:   Procedure Laterality Date    CHALAZION EXCISION Left 8/18/13    CYST REMOVAL  2013    Back of neck    FLEXIBLE CYSTOSCOPY N/A 12/7/2021    Procedure: CYSTOSCOPY, FLEXIBLE;  Surgeon: Beatrice Vaca MD;  Location: [facility] OR [address] [place] [address];  Service: Urology;  Laterality: N/A;    FLUOROSCOPIC URODYNAMIC STUDY N/A 12/7/2021    Procedure: URODYNAMIC STUDY, FLUOROSCOPIC;  Surgeon: Beatrice Vaca MD;  Location: [facility] OR [address] [place] [address];  Service: Urology;  Laterality: N/A;  90 minutes     SPINE SURGERY  2007    x2 (2000, 2007)       Family History   Problem Relation Age of Onset    Hyperlipidemia Mother     Hypertension Mother     Kidney disease Mother     Cancer Mother     Heart disease Mother     Kidney failure Mother     Hypertension Maternal Grandmother      Social History     Socioeconomic History    Marital status:    Occupational History    Occupation: Retired     Comment:    Tobacco Use    Smoking status: Never    Smokeless tobacco: Former     Types: Chew    Tobacco comments:     Chewed tobacco once per day for 4-5 years when a    Substance and Sexual Activity    Alcohol use: No    Drug use: No    Sexual activity: Not Currently     Partners: Female   Social History Narrative        Colon 2007     Social Determinants of Health     Financial Resource Strain: Low Risk  (9/22/2023)    Overall Financial Resource Strain (CARDIA)     Difficulty of Paying Living Expenses: Not hard at all   Food Insecurity: No Food Insecurity (9/22/2023)    Hunger Vital Sign     Worried About Running Out of Food in the Last Year: Never true     Ran Out of Food in the Last Year: Never true   Transportation  Needs: No Transportation Needs (9/22/2023)    PRAPARE - Transportation     Lack of Transportation (Medical): No     Lack of Transportation (Non-Medical): No   Physical Activity: Insufficiently Active (9/22/2023)    Exercise Vital Sign     Days of Exercise per Week: 7 days     Minutes of Exercise per Session: 10 min   Stress: No Stress Concern Present (9/22/2023)    [employer] [place] [employer] - Occupational Stress Questionnaire     Feeling of Stress : Only a little   Social Connections: Moderately Isolated (9/22/2023)    Social Connection and Isolation Panel [NHANES]     Frequency of Communication with Friends and Family: More than three times a week     Frequency of Social Gatherings with Friends and Family: Never     Attends Mu-ism Services: Never     Active Member of Clubs or Organizations: No     Attends Club or Organization Meetings: Never     Marital Status:    Housing Stability: Low Risk  (9/22/2023)    Housing Stability Vital Sign     Unable to Pay for Housing in the Last Year: No     Number of Places Lived in the Last Year: 1     Unstable Housing in the Last Year: No       Allergies:  Patient has no known allergies.    Medications:  Outpatient Encounter Medications as of 10/13/2023   Medication Sig Dispense Refill    acetaminophen/diphenhydramine (TYLENOL PM ORAL) Take 2 capsules by mouth nightly.      amLODIPine (NORVASC) 5 MG tablet Take 5 mg by mouth once daily.      aspirin (ECOTRIN) 81 MG EC tablet TAKE 1 TABLET EVERY DAY 90 tablet 3    aspirin/salicylamide/caffeine (BC HEADACHE POWDER ORAL) Take 1 packet by mouth daily as needed (for headache).      azelastine (ASTELIN) 137 mcg (0.1 %) nasal spray USE 1 SPRAY IN EACH NOSTRIL TWICE DAILY 60 mL 0    benzonatate (TESSALON) 200 MG capsule TAKE 1 CAPSULE THREE TIMES DAILY AS NEEDED FOR COUGH 30 capsule 0    calcium carbonate (TUMS ORAL) Take 1-2 tablets by mouth daily as needed (for acid reflux).      carbidopa-levodopa  mg  (SINEMET)  mg per tablet Take 1 tablet by mouth 3 (three) times daily. For the first 3 days take twice daily then take three times daily thereafter. 90 tablet 2    chlorthalidone (HYGROTEN) 25 MG Tab TAKE 1/2 TABLET EVERY DAY 45 tablet 2    cholecalciferol, vitamin D3, (VITAMIN D3) 50 mcg (2,000 unit) Cap Take 1 capsule by mouth once daily.      clotrimazole-betamethasone 1-0.05% (LOTRISONE) cream APPLY TOPICALLY 2 (TWO) TIMES DAILY. (Patient taking differently: Apply topically 2 (two) times daily. Uses at bath time as needed for redness between thighs.) 45 g 0    docusate sodium (COLACE) 50 MG capsule Take 100 mg by mouth daily as needed for Constipation.      finasteride (PROSCAR) 5 mg tablet Take 1 tablet (5 mg total) by mouth once daily. 90 tablet 3    fluticasone propionate (FLONASE) 50 mcg/actuation nasal spray USE 1 SPRAY NASALLY ONE TIME DAILY 32 g 6    gabapentin (NEURONTIN) 300 MG capsule TAKE 1 CAPSULE EVERY MORNING AND AT NOON, AND TAKE 2 CAPSULES EVERY EVENING (TOTAL 4 CAPSULES DAILY) 360 capsule 0    guaifenesin (MUCINEX) 600 mg 12 hr tablet Take 1,200 mg by mouth 2 (two) times daily as needed.       irbesartan (AVAPRO) 300 MG tablet TAKE 1 TABLET EVERY EVENING 90 tablet 2    ketoconazole (NIZORAL) 2 % cream AAA bid (facial redness and scaling) (Patient taking differently: Apply topically once weekly for facial redness and scaling.) 60 g 3    lanolin/mineral oil/petrolatum (ARTIFICIAL TEARS OPHT) Place 1-2 drops into both eyes daily as needed (for dry eyes).      LIDOcaine 4 % PtMd Apply 1 patch topically daily as needed (for back pain).      methocarbamoL (ROBAXIN) 500 MG Tab Take 500 mg by mouth 4 (four) times daily.      omeprazole (PRILOSEC) 20 MG capsule Take 2 capsules (40 mg total) by mouth once daily. 90 capsule 3    potassium chloride (MICRO-K) 10 MEQ CpSR TAKE 3 CAPSULES ONE TIME DAILY 270 capsule 3    pravastatin (PRAVACHOL) 20 MG tablet TAKE 1 TABLET EVERY DAY 90 tablet 0     spironolactone (ALDACTONE) 25 MG tablet Take 0.5 tablets (12.5 mg total) by mouth once daily. 45 tablet 3    tamsulosin (FLOMAX) 0.4 mg Cap Take 1 capsule (0.4 mg total) by mouth once daily. 90 capsule 3    [DISCONTINUED] finasteride (PROSCAR) 5 mg tablet Take 1 tablet (5 mg total) by mouth once daily. 90 tablet 3    [DISCONTINUED] pravastatin (PRAVACHOL) 20 MG tablet TAKE 1 TABLET EVERY DAY 90 tablet 3     No facility-administered encounter medications on file as of 10/13/2023.         PHYSICAL EXAMINATION:    The patient generally appears in good health, is appropriately interactive, and is in no apparent distress.    Skin: No lesions.    Mental: Cooperative with normal affect.    Neuro: Grossly intact.    HEENT: Normal. No evidence of lymphadenopathy.    Chest:  normal inspiratory effort.    Abdomen: Soft, non-tender. No masses or organomegaly. Bladder is not palpable. No evidence of flank discomfort. No evidence of inguinal hernia.    Extremities: No clubbing, cyanosis, or edema    Scrotum showed no rashes or lesions. Testicles showed a large right hydrocele, with no tenderness left side is normal. Epididymis showed no masses or tenderness.  Penis was circumcised. No meatal stenosis. No penile discharge.  No inguinal hernias.  No inguinal lymphadenopathy.    IRINA:  prostate 40 g without nodularity.  Normal rectal tone.  No anal masses.   PVR by catheterization was 28 ml       LABS:    Lab Results   Component Value Date    BUN 20 06/22/2023    CREATININE 0.9 06/22/2023       Lab Results   Component Value Date    PSA 2.2 11/11/2021    PSA 1.6 09/02/2020    PSA 1.9 08/29/2019    PSADIAG 2.1 07/19/2018    PSADIAG 1.6 06/12/2017    PSADIAG 1.9 05/15/2015       UA 1.005, pH 7, otherwise, negative.     IMPRESSION:    Right hydrocele  Left testicular tenderness  BPH with LUTS    PLAN:    1.  Due to the size, recommended that he undergo right hydrocelectomy as the potential for post op bleeding increases with increased  size.  There is also the balance of having surgery now vs. Later when he may not be as able to recover as readily.  Message sent to Dr. Brown requesting anesthesia risk assessment.   2.  Consent signed.   3.  Scrotal ultrasound to evaluate the left testicular tenderness  4.  Continue the tamsulosin and finasteride    I spent 40 minutes with the patient of which more than half was spent in direct consultation with the patient in regards to our treatment and plan.      Copy to: Dr. Amanda Brown

## 2023-10-13 NOTE — TELEPHONE ENCOUNTER
Please call the patient to schedule a pre-op evaluation. Pt to see me next week in office.   Thanks SHAYNA

## 2023-10-17 ENCOUNTER — CLINICAL SUPPORT (OUTPATIENT)
Dept: REHABILITATION | Facility: HOSPITAL | Age: 78
End: 2023-10-17
Payer: MEDICARE

## 2023-10-17 DIAGNOSIS — R29.898 LEG WEAKNESS, BILATERAL: ICD-10-CM

## 2023-10-17 DIAGNOSIS — R29.3 POOR POSTURE: Primary | ICD-10-CM

## 2023-10-17 PROCEDURE — 97110 THERAPEUTIC EXERCISES: CPT | Mod: PO

## 2023-10-18 ENCOUNTER — TELEPHONE (OUTPATIENT)
Dept: INTERNAL MEDICINE | Facility: CLINIC | Age: 78
End: 2023-10-18
Payer: MEDICARE

## 2023-10-18 NOTE — TELEPHONE ENCOUNTER
----- Message from Rayne Ventura sent at 10/13/2023  3:09 PM CDT -----  Regarding: Return Call    Who Called:  Patient      Who Left Message for Patient:  Hannah Dudley      Does the patient know what this is regarding?  Labs, Patient stated he has taken his EKG and would like to speak with you before he do his labs        Best Call Back Number: [phone]         Additional Information: Patient is returning a call.  Please assist.

## 2023-10-24 DIAGNOSIS — E78.5 HYPERLIPIDEMIA, UNSPECIFIED HYPERLIPIDEMIA TYPE: ICD-10-CM

## 2023-10-24 DIAGNOSIS — K21.00 GASTROESOPHAGEAL REFLUX DISEASE WITH ESOPHAGITIS: ICD-10-CM

## 2023-10-24 RX ORDER — PRAVASTATIN SODIUM 20 MG/1
20 TABLET ORAL DAILY
Qty: 90 TABLET | Refills: 0 | Status: SHIPPED | OUTPATIENT
Start: 2023-10-24

## 2023-10-24 RX ORDER — OMEPRAZOLE 20 MG/1
40 CAPSULE, DELAYED RELEASE ORAL DAILY
Qty: 180 CAPSULE | Refills: 0 | Status: SHIPPED | OUTPATIENT
Start: 2023-10-24 | End: 2024-01-14

## 2023-10-24 NOTE — TELEPHONE ENCOUNTER
Provider Staff:  Action required. This patient has received emergency care.     Please schedule patient for a follow up appointment within next 90 days.     Thanks!  [facility]     Appointments      Date Provider   Last Visit   5/31/2023 Amanda Brown MD   Next Visit   Visit date not found Amanda Brown MD        Refill Decision Note   Tito Menard  is requesting a refill authorization.  Brief Assessment and Rationale for Refill:  Approve     Medication Therapy Plan: ED documentation reviewed. No changes to therapy noted.        Extended chart review required: Yes   Comments:     Note composed:5:27 PM 10/24/2023

## 2023-10-24 NOTE — TELEPHONE ENCOUNTER
Refill Encounter    PCP Visits: Recent Visits  Date Type Provider Dept   05/31/23 Office Visit Amanda Brown MD [facility] Internal Medicine   Showing recent visits within past 360 days and meeting all other requirements  Future Appointments  No visits were found meeting these conditions.  Showing future appointments within next 720 days and meeting all other requirements     Last 3 Blood Pressure:   BP Readings from Last 3 Encounters:   10/13/23 (!) 159/79   09/22/23 124/70   08/03/23 (!) 148/80     Preferred Pharmacy:   [facility] - [place], [place] - [address]  [address] [facility] [address] [address]  Phone: [phone] Fax: [phone]      Requested RX:  Requested Prescriptions     Pending Prescriptions Disp Refills    pravastatin (PRAVACHOL) 20 MG tablet 90 tablet 0     Sig: Take 1 tablet (20 mg total) by mouth once daily.    omeprazole (PRILOSEC) 20 MG capsule 90 capsule 3     Sig: Take 2 capsules (40 mg total) by mouth once daily.      RX Route: Normal

## 2023-10-24 NOTE — TELEPHONE ENCOUNTER
No care due was identified.  [facility] Embedded Care Due Messages. Reference number: [id number].   10/24/2023 8:32:00 AM CDT

## 2023-10-24 NOTE — TELEPHONE ENCOUNTER
----- Message from Leonardo Castañeda sent at 10/23/2023  2:00 PM CDT -----  Who Called:ASHLEY FISHER [[id number]]       New Prescription or Refill : Refill      RX Name and Strength:  pravastatin (PRAVACHOL) 20 MG tablet           RX Name and Strength:omeprazole (PRILOSEC) 20 MG capsule       Local or Mail Order : Local   Mail Order            Preferred Pharmacy: [facility] - [facility] [address]      Would the patient rather a call back or a response via [facility]? No        Best Call Back Number:  [phone]        Additional Information:Pt calling because he states that [employer] has sent in a refill request from some medication for him but haven't back from staff, I asked pt if he knew the name of the medication and he didn't.Please call back to further assist.

## 2023-11-01 ENCOUNTER — TELEPHONE (OUTPATIENT)
Dept: INTERNAL MEDICINE | Facility: CLINIC | Age: 78
End: 2023-11-01
Payer: MEDICARE

## 2023-11-01 NOTE — TELEPHONE ENCOUNTER
----- Message from Didi Townsend sent at 10/31/2023  1:10 PM CDT -----  Name of Who is Calling: ASHLEY FISHER [[id number]]              What is the request in detail: Patient requesting a call back to discuss medication and what it is for              Can the clinic reply by [facility]: No              What Number to Call Back if not in [facility]: [phone]

## 2023-11-01 NOTE — TELEPHONE ENCOUNTER
Call was successful then got disconnected. Tried calling back 2 more times but phone did not ring.

## 2023-11-03 ENCOUNTER — HOSPITAL ENCOUNTER (OUTPATIENT)
Dept: CARDIOLOGY | Facility: OTHER | Age: 78
Discharge: HOME | End: 2023-11-03
Attending: INTERNAL MEDICINE
Payer: MEDICARE

## 2023-11-03 DIAGNOSIS — Z01.818 PRE-OP EVALUATION: ICD-10-CM

## 2023-11-03 PROCEDURE — 93005 ELECTROCARDIOGRAM TRACING: CPT

## 2023-11-03 PROCEDURE — 93010 EKG 12-LEAD: ICD-10-PCS | Mod: ,,, | Performed by: INTERNAL MEDICINE

## 2023-11-03 PROCEDURE — 93010 ELECTROCARDIOGRAM REPORT: CPT | Mod: ,,, | Performed by: INTERNAL MEDICINE

## 2023-11-13 ENCOUNTER — PATIENT MESSAGE (OUTPATIENT)
Dept: UROLOGY | Facility: CLINIC | Age: 78
End: 2023-11-13
Payer: MEDICARE

## 2023-11-13 ENCOUNTER — ANESTHESIA EVENT (OUTPATIENT)
Dept: SURGERY | Facility: HOSPITAL | Age: 78
End: 2023-11-13
Payer: MEDICARE

## 2023-11-13 DIAGNOSIS — E11.42 DIABETIC POLYNEUROPATHY ASSOCIATED WITH TYPE 2 DIABETES MELLITUS: ICD-10-CM

## 2023-11-13 NOTE — PRE-PROCEDURE INSTRUCTIONS
PreOp Instructions given:   - Verbal medication information (what to hold and what to take)   - NPO guidelines   - Arrival place directions given; time to be given the day before procedure by the   Surgeon's Office [facility]   - Bathing with antibacterial soap   - Don't wear any jewelry or bring any valuables AM of surgery   - No makeup or moisturizer to face   - No perfume/cologne, powder, lotions or aftershave   Pt. verbalized understanding.   Pt denies any h/o Anesthesia/Sedation complications or side effects.

## 2023-11-13 NOTE — TELEPHONE ENCOUNTER
No care due was identified.  [facility] Embedded Care Due Messages. Reference number: [id number].   11/13/2023 3:04:53 PM CST

## 2023-11-14 ENCOUNTER — HOSPITAL ENCOUNTER (OUTPATIENT)
Facility: HOSPITAL | Age: 78
Discharge: HOME | End: 2023-11-14
Attending: UROLOGY | Admitting: UROLOGY
Payer: MEDICARE

## 2023-11-14 ENCOUNTER — ANESTHESIA (OUTPATIENT)
Dept: SURGERY | Facility: HOSPITAL | Age: 78
End: 2023-11-14
Payer: MEDICARE

## 2023-11-14 VITALS
HEART RATE: 63 BPM | DIASTOLIC BLOOD PRESSURE: 94 MMHG | OXYGEN SATURATION: 97 % | WEIGHT: 195 LBS | TEMPERATURE: 98 F | BODY MASS INDEX: 27.3 KG/M2 | SYSTOLIC BLOOD PRESSURE: 169 MMHG | RESPIRATION RATE: 19 BRPM | HEIGHT: 71 IN

## 2023-11-14 DIAGNOSIS — N43.3 HYDROCELE: ICD-10-CM

## 2023-11-14 DIAGNOSIS — N43.3 HYDROCELE, RIGHT: Primary | ICD-10-CM

## 2023-11-14 LAB — POCT GLUCOSE: 95 MG/DL (ref 70–110)

## 2023-11-14 PROCEDURE — 88360 PR  TUMOR IMMUNOHISTOCHEM/MANUAL: ICD-10-PCS | Mod: 26,,, | Performed by: PATHOLOGY

## 2023-11-14 PROCEDURE — 36000707: Performed by: UROLOGY

## 2023-11-14 PROCEDURE — D9220A PRA ANESTHESIA: ICD-10-PCS | Mod: ,,, | Performed by: ANESTHESIOLOGY

## 2023-11-14 PROCEDURE — 71000044 HC DOSC ROUTINE RECOVERY FIRST HOUR: Performed by: UROLOGY

## 2023-11-14 PROCEDURE — 88302 TISSUE EXAM BY PATHOLOGIST: CPT | Mod: 26,,, | Performed by: PATHOLOGY

## 2023-11-14 PROCEDURE — 36000706: Performed by: UROLOGY

## 2023-11-14 PROCEDURE — 25000003 PHARM REV CODE 250: Performed by: UROLOGY

## 2023-11-14 PROCEDURE — 55040 PR REMOVAL OF HYDROCELE,TUNICA,UNILAT: ICD-10-PCS | Mod: RT,,, | Performed by: UROLOGY

## 2023-11-14 PROCEDURE — 88302 TISSUE EXAM BY PATHOLOGIST: CPT | Performed by: PATHOLOGY

## 2023-11-14 PROCEDURE — 82962 GLUCOSE BLOOD TEST: CPT | Performed by: UROLOGY

## 2023-11-14 PROCEDURE — 37000008 HC ANESTHESIA 1ST 15 MINUTES: Performed by: UROLOGY

## 2023-11-14 PROCEDURE — 88342 IMHCHEM/IMCYTCHM 1ST ANTB: CPT | Performed by: PATHOLOGY

## 2023-11-14 PROCEDURE — 88341 PR IHC OR ICC EACH ADD'L SINGLE ANTIBODY  STAINPR: ICD-10-PCS | Mod: 26,59,, | Performed by: PATHOLOGY

## 2023-11-14 PROCEDURE — 25000003 PHARM REV CODE 250

## 2023-11-14 PROCEDURE — 88341 IMHCHEM/IMCYTCHM EA ADD ANTB: CPT | Mod: 26,59,, | Performed by: PATHOLOGY

## 2023-11-14 PROCEDURE — 71000015 HC POSTOP RECOV 1ST HR: Performed by: UROLOGY

## 2023-11-14 PROCEDURE — 88342 IMHCHEM/IMCYTCHM 1ST ANTB: CPT | Mod: 26,59,, | Performed by: PATHOLOGY

## 2023-11-14 PROCEDURE — 88341 IMHCHEM/IMCYTCHM EA ADD ANTB: CPT | Mod: 59 | Performed by: PATHOLOGY

## 2023-11-14 PROCEDURE — 88342 CHG IMMUNOCYTOCHEMISTRY: ICD-10-PCS | Mod: 26,59,, | Performed by: PATHOLOGY

## 2023-11-14 PROCEDURE — 63600175 PHARM REV CODE 636 W HCPCS

## 2023-11-14 PROCEDURE — 55040 REMOVAL OF HYDROCELE: CPT | Mod: RT,,, | Performed by: UROLOGY

## 2023-11-14 PROCEDURE — 88302 PR  SURG PATH,LEVEL II: ICD-10-PCS | Mod: 26,,, | Performed by: PATHOLOGY

## 2023-11-14 PROCEDURE — 88360 TUMOR IMMUNOHISTOCHEM/MANUAL: CPT | Mod: 26,,, | Performed by: PATHOLOGY

## 2023-11-14 PROCEDURE — 37000009 HC ANESTHESIA EA ADD 15 MINS: Performed by: UROLOGY

## 2023-11-14 PROCEDURE — D9220A PRA ANESTHESIA: Mod: ,,, | Performed by: ANESTHESIOLOGY

## 2023-11-14 RX ORDER — GABAPENTIN 300 MG/1
CAPSULE ORAL
Qty: 360 CAPSULE | Refills: 1 | Status: SHIPPED | OUTPATIENT
Start: 2023-11-14

## 2023-11-14 RX ORDER — FENTANYL CITRATE 50 UG/ML
INJECTION, SOLUTION INTRAMUSCULAR; INTRAVENOUS
Status: DISCONTINUED | OUTPATIENT
Start: 2023-11-14 | End: 2023-11-14

## 2023-11-14 RX ORDER — EPHEDRINE SULFATE 50 MG/ML
INJECTION, SOLUTION INTRAVENOUS
Status: DISCONTINUED | OUTPATIENT
Start: 2023-11-14 | End: 2023-11-14

## 2023-11-14 RX ORDER — BACITRACIN ZINC 500 UNIT/G
OINTMENT (GRAM) TOPICAL
Status: DISCONTINUED | OUTPATIENT
Start: 2023-11-14 | End: 2023-11-14 | Stop reason: HOSPADM

## 2023-11-14 RX ORDER — CARBIDOPA AND LEVODOPA 25; 100 MG/1; MG/1
TABLET ORAL
Qty: 270 TABLET | Refills: 0 | OUTPATIENT
Start: 2023-11-14

## 2023-11-14 RX ORDER — CARBIDOPA AND LEVODOPA 25; 100 MG/1; MG/1
1 TABLET ORAL 3 TIMES DAILY
Qty: 90 TABLET | Refills: 2 | OUTPATIENT
Start: 2023-11-14 | End: 2024-02-12

## 2023-11-14 RX ORDER — ROCURONIUM BROMIDE 10 MG/ML
INJECTION, SOLUTION INTRAVENOUS
Status: DISCONTINUED | OUTPATIENT
Start: 2023-11-14 | End: 2023-11-14

## 2023-11-14 RX ORDER — DEXAMETHASONE SODIUM PHOSPHATE 4 MG/ML
INJECTION, SOLUTION INTRA-ARTICULAR; INTRALESIONAL; INTRAMUSCULAR; INTRAVENOUS; SOFT TISSUE
Status: DISCONTINUED | OUTPATIENT
Start: 2023-11-14 | End: 2023-11-14

## 2023-11-14 RX ORDER — LIDOCAINE HYDROCHLORIDE 20 MG/ML
INJECTION, SOLUTION EPIDURAL; INFILTRATION; INTRACAUDAL; PERINEURAL
Status: DISCONTINUED | OUTPATIENT
Start: 2023-11-14 | End: 2023-11-14

## 2023-11-14 RX ORDER — SODIUM CHLORIDE 0.9 % (FLUSH) 0.9 %
10 SYRINGE (ML) INJECTION
Status: DISCONTINUED | OUTPATIENT
Start: 2023-11-14 | End: 2023-11-14 | Stop reason: HOSPADM

## 2023-11-14 RX ORDER — GABAPENTIN 300 MG/1
CAPSULE ORAL
Qty: 360 CAPSULE | Refills: 0 | Status: SHIPPED | OUTPATIENT
Start: 2023-11-14 | End: 2023-11-14 | Stop reason: SDUPTHER

## 2023-11-14 RX ORDER — ONDANSETRON 2 MG/ML
INJECTION INTRAMUSCULAR; INTRAVENOUS
Status: DISCONTINUED | OUTPATIENT
Start: 2023-11-14 | End: 2023-11-14

## 2023-11-14 RX ORDER — PROPOFOL 10 MG/ML
VIAL (ML) INTRAVENOUS
Status: DISCONTINUED | OUTPATIENT
Start: 2023-11-14 | End: 2023-11-14

## 2023-11-14 RX ORDER — CEFAZOLIN SODIUM 1 G/3ML
INJECTION, POWDER, FOR SOLUTION INTRAMUSCULAR; INTRAVENOUS
Status: DISCONTINUED | OUTPATIENT
Start: 2023-11-14 | End: 2023-11-14

## 2023-11-14 RX ORDER — ACETAMINOPHEN 500 MG
1000 TABLET ORAL
Status: COMPLETED | OUTPATIENT
Start: 2023-11-14 | End: 2023-11-14

## 2023-11-14 RX ORDER — LIDOCAINE HYDROCHLORIDE 10 MG/ML
INJECTION, SOLUTION EPIDURAL; INFILTRATION; INTRACAUDAL; PERINEURAL
Status: DISCONTINUED | OUTPATIENT
Start: 2023-11-14 | End: 2023-11-14 | Stop reason: HOSPADM

## 2023-11-14 RX ORDER — OXYCODONE HYDROCHLORIDE 5 MG/1
5 TABLET ORAL EVERY 6 HOURS PRN
Qty: 6 TABLET | Refills: 0 | Status: SHIPPED | OUTPATIENT
Start: 2023-11-14 | End: 2023-12-06

## 2023-11-14 RX ORDER — HALOPERIDOL 5 MG/ML
0.5 INJECTION INTRAMUSCULAR EVERY 10 MIN PRN
Status: DISCONTINUED | OUTPATIENT
Start: 2023-11-14 | End: 2023-11-14 | Stop reason: HOSPADM

## 2023-11-14 RX ORDER — MIDAZOLAM HYDROCHLORIDE 1 MG/ML
INJECTION INTRAMUSCULAR; INTRAVENOUS
Status: DISCONTINUED | OUTPATIENT
Start: 2023-11-14 | End: 2023-11-14

## 2023-11-14 RX ORDER — HYDROMORPHONE HYDROCHLORIDE 1 MG/ML
0.2 INJECTION, SOLUTION INTRAMUSCULAR; INTRAVENOUS; SUBCUTANEOUS EVERY 5 MIN PRN
Status: DISCONTINUED | OUTPATIENT
Start: 2023-11-14 | End: 2023-11-14 | Stop reason: HOSPADM

## 2023-11-14 RX ADMIN — SUGAMMADEX 400 MG: 100 INJECTION, SOLUTION INTRAVENOUS at 01:11

## 2023-11-14 RX ADMIN — SODIUM CHLORIDE: 0.9 INJECTION, SOLUTION INTRAVENOUS at 12:11

## 2023-11-14 RX ADMIN — DEXAMETHASONE SODIUM PHOSPHATE 4 MG: 4 INJECTION, SOLUTION INTRAMUSCULAR; INTRAVENOUS at 12:11

## 2023-11-14 RX ADMIN — FENTANYL CITRATE 100 MCG: 50 INJECTION, SOLUTION INTRAMUSCULAR; INTRAVENOUS at 12:11

## 2023-11-14 RX ADMIN — ONDANSETRON 4 MG: 2 INJECTION INTRAMUSCULAR; INTRAVENOUS at 01:11

## 2023-11-14 RX ADMIN — SODIUM CHLORIDE, SODIUM GLUCONATE, SODIUM ACETATE, POTASSIUM CHLORIDE, MAGNESIUM CHLORIDE, SODIUM PHOSPHATE, DIBASIC, AND POTASSIUM PHOSPHATE: .53; .5; .37; .037; .03; .012; .00082 INJECTION, SOLUTION INTRAVENOUS at 01:11

## 2023-11-14 RX ADMIN — PROPOFOL 200 MG: 10 INJECTION, EMULSION INTRAVENOUS at 12:11

## 2023-11-14 RX ADMIN — EPHEDRINE SULFATE 25 MG: 50 INJECTION INTRAVENOUS at 12:11

## 2023-11-14 RX ADMIN — ROCURONIUM BROMIDE 50 MG: 10 INJECTION, SOLUTION INTRAVENOUS at 12:11

## 2023-11-14 RX ADMIN — ACETAMINOPHEN 1000 MG: 500 TABLET ORAL at 10:11

## 2023-11-14 RX ADMIN — LIDOCAINE HYDROCHLORIDE 100 MG: 20 INJECTION, SOLUTION EPIDURAL; INFILTRATION; INTRACAUDAL; PERINEURAL at 12:11

## 2023-11-14 RX ADMIN — MIDAZOLAM HYDROCHLORIDE 2 MG: 1 INJECTION INTRAMUSCULAR; INTRAVENOUS at 12:11

## 2023-11-14 RX ADMIN — CEFAZOLIN 2 G: 330 INJECTION, POWDER, FOR SOLUTION INTRAMUSCULAR; INTRAVENOUS at 12:11

## 2023-11-14 NOTE — ANESTHESIA PREPROCEDURE EVALUATION
[facility]  Anesthesia Pre-Operative Evaluation         Patient Name: Tito Menard  YOB: 1945  MRN: [id number]    SUBJECTIVE:     Pre-operative evaluation for Procedure(s) (LRB):  HYDROCELECTOMY (Right)     11/13/2023    Tito Menard is a 77 y.o. male w/ a significant PMHx of HTN, HLD, T2DM, BPH, R bundle branch block, obesity, thoracic aortic aneurysm without rupture, spinal stenosis, right epididymo orchitis which required hospitalization on 6/17/2022 and subsequent R hydrocele formation. Hydrocele has been slowly enlarging over time.     Patient now presents for the above procedure(s).    TTE 6/20/23:  The left ventricle is normal in size with normal systolic function.  The estimated ejection fraction is 55%.  Normal left ventricular diastolic function.  Mild aortic regurgitation.  Mild right ventricular enlargement with moderately reduced right ventricular systolic function.  The estimated PA systolic pressure is 24 mmHg. Faint TR, the PASP may be under-estimated.  Normal central venous pressure (3 mmHg).  The ascending aorta is mildly dilated.    LDA: None documented.     Prev airway: None documented.    Drips: None documented.      Patient Active Problem List   Diagnosis    Essential hypertension    Mixed hyperlipidemia    Osteopenia    Spinal stenosis    Vitamin D deficiency disease    Obstructive chronic bronchitis without exacerbation    Diabetic polyneuropathy associated with type 2 diabetes mellitus    Spondylosis of cervical joint with myelopathy    Gait disorder    Chronic right-sided low back pain without sciatica    BPH with obstruction/lower urinary tract symptoms    Restrictive lung disease due to kyphoscoliosis    History of diabetes mellitus, type II    Decreased functional mobility    Alteration in performance of activities of daily living    Impaired instrumental activities of daily living (IADL)    Right bundle branch block    Hx of multiple pulmonary  nodules    Chronically elevated hemidiaphragm    Pre-syncope    Syncope    Subcortical microvascular ischemic occlusive disease    Lower extremity edema    Class 1 obesity due to excess calories with serious comorbidity and body mass index (BMI) of 31.0 to 31.9 in adult    Thoracic aortic aneurysm without rupture    Epididymo-orchitis    Bacteremia    Chest discomfort    Hypokalemia    Aortic atherosclerosis    Gastroesophageal reflux disease with esophagitis    Acute neck pain    Poor posture    Leg weakness, bilateral       Review of patient's allergies indicates:  No Known Allergies    Current Inpatient Medications:      No current facility-administered medications on file prior to encounter.     Current Outpatient Medications on File Prior to Encounter   Medication Sig Dispense Refill    aspirin/salicylamide/caffeine (BC HEADACHE POWDER ORAL) Take 1 packet by mouth daily as needed (for headache).      carbidopa-levodopa  mg (SINEMET)  mg per tablet Take 1 tablet by mouth 3 (three) times daily. For the first 3 days take twice daily then take three times daily thereafter. 90 tablet 2    chlorthalidone (HYGROTEN) 25 MG Tab TAKE 1/2 TABLET EVERY DAY 45 tablet 2    gabapentin (NEURONTIN) 300 MG capsule TAKE 1 CAPSULE EVERY MORNING AND AT NOON, AND TAKE 2 CAPSULES EVERY EVENING (TOTAL 4 CAPSULES DAILY) 360 capsule 0    irbesartan (AVAPRO) 300 MG tablet TAKE 1 TABLET EVERY EVENING 90 tablet 2    tamsulosin (FLOMAX) 0.4 mg Cap Take 1 capsule (0.4 mg total) by mouth once daily. (Patient taking differently: Take 1 capsule by mouth every evening.) 90 capsule 3    acetaminophen/diphenhydramine (TYLENOL PM ORAL) Take 2 capsules by mouth nightly.      amLODIPine (NORVASC) 5 MG tablet Take 5 mg by mouth once daily.      aspirin (ECOTRIN) 81 MG EC tablet TAKE 1 TABLET EVERY DAY 90 tablet 3    azelastine (ASTELIN) 137 mcg (0.1 %) nasal spray USE 1 SPRAY IN EACH NOSTRIL TWICE DAILY 60 mL 0    benzonatate (TESSALON)  200 MG capsule TAKE 1 CAPSULE THREE TIMES DAILY AS NEEDED FOR COUGH (Patient not taking: Reported on 11/13/2023) 30 capsule 0    calcium carbonate (TUMS ORAL) Take 1-2 tablets by mouth daily as needed (for acid reflux).      cholecalciferol, vitamin D3, (VITAMIN D3) 50 mcg (2,000 unit) Cap Take 1 capsule by mouth once daily.      clotrimazole-betamethasone 1-0.05% (LOTRISONE) cream APPLY TOPICALLY 2 (TWO) TIMES DAILY. (Patient taking differently: Apply topically 2 (two) times daily. Uses at bath time as needed for redness between thighs.) 45 g 0    docusate sodium (COLACE) 50 MG capsule Take 100 mg by mouth daily as needed for Constipation.      finasteride (PROSCAR) 5 mg tablet Take 1 tablet (5 mg total) by mouth once daily. 90 tablet 3    fluticasone propionate (FLONASE) 50 mcg/actuation nasal spray USE 1 SPRAY NASALLY ONE TIME DAILY 32 g 6    guaifenesin (MUCINEX) 600 mg 12 hr tablet Take 1,200 mg by mouth 2 (two) times daily as needed.       ketoconazole (NIZORAL) 2 % cream AAA bid (facial redness and scaling) (Patient taking differently: Apply topically once weekly for facial redness and scaling.) 60 g 3    lanolin/mineral oil/petrolatum (ARTIFICIAL TEARS OPHT) Place 1-2 drops into both eyes daily as needed (for dry eyes).      LIDOcaine 4 % PtMd Apply 1 patch topically daily as needed (for back pain).      methocarbamoL (ROBAXIN) 500 MG Tab Take 500 mg by mouth 4 (four) times daily.      potassium chloride (MICRO-K) 10 MEQ CpSR TAKE 3 CAPSULES ONE TIME DAILY 270 capsule 3    spironolactone (ALDACTONE) 25 MG tablet Take 0.5 tablets (12.5 mg total) by mouth once daily. 45 tablet 3       Past Surgical History:   Procedure Laterality Date    CHALAZION EXCISION Left 8/18/13    CYST REMOVAL  2013    Back of neck    FLEXIBLE CYSTOSCOPY N/A 12/7/2021    Procedure: CYSTOSCOPY, FLEXIBLE;  Surgeon: Beatrice Vaca MD;  Location: [facility] OR [address] [place] [address];  Service: Urology;  Laterality: N/A;    FLUOROSCOPIC URODYNAMIC STUDY N/A  12/7/2021    Procedure: URODYNAMIC STUDY, FLUOROSCOPIC;  Surgeon: Beatrice Vaca MD;  Location: [address];  Service: Urology;  Laterality: N/A;  90 minutes     SPINE SURGERY  2007    x2 (2000, 2007)       Social History     Socioeconomic History    Marital status:    Occupational History    Occupation: Retired     Comment:    Tobacco Use    Smoking status: Never    Smokeless tobacco: Former     Types: Chew    Tobacco comments:     Chewed tobacco once per day for 4-5 years when a    Substance and Sexual Activity    Alcohol use: No    Drug use: No    Sexual activity: Not Currently     Partners: Female   Social History Narrative        Colon 2007     Social Determinants of Health     Financial Resource Strain: Low Risk  (9/22/2023)    Overall Financial Resource Strain (CARDIA)     Difficulty of Paying Living Expenses: Not hard at all   Food Insecurity: No Food Insecurity (9/22/2023)    Hunger Vital Sign     Worried About Running Out of Food in the Last Year: Never true     Ran Out of Food in the Last Year: Never true   Transportation Needs: No Transportation Needs (9/22/2023)    PRAPARE - Transportation     Lack of Transportation (Medical): No     Lack of Transportation (Non-Medical): No   Physical Activity: Insufficiently Active (9/22/2023)    Exercise Vital Sign     Days of Exercise per Week: 7 days     Minutes of Exercise per Session: 10 min   Stress: No Stress Concern Present (9/22/2023)    [employer] [place] [employer] - Occupational Stress Questionnaire     Feeling of Stress : Only a little   Social Connections: Moderately Isolated (9/22/2023)    Social Connection and Isolation Panel [NHANES]     Frequency of Communication with Friends and Family: More than three times a week     Frequency of Social Gatherings with Friends and Family: Never     Attends Mormon Services: Never     Active Member of Clubs or Organizations: No     Attends Club or  Organization Meetings: Never     Marital Status:    Housing Stability: Low Risk  (9/22/2023)    Housing Stability Vital Sign     Unable to Pay for Housing in the Last Year: No     Number of Places Lived in the Last Year: 1     Unstable Housing in the Last Year: No       OBJECTIVE:     Vital Signs Range (Last 24H):         Significant Labs:  Lab Results   Component Value Date    WBC 4.48 11/03/2023    HGB 12.7 (L) 11/03/2023    HCT 38.9 (L) 11/03/2023     11/03/2023    CHOL 167 05/31/2023    TRIG 97 05/31/2023    HDL 44 05/31/2023    ALT 6 (L) 11/03/2023    AST 24 11/03/2023     11/03/2023    K 4.1 11/03/2023     11/03/2023    CREATININE 1.0 11/03/2023    BUN 15 11/03/2023    CO2 26 11/03/2023    TSH 0.661 06/20/2023    PSA 2.2 11/11/2021    INR 1.0 04/26/2021    HGBA1C 5.6 06/20/2023       Diagnostic Studies: No relevant studies.    EKG:   Results for orders placed or performed during the hospital encounter of 11/03/23   EKG 12-lead    Collection Time: 11/03/23  9:07 AM    Narrative    Test Reason : Z01.818,    Vent. Rate : 058 BPM     Atrial Rate : 058 BPM     P-R Int : 160 ms          QRS Dur : 128 ms      QT Int : 422 ms       P-R-T Axes : 065 042 067 degrees     QTc Int : 414 ms    Sinus bradycardia  Right bundle branch block  Abnormal ECG    Confirmed by Olinda TOUSSAINT, Iván JARRETT ([id number]) on 11/3/2023 9:52:51 AM    Referred By: GRAHAM ENRIQUEZ           Confirmed By:Iván Prakash MD       2D ECHO:  TTE:  Results for orders placed or performed during the hospital encounter of 06/19/23   Echo   Result Value Ref Range    BSA 2.19 m2    TDI SEPTAL 0.06 m/s    LV LATERAL E/E' RATIO 4.91 m/s    LV SEPTAL E/E' RATIO 9.00 m/s    LA WIDTH 4.08 cm    TDI LATERAL 0.11 m/s    LVIDd 5.43 3.5 - 6.0 cm    IVS 1.00 0.6 - 1.1 cm    Posterior Wall 0.90 0.6 - 1.1 cm    LVIDs 3.88 2.1 - 4.0 cm    FS 29 28 - 44 %    Sinus 3.39 cm    STJ 3.49 cm    Ascending aorta 4.16 cm    LV mass 195.06 g    LA size 3.87 cm    RVDD  4.40 cm    TAPSE 1.67 cm    Left Ventricle Relative Wall Thickness 0.33 cm    AV mean gradient 4 mmHg    AV valve area 3.03 cm2    AV Velocity Ratio 0.73     AV index (prosthetic) 0.70     E/A ratio 1.17     Mean e' 0.09 m/s    E wave deceleration time 372.23 msec    LVOT diameter 2.35 cm    LVOT area 4.3 cm2    LVOT peak gibran 0.94 m/s    LVOT peak VTI 19.26 cm    Ao peak gibran 1.29 m/s    Ao VTI 27.53 cm    LVOT stroke volume 83.50 cm3    AV peak gradient 7 mmHg    E/E' ratio 6.35 m/s    MV Peak E Gibran 0.54 m/s    TR Max Gibran 2.27 m/s    MV Peak A Gibran 0.46 m/s    LV Systolic Volume 65.12 mL    LV Systolic Volume Index 30.1 mL/m2    LV Diastolic Volume 143.30 mL    LV Diastolic Volume Index 66.34 mL/m2    LV Mass Index 90 g/m2    RA Major Axis 4.49 cm    Left Atrium Major Axis 5.98 cm    Triscuspid Valve Regurgitation Peak Gradient 21 mmHg    LA Volume Index (Mod) 33.8 mL/m2    LA volume (mod) 73.00 cm3    RA Width 2.45 cm    Right Atrial Pressure (from IVC) 3 mmHg    EF 55 %    TV resting pulmonary artery pressure 24 mmHg    Narrative    · The left ventricle is normal in size with normal systolic function.  · The estimated ejection fraction is 55%.  · Normal left ventricular diastolic function.  · Mild aortic regurgitation.  · Mild right ventricular enlargement with moderately reduced right   ventricular systolic function.  · The estimated PA systolic pressure is 24 mmHg. Faint TR, the PASP may be   under-estimated.  · Normal central venous pressure (3 mmHg).  · The ascending aorta is mildly dilated.          PROMISE:  No results found for this or any previous visit.    ASSESSMENT/PLAN:         Pre-op Assessment    I have reviewed the Patient Summary Reports.     I have reviewed the Nursing Notes. I have reviewed the NPO Status.   I have reviewed the Medications.     Review of Systems  Anesthesia Hx:  No problems with previous Anesthesia   History of prior surgery of interest to airway management or planning:           Denies Family Hx of Anesthesia complications.    Denies Personal Hx of Anesthesia complications.                    Social:  Non-Smoker, No Alcohol Use       Hematology/Oncology:                      Denies Current/Recent Cancer                EENT/Dental:  denies chronic allergic rhinitis           Cardiovascular:     Hypertension    Denies CAD.    Dysrhythmias       hyperlipidemia                             Pulmonary:    Denies COPD.  Denies Asthma.     Denies Sleep Apnea.                Renal/:   Denies Chronic Renal Disease.                Hepatic/GI:      Denies GERD.             Musculoskeletal:  Denies Arthritis.          Spine Disorders:             Neurological:    Denies CVA.    Denies Seizures.                                Endocrine:  Diabetes         Obesity / BMI > 30  Psych:  Denies Psychiatric History.                  Physical Exam  General: Well nourished, Cooperative, Alert and Oriented    Airway:  Mallampati: II   Mouth Opening: Normal  TM Distance: Normal  Tongue: Normal  Neck ROM: Normal ROM    Dental:  Intact        Anesthesia Plan  Type of Anesthesia, risks & benefits discussed:    Anesthesia Type: Gen ETT  Intra-op Monitoring Plan: Standard ASA Monitors  Post Op Pain Control Plan: multimodal analgesia  Induction:  IV  Airway Plan: Direct, Post-Induction  Informed Consent: Informed consent signed with the Patient and all parties understand the risks and agree with anesthesia plan.  All questions answered.   ASA Score: 3  Day of Surgery Review of History & Physical: H&P Update referred to the surgeon/provider.    Ready For Surgery From Anesthesia Perspective.     .

## 2023-11-14 NOTE — PROGRESS NOTES
Called the patient's wife and informed her that surgery went well.  We discussed the findings.  She asked if a specimen was sent (yes, the hydrocele sac) and if anything looked concerning (no, looked like a normal sac to me, I did not see anything suspicious)     Discussed the dressing and wound care with the bacitracin ointment.     Maria Teresa will reach out to schedule the post op appointment.

## 2023-11-14 NOTE — H&P
Urology ([facility]) H&P      HPI:  Tito Menard is a 77 y.o. male who presents with a history of right epididymo orchitis which required hospitalization on 6/17/2022.  He developed a right hydrocele and it has been slowly enlarging over time.  He states it is not painful but he has to wear tight underwear as a scrotal support.  He notes that is it pulling more on his scrotum.  He also has a nagging sensation on the left side similar to what he had when he developed epididymo orchitis on the right side.      He was on myrbetriq and oxybutynin and went into urinary retention.  A yarbrough catheter was placed and the medications were discontinued.  Since the catheter was removed in July, he has had no further issues with urinary retention.  He has occasional small volume urge incontinence and he wears a male shield and lines it with tissue paper or toilet.  He states that the pad is changed once a day and the tissue twice a day.  He is able to manage pretty well.  He walks with a walker.      Scrotal US showed large right hydrocele with possible spermatocele.  The hydrocele is becoming uncomfortable and we discussed that it is of a size that could be treated.  If it gets larger, the risk of post op bleeding increased due to the size alone.      ROS:  Neg except per HPI    Past Medical History:   Diagnosis Date    Allergy     Aneurysm of artery of lower extremity     Chalazion of left eye     CKD (chronic kidney disease), stage II 4/1/2019    Diabetes mellitus type II     Diabetes with neurologic complications     Enlarged aorta 8/2/2016    Noted on pharmacological stress echo 2/28/2014.      GERD (gastroesophageal reflux disease)     egd 2008    Hyperlipidemia     Hypertension     Jock itch 7/19/2018    MGD (meibomian gland dysfunction)     Osteopenia     Spinal stenosis     Spondylosis without myelopathy 10/23/2015    Vitamin D deficiency disease        Past Surgical History:   Procedure Laterality Date    CHALAZION  EXCISION Left 8/18/13    CYST REMOVAL  2013    Back of neck    FLEXIBLE CYSTOSCOPY N/A 12/7/2021    Procedure: CYSTOSCOPY, FLEXIBLE;  Surgeon: Beatrice Vaca MD;  Location: [facility] OR [address] [place] [address];  Service: Urology;  Laterality: N/A;    FLUOROSCOPIC URODYNAMIC STUDY N/A 12/7/2021    Procedure: URODYNAMIC STUDY, FLUOROSCOPIC;  Surgeon: Beatrice Vaca MD;  Location: [facility] OR [address] [place] [address];  Service: Urology;  Laterality: N/A;  90 minutes     SPINE SURGERY  2007    x2 (2000, 2007)       Social History     Socioeconomic History    Marital status:    Occupational History    Occupation: Retired     Comment:    Tobacco Use    Smoking status: Never    Smokeless tobacco: Former     Types: Chew    Tobacco comments:     Chewed tobacco once per day for 4-5 years when a    Substance and Sexual Activity    Alcohol use: No    Drug use: No    Sexual activity: Not Currently     Partners: Female   Social History Narrative        Colon 2007     Social Determinants of Health     Financial Resource Strain: Low Risk  (9/22/2023)    Overall Financial Resource Strain (CARDIA)     Difficulty of Paying Living Expenses: Not hard at all   Food Insecurity: No Food Insecurity (9/22/2023)    Hunger Vital Sign     Worried About Running Out of Food in the Last Year: Never true     Ran Out of Food in the Last Year: Never true   Transportation Needs: No Transportation Needs (9/22/2023)    PRAPARE - Transportation     Lack of Transportation (Medical): No     Lack of Transportation (Non-Medical): No   Physical Activity: Insufficiently Active (9/22/2023)    Exercise Vital Sign     Days of Exercise per Week: 7 days     Minutes of Exercise per Session: 10 min   Stress: No Stress Concern Present (9/22/2023)    [employer] [place] [place] [employer] - Occupational Stress Questionnaire     Feeling of Stress : Only a little   Social Connections: Moderately Isolated (9/22/2023)    Social Connection and Isolation Panel  [NHANES]     Frequency of Communication with Friends and Family: More than three times a week     Frequency of Social Gatherings with Friends and Family: Never     Attends Ep[employer]opalian Services: Never     Active Member of Clubs or Organizations: No     Attends Club or Organization Meetings: Never     Marital Status:    Housing Stability: Low Risk  (9/22/2023)    Housing Stability Vital Sign     Unable to Pay for Housing in the Last Year: No     Number of Places Lived in the Last Year: 1     Unstable Housing in the Last Year: No       Family History   Problem Relation Age of Onset    Hyperlipidemia Mother     Hypertension Mother     Kidney disease Mother     Cancer Mother     Heart disease Mother     Kidney failure Mother     Hypertension Maternal Grandmother        Review of patient's allergies indicates:  No Known Allergies    No current facility-administered medications on file prior to encounter.     Current Outpatient Medications on File Prior to Encounter   Medication Sig Dispense Refill    aspirin/salicylamide/caffeine (BC HEADACHE POWDER ORAL) Take 1 packet by mouth daily as needed (for headache).      carbidopa-levodopa  mg (SINEMET)  mg per tablet Take 1 tablet by mouth 3 (three) times daily. For the first 3 days take twice daily then take three times daily thereafter. 90 tablet 2    chlorthalidone (HYGROTEN) 25 MG Tab TAKE 1/2 TABLET EVERY DAY 45 tablet 2    irbesartan (AVAPRO) 300 MG tablet TAKE 1 TABLET EVERY EVENING 90 tablet 2    potassium chloride (MICRO-K) 10 MEQ CpSR TAKE 3 CAPSULES ONE TIME DAILY 270 capsule 3    spironolactone (ALDACTONE) 25 MG tablet Take 0.5 tablets (12.5 mg total) by mouth once daily. 45 tablet 3    tamsulosin (FLOMAX) 0.4 mg Cap Take 1 capsule (0.4 mg total) by mouth once daily. (Patient taking differently: Take 1 capsule by mouth every evening.) 90 capsule 3    [DISCONTINUED] gabapentin (NEURONTIN) 300 MG capsule TAKE 1 CAPSULE EVERY MORNING AND AT NOON, AND  TAKE 2 CAPSULES EVERY EVENING (TOTAL 4 CAPSULES DAILY) 360 capsule 0    acetaminophen/diphenhydramine (TYLENOL PM ORAL) Take 2 capsules by mouth nightly.      amLODIPine (NORVASC) 5 MG tablet Take 5 mg by mouth once daily.      aspirin (ECOTRIN) 81 MG EC tablet TAKE 1 TABLET EVERY DAY 90 tablet 3    azelastine (ASTELIN) 137 mcg (0.1 %) nasal spray USE 1 SPRAY IN EACH NOSTRIL TWICE DAILY 60 mL 0    benzonatate (TESSALON) 200 MG capsule TAKE 1 CAPSULE THREE TIMES DAILY AS NEEDED FOR COUGH (Patient not taking: Reported on 11/13/2023) 30 capsule 0    calcium carbonate (TUMS ORAL) Take 1-2 tablets by mouth daily as needed (for acid reflux).      cholecalciferol, vitamin D3, (VITAMIN D3) 50 mcg (2,000 unit) Cap Take 1 capsule by mouth once daily.      clotrimazole-betamethasone 1-0.05% (LOTRISONE) cream APPLY TOPICALLY 2 (TWO) TIMES DAILY. (Patient taking differently: Apply topically 2 (two) times daily. Uses at bath time as needed for redness between thighs.) 45 g 0    docusate sodium (COLACE) 50 MG capsule Take 100 mg by mouth daily as needed for Constipation.      finasteride (PROSCAR) 5 mg tablet Take 1 tablet (5 mg total) by mouth once daily. 90 tablet 3    fluticasone propionate (FLONASE) 50 mcg/actuation nasal spray USE 1 SPRAY NASALLY ONE TIME DAILY 32 g 6    guaifenesin (MUCINEX) 600 mg 12 hr tablet Take 1,200 mg by mouth 2 (two) times daily as needed.       ketoconazole (NIZORAL) 2 % cream AAA bid (facial redness and scaling) (Patient taking differently: Apply topically once weekly for facial redness and scaling.) 60 g 3    lanolin/mineral oil/petrolatum (ARTIFICIAL TEARS OPHT) Place 1-2 drops into both eyes daily as needed (for dry eyes).      LIDOcaine 4 % PtMd Apply 1 patch topically daily as needed (for back pain).      methocarbamoL (ROBAXIN) 500 MG Tab Take 500 mg by mouth 4 (four) times daily.         Anticoagulation:  Yes ASA 81 last took 5 days ago    Physical Exam:  General: No acute distress, well  developed. AAOx3  Head: Normocephalic, Atraumatic  Eyes: Extra-occular movements intact, No discharge  Neck: supple, symmetrical, trachea midline  Lungs: normal respiratory effort, no respiratory distress, no wheezes  CV: regular rate, 2+ pulses  Abdomen: soft, non-tender, non-distended, no organomegaly  : large right hydrocele present  MSK: no edema, no deformities, normal ROM  Skin: skin color, texture, turgor normal.  Neurologic: no focal deficits, sensation intact    Labs:      Lab Results   Component Value Date    WBC 4.48 11/03/2023    HGB 12.7 (L) 11/03/2023    HCT 38.9 (L) 11/03/2023    MCV 90 11/03/2023     11/03/2023           BMP  Lab Results   Component Value Date     11/03/2023    K 4.1 11/03/2023     11/03/2023    CO2 26 11/03/2023    BUN 15 11/03/2023    CREATININE 1.0 11/03/2023    CALCIUM 9.2 11/03/2023    ANIONGAP 9 11/03/2023    EGFRNORACEVR >60 11/03/2023       Lab Results   Component Value Date    PSA 2.2 11/11/2021    PSA 1.6 09/02/2020    PSA 1.9 08/29/2019       Assessment: Tito Menard is a 77 y.o. male with large right hydrocele    Plan:     1. To OR on 11/14/23 for right hydrocelectomy  2. Consents signed   3. I have explained the risk, benefits, and alternatives of the procedure in detail. The patient voices understanding and all questions have been answered. The patient agrees to proceed as planned.       Henri Cheung MD    Patient seen in holding.  No changes in clinical condition.  Proceed with planned procedure.  Questions were addressed with the patient and his wife.

## 2023-11-14 NOTE — ANESTHESIA PROCEDURE NOTES
Intubation    Date/Time: 11/14/2023 12:42 PM    Performed by: Dena Fong MD  Authorized by: Emory Palacios Jr., MD    Intubation:     Induction:  Intravenous    Intubated:  Postinduction    Mask Ventilation:  Easy with oral airway    Attempts:  1    Attempted By:  Resident anesthesiologist    Method of Intubation:  Direct    Blade:  Rafy 3    Laryngeal View Grade: Grade I - full view of cords      Difficult Airway Encountered?: No      Complications:  None    Airway Device:  Oral endotracheal tube    Airway Device Size:  7.5    Style/Cuff Inflation:  Cuffed (inflated to minimal occlusive pressure)    Tube secured:  22    Secured at:  The teeth    Placement Verified By:  Capnometry    Complicating Factors:  None    Findings Post-Intubation:  BS equal bilateral and atraumatic/condition of teeth unchanged

## 2023-11-14 NOTE — PLAN OF CARE
Dc instructions reviewed with pt and wife, verbalized understanding. VSS, IV removed intact, no distress noted. Pt dressed waiting on bedside pharmacy

## 2023-11-14 NOTE — DISCHARGE SUMMARY
[facility] (2nd Fl)  Discharge Note  Short Stay    Procedure(s) (LRB):  HYDROCELECTOMY (Right)      OUTCOME: Patient tolerated treatment/procedure well without complication and is now ready for discharge.    DISPOSITION: Home or Self Care    FINAL DIAGNOSIS:  Hydrocele, right    FOLLOWUP: In clinic    DISCHARGE INSTRUCTIONS:    Discharge Procedure Orders   Notify your health care provider if you experience any of the following:  temperature >100.4     Notify your health care provider if you experience any of the following:  persistent nausea and vomiting or diarrhea     Notify your health care provider if you experience any of the following:  severe uncontrolled pain     Notify your health care provider if you experience any of the following:  redness, tenderness, or signs of infection (pain, swelling, redness, odor or green/yellow discharge around incision site)     Notify your health care provider if you experience any of the following:  worsening rash     Notify your health care provider if you experience any of the following:  persistent dizziness, light-headedness, or visual disturbances        TIME SPENT ON DISCHARGE: 15 minutes    As above

## 2023-11-14 NOTE — TRANSFER OF CARE
"Anesthesia Transfer of Care Note    Patient: Tito Menard    Procedure(s) Performed: Procedure(s) (LRB):  HYDROCELECTOMY (Right)    Patient location: [facility]    Transport from OR: Transported from OR on 6-10 L/min O2 by face mask with adequate spontaneous ventilation    Post pain: adequate analgesia    Post assessment: no apparent anesthetic complications    Post vital signs: stable    Level of consciousness: awake, alert and oriented    Complications: none    Transfer of care protocol was followed      Last vitals: Visit Vitals  BP (!) 150/83   Pulse 67   Temp 36.6 °C (97.9 °F) (Temporal)   Resp 16   Ht 5' 11" (1.803 m)   Wt 88.5 kg (195 lb)   SpO2 100%   BMI 27.20 kg/m²     "

## 2023-11-14 NOTE — ANESTHESIA POSTPROCEDURE EVALUATION
Anesthesia Post Evaluation    Patient: Tito Menard    Procedure(s) Performed: Procedure(s) (LRB):  HYDROCELECTOMY (Right)    Final Anesthesia Type: general      Patient location during evaluation: PACU  Patient participation: Yes- Able to Participate  Level of consciousness: awake and alert  Post-procedure vital signs: reviewed and stable  Pain management: adequate  Airway patency: patent    PONV status at discharge: No PONV  Anesthetic complications: no      Cardiovascular status: blood pressure returned to baseline  Respiratory status: spontaneous ventilation and room air  Hydration status: euvolemic  Follow-up not needed.          Vitals Value Taken Time   /94 11/14/23 1501   Temp 36.6 °C (97.9 °F) 11/14/23 1413   Pulse 63 11/14/23 1509   Resp 19 11/14/23 1509   SpO2 97 % 11/14/23 1509         No case tracking events are documented in the log.      Pain/Stacey Score: Pain Rating Prior to Med Admin: 0 (11/14/2023 10:07 AM)  Stacey Score: 10 (11/14/2023  3:11 PM)

## 2023-11-14 NOTE — OP NOTE
[facility]  Operative Note    Date: 11/14/2023    Pre-Op Diagnosis: Right hydrocele    Post-Op Diagnosis: same    Procedure(s) Performed: 1.  Right hydrocelectomy    Specimen(s):  Right hydrocele sac    Staff Surgeon: Beatrice Vaca MD    Assistant Surgeon: Henri Cheung MD    Anesthesia: General endotracheal anesthesia    Indications: Tito Menard is a 77 y.o. male with a right hydrocele.  He desires surgical correction.      Findings: 300 mL of fluid was drained    Estimated Blood Loss: min    Drains: none    Procedure in detail:  After risks benefits and possible complications of hydrocelectomy were explained, the patient elected to undergo the procedure and consent was obtained. All questions were answered in the pre-operative area. The patient was transferred to the operative suite and placed in supine position on the operative table.  SCDs were applied and working.  Anesthesia was administered and the patient was prepped and draped in the usual sterile fashion. Time out was performed, lauren-procedural antibiotics were confirmed.     The patient's hydrocele was palpated and brought to the anterior scrotal skin. A marking pen was used to amanuel a 4 cm incision along the midline raphe.  A 15 blade was used to sharply incise the incision.  The underlying dartos and spermatic fascia was dissected with electrocautery until the hydrocele sac could be delivered through the scrotal incision. The tunica vaginalis was bluntly dissected free with a moist ray ese and opened sharply with Metzenbaum scissors. Care was taken to preserve the spermatic cord and testicle. 300 mL of fluid was suctioned from the hydrocele sac.  It was then opened and excised with electrocautery leaving a 1 cm remnant circumferentially around the testis. The hydrocele sac was passed off the field for pathologic analysis.  The circumferential remnant was then oversewn with a 3-0 chromic in a running baseball fashion keeping the  epidididymis in tact.  The cord structures were inspected and found to be in tact.     The scrotum was irrigated with NS. Hemostasis was obtained with electrocautery. The testicle was returned to its orthotopic position. The dartos fascia was closed with a 3.0 chromic in a running fashion. The skin was re approximated with a 2-0 chromic suture in a horizontal matress fashion. Collodion, fluffs and scrotal support was applied    The patient tolerated the procedure well and was transferred to the PACU in stable condition    Disposition:  The patient will follow up with Dr. Vaca in 4-6 weeks .  He was given prescriptions for oxycodone.  He was instructed to avoid heavy lifting x 2 weeks, ice his scrotum x 48 hours and wear scrotal support x 2 weeks.      MD YVONNE Fallon was present for the entire case and agree with the above note.

## 2023-11-15 RX ORDER — CARBIDOPA AND LEVODOPA 25; 100 MG/1; MG/1
1 TABLET ORAL 3 TIMES DAILY
Qty: 90 TABLET | Refills: 5 | Status: SHIPPED | OUTPATIENT
Start: 2023-11-15 | End: 2024-05-13

## 2023-11-15 NOTE — PROGRESS NOTES
I refilled the patient's dose of carbidopa-levodopa. If the patient has any further questions or concerns I recommended he reach back to the office.

## 2023-11-30 ENCOUNTER — OFFICE VISIT (OUTPATIENT)
Dept: INTERNAL MEDICINE | Facility: CLINIC | Age: 78
End: 2023-11-30
Payer: MEDICARE

## 2023-11-30 VITALS
DIASTOLIC BLOOD PRESSURE: 82 MMHG | HEART RATE: 89 BPM | OXYGEN SATURATION: 98 % | WEIGHT: 205.5 LBS | SYSTOLIC BLOOD PRESSURE: 138 MMHG | HEIGHT: 71 IN | BODY MASS INDEX: 28.77 KG/M2

## 2023-11-30 DIAGNOSIS — K21.00 GASTROESOPHAGEAL REFLUX DISEASE WITH ESOPHAGITIS WITHOUT HEMORRHAGE: ICD-10-CM

## 2023-11-30 DIAGNOSIS — I10 ESSENTIAL HYPERTENSION: Primary | Chronic | ICD-10-CM

## 2023-11-30 DIAGNOSIS — E78.2 MIXED HYPERLIPIDEMIA: Chronic | ICD-10-CM

## 2023-11-30 DIAGNOSIS — I70.0 AORTIC ATHEROSCLEROSIS: ICD-10-CM

## 2023-11-30 DIAGNOSIS — E11.42 DIABETIC POLYNEUROPATHY ASSOCIATED WITH TYPE 2 DIABETES MELLITUS: ICD-10-CM

## 2023-11-30 LAB
FINAL PATHOLOGIC DIAGNOSIS: NORMAL
GROSS: NORMAL
Lab: NORMAL
MICROSCOPIC EXAM: NORMAL

## 2023-11-30 PROCEDURE — 99214 OFFICE O/P EST MOD 30 MIN: CPT | Mod: S$GLB,,, | Performed by: PHYSICIAN ASSISTANT

## 2023-11-30 PROCEDURE — 1101F PT FALLS ASSESS-DOCD LE1/YR: CPT | Mod: CPTII,S$GLB,, | Performed by: PHYSICIAN ASSISTANT

## 2023-11-30 PROCEDURE — 3288F PR FALLS RISK ASSESSMENT DOCUMENTED: ICD-10-PCS | Mod: CPTII,S$GLB,, | Performed by: PHYSICIAN ASSISTANT

## 2023-11-30 PROCEDURE — 1101F PR PT FALLS ASSESS DOC 0-1 FALLS W/OUT INJ PAST YR: ICD-10-PCS | Mod: CPTII,S$GLB,, | Performed by: PHYSICIAN ASSISTANT

## 2023-11-30 PROCEDURE — 1159F MED LIST DOCD IN RCRD: CPT | Mod: CPTII,S$GLB,, | Performed by: PHYSICIAN ASSISTANT

## 2023-11-30 PROCEDURE — 3075F PR MOST RECENT SYSTOLIC BLOOD PRESS GE 130-139MM HG: ICD-10-PCS | Mod: CPTII,S$GLB,, | Performed by: PHYSICIAN ASSISTANT

## 2023-11-30 PROCEDURE — 3079F DIAST BP 80-89 MM HG: CPT | Mod: CPTII,S$GLB,, | Performed by: PHYSICIAN ASSISTANT

## 2023-11-30 PROCEDURE — 1126F PR PAIN SEVERITY QUANTIFIED, NO PAIN PRESENT: ICD-10-PCS | Mod: CPTII,S$GLB,, | Performed by: PHYSICIAN ASSISTANT

## 2023-11-30 PROCEDURE — 99214 PR OFFICE/OUTPT VISIT, EST, LEVL IV, 30-39 MIN: ICD-10-PCS | Mod: S$GLB,,, | Performed by: PHYSICIAN ASSISTANT

## 2023-11-30 PROCEDURE — 1126F AMNT PAIN NOTED NONE PRSNT: CPT | Mod: CPTII,S$GLB,, | Performed by: PHYSICIAN ASSISTANT

## 2023-11-30 PROCEDURE — 3079F PR MOST RECENT DIASTOLIC BLOOD PRESSURE 80-89 MM HG: ICD-10-PCS | Mod: CPTII,S$GLB,, | Performed by: PHYSICIAN ASSISTANT

## 2023-11-30 PROCEDURE — 99999 PR PBB SHADOW E&M-EST. PATIENT-LVL V: ICD-10-PCS | Mod: PBBFAC,,, | Performed by: PHYSICIAN ASSISTANT

## 2023-11-30 PROCEDURE — 1159F PR MEDICATION LIST DOCUMENTED IN MEDICAL RECORD: ICD-10-PCS | Mod: CPTII,S$GLB,, | Performed by: PHYSICIAN ASSISTANT

## 2023-11-30 PROCEDURE — 3288F FALL RISK ASSESSMENT DOCD: CPT | Mod: CPTII,S$GLB,, | Performed by: PHYSICIAN ASSISTANT

## 2023-11-30 PROCEDURE — 3075F SYST BP GE 130 - 139MM HG: CPT | Mod: CPTII,S$GLB,, | Performed by: PHYSICIAN ASSISTANT

## 2023-11-30 PROCEDURE — 99999 PR PBB SHADOW E&M-EST. PATIENT-LVL V: CPT | Mod: PBBFAC,,, | Performed by: PHYSICIAN ASSISTANT

## 2023-11-30 NOTE — PROGRESS NOTES
INTERNAL MEDICINE PROGRESS NOTE    CHIEF COMPLAINT     Chief Complaint   Patient presents with    Gastroesophageal Reflux       HPI     Tito Menard is a 77 y.o. male who presents for a follow-up visit today.    PCP is Amanda Brown MD, patient is known to me.     He presents for follow-up. He was last seen by PCP on 5/31/2023 for GERD eval.   He reports that his GERD is much better controlled than previous OV - he is consistent with Prilosec 20 mg bid .     He had right sided hydrocelectomy on 10/13/2023. Tolerated this well, no complication.     He has DM but does not check BS regularly. Last A1C was 5.6, due for repeat in two weeks.      He has HTN is managed with chlorthalidone 25, irbesartan 300mg daily, spironolactone 25 daily. Tolerating this medication regimen well.     He has HLD and aortic atherosclerosis, last lipid panel was in normal range HLD is managed by pravastatin 20mg daily. He is tolerating this well.     Recent labs from 11/3/2023 reviewed and look good.     Past Medical History:  Past Medical History:   Diagnosis Date    Allergy     Aneurysm of artery of lower extremity     Chalazion of left eye     CKD (chronic kidney disease), stage II 4/1/2019    Diabetes mellitus type II     Diabetes with neurologic complications     Enlarged aorta 8/2/2016    Noted on pharmacological stress echo 2/28/2014.      GERD (gastroesophageal reflux disease)     egd 2008    Hyperlipidemia     Hypertension     Jock itch 7/19/2018    MGD (meibomian gland dysfunction)     Osteopenia     Spinal stenosis     Spondylosis without myelopathy 10/23/2015    Vitamin D deficiency disease        Home Medications:  Prior to Admission medications    Medication Sig Start Date End Date Taking? Authorizing Provider   acetaminophen/diphenhydramine (TYLENOL PM ORAL) Take 2 capsules by mouth nightly.   Yes Provider, Historical   amLODIPine (NORVASC) 5 MG tablet Take 5 mg by mouth once daily. 9/26/23  Yes Provider, Historical    aspirin (ECOTRIN) 81 MG EC tablet TAKE 1 TABLET EVERY DAY 11/11/22  Yes Giovanna Solano MD   aspirin/salicylamide/caffeine (BC HEADACHE POWDER ORAL) Take 1 packet by mouth daily as needed (for headache).   Yes Provider, Historical   azelastine (ASTELIN) 137 mcg (0.1 %) nasal spray USE 1 SPRAY IN EACH NOSTRIL TWICE DAILY 7/7/23  Yes Amanda Brown MD   benzonatate (TESSALON) 200 MG capsule TAKE 1 CAPSULE THREE TIMES DAILY AS NEEDED FOR COUGH 9/20/21  Yes Luly Hurtado MD   calcium carbonate (TUMS ORAL) Take 1-2 tablets by mouth daily as needed (for acid reflux).   Yes Provider, Historical   carbidopa-levodopa  mg (SINEMET)  mg per tablet Take 1 tablet by mouth 3 (three) times daily. 11/15/23 5/13/24 Yes Sheldon James MD   chlorthalidone (HYGROTEN) 25 MG Tab TAKE 1/2 TABLET EVERY DAY 4/19/23  Yes Hannah Dudley PA-C   cholecalciferol, vitamin D3, (VITAMIN D3) 50 mcg (2,000 unit) Cap Take 1 capsule by mouth once daily.   Yes Provider, Historical   clotrimazole-betamethasone 1-0.05% (LOTRISONE) cream APPLY TOPICALLY 2 (TWO) TIMES DAILY.  Patient taking differently: Apply topically 2 (two) times daily. Uses at bath time as needed for redness between thighs. 10/21/20  Yes Beatrice Vaca MD   docusate sodium (COLACE) 50 MG capsule Take 100 mg by mouth daily as needed for Constipation.   Yes Provider, Historical   finasteride (PROSCAR) 5 mg tablet Take 1 tablet (5 mg total) by mouth once daily. 9/25/23 9/24/24 Yes Beatrice Vaca MD   fluticasone propionate (FLONASE) 50 mcg/actuation nasal spray USE 1 SPRAY NASALLY ONE TIME DAILY 3/20/23  Yes Ronny Jimenes MD   gabapentin (NEURONTIN) 300 MG capsule One po at noon daily and two po every evening 11/14/23  Yes Amanda Brown MD   guaifenesin (MUCINEX) 600 mg 12 hr tablet Take 1,200 mg by mouth 2 (two) times daily as needed.    Yes Provider, Historical   irbesartan (AVAPRO) 300 MG tablet TAKE 1 TABLET EVERY EVENING 8/8/23   Yes Ronny Jimenes MD   ketoconazole (NIZORAL) 2 % cream AAA bid (facial redness and scaling)  Patient taking differently: Apply topically once weekly for facial redness and scaling. 5/4/21  Yes Anny Fregoso MD   lanolin/mineral oil/petrolatum (ARTIFICIAL TEARS OPHT) Place 1-2 drops into both eyes daily as needed (for dry eyes).   Yes Provider, Historical   LIDOcaine 4 % PtMd Apply 1 patch topically daily as needed (for back pain).   Yes Provider, Historical   methocarbamoL (ROBAXIN) 500 MG Tab Take 500 mg by mouth 4 (four) times daily.   Yes Provider, Historical   omeprazole (PRILOSEC) 20 MG capsule Take 2 capsules (40 mg total) by mouth once daily.  Patient taking differently: Take 40 mg by mouth 2 (two) times a day. 10/24/23  Yes Amanda Brown MD   oxyCODONE (ROXICODONE) 5 MG immediate release tablet Take 1 tablet (5 mg total) by mouth every 6 (six) hours as needed for Pain. 11/14/23  Yes Henri Cheung MD   potassium chloride (MICRO-K) 10 MEQ CpSR TAKE 3 CAPSULES ONE TIME DAILY 2/8/23  Yes Hannah Dudley PA-C   pravastatin (PRAVACHOL) 20 MG tablet Take 1 tablet (20 mg total) by mouth once daily.  Patient taking differently: Take 20 mg by mouth every evening. 10/24/23  Yes Amanda Brown MD   spironolactone (ALDACTONE) 25 MG tablet Take 0.5 tablets (12.5 mg total) by mouth once daily. 6/1/23 5/31/24 Yes To Dawson MD   tamsulosin (FLOMAX) 0.4 mg Cap Take 1 capsule (0.4 mg total) by mouth once daily.  Patient taking differently: Take 1 capsule by mouth every evening. 10/13/23  Yes Beatrice Vaca MD       Review of Systems:  Review of Systems   Constitutional:  Negative for chills and fever.   HENT:  Negative for sore throat and trouble swallowing.    Eyes:  Negative for visual disturbance.   Respiratory:  Negative for cough and shortness of breath.    Cardiovascular:  Negative for chest pain.   Gastrointestinal:  Negative for abdominal pain, constipation, diarrhea, nausea and  "vomiting.   Genitourinary:  Negative for dysuria and flank pain.   Musculoskeletal:  Negative for back pain, neck pain and neck stiffness.   Skin:  Negative for rash.   Neurological:  Negative for dizziness, syncope, weakness and headaches.   Psychiatric/Behavioral:  Negative for confusion.        Health Maintainence:   Immunizations:  Health Maintenance         Date Due Completion Date    RSV Vaccine (Age 60+ and Pregnant patients) (1 - 1-dose 60+ series) Never done ---    Hemoglobin A1c 12/20/2023 6/20/2023    Override on 10/23/2015: Done    Colonoscopy 02/03/2024 2/3/2021    Diabetes Urine Screening 05/31/2024 5/31/2023    Lipid Panel 05/31/2024 5/31/2023    Eye Exam 08/03/2024 8/3/2023    Override on 6/14/2018: Done    Override on 8/5/2015: Done    TETANUS VACCINE 03/30/2033 3/30/2023             PHYSICAL EXAM     /82   Pulse 89   Ht 5' 11" (1.803 m)   Wt 93.2 kg (205 lb 7.5 oz)   SpO2 98%   BMI 28.66 kg/m²     Physical Exam  Vitals and nursing note reviewed.   Constitutional:       Appearance: Normal appearance.      Comments: Elderly male in NAD or apparent pain. He makes good eye contact, speaks in clear full sentences and ambulates with Rolator from home.    HENT:      Head: Normocephalic and atraumatic.      Nose: Nose normal.      Mouth/Throat:      Pharynx: Oropharynx is clear.   Eyes:      Conjunctiva/sclera: Conjunctivae normal.   Cardiovascular:      Rate and Rhythm: Normal rate and regular rhythm.      Pulses: Normal pulses.   Pulmonary:      Effort: No respiratory distress.   Abdominal:      Tenderness: There is no abdominal tenderness.   Musculoskeletal:         General: Normal range of motion.      Cervical back: No rigidity.   Skin:     General: Skin is warm and dry.      Capillary Refill: Capillary refill takes less than 2 seconds.      Findings: No rash.   Neurological:      General: No focal deficit present.      Mental Status: He is alert.      Gait: Gait normal.   Psychiatric:    "      Mood and Affect: Mood normal.         LABS     Lab Results   Component Value Date    HGBA1C 5.6 06/20/2023     CMP  Sodium   Date Value Ref Range Status   11/03/2023 140 136 - 145 mmol/L Final     Potassium   Date Value Ref Range Status   11/03/2023 4.1 3.5 - 5.1 mmol/L Final     Chloride   Date Value Ref Range Status   11/03/2023 105 95 - 110 mmol/L Final     CO2   Date Value Ref Range Status   11/03/2023 26 23 - 29 mmol/L Final     Glucose   Date Value Ref Range Status   11/03/2023 104 70 - 110 mg/dL Final   07/09/2022 102 mg/dL Final     BUN   Date Value Ref Range Status   11/03/2023 15 8 - 23 mg/dL Final   07/09/2022 13 4 - 21 mg/dL Final     Creatinine   Date Value Ref Range Status   11/03/2023 1.0 0.5 - 1.4 mg/dL Final   07/09/2022 0.9 mg/dL Final     Calcium   Date Value Ref Range Status   11/03/2023 9.2 8.7 - 10.5 mg/dL Final     Total Protein   Date Value Ref Range Status   11/03/2023 7.1 6.0 - 8.4 g/dL Final     Albumin   Date Value Ref Range Status   11/03/2023 3.6 3.5 - 5.2 g/dL Final     Total Bilirubin   Date Value Ref Range Status   11/03/2023 0.5 0.1 - 1.0 mg/dL Final     Comment:     For infants and newborns, interpretation of results should be based  on gestational age, weight and in agreement with clinical  observations.    Premature Infant recommended reference ranges:  Up to 24 hours.............<8.0 mg/dL  Up to 48 hours............<12.0 mg/dL  3-5 days..................<15.0 mg/dL  6-29 days.................<15.0 mg/dL       Alkaline Phosphatase   Date Value Ref Range Status   11/03/2023 86 55 - 135 U/L Final     AST   Date Value Ref Range Status   11/03/2023 24 10 - 40 U/L Final     ALT   Date Value Ref Range Status   11/03/2023 6 (L) 10 - 44 U/L Final     Anion Gap   Date Value Ref Range Status   11/03/2023 9 8 - 16 mmol/L Final     eGFR if    Date Value Ref Range Status   06/27/2022 >60.0 >60 mL/min/1.73 m^2 Final     eGFR if non    Date Value Ref Range  Status   06/27/2022 >60.0 >60 mL/min/1.73 m^2 Final     Comment:     Calculation used to obtain the estimated glomerular filtration  rate (eGFR) is the CKD-EPI equation.        Lab Results   Component Value Date    WBC 4.48 11/03/2023    HGB 12.7 (L) 11/03/2023    HCT 38.9 (L) 11/03/2023    MCV 90 11/03/2023     11/03/2023     Lab Results   Component Value Date    CHOL 167 05/31/2023    CHOL 153 07/16/2021    CHOL 151 07/31/2020     Lab Results   Component Value Date    HDL 44 05/31/2023    HDL 46 07/16/2021    HDL 45 07/31/2020     Lab Results   Component Value Date    LDLCALC 103.6 05/31/2023    LDLCALC 90.6 07/16/2021    LDLCALC 86.2 07/31/2020     Lab Results   Component Value Date    TRIG 97 05/31/2023    TRIG 82 07/16/2021    TRIG 99 07/31/2020     Lab Results   Component Value Date    CHOLHDL 26.3 05/31/2023    CHOLHDL 30.1 07/16/2021    CHOLHDL 29.8 07/31/2020     Lab Results   Component Value Date    TSH 0.661 06/20/2023       ASSESSMENT/PLAN     Tito Menard is a 77 y.o. male     Tito was seen today for gastroesophageal reflux.    Diagnoses and all orders for this visit:    Essential hypertension   -well controlled, continue current regimen     Diabetic polyneuropathy associated with type 2 diabetes mellitus  -     Hemoglobin A1C; Future    Mixed hyperlipidemia   -continue statin    Aortic atherosclerosis   -continue statin and graded CV exercise as tolerated.    -The 10-year ASCVD risk score (Erlin DK, et al., 2019) is: 44.9%    Values used to calculate the score:      Age: 77 years      Sex: Male      Is Non- : Yes      Diabetic: Yes      Tobacco smoker: No      Systolic Blood Pressure: 138 mmHg      Is BP treated: Yes      HDL Cholesterol: 44 mg/dL      Total Cholesterol: 167 mg/dL      Gastroesophageal reflux disease with esophagitis without hemorrhage   -continue PPI         Hannah Dudley PA-C   Department of Internal Medicine - [facility]   9:59 AM

## 2023-12-01 ENCOUNTER — OFFICE VISIT (OUTPATIENT)
Dept: UROLOGY | Facility: CLINIC | Age: 78
End: 2023-12-01
Payer: MEDICARE

## 2023-12-01 VITALS
BODY MASS INDEX: 28.77 KG/M2 | SYSTOLIC BLOOD PRESSURE: 135 MMHG | WEIGHT: 205.5 LBS | HEART RATE: 74 BPM | HEIGHT: 71 IN | DIASTOLIC BLOOD PRESSURE: 82 MMHG

## 2023-12-01 DIAGNOSIS — Z98.890 POST-OPERATIVE STATE: Primary | ICD-10-CM

## 2023-12-01 PROCEDURE — 99999 PR PBB SHADOW E&M-EST. PATIENT-LVL III: CPT | Mod: PBBFAC,,, | Performed by: UROLOGY

## 2023-12-01 PROCEDURE — 1159F PR MEDICATION LIST DOCUMENTED IN MEDICAL RECORD: ICD-10-PCS | Mod: CPTII,S$GLB,, | Performed by: UROLOGY

## 2023-12-01 PROCEDURE — 99024 PR POST-OP FOLLOW-UP VISIT: ICD-10-PCS | Mod: S$GLB,,, | Performed by: UROLOGY

## 2023-12-01 PROCEDURE — 1159F MED LIST DOCD IN RCRD: CPT | Mod: CPTII,S$GLB,, | Performed by: UROLOGY

## 2023-12-01 PROCEDURE — 3075F PR MOST RECENT SYSTOLIC BLOOD PRESS GE 130-139MM HG: ICD-10-PCS | Mod: CPTII,S$GLB,, | Performed by: UROLOGY

## 2023-12-01 PROCEDURE — 99024 POSTOP FOLLOW-UP VISIT: CPT | Mod: S$GLB,,, | Performed by: UROLOGY

## 2023-12-01 PROCEDURE — 99999 PR PBB SHADOW E&M-EST. PATIENT-LVL III: ICD-10-PCS | Mod: PBBFAC,,, | Performed by: UROLOGY

## 2023-12-01 PROCEDURE — 81002 PR URINALYSIS NONAUTO W/O SCOPE: ICD-10-PCS | Mod: S$GLB,,, | Performed by: UROLOGY

## 2023-12-01 PROCEDURE — 3075F SYST BP GE 130 - 139MM HG: CPT | Mod: CPTII,S$GLB,, | Performed by: UROLOGY

## 2023-12-01 PROCEDURE — 3079F PR MOST RECENT DIASTOLIC BLOOD PRESSURE 80-89 MM HG: ICD-10-PCS | Mod: CPTII,S$GLB,, | Performed by: UROLOGY

## 2023-12-01 PROCEDURE — 3079F DIAST BP 80-89 MM HG: CPT | Mod: CPTII,S$GLB,, | Performed by: UROLOGY

## 2023-12-01 PROCEDURE — 81002 URINALYSIS NONAUTO W/O SCOPE: CPT | Mod: S$GLB,,, | Performed by: UROLOGY

## 2023-12-01 NOTE — PROGRESS NOTES
"CHIEF COMPLAINT:    Mr. Menard is a 77 y.o. male presenting for a post-op check s/p hydrocele repair    PRESENTING ILLNESS:    Tito Menard is a 77 y.o. male with a history of R hydrocele, s/p repair on 11/14.  He presents for post-op check.  He has been doing well since surgery.  Has been elevating scrotum. Denies pain. Notes some "heaviness" of R scrotum.        REVIEW OF SYSTEMS:    Review of Systems   Constitutional: Negative.    HENT: Negative.     Eyes: Negative.    Respiratory: Negative.     Cardiovascular: Negative.    Gastrointestinal: Negative.    Genitourinary: Negative.    Musculoskeletal:         Walks with a walker   Skin: Negative.    Neurological:  Positive for weakness.   Endo/Heme/Allergies: Negative.    Psychiatric/Behavioral: Negative.         PATIENT HISTORY:    Past Medical History:   Diagnosis Date    Allergy     Aneurysm of artery of lower extremity     Chalazion of left eye     CKD (chronic kidney disease), stage II 4/1/2019    Diabetes mellitus type II     Diabetes with neurologic complications     Enlarged aorta 8/2/2016    Noted on pharmacological stress echo 2/28/2014.      GERD (gastroesophageal reflux disease)     egd 2008    Hyperlipidemia     Hypertension     Jock itch 7/19/2018    MGD (meibomian gland dysfunction)     Osteopenia     Spinal stenosis     Spondylosis without myelopathy 10/23/2015    Vitamin D deficiency disease        Past Surgical History:   Procedure Laterality Date    CHALAZION EXCISION Left 8/18/13    CYST REMOVAL  2013    Back of neck    EXCISION OF HYDROCELE Right 11/14/2023    Procedure: HYDROCELECTOMY;  Surgeon: Beatrice Vaca MD;  Location: [facility] OR [address] [place] [address];  Service: Urology;  Laterality: Right;  1 hr    FLEXIBLE CYSTOSCOPY N/A 12/7/2021    Procedure: CYSTOSCOPY, FLEXIBLE;  Surgeon: Beatrice Vaca MD;  Location: [facility] OR [address] [place] [address];  Service: Urology;  Laterality: N/A;    FLUOROSCOPIC URODYNAMIC STUDY N/A 12/7/2021    Procedure: URODYNAMIC STUDY, " FLUOROSCOPIC;  Surgeon: Beatrice Vaca MD;  Location: [address];  Service: Urology;  Laterality: N/A;  90 minutes     SPINE SURGERY  2007    x2 (2000, 2007)       Family History   Problem Relation Age of Onset    Hyperlipidemia Mother     Hypertension Mother     Kidney disease Mother     Cancer Mother     Heart disease Mother     Kidney failure Mother     Hypertension Maternal Grandmother      Social History     Socioeconomic History    Marital status:    Occupational History    Occupation: Retired     Comment:    Tobacco Use    Smoking status: Never    Smokeless tobacco: Former     Types: Chew    Tobacco comments:     Chewed tobacco once per day for 4-5 years when a    Substance and Sexual Activity    Alcohol use: No    Drug use: No    Sexual activity: Not Currently     Partners: Female   Social History Narrative        Colon 2007     Social Determinants of Health     Financial Resource Strain: Low Risk  (9/22/2023)    Overall Financial Resource Strain (CARDIA)     Difficulty of Paying Living Expenses: Not hard at all   Food Insecurity: No Food Insecurity (9/22/2023)    Hunger Vital Sign     Worried About Running Out of Food in the Last Year: Never true     Ran Out of Food in the Last Year: Never true   Transportation Needs: No Transportation Needs (9/22/2023)    PRAPARE - Transportation     Lack of Transportation (Medical): No     Lack of Transportation (Non-Medical): No   Physical Activity: Insufficiently Active (9/22/2023)    Exercise Vital Sign     Days of Exercise per Week: 7 days     Minutes of Exercise per Session: 10 min   Stress: No Stress Concern Present (9/22/2023)    [employer] - Occupational Stress Questionnaire     Feeling of Stress : Only a little   Social Connections: Moderately Isolated (9/22/2023)    Social Connection and Isolation Panel [NHANES]     Frequency of Communication with Friends and Family: More than three  times a week     Frequency of Social Gatherings with Friends and Family: Never     Attends Tenriism Services: Never     Active Member of Clubs or Organizations: No     Attends Club or Organization Meetings: Never     Marital Status:    Housing Stability: Low Risk  (9/22/2023)    Housing Stability Vital Sign     Unable to Pay for Housing in the Last Year: No     Number of Places Lived in the Last Year: 1     Unstable Housing in the Last Year: No       Allergies:  Patient has no known allergies.    Medications:  Outpatient Encounter Medications as of 12/1/2023   Medication Sig Dispense Refill    acetaminophen/diphenhydramine (TYLENOL PM ORAL) Take 2 capsules by mouth nightly.      amLODIPine (NORVASC) 5 MG tablet Take 5 mg by mouth once daily.      aspirin (ECOTRIN) 81 MG EC tablet TAKE 1 TABLET EVERY DAY 90 tablet 3    aspirin/salicylamide/caffeine (BC HEADACHE POWDER ORAL) Take 1 packet by mouth daily as needed (for headache).      azelastine (ASTELIN) 137 mcg (0.1 %) nasal spray USE 1 SPRAY IN EACH NOSTRIL TWICE DAILY 60 mL 0    benzonatate (TESSALON) 200 MG capsule TAKE 1 CAPSULE THREE TIMES DAILY AS NEEDED FOR COUGH 30 capsule 0    calcium carbonate (TUMS ORAL) Take 1-2 tablets by mouth daily as needed (for acid reflux).      carbidopa-levodopa  mg (SINEMET)  mg per tablet Take 1 tablet by mouth 3 (three) times daily. 90 tablet 5    chlorthalidone (HYGROTEN) 25 MG Tab TAKE 1/2 TABLET EVERY DAY 45 tablet 2    cholecalciferol, vitamin D3, (VITAMIN D3) 50 mcg (2,000 unit) Cap Take 1 capsule by mouth once daily.      clotrimazole-betamethasone 1-0.05% (LOTRISONE) cream APPLY TOPICALLY 2 (TWO) TIMES DAILY. (Patient taking differently: Apply topically 2 (two) times daily. Uses at bath time as needed for redness between thighs.) 45 g 0    docusate sodium (COLACE) 50 MG capsule Take 100 mg by mouth daily as needed for Constipation.      finasteride (PROSCAR) 5 mg tablet Take 1 tablet (5 mg total) by  mouth once daily. 90 tablet 3    fluticasone propionate (FLONASE) 50 mcg/actuation nasal spray USE 1 SPRAY NASALLY ONE TIME DAILY 32 g 6    gabapentin (NEURONTIN) 300 MG capsule One po at noon daily and two po every evening 360 capsule 1    guaifenesin (MUCINEX) 600 mg 12 hr tablet Take 1,200 mg by mouth 2 (two) times daily as needed.       irbesartan (AVAPRO) 300 MG tablet TAKE 1 TABLET EVERY EVENING 90 tablet 2    ketoconazole (NIZORAL) 2 % cream AAA bid (facial redness and scaling) (Patient taking differently: Apply topically once weekly for facial redness and scaling.) 60 g 3    lanolin/mineral oil/petrolatum (ARTIFICIAL TEARS OPHT) Place 1-2 drops into both eyes daily as needed (for dry eyes).      LIDOcaine 4 % PtMd Apply 1 patch topically daily as needed (for back pain).      methocarbamoL (ROBAXIN) 500 MG Tab Take 500 mg by mouth 4 (four) times daily.      omeprazole (PRILOSEC) 20 MG capsule Take 2 capsules (40 mg total) by mouth once daily. (Patient taking differently: Take 40 mg by mouth 2 (two) times a day.) 180 capsule 0    oxyCODONE (ROXICODONE) 5 MG immediate release tablet Take 1 tablet (5 mg total) by mouth every 6 (six) hours as needed for Pain. 6 tablet 0    potassium chloride (MICRO-K) 10 MEQ CpSR TAKE 3 CAPSULES ONE TIME DAILY 270 capsule 3    pravastatin (PRAVACHOL) 20 MG tablet Take 1 tablet (20 mg total) by mouth once daily. (Patient taking differently: Take 20 mg by mouth every evening.) 90 tablet 0    spironolactone (ALDACTONE) 25 MG tablet Take 0.5 tablets (12.5 mg total) by mouth once daily. 45 tablet 3    tamsulosin (FLOMAX) 0.4 mg Cap Take 1 capsule (0.4 mg total) by mouth once daily. (Patient taking differently: Take 1 capsule by mouth every evening.) 90 capsule 3     No facility-administered encounter medications on file as of 12/1/2023.         PHYSICAL EXAMINATION:    The patient generally appears in good health, is appropriately interactive, and is in no apparent distress.    Skin:  No lesions.    Mental: Cooperative with normal affect.    Neuro: Grossly intact.    HEENT: Normal. No evidence of lymphadenopathy.    Chest:  normal inspiratory effort.    Abdomen: Soft, non-tender. No masses or organomegaly. Bladder is not palpable. No evidence of flank discomfort. No evidence of inguinal hernia.    Extremities: No clubbing, cyanosis, or edema    Scrotum showed no rashes or lesions. Midline scrotal incision well-healing with sutures in place.  Left hemiscrotum normal, testicle and cord structures palpable.  Firm R hemiscrotum with swelling.  Unable to palpate R testis.  Penis was circumcised. No meatal stenosis.      LABS:    Lab Results   Component Value Date    BUN 15 11/03/2023    CREATININE 1.0 11/03/2023       Lab Results   Component Value Date    PSA 2.2 11/11/2021    PSA 1.6 09/02/2020    PSA 1.9 08/29/2019    PSADIAG 2.1 07/19/2018    PSADIAG 1.6 06/12/2017    PSADIAG 1.9 05/15/2015       UA 1.005, pH 7, otherwise, negative.     Final path is pending.      IMPRESSION:    Status post right hydrocelectomy    PLAN:    1.  Doing well post-operatively, counseled patient that swelling will improve with time.   2.  RTC in 1 month for final path review  3.  Continue the tamsulosin and finasteride    Copy to: Dr. Amanda Brown

## 2023-12-06 ENCOUNTER — LAB VISIT (OUTPATIENT)
Dept: LAB | Facility: HOSPITAL | Age: 78
End: 2023-12-06
Payer: MEDICARE

## 2023-12-06 ENCOUNTER — OFFICE VISIT (OUTPATIENT)
Dept: NEUROLOGY | Facility: CLINIC | Age: 78
End: 2023-12-06
Payer: MEDICARE

## 2023-12-06 VITALS — HEART RATE: 72 BPM | SYSTOLIC BLOOD PRESSURE: 155 MMHG | DIASTOLIC BLOOD PRESSURE: 87 MMHG

## 2023-12-06 DIAGNOSIS — G20.C PARKINSONISM, UNSPECIFIED PARKINSONISM TYPE: Primary | ICD-10-CM

## 2023-12-06 DIAGNOSIS — E11.42 DIABETIC POLYNEUROPATHY ASSOCIATED WITH TYPE 2 DIABETES MELLITUS: ICD-10-CM

## 2023-12-06 LAB
ESTIMATED AVG GLUCOSE: 114 MG/DL (ref 68–131)
HBA1C MFR BLD: 5.6 % (ref 4–5.6)

## 2023-12-06 PROCEDURE — 3077F PR MOST RECENT SYSTOLIC BLOOD PRESSURE >= 140 MM HG: ICD-10-PCS | Mod: CPTII,GC,S$GLB,

## 2023-12-06 PROCEDURE — 3079F DIAST BP 80-89 MM HG: CPT | Mod: CPTII,GC,S$GLB,

## 2023-12-06 PROCEDURE — 1159F PR MEDICATION LIST DOCUMENTED IN MEDICAL RECORD: ICD-10-PCS | Mod: CPTII,GC,S$GLB,

## 2023-12-06 PROCEDURE — 99999 PR PBB SHADOW E&M-EST. PATIENT-LVL III: ICD-10-PCS | Mod: PBBFAC,GC,,

## 2023-12-06 PROCEDURE — 3288F FALL RISK ASSESSMENT DOCD: CPT | Mod: CPTII,GC,S$GLB,

## 2023-12-06 PROCEDURE — 99999 PR PBB SHADOW E&M-EST. PATIENT-LVL III: CPT | Mod: PBBFAC,GC,,

## 2023-12-06 PROCEDURE — 1125F PR PAIN SEVERITY QUANTIFIED, PAIN PRESENT: ICD-10-PCS | Mod: CPTII,GC,S$GLB,

## 2023-12-06 PROCEDURE — 3077F SYST BP >= 140 MM HG: CPT | Mod: CPTII,GC,S$GLB,

## 2023-12-06 PROCEDURE — 1159F MED LIST DOCD IN RCRD: CPT | Mod: CPTII,GC,S$GLB,

## 2023-12-06 PROCEDURE — 1100F PTFALLS ASSESS-DOCD GE2>/YR: CPT | Mod: CPTII,GC,S$GLB,

## 2023-12-06 PROCEDURE — 99214 PR OFFICE/OUTPT VISIT, EST, LEVL IV, 30-39 MIN: ICD-10-PCS | Mod: GC,S$GLB,,

## 2023-12-06 PROCEDURE — 83036 HEMOGLOBIN GLYCOSYLATED A1C: CPT | Performed by: PHYSICIAN ASSISTANT

## 2023-12-06 PROCEDURE — 99214 OFFICE O/P EST MOD 30 MIN: CPT | Mod: GC,S$GLB,,

## 2023-12-06 PROCEDURE — 36415 COLL VENOUS BLD VENIPUNCTURE: CPT | Performed by: PHYSICIAN ASSISTANT

## 2023-12-06 PROCEDURE — 1100F PR PT FALLS ASSESS DOC 2+ FALLS/FALL W/INJURY/YR: ICD-10-PCS | Mod: CPTII,GC,S$GLB,

## 2023-12-06 PROCEDURE — 1125F AMNT PAIN NOTED PAIN PRSNT: CPT | Mod: CPTII,GC,S$GLB,

## 2023-12-06 PROCEDURE — 3288F PR FALLS RISK ASSESSMENT DOCUMENTED: ICD-10-PCS | Mod: CPTII,GC,S$GLB,

## 2023-12-06 PROCEDURE — 3079F PR MOST RECENT DIASTOLIC BLOOD PRESSURE 80-89 MM HG: ICD-10-PCS | Mod: CPTII,GC,S$GLB,

## 2023-12-06 NOTE — PROGRESS NOTES
[facility] - NEUROLOGY [address]TH [address] [facility], [place]    Date: 12/6/23  Patient Name: Tito Menard   MRN: [id number]   PCP: Amanda Brown  Referring Provider: No ref. provider found    Assessment:   Tito Menard is a 78 y.o. male presenting as a f/u for syncopal episodes and parkinsonism. He started sinnemet at our last appointment and he is tolerating the medication well. No further syncopal episodes reported. Plan to continue with current dose until follow up.     Plan:     - Continue sinnemet  mg TID  - Discussed fall hazards at home with the patient  - No further changes to regimen  - Follow up in 6 months     Problem List Items Addressed This Visit    None  Visit Diagnoses       Parkinsonism, unspecified Parkinsonism type    -  Primary            Sheldon James MD    Patient note was created using MModal Dictation.  Any errors in syntax or even information may not have been identified and edited on initial review prior to signing this note.  Subjective:   Patient seen in consultation at the request of No ref. provider found for the evaluation of syncopal episodes and parkinsonism. A copy of this note will be sent to the referring physician.          Interval history: 12/16/2023    Mr. Tito Menard is a 78 y.o. male presenting as a f/u for syncopal episodes, neck pain/stiffness, and parkinsonism. He started taking sinnemet with good results. He reports being more fluid and reduced tremor in his left arm. He completed physical therapy from September to October. He uses a cycling pedal device at home. He underwent a hydrocellectomy 11/14 without complications. He still notes stiffness in his neck and right leg. He was temporarily taking oxycodone after his surgery. No syncopal episodes reported since our last visit. He is scheduled for an audiology evaluation 12/13/2023. He denies additional symptoms such as chest pain, sob, nausea, vomiting, or diarrhea.        HPI: 08/03/2023    Mr. Tito Menard is a 77 y.o. male with history of spinal stenosis, diabetes, hypertension, and syncopal episodes (recently discharged 6/22) presenting with neck pain/stiffness. His symptoms have persisted for the last 4 years. The symptoms are more prominent on the right side. He notes decreased range of motion. There is no radiation of pain into the lower back. He denies any new syncopal episodes since being discharged. ECHO with EF 55% and MRI/MRA/EEG with nonspecific findings. He notes trouble getting out of the bathtub and uses a walker for a number of years. He went to physical therapy in the past but was lost to follow up due to trouble paying copays. Gabapentin and robaxin sometimes help with the neck pain. He also occassionally takes 2 tylenol PM before going to bed. He denies any chest pain, shortness of breath, palpitations, nausea, vomiting, or diarrhea. He does endorse constipation for the last 5 days requiring a laxative.     PAST MEDICAL HISTORY:  Past Medical History:   Diagnosis Date    Allergy     Aneurysm of artery of lower extremity     Chalazion of left eye     CKD (chronic kidney disease), stage II 4/1/2019    Diabetes mellitus type II     Diabetes with neurologic complications     Enlarged aorta 8/2/2016    Noted on pharmacological stress echo 2/28/2014.      GERD (gastroesophageal reflux disease)     egd 2008    Hyperlipidemia     Hypertension     Jock itch 7/19/2018    MGD (meibomian gland dysfunction)     Osteopenia     Spinal stenosis     Spondylosis without myelopathy 10/23/2015    Vitamin D deficiency disease        PAST SURGICAL HISTORY:  Past Surgical History:   Procedure Laterality Date    CHALAZION EXCISION Left 8/18/13    CYST REMOVAL  2013    Back of neck    EXCISION OF HYDROCELE Right 11/14/2023    Procedure: HYDROCELECTOMY;  Surgeon: Beatrice Vaca MD;  Location: [facility] OR [address] [place] [address];  Service: Urology;  Laterality: Right;  1 hr    FLEXIBLE CYSTOSCOPY  N/A 12/7/2021    Procedure: CYSTOSCOPY, FLEXIBLE;  Surgeon: Beatrice Vaca MD;  [facility];  Service: Urology;  Laterality: N/A;    FLUOROSCOPIC URODYNAMIC STUDY N/A 12/7/2021    Procedure: URODYNAMIC STUDY, FLUOROSCOPIC;  Surgeon: Beatrice Vaca MD;  [facility];  Service: Urology;  Laterality: N/A;  90 minutes     SPINE SURGERY  2007    x2 (2000, 2007)       CURRENT MEDS:  Current Outpatient Medications   Medication Sig Dispense Refill    acetaminophen/diphenhydramine (TYLENOL PM ORAL) Take 2 capsules by mouth nightly.      aspirin (ECOTRIN) 81 MG EC tablet TAKE 1 TABLET EVERY DAY 90 tablet 3    aspirin/salicylamide/caffeine (BC HEADACHE POWDER ORAL) Take 1 packet by mouth daily as needed (for headache).      azelastine (ASTELIN) 137 mcg (0.1 %) nasal spray USE 1 SPRAY IN EACH NOSTRIL TWICE DAILY 60 mL 0    benzonatate (TESSALON) 200 MG capsule TAKE 1 CAPSULE THREE TIMES DAILY AS NEEDED FOR COUGH 30 capsule 0    calcium carbonate (TUMS ORAL) Take 1-2 tablets by mouth daily as needed (for acid reflux).      carbidopa-levodopa  mg (SINEMET)  mg per tablet Take 1 tablet by mouth 3 (three) times daily. 90 tablet 5    chlorthalidone (HYGROTEN) 25 MG Tab TAKE 1/2 TABLET EVERY DAY 45 tablet 2    cholecalciferol, vitamin D3, (VITAMIN D3) 50 mcg (2,000 unit) Cap Take 1 capsule by mouth once daily.      clotrimazole-betamethasone 1-0.05% (LOTRISONE) cream APPLY TOPICALLY 2 (TWO) TIMES DAILY. (Patient taking differently: Apply topically 2 (two) times daily. Uses at bath time as needed for redness between thighs.) 45 g 0    docusate sodium (COLACE) 50 MG capsule Take 100 mg by mouth daily as needed for Constipation.      finasteride (PROSCAR) 5 mg tablet Take 1 tablet (5 mg total) by mouth once daily. 90 tablet 3    fluticasone propionate (FLONASE) 50 mcg/actuation nasal spray USE 1 SPRAY NASALLY ONE TIME DAILY 32 g 6    gabapentin (NEURONTIN) 300 MG capsule One po at noon daily  and two po every evening 360 capsule 1    guaifenesin (MUCINEX) 600 mg 12 hr tablet Take 1,200 mg by mouth 2 (two) times daily as needed.       irbesartan (AVAPRO) 300 MG tablet TAKE 1 TABLET EVERY EVENING 90 tablet 2    ketoconazole (NIZORAL) 2 % cream AAA bid (facial redness and scaling) (Patient taking differently: Apply topically once weekly for facial redness and scaling.) 60 g 3    lanolin/mineral oil/petrolatum (ARTIFICIAL TEARS OPHT) Place 1-2 drops into both eyes daily as needed (for dry eyes).      LIDOcaine 4 % PtMd Apply 1 patch topically daily as needed (for back pain).      methocarbamoL (ROBAXIN) 500 MG Tab Take 500 mg by mouth 4 (four) times daily.      omeprazole (PRILOSEC) 20 MG capsule Take 2 capsules (40 mg total) by mouth once daily. (Patient taking differently: Take 40 mg by mouth 2 (two) times a day.) 180 capsule 0    potassium chloride (MICRO-K) 10 MEQ CpSR TAKE 3 CAPSULES ONE TIME DAILY 270 capsule 3    pravastatin (PRAVACHOL) 20 MG tablet Take 1 tablet (20 mg total) by mouth once daily. (Patient taking differently: Take 20 mg by mouth every evening.) 90 tablet 0    spironolactone (ALDACTONE) 25 MG tablet Take 0.5 tablets (12.5 mg total) by mouth once daily. 45 tablet 3    tamsulosin (FLOMAX) 0.4 mg Cap Take 1 capsule (0.4 mg total) by mouth once daily. (Patient taking differently: Take 1 capsule by mouth every evening.) 90 capsule 3    amLODIPine (NORVASC) 5 MG tablet Take 5 mg by mouth once daily.      oxyCODONE (ROXICODONE) 5 MG immediate release tablet Take 1 tablet (5 mg total) by mouth every 6 (six) hours as needed for Pain. (Patient not taking: Reported on 12/6/2023) 6 tablet 0     No current facility-administered medications for this visit.       ALLERGIES:  Review of patient's allergies indicates:  No Known Allergies    FAMILY HISTORY:  Family History   Problem Relation Age of Onset    Hyperlipidemia Mother     Hypertension Mother     Kidney disease Mother     Cancer Mother      Heart disease Mother     Kidney failure Mother     No Known Problems Daughter     No Known Problems Sister     No Known Problems Brother     No Known Problems Son     No Known Problems Brother     No Known Problems Son     Hypertension Maternal Grandmother     Amblyopia Neg Hx     Blindness Neg Hx     Cataracts Neg Hx     Diabetes Neg Hx     Glaucoma Neg Hx     Macular degeneration Neg Hx     Retinal detachment Neg Hx     Strabismus Neg Hx     Stroke Neg Hx     Thyroid disease Neg Hx     Melanoma Neg Hx     Psoriasis Neg Hx     Lupus Neg Hx     Eczema Neg Hx        SOCIAL HISTORY:  Social History     Tobacco Use    Smoking status: Never    Smokeless tobacco: Former     Types: Chew    Tobacco comments:     Chewed tobacco once per day for 4-5 years when a    Substance Use Topics    Alcohol use: No    Drug use: No       Review of Systems:  12 system review of systems is negative except for the symptoms mentioned in HPI.      Objective:     Vitals:    12/06/23 1107   BP: (!) 155/87   Pulse: 72     General: NAD, well nourished   Eyes: no tearing, discharge, no erythema   ENT: moist mucous membranes of the oral cavity, nares patent    Neck: Supple, full range of motion  Cardiovascular: Warm and well perfused, pulses equal and symmetrical  Lungs: Normal work of breathing, normal chest wall excursions  Skin: No rash, lesions, or breakdown on exposed skin  Psychiatry: Mood and affect are appropriate   Abdomen: soft, non tender, non distended  Extremeties: No cyanosis, clubbing or edema.    Neurological   MENTAL STATUS: Alert and oriented to person, place, and time. Attention and concentration within normal limits. Speech without dysarthria, able to name and repeat without difficulty. Recent and remote memory within normal limits. Speech is more fluid today.  CRANIAL NERVES: Visual fields intact. PERRL. EOMI. Facial sensation intact. Face symmetrical. Hearing grossly intact. Full shoulder shrug bilaterally. Tongue  protrudes midline   SENSORY: Sensation is intact to light touch throughout.  Joint position perception intact. Negative Romberg.   MOTOR: Normal bulk and tone. No pronator drift.  5/5 deltoid, biceps, triceps, interosseous, hand  bilaterally. 4/5 iliopsoas, knee extension/flexion, foot dorsi/plantarflexion bilaterally.  Continued rigidity in the upper extremities though improved. No appreciable resting tremor in the left hand as seen during last visit. Decreased range of motion in the neck on rotation, flexion, and extension. Slow gate with stooped posture.   REFLEXES: Symmetric and 2+ throughout. Toes down going bilaterally.   CEREBELLAR/COORDINATION/GAIT: Gait steady with normal arm swing and stride length.  Heel to shin intact. Finger to nose intact. Normal rapid alternating movements.

## 2023-12-11 DIAGNOSIS — I51.9 MILD AORTIC REGURGITATION WITH LEFT VENTRICULAR SYSTOLIC DYSFUNCTION BY PRIOR ECHOCARDIOGRAM: ICD-10-CM

## 2023-12-11 DIAGNOSIS — I35.1 MILD AORTIC REGURGITATION WITH LEFT VENTRICULAR SYSTOLIC DYSFUNCTION BY PRIOR ECHOCARDIOGRAM: ICD-10-CM

## 2023-12-12 RX ORDER — ASPIRIN 81 MG/1
TABLET ORAL
Qty: 90 TABLET | Refills: 3 | Status: SHIPPED | OUTPATIENT
Start: 2023-12-12

## 2023-12-13 ENCOUNTER — CLINICAL SUPPORT (OUTPATIENT)
Dept: AUDIOLOGY | Facility: CLINIC | Age: 78
End: 2023-12-13
Payer: MEDICARE

## 2023-12-13 DIAGNOSIS — H90.3 SENSORINEURAL HEARING LOSS (SNHL) OF BOTH EARS: ICD-10-CM

## 2023-12-13 PROCEDURE — 92557 COMPREHENSIVE HEARING TEST: CPT | Mod: S$GLB,,,

## 2023-12-13 PROCEDURE — 99999 PR PBB SHADOW E&M-EST. PATIENT-LVL I: CPT | Mod: PBBFAC,,,

## 2023-12-13 PROCEDURE — 92567 TYMPANOMETRY: CPT | Mod: 52,S$GLB,,

## 2023-12-13 PROCEDURE — 92567 PR TYMPA2METRY: ICD-10-PCS | Mod: 52,S$GLB,,

## 2023-12-13 PROCEDURE — 99999 PR PBB SHADOW E&M-EST. PATIENT-LVL I: ICD-10-PCS | Mod: PBBFAC,,,

## 2023-12-13 PROCEDURE — 92557 PR COMPREHENSIVE HEARING TEST: ICD-10-PCS | Mod: S$GLB,,,

## 2023-12-13 NOTE — Clinical Note
Mr. Menard was seen today for his hearing test. Let me know if you have any questions about his results. Thanks!

## 2023-12-13 NOTE — PROGRESS NOTES
History:  Tito Menard, a 78 y.o. male, was seen today for an audiologic evaluation due to hearing loss.  His most recent audiogram in 2015 indicated normal hearing sensitivity through 2000 Hz sloping to severe sensorineural hearing loss in both ears. Today he reported difficulty understanding speech, needing to ask for repetition, and avoiding complex listening situations due to this difficulty. Patient reported longstanding, non-intrusive, bilateral ringing tinnitus. He also noted occasional aural fullness in the right ear concurrent with sinus issues and managed with nasal sprays. Patient endorsed occasional lightheaded dizziness with syncope/presyncope and slurring of speech, and he is followed by neurology for this complaint.   Patient denied current otalgia and aural fullness.     Results:  Otoscopy revealed clear view of the tympanic membrane in the right ear, and clear view of the tympanic membrane in the left ear.  Tympanometry revealed Type A tympanogram. Tympanometry was attempted extensively in the left ear and could not be completed due to inability to obtain a hermetic seal. Acoustic reflexes could not be measured due to inability to obtain a hermetic seal.  Pure tone audiometry revealed  normal hearing sensitivity sloping to moderately severe sensorineural hearing loss in the right ear and normal hearing sensitivity sloping to severe hearing loss in the left ear.  Speech reception thresholds were obtained at 15 dB in the right ear and 15 dB in the left ear.  Word recognition scores were 96% in the right ear and 100% in the left ear.    Recommendations:  Hearing aid consult  Hearing protection in noise  If aural pressure/fullness persists/worsens with use of nasal sprays as previously directed, consider otologic consult.  Continue following up with neurology regarding lightheaded dizziness, and consider otologic consult if dizziness changes/worsens.  Return for annual  audiologic evaluation, or  sooner if changes in hearing are noted

## 2024-01-12 ENCOUNTER — PATIENT MESSAGE (OUTPATIENT)
Dept: UROLOGY | Facility: CLINIC | Age: 79
End: 2024-01-12
Payer: MEDICARE

## 2024-01-19 ENCOUNTER — TELEPHONE (OUTPATIENT)
Dept: INTERNAL MEDICINE | Facility: CLINIC | Age: 79
End: 2024-01-19
Payer: MEDICARE

## 2024-01-19 DIAGNOSIS — Z12.11 ENCOUNTER FOR SCREENING COLONOSCOPY: Primary | ICD-10-CM

## 2024-02-07 ENCOUNTER — PATIENT MESSAGE (OUTPATIENT)
Dept: RESEARCH | Facility: HOSPITAL | Age: 79
End: 2024-02-07
Payer: MEDICARE

## 2024-02-16 ENCOUNTER — OFFICE VISIT (OUTPATIENT)
Dept: UROLOGY | Facility: CLINIC | Age: 79
End: 2024-02-16
Payer: MEDICARE

## 2024-02-16 VITALS
HEIGHT: 71 IN | WEIGHT: 205.5 LBS | DIASTOLIC BLOOD PRESSURE: 63 MMHG | HEART RATE: 74 BPM | SYSTOLIC BLOOD PRESSURE: 104 MMHG | BODY MASS INDEX: 28.77 KG/M2

## 2024-02-16 DIAGNOSIS — Z98.890 POST-OPERATIVE STATE: Primary | ICD-10-CM

## 2024-02-16 PROCEDURE — 99999 PR PBB SHADOW E&M-EST. PATIENT-LVL IV: CPT | Mod: PBBFAC,,, | Performed by: UROLOGY

## 2024-02-16 PROCEDURE — 3074F SYST BP LT 130 MM HG: CPT | Mod: CPTII,S$GLB,, | Performed by: UROLOGY

## 2024-02-16 PROCEDURE — 1126F AMNT PAIN NOTED NONE PRSNT: CPT | Mod: CPTII,S$GLB,, | Performed by: UROLOGY

## 2024-02-16 PROCEDURE — 99024 POSTOP FOLLOW-UP VISIT: CPT | Mod: S$GLB,,, | Performed by: UROLOGY

## 2024-02-16 PROCEDURE — 3078F DIAST BP <80 MM HG: CPT | Mod: CPTII,S$GLB,, | Performed by: UROLOGY

## 2024-02-16 PROCEDURE — 1159F MED LIST DOCD IN RCRD: CPT | Mod: CPTII,S$GLB,, | Performed by: UROLOGY

## 2024-02-16 NOTE — PROGRESS NOTES
"CHIEF COMPLAINT:    Mr. Menard is a 78 y.o. male presenting for a post-op check s/p hydrocele repair    PRESENTING ILLNESS:    Tito Menard is a 78 y.o. male with a history of R hydrocele, s/p repair on 11/14.  He presents for post-op check.  He has been doing well since surgery.  Denies pain.  "Heaviness" of R scrotum reported at last post-op check has resolved.  Swelling much improved from 1 month ago.          REVIEW OF SYSTEMS:    Review of Systems   Constitutional: Negative.    HENT: Negative.     Eyes: Negative.    Respiratory: Negative.     Cardiovascular: Negative.    Gastrointestinal: Negative.    Genitourinary: Negative.    Musculoskeletal:         Walks with a walker   Skin: Negative.    Neurological:  Positive for weakness.   Endo/Heme/Allergies: Negative.    Psychiatric/Behavioral: Negative.         PATIENT HISTORY:    Past Medical History:   Diagnosis Date    Allergy     Aneurysm of artery of lower extremity     Chalazion of left eye     CKD (chronic kidney disease), stage II 4/1/2019    Diabetes mellitus type II     Diabetes with neurologic complications     Enlarged aorta 8/2/2016    Noted on pharmacological stress echo 2/28/2014.      GERD (gastroesophageal reflux disease)     egd 2008    Hyperlipidemia     Hypertension     Jock itch 7/19/2018    MGD (meibomian gland dysfunction)     Osteopenia     Spinal stenosis     Spondylosis without myelopathy 10/23/2015    Vitamin D deficiency disease        Past Surgical History:   Procedure Laterality Date    CHALAZION EXCISION Left 8/18/13    CYST REMOVAL  2013    Back of neck    EXCISION OF HYDROCELE Right 11/14/2023    Procedure: HYDROCELECTOMY;  Surgeon: Beatrice Vaca MD;  Location: [facility] OR [address] [place] [address];  Service: Urology;  Laterality: Right;  1 hr    FLEXIBLE CYSTOSCOPY N/A 12/7/2021    Procedure: CYSTOSCOPY, FLEXIBLE;  Surgeon: Beatrice Vaca MD;  Location: [facility] OR [address] [place] [address];  Service: Urology;  Laterality: N/A;    FLUOROSCOPIC URODYNAMIC STUDY " N/A 12/7/2021    Procedure: URODYNAMIC STUDY, FLUOROSCOPIC;  Surgeon: Beatrice Vaca MD;  Location: [facility] OR [address] [place] [address];  Service: Urology;  Laterality: N/A;  90 minutes     SPINE SURGERY  2007    x2 (2000, 2007)       Family History   Problem Relation Age of Onset    Hyperlipidemia Mother     Hypertension Mother     Kidney disease Mother     Cancer Mother     Heart disease Mother     Kidney failure Mother     Hypertension Maternal Grandmother      Social History     Socioeconomic History    Marital status:    Occupational History    Occupation: Retired     Comment:    Tobacco Use    Smoking status: Never    Smokeless tobacco: Former     Types: Chew    Tobacco comments:     Chewed tobacco once per day for 4-5 years when a    Substance and Sexual Activity    Alcohol use: No    Drug use: No    Sexual activity: Not Currently     Partners: Female   Social History Narrative        Colon 2007     Social Determinants of Health     Financial Resource Strain: Low Risk  (9/22/2023)    Overall Financial Resource Strain (CARDIA)     Difficulty of Paying Living Expenses: Not hard at all   Food Insecurity: No Food Insecurity (9/22/2023)    Hunger Vital Sign     Worried About Running Out of Food in the Last Year: Never true     Ran Out of Food in the Last Year: Never true   Transportation Needs: No Transportation Needs (9/22/2023)    PRAPARE - Transportation     Lack of Transportation (Medical): No     Lack of Transportation (Non-Medical): No   Physical Activity: Insufficiently Active (9/22/2023)    Exercise Vital Sign     Days of Exercise per Week: 7 days     Minutes of Exercise per Session: 10 min   Stress: No Stress Concern Present (9/22/2023)    [employer] [place] [employer] - Occupational Stress Questionnaire     Feeling of Stress : Only a little   Social Connections: Moderately Isolated (9/22/2023)    Social Connection and Isolation Panel [NHANES]     Frequency of  Communication with Friends and Family: More than three times a week     Frequency of Social Gatherings with Friends and Family: Never     Attends Sabianism Services: Never     Active Member of Clubs or Organizations: No     Attends Club or Organization Meetings: Never     Marital Status:    Housing Stability: Low Risk  (9/22/2023)    Housing Stability Vital Sign     Unable to Pay for Housing in the Last Year: No     Number of Places Lived in the Last Year: 1     Unstable Housing in the Last Year: No       Allergies:  Patient has no known allergies.    Medications:  Outpatient Encounter Medications as of 2/16/2024   Medication Sig Dispense Refill    acetaminophen/diphenhydramine (TYLENOL PM ORAL) Take 2 capsules by mouth nightly.      amLODIPine (NORVASC) 5 MG tablet Take 5 mg by mouth once daily.      aspirin (ECOTRIN) 81 MG EC tablet TAKE 1 TABLET EVERY DAY. 90 tablet 3    aspirin/salicylamide/caffeine (BC HEADACHE POWDER ORAL) Take 1 packet by mouth daily as needed (for headache).      azelastine (ASTELIN) 137 mcg (0.1 %) nasal spray USE 1 SPRAY IN EACH NOSTRIL TWICE DAILY 60 mL 0    benzonatate (TESSALON) 200 MG capsule TAKE 1 CAPSULE THREE TIMES DAILY AS NEEDED FOR COUGH 30 capsule 0    calcium carbonate (TUMS ORAL) Take 1-2 tablets by mouth daily as needed (for acid reflux).      carbidopa-levodopa  mg (SINEMET)  mg per tablet Take 1 tablet by mouth 3 (three) times daily. 90 tablet 5    chlorthalidone (HYGROTEN) 25 MG Tab TAKE 1/2 TABLET EVERY DAY 45 tablet 2    cholecalciferol, vitamin D3, (VITAMIN D3) 50 mcg (2,000 unit) Cap Take 1 capsule by mouth once daily.      clotrimazole-betamethasone 1-0.05% (LOTRISONE) cream APPLY TOPICALLY 2 (TWO) TIMES DAILY. (Patient taking differently: Apply topically 2 (two) times daily. Uses at bath time as needed for redness between thighs.) 45 g 0    docusate sodium (COLACE) 50 MG capsule Take 100 mg by mouth daily as needed for Constipation.       finasteride (PROSCAR) 5 mg tablet Take 1 tablet (5 mg total) by mouth once daily. 90 tablet 3    fluticasone propionate (FLONASE) 50 mcg/actuation nasal spray USE 1 SPRAY NASALLY ONE TIME DAILY 32 g 6    gabapentin (NEURONTIN) 300 MG capsule One po at noon daily and two po every evening 360 capsule 1    guaifenesin (MUCINEX) 600 mg 12 hr tablet Take 1,200 mg by mouth 2 (two) times daily as needed.       irbesartan (AVAPRO) 300 MG tablet TAKE 1 TABLET EVERY EVENING 90 tablet 2    ketoconazole (NIZORAL) 2 % cream AAA bid (facial redness and scaling) (Patient taking differently: Apply topically once weekly for facial redness and scaling.) 60 g 3    lanolin/mineral oil/petrolatum (ARTIFICIAL TEARS OPHT) Place 1-2 drops into both eyes daily as needed (for dry eyes).      LIDOcaine 4 % PtMd Apply 1 patch topically daily as needed (for back pain).      methocarbamoL (ROBAXIN) 500 MG Tab Take 500 mg by mouth 4 (four) times daily.      omeprazole (PRILOSEC) 20 MG capsule Take 2 capsules (40 mg total) by mouth once daily. 180 capsule 0    potassium chloride (MICRO-K) 10 MEQ CpSR TAKE 3 CAPSULES ONE TIME DAILY 270 capsule 3    pravastatin (PRAVACHOL) 20 MG tablet Take 1 tablet (20 mg total) by mouth once daily. (Patient taking differently: Take 20 mg by mouth every evening.) 90 tablet 0    spironolactone (ALDACTONE) 25 MG tablet Take 0.5 tablets (12.5 mg total) by mouth once daily. 45 tablet 3    tamsulosin (FLOMAX) 0.4 mg Cap Take 1 capsule (0.4 mg total) by mouth once daily. (Patient taking differently: Take 1 capsule by mouth every evening.) 90 capsule 3     No facility-administered encounter medications on file as of 2/16/2024.         PHYSICAL EXAMINATION:    The patient generally appears in good health, is appropriately interactive, and is in no apparent distress.    Skin: No lesions.    Mental: Cooperative with normal affect.    Neuro: Grossly intact.    HEENT: Normal. No evidence of lymphadenopathy.    Chest:  normal  inspiratory effort.    Abdomen: Soft, non-tender. No masses or organomegaly. Bladder is not palpable. No evidence of flank discomfort. No evidence of inguinal hernia.    Extremities: No clubbing, cyanosis, or edema    Scrotum showed no rashes or lesions. Midline scrotal incision well-healed.  Left hemiscrotum normal, testicle and cord structures palpable.  R testicle palpable within scrotum, improved swelling of R hemiscrotum with mild firmness.  Penis was circumcised. No meatal stenosis.      LABS:    Lab Results   Component Value Date    BUN 15 11/03/2023    CREATININE 1.0 11/03/2023       Lab Results   Component Value Date    PSA 2.2 11/11/2021    PSA 1.6 09/02/2020    PSA 1.9 08/29/2019    PSADIAG 2.1 07/19/2018    PSADIAG 1.6 06/12/2017    PSADIAG 1.9 05/15/2015       UA 1.002, pH 5, otherwise, negative.     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  Doing well post-operatively.   2.  Continue the tamsulosin and finasteride    Copy to: Dr. Amanda Brown
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
## 2024-02-20 ENCOUNTER — CLINICAL SUPPORT (OUTPATIENT)
Dept: ENDOSCOPY | Facility: HOSPITAL | Age: 79
End: 2024-02-20
Attending: INTERNAL MEDICINE
Payer: MEDICARE

## 2024-02-20 ENCOUNTER — TELEPHONE (OUTPATIENT)
Dept: ENDOSCOPY | Facility: HOSPITAL | Age: 79
End: 2024-02-20

## 2024-02-20 DIAGNOSIS — Z12.11 ENCOUNTER FOR SCREENING COLONOSCOPY: ICD-10-CM

## 2024-02-20 DIAGNOSIS — Z86.010 HISTORY OF COLON POLYPS: Primary | ICD-10-CM

## 2024-02-20 RX ORDER — POLYETHYLENE GLYCOL 3350, SODIUM SULFATE ANHYDROUS, SODIUM BICARBONATE, SODIUM CHLORIDE, POTASSIUM CHLORIDE 236; 22.74; 6.74; 5.86; 2.97 G/4L; G/4L; G/4L; G/4L; G/4L
4 POWDER, FOR SOLUTION ORAL ONCE
Qty: 4000 ML | Refills: 0 | Status: SHIPPED | OUTPATIENT
Start: 2024-02-20 | End: 2024-02-20

## 2024-02-20 NOTE — TELEPHONE ENCOUNTER
Spoke to patient to schedule procedure(s) Colonoscopy       Physician to perform procedure(s) Dr. DARBY Sorto  Date of Procedure (s) 4/24/24  Arrival Time 11:15 AM  Time of Procedure(s) 12:15 PM   Location of Procedure(s) [facility] 4th Floor  Type of Rx Prep sent to patient: PEG  Instructions provided to patient via [facility]    Patient was informed on the following information and verbalized understanding. Screening questionnaire reviewed with patient and complete. If procedure requires anesthesia, a responsible adult needs to be present to accompany the patient home, patient cannot drive after receiving anesthesia. Appointment details are tentative, especially check-in time. Patient will receive a prep-op call 7 days prior to confirm check-in time for procedure. If applicable the patient should contact their pharmacy to verify Rx for procedure prep is ready for pick-up. Patient was advised to call the scheduling department at [phone] if pharmacy states no Rx is available. Patient was advised to call the endoscopy scheduling department if any questions or concerns arise.      [facility] Scheduling Department

## 2024-02-26 DIAGNOSIS — J30.2 SEASONAL ALLERGIC RHINITIS, UNSPECIFIED TRIGGER: ICD-10-CM

## 2024-02-26 DIAGNOSIS — I10 ESSENTIAL HYPERTENSION: ICD-10-CM

## 2024-02-26 RX ORDER — AZELASTINE 1 MG/ML
1 SPRAY, METERED NASAL 2 TIMES DAILY
Qty: 90 ML | Refills: 0 | Status: SHIPPED | OUTPATIENT
Start: 2024-02-26 | End: 2024-05-13

## 2024-02-26 NOTE — TELEPHONE ENCOUNTER
Care Due:                  Date            Visit Type   Department     Provider  --------------------------------------------------------------------------------                                EP -                              PRIMARY      [facility]  Amanda Guerin  Last Visit: 05-      CARE ([facility])   MEDICINE       Stephanie  Next Visit: None Scheduled  None         None Found                                                            Last  Test          Frequency    Reason                     Performed    Due Date  --------------------------------------------------------------------------------    Lipid Panel.  12 months..  pravastatin..............  05- 05-    [facility] Embedded Care Due Messages. Reference number: [id number].   2/26/2024 10:21:42 AM CST

## 2024-02-26 NOTE — TELEPHONE ENCOUNTER
Provider Staff:  Action required for this patient    Requires labs      Please see care gap opportunities below in Care Due Message.    Thanks!  [facility]     Appointments      Date Provider   Last Visit   5/31/2023 Amanda Brown MD   Next Visit   Visit date not found Amanda Brown MD     Refill Decision Note   Tito Menard  is requesting a refill authorization.  Brief Assessment and Rationale for Refill:  Approve     Medication Therapy Plan:  PT seen in ED for Syncope on 06/2023; no changes to Azelastine therapy; follow up visit with with PA [id number]      Extended chart review required: Yes   Comments:     Note composed:3:18 PM 02/26/2024

## 2024-02-27 RX ORDER — AMLODIPINE BESYLATE 5 MG/1
5 TABLET ORAL
Qty: 90 TABLET | Refills: 3 | Status: SHIPPED | OUTPATIENT
Start: 2024-02-27 | End: 2024-04-30

## 2024-02-27 RX ORDER — POTASSIUM CHLORIDE 750 MG/1
CAPSULE, EXTENDED RELEASE ORAL
Qty: 270 CAPSULE | Refills: 3 | Status: SHIPPED | OUTPATIENT
Start: 2024-02-27 | End: 2024-04-30

## 2024-02-27 RX ORDER — CHLORTHALIDONE 25 MG/1
TABLET ORAL
Qty: 45 TABLET | Refills: 3 | Status: SHIPPED | OUTPATIENT
Start: 2024-02-27 | End: 2024-04-30

## 2024-02-29 ENCOUNTER — OFFICE VISIT (OUTPATIENT)
Dept: INTERNAL MEDICINE | Facility: CLINIC | Age: 79
End: 2024-02-29
Payer: MEDICARE

## 2024-02-29 VITALS
SYSTOLIC BLOOD PRESSURE: 118 MMHG | HEIGHT: 71 IN | OXYGEN SATURATION: 98 % | BODY MASS INDEX: 29.32 KG/M2 | WEIGHT: 209.44 LBS | DIASTOLIC BLOOD PRESSURE: 58 MMHG | HEART RATE: 85 BPM

## 2024-02-29 DIAGNOSIS — E11.42 DIABETIC POLYNEUROPATHY ASSOCIATED WITH TYPE 2 DIABETES MELLITUS: ICD-10-CM

## 2024-02-29 DIAGNOSIS — I10 ESSENTIAL HYPERTENSION: Primary | ICD-10-CM

## 2024-02-29 DIAGNOSIS — K21.9 GASTROESOPHAGEAL REFLUX DISEASE, UNSPECIFIED WHETHER ESOPHAGITIS PRESENT: ICD-10-CM

## 2024-02-29 DIAGNOSIS — E78.2 MIXED HYPERLIPIDEMIA: ICD-10-CM

## 2024-02-29 DIAGNOSIS — G20.C PARKINSONISM, UNSPECIFIED PARKINSONISM TYPE: ICD-10-CM

## 2024-02-29 PROCEDURE — 99214 OFFICE O/P EST MOD 30 MIN: CPT | Mod: S$GLB,,, | Performed by: PHYSICIAN ASSISTANT

## 2024-02-29 PROCEDURE — 3078F DIAST BP <80 MM HG: CPT | Mod: CPTII,S$GLB,, | Performed by: PHYSICIAN ASSISTANT

## 2024-02-29 PROCEDURE — 3074F SYST BP LT 130 MM HG: CPT | Mod: CPTII,S$GLB,, | Performed by: PHYSICIAN ASSISTANT

## 2024-02-29 PROCEDURE — 1100F PTFALLS ASSESS-DOCD GE2>/YR: CPT | Mod: CPTII,S$GLB,, | Performed by: PHYSICIAN ASSISTANT

## 2024-02-29 PROCEDURE — 3288F FALL RISK ASSESSMENT DOCD: CPT | Mod: CPTII,S$GLB,, | Performed by: PHYSICIAN ASSISTANT

## 2024-02-29 PROCEDURE — 1159F MED LIST DOCD IN RCRD: CPT | Mod: CPTII,S$GLB,, | Performed by: PHYSICIAN ASSISTANT

## 2024-02-29 PROCEDURE — 99999 PR PBB SHADOW E&M-EST. PATIENT-LVL IV: CPT | Mod: PBBFAC,,, | Performed by: PHYSICIAN ASSISTANT

## 2024-02-29 NOTE — PROGRESS NOTES
INTERNAL MEDICINE PROGRESS NOTE    CHIEF COMPLAINT     Chief Complaint   Patient presents with    Follow-up       HPI     Tito Menard is a 78 y.o. male who presents for an Follow-up visit today.    He is here today for 6 month follow-up. He is feeling well and has no new complaints.   He was last seen by me in clinic on 11/30/2024.  Due for annual with Stephanie in may of 2024    GERD - consistently taking Prilosec 20 mg bid watching dietary irritants and triggers, reports these symptoms are well controlled.     Had right sided hydrocelectomy on 10/13/2023 Surgical incision has healed well - no current complaints.     Has DM but does not check BS regularly. Last A1C two A1C readings have been 5.6, well controlled with diet    Has HTN that is managed with chlorthalidone 25, irbesartan 300mg daily, spironolactone 25 daily. Tolerating this medication regimen well.     He has HLD and aortic atherosclerosis, last lipid panel was in normal range HLD is managed by pravastatin 20mg daily. He is tolerating this well.     Having C-scope in 4/24/2024    Wears compression socks  -notices swelling when he doesn't wear them     He has parkinsonism that he manages with sinnement -noticing some fatigue but no syncope. He also suspects that this medication makes him sleepy after lunch - he reports that he plans to discuss this with his neurologist. Follows with Dr. James, neurology. Clinic visits q6 months.     Past Medical History:  Past Medical History:   Diagnosis Date    Allergy     Aneurysm of artery of lower extremity     Chalazion of left eye     CKD (chronic kidney disease), stage II 4/1/2019    Diabetes mellitus type II     Diabetes with neurologic complications     Enlarged aorta 8/2/2016    Noted on pharmacological stress echo 2/28/2014.      GERD (gastroesophageal reflux disease)     egd 2008    Hyperlipidemia     Hypertension     Jock itch 7/19/2018    MGD (meibomian gland dysfunction)     Osteopenia     Spinal  stenosis     Spondylosis without myelopathy 10/23/2015    Vitamin D deficiency disease        Home Medications:  Prior to Admission medications    Medication Sig Start Date End Date Taking? Authorizing Provider   acetaminophen/diphenhydramine (TYLENOL PM ORAL) Take 2 capsules by mouth nightly.    Provider, Historical   amLODIPine (NORVASC) 5 MG tablet TAKE 1 TABLET EVERY DAY 2/27/24   Hannah Dudley PA-C   aspirin (ECOTRIN) 81 MG EC tablet TAKE 1 TABLET EVERY DAY. 12/12/23   Amanda Brown MD   aspirin/salicylamide/caffeine (BC HEADACHE POWDER ORAL) Take 1 packet by mouth daily as needed (for headache).    Provider, Historical   azelastine (ASTELIN) 137 mcg (0.1 %) nasal spray 1 spray (137 mcg total) by Nasal route 2 (two) times daily. 2/26/24   Amanda Brown MD   benzonatate (TESSALON) 200 MG capsule TAKE 1 CAPSULE THREE TIMES DAILY AS NEEDED FOR COUGH 9/20/21   Luly Hurtado MD   calcium carbonate (TUMS ORAL) Take 1-2 tablets by mouth daily as needed (for acid reflux).    Provider, Historical   carbidopa-levodopa  mg (SINEMET)  mg per tablet Take 1 tablet by mouth 3 (three) times daily. 11/15/23 5/13/24  Sheldon James MD   chlorthalidone (HYGROTEN) 25 MG Tab TAKE 1/2 TABLET EVERY DAY 2/27/24   Hannah Dudley PA-C   cholecalciferol, vitamin D3, (VITAMIN D3) 50 mcg (2,000 unit) Cap Take 1 capsule by mouth once daily.    Provider, Historical   clotrimazole-betamethasone 1-0.05% (LOTRISONE) cream APPLY TOPICALLY 2 (TWO) TIMES DAILY.  Patient taking differently: Apply topically 2 (two) times daily. Uses at bath time as needed for redness between thighs. 10/21/20   Beatrice Vaca MD   docusate sodium (COLACE) 50 MG capsule Take 100 mg by mouth daily as needed for Constipation.    Provider, Historical   finasteride (PROSCAR) 5 mg tablet Take 1 tablet (5 mg total) by mouth once daily. 9/25/23 9/24/24  Beatrice Vaca MD   fluticasone propionate (FLONASE) 50 mcg/actuation nasal spray  USE 1 SPRAY NASALLY ONE TIME DAILY 3/20/23   Ronny Jimenes MD   gabapentin (NEURONTIN) 300 MG capsule One po at noon daily and two po every evening 11/14/23   Amanda Brown MD   guaifenesin (MUCINEX) 600 mg 12 hr tablet Take 1,200 mg by mouth 2 (two) times daily as needed.     Provider, Historical   irbesartan (AVAPRO) 300 MG tablet TAKE 1 TABLET EVERY EVENING 8/8/23   Ronny Jimenes MD   ketoconazole (NIZORAL) 2 % cream AAA bid (facial redness and scaling)  Patient taking differently: Apply topically once weekly for facial redness and scaling. 5/4/21   Anny Fregoso MD   lanolin/mineral oil/petrolatum (ARTIFICIAL TEARS OPHT) Place 1-2 drops into both eyes daily as needed (for dry eyes).    Provider, Historical   LIDOcaine 4 % PtMd Apply 1 patch topically daily as needed (for back pain).    Provider, Historical   methocarbamoL (ROBAXIN) 500 MG Tab Take 500 mg by mouth 4 (four) times daily.    Provider, Historical   omeprazole (PRILOSEC) 20 MG capsule Take 2 capsules (40 mg total) by mouth once daily. 1/14/24   Amanda Brown MD   potassium chloride (MICRO-K) 10 MEQ CpSR TAKE 3 CAPSULES ONE TIME DAILY 2/27/24   Hannah Dudley PA-C   pravastatin (PRAVACHOL) 20 MG tablet Take 1 tablet (20 mg total) by mouth once daily.  Patient taking differently: Take 20 mg by mouth every evening. 10/24/23   Amanda Brown MD   spironolactone (ALDACTONE) 25 MG tablet Take 0.5 tablets (12.5 mg total) by mouth once daily. 6/1/23 5/31/24  To Dawson MD   tamsulosin (FLOMAX) 0.4 mg Cap Take 1 capsule (0.4 mg total) by mouth once daily.  Patient taking differently: Take 1 capsule by mouth every evening. 10/13/23   Beatrice Vaca MD       Review of Systems:  Review of Systems   Constitutional:  Negative for chills and fever.   HENT:  Negative for sore throat and trouble swallowing.    Eyes:  Negative for visual disturbance.   Respiratory:  Negative for cough and shortness of breath.   "  Cardiovascular:  Negative for chest pain.   Gastrointestinal:  Negative for abdominal pain, constipation, diarrhea, nausea and vomiting.   Genitourinary:  Negative for dysuria and flank pain.   Musculoskeletal:  Negative for back pain, neck pain and neck stiffness.   Skin:  Negative for rash.   Neurological:  Negative for dizziness, syncope, weakness and headaches.   Psychiatric/Behavioral:  Negative for confusion.        Health Maintainence:   Immunizations:  Health Maintenance         Date Due Completion Date    RSV Vaccine (Age 60+ and Pregnant patients) (1 - 1-dose 60+ series) Never done ---    Colonoscopy 02/03/2024 2/3/2021    Diabetes Urine Screening 05/31/2024 5/31/2023    Lipid Panel 05/31/2024 5/31/2023    Hemoglobin A1c 06/06/2024 12/6/2023    Override on 10/23/2015: Done    Eye Exam 08/03/2024 8/3/2023    Override on 6/14/2018: Done    Override on 8/5/2015: Done    TETANUS VACCINE 03/30/2033 3/30/2023             PHYSICAL EXAM     BP (!) 118/58   Pulse 85   Ht 5' 11" (1.803 m)   Wt 95 kg (209 lb 7 oz)   SpO2 98%   BMI 29.21 kg/m²     Physical Exam  Vitals and nursing note reviewed.   Constitutional:       Appearance: Normal appearance.      Comments: Elderly male in NAD or apparent pain. He makes good eye contact, speaks in clear full sentences and ambulates with cane from home.    HENT:      Head: Normocephalic and atraumatic.      Nose: Nose normal.      Mouth/Throat:      Pharynx: Oropharynx is clear.   Eyes:      Conjunctiva/sclera: Conjunctivae normal.   Cardiovascular:      Rate and Rhythm: Normal rate and regular rhythm.      Pulses: Normal pulses.   Pulmonary:      Effort: No respiratory distress.   Abdominal:      Tenderness: There is no abdominal tenderness.   Musculoskeletal:         General: Normal range of motion.      Cervical back: No rigidity.   Skin:     General: Skin is warm and dry.      Capillary Refill: Capillary refill takes less than 2 seconds.      Findings: No rash. "   Neurological:      General: No focal deficit present.      Mental Status: He is alert.      Gait: Gait normal.   Psychiatric:         Mood and Affect: Mood normal.         LABS     Lab Results   Component Value Date    HGBA1C 5.6 12/06/2023     CMP  Sodium   Date Value Ref Range Status   11/03/2023 140 136 - 145 mmol/L Final     Potassium   Date Value Ref Range Status   11/03/2023 4.1 3.5 - 5.1 mmol/L Final     Chloride   Date Value Ref Range Status   11/03/2023 105 95 - 110 mmol/L Final     CO2   Date Value Ref Range Status   11/03/2023 26 23 - 29 mmol/L Final     Glucose   Date Value Ref Range Status   11/03/2023 104 70 - 110 mg/dL Final   07/09/2022 102 mg/dL Final     BUN   Date Value Ref Range Status   11/03/2023 15 8 - 23 mg/dL Final   07/09/2022 13 4 - 21 mg/dL Final     Creatinine   Date Value Ref Range Status   11/03/2023 1.0 0.5 - 1.4 mg/dL Final   07/09/2022 0.9 mg/dL Final     Calcium   Date Value Ref Range Status   11/03/2023 9.2 8.7 - 10.5 mg/dL Final     Total Protein   Date Value Ref Range Status   11/03/2023 7.1 6.0 - 8.4 g/dL Final     Albumin   Date Value Ref Range Status   11/03/2023 3.6 3.5 - 5.2 g/dL Final     Total Bilirubin   Date Value Ref Range Status   11/03/2023 0.5 0.1 - 1.0 mg/dL Final     Comment:     For infants and newborns, interpretation of results should be based  on gestational age, weight and in agreement with clinical  observations.    Premature Infant recommended reference ranges:  Up to 24 hours.............<8.0 mg/dL  Up to 48 hours............<12.0 mg/dL  3-5 days..................<15.0 mg/dL  6-29 days.................<15.0 mg/dL       Alkaline Phosphatase   Date Value Ref Range Status   11/03/2023 86 55 - 135 U/L Final     AST   Date Value Ref Range Status   11/03/2023 24 10 - 40 U/L Final     ALT   Date Value Ref Range Status   11/03/2023 6 (L) 10 - 44 U/L Final     Anion Gap   Date Value Ref Range Status   11/03/2023 9 8 - 16 mmol/L Final     eGFR if     Date Value Ref Range Status   06/27/2022 >60.0 >60 mL/min/1.73 m^2 Final     eGFR if non    Date Value Ref Range Status   06/27/2022 >60.0 >60 mL/min/1.73 m^2 Final     Comment:     Calculation used to obtain the estimated glomerular filtration  rate (eGFR) is the CKD-EPI equation.        Lab Results   Component Value Date    WBC 4.48 11/03/2023    HGB 12.7 (L) 11/03/2023    HCT 38.9 (L) 11/03/2023    MCV 90 11/03/2023     11/03/2023     Lab Results   Component Value Date    CHOL 167 05/31/2023    CHOL 153 07/16/2021    CHOL 151 07/31/2020     Lab Results   Component Value Date    HDL 44 05/31/2023    HDL 46 07/16/2021    HDL 45 07/31/2020     Lab Results   Component Value Date    LDLCALC 103.6 05/31/2023    LDLCALC 90.6 07/16/2021    LDLCALC 86.2 07/31/2020     Lab Results   Component Value Date    TRIG 97 05/31/2023    TRIG 82 07/16/2021    TRIG 99 07/31/2020     Lab Results   Component Value Date    CHOLHDL 26.3 05/31/2023    CHOLHDL 30.1 07/16/2021    CHOLHDL 29.8 07/31/2020     Lab Results   Component Value Date    TSH 0.661 06/20/2023       ASSESSMENT/PLAN     Tito Menard is a 78 y.o. male     Tito was seen today for follow-up.    Diagnoses and all orders for this visit:    Essential hypertension  -     CBC Auto Differential; Future    Mixed hyperlipidemia  -     Comprehensive Metabolic Panel; Future  -     Lipid Panel; Future    Diabetic polyneuropathy associated with type 2 diabetes mellitus  -     Hemoglobin A1C; Future    Gastroesophageal reflux disease, unspecified whether esophagitis present    Parkinsonism, unspecified Parkinsonism type      Patient was counseled on when and how to seek emergent care.       Hannah Dudley PA-C   Department of Internal Medicine - [facility]   9:58 AM

## 2024-03-04 NOTE — ASSESSMENT & PLAN NOTE
Patient presenting with a syncopal episode after gettig out of the tub this AM. +LOC, - head trauma  - Tn neg, BNP neg  - CTH w/o intracranial abnormality  - 2D ECHO ordered   - EKG with NSR and RBBB, unchanged from previous  - orthostatic vitals neg   - tele  - most consistent with reflex syncope   [facility] PRE OP RECOVERY  Operative Note     Sunitha Rubio Location: OR   [facility] [id number] MRN [id number]   Admission Date 3/4/2024 Operation Date 3/4/2024   Attending Physician Amanda Esposito MD Operating Physician Amanda Esposito MD      Preoperative Diagnosis: Request for sterilization [Z30.2]     Postoperative Diagnosis: Request for sterilization [Z30.2]     Procedure Performed:   Laparoscopic bilateral salppingectomy     Primary Surgeon: Amanda Esposito MD      Assistant: Daniel Mooney      Surgical Findings: normal uterus, tubes and ovaries,      Anesthesia: General     Complications: none      Implants: * No implants in log *     Specimen: bilateral tubes      Drains: none      Condition: stable Tubes      Estimated Blood Loss: Blood Output: 0 mL (3/4/2024 12:47 PM)       Summary of Case: Patient was taken to the operative suite where she was administered general endotracheal anesthesia.  Patient was then placed in dorsal lithotomy in Hoang stirrups.  Abdominal and vaginal prep were performed.  In and out bladder catheterization was performed.  A time-out was then performed.    A speculum was placed in vagina.  The cervix was visualized, grasped with tenaculum and an acorn uterine manipulator was placed.    Using a scalpel, a 5 mm incision was made infraumbilically.  The abdomen and peritoneum were then entered under direct visualization using a 5 mm Optiview trocar.  After confirming intraperitoneal placement, CO2 gas was used to insufflate the abdomen.  Camera was inserted and patient was placed in Trendelenburg.  Pelvis and appeared normal with normal-appearing tubes, ovaries, and uterus.    A 5 mm incisions were made on the lateral abdominal side wall and trocars were introduced under direct visualization.  The left tube was first elevated using an atraumatic grasper and the fallopian tube was  then removed including its fimbriated end using the LigaSure [place] sealer divider device.   Good hemostasis was noted from the left salpingectomy site.    The right tube was then identified and elevated with an atraumatic grasper.  The right tube was then removed using the same LigaSure sealer and divider.  Good hemostasis was noted.  Tubes were removed in their entirety and sent for pathologic evaluation.    Pressure was released from the abdomen.  The tubal sites were again inspected and hemostatic.  Gas was then released from the abdomen and trocars were removed under direct visualization.      The incisions were then closed using dermabond .    Patient tolerated the procedure well.  She was transferred to recovery room in stable condition.   All counts were correct.   Blood loss was estimated at minimal     Daniel Jesica was utilized as a surgical assist for the entire procedure due to the need for tissue retraction, dissection of vital structures, prevention and management of blood loss, and reduction in overall operative and anesthesia time. They were also involved in the critical decision making as it relates to the complexity of this case.       Amanda Esposito MD  3/4/2024  1:26 PM

## 2024-03-11 NOTE — TELEPHONE ENCOUNTER
----- Message from Clifton Ye sent at 5/25/2023 10:59 AM CDT -----  Can the clinic reply in [facility]: NO              Please refill the medication(s) listed below. Please call the patient when the prescription(s) is ready for  at this phone number   [phone]           Medication #1 omeprazole (PRILOSEC) 20 MG capsule         Medication #2           Preferred Pharmacy: [facility] - [facility] [id number]     Statement Selected

## 2024-03-26 DIAGNOSIS — I50.32 CHRONIC DIASTOLIC HEART FAILURE: ICD-10-CM

## 2024-03-26 DIAGNOSIS — R06.09 DOE (DYSPNEA ON EXERTION): ICD-10-CM

## 2024-03-26 RX ORDER — SPIRONOLACTONE 25 MG/1
12.5 TABLET ORAL DAILY
Qty: 45 TABLET | Refills: 3 | Status: SHIPPED | OUTPATIENT
Start: 2024-03-26 | End: 2024-04-30

## 2024-03-30 DIAGNOSIS — K21.00 GASTROESOPHAGEAL REFLUX DISEASE WITH ESOPHAGITIS: ICD-10-CM

## 2024-03-30 NOTE — TELEPHONE ENCOUNTER
No care due was identified.  Health [facility] Embedded Care Due Messages. Reference number: [id number].   3/30/2024 2:13:50 AM CDT

## 2024-04-01 RX ORDER — OMEPRAZOLE 20 MG/1
40 CAPSULE, DELAYED RELEASE ORAL
Qty: 180 CAPSULE | Refills: 0 | Status: SHIPPED | OUTPATIENT
Start: 2024-04-01 | End: 2024-06-13

## 2024-04-01 NOTE — TELEPHONE ENCOUNTER
Refill Decision Note   Tito Menard  is requesting a refill authorization.  Brief Assessment and Rationale for Refill:  Approve     Medication Therapy Plan:  ED documentation reviewed from Encounter on 6/19/23,patient was seen for Syncope and neck pain.  No changes to therapy noted for Omeprazole.  Patient has a FOV with PCP scheduled on 5/29/24..  Approved per protocol.      Comments:     Note composed:11:40 AM 04/01/2024

## 2024-04-15 NOTE — TELEPHONE ENCOUNTER
No care due was identified.  Health [facility] Embedded Care Due Messages. Reference number: [id number].   4/15/2024 10:45:03 AM CDT

## 2024-04-17 RX ORDER — FLUTICASONE PROPIONATE 50 MCG
SPRAY, SUSPENSION (ML) NASAL
Qty: 32 G | Refills: 0 | Status: SHIPPED | OUTPATIENT
Start: 2024-04-17

## 2024-04-18 ENCOUNTER — PATIENT MESSAGE (OUTPATIENT)
Dept: ENDOSCOPY | Facility: HOSPITAL | Age: 79
End: 2024-04-18
Payer: MEDICARE

## 2024-04-18 ENCOUNTER — TELEPHONE (OUTPATIENT)
Dept: ENDOSCOPY | Facility: HOSPITAL | Age: 79
End: 2024-04-18
Payer: MEDICARE

## 2024-04-24 ENCOUNTER — HOSPITAL ENCOUNTER (OUTPATIENT)
Facility: HOSPITAL | Age: 79
Discharge: HOME | End: 2024-04-24
Attending: OBSTETRICS & GYNECOLOGY | Admitting: STUDENT IN AN ORGANIZED HEALTH CARE EDUCATION/TRAINING PROGRAM
Payer: MEDICARE

## 2024-04-24 ENCOUNTER — ANESTHESIA (OUTPATIENT)
Dept: ENDOSCOPY | Facility: HOSPITAL | Age: 79
End: 2024-04-24
Payer: MEDICARE

## 2024-04-24 ENCOUNTER — ANESTHESIA EVENT (OUTPATIENT)
Dept: ENDOSCOPY | Facility: HOSPITAL | Age: 79
End: 2024-04-24
Payer: MEDICARE

## 2024-04-24 VITALS
HEIGHT: 71 IN | HEART RATE: 56 BPM | WEIGHT: 207 LBS | TEMPERATURE: 98 F | OXYGEN SATURATION: 98 % | BODY MASS INDEX: 28.98 KG/M2 | DIASTOLIC BLOOD PRESSURE: 73 MMHG | SYSTOLIC BLOOD PRESSURE: 150 MMHG | RESPIRATION RATE: 18 BRPM

## 2024-04-24 DIAGNOSIS — Z12.11 ENCOUNTER FOR SCREENING COLONOSCOPY: ICD-10-CM

## 2024-04-24 DIAGNOSIS — D12.2 ADENOMATOUS POLYP OF ASCENDING COLON: Primary | ICD-10-CM

## 2024-04-24 PROCEDURE — E9220 PRA ENDO ANESTHESIA: HCPCS | Mod: ,,, | Performed by: NURSE ANESTHETIST, CERTIFIED REGISTERED

## 2024-04-24 PROCEDURE — 37000008 HC ANESTHESIA 1ST 15 MINUTES: Performed by: STUDENT IN AN ORGANIZED HEALTH CARE EDUCATION/TRAINING PROGRAM

## 2024-04-24 PROCEDURE — 25000003 PHARM REV CODE 250: Performed by: NURSE ANESTHETIST, CERTIFIED REGISTERED

## 2024-04-24 PROCEDURE — 63600175 PHARM REV CODE 636 W HCPCS: Performed by: NURSE ANESTHETIST, CERTIFIED REGISTERED

## 2024-04-24 PROCEDURE — 88305 TISSUE EXAM BY PATHOLOGIST: CPT | Mod: 26,HCNC,, | Performed by: PATHOLOGY

## 2024-04-24 PROCEDURE — 37000009 HC ANESTHESIA EA ADD 15 MINS: Performed by: STUDENT IN AN ORGANIZED HEALTH CARE EDUCATION/TRAINING PROGRAM

## 2024-04-24 PROCEDURE — 27201089 HC SNARE, DISP (ANY): Performed by: STUDENT IN AN ORGANIZED HEALTH CARE EDUCATION/TRAINING PROGRAM

## 2024-04-24 PROCEDURE — 45385 COLONOSCOPY W/LESION REMOVAL: CPT | Mod: PT,HCNC | Performed by: STUDENT IN AN ORGANIZED HEALTH CARE EDUCATION/TRAINING PROGRAM

## 2024-04-24 PROCEDURE — 45385 COLONOSCOPY W/LESION REMOVAL: CPT | Mod: PT,HCNC,, | Performed by: STUDENT IN AN ORGANIZED HEALTH CARE EDUCATION/TRAINING PROGRAM

## 2024-04-24 PROCEDURE — 88305 TISSUE EXAM BY PATHOLOGIST: CPT | Mod: HCNC | Performed by: PATHOLOGY

## 2024-04-24 RX ORDER — SODIUM CHLORIDE 9 MG/ML
INJECTION, SOLUTION INTRAVENOUS CONTINUOUS
Status: CANCELLED | OUTPATIENT
Start: 2024-04-24

## 2024-04-24 RX ORDER — LIDOCAINE HYDROCHLORIDE 20 MG/ML
INJECTION INTRAVENOUS
Status: DISCONTINUED | OUTPATIENT
Start: 2024-04-24 | End: 2024-04-24

## 2024-04-24 RX ORDER — EPHEDRINE SULFATE 50 MG/ML
INJECTION, SOLUTION INTRAVENOUS
Status: DISCONTINUED | OUTPATIENT
Start: 2024-04-24 | End: 2024-04-24

## 2024-04-24 RX ORDER — PROPOFOL 10 MG/ML
VIAL (ML) INTRAVENOUS
Status: DISCONTINUED | OUTPATIENT
Start: 2024-04-24 | End: 2024-04-24

## 2024-04-24 RX ADMIN — PROPOFOL 150 MCG/KG/MIN: 10 INJECTION, EMULSION INTRAVENOUS at 12:04

## 2024-04-24 RX ADMIN — SODIUM CHLORIDE: 0.9 INJECTION, SOLUTION INTRAVENOUS at 12:04

## 2024-04-24 RX ADMIN — LIDOCAINE HYDROCHLORIDE 50 MG: 20 INJECTION INTRAVENOUS at 12:04

## 2024-04-24 RX ADMIN — EPHEDRINE SULFATE 10 MG: 50 INJECTION INTRAVENOUS at 01:04

## 2024-04-24 NOTE — PROVATION PATIENT INSTRUCTIONS
Discharge Summary/Instructions after an Endoscopic Procedure  Patient Name: Tito Menard  Patient MRN: [id number]  Patient YOB: 1945 Wednesday, April 24, 2024  Catalino Sorto MD  Dear patient,  As a result of recent federal legislation (The Federal Cures Act), you may   receive lab or pathology results from your procedure in your [facility]   account before your physician is able to contact you. Your physician or   their representative will relay the results to you with their   recommendations at their soonest availability.  Thank you,  RESTRICTIONS:  During your procedure today, you received medications for sedation.  These   medications may affect your judgment, balance and coordination.  Therefore,   for 24 hours, you have the following restrictions:   - DO NOT drive a car, operate machinery, make legal/financial decisions,   sign important papers or drink alcohol.    ACTIVITY:  Today: no heavy lifting, straining or running due to procedural   sedation/anesthesia.  The following day: return to full activity including work.  DIET:  Eat and drink normally unless instructed otherwise.     TREATMENT FOR COMMON SIDE EFFECTS:  - Mild abdominal pain, nausea, belching, bloating or excessive gas:  rest,   eat lightly and use a heating pad.  - Sore Throat: treat with throat lozenges and/or gargle with warm salt   water.  - Because air was used during the procedure, expelling large amounts of air   from your rectum or belching is normal.  - If a bowel prep was taken, you may not have a bowel movement for 1-3 days.    This is normal.  SYMPTOMS TO WATCH FOR AND REPORT TO YOUR PHYSICIAN:  1. Abdominal pain or bloating, other than gas cramps.  2. Chest pain.  3. Back pain.  4. Signs of infection such as: chills or fever occurring within 24 hours   after the procedure.  5. Rectal bleeding, which would show as bright red, maroon, or black stools.   (A tablespoon of blood from the rectum is not serious, especially  if   hemorrhoids are present.)  6. Vomiting.  7. Weakness or dizziness.  GO DIRECTLY TO THE NEAREST EMERGENCY ROOM IF YOU HAVE ANY OF THE FOLLOWING:      Difficulty breathing              Chills and/or fever over 101 F   Persistent vomiting and/or vomiting blood   Severe abdominal pain   Severe chest pain   Black, tarry stools   Bleeding- more than one tablespoon   Any other symptom or condition that you feel may need urgent attention  Your doctor recommends these additional instructions:  If any biopsies were taken, your doctors clinic will contact you in 1 to 2   weeks with any results.  - Discharge patient to home.   - Resume previous diet.   - Continue present medications.   - Await pathology results.   - Repeat colonoscopy in 1 year for surveillance of multiple polyps.  For questions, problems or results please call your physician - Catalino Sorto MD at Work:  [phone].  [facility] EMERGENCY ROOM PHONE NUMBER: [phone]  IF A COMPLICATION OR EMERGENCY SITUATION ARISES AND YOU ARE UNABLE TO REACH   YOUR PHYSICIAN - GO DIRECTLY TO THE EMERGENCY ROOM.  MD Catalino Montano MD  4/24/2024 2:01:04 PM  This report has been verified and signed electronically.  Dear patient,  As a result of recent federal legislation (The Federal Cures Act), you may   receive lab or pathology results from your procedure in your [facility]   account before your physician is able to contact you. Your physician or   their representative will relay the results to you with their   recommendations at their soonest availability.  Thank you,  PROVATION

## 2024-04-24 NOTE — ANESTHESIA PREPROCEDURE EVALUATION
04/24/2024  Tito Menard is a 78 y.o., male.  [facility]  Anesthesia Pre-Operative Evaluation       Patient Name: Tito Menard  YOB: 1945  MRN: [id number]  [facility]: [id number]      Code Status: Prior   Date of Procedure: 4/24/2024  Anesthesia: Choice Procedure: Procedure(s) (LRB):  COLONOSCOPY (N/A)  Pre-Operative Diagnosis: Encounter for screening colonoscopy [Z12.11]  History of colon polyps [Z86.010]  Proceduralist: Surgeons and Role:     * Catalino Sorto MD - Primary Nurse: (Unknown)      SUBJECTIVE:   Tito Menard is a 78 y.o. male who  has a past medical history of Allergy, Aneurysm of artery of lower extremity, Chalazion of left eye, CKD (chronic kidney disease), stage II (4/1/2019), Diabetes mellitus type II, Diabetes with neurologic complications, Enlarged aorta (8/2/2016), GERD (gastroesophageal reflux disease), Hyperlipidemia, Hypertension, Jock itch (7/19/2018), MGD (meibomian gland dysfunction), Osteopenia, Spinal stenosis, Spondylosis without myelopathy (10/23/2015), and Vitamin D deficiency disease..     he has a current medication list which includes the following long-term medication(s): amlodipine, aspirin, azelastine, carbidopa-levodopa  mg, chlorthalidone, finasteride, fluticasone propionate, gabapentin, irbesartan, ketoconazole, omeprazole, pravastatin, spironolactone, and tamsulosin.     ALLERGIES:   Review of patient's allergies indicates:  No Known Allergies  LDA:          Lines/Drains/Airways       Airway  Duration                  Airway - Non-Surgical 11/14/23 [id number] days                   Anesthesia Evaluation      Airway   Dental      Pulmonary    (+) COPD  Cardiovascular   (+) hypertension, dysrhythmias (RBBB)  Valvular problems/murmurs: Aortic atherosclerosis.    Neuro/Psych      GI/Hepatic/Renal    (+) GERD, chronic renal  disease    Endo/Other    (+) diabetes mellitus type 2, arthritis  Abdominal                   MEDICATIONS:     Antibiotics (From admission, onward)      None          VTE Risk Mitigation (From admission, onward)      None              No current facility-administered medications for this encounter.     Current Outpatient Medications   Medication Sig Dispense Refill    acetaminophen/diphenhydramine (TYLENOL PM ORAL) Take 2 capsules by mouth nightly.      amLODIPine (NORVASC) 5 MG tablet TAKE 1 TABLET EVERY DAY 90 tablet 3    aspirin (ECOTRIN) 81 MG EC tablet TAKE 1 TABLET EVERY DAY. 90 tablet 3    aspirin/salicylamide/caffeine (BC HEADACHE POWDER ORAL) Take 1 packet by mouth daily as needed (for headache).      azelastine (ASTELIN) 137 mcg (0.1 %) nasal spray 1 spray (137 mcg total) by Nasal route 2 (two) times daily. 90 mL 0    benzonatate (TESSALON) 200 MG capsule TAKE 1 CAPSULE THREE TIMES DAILY AS NEEDED FOR COUGH 30 capsule 0    calcium carbonate (TUMS ORAL) Take 1-2 tablets by mouth daily as needed (for acid reflux).      carbidopa-levodopa  mg (SINEMET)  mg per tablet Take 1 tablet by mouth 3 (three) times daily. 90 tablet 5    chlorthalidone (HYGROTEN) 25 MG Tab TAKE 1/2 TABLET EVERY DAY 45 tablet 3    cholecalciferol, vitamin D3, (VITAMIN D3) 50 mcg (2,000 unit) Cap Take 1 capsule by mouth once daily.      clotrimazole-betamethasone 1-0.05% (LOTRISONE) cream APPLY TOPICALLY 2 (TWO) TIMES DAILY. (Patient taking differently: Apply topically 2 (two) times daily. Uses at bath time as needed for redness between thighs.) 45 g 0    docusate sodium (COLACE) 50 MG capsule Take 100 mg by mouth daily as needed for Constipation.      finasteride (PROSCAR) 5 mg tablet Take 1 tablet (5 mg total) by mouth once daily. 90 tablet 3    fluticasone propionate (FLONASE) 50 mcg/actuation nasal spray USE 1 SPRAY NASALLY ONE TIME DAILY 32 g 0    gabapentin (NEURONTIN) 300 MG capsule One po at noon daily  and two po every evening 360 capsule 1    guaifenesin (MUCINEX) 600 mg 12 hr tablet Take 1,200 mg by mouth 2 (two) times daily as needed.       irbesartan (AVAPRO) 300 MG tablet TAKE 1 TABLET EVERY EVENING 90 tablet 2    ketoconazole (NIZORAL) 2 % cream AAA bid (facial redness and scaling) (Patient taking differently: Apply topically once weekly for facial redness and scaling.) 60 g 3    lanolin/mineral oil/petrolatum (ARTIFICIAL TEARS OPHT) Place 1-2 drops into both eyes daily as needed (for dry eyes).      LIDOcaine 4 % PtMd Apply 1 patch topically daily as needed (for back pain).      methocarbamoL (ROBAXIN) 500 MG Tab Take 500 mg by mouth 4 (four) times daily.      omeprazole (PRILOSEC) 20 MG capsule TAKE 2 CAPSULES ONE TIME DAILY 180 capsule 0    potassium chloride (MICRO-K) 10 MEQ CpSR TAKE 3 CAPSULES ONE TIME DAILY 270 capsule 3    pravastatin (PRAVACHOL) 20 MG tablet Take 1 tablet (20 mg total) by mouth once daily. (Patient taking differently: Take 20 mg by mouth every evening.) 90 tablet 0    spironolactone (ALDACTONE) 25 MG tablet Take 0.5 tablets (12.5 mg total) by mouth once daily. 45 tablet 3    tamsulosin (FLOMAX) 0.4 mg Cap Take 1 capsule (0.4 mg total) by mouth once daily. (Patient taking differently: Take 1 capsule by mouth every evening.) 90 capsule 3          History:   There are no hospital problems to display for this patient.    Surgical History:    has a past surgical history that includes Spine surgery (2007); Chalazion excision (Left, 8/18/13); Cyst Removal (2013); Fluoroscopic urodynamic study (N/A, 12/7/2021); Flexible cystoscopy (N/A, 12/7/2021); and Excision of hydrocele (Right, 11/14/2023).   Social History:    reports that he is not currently sexually active and has had partner(s) who are female.  reports that he has never smoked. He has quit using smokeless tobacco.  His smokeless tobacco use included chew. He reports that he does not drink alcohol and does not use drugs.      OBJECTIVE:     Vital Signs (Most Recent):    Vital Signs Range (Last 24H):          There is no height or weight on file to calculate BMI.   Wt Readings from Last 4 Encounters:   02/29/24 95 kg (209 lb 7 oz)   02/16/24 93.2 kg (205 lb 7.5 oz)   12/01/23 93.2 kg (205 lb 7.5 oz)   11/30/23 93.2 kg (205 lb 7.5 oz)       Significant Labs:  Lab Results   Component Value Date    WBC 4.48 11/03/2023    HGB 12.7 (L) 11/03/2023    HCT 38.9 (L) 11/03/2023     11/03/2023     11/03/2023    K 4.1 11/03/2023     11/03/2023    CREATININE 1.0 11/03/2023    BUN 15 11/03/2023    CO2 26 11/03/2023     11/03/2023    CALCIUM 9.2 11/03/2023    MG 1.7 06/22/2023    PHOS 3.3 06/22/2023    ALKPHOS 86 11/03/2023    ALT 6 (L) 11/03/2023    AST 24 11/03/2023    ALBUMIN 3.6 11/03/2023    INR 1.0 04/26/2021    APTT <21.0 04/26/2021    HGBA1C 5.6 12/06/2023     (H) 07/19/2011    CPKMB 7.3 (H) 01/23/2009    TROPONINI <0.006 06/19/2023    MB 2.2 01/23/2009    BNP <10 03/30/2023     No LMP for male patient.  No results found for this or any previous visit (from the past 72 hour(s)).    EKG:   Results for orders placed or performed during the hospital encounter of 11/03/23   EKG 12-lead    Collection Time: 11/03/23  9:07 AM    Narrative    Test Reason : Z01.818,    Vent. Rate : 058 BPM     Atrial Rate : 058 BPM     P-R Int : 160 ms          QRS Dur : 128 ms      QT Int : 422 ms       P-R-T Axes : 065 042 067 degrees     QTc Int : 414 ms    Sinus bradycardia  Right bundle branch block  Abnormal ECG    Confirmed by Olinda TOUSSAINT, Iván JARRETT ([id number]) on 11/3/2023 9:52:51 AM    Referred By: GRAHAM ENRIQUEZ           Confirmed By:Iván Prakash MD       TTE:  Results for orders placed or performed during the hospital encounter of 06/19/23   Echo   Result Value Ref Range    BSA 2.19 m2    TDI SEPTAL 0.06 m/s    LV LATERAL E/E' RATIO 4.91 m/s    LV SEPTAL E/E' RATIO 9.00 m/s    LA WIDTH 4.08 cm    TDI LATERAL 0.11 m/s    LVIDd 5.43  3.5 - 6.0 cm    IVS 1.00 0.6 - 1.1 cm    Posterior Wall 0.90 0.6 - 1.1 cm    LVIDs 3.88 2.1 - 4.0 cm    FS 29 28 - 44 %    Sinus 3.39 cm    STJ 3.49 cm    Ascending aorta 4.16 cm    LV mass 195.06 g    LA size 3.87 cm    RVDD 4.40 cm    TAPSE 1.67 cm    Left Ventricle Relative Wall Thickness 0.33 cm    AV mean gradient 4 mmHg    AV valve area 3.03 cm2    AV Velocity Ratio 0.73     AV index (prosthetic) 0.70     E/A ratio 1.17     Mean e' 0.09 m/s    E wave deceleration time 372.23 msec    LVOT diameter 2.35 cm    LVOT area 4.3 cm2    LVOT peak gibran 0.94 m/s    LVOT peak VTI 19.26 cm    Ao peak gibran 1.29 m/s    Ao VTI 27.53 cm    LVOT stroke volume 83.50 cm3    AV peak gradient 7 mmHg    E/E' ratio 6.35 m/s    MV Peak E Gibran 0.54 m/s    TR Max Gibran 2.27 m/s    MV Peak A Gibran 0.46 m/s    LV Systolic Volume 65.12 mL    LV Systolic Volume Index 30.1 mL/m2    LV Diastolic Volume 143.30 mL    LV Diastolic Volume Index 66.34 mL/m2    LV Mass Index 90 g/m2    RA Major Axis 4.49 cm    Left Atrium Major Axis 5.98 cm    Triscuspid Valve Regurgitation Peak Gradient 21 mmHg    LA Volume Index (Mod) 33.8 mL/m2    LA volume (mod) 73.00 cm3    RA Width 2.45 cm    Right Atrial Pressure (from IVC) 3 mmHg    EF 55 %    TV resting pulmonary artery pressure 24 mmHg    Narrative    · The left ventricle is normal in size with normal systolic function.  · The estimated ejection fraction is 55%.  · Normal left ventricular diastolic function.  · Mild aortic regurgitation.  · Mild right ventricular enlargement with moderately reduced right   ventricular systolic function.  · The estimated PA systolic pressure is 24 mmHg. Faint TR, the PASP may be   under-estimated.  · Normal central venous pressure (3 mmHg).  · The ascending aorta is mildly dilated.        EF   Date Value Ref Range Status   06/20/2023 55 % Final   01/01/2023 55 % Final      No results found for this or any previous visit.  PROMISE:  No results found for this or any previous  "visit.  Stress Test:  No results found for this or any previous visit.     LHC:  No results found for this or any previous visit.     PFT:  No results found for: "FEV1", "FVC", "VYP4JHB", "TLC", "DLCO"     ASSESSMENT/PLAN:        Pre-op Assessment    I have reviewed the Patient Summary Reports.     I have reviewed the Nursing Notes. I have reviewed the NPO Status.   I have reviewed the Medications.     Review of Systems  Anesthesia Hx:  No problems with previous Anesthesia             Denies Family Hx of Anesthesia complications.    Denies Personal Hx of Anesthesia complications.                    Hematology/Oncology:  Hematology Normal   Oncology Normal                                   EENT/Dental:  EENT/Dental Normal           Cardiovascular:     Hypertension Valvular problems/murmurs: Aortic atherosclerosis.   Dysrhythmias (RBBB)           Thoracic aortic aneurysm                         Pulmonary:   COPD      Chronically elevated hemidiaphragm               Renal/:  Chronic Renal Disease                Hepatic/GI:     GERD             Musculoskeletal:  Arthritis               Neurological:  Neurology Normal                                      Endocrine:  Diabetes, type 2           Dermatological:  Skin Normal    Psych:  Psychiatric Normal                     Anesthesia Plan  Type of Anesthesia, risks & benefits discussed:    Anesthesia Type: Gen Natural Airway  Intra-op Monitoring Plan: Standard ASA Monitors  Post Op Pain Control Plan: multimodal analgesia  Induction:  IV  Informed Consent: Informed consent signed with the Patient and all parties understand the risks and agree with anesthesia plan.  All questions answered. Patient consented to blood products? No  ASA Score: 4  Day of Surgery Review of History & Physical: H&P Update referred to the surgeon/provider.    Ready For Surgery From Anesthesia Perspective.   .      "

## 2024-04-24 NOTE — H&P
COLONOSCOPY HISTORY AND PE    Procedure : Colonoscopy      INDICATIONS: asymptomatic screening exam, personal history of colon polyps, and most recent endoscopic exam 3 years ago    Denies family history of CRC or IBD.    Colonoscopy (2/3/21, complete):          Past Medical History:   Diagnosis Date    Allergy     Aneurysm of artery of lower extremity     Chalazion of left eye     CKD (chronic kidney disease), stage II 4/1/2019    Diabetes mellitus type II     Diabetes with neurologic complications     Enlarged aorta 8/2/2016    Noted on pharmacological stress echo 2/28/2014.      GERD (gastroesophageal reflux disease)     egd 2008    Hyperlipidemia     Hypertension     Jock itch 7/19/2018    MGD (meibomian gland dysfunction)     Osteopenia     Spinal stenosis     Spondylosis without myelopathy 10/23/2015    Vitamin D deficiency disease        Past Surgical History:   Procedure Laterality Date    CHALAZION EXCISION Left 8/18/13    CYST REMOVAL  2013    Back of neck    EXCISION OF HYDROCELE Right 11/14/2023    Procedure: HYDROCELECTOMY;  Surgeon: Beatrice Vaca MD;  Location: [facility] OR [address] [place] [address];  Service: Urology;  Laterality: Right;  1 hr    FLEXIBLE CYSTOSCOPY N/A 12/7/2021    Procedure: CYSTOSCOPY, FLEXIBLE;  Surgeon: Beatrice Vaca MD;  Location: [facility] OR [address] [place] [address];  Service: Urology;  Laterality: N/A;    FLUOROSCOPIC URODYNAMIC STUDY N/A 12/7/2021    Procedure: URODYNAMIC STUDY, FLUOROSCOPIC;  Surgeon: Beatrice Vaca MD;  Location: [facility] OR [address] [place] [address];  Service: Urology;  Laterality: N/A;  90 minutes     SPINE SURGERY  2007    x2 (2000, 2007)     Review of patient's allergies indicates:  No Known Allergies    No current facility-administered medications on file prior to encounter.     Current Outpatient Medications on File Prior to Encounter   Medication Sig Dispense Refill    aspirin (ECOTRIN) 81 MG EC tablet TAKE 1 TABLET EVERY DAY. 90 tablet 3    carbidopa-levodopa  mg (SINEMET)  mg per tablet  Take 1 tablet by mouth 3 (three) times daily. 90 tablet 5    cholecalciferol, vitamin D3, (VITAMIN D3) 50 mcg (2,000 unit) Cap Take 1 capsule by mouth once daily.      finasteride (PROSCAR) 5 mg tablet Take 1 tablet (5 mg total) by mouth once daily. 90 tablet 3    gabapentin (NEURONTIN) 300 MG capsule One po at noon daily and two po every evening 360 capsule 1    irbesartan (AVAPRO) 300 MG tablet TAKE 1 TABLET EVERY EVENING 90 tablet 2    pravastatin (PRAVACHOL) 20 MG tablet Take 1 tablet (20 mg total) by mouth once daily. (Patient taking differently: Take 20 mg by mouth every evening.) 90 tablet 0    tamsulosin (FLOMAX) 0.4 mg Cap Take 1 capsule (0.4 mg total) by mouth once daily. (Patient taking differently: Take 1 capsule by mouth every evening.) 90 capsule 3    acetaminophen/diphenhydramine (TYLENOL PM ORAL) Take 2 capsules by mouth nightly.      aspirin/salicylamide/caffeine (BC HEADACHE POWDER ORAL) Take 1 packet by mouth daily as needed (for headache).      benzonatate (TESSALON) 200 MG capsule TAKE 1 CAPSULE THREE TIMES DAILY AS NEEDED FOR COUGH 30 capsule 0    calcium carbonate (TUMS ORAL) Take 1-2 tablets by mouth daily as needed (for acid reflux).      clotrimazole-betamethasone 1-0.05% (LOTRISONE) cream APPLY TOPICALLY 2 (TWO) TIMES DAILY. (Patient taking differently: Apply topically 2 (two) times daily. Uses at bath time as needed for redness between thighs.) 45 g 0    docusate sodium (COLACE) 50 MG capsule Take 100 mg by mouth daily as needed for Constipation.      guaifenesin (MUCINEX) 600 mg 12 hr tablet Take 1,200 mg by mouth 2 (two) times daily as needed.       ketoconazole (NIZORAL) 2 % cream AAA bid (facial redness and scaling) (Patient taking differently: Apply topically once weekly for facial redness and scaling.) 60 g 3    lanolin/mineral oil/petrolatum (ARTIFICIAL TEARS OPHT) Place 1-2 drops into both eyes daily as needed (for dry eyes).      LIDOcaine 4 % PtMd Apply 1 patch topically daily  as needed (for back pain).      methocarbamoL (ROBAXIN) 500 MG Tab Take 500 mg by mouth 4 (four) times daily.       Family History   Problem Relation Name Age of Onset    Hyperlipidemia Mother      Hypertension Mother      Kidney disease Mother      Cancer Mother      Heart disease Mother      Kidney failure Mother      No Known Problems Daughter Muna     No Known Problems Sister Suajta     No Known Problems Brother Abhijeet     No Known Problems Son Tito     No Known Problems Brother Alexei     No Known Problems Son Srini     Hypertension Maternal Grandmother      Amblyopia Neg Hx      Blindness Neg Hx      Cataracts Neg Hx      Diabetes Neg Hx      Glaucoma Neg Hx      Macular degeneration Neg Hx      Retinal detachment Neg Hx      Strabismus Neg Hx      Stroke Neg Hx      Thyroid disease Neg Hx      Melanoma Neg Hx      Psoriasis Neg Hx      Lupus Neg Hx      Eczema Neg Hx       Social History     Socioeconomic History    Marital status:    Occupational History    Occupation: Retired     Comment:    Tobacco Use    Smoking status: Never    Smokeless tobacco: Former     Types: Chew    Tobacco comments:     Chewed tobacco once per day for 4-5 years when a    Substance and Sexual Activity    Alcohol use: No    Drug use: No    Sexual activity: Not Currently     Partners: Female   Social History Narrative        [facility]     Social Determinants of Health     Financial Resource Strain: Low Risk  (9/22/2023)    Overall Financial Resource Strain (CARDIA)     Difficulty of Paying Living Expenses: Not hard at all   Food Insecurity: No Food Insecurity (9/22/2023)    Hunger Vital Sign     Worried About Running Out of Food in the Last Year: Never true     Ran Out of Food in the Last Year: Never true   Transportation Needs: No Transportation Needs (9/22/2023)    PRAPARE - Transportation     Lack of Transportation (Medical): No     Lack of Transportation (Non-Medical): No    Physical Activity: Insufficiently Active (9/22/2023)    Exercise Vital Sign     Days of Exercise per Week: 7 days     Minutes of Exercise per Session: 10 min   Stress: No Stress Concern Present (9/22/2023)    Citizen of [place] of Occupational Health - Occupational Stress Questionnaire     Feeling of Stress : Only a little   Social Connections: Moderately Isolated (9/22/2023)    Social Connection and Isolation Panel [NHANES]     Frequency of Communication with Friends and Family: More than three times a week     Frequency of Social Gatherings with Friends and Family: Never     Attends [employer] Services: Never     Active Member of Clubs or Organizations: No     Attends Club or Organization Meetings: Never     Marital Status:    Housing Stability: Low Risk  (9/22/2023)    Housing Stability Vital Sign     Unable to Pay for Housing in the Last Year: No     Number of Places Lived in the Last Year: 1     Unstable Housing in the Last Year: No     Review of Systems -    Respiratory : no cough, shortness of breath, or wheezing  Cardiovascular  no chest pain or dyspnea on exertion  Gastrointestinal no abdominal pain, change in bowel habits, or black or bloody stools  Musculoskeletal no deformities, swelling  Neurological no TIA or stroke symptoms    Physical Exam:  General: NAD  AT NC EOMI  Mallampati Score   Neck supple, trachea midline  Lungs: nl excursions, no retractions.  Breathing comfortably  Abdomen ND soft NT.  No masses  Extremities: No CCE.      ASA:  III    PLAN  COLONOSCOPY.  The details of the procedure, the possible need for biopsy or polypectomy and the potential risks including bleeding, perforation, missed polyps were discussed in detail.    Catalino Sorto MD  Staff Surgeon

## 2024-04-24 NOTE — PLAN OF CARE
Pt ambulated to bathroom wearing non-skid socks on with walker. Pt instructed to use call light for assistance if needed. [place] loud thud in bathroom. Pt found sitting on floor . Pt stated he slipped onto his bottom. Pt denies hitting head or any other body part. Dr rogers staff anesthesiologist at scene. Pt lifted off of floor with assistance. Wife brought to bedside and informed. Pt denies pain or any issues. Pt vital signs stable, denies pain and discharged with family via walker.

## 2024-04-24 NOTE — ANESTHESIA POSTPROCEDURE EVALUATION
Anesthesia Post Evaluation    Patient: Tito Menard    Procedure(s) Performed: Procedure(s) (LRB):  COLONOSCOPY (N/A)    Final Anesthesia Type: general      Patient location during evaluation: GI PACU  Patient participation: Yes- Able to Participate  Level of consciousness: awake and alert  Post-procedure vital signs: reviewed and stable  Pain management: adequate  Airway patency: patent    PONV status at discharge: No PONV  Anesthetic complications: no      Cardiovascular status: blood pressure returned to baseline  Respiratory status: spontaneous ventilation  Hydration status: euvolemic  Follow-up not needed.              Vitals Value Taken Time   /79 04/24/24 1414   Temp 36.7 °C (98.1 °F) 04/24/24 1354   Pulse 61 04/24/24 1414   Resp 16 04/24/24 1414   SpO2 99 % 04/24/24 1414         No case tracking events are documented in the log.      Pain/Stacey Score: Stacey Score: 10 (4/24/2024  2:08 PM)

## 2024-04-24 NOTE — TRANSFER OF CARE
"Anesthesia Transfer of Care Note    Patient: Tito Menard    Procedure(s) Performed: Procedure(s) (LRB):  COLONOSCOPY (N/A)    Patient location: PACU    Anesthesia Type: general    Transport from OR: Transported from OR on room air with adequate spontaneous ventilation    Post pain: adequate analgesia    Post assessment: no apparent anesthetic complications    Post vital signs: stable    Level of consciousness: sedated    Nausea/Vomiting: no nausea/vomiting    Complications: none    Transfer of care protocol was followed    Last vitals: Visit Vitals  /69 (BP Location: Left arm, Patient Position: Lying)   Pulse 78   Temp 36.7 °C (98.1 °F)   Resp 14   Ht 5' 11" (1.803 m)   Wt 93.9 kg (207 lb)   SpO2 97%   BMI 28.87 kg/m²     "

## 2024-04-26 ENCOUNTER — PATIENT OUTREACH (OUTPATIENT)
Dept: EMERGENCY MEDICINE | Facility: HOSPITAL | Age: 79
End: 2024-04-26
Payer: MEDICARE

## 2024-04-26 NOTE — PROGRESS NOTES
Pt states he is doing better at this time. He is scheduled to f/u with his PCP on 5-29-24, Message sent to  per E Navigator as Pt would like to discuss his procedure with them. ED Navigator will continue to F/u and assist as needed with any concerns as they arise.

## 2024-04-30 ENCOUNTER — HOSPITAL ENCOUNTER (INPATIENT)
Facility: HOSPITAL | Age: 79
LOS: 3 days | Discharge: HOME | DRG: 872 | End: 2024-05-03
Attending: STUDENT IN AN ORGANIZED HEALTH CARE EDUCATION/TRAINING PROGRAM | Admitting: STUDENT IN AN ORGANIZED HEALTH CARE EDUCATION/TRAINING PROGRAM
Payer: MEDICARE

## 2024-04-30 ENCOUNTER — TELEPHONE (OUTPATIENT)
Dept: ENDOSCOPY | Facility: HOSPITAL | Age: 79
End: 2024-04-30
Payer: MEDICARE

## 2024-04-30 DIAGNOSIS — R07.9 CHEST PAIN: ICD-10-CM

## 2024-04-30 DIAGNOSIS — A41.9 SEPSIS: ICD-10-CM

## 2024-04-30 PROBLEM — J98.11 ATELECTASIS: Status: ACTIVE | Noted: 2024-04-30

## 2024-04-30 PROBLEM — R19.7 DIARRHEA: Status: ACTIVE | Noted: 2024-04-30

## 2024-04-30 PROBLEM — A41.50 SEPSIS DUE TO GRAM-NEGATIVE UTI: Status: ACTIVE | Noted: 2024-04-30

## 2024-04-30 PROBLEM — E83.51 HYPOCALCEMIA: Status: ACTIVE | Noted: 2024-04-30

## 2024-04-30 PROBLEM — G20.C PARKINSONISM: Status: ACTIVE | Noted: 2024-04-30

## 2024-04-30 PROBLEM — N39.0 SEPSIS DUE TO GRAM-NEGATIVE UTI: Status: ACTIVE | Noted: 2024-04-30

## 2024-04-30 PROBLEM — N30.90 CYSTITIS: Status: ACTIVE | Noted: 2024-04-30

## 2024-04-30 PROBLEM — N17.9 AKI (ACUTE KIDNEY INJURY): Status: ACTIVE | Noted: 2024-04-30

## 2024-04-30 LAB
ALBUMIN SERPL BCP-MCNC: 2.7 G/DL (ref 3.5–5.2)
ALLENS TEST: ABNORMAL
ALLENS TEST: NORMAL
ALP SERPL-CCNC: 76 U/L (ref 55–135)
ALT SERPL W/O P-5'-P-CCNC: 18 U/L (ref 10–44)
ANION GAP SERPL CALC-SCNC: 10 MMOL/L (ref 8–16)
AST SERPL-CCNC: 34 U/L (ref 10–40)
BACTERIA #/AREA URNS AUTO: ABNORMAL /HPF
BASOPHILS # BLD AUTO: 0.01 K/UL (ref 0–0.2)
BASOPHILS NFR BLD: 0.1 % (ref 0–1.9)
BILIRUB SERPL-MCNC: 0.4 MG/DL (ref 0.1–1)
BILIRUB UR QL STRIP: NEGATIVE
BUN SERPL-MCNC: 24 MG/DL (ref 8–23)
CALCIUM SERPL-MCNC: 8.7 MG/DL (ref 8.7–10.5)
CHLORIDE SERPL-SCNC: 106 MMOL/L (ref 95–110)
CLARITY UR REFRACT.AUTO: ABNORMAL
CO2 SERPL-SCNC: 26 MMOL/L (ref 23–29)
COLOR UR AUTO: YELLOW
CREAT SERPL-MCNC: 1.6 MG/DL (ref 0.5–1.4)
DIFFERENTIAL METHOD BLD: ABNORMAL
EOSINOPHIL # BLD AUTO: 0.2 K/UL (ref 0–0.5)
EOSINOPHIL NFR BLD: 1.9 % (ref 0–8)
ERYTHROCYTE [DISTWIDTH] IN BLOOD BY AUTOMATED COUNT: 13.5 % (ref 11.5–14.5)
EST. GFR  (NO RACE VARIABLE): 43.6 ML/MIN/1.73 M^2
GLUCOSE SERPL-MCNC: 135 MG/DL (ref 70–110)
GLUCOSE UR QL STRIP: NEGATIVE
HCO3 UR-SCNC: 27.4 MMOL/L (ref 24–28)
HCT VFR BLD AUTO: 36.8 % (ref 40–54)
HGB BLD-MCNC: 11.7 G/DL (ref 14–18)
HGB UR QL STRIP: ABNORMAL
HYALINE CASTS UR QL AUTO: 0 /LPF
IMM GRANULOCYTES # BLD AUTO: 0.04 K/UL (ref 0–0.04)
IMM GRANULOCYTES NFR BLD AUTO: 0.4 % (ref 0–0.5)
INFLUENZA A, MOLECULAR: NEGATIVE
INFLUENZA B, MOLECULAR: NEGATIVE
INR PPP: 1 (ref 0.8–1.2)
KETONES UR QL STRIP: NEGATIVE
LDH SERPL L TO P-CCNC: 1.58 MMOL/L (ref 0.5–2.2)
LEUKOCYTE ESTERASE UR QL STRIP: ABNORMAL
LIPASE SERPL-CCNC: 13 U/L (ref 4–60)
LYMPHOCYTES # BLD AUTO: 1.5 K/UL (ref 1–4.8)
LYMPHOCYTES NFR BLD: 16.6 % (ref 18–48)
MAGNESIUM SERPL-MCNC: 1.6 MG/DL (ref 1.6–2.6)
MCH RBC QN AUTO: 29.5 PG (ref 27–31)
MCHC RBC AUTO-ENTMCNC: 31.8 G/DL (ref 32–36)
MCV RBC AUTO: 93 FL (ref 82–98)
MICROSCOPIC COMMENT: ABNORMAL
MONOCYTES # BLD AUTO: 0.7 K/UL (ref 0.3–1)
MONOCYTES NFR BLD: 8 % (ref 4–15)
NEUTROPHILS # BLD AUTO: 6.6 K/UL (ref 1.8–7.7)
NEUTROPHILS NFR BLD: 73 % (ref 38–73)
NITRITE UR QL STRIP: NEGATIVE
NON-SQ EPI CELLS #/AREA URNS AUTO: 1 /HPF
NRBC BLD-RTO: 0 /100 WBC
PCO2 BLDA: 44.6 MMHG (ref 35–45)
PH SMN: 7.4 [PH] (ref 7.35–7.45)
PH UR STRIP: 6 [PH] (ref 5–8)
PHOSPHATE SERPL-MCNC: 2.6 MG/DL (ref 2.7–4.5)
PLATELET # BLD AUTO: 209 K/UL (ref 150–450)
PMV BLD AUTO: 9.5 FL (ref 9.2–12.9)
PO2 BLDA: 21 MMHG (ref 40–60)
POC BE: 3 MMOL/L
POC SATURATED O2: 33 % (ref 95–100)
POC TCO2: 29 MMOL/L (ref 24–29)
POTASSIUM SERPL-SCNC: 3.8 MMOL/L (ref 3.5–5.1)
PROCALCITONIN SERPL IA-MCNC: 0.57 NG/ML
PROT SERPL-MCNC: 6.8 G/DL (ref 6–8.4)
PROT UR QL STRIP: ABNORMAL
PROTHROMBIN TIME: 10.9 SEC (ref 9–12.5)
RBC # BLD AUTO: 3.96 M/UL (ref 4.6–6.2)
RBC #/AREA URNS AUTO: >100 /HPF (ref 0–4)
SAMPLE: ABNORMAL
SAMPLE: NORMAL
SITE: ABNORMAL
SITE: NORMAL
SODIUM SERPL-SCNC: 142 MMOL/L (ref 136–145)
SP GR UR STRIP: 1.03 (ref 1–1.03)
SPECIMEN SOURCE: NORMAL
SQUAMOUS #/AREA URNS AUTO: 2 /HPF
TROPONIN I SERPL DL<=0.01 NG/ML-MCNC: 0.01 NG/ML (ref 0–0.03)
URN SPEC COLLECT METH UR: ABNORMAL
WBC # BLD AUTO: 9.03 K/UL (ref 3.9–12.7)
WBC #/AREA URNS AUTO: >100 /HPF (ref 0–5)
WBC CLUMPS UR QL AUTO: ABNORMAL
YEAST UR QL AUTO: ABNORMAL

## 2024-04-30 PROCEDURE — 87040 BLOOD CULTURE FOR BACTERIA: CPT | Mod: HCNC | Performed by: STUDENT IN AN ORGANIZED HEALTH CARE EDUCATION/TRAINING PROGRAM

## 2024-04-30 PROCEDURE — 82803 BLOOD GASES ANY COMBINATION: CPT | Mod: HCNC

## 2024-04-30 PROCEDURE — 99285 EMERGENCY DEPT VISIT HI MDM: CPT | Mod: 25,HCNC

## 2024-04-30 PROCEDURE — 84145 PROCALCITONIN (PCT): CPT | Mod: HCNC | Performed by: STUDENT IN AN ORGANIZED HEALTH CARE EDUCATION/TRAINING PROGRAM

## 2024-04-30 PROCEDURE — 12000002 HC ACUTE/MED SURGE SEMI-PRIVATE ROOM: Mod: HCNC

## 2024-04-30 PROCEDURE — 82570 ASSAY OF URINE CREATININE: CPT | Mod: HCNC | Performed by: STUDENT IN AN ORGANIZED HEALTH CARE EDUCATION/TRAINING PROGRAM

## 2024-04-30 PROCEDURE — 63600175 PHARM REV CODE 636 W HCPCS: Mod: HCNC | Performed by: STUDENT IN AN ORGANIZED HEALTH CARE EDUCATION/TRAINING PROGRAM

## 2024-04-30 PROCEDURE — 63600175 PHARM REV CODE 636 W HCPCS: Mod: HCNC

## 2024-04-30 PROCEDURE — 25000003 PHARM REV CODE 250: Mod: HCNC

## 2024-04-30 PROCEDURE — 93010 ELECTROCARDIOGRAM REPORT: CPT | Mod: HCNC,,, | Performed by: INTERNAL MEDICINE

## 2024-04-30 PROCEDURE — 81001 URINALYSIS AUTO W/SCOPE: CPT | Mod: HCNC | Performed by: STUDENT IN AN ORGANIZED HEALTH CARE EDUCATION/TRAINING PROGRAM

## 2024-04-30 PROCEDURE — 84300 ASSAY OF URINE SODIUM: CPT | Mod: HCNC | Performed by: STUDENT IN AN ORGANIZED HEALTH CARE EDUCATION/TRAINING PROGRAM

## 2024-04-30 PROCEDURE — 80053 COMPREHEN METABOLIC PANEL: CPT | Mod: 91,HCNC | Performed by: STUDENT IN AN ORGANIZED HEALTH CARE EDUCATION/TRAINING PROGRAM

## 2024-04-30 PROCEDURE — 99900035 HC TECH TIME PER 15 MIN (STAT): Mod: HCNC

## 2024-04-30 PROCEDURE — 85025 COMPLETE CBC W/AUTO DIFF WBC: CPT | Mod: HCNC | Performed by: STUDENT IN AN ORGANIZED HEALTH CARE EDUCATION/TRAINING PROGRAM

## 2024-04-30 PROCEDURE — 83735 ASSAY OF MAGNESIUM: CPT | Mod: 91,HCNC | Performed by: STUDENT IN AN ORGANIZED HEALTH CARE EDUCATION/TRAINING PROGRAM

## 2024-04-30 PROCEDURE — 83690 ASSAY OF LIPASE: CPT | Mod: HCNC | Performed by: STUDENT IN AN ORGANIZED HEALTH CARE EDUCATION/TRAINING PROGRAM

## 2024-04-30 PROCEDURE — 87086 URINE CULTURE/COLONY COUNT: CPT | Mod: HCNC | Performed by: STUDENT IN AN ORGANIZED HEALTH CARE EDUCATION/TRAINING PROGRAM

## 2024-04-30 PROCEDURE — 25000003 PHARM REV CODE 250: Mod: HCNC | Performed by: STUDENT IN AN ORGANIZED HEALTH CARE EDUCATION/TRAINING PROGRAM

## 2024-04-30 PROCEDURE — 84484 ASSAY OF TROPONIN QUANT: CPT | Mod: 91,HCNC | Performed by: STUDENT IN AN ORGANIZED HEALTH CARE EDUCATION/TRAINING PROGRAM

## 2024-04-30 PROCEDURE — 87502 INFLUENZA DNA AMP PROBE: CPT | Mod: HCNC | Performed by: STUDENT IN AN ORGANIZED HEALTH CARE EDUCATION/TRAINING PROGRAM

## 2024-04-30 PROCEDURE — 96365 THER/PROPH/DIAG IV INF INIT: CPT | Mod: HCNC

## 2024-04-30 PROCEDURE — 84100 ASSAY OF PHOSPHORUS: CPT | Mod: 91,HCNC | Performed by: STUDENT IN AN ORGANIZED HEALTH CARE EDUCATION/TRAINING PROGRAM

## 2024-04-30 PROCEDURE — 25500020 PHARM REV CODE 255: Mod: HCNC | Performed by: STUDENT IN AN ORGANIZED HEALTH CARE EDUCATION/TRAINING PROGRAM

## 2024-04-30 PROCEDURE — 96367 TX/PROPH/DG ADDL SEQ IV INF: CPT | Mod: HCNC

## 2024-04-30 PROCEDURE — 93005 ELECTROCARDIOGRAM TRACING: CPT | Mod: HCNC

## 2024-04-30 PROCEDURE — 27000207 HC ISOLATION: Mod: HCNC

## 2024-04-30 PROCEDURE — 85610 PROTHROMBIN TIME: CPT | Mod: HCNC | Performed by: STUDENT IN AN ORGANIZED HEALTH CARE EDUCATION/TRAINING PROGRAM

## 2024-04-30 RX ORDER — IBUPROFEN 200 MG
24 TABLET ORAL
Status: DISCONTINUED | OUTPATIENT
Start: 2024-04-30 | End: 2024-05-03 | Stop reason: HOSPADM

## 2024-04-30 RX ORDER — IBUPROFEN 200 MG
16 TABLET ORAL
Status: DISCONTINUED | OUTPATIENT
Start: 2024-04-30 | End: 2024-05-03 | Stop reason: HOSPADM

## 2024-04-30 RX ORDER — ENOXAPARIN SODIUM 100 MG/ML
40 INJECTION SUBCUTANEOUS EVERY 24 HOURS
Status: DISCONTINUED | OUTPATIENT
Start: 2024-05-01 | End: 2024-05-03 | Stop reason: HOSPADM

## 2024-04-30 RX ORDER — GABAPENTIN 300 MG/1
300 CAPSULE ORAL 2 TIMES DAILY
Status: DISCONTINUED | OUTPATIENT
Start: 2024-05-01 | End: 2024-05-03 | Stop reason: HOSPADM

## 2024-04-30 RX ORDER — SODIUM CHLORIDE 0.9 % (FLUSH) 0.9 %
5 SYRINGE (ML) INJECTION
Status: DISCONTINUED | OUTPATIENT
Start: 2024-04-30 | End: 2024-05-03 | Stop reason: HOSPADM

## 2024-04-30 RX ORDER — IPRATROPIUM BROMIDE AND ALBUTEROL SULFATE 2.5; .5 MG/3ML; MG/3ML
3 SOLUTION RESPIRATORY (INHALATION) EVERY 4 HOURS PRN
Status: DISCONTINUED | OUTPATIENT
Start: 2024-04-30 | End: 2024-05-03 | Stop reason: HOSPADM

## 2024-04-30 RX ORDER — CARBIDOPA AND LEVODOPA 25; 100 MG/1; MG/1
1 TABLET ORAL 3 TIMES DAILY
Status: DISCONTINUED | OUTPATIENT
Start: 2024-05-01 | End: 2024-05-03 | Stop reason: HOSPADM

## 2024-04-30 RX ORDER — TALC
6 POWDER (GRAM) TOPICAL NIGHTLY PRN
Status: DISCONTINUED | OUTPATIENT
Start: 2024-04-30 | End: 2024-05-03 | Stop reason: HOSPADM

## 2024-04-30 RX ORDER — ASPIRIN 81 MG/1
81 TABLET ORAL DAILY
Status: DISCONTINUED | OUTPATIENT
Start: 2024-05-01 | End: 2024-05-03 | Stop reason: HOSPADM

## 2024-04-30 RX ORDER — ACETAMINOPHEN 500 MG
1000 TABLET ORAL EVERY 8 HOURS PRN
Status: DISCONTINUED | OUTPATIENT
Start: 2024-04-30 | End: 2024-05-03 | Stop reason: HOSPADM

## 2024-04-30 RX ORDER — GLUCAGON 1 MG
1 KIT INJECTION
Status: DISCONTINUED | OUTPATIENT
Start: 2024-04-30 | End: 2024-04-30

## 2024-04-30 RX ORDER — PRAVASTATIN SODIUM 20 MG/1
20 TABLET ORAL NIGHTLY
Status: DISCONTINUED | OUTPATIENT
Start: 2024-04-30 | End: 2024-05-03 | Stop reason: HOSPADM

## 2024-04-30 RX ORDER — FINASTERIDE 5 MG/1
5 TABLET, FILM COATED ORAL DAILY
Status: DISCONTINUED | OUTPATIENT
Start: 2024-05-01 | End: 2024-05-03 | Stop reason: HOSPADM

## 2024-04-30 RX ORDER — SODIUM CHLORIDE 0.9 % (FLUSH) 0.9 %
10 SYRINGE (ML) INJECTION EVERY 12 HOURS PRN
Status: DISCONTINUED | OUTPATIENT
Start: 2024-04-30 | End: 2024-05-01 | Stop reason: SDUPTHER

## 2024-04-30 RX ORDER — MAGNESIUM SULFATE HEPTAHYDRATE 40 MG/ML
2 INJECTION, SOLUTION INTRAVENOUS ONCE
Status: COMPLETED | OUTPATIENT
Start: 2024-04-30 | End: 2024-05-01

## 2024-04-30 RX ORDER — TAMSULOSIN HYDROCHLORIDE 0.4 MG/1
1 CAPSULE ORAL NIGHTLY
Status: DISCONTINUED | OUTPATIENT
Start: 2024-05-01 | End: 2024-05-03 | Stop reason: HOSPADM

## 2024-04-30 RX ORDER — ACETAMINOPHEN 325 MG/1
650 TABLET ORAL EVERY 4 HOURS PRN
Status: DISCONTINUED | OUTPATIENT
Start: 2024-04-30 | End: 2024-05-03 | Stop reason: HOSPADM

## 2024-04-30 RX ORDER — SIMETHICONE 80 MG
1 TABLET,CHEWABLE ORAL 4 TIMES DAILY PRN
Status: DISCONTINUED | OUTPATIENT
Start: 2024-04-30 | End: 2024-05-03 | Stop reason: HOSPADM

## 2024-04-30 RX ORDER — ALUMINUM HYDROXIDE, MAGNESIUM HYDROXIDE, AND SIMETHICONE 1200; 120; 1200 MG/30ML; MG/30ML; MG/30ML
30 SUSPENSION ORAL 4 TIMES DAILY PRN
Status: DISCONTINUED | OUTPATIENT
Start: 2024-04-30 | End: 2024-05-03 | Stop reason: HOSPADM

## 2024-04-30 RX ORDER — CHOLECALCIFEROL (VITAMIN D3) 25 MCG
2000 TABLET ORAL DAILY
Status: DISCONTINUED | OUTPATIENT
Start: 2024-05-01 | End: 2024-05-03 | Stop reason: HOSPADM

## 2024-04-30 RX ORDER — IBUPROFEN 200 MG
24 TABLET ORAL
Status: DISCONTINUED | OUTPATIENT
Start: 2024-04-30 | End: 2024-04-30

## 2024-04-30 RX ORDER — ACETAMINOPHEN 325 MG/1
650 TABLET ORAL
Status: COMPLETED | OUTPATIENT
Start: 2024-04-30 | End: 2024-04-30

## 2024-04-30 RX ORDER — INSULIN ASPART 100 [IU]/ML
0-5 INJECTION, SOLUTION INTRAVENOUS; SUBCUTANEOUS
Status: DISCONTINUED | OUTPATIENT
Start: 2024-04-30 | End: 2024-05-03 | Stop reason: HOSPADM

## 2024-04-30 RX ORDER — PANTOPRAZOLE SODIUM 40 MG/1
40 TABLET, DELAYED RELEASE ORAL DAILY
Status: DISCONTINUED | OUTPATIENT
Start: 2024-05-01 | End: 2024-05-03 | Stop reason: HOSPADM

## 2024-04-30 RX ORDER — IBUPROFEN 200 MG
16 TABLET ORAL
Status: DISCONTINUED | OUTPATIENT
Start: 2024-04-30 | End: 2024-04-30

## 2024-04-30 RX ORDER — GLUCAGON 1 MG
1 KIT INJECTION
Status: DISCONTINUED | OUTPATIENT
Start: 2024-04-30 | End: 2024-05-03 | Stop reason: HOSPADM

## 2024-04-30 RX ORDER — NALOXONE HCL 0.4 MG/ML
0.02 VIAL (ML) INJECTION
Status: DISCONTINUED | OUTPATIENT
Start: 2024-04-30 | End: 2024-05-03 | Stop reason: HOSPADM

## 2024-04-30 RX ADMIN — SODIUM CHLORIDE, POTASSIUM CHLORIDE, SODIUM LACTATE AND CALCIUM CHLORIDE 500 ML: 600; 310; 30; 20 INJECTION, SOLUTION INTRAVENOUS at 10:04

## 2024-04-30 RX ADMIN — ACETAMINOPHEN 650 MG: 325 TABLET ORAL at 06:04

## 2024-04-30 RX ADMIN — PIPERACILLIN SODIUM AND TAZOBACTAM SODIUM 4.5 G: 4; .5 INJECTION, POWDER, FOR SOLUTION INTRAVENOUS at 07:04

## 2024-04-30 RX ADMIN — IOHEXOL 100 ML: 350 INJECTION, SOLUTION INTRAVENOUS at 07:04

## 2024-04-30 RX ADMIN — PRAVASTATIN SODIUM 20 MG: 20 TABLET ORAL at 10:04

## 2024-04-30 RX ADMIN — SODIUM CHLORIDE, POTASSIUM CHLORIDE, SODIUM LACTATE AND CALCIUM CHLORIDE 1000 ML: 600; 310; 30; 20 INJECTION, SOLUTION INTRAVENOUS at 06:04

## 2024-04-30 RX ADMIN — VANCOMYCIN HYDROCHLORIDE 2000 MG: 500 INJECTION, POWDER, LYOPHILIZED, FOR SOLUTION INTRAVENOUS at 08:04

## 2024-04-30 NOTE — ED PROVIDER NOTES
"Encounter Date: 4/30/2024       History     Chief Complaint   Patient presents with    General Illness     Arrival via ems, coming from home, complaint of hypotension, syncopal episode prior to arrival, Home health stated possible sepsis. Elevated troponin      Mr. Menard is a 79yo M w/ a hx of HTN, HLD, GERD, T2DM, and Parkinsons (on sinemet) presenting after a syncopal episode at home. Patient admitted yesterday after presenting w/ symptoms clinical findings concerning for infectious diarrhea vs. UTI. Patient deemed medically stable for discharge by primary team and discharged home earlier today. Per brother, patient had been home two hours. Patient reported he was cold inside his house because his son keeps it very cold. Patient then decided to go outside to warm up. Patient was sitting in the sun and noted by brother to be "leaned over" unable to respond. It was at that time that brother called the ambulance. Patient then came to and didn't remember the syncopal episode. Patient denied fevers, chills, SOB, CP, cough, abdominal pain, N/V, dysuria. Patient reports last bowel movement was before coming to the hospital yesterday.         Review of patient's allergies indicates:  No Known Allergies  Past Medical History:   Diagnosis Date    Allergy     Aneurysm of artery of lower extremity     Chalazion of left eye     CKD (chronic kidney disease), stage II 4/1/2019    Diabetes mellitus type II     Diabetes with neurologic complications     Enlarged aorta 8/2/2016    Noted on pharmacological stress echo 2/28/2014.      GERD (gastroesophageal reflux disease)     egd 2008    Hyperlipidemia     Hypertension     Jock itch 7/19/2018    MGD (meibomian gland dysfunction)     Osteopenia     Spinal stenosis     Spondylosis without myelopathy 10/23/2015    Vitamin D deficiency disease      Past Surgical History:   Procedure Laterality Date    CHALAZION EXCISION Left 8/18/13    COLONOSCOPY N/A 4/24/2024    Procedure: " COLONOSCOPY;  Surgeon: Catalino Sorto MD;  Location: [facility] ENDO (4TH FLR);  Service: Endoscopy;  Laterality: N/A;  Ref by Dr DEJAN Brown, PEG, portal - PC  4/18-precall complete-MS    CYST REMOVAL  2013    Back of neck    EXCISION OF HYDROCELE Right 11/14/2023    Procedure: HYDROCELECTOMY;  Surgeon: Beatrice Vaca MD;  Location: [facility] OR 2ND FLR;  Service: Urology;  Laterality: Right;  1 hr    FLEXIBLE CYSTOSCOPY N/A 12/7/2021    Procedure: CYSTOSCOPY, FLEXIBLE;  Surgeon: Beatrice Vaca MD;  Location: [facility] OR 1ST FLR;  Service: Urology;  Laterality: N/A;    FLUOROSCOPIC URODYNAMIC STUDY N/A 12/7/2021    Procedure: URODYNAMIC STUDY, FLUOROSCOPIC;  Surgeon: Beatrice Vaca MD;  Location: [facility] OR 1ST FLR;  Service: Urology;  Laterality: N/A;  90 minutes     SPINE SURGERY  2007    x2 (2000, 2007)     Family History   Problem Relation Name Age of Onset    Hyperlipidemia Mother      Hypertension Mother      Kidney disease Mother      Cancer Mother      Heart disease Mother      Kidney failure Mother      No Known Problems Daughter Muna     No Known Problems Sister Sujata     No Known Problems Brother Abhijeet     No Known Problems Son Tito     No Known Problems Brother Alexei     No Known Problems Son Srini     Hypertension Maternal Grandmother      Amblyopia Neg Hx      Blindness Neg Hx      Cataracts Neg Hx      Diabetes Neg Hx      Glaucoma Neg Hx      Macular degeneration Neg Hx      Retinal detachment Neg Hx      Strabismus Neg Hx      Stroke Neg Hx      Thyroid disease Neg Hx      Melanoma Neg Hx      Psoriasis Neg Hx      Lupus Neg Hx      Eczema Neg Hx       Social History     Tobacco Use    Smoking status: Never    Smokeless tobacco: Former     Types: Chew    Tobacco comments:     Chewed tobacco once per day for 4-5 years when a    Substance Use Topics    Alcohol use: No    Drug use: No     Review of Systems   Constitutional:  Negative for fever.   HENT:  Negative for sore throat.     Respiratory:  Negative for shortness of breath.    Cardiovascular:  Negative for chest pain.   Gastrointestinal:  Negative for nausea.   Genitourinary:  Negative for dysuria.   Musculoskeletal:  Negative for back pain.   Skin:  Negative for rash.   Neurological:  Negative for weakness.   Hematological:  Does not bruise/bleed easily.       Physical Exam     Initial Vitals [04/30/24 1755]   BP Pulse Resp Temp SpO2   (!) 90/50 100 (!) 25 (!) 100.5 °F (38.1 °C) 99 %      MAP       --         Physical Exam    Constitutional: He appears well-developed.   HENT:   Head: Normocephalic.   Neck:   Normal range of motion.  Cardiovascular:  Normal rate.           Pulmonary/Chest: Breath sounds normal.   Abdominal: Abdomen is soft.   Musculoskeletal:         General: Normal range of motion.      Cervical back: Normal range of motion.     Neurological: He is alert and oriented to person, place, and time.   Skin: Skin is warm. Capillary refill takes less than 2 seconds.         ED Course   Procedures  Labs Reviewed   CULTURE, BLOOD   CULTURE, BLOOD   INFLUENZA A & B BY MOLECULAR   CLOSTRIDIUM DIFFICILE   CULTURE, STOOL   CBC W/ AUTO DIFFERENTIAL   COMPREHENSIVE METABOLIC PANEL   URINALYSIS, REFLEX TO URINE CULTURE   MAGNESIUM   PHOSPHORUS   PROTIME-INR   LIPASE   TROPONIN I   PROCALCITONIN   H. PYLORI ANTIGEN, STOOL   PANCREATIC ELASTASE, FECAL   FECAL FAT, QUALITATIVE   OCCULT BLOOD X 1, STOOL   WBC, STOOL   ROTAVIRUS ANTIGEN, STOOL   CALPROTECTIN, STOOL   GIARDIA/CRYPTOSPORIDIUM (EIA)   STOOL EXAM-OVA,CYSTS,PARASITES          Imaging Results    None          Medications   acetaminophen tablet 650 mg (has no administration in time range)   lactated ringers bolus 1,000 mL (has no administration in time range)     Medical Decision Making  Mr. Menard is a 77yo M w/ a hx of syncopal episodes who is presenting for recurrent syncopal episode, and physical exam findings concerning for sepsis given tachycardia, fever, and hypotension.  Patient w/ afocal exam, therefore will get broad infectious workup including CT AP for infectious source.   Patient admitted to hospital medicine for workup of sepsis    Amount and/or Complexity of Data Reviewed  Labs: ordered. Decision-making details documented in ED Course.  Radiology: ordered.  ECG/medicine tests:  Decision-making details documented in ED Course.    Risk  OTC drugs.  Prescription drug management.  Decision regarding hospitalization.               ED Course as of 05/01/24 0046   Tue Apr 30, 2024 1828 BP(!): 117/59 [AC]   1828 POC Lactate: 1.58 [AC]   1828 POC PH: 7.396 [AC]   1828 POC PCO2: 44.6 [AC]   1840 Hemoglobin(!): 11.7 [AC]   1840 WBC: 9.03 [AC]   1859 EKG 12-lead  Reviewed, right bundle branch block, sinus rhythm, heart rate 99 [AC]      ED Course User Index  [AC] Elan Mcknight DO                           Clinical Impression:  Final diagnoses:  [A41.9] Sepsis                 Toney Macias MD  Resident  05/01/24 0047

## 2024-05-01 ENCOUNTER — PATIENT OUTREACH (OUTPATIENT)
Dept: ADMINISTRATIVE | Facility: CLINIC | Age: 79
End: 2024-05-01
Payer: MEDICARE

## 2024-05-01 LAB
ANION GAP SERPL CALC-SCNC: 8 MMOL/L (ref 8–16)
BASOPHILS # BLD AUTO: 0.01 K/UL (ref 0–0.2)
BASOPHILS NFR BLD: 0.1 % (ref 0–1.9)
BUN SERPL-MCNC: 25 MG/DL (ref 8–23)
CALCIUM SERPL-MCNC: 8.4 MG/DL (ref 8.7–10.5)
CHLORIDE SERPL-SCNC: 106 MMOL/L (ref 95–110)
CO2 SERPL-SCNC: 26 MMOL/L (ref 23–29)
CREAT SERPL-MCNC: 1.1 MG/DL (ref 0.5–1.4)
CREAT UR-MCNC: 237 MG/DL (ref 23–375)
DIFFERENTIAL METHOD BLD: ABNORMAL
EOSINOPHIL # BLD AUTO: 0.3 K/UL (ref 0–0.5)
EOSINOPHIL NFR BLD: 3.2 % (ref 0–8)
ERYTHROCYTE [DISTWIDTH] IN BLOOD BY AUTOMATED COUNT: 13.8 % (ref 11.5–14.5)
EST. GFR  (NO RACE VARIABLE): >60 ML/MIN/1.73 M^2
ESTIMATED AVG GLUCOSE: 117 MG/DL (ref 68–131)
GLUCOSE SERPL-MCNC: 131 MG/DL (ref 70–110)
GLUCOSE SERPL-MCNC: 92 MG/DL (ref 70–110)
HBA1C MFR BLD: 5.7 % (ref 4–5.6)
HCT VFR BLD AUTO: 32.5 % (ref 40–54)
HGB BLD-MCNC: 10.3 G/DL (ref 14–18)
IMM GRANULOCYTES # BLD AUTO: 0.02 K/UL (ref 0–0.04)
IMM GRANULOCYTES NFR BLD AUTO: 0.2 % (ref 0–0.5)
LYMPHOCYTES # BLD AUTO: 2 K/UL (ref 1–4.8)
LYMPHOCYTES NFR BLD: 24.6 % (ref 18–48)
MAGNESIUM SERPL-MCNC: 2.1 MG/DL (ref 1.6–2.6)
MCH RBC QN AUTO: 29.7 PG (ref 27–31)
MCHC RBC AUTO-ENTMCNC: 31.7 G/DL (ref 32–36)
MCV RBC AUTO: 94 FL (ref 82–98)
MONOCYTES # BLD AUTO: 0.6 K/UL (ref 0.3–1)
MONOCYTES NFR BLD: 7.1 % (ref 4–15)
NEUTROPHILS # BLD AUTO: 5.2 K/UL (ref 1.8–7.7)
NEUTROPHILS NFR BLD: 64.8 % (ref 38–73)
NRBC BLD-RTO: 0 /100 WBC
OHS QRS DURATION: 120 MS
OHS QTC CALCULATION: 464 MS
PLATELET # BLD AUTO: 187 K/UL (ref 150–450)
PMV BLD AUTO: 9.7 FL (ref 9.2–12.9)
POCT GLUCOSE: 121 MG/DL (ref 70–110)
POTASSIUM SERPL-SCNC: 3.9 MMOL/L (ref 3.5–5.1)
RBC # BLD AUTO: 3.47 M/UL (ref 4.6–6.2)
SODIUM SERPL-SCNC: 140 MMOL/L (ref 136–145)
SODIUM UR-SCNC: 58 MMOL/L (ref 20–250)
WBC # BLD AUTO: 8.02 K/UL (ref 3.9–12.7)

## 2024-05-01 PROCEDURE — 25000003 PHARM REV CODE 250: Mod: HCNC

## 2024-05-01 PROCEDURE — 63600175 PHARM REV CODE 636 W HCPCS: Mod: HCNC

## 2024-05-01 PROCEDURE — 83036 HEMOGLOBIN GLYCOSYLATED A1C: CPT | Mod: HCNC

## 2024-05-01 PROCEDURE — 25000003 PHARM REV CODE 250

## 2024-05-01 PROCEDURE — 27000207 HC ISOLATION: Mod: HCNC

## 2024-05-01 PROCEDURE — 20600001 HC STEP DOWN PRIVATE ROOM: Mod: HCNC

## 2024-05-01 PROCEDURE — 80048 BASIC METABOLIC PNL TOTAL CA: CPT | Mod: HCNC

## 2024-05-01 PROCEDURE — 25000242 PHARM REV CODE 250 ALT 637 W/ HCPCS: Mod: HCNC | Performed by: HOSPITALIST

## 2024-05-01 PROCEDURE — 85025 COMPLETE CBC W/AUTO DIFF WBC: CPT | Mod: HCNC

## 2024-05-01 PROCEDURE — 83735 ASSAY OF MAGNESIUM: CPT | Mod: HCNC

## 2024-05-01 RX ORDER — FLUTICASONE PROPIONATE 50 MCG
1 SPRAY, SUSPENSION (ML) NASAL DAILY
Status: DISCONTINUED | OUTPATIENT
Start: 2024-05-01 | End: 2024-05-03 | Stop reason: HOSPADM

## 2024-05-01 RX ADMIN — GABAPENTIN 300 MG: 300 CAPSULE ORAL at 08:05

## 2024-05-01 RX ADMIN — GABAPENTIN 300 MG: 300 CAPSULE ORAL at 09:05

## 2024-05-01 RX ADMIN — PIPERACILLIN SODIUM AND TAZOBACTAM SODIUM 4.5 G: 4; .5 INJECTION, POWDER, FOR SOLUTION INTRAVENOUS at 04:05

## 2024-05-01 RX ADMIN — CARBIDOPA AND LEVODOPA 1 TABLET: 25; 100 TABLET ORAL at 04:05

## 2024-05-01 RX ADMIN — ACETAMINOPHEN 1000 MG: 500 TABLET ORAL at 04:05

## 2024-05-01 RX ADMIN — FLUTICASONE PROPIONATE 50 MCG: 50 SPRAY, METERED NASAL at 09:05

## 2024-05-01 RX ADMIN — PANTOPRAZOLE SODIUM 40 MG: 40 TABLET, DELAYED RELEASE ORAL at 09:05

## 2024-05-01 RX ADMIN — Medication 6 MG: at 08:05

## 2024-05-01 RX ADMIN — ASPIRIN 81 MG: 81 TABLET, COATED ORAL at 09:05

## 2024-05-01 RX ADMIN — CARBIDOPA AND LEVODOPA 1 TABLET: 25; 100 TABLET ORAL at 08:05

## 2024-05-01 RX ADMIN — CHOLECALCIFEROL TAB 25 MCG (1000 UNIT) 2000 UNITS: 25 TAB at 09:05

## 2024-05-01 RX ADMIN — PIPERACILLIN SODIUM AND TAZOBACTAM SODIUM 4.5 G: 4; .5 INJECTION, POWDER, FOR SOLUTION INTRAVENOUS at 09:05

## 2024-05-01 RX ADMIN — FINASTERIDE 5 MG: 5 TABLET, FILM COATED ORAL at 09:05

## 2024-05-01 RX ADMIN — ACETAMINOPHEN 1000 MG: 500 TABLET ORAL at 08:05

## 2024-05-01 RX ADMIN — CARBIDOPA AND LEVODOPA 1 TABLET: 25; 100 TABLET ORAL at 09:05

## 2024-05-01 RX ADMIN — MAGNESIUM SULFATE HEPTAHYDRATE 2 G: 40 INJECTION, SOLUTION INTRAVENOUS at 12:05

## 2024-05-01 RX ADMIN — PRAVASTATIN SODIUM 20 MG: 20 TABLET ORAL at 08:05

## 2024-05-01 RX ADMIN — TAMSULOSIN HYDROCHLORIDE 0.4 MG: 0.4 CAPSULE ORAL at 08:05

## 2024-05-01 RX ADMIN — PIPERACILLIN SODIUM AND TAZOBACTAM SODIUM 4.5 G: 4; .5 INJECTION, POWDER, FOR SOLUTION INTRAVENOUS at 11:05

## 2024-05-01 NOTE — HPI
Tito Menard is a 77yo M w/ a hx of HTN, HLD, GERD, T2DM, and Parkinsons (on sinemet) presenting after a syncopal episode at home. Patient was admitted yesterday after a syncopal episode. He reported multiple episodes of diarrhea at the time, stool studies were ordered but not collected as he did not have any further episodes of diarrhea while in the hospital. He also was treated with zosyn for UTI and discharged home with bactrim. He reports that when he got home he was feeling okay but felt cold in the home so he went to go sit outside in the sun. He was taking with his brother and the next thing he remembers was his brother waking him up and telling him he has passed out while sitting and talking.  He was told he was slumped over with his eyes open and when his brother went to call 911 he somehow used his walker and ambulated back inside the house. He does not remember this. He did not have a prodrome. Denies any chest pain, palpitations, fever, chills, cough, shortness of breath, abdominal pain, dysuria, frequency.urgency flank pain. He felt generally well when discharged. His diarrhea stopped. He ate soup and crackers.     Noted to have extensive work up for syncopal in the past including CTA head and neck, EEG, event monitor. TTE yesterday fairly unremarkable. UA yesterday + for GNRs.    In the ED: tachycardic, febrile, hypotensive (90s/50s). Workup remarkable for infectious appearing urine, imaging w/ evidence of bladder wall thickening and fat stranding. Patient give IV zosyn and vancomycin as well as 1L  IVFs. Admitted to  for syncopal episode in setting of urosepsis.

## 2024-05-01 NOTE — ASSESSMENT & PLAN NOTE
"Patient's FSGs are controlled on current medication regimen.  Last A1c reviewed-   Lab Results   Component Value Date    HGBA1C 5.6 12/06/2023     Most recent fingerstick glucose reviewed- No results for input(s): "POCTGLUCOSE" in the last 24 hours.  Current correctional scale  Low  Maintain anti-hyperglycemic dose as follows-   Antihyperglycemics (From admission, onward)      Start     Stop Route Frequency Ordered    04/30/24 2304  insulin aspart U-100 pen 0-5 Units         -- SubQ Before meals & nightly PRN 04/30/24 2205          Hold Oral hypoglycemics while patient is in the hospital.  Continue home gabapentin   "

## 2024-05-01 NOTE — ASSESSMENT & PLAN NOTE
Patient with acute kidney injury/acute renal failure likely due to pre-renal azotemia due to IVVD and acute tubular necrosis caused by hypotension/sepsis  BRENDA is currently worsening. Baseline creatinine  1.0  - Labs reviewed- Renal function/electrolytes with Estimated Creatinine Clearance: 43.8 mL/min (A) (based on SCr of 1.6 mg/dL (H)). according to latest data. Monitor urine output and serial BMP and adjust therapy as needed. Avoid nephrotoxins and renally dose meds for GFR listed above.  - Cr 1.6 on admission, baseline closer to 1.0  - Suspect 2/2 hypotension/volume depletion/dehydration and UTI  - ABX for UTI  - S/p 1L LR in ED.  Additional 500 cc ordered.   - Urine studies ordered  - Monitor vitals q4h  - Renal US if no improvement in 24-48 hrs

## 2024-05-01 NOTE — H&P
[facility] - Emergency Dept  [facility] Medicine  History & Physical    Patient Name: Tito Menard  MRN: [id number]  Patient Class: IP- Inpatient  Admission Date: 4/30/2024  Attending Physician: Gill Warren MD   Primary Care Provider: Amanda Brown MD         Patient information was obtained from patient, past medical records, and ER records.     Subjective:     Principal Problem:Sepsis due to gram-negative UTI    Chief Complaint:   Chief Complaint   Patient presents with    General Illness     Arrival via ems, coming from home, complaint of hypotension, syncopal episode prior to arrival, Home health stated possible sepsis. Elevated troponin         HPI: Tito Menard is a 79yo M w/ a hx of HTN, HLD, GERD, T2DM, and Parkinsons (on sinemet) presenting after a syncopal episode at home. Patient was admitted yesterday after a syncopal episode. He reported multiple episodes of diarrhea at the time, stool studies were ordered but not collected as he did not have any further episodes of diarrhea while in the hospital. He also was treated with zosyn for UTI and discharged home with bactrim. He reports that when he got home he was feeling okay but felt cold in the home so he went to go sit outside in the sun. He was taking with his brother and the next thing he remembers was his brother waking him up and telling him he has passed out while sitting and talking.  He was told he was slumped over with his eyes open and when his brother went to call 911 he somehow used his walker and ambulated back inside the house. He does not remember this. He did not have a prodrome. Denies any chest pain, palpitations, fever, chills, cough, shortness of breath, abdominal pain, dysuria, frequency.urgency flank pain. He felt generally well when discharged. His diarrhea stopped. He ate soup and crackers.     Noted to have extensive work up for syncopal in the past including CTA head and neck, EEG, event monitor. TTE yesterday fairly  Internal Medicine unremarkable. UA yesterday + for GNRs.    In the ED: tachycardic, febrile, hypotensive (90s/50s). Workup remarkable for infectious appearing urine, imaging w/ evidence of bladder wall thickening and fat stranding. Patient give IV zosyn and vancomycin as well as 1L  IVFs. Admitted to  for syncopal episode in setting of urosepsis.     Past Medical History:   Diagnosis Date    Allergy     Aneurysm of artery of lower extremity     Chalazion of left eye     CKD (chronic kidney disease), stage II 4/1/2019    Diabetes mellitus type II     Diabetes with neurologic complications     Enlarged aorta 8/2/2016    Noted on pharmacological stress echo 2/28/2014.      GERD (gastroesophageal reflux disease)     egd 2008    Hyperlipidemia     Hypertension     Jock itch 7/19/2018    MGD (meibomian gland dysfunction)     Osteopenia     Spinal stenosis     Spondylosis without myelopathy 10/23/2015    Vitamin D deficiency disease        Past Surgical History:   Procedure Laterality Date    CHALAZION EXCISION Left 8/18/13    COLONOSCOPY N/A 4/24/2024    Procedure: COLONOSCOPY;  Surgeon: Catalino Sorto MD;  Location: [facility] (4TH FLR);  Service: Endoscopy;  Laterality: N/A;  Ref by Dr DEJAN Brown, PEG, portal - PC  4/18-precall complete-MS    CYST REMOVAL  2013    Back of neck    EXCISION OF HYDROCELE Right 11/14/2023    Procedure: HYDROCELECTOMY;  Surgeon: Beatrice Vaca MD;  Location: [facility] OR 2ND FLR;  Service: Urology;  Laterality: Right;  1 hr    FLEXIBLE CYSTOSCOPY N/A 12/7/2021    Procedure: CYSTOSCOPY, FLEXIBLE;  Surgeon: Beatrice Vaca MD;  Location: [facility] OR 1ST FLR;  Service: Urology;  Laterality: N/A;    FLUOROSCOPIC URODYNAMIC STUDY N/A 12/7/2021    Procedure: URODYNAMIC STUDY, FLUOROSCOPIC;  Surgeon: Beatrice Vaca MD;  Location: [facility] OR 1ST FLR;  Service: Urology;  Laterality: N/A;  90 minutes     SPINE SURGERY  2007    x2 (2000, 2007)       Review of patient's allergies indicates:  No Known  Allergies    Current Facility-Administered Medications   Medication Dose Route Frequency Provider Last Rate Last Admin    acetaminophen tablet 1,000 mg  1,000 mg Oral Q8H PRN Angelica Waite PA-C        acetaminophen tablet 650 mg  650 mg Oral Q4H PRN Angelica Waite PA-C        albuterol-ipratropium 2.5 mg-0.5 mg/3 mL nebulizer solution 3 mL  3 mL Nebulization Q4H PRN Angelica Waite PA-C        aluminum-magnesium hydroxide-simethicone 200-200-20 mg/5 mL suspension 30 mL  30 mL Oral QID PRN Angelica Waite PA-C        [START ON 5/1/2024] aspirin EC tablet 81 mg  81 mg Oral Daily Angelica Waite PA-C        [START ON 5/1/2024] carbidopa-levodopa  mg per tablet 1 tablet  1 tablet Oral TID Angelica Waite PA-C        dextrose 10% bolus 125 mL 125 mL  12.5 g Intravenous PRN Angelica Waite PA-C        dextrose 10% bolus 250 mL 250 mL  25 g Intravenous PRN Angelica Waite PA-C        [START ON 5/1/2024] enoxaparin injection 40 mg  40 mg Subcutaneous Daily Angelica Waite PA-C        [START ON 5/1/2024] finasteride tablet 5 mg  5 mg Oral Daily Angelica Waite PA-C        [START ON 5/1/2024] gabapentin capsule 300 mg  300 mg Oral BID Angelica Waite PA-C        glucagon (human recombinant) injection 1 mg  1 mg Intramuscular PRN Angelica Waite PA-C        glucose chewable tablet 16 g  16 g Oral PRN Angelica Waite PA-C        glucose chewable tablet 24 g  24 g Oral PRN Angelica Waite PA-C        insulin aspart U-100 pen 0-5 Units  0-5 Units Subcutaneous QID (AC + HS) PRN Angelica Watie PA-C        lactated ringers bolus 500 mL  500 mL Intravenous Once Angelica Waite PA-C 500 mL/hr at 04/30/24 2235 500 mL at 04/30/24 2235    magnesium sulfate 2g in water 50mL IVPB (premix)  2 g Intravenous Once Angelica Waite PA-C        melatonin tablet 6 mg  6 mg Oral Nightly PRN Angelica Waite PA-C        naloxone 0.4 mg/mL injection 0.02 mg  0.02 mg  Intravenous PRN Angelica Waite PA-C        [START ON 5/1/2024] pantoprazole EC tablet 40 mg  40 mg Oral Daily Angelica Waite PA-C        [START ON 5/1/2024] piperacillin-tazobactam (ZOSYN) 4.5 g in dextrose 5 % in water (D5W) 100 mL IVPB (MB+)  4.5 g Intravenous Q8H Angelica Waite PA-C        pravastatin tablet 20 mg  20 mg Oral QHS Angelica Waite PA-C   20 mg at 04/30/24 2235    simethicone chewable tablet 80 mg  1 tablet Oral QID PRN Angelica Waite PA-C        sodium chloride 0.9% flush 10 mL  10 mL Intravenous Q12H PRN Angelica Waite PA-C        sodium chloride 0.9% flush 5 mL  5 mL Intravenous PRN Angelica Waite PA-C        [START ON 5/1/2024] tamsulosin 24 hr capsule 0.4 mg  1 capsule Oral QHS Angelica Waite PA-C        [START ON 5/1/2024] vitamin D 1000 units tablet 2,000 Units  2,000 Units Oral Daily Angelica Waite PA-C         Current Outpatient Medications   Medication Sig Dispense Refill    aspirin (ECOTRIN) 81 MG EC tablet TAKE 1 TABLET EVERY DAY. 90 tablet 3    azelastine (ASTELIN) 137 mcg (0.1 %) nasal spray 1 spray (137 mcg total) by Nasal route 2 (two) times daily. 90 mL 0    calcium carbonate (TUMS ORAL) Take 1-2 tablets by mouth daily as needed (for acid reflux).      carbidopa-levodopa  mg (SINEMET)  mg per tablet Take 1 tablet by mouth 3 (three) times daily. 90 tablet 5    cholecalciferol, vitamin D3, (VITAMIN D3) 50 mcg (2,000 unit) Cap Take 1 capsule by mouth once daily.      clotrimazole-betamethasone 1-0.05% (LOTRISONE) cream APPLY TOPICALLY 2 (TWO) TIMES DAILY. (Patient taking differently: Apply topically 2 (two) times daily. Uses at bath time as needed for redness between thighs.) 45 g 0    finasteride (PROSCAR) 5 mg tablet Take 1 tablet (5 mg total) by mouth once daily. 90 tablet 3    fluticasone propionate (FLONASE) 50 mcg/actuation nasal spray USE 1 SPRAY NASALLY ONE TIME DAILY 32 g 0    gabapentin (NEURONTIN) 300 MG capsule One po  at noon daily and two po every evening 360 capsule 1    ketoconazole (NIZORAL) 2 % cream AAA bid (facial redness and scaling) (Patient taking differently: Apply topically once weekly for facial redness and scaling.) 60 g 3    lanolin/mineral oil/petrolatum (ARTIFICIAL TEARS OPHT) Place 1-2 drops into both eyes daily as needed (for dry eyes).      LIDOcaine 4 % PtMd Apply 1 patch topically daily as needed (for back pain).      methocarbamoL (ROBAXIN) 500 MG Tab Take 500 mg by mouth 4 (four) times daily.      omeprazole (PRILOSEC) 20 MG capsule TAKE 2 CAPSULES ONE TIME DAILY 180 capsule 0    pravastatin (PRAVACHOL) 20 MG tablet Take 1 tablet (20 mg total) by mouth once daily. (Patient taking differently: Take 20 mg by mouth every evening.) 90 tablet 0    sulfamethoxazole-trimethoprim 800-160mg (BACTRIM DS) 800-160 mg Tab Take 1 tablet by mouth 2 (two) times daily. for 7 days 14 tablet 0    tamsulosin (FLOMAX) 0.4 mg Cap Take 1 capsule (0.4 mg total) by mouth once daily. (Patient taking differently: Take 1 capsule by mouth every evening.) 90 capsule 3     Family History       Problem Relation (Age of Onset)    Cancer Mother    Heart disease Mother    Hyperlipidemia Mother    Hypertension Mother, Maternal Grandmother    Kidney disease Mother    Kidney failure Mother    No Known Problems Daughter, Sister, Brother, Son, Brother, Son          Tobacco Use    Smoking status: Never    Smokeless tobacco: Former     Types: Chew    Tobacco comments:     Chewed tobacco once per day for 4-5 years when a    Substance and Sexual Activity    Alcohol use: No    Drug use: No    Sexual activity: Not Currently     Partners: Female     Review of Systems   Constitutional:  Negative for chills and fever.   HENT:  Negative for trouble swallowing.    Eyes:  Negative for visual disturbance.   Respiratory:  Negative for cough and shortness of breath.    Cardiovascular:  Negative for chest pain and palpitations.   Gastrointestinal:   Positive for diarrhea (resolved yesterday). Negative for abdominal pain, nausea and vomiting.   Genitourinary:  Negative for dysuria and flank pain.   Musculoskeletal:  Positive for gait problem (walker at baseline). Negative for arthralgias and back pain.   Skin:  Negative for rash and wound.   Neurological:  Positive for syncope. Negative for dizziness, seizures, facial asymmetry, speech difficulty, weakness, light-headedness, numbness and headaches.   Psychiatric/Behavioral:  Negative for confusion. The patient is not nervous/anxious.      Objective:     Vital Signs (Most Recent):  Temp: 99.3 °F (37.4 °C) (04/30/24 1938)  Pulse: 73 (04/30/24 2146)  Resp: 20 (04/30/24 2146)  BP: 113/69 (04/30/24 2132)  SpO2: 97 % (04/30/24 2146) Vital Signs (24h Range):  Temp:  [97.8 °F (36.6 °C)-100.5 °F (38.1 °C)] 99.3 °F (37.4 °C)  Pulse:  [] 73  Resp:  [11-25] 20  SpO2:  [95 %-99 %] 97 %  BP: ()/(50-74) 113/69     Weight: 93.9 kg (207 lb)  Body mass index is 28.87 kg/m².     Physical Exam  Vitals and nursing note reviewed.   Constitutional:       General: He is not in acute distress.  HENT:      Head: Normocephalic and atraumatic.      Nose: No congestion.      Mouth/Throat:      Pharynx: No oropharyngeal exudate.   Eyes:      Extraocular Movements: Extraocular movements intact.      Conjunctiva/sclera: Conjunctivae normal.   Cardiovascular:      Rate and Rhythm: Normal rate and regular rhythm.      Heart sounds: Normal heart sounds.   Pulmonary:      Effort: Pulmonary effort is normal. No respiratory distress.      Breath sounds: Normal breath sounds.   Abdominal:      General: Bowel sounds are normal. There is no distension.      Palpations: Abdomen is soft.      Tenderness: There is no abdominal tenderness.   Musculoskeletal:         General: No tenderness. Normal range of motion.      Cervical back: Normal range of motion.   Skin:     General: Skin is warm and dry.   Neurological:      General: No focal deficit  present.      Mental Status: He is alert and oriented to person, place, and time.   Psychiatric:         Mood and Affect: Mood normal.         Behavior: Behavior normal.                Significant Labs: All pertinent labs within the past 24 hours have been reviewed.  Blood Culture:   Recent Labs   Lab 04/29/24 2111   LABBLOO No Growth to date  No Growth to date  No Growth to date  No Growth to date     BMP:   Recent Labs   Lab 04/30/24  1813   *      K 3.8      CO2 26   BUN 24*   CREATININE 1.6*   CALCIUM 8.7   MG 1.6     CBC:   Recent Labs   Lab 04/29/24 2111 04/30/24  1813   WBC 11.32 9.03   HGB 11.9* 11.7*   HCT 36.6* 36.8*    209     CMP:   Recent Labs   Lab 04/29/24 2111 04/30/24 0423 04/30/24  1813    140 142   K 3.6 3.6 3.8    105 106   CO2 22* 25 26   * 109 135*   BUN 21 24* 24*   CREATININE 1.0 1.2 1.6*   CALCIUM 8.4* 8.2* 8.7   PROT 6.9 6.3 6.8   ALBUMIN 2.8* 2.5* 2.7*   BILITOT 0.6 0.6 0.4   ALKPHOS 73 63 76   AST 32 30 34   ALT 18 17 18   ANIONGAP 11 10 10       Coagulation:   Recent Labs   Lab 04/30/24  1813   INR 1.0       Lipase:   Recent Labs   Lab 04/30/24  1813   LIPASE 13   Magnesium:   Recent Labs   Lab 04/30/24 0423 04/30/24  1813   MG 1.6 1.6     Troponin:   Recent Labs   Lab 04/29/24 2111 04/30/24 0423 04/30/24  1813   TROPONINI 0.029* 0.027* 0.013     Urine Culture:   Recent Labs   Lab 04/29/24  2343   LABURIN GRAM NEGATIVE CANDIS  > 100,000 cfu/ml  Identification and susceptibility pending  *     Urine Studies:   Recent Labs   Lab 04/30/24  1823   COLORU Yellow   APPEARANCEUA Ex.Turbid   PHUR 6.0   SPECGRAV 1.030   PROTEINUA 2+*   GLUCUA Negative   KETONESU Negative   BILIRUBINUA Negative   OCCULTUA 2+*   NITRITE Negative   LEUKOCYTESUR 3+*   RBCUA >100*   WBCUA >100*   BACTERIA Moderate*   SQUAMEPITHEL 2   HYALINECASTS 0       Significant Imaging: I have reviewed all pertinent imaging results/findings within the past 24 hours.  CT Abdomen  Pelvis With IV Contrast NO Oral Contrast  Narrative: EXAMINATION:  CT ABDOMEN PELVIS WITH IV CONTRAST    CLINICAL HISTORY:  LLQ abdominal pain;    TECHNIQUE:  Low dose axial images, sagittal and coronal reformations were obtained from the lung bases to the pubic symphysis following the IV administration of 100 mL of Omnipaque 350 .  Oral contrast was not administered.    COMPARISON:  None.    FINDINGS:  Abdomen:    - Lower thorax:    - Lung bases: Mild right lower lobe atelectasis or mild consolidation posteriorly.    - Liver: No focal mass.    - Gallbladder: No calcified gallstones.    - Bile Ducts: No evidence of intra or extra hepatic biliary ductal dilation.    - Spleen: Negative.    - Kidneys: 4.8 cm simple cyst at the upper pole the right kidney.  No stone, soft tissue mass, hydronephrosis or hydroureter bilaterally.    - Adrenals: Unremarkable.    - Pancreas: No mass or peripancreatic fat stranding.    - Retroperitoneum:  No significant adenopathy.    - Vascular: No abdominal aortic aneurysm.    - Abdominal wall:  Small fat containing umbilical hernia.    The appendix is adequately maintained.    Pelvis:    There is diffuse wall thickening of the urinary bladder with mild stranding.  Findings could represent cystitis.  Follow-up recommended.    The prostate is mildly enlarged measuring 5.4 cm.    Bowel/Mesentery:    No evidence of bowel obstruction or inflammation.  Mild gaseous distension.    Few scattered diverticula of the left colon with no evidence of acute diverticulitis.    Nondistention of the distal sigmoid colon and rectum.  Mild proctocolitis is not excluded.    Bones:  No acute fractures.    Grade 1 spondylolisthesis of L3 on L4.  Severe disc space narrowing at L5-S1 and L4-5 with moderate disc space narrowing at L3-4 and L2-3.    Multilevel mild diffuse posterior disc osteophyte complex.    Mild central canal narrowing at L5-S1.  Moderate-severe central canal narrowing at L4-5.  Severe central  canal narrowing at L3-4.  Severe central canal narrowing at L2-3.    Severe foraminal narrowing bilaterally from L1 through S1, right greater than left, most prominent at L5-S1 and L4-5.  Impression: 1. Diffuse wall thickening of the urinary bladder with mild adjacent stranding.  Findings suggest cystitis.  Follow-up recommended.  2. Mild prostatomegaly.  3. Mild right lower lobe pulmonary atelectasis or mild consolidation.  Follow-up recommended.  4. Nondistention of the distal sigmoid colon and rectum.  Mild wall thickening/proctocolitis is not excluded.  5. Multilevel chronic degenerative changes of the lumbar spine.  See above comments.    Electronically signed by: Satinder Lakhani  Date:    04/30/2024  Time:    20:45  X-Ray Chest AP Portable  Narrative: EXAMINATION:  XR CHEST AP PORTABLE    CLINICAL HISTORY:  Sepsis;    TECHNIQUE:  Single frontal view of the chest was performed.    COMPARISON:  04/29/2024    FINDINGS:  Cardiac silhouette is within normal limits.  Mild elevation the right hemidiaphragm.    Mild right basilar atelectasis or scarring.    No effusion or pneumothorax.  No mass or consolidation.  No acute osseous abnormality.    No significant change.  Impression: No acute radiographic abnormality with no significant change.  See above comments.    Electronically signed by: Satinder Lakhani  Date:    04/30/2024  Time:    19:32  Echo    Left Ventricle: The left ventricle is normal in size. Ventricular mass   is normal. Mildly increased wall thickness. Mild septal thickening. There   is concentric remodeling. There is normal systolic function with a   visually estimated ejection fraction of 60 - 65%. There is normal   diastolic function.    Right Ventricle: Normal right ventricular cavity size. Wall thickness   is normal. Systolic function is normal.    Aortic Valve: The aortic valve is a trileaflet valve. There is moderate   aortic valve sclerosis. There is mild annular calcification present. There   is  trace aortic regurgitation.    Mitral Valve: There is mild bileaflet sclerosis.    Pulmonic Valve: There is mild regurgitation.    Pulmonary Artery: No pulmonary hypertension.    IVC/SVC: Normal venous pressure at 3 mmHg.     Assessment/Plan:     * Sepsis due to gram-negative UTI  This patient does have evidence of infective focus  My overall impression is sepsis.  Source: Urinary Tract  Antibiotics given-   Antibiotics (72h ago, onward)      Start     Stop Route Frequency Ordered    05/01/24 0300  piperacillin-tazobactam (ZOSYN) 4.5 g in dextrose 5 % in water (D5W) 100 mL IVPB (MB+)         -- IV Every 8 hours (non-standard times) 04/30/24 2240          Latest lactate reviewed-  Recent Labs   Lab 04/30/24  1824   POCLAC 1.58     Organ dysfunction indicated by Acute kidney injury    Fluid challenge Not needed - patient is not hypotensive  ; received 1.5L while in ED with good blood pressure response.    Post- resuscitation assessment No - Post resuscitation assessment not needed       Will Not start Pressors- Levophed for MAP of 65  Source control achieved by: Zosyn for now, will deescalate as appropriate     Parkinsonism  Continue sinemet     BRENDA (acute kidney injury)  Patient with acute kidney injury/acute renal failure likely due to pre-renal azotemia due to IVVD and acute tubular necrosis caused by hypotension/sepsis  BRENDA is currently worsening. Baseline creatinine  1.0  - Labs reviewed- Renal function/electrolytes with Estimated Creatinine Clearance: 43.8 mL/min (A) (based on SCr of 1.6 mg/dL (H)). according to latest data. Monitor urine output and serial BMP and adjust therapy as needed. Avoid nephrotoxins and renally dose meds for GFR listed above.  - Cr 1.6 on admission, baseline closer to 1.0  - Suspect 2/2 hypotension/volume depletion/dehydration and UTI  - ABX for UTI  - S/p 1L LR in ED.  Additional 500 cc ordered.   - Urine studies ordered  - Monitor vitals q4h  - Renal US if no improvement in 24-48  "hrs    Diarrhea  Likely resolved. Pt reported multiple episodes of diarrhea after colonoscopy last week. Stool studies ordered yesterday but not obtained since he had no further episodes.  Monitor I/Os and collect stool studies if diarrhea persists.     Gastroesophageal reflux disease with esophagitis  - Continue home PPI    Syncope  Admitted yesterday for the same. Today occurred while seated outside and was described as brief unresponsiveness with eyes open and slumped over, witnessed by brother.  Work up previously including CTA head and neck, event monitor, TTE, EEG all unremarkable. Hypotensive on EMS arrival. Suspect this episode was 2/2 hypotension/sepsis UTI.  - Treat infection and hypotension  - Monitor on telemetry   - fall precautions  - orthostatics q shift    BPH with obstruction/lower urinary tract symptoms  - Continue home tamsulosin and finasteride     Diabetic polyneuropathy associated with type 2 diabetes mellitus  Patient's FSGs are controlled on current medication regimen.  Last A1c reviewed-   Lab Results   Component Value Date    HGBA1C 5.6 12/06/2023     Most recent fingerstick glucose reviewed- No results for input(s): "POCTGLUCOSE" in the last 24 hours.  Current correctional scale  Low  Maintain anti-hyperglycemic dose as follows-   Antihyperglycemics (From admission, onward)      Start     Stop Route Frequency Ordered    04/30/24 2304  insulin aspart U-100 pen 0-5 Units         -- SubQ Before meals & nightly PRN 04/30/24 2205          Hold Oral hypoglycemics while patient is in the hospital.  Continue home gabapentin     Vitamin D deficiency disease  - Continue vitamin D supplement     Mixed hyperlipidemia  - Continue home statin     Essential hypertension  Chronic, controlled. Latest blood pressure and vitals reviewed-     Temp:  [97.8 °F (36.6 °C)-100.5 °F (38.1 °C)]   Pulse:  []   Resp:  [11-25]   BP: ()/(50-74)   SpO2:  [95 %-99 %] .   Home meds for hypertension were " reviewed and noted below.   Hypertension Medications      While in the hospital, will manage blood pressure as follows; Adjust home antihypertensive regimen as follows- Hold for now given episodes of hypotension during admission yesterday and on arrival to ED today and concern for sepsis . Resume home medication as appropriate      VTE Risk Mitigation (From admission, onward)           Ordered     enoxaparin injection 40 mg  Daily         04/30/24 2205     IP VTE HIGH RISK PATIENT  Once         04/30/24 2205     Place sequential compression device  Until discontinued         04/30/24 2205                                    Angelica Waite PA-C  Department of Hospital Medicine  [facility] - Emergency Dept

## 2024-05-01 NOTE — SUBJECTIVE & OBJECTIVE
Past Medical History:   Diagnosis Date    Allergy     Aneurysm of artery of lower extremity     Chalazion of left eye     CKD (chronic kidney disease), stage II 4/1/2019    Diabetes mellitus type II     Diabetes with neurologic complications     Enlarged aorta 8/2/2016    Noted on pharmacological stress echo 2/28/2014.      GERD (gastroesophageal reflux disease)     egd 2008    Hyperlipidemia     Hypertension     Jock itch 7/19/2018    MGD (meibomian gland dysfunction)     Osteopenia     Spinal stenosis     Spondylosis without myelopathy 10/23/2015    Vitamin D deficiency disease        Past Surgical History:   Procedure Laterality Date    CHALAZION EXCISION Left 8/18/13    COLONOSCOPY N/A 4/24/2024    Procedure: COLONOSCOPY;  Surgeon: Catalino Sorto MD;  Location: [facility] ENDO (4TH FLR);  Service: Endoscopy;  Laterality: N/A;  Ref by Dr DEJAN Brown, PEG, portal - PC  4/18-precall complete-MS    CYST REMOVAL  2013    Back of neck    EXCISION OF HYDROCELE Right 11/14/2023    Procedure: HYDROCELECTOMY;  Surgeon: Beatrice Vaca MD;  Location: [facility] OR 2ND FLR;  Service: Urology;  Laterality: Right;  1 hr    FLEXIBLE CYSTOSCOPY N/A 12/7/2021    Procedure: CYSTOSCOPY, FLEXIBLE;  Surgeon: Beatrice Vaca MD;  Location: [facility] OR 1ST FLR;  Service: Urology;  Laterality: N/A;    FLUOROSCOPIC URODYNAMIC STUDY N/A 12/7/2021    Procedure: URODYNAMIC STUDY, FLUOROSCOPIC;  Surgeon: Beatrice Vaca MD;  Location: [facility] OR 1ST FLR;  Service: Urology;  Laterality: N/A;  90 minutes     SPINE SURGERY  2007    x2 (2000, 2007)       Review of patient's allergies indicates:  No Known Allergies    Current Facility-Administered Medications   Medication Dose Route Frequency Provider Last Rate Last Admin    acetaminophen tablet 1,000 mg  1,000 mg Oral Q8H PRN Angelica Waite PA-C        acetaminophen tablet 650 mg  650 mg Oral Q4H PRN Angelica Waite PA-C        albuterol-ipratropium 2.5 mg-0.5 mg/3 mL nebulizer solution 3 mL  3  mL Nebulization Q4H PRN Angelica Waite PA-C        aluminum-magnesium hydroxide-simethicone 200-200-20 mg/5 mL suspension 30 mL  30 mL Oral QID PRN Angelica Waite PA-C        [START ON 5/1/2024] aspirin EC tablet 81 mg  81 mg Oral Daily Angelica Waite PA-C        [START ON 5/1/2024] carbidopa-levodopa  mg per tablet 1 tablet  1 tablet Oral TID Angelica Waite PA-C        dextrose 10% bolus 125 mL 125 mL  12.5 g Intravenous PRN Angelica Waite PA-C        dextrose 10% bolus 250 mL 250 mL  25 g Intravenous PRN Angelica Waite PA-C        [START ON 5/1/2024] enoxaparin injection 40 mg  40 mg Subcutaneous Daily Angelica Waite PA-C        [START ON 5/1/2024] finasteride tablet 5 mg  5 mg Oral Daily Angelica Waite PA-C        [START ON 5/1/2024] gabapentin capsule 300 mg  300 mg Oral BID Angelica Waite PA-C        glucagon (human recombinant) injection 1 mg  1 mg Intramuscular PRN Angelica Waite PA-C        glucose chewable tablet 16 g  16 g Oral PRN Angelica Waite PA-C        glucose chewable tablet 24 g  24 g Oral PRN Angelica Waite PA-C        insulin aspart U-100 pen 0-5 Units  0-5 Units Subcutaneous QID (AC + HS) PRN Angelica Waite PA-C        lactated ringers bolus 500 mL  500 mL Intravenous Once Angelica Waite PA-C 500 mL/hr at 04/30/24 2235 500 mL at 04/30/24 2235    magnesium sulfate 2g in water 50mL IVPB (premix)  2 g Intravenous Once Angelica Waite PA-C        melatonin tablet 6 mg  6 mg Oral Nightly PRN Angelica Waite PA-C        naloxone 0.4 mg/mL injection 0.02 mg  0.02 mg Intravenous PRN Angelica Waite PA-C        [START ON 5/1/2024] pantoprazole EC tablet 40 mg  40 mg Oral Daily Angelica Waite PA-C        [START ON 5/1/2024] piperacillin-tazobactam (ZOSYN) 4.5 g in dextrose 5 % in water (D5W) 100 mL IVPB (MB+)  4.5 g Intravenous Q8H Angelica Waite PA-C        pravastatin tablet 20 mg  20 mg Oral QHS Ravindra  Angelica HERNÁNDEZ PA-C   20 mg at 04/30/24 2235    simethicone chewable tablet 80 mg  1 tablet Oral QID PRN Angelica Waite PA-C        sodium chloride 0.9% flush 10 mL  10 mL Intravenous Q12H PRN Angelica Waite PA-C        sodium chloride 0.9% flush 5 mL  5 mL Intravenous PRN Angelica Waite PA-C        [START ON 5/1/2024] tamsulosin 24 hr capsule 0.4 mg  1 capsule Oral QHS Angelica Waite PA-C        [START ON 5/1/2024] vitamin D 1000 units tablet 2,000 Units  2,000 Units Oral Daily Angelica Waite PA-C         Current Outpatient Medications   Medication Sig Dispense Refill    aspirin (ECOTRIN) 81 MG EC tablet TAKE 1 TABLET EVERY DAY. 90 tablet 3    azelastine (ASTELIN) 137 mcg (0.1 %) nasal spray 1 spray (137 mcg total) by Nasal route 2 (two) times daily. 90 mL 0    calcium carbonate (TUMS ORAL) Take 1-2 tablets by mouth daily as needed (for acid reflux).      carbidopa-levodopa  mg (SINEMET)  mg per tablet Take 1 tablet by mouth 3 (three) times daily. 90 tablet 5    cholecalciferol, vitamin D3, (VITAMIN D3) 50 mcg (2,000 unit) Cap Take 1 capsule by mouth once daily.      clotrimazole-betamethasone 1-0.05% (LOTRISONE) cream APPLY TOPICALLY 2 (TWO) TIMES DAILY. (Patient taking differently: Apply topically 2 (two) times daily. Uses at bath time as needed for redness between thighs.) 45 g 0    finasteride (PROSCAR) 5 mg tablet Take 1 tablet (5 mg total) by mouth once daily. 90 tablet 3    fluticasone propionate (FLONASE) 50 mcg/actuation nasal spray USE 1 SPRAY NASALLY ONE TIME DAILY 32 g 0    gabapentin (NEURONTIN) 300 MG capsule One po at noon daily and two po every evening 360 capsule 1    ketoconazole (NIZORAL) 2 % cream AAA bid (facial redness and scaling) (Patient taking differently: Apply topically once weekly for facial redness and scaling.) 60 g 3    lanolin/mineral oil/petrolatum (ARTIFICIAL TEARS OPHT) Place 1-2 drops into both eyes daily as needed (for dry eyes).      LIDOcaine  4 % PtMd Apply 1 patch topically daily as needed (for back pain).      methocarbamoL (ROBAXIN) 500 MG Tab Take 500 mg by mouth 4 (four) times daily.      omeprazole (PRILOSEC) 20 MG capsule TAKE 2 CAPSULES ONE TIME DAILY 180 capsule 0    pravastatin (PRAVACHOL) 20 MG tablet Take 1 tablet (20 mg total) by mouth once daily. (Patient taking differently: Take 20 mg by mouth every evening.) 90 tablet 0    sulfamethoxazole-trimethoprim 800-160mg (BACTRIM DS) 800-160 mg Tab Take 1 tablet by mouth 2 (two) times daily. for 7 days 14 tablet 0    tamsulosin (FLOMAX) 0.4 mg Cap Take 1 capsule (0.4 mg total) by mouth once daily. (Patient taking differently: Take 1 capsule by mouth every evening.) 90 capsule 3     Family History       Problem Relation (Age of Onset)    Cancer Mother    Heart disease Mother    Hyperlipidemia Mother    Hypertension Mother, Maternal Grandmother    Kidney disease Mother    Kidney failure Mother    No Known Problems Daughter, Sister, Brother, Son, Brother, Son          Tobacco Use    Smoking status: Never    Smokeless tobacco: Former     Types: Chew    Tobacco comments:     Chewed tobacco once per day for 4-5 years when a    Substance and Sexual Activity    Alcohol use: No    Drug use: No    Sexual activity: Not Currently     Partners: Female     Review of Systems   Constitutional:  Negative for chills and fever.   HENT:  Negative for trouble swallowing.    Eyes:  Negative for visual disturbance.   Respiratory:  Negative for cough and shortness of breath.    Cardiovascular:  Negative for chest pain and palpitations.   Gastrointestinal:  Positive for diarrhea (resolved yesterday). Negative for abdominal pain, nausea and vomiting.   Genitourinary:  Negative for dysuria and flank pain.   Musculoskeletal:  Positive for gait problem (walker at baseline). Negative for arthralgias and back pain.   Skin:  Negative for rash and wound.   Neurological:  Positive for syncope. Negative for dizziness,  seizures, facial asymmetry, speech difficulty, weakness, light-headedness, numbness and headaches.   Psychiatric/Behavioral:  Negative for confusion. The patient is not nervous/anxious.      Objective:     Vital Signs (Most Recent):  Temp: 99.3 °F (37.4 °C) (04/30/24 1938)  Pulse: 73 (04/30/24 2146)  Resp: 20 (04/30/24 2146)  BP: 113/69 (04/30/24 2132)  SpO2: 97 % (04/30/24 2146) Vital Signs (24h Range):  Temp:  [97.8 °F (36.6 °C)-100.5 °F (38.1 °C)] 99.3 °F (37.4 °C)  Pulse:  [] 73  Resp:  [11-25] 20  SpO2:  [95 %-99 %] 97 %  BP: ()/(50-74) 113/69     Weight: 93.9 kg (207 lb)  Body mass index is 28.87 kg/m².     Physical Exam  Vitals and nursing note reviewed.   Constitutional:       General: He is not in acute distress.  HENT:      Head: Normocephalic and atraumatic.      Nose: No congestion.      Mouth/Throat:      Pharynx: No oropharyngeal exudate.   Eyes:      Extraocular Movements: Extraocular movements intact.      Conjunctiva/sclera: Conjunctivae normal.   Cardiovascular:      Rate and Rhythm: Normal rate and regular rhythm.      Heart sounds: Normal heart sounds.   Pulmonary:      Effort: Pulmonary effort is normal. No respiratory distress.      Breath sounds: Normal breath sounds.   Abdominal:      General: Bowel sounds are normal. There is no distension.      Palpations: Abdomen is soft.      Tenderness: There is no abdominal tenderness.   Musculoskeletal:         General: No tenderness. Normal range of motion.      Cervical back: Normal range of motion.   Skin:     General: Skin is warm and dry.   Neurological:      General: No focal deficit present.      Mental Status: He is alert and oriented to person, place, and time.   Psychiatric:         Mood and Affect: Mood normal.         Behavior: Behavior normal.                Significant Labs: All pertinent labs within the past 24 hours have been reviewed.  Blood Culture:   Recent Labs   Lab 04/29/24 2111   LABBLOO No Growth to date  No Growth  to date  No Growth to date  No Growth to date     BMP:   Recent Labs   Lab 04/30/24  1813   *      K 3.8      CO2 26   BUN 24*   CREATININE 1.6*   CALCIUM 8.7   MG 1.6     CBC:   Recent Labs   Lab 04/29/24 2111 04/30/24  1813   WBC 11.32 9.03   HGB 11.9* 11.7*   HCT 36.6* 36.8*    209     CMP:   Recent Labs   Lab 04/29/24 2111 04/30/24  0423 04/30/24  1813    140 142   K 3.6 3.6 3.8    105 106   CO2 22* 25 26   * 109 135*   BUN 21 24* 24*   CREATININE 1.0 1.2 1.6*   CALCIUM 8.4* 8.2* 8.7   PROT 6.9 6.3 6.8   ALBUMIN 2.8* 2.5* 2.7*   BILITOT 0.6 0.6 0.4   ALKPHOS 73 63 76   AST 32 30 34   ALT 18 17 18   ANIONGAP 11 10 10       Coagulation:   Recent Labs   Lab 04/30/24  1813   INR 1.0       Lipase:   Recent Labs   Lab 04/30/24  1813   LIPASE 13   Magnesium:   Recent Labs   Lab 04/30/24 0423 04/30/24  1813   MG 1.6 1.6     Troponin:   Recent Labs   Lab 04/29/24 2111 04/30/24 0423 04/30/24  1813   TROPONINI 0.029* 0.027* 0.013     Urine Culture:   Recent Labs   Lab 04/29/24  2343   LABURIN GRAM NEGATIVE CANDIS  > 100,000 cfu/ml  Identification and susceptibility pending  *     Urine Studies:   Recent Labs   Lab 04/30/24  1823   COLORU Yellow   APPEARANCEUA Ex.Turbid   PHUR 6.0   SPECGRAV 1.030   PROTEINUA 2+*   GLUCUA Negative   KETONESU Negative   BILIRUBINUA Negative   OCCULTUA 2+*   NITRITE Negative   LEUKOCYTESUR 3+*   RBCUA >100*   WBCUA >100*   BACTERIA Moderate*   SQUAMEPITHEL 2   HYALINECASTS 0       Significant Imaging: I have reviewed all pertinent imaging results/findings within the past 24 hours.  CT Abdomen Pelvis With IV Contrast NO Oral Contrast  Narrative: EXAMINATION:  CT ABDOMEN PELVIS WITH IV CONTRAST    CLINICAL HISTORY:  LLQ abdominal pain;    TECHNIQUE:  Low dose axial images, sagittal and coronal reformations were obtained from the lung bases to the pubic symphysis following the IV administration of 100 mL of Omnipaque 350 .  Oral contrast was not  administered.    COMPARISON:  None.    FINDINGS:  Abdomen:    - Lower thorax:    - Lung bases: Mild right lower lobe atelectasis or mild consolidation posteriorly.    - Liver: No focal mass.    - Gallbladder: No calcified gallstones.    - Bile Ducts: No evidence of intra or extra hepatic biliary ductal dilation.    - Spleen: Negative.    - Kidneys: 4.8 cm simple cyst at the upper pole the right kidney.  No stone, soft tissue mass, hydronephrosis or hydroureter bilaterally.    - Adrenals: Unremarkable.    - Pancreas: No mass or peripancreatic fat stranding.    - Retroperitoneum:  No significant adenopathy.    - Vascular: No abdominal aortic aneurysm.    - Abdominal wall:  Small fat containing umbilical hernia.    The appendix is adequately maintained.    Pelvis:    There is diffuse wall thickening of the urinary bladder with mild stranding.  Findings could represent cystitis.  Follow-up recommended.    The prostate is mildly enlarged measuring 5.4 cm.    Bowel/Mesentery:    No evidence of bowel obstruction or inflammation.  Mild gaseous distension.    Few scattered diverticula of the left colon with no evidence of acute diverticulitis.    Nondistention of the distal sigmoid colon and rectum.  Mild proctocolitis is not excluded.    Bones:  No acute fractures.    Grade 1 spondylolisthesis of L3 on L4.  Severe disc space narrowing at L5-S1 and L4-5 with moderate disc space narrowing at L3-4 and L2-3.    Multilevel mild diffuse posterior disc osteophyte complex.    Mild central canal narrowing at L5-S1.  Moderate-severe central canal narrowing at L4-5.  Severe central canal narrowing at L3-4.  Severe central canal narrowing at L2-3.    Severe foraminal narrowing bilaterally from L1 through S1, right greater than left, most prominent at L5-S1 and L4-5.  Impression: 1. Diffuse wall thickening of the urinary bladder with mild adjacent stranding.  Findings suggest cystitis.  Follow-up recommended.  2. Mild  prostatomegaly.  3. Mild right lower lobe pulmonary atelectasis or mild consolidation.  Follow-up recommended.  4. Nondistention of the distal sigmoid colon and rectum.  Mild wall thickening/proctocolitis is not excluded.  5. Multilevel chronic degenerative changes of the lumbar spine.  See above comments.    Electronically signed by: Satinder Lakhani  Date:    04/30/2024  Time:    20:45  X-Ray Chest AP Portable  Narrative: EXAMINATION:  XR CHEST AP PORTABLE    CLINICAL HISTORY:  Sepsis;    TECHNIQUE:  Single frontal view of the chest was performed.    COMPARISON:  04/29/2024    FINDINGS:  Cardiac silhouette is within normal limits.  Mild elevation the right hemidiaphragm.    Mild right basilar atelectasis or scarring.    No effusion or pneumothorax.  No mass or consolidation.  No acute osseous abnormality.    No significant change.  Impression: No acute radiographic abnormality with no significant change.  See above comments.    Electronically signed by: Satinder Lakhani  Date:    04/30/2024  Time:    19:32  Echo    Left Ventricle: The left ventricle is normal in size. Ventricular mass   is normal. Mildly increased wall thickness. Mild septal thickening. There   is concentric remodeling. There is normal systolic function with a   visually estimated ejection fraction of 60 - 65%. There is normal   diastolic function.    Right Ventricle: Normal right ventricular cavity size. Wall thickness   is normal. Systolic function is normal.    Aortic Valve: The aortic valve is a trileaflet valve. There is moderate   aortic valve sclerosis. There is mild annular calcification present. There   is trace aortic regurgitation.    Mitral Valve: There is mild bileaflet sclerosis.    Pulmonic Valve: There is mild regurgitation.    Pulmonary Artery: No pulmonary hypertension.    IVC/SVC: Normal venous pressure at 3 mmHg.

## 2024-05-01 NOTE — ASSESSMENT & PLAN NOTE
Chronic, controlled. Latest blood pressure and vitals reviewed-     Temp:  [97.8 °F (36.6 °C)-100.5 °F (38.1 °C)]   Pulse:  []   Resp:  [11-25]   BP: ()/(50-74)   SpO2:  [95 %-99 %] .   Home meds for hypertension were reviewed and noted below.   Hypertension Medications      While in the hospital, will manage blood pressure as follows; Adjust home antihypertensive regimen as follows- Hold for now given episodes of hypotension during admission yesterday and on arrival to ED today and concern for sepsis . Resume home medication as appropriate

## 2024-05-01 NOTE — ED NOTES
Pt to ED via EMS after syncopal episode. Pt was discharged today from hospital and was sitting outside with family and had LOC for several seconds. Pt hypotensive with EMS, pt's BP improved after fluids from EMS. Pt has no complaints at this time. Condom catheter noted upon arrival.     LOC: The patient is awake, alert and aware of environment with an appropriate affect, the patient is oriented x 3 and speaking appropriately.  APPEARANCE: Patient resting comfortably and in no acute distress, patient is clean and well groomed, patient's clothing is properly fastened.  SKIN: The skin is hot and dry, color consistent with ethnicity, patient has normal skin turgor and moist mucus membranes, skin intact, no breakdown or bruising noted.  MUSCULOSKELETAL: Patient moving all extremities spontaneously, no obvious swelling or deformities noted.  RESPIRATORY: Airway is open and patent, respirations are spontaneous, patient has a normal effort and rate, no accessory muscle use noted.  CARDIAC: Patient has a normal rate and regular rhythm, no periphreal edema noted, capillary refill < 3 seconds.  ABDOMEN: Soft and non tender to palpation, no distention noted, normoactive bowel sounds present in all four quadrants.  NEUROLOGIC:  facial expression is symmetrical, patient moving all extremities spontaneously, normal sensation in all extremities when touched with a finger.  Follows all commands appropriately.

## 2024-05-01 NOTE — ASSESSMENT & PLAN NOTE
Likely resolved. Pt reported multiple episodes of diarrhea after colonoscopy last week. Stool studies ordered yesterday but not obtained since he had no further episodes.  Monitor I/Os and collect stool studies if diarrhea persists.

## 2024-05-01 NOTE — HOSPITAL COURSE
Patient admitted for sepsis. Now with fever, tachycardia, hypotension. Started on vanc and zosyn 4/30.  Vanc stopped 5/1 as MRSA negative. Urine culture from 4/29 growing e. Coli. Blood cultures collected 4/29 with coagulase negative staph - likely contaminant. Multiple repeat blood cultures negative. Patient transitioned to bactrim - plan for 14 days total treatment. Patient with borderline orthostatic hypotension so given additional fluid prior to discharge. Patient to have close follow-up with PCP. Instructed to hold BP medications until that time. Patient instructed to take precautions for orthostatic hypotension.     Patient seen and examined on day of discharge and deemed appropriate for discharge. Plan discussed with patient, who was agreeable and amenable. Medications were discussed and reviewed, outpatient follow-up scheduled, ER precautions were given, all questions were answered to the patient's satisfaction, and patient was subsequently discharged.

## 2024-05-01 NOTE — PROGRESS NOTES
[facility] - Emergency Dept  [facility]  Progress Note    Patient Name: Tito Menard  MRN: [id number]  Patient Class: IP- Inpatient   Admission Date: 4/30/2024  Length of Stay: 1 days  Attending Physician: Gill Warren MD  Primary Care Provider: Amanda Brown MD        Subjective:     Principal Problem:Sepsis due to gram-negative UTI        HPI:  Tito Menard is a 77yo M w/ a hx of HTN, HLD, GERD, T2DM, and Parkinsons (on sinemet) presenting after a syncopal episode at home. Patient was admitted yesterday after a syncopal episode. He reported multiple episodes of diarrhea at the time, stool studies were ordered but not collected as he did not have any further episodes of diarrhea while in the hospital. He also was treated with zosyn for UTI and discharged home with bactrim. He reports that when he got home he was feeling okay but felt cold in the home so he went to go sit outside in the sun. He was taking with his brother and the next thing he remembers was his brother waking him up and telling him he has passed out while sitting and talking.  He was told he was slumped over with his eyes open and when his brother went to call 911 he somehow used his walker and ambulated back inside the house. He does not remember this. He did not have a prodrome. Denies any chest pain, palpitations, fever, chills, cough, shortness of breath, abdominal pain, dysuria, frequency.urgency flank pain. He felt generally well when discharged. His diarrhea stopped. He ate soup and crackers.     Noted to have extensive work up for syncopal in the past including CTA head and neck, EEG, event monitor. TTE yesterday fairly unremarkable. UA yesterday + for GNRs.    In the ED: tachycardic, febrile, hypotensive (90s/50s). Workup remarkable for infectious appearing urine, imaging w/ evidence of bladder wall thickening and fat stranding. Patient give IV zosyn and vancomycin as well as 1L  IVFs. Admitted to  for syncopal episode  in setting of urosepsis.     Overview/Hospital Course:  Patient admitted for sepsis. Now with fever, tachycardia, hypotension. Broad spectrum antibiotics started. Suspect UTI.     Interval History: Patient reports feeling well this morning. Denies chest pain, SOB, n/v, c/d.       Review of Systems  Objective:     Vital Signs (Most Recent):  Temp: 98.9 °F (37.2 °C) (04/30/24 2330)  Pulse: 65 (05/01/24 1003)  Resp: 15 (05/01/24 1003)  BP: 132/70 (05/01/24 1003)  SpO2: 98 % (05/01/24 1003) Vital Signs (24h Range):  Temp:  [98.9 °F (37.2 °C)-100.5 °F (38.1 °C)] 98.9 °F (37.2 °C)  Pulse:  [] 65  Resp:  [11-25] 15  SpO2:  [95 %-99 %] 98 %  BP: ()/(50-73) 132/70     Weight: 93.9 kg (207 lb)  Body mass index is 28.87 kg/m².    Intake/Output Summary (Last 24 hours) at 5/1/2024 1219  Last data filed at 5/1/2024 0232  Gross per 24 hour   Intake 2149 ml   Output --   Net 2149 ml         Physical Exam  Vitals and nursing note reviewed.   Constitutional:       General: He is not in acute distress.  HENT:      Head: Normocephalic and atraumatic.      Nose: No congestion.      Mouth/Throat:      Pharynx: No oropharyngeal exudate.   Eyes:      Extraocular Movements: Extraocular movements intact.      Conjunctiva/sclera: Conjunctivae normal.   Cardiovascular:      Rate and Rhythm: Normal rate and regular rhythm.      Heart sounds: Normal heart sounds. No murmur heard.  Pulmonary:      Effort: Pulmonary effort is normal. No respiratory distress.      Breath sounds: Normal breath sounds.   Abdominal:      General: Bowel sounds are normal. There is no distension.      Palpations: Abdomen is soft.      Tenderness: There is no abdominal tenderness.   Musculoskeletal:         General: No tenderness. Normal range of motion.      Cervical back: Normal range of motion.   Skin:     General: Skin is warm and dry.   Neurological:      General: No focal deficit present.      Mental Status: He is alert and oriented to person, place,  and time.   Psychiatric:         Mood and Affect: Mood normal.         Behavior: Behavior normal.             Significant Labs: All pertinent labs within the past 24 hours have been reviewed.    Significant Imaging: I have reviewed all pertinent imaging results/findings within the past 24 hours.    Assessment/Plan:      * Sepsis due to gram-negative UTI  This patient does have evidence of infective focus  My overall impression is sepsis.  Source: Urinary Tract  Antibiotics given-   Antibiotics (72h ago, onward)      Start     Stop Route Frequency Ordered    05/01/24 0300  piperacillin-tazobactam (ZOSYN) 4.5 g in dextrose 5 % in water (D5W) 100 mL IVPB (MB+)         -- IV Every 8 hours (non-standard times) 04/30/24 2240          Latest lactate reviewed-  Recent Labs   Lab 04/30/24  1824   POCLAC 1.58       Organ dysfunction indicated by Acute kidney injury    Fluid challenge Not needed - patient is not hypotensive  ; received 1.5L while in ED with good blood pressure response.    Post- resuscitation assessment No - Post resuscitation assessment not needed       Will Not start Pressors- Levophed for MAP of 65  Source control achieved by: Zosyn for now, will deescalate as appropriate     BRENDA (acute kidney injury)  Patient with acute kidney injury/acute renal failure likely due to pre-renal azotemia due to IVVD and acute tubular necrosis caused by hypotension/sepsis . 1.6 on admission. BRENDA is currently improving. Baseline creatinine  1.0  - Labs reviewed- Renal function/electrolytes with Estimated Creatinine Clearance: 63.7 mL/min (based on SCr of 1.1 mg/dL). according to latest data. Monitor urine output and serial BMP and adjust therapy as needed. Avoid nephrotoxins and renally dose meds for GFR listed above.  - continue to monitor    Syncope  Admitted yesterday for the same. Today occurred while seated outside and was described as brief unresponsiveness with eyes open and slumped over, witnessed by brother.  Work up  "previously including CTA head and neck, event monitor, TTE, EEG all unremarkable. Hypotensive on EMS arrival. Suspect this episode was 2/2 hypotension/sepsis UTI.  - Treat infection and hypotension  - Monitor on telemetry   - fall precautions  - orthostatics q shift    Parkinsonism  Continue sinemet     Diarrhea  Likely resolved. Pt reported multiple episodes of diarrhea after colonoscopy last week. Stool studies ordered yesterday but not obtained since he had no further episodes.  Monitor I/Os and collect stool studies if diarrhea persists.     Gastroesophageal reflux disease with esophagitis  - Continue home PPI    BPH with obstruction/lower urinary tract symptoms  - Continue home tamsulosin and finasteride     Diabetic polyneuropathy associated with type 2 diabetes mellitus  Patient's FSGs are controlled on current medication regimen.  Last A1c reviewed-   Lab Results   Component Value Date    HGBA1C 5.7 (H) 05/01/2024     Most recent fingerstick glucose reviewed- No results for input(s): "POCTGLUCOSE" in the last 24 hours.  Current correctional scale  Low  Maintain anti-hyperglycemic dose as follows-   Antihyperglycemics (From admission, onward)      Start     Stop Route Frequency Ordered    04/30/24 2304  insulin aspart U-100 pen 0-5 Units         -- SubQ Before meals & nightly PRN 04/30/24 2205          Hold Oral hypoglycemics while patient is in the hospital.  Continue home gabapentin     Vitamin D deficiency disease  - Continue vitamin D supplement     Mixed hyperlipidemia  - Continue home statin     Essential hypertension  Chronic, controlled. Latest blood pressure and vitals reviewed-     Temp:  [98.9 °F (37.2 °C)-100.5 °F (38.1 °C)]   Pulse:  []   Resp:  [11-25]   BP: ()/(50-73)   SpO2:  [95 %-99 %] .   Home meds for hypertension were reviewed and noted below.   Hypertension Medications      While in the hospital, will manage blood pressure as follows; Adjust home antihypertensive regimen as " follows- Hold for now given episodes of hypotension during admission yesterday and on arrival to ED today and concern for sepsis . Resume home medication as appropriate      VTE Risk Mitigation (From admission, onward)           Ordered     enoxaparin injection 40 mg  Daily         04/30/24 2205     IP VTE HIGH RISK PATIENT  Once         04/30/24 2205     Place sequential compression device  Until discontinued         04/30/24 2205                    Discharge Planning   ELLEN: 5/3/2024     Code Status: Full Code   Is the patient medically ready for discharge?:     Reason for patient still in hospital (select all that apply): Patient trending condition, Laboratory test, and Treatment                     Gill Warren MD  Department of Hospital Medicine   [facility] - Emergency Dept

## 2024-05-01 NOTE — ASSESSMENT & PLAN NOTE
Patient with acute kidney injury/acute renal failure likely due to pre-renal azotemia due to IVVD and acute tubular necrosis caused by hypotension/sepsis . 1.6 on admission. BRENDA is currently improving. Baseline creatinine  1.0  - Labs reviewed- Renal function/electrolytes with Estimated Creatinine Clearance: 63.7 mL/min (based on SCr of 1.1 mg/dL). according to latest data. Monitor urine output and serial BMP and adjust therapy as needed. Avoid nephrotoxins and renally dose meds for GFR listed above.  - continue to monitor

## 2024-05-01 NOTE — SUBJECTIVE & OBJECTIVE
Interval History: Patient reports feeling well this morning. Denies chest pain, SOB, n/v, c/d.       Review of Systems  Objective:     Vital Signs (Most Recent):  Temp: 98.9 °F (37.2 °C) (04/30/24 2330)  Pulse: 65 (05/01/24 1003)  Resp: 15 (05/01/24 1003)  BP: 132/70 (05/01/24 1003)  SpO2: 98 % (05/01/24 1003) Vital Signs (24h Range):  Temp:  [98.9 °F (37.2 °C)-100.5 °F (38.1 °C)] 98.9 °F (37.2 °C)  Pulse:  [] 65  Resp:  [11-25] 15  SpO2:  [95 %-99 %] 98 %  BP: ()/(50-73) 132/70     Weight: 93.9 kg (207 lb)  Body mass index is 28.87 kg/m².    Intake/Output Summary (Last 24 hours) at 5/1/2024 1219  Last data filed at 5/1/2024 0232  Gross per 24 hour   Intake 2149 ml   Output --   Net 2149 ml         Physical Exam  Vitals and nursing note reviewed.   Constitutional:       General: He is not in acute distress.  HENT:      Head: Normocephalic and atraumatic.      Nose: No congestion.      Mouth/Throat:      Pharynx: No oropharyngeal exudate.   Eyes:      Extraocular Movements: Extraocular movements intact.      Conjunctiva/sclera: Conjunctivae normal.   Cardiovascular:      Rate and Rhythm: Normal rate and regular rhythm.      Heart sounds: Normal heart sounds. No murmur heard.  Pulmonary:      Effort: Pulmonary effort is normal. No respiratory distress.      Breath sounds: Normal breath sounds.   Abdominal:      General: Bowel sounds are normal. There is no distension.      Palpations: Abdomen is soft.      Tenderness: There is no abdominal tenderness.   Musculoskeletal:         General: No tenderness. Normal range of motion.      Cervical back: Normal range of motion.   Skin:     General: Skin is warm and dry.   Neurological:      General: No focal deficit present.      Mental Status: He is alert and oriented to person, place, and time.   Psychiatric:         Mood and Affect: Mood normal.         Behavior: Behavior normal.             Significant Labs: All pertinent labs within the past 24 hours have been  reviewed.    Significant Imaging: I have reviewed all pertinent imaging results/findings within the past 24 hours.

## 2024-05-01 NOTE — ASSESSMENT & PLAN NOTE
This patient does have evidence of infective focus  My overall impression is sepsis.  Source: Urinary Tract  Antibiotics given-   Antibiotics (72h ago, onward)    Start     Stop Route Frequency Ordered    05/01/24 0300  piperacillin-tazobactam (ZOSYN) 4.5 g in dextrose 5 % in water (D5W) 100 mL IVPB (MB+)         -- IV Every 8 hours (non-standard times) 04/30/24 2240        Latest lactate reviewed-  Recent Labs   Lab 04/30/24  1824   POCLAC 1.58       Organ dysfunction indicated by Acute kidney injury    Fluid challenge Not needed - patient is not hypotensive  ; received 1.5L while in ED with good blood pressure response.    Post- resuscitation assessment No - Post resuscitation assessment not needed       Will Not start Pressors- Levophed for MAP of 65  Source control achieved by: Zosyn for now, will deescalate as appropriate

## 2024-05-01 NOTE — ASSESSMENT & PLAN NOTE
Admitted yesterday for the same. Today occurred while seated outside and was described as brief unresponsiveness with eyes open and slumped over, witnessed by brother.  Work up previously including CTA head and neck, event monitor, TTE, EEG all unremarkable. Hypotensive on EMS arrival. Suspect this episode was 2/2 hypotension/sepsis UTI.  - Treat infection and hypotension  - Monitor on telemetry   - fall precautions  - orthostatics q shift

## 2024-05-01 NOTE — ASSESSMENT & PLAN NOTE
"Patient's FSGs are controlled on current medication regimen.  Last A1c reviewed-   Lab Results   Component Value Date    HGBA1C 5.7 (H) 05/01/2024     Most recent fingerstick glucose reviewed- No results for input(s): "POCTGLUCOSE" in the last 24 hours.  Current correctional scale  Low  Maintain anti-hyperglycemic dose as follows-   Antihyperglycemics (From admission, onward)    Start     Stop Route Frequency Ordered    04/30/24 2304  insulin aspart U-100 pen 0-5 Units         -- SubQ Before meals & nightly PRN 04/30/24 2205        Hold Oral hypoglycemics while patient is in the hospital.  Continue home gabapentin   "

## 2024-05-01 NOTE — ASSESSMENT & PLAN NOTE
Chronic, controlled. Latest blood pressure and vitals reviewed-     Temp:  [98.9 °F (37.2 °C)-100.5 °F (38.1 °C)]   Pulse:  []   Resp:  [11-25]   BP: ()/(50-73)   SpO2:  [95 %-99 %] .   Home meds for hypertension were reviewed and noted below.   Hypertension Medications      While in the hospital, will manage blood pressure as follows; Adjust home antihypertensive regimen as follows- Hold for now given episodes of hypotension during admission yesterday and on arrival to ED today and concern for sepsis . Resume home medication as appropriate

## 2024-05-02 DIAGNOSIS — E78.5 HYPERLIPIDEMIA, UNSPECIFIED HYPERLIPIDEMIA TYPE: ICD-10-CM

## 2024-05-02 LAB
ANION GAP SERPL CALC-SCNC: 8 MMOL/L (ref 8–16)
BACTERIA UR CULT: NORMAL
BASOPHILS # BLD AUTO: 0.02 K/UL (ref 0–0.2)
BASOPHILS NFR BLD: 0.3 % (ref 0–1.9)
BUN SERPL-MCNC: 19 MG/DL (ref 8–23)
CALCIUM SERPL-MCNC: 8.3 MG/DL (ref 8.7–10.5)
CHLORIDE SERPL-SCNC: 103 MMOL/L (ref 95–110)
CO2 SERPL-SCNC: 26 MMOL/L (ref 23–29)
CREAT SERPL-MCNC: 1.1 MG/DL (ref 0.5–1.4)
DIFFERENTIAL METHOD BLD: ABNORMAL
EOSINOPHIL # BLD AUTO: 0.4 K/UL (ref 0–0.5)
EOSINOPHIL NFR BLD: 6.2 % (ref 0–8)
ERYTHROCYTE [DISTWIDTH] IN BLOOD BY AUTOMATED COUNT: 13.5 % (ref 11.5–14.5)
EST. GFR  (NO RACE VARIABLE): >60 ML/MIN/1.73 M^2
GLUCOSE SERPL-MCNC: 93 MG/DL (ref 70–110)
HCT VFR BLD AUTO: 32.6 % (ref 40–54)
HGB BLD-MCNC: 10.4 G/DL (ref 14–18)
IMM GRANULOCYTES # BLD AUTO: 0.02 K/UL (ref 0–0.04)
IMM GRANULOCYTES NFR BLD AUTO: 0.3 % (ref 0–0.5)
LYMPHOCYTES # BLD AUTO: 1.6 K/UL (ref 1–4.8)
LYMPHOCYTES NFR BLD: 27.8 % (ref 18–48)
MAGNESIUM SERPL-MCNC: 1.9 MG/DL (ref 1.6–2.6)
MCH RBC QN AUTO: 29.3 PG (ref 27–31)
MCHC RBC AUTO-ENTMCNC: 31.9 G/DL (ref 32–36)
MCV RBC AUTO: 92 FL (ref 82–98)
MONOCYTES # BLD AUTO: 0.5 K/UL (ref 0.3–1)
MONOCYTES NFR BLD: 8.4 % (ref 4–15)
NEUTROPHILS # BLD AUTO: 3.3 K/UL (ref 1.8–7.7)
NEUTROPHILS NFR BLD: 57 % (ref 38–73)
NRBC BLD-RTO: 0 /100 WBC
PLATELET # BLD AUTO: 203 K/UL (ref 150–450)
PMV BLD AUTO: 9.7 FL (ref 9.2–12.9)
POCT GLUCOSE: 103 MG/DL (ref 70–110)
POCT GLUCOSE: 114 MG/DL (ref 70–110)
POCT GLUCOSE: 97 MG/DL (ref 70–110)
POTASSIUM SERPL-SCNC: 3.5 MMOL/L (ref 3.5–5.1)
RBC # BLD AUTO: 3.55 M/UL (ref 4.6–6.2)
SODIUM SERPL-SCNC: 137 MMOL/L (ref 136–145)
WBC # BLD AUTO: 5.83 K/UL (ref 3.9–12.7)

## 2024-05-02 PROCEDURE — 25000003 PHARM REV CODE 250: Mod: HCNC

## 2024-05-02 PROCEDURE — 63600175 PHARM REV CODE 636 W HCPCS: Mod: HCNC

## 2024-05-02 PROCEDURE — 20600001 HC STEP DOWN PRIVATE ROOM: Mod: HCNC

## 2024-05-02 PROCEDURE — 83735 ASSAY OF MAGNESIUM: CPT | Mod: HCNC

## 2024-05-02 PROCEDURE — 63600175 PHARM REV CODE 636 W HCPCS: Mod: HCNC | Performed by: STUDENT IN AN ORGANIZED HEALTH CARE EDUCATION/TRAINING PROGRAM

## 2024-05-02 PROCEDURE — 87040 BLOOD CULTURE FOR BACTERIA: CPT | Mod: HCNC | Performed by: STUDENT IN AN ORGANIZED HEALTH CARE EDUCATION/TRAINING PROGRAM

## 2024-05-02 PROCEDURE — 80048 BASIC METABOLIC PNL TOTAL CA: CPT | Mod: HCNC

## 2024-05-02 PROCEDURE — 36415 COLL VENOUS BLD VENIPUNCTURE: CPT | Mod: HCNC | Performed by: STUDENT IN AN ORGANIZED HEALTH CARE EDUCATION/TRAINING PROGRAM

## 2024-05-02 PROCEDURE — 36415 COLL VENOUS BLD VENIPUNCTURE: CPT | Mod: HCNC

## 2024-05-02 PROCEDURE — 85025 COMPLETE CBC W/AUTO DIFF WBC: CPT | Mod: HCNC

## 2024-05-02 RX ORDER — PRAVASTATIN SODIUM 20 MG/1
20 TABLET ORAL
Qty: 90 TABLET | Refills: 0 | Status: SHIPPED | OUTPATIENT
Start: 2024-05-02

## 2024-05-02 RX ADMIN — PANTOPRAZOLE SODIUM 40 MG: 40 TABLET, DELAYED RELEASE ORAL at 08:05

## 2024-05-02 RX ADMIN — Medication 6 MG: at 08:05

## 2024-05-02 RX ADMIN — CHOLECALCIFEROL TAB 25 MCG (1000 UNIT) 2000 UNITS: 25 TAB at 08:05

## 2024-05-02 RX ADMIN — FLUTICASONE PROPIONATE 50 MCG: 50 SPRAY, METERED NASAL at 08:05

## 2024-05-02 RX ADMIN — FINASTERIDE 5 MG: 5 TABLET, FILM COATED ORAL at 08:05

## 2024-05-02 RX ADMIN — SIMETHICONE 80 MG: 80 TABLET, CHEWABLE ORAL at 09:05

## 2024-05-02 RX ADMIN — CARBIDOPA AND LEVODOPA 1 TABLET: 25; 100 TABLET ORAL at 08:05

## 2024-05-02 RX ADMIN — CARBIDOPA AND LEVODOPA 1 TABLET: 25; 100 TABLET ORAL at 02:05

## 2024-05-02 RX ADMIN — ACETAMINOPHEN 1000 MG: 500 TABLET ORAL at 08:05

## 2024-05-02 RX ADMIN — ASPIRIN 81 MG: 81 TABLET, COATED ORAL at 08:05

## 2024-05-02 RX ADMIN — PRAVASTATIN SODIUM 20 MG: 20 TABLET ORAL at 08:05

## 2024-05-02 RX ADMIN — ENOXAPARIN SODIUM 40 MG: 40 INJECTION SUBCUTANEOUS at 04:05

## 2024-05-02 RX ADMIN — ACETAMINOPHEN 1000 MG: 500 TABLET ORAL at 05:05

## 2024-05-02 RX ADMIN — PIPERACILLIN SODIUM AND TAZOBACTAM SODIUM 4.5 G: 4; .5 INJECTION, POWDER, FOR SOLUTION INTRAVENOUS at 04:05

## 2024-05-02 RX ADMIN — PIPERACILLIN SODIUM AND TAZOBACTAM SODIUM 4.5 G: 4; .5 INJECTION, POWDER, FOR SOLUTION INTRAVENOUS at 05:05

## 2024-05-02 RX ADMIN — SODIUM CHLORIDE, POTASSIUM CHLORIDE, SODIUM LACTATE AND CALCIUM CHLORIDE 1000 ML: 600; 310; 30; 20 INJECTION, SOLUTION INTRAVENOUS at 11:05

## 2024-05-02 RX ADMIN — TAMSULOSIN HYDROCHLORIDE 0.4 MG: 0.4 CAPSULE ORAL at 08:05

## 2024-05-02 RX ADMIN — GABAPENTIN 300 MG: 300 CAPSULE ORAL at 08:05

## 2024-05-02 NOTE — TELEPHONE ENCOUNTER
Refill request routed to [facility] Review Pool for Pharmacist Review. pravastatin and BRENDA (acute kidney injury)

## 2024-05-02 NOTE — ASSESSMENT & PLAN NOTE
Patient's FSGs are controlled on current medication regimen.  Last A1c reviewed-   Lab Results   Component Value Date    HGBA1C 5.7 (H) 05/01/2024     Most recent fingerstick glucose reviewed-   Recent Labs   Lab 05/01/24 2116 05/02/24  0924   POCTGLUCOSE 121* 97     Current correctional scale  Low  Maintain anti-hyperglycemic dose as follows-   Antihyperglycemics (From admission, onward)    Start     Stop Route Frequency Ordered    04/30/24 2304  insulin aspart U-100 pen 0-5 Units         -- SubQ Before meals & nightly PRN 04/30/24 2205        Hold Oral hypoglycemics while patient is in the hospital.  Continue home gabapentin

## 2024-05-02 NOTE — SUBJECTIVE & OBJECTIVE
Interval History: Patient reports some lightheadedness with ambulation to bathroom. Denies chest pain, difficulty breathing, n/v. Last episode of diarrhea was 5/29, no BM since.    Review of Systems  Objective:     Vital Signs (Most Recent):  Temp: 97.9 °F (36.6 °C) (05/02/24 1207)  Pulse: (!) 58 (05/02/24 1207)  Resp: 18 (05/02/24 1207)  BP: (!) 141/81 (05/02/24 1207)  SpO2: 100 % (05/02/24 1207) Vital Signs (24h Range):  Temp:  [97.9 °F (36.6 °C)-98.4 °F (36.9 °C)] 97.9 °F (36.6 °C)  Pulse:  [53-81] 58  Resp:  [17-20] 18  SpO2:  [95 %-100 %] 100 %  BP: (103-141)/(59-81) 141/81     Weight:  (bed scale does not work)  Body mass index is 28.87 kg/m².    Intake/Output Summary (Last 24 hours) at 5/2/2024 1222  Last data filed at 5/2/2024 1029  Gross per 24 hour   Intake 558.48 ml   Output 1015 ml   Net -456.52 ml         Physical Exam  Vitals and nursing note reviewed.   Constitutional:       General: He is not in acute distress.  HENT:      Head: Normocephalic and atraumatic.      Nose: No congestion.      Mouth/Throat:      Pharynx: No oropharyngeal exudate.   Eyes:      Extraocular Movements: Extraocular movements intact.      Conjunctiva/sclera: Conjunctivae normal.   Cardiovascular:      Rate and Rhythm: Normal rate and regular rhythm.      Heart sounds: Normal heart sounds. No murmur heard.  Pulmonary:      Effort: Pulmonary effort is normal. No respiratory distress.      Breath sounds: Normal breath sounds.   Abdominal:      General: Bowel sounds are normal. There is no distension.      Palpations: Abdomen is soft.      Tenderness: There is no abdominal tenderness.   Musculoskeletal:         General: No tenderness. Normal range of motion.      Cervical back: Normal range of motion.   Skin:     General: Skin is warm and dry.   Neurological:      General: No focal deficit present.      Mental Status: He is alert and oriented to person, place, and time.   Psychiatric:         Mood and Affect: Mood normal.          Behavior: Behavior normal.             Significant Labs: All pertinent labs within the past 24 hours have been reviewed.    Significant Imaging: I have reviewed all pertinent imaging results/findings within the past 24 hours.

## 2024-05-02 NOTE — PLAN OF CARE
[facility] - Transplant Stepdown  Initial Discharge Assessment       Primary Care Provider: Amanda Brown MD    Admission Diagnosis: Chest pain [R07.9]  Sepsis [A41.9]    Admission Date: 4/30/2024  Expected Discharge Date: 5/3/2024    Transition of Care Barriers: None    Payor: HU[employer] MANAGED MEDICARE / Plan: HU[employer] MEDICARE HMO / Product Type: Capitation /     Extended Emergency Contact Information  Primary Emergency Contact: Guillermina Menard  Address: [address] [place] [address] [facility]  Home Phone: [phone]  Mobile Phone: [phone]  Relation: Spouse    Discharge Plan A: Home Health, Home with family, Home  Discharge Plan B: Home, Home with family, Home Health      [facility] - [facility] [address] [facility]  [address] [facility] [address]  Phone: [phone] Fax: [phone]    [facility] [address] [place], [place] [address] - [address] [facility]  [address]  [facility] [address]  Phone: [phone] Fax: [phone]      Initial Assessment (most recent)       Adult Discharge Assessment - 05/02/24 0819          Discharge Assessment    Assessment Type Discharge Planning Assessment     Confirmed/corrected address, phone number and insurance Yes     Confirmed Demographics Correct on Facesheet     Source of Information patient     When was your last doctors appointment? 04/01/24     Communicated ELLEN with patient/caregiver Date not available/Unable to determine     People in Home spouse;child(ashley), adult     Do you expect to return to your current living situation? Yes     Do you have help at home or someone to help you manage your care at home? Yes     Prior to hospitilization cognitive status: Unable to Assess     Current cognitive status: No Deficits     Walking or Climbing Stairs Difficulty yes     Mobility Management WALKER     Dressing/Bathing Difficulty no     Home Accessibility not wheelchair accessible     Home Layout Able to  live on 1st floor     Equipment Currently Used at Home walker, standard;walker, rolling;wheelchair     Readmission within 30 days? Yes     Patient currently being followed by outpatient case management? Unable to determine (comments)     Do you currently have service(s) that help you manage your care at home? No     Do you take prescription medications? Yes     Do you have prescription coverage? Yes     Do you have any problems affording any of your prescribed medications? No     Is the patient taking medications as prescribed? yes     Who is going to help you get home at discharge? FAMILY     How do you get to doctors appointments? family or friend will provide     Are you on dialysis? No     Do you take coumadin? No     Discharge Plan A Home Health;Home with family;Home     Discharge Plan B Home;Home with family;Home Health     DME Needed Upon Discharge  none     Discharge Plan discussed with: Patient     Transition of Care Barriers None        Physical Activity    On average, how many days per week do you engage in moderate to strenuous exercise (like a brisk walk)? 0 days     On average, how many minutes do you engage in exercise at this level? 0 min        Financial Resource Strain    How hard is it for you to pay for the very basics like food, housing, medical care, and heating? Not hard at all        Housing Stability    In the last 12 months, was there a time when you were not able to pay the mortgage or rent on time? No     At any time in the past 12 months, were you homeless or living in a shelter (including now)? No        Transportation Needs    In the past 12 months, has lack of transportation kept you from medical appointments or from getting medications? No     In the past 12 months, has lack of transportation kept you from meetings, work, or from getting things needed for daily living? No        Food Insecurity    Within the past 12 months, you worried that your food would run out before you got the  money to buy more. Never true     Within the past 12 months, the food you bought just didn't last and you didn't have money to get more. Never true        Stress    Do you feel stress - tense, restless, nervous, or anxious, or unable to sleep at night because your mind is troubled all the time - these days? Patient unable to answer        Alcohol Use    Q1: How often do you have a drink containing alcohol? Patient declined     Q2: How many drinks containing alcohol do you have on a typical day when you are drinking? Patient declined     Q3: How often do you have six or more drinks on one occasion? Patient declined                 Patient lives in a 1 story house with his spouse and his adult son he is not dialysis and does not take coumadin  Discharge Plan A home health and Plan B home health have been determined by review of patient's clinical status, future medical and therapeutic needs, and coverage/benefits for post-acute care in coordination with multidisciplinary team members.

## 2024-05-02 NOTE — TELEPHONE ENCOUNTER
No care due was identified.  [facility] Embedded Care Due Messages. Reference number: [id number].   5/02/2024 10:39:15 AM CDT

## 2024-05-02 NOTE — ASSESSMENT & PLAN NOTE
Occurred while seated outside and was described as brief unresponsiveness with eyes open and slumped over, witnessed by brother.  Work up previously including CTA head and neck, event monitor, TTE, EEG all unremarkable. Hypotensive on EMS arrival. Suspect this episode was 2/2 hypotension/sepsis UTI.  - Monitor on telemetry   - fall precautions  - orthostatics q shift

## 2024-05-02 NOTE — PLAN OF CARE
- Patient admitted 4/30/24 with syncopal episodes and low-grade fever (100.5); patient boarded in ED and arrived to [facility] ~1845 yesterday, 5/1.  - No acute events overnight.  - Around midnight, patient ambulated to restroom with walker and standby assistance. No light-headedness on standing/during ambulation. Patient spent a fairly long (~40 minutes) time in restroom attempting to have BM. Patient stated he only passed gas, and felt like he needed to have BM but was starting to feel weak. Patient assisted back to bed. Unable to collect stool studies. Patient is currently a C-diff rule-out.  - IV Abx: Zosyn q8h.  - Patient has chronic back pain. Extra-strength Tylenol administered twice overnight.  - Cardiac monitoring is in place - right bundle branch block with bradycardic HR (low 50s) while sleeping.  - BG monitored AC/HS. BG= 121 at bedtime.  - Urine output= 1,015 mL overnight.

## 2024-05-02 NOTE — ASSESSMENT & PLAN NOTE
Chronic, controlled. Latest blood pressure and vitals reviewed-     Temp:  [97.9 °F (36.6 °C)-98.4 °F (36.9 °C)]   Pulse:  [53-81]   Resp:  [17-20]   BP: (103-141)/(59-81)   SpO2:  [95 %-100 %] .   Home meds for hypertension were reviewed and noted below.   Hypertension Medications      While in the hospital, will manage blood pressure as follows; Adjust home antihypertensive regimen as follows- Hold for now given episodes of hypotension during admission yesterday and on arrival to ED today and concern for sepsis . Resume home medication as appropriate

## 2024-05-02 NOTE — TELEPHONE ENCOUNTER
Refill Decision Note   Tito Menard  is requesting a refill authorization.  Brief Assessment and Rationale for Refill:  Approve     Medication Therapy Plan:  Multiple ED visits due to AMS, syncope, urinary retention, and sepsis. No changes to Pravachol therapy. FOVS 5/29/24.      Extended chart review required: Yes   Comments:     Note composed:3:10 PM 05/02/2024

## 2024-05-02 NOTE — ASSESSMENT & PLAN NOTE
Urine culture positive for e coli. Blood cultures positive with gram positive cocci in clusters, MRSA/MSSA negative.   - continue zosyn for now pending further micro data  - repeat cultures pending     This patient does have evidence of infective focus  My overall impression is sepsis.  Source: Urinary Tract  Antibiotics given-   Antibiotics (72h ago, onward)      Start     Stop Route Frequency Ordered    05/01/24 0300  piperacillin-tazobactam (ZOSYN) 4.5 g in dextrose 5 % in water (D5W) 100 mL IVPB (MB+)         -- IV Every 8 hours (non-standard times) 04/30/24 2240          Latest lactate reviewed-  Recent Labs   Lab 04/30/24  1824   POCLAC 1.58       Organ dysfunction indicated by Acute kidney injury    Fluid challenge Not needed - patient is not hypotensive  ; received 1.5L while in ED with good blood pressure response.    Post- resuscitation assessment No - Post resuscitation assessment not needed       Will Not start Pressors- Levophed for MAP of 65  Source control achieved by: Zosyn for now, will deescalate as appropriate

## 2024-05-02 NOTE — PLAN OF CARE
- Zosyn scheduled  - 1L LR bolus administered over 4 hours per order  - Keep bed low & locked, call light in reach, nonslip footwear in use, calls appropriately for assistance    Problem: Adult Inpatient Plan of Care  Goal: Plan of Care Review  Outcome: Progressing  Goal: Patient-Specific Goal (Individualized)  Outcome: Progressing  Goal: Absence of Hospital-Acquired Illness or Injury  Outcome: Progressing  Goal: Optimal Comfort and Wellbeing  Outcome: Progressing  Goal: Readiness for Transition of Care  Outcome: Progressing     Problem: Infection  Goal: Absence of Infection Signs and Symptoms  Outcome: Progressing     Problem: Diabetes Comorbidity  Goal: Blood Glucose Level Within Targeted Range  Outcome: Progressing     Problem: Acute Kidney Injury/Impairment  Goal: Fluid and Electrolyte Balance  Outcome: Progressing  Goal: Improved Oral Intake  Outcome: Progressing  Goal: Effective Renal Function  Outcome: Progressing

## 2024-05-02 NOTE — PROGRESS NOTES
[facility] Medicine  Progress Note    Patient Name: Tito Menard  MRN: [id number]  Patient Class: IP- Inpatient   Admission Date: 4/30/2024  Length of Stay: 2 days  Attending Physician: Gill Warren MD  Primary Care Provider: Amanda Brown MD        Subjective:     Principal Problem:Sepsis due to gram-negative UTI        HPI:  Tito Menard is a 79yo M w/ a hx of HTN, HLD, GERD, T2DM, and Parkinsons (on sinemet) presenting after a syncopal episode at home. Patient was admitted yesterday after a syncopal episode. He reported multiple episodes of diarrhea at the time, stool studies were ordered but not collected as he did not have any further episodes of diarrhea while in the hospital. He also was treated with zosyn for UTI and discharged home with bactrim. He reports that when he got home he was feeling okay but felt cold in the home so he went to go sit outside in the sun. He was taking with his brother and the next thing he remembers was his brother waking him up and telling him he has passed out while sitting and talking.  He was told he was slumped over with his eyes open and when his brother went to call 911 he somehow used his walker and ambulated back inside the house. He does not remember this. He did not have a prodrome. Denies any chest pain, palpitations, fever, chills, cough, shortness of breath, abdominal pain, dysuria, frequency.urgency flank pain. He felt generally well when discharged. His diarrhea stopped. He ate soup and crackers.     Noted to have extensive work up for syncopal in the past including CTA head and neck, EEG, event monitor. TTE yesterday fairly unremarkable. UA yesterday + for GNRs.    In the ED: tachycardic, febrile, hypotensive (90s/50s). Workup remarkable for infectious appearing urine, imaging w/ evidence of bladder wall thickening and fat stranding. Patient give IV zosyn and vancomycin as well as 1L  IVFs. Admitted to  for syncopal  episode in setting of urosepsis.     Overview/Hospital Course:  Patient admitted for sepsis. Now with fever, tachycardia, hypotension. Started on vanc and zosyn 4/30.  Vanc stopped 5/1 as MRSA negative. Urine culture from 4/29 growing e. Coli. Blood cultures collected 4/29 with gram positive cocci in clusters, MRSA and MSSA negative. Repeat blood cultures thus far negative.     Interval History: Patient reports some lightheadedness with ambulation to bathroom. Denies chest pain, difficulty breathing, n/v. Last episode of diarrhea was 5/29, no BM since.    Review of Systems  Objective:     Vital Signs (Most Recent):  Temp: 97.9 °F (36.6 °C) (05/02/24 1207)  Pulse: (!) 58 (05/02/24 1207)  Resp: 18 (05/02/24 1207)  BP: (!) 141/81 (05/02/24 1207)  SpO2: 100 % (05/02/24 1207) Vital Signs (24h Range):  Temp:  [97.9 °F (36.6 °C)-98.4 °F (36.9 °C)] 97.9 °F (36.6 °C)  Pulse:  [53-81] 58  Resp:  [17-20] 18  SpO2:  [95 %-100 %] 100 %  BP: (103-141)/(59-81) 141/81     Weight:  (bed scale does not work)  Body mass index is 28.87 kg/m².    Intake/Output Summary (Last 24 hours) at 5/2/2024 1222  Last data filed at 5/2/2024 1029  Gross per 24 hour   Intake 558.48 ml   Output 1015 ml   Net -456.52 ml         Physical Exam  Vitals and nursing note reviewed.   Constitutional:       General: He is not in acute distress.  HENT:      Head: Normocephalic and atraumatic.      Nose: No congestion.      Mouth/Throat:      Pharynx: No oropharyngeal exudate.   Eyes:      Extraocular Movements: Extraocular movements intact.      Conjunctiva/sclera: Conjunctivae normal.   Cardiovascular:      Rate and Rhythm: Normal rate and regular rhythm.      Heart sounds: Normal heart sounds. No murmur heard.  Pulmonary:      Effort: Pulmonary effort is normal. No respiratory distress.      Breath sounds: Normal breath sounds.   Abdominal:      General: Bowel sounds are normal. There is no distension.      Palpations: Abdomen is soft.      Tenderness: There  is no abdominal tenderness.   Musculoskeletal:         General: No tenderness. Normal range of motion.      Cervical back: Normal range of motion.   Skin:     General: Skin is warm and dry.   Neurological:      General: No focal deficit present.      Mental Status: He is alert and oriented to person, place, and time.   Psychiatric:         Mood and Affect: Mood normal.         Behavior: Behavior normal.             Significant Labs: All pertinent labs within the past 24 hours have been reviewed.    Significant Imaging: I have reviewed all pertinent imaging results/findings within the past 24 hours.    Assessment/Plan:      * Sepsis due to gram-negative UTI  Urine culture positive for e coli. Blood cultures positive with gram positive cocci in clusters, MRSA/MSSA negative.   - continue zosyn for now pending further micro data  - repeat cultures pending     This patient does have evidence of infective focus  My overall impression is sepsis.  Source: Urinary Tract  Antibiotics given-   Antibiotics (72h ago, onward)      Start     Stop Route Frequency Ordered    05/01/24 0300  piperacillin-tazobactam (ZOSYN) 4.5 g in dextrose 5 % in water (D5W) 100 mL IVPB (MB+)         -- IV Every 8 hours (non-standard times) 04/30/24 2240          Latest lactate reviewed-  Recent Labs   Lab 04/30/24  1824   POCLAC 1.58       Organ dysfunction indicated by Acute kidney injury    Fluid challenge Not needed - patient is not hypotensive  ; received 1.5L while in ED with good blood pressure response.    Post- resuscitation assessment No - Post resuscitation assessment not needed       Will Not start Pressors- Levophed for MAP of 65  Source control achieved by: Zosyn for now, will deescalate as appropriate     BRENDA (acute kidney injury)  Resolved  Patient with acute kidney injury/acute renal failure likely due to pre-renal azotemia due to IVVD and acute tubular necrosis caused by hypotension/sepsis . 1.6 on admission. BRENDA is currently  improving. Baseline creatinine  1.0  - Labs reviewed- Renal function/electrolytes with Estimated Creatinine Clearance: 63.7 mL/min (based on SCr of 1.1 mg/dL). according to latest data. Monitor urine output and serial BMP and adjust therapy as needed. Avoid nephrotoxins and renally dose meds for GFR listed above.  - continue to monitor    Syncope  Occurred while seated outside and was described as brief unresponsiveness with eyes open and slumped over, witnessed by brother.  Work up previously including CTA head and neck, event monitor, TTE, EEG all unremarkable. Hypotensive on EMS arrival. Suspect this episode was 2/2 hypotension/sepsis UTI.  - Monitor on telemetry   - fall precautions  - orthostatics q shift    Parkinsonism  Continue sinemet     Diarrhea  Likely resolved. Pt reported multiple episodes of diarrhea after colonoscopy last week. Stool studies ordered yesterday but not obtained since he had no further episodes.  Monitor I/Os and collect stool studies if diarrhea persists.     Gastroesophageal reflux disease with esophagitis  - Continue home PPI    BPH with obstruction/lower urinary tract symptoms  - Continue home tamsulosin and finasteride     Diabetic polyneuropathy associated with type 2 diabetes mellitus  Patient's FSGs are controlled on current medication regimen.  Last A1c reviewed-   Lab Results   Component Value Date    HGBA1C 5.7 (H) 05/01/2024     Most recent fingerstick glucose reviewed-   Recent Labs   Lab 05/01/24  2116 05/02/24  0924   POCTGLUCOSE 121* 97     Current correctional scale  Low  Maintain anti-hyperglycemic dose as follows-   Antihyperglycemics (From admission, onward)      Start     Stop Route Frequency Ordered    04/30/24 2304  insulin aspart U-100 pen 0-5 Units         -- SubQ Before meals & nightly PRN 04/30/24 2205          Hold Oral hypoglycemics while patient is in the hospital.  Continue home gabapentin     Vitamin D deficiency disease  - Continue vitamin D supplement      Mixed hyperlipidemia  - Continue home statin     Essential hypertension  Chronic, controlled. Latest blood pressure and vitals reviewed-     Temp:  [97.9 °F (36.6 °C)-98.4 °F (36.9 °C)]   Pulse:  [53-81]   Resp:  [17-20]   BP: (103-141)/(59-81)   SpO2:  [95 %-100 %] .   Home meds for hypertension were reviewed and noted below.   Hypertension Medications      While in the hospital, will manage blood pressure as follows; Adjust home antihypertensive regimen as follows- Hold for now given episodes of hypotension during admission yesterday and on arrival to ED today and concern for sepsis . Resume home medication as appropriate      VTE Risk Mitigation (From admission, onward)           Ordered     enoxaparin injection 40 mg  Daily         04/30/24 2205     IP VTE HIGH RISK PATIENT  Once         04/30/24 2205     Place sequential compression device  Until discontinued         04/30/24 2205                    Discharge Planning   ELLEN: 5/5/2024     Code Status: Full Code   Is the patient medically ready for discharge?:     Reason for patient still in hospital (select all that apply): Patient trending condition, Laboratory test, and Treatment  Discharge Plan A: Home Health, Home with family, Home                  Gill Warren MD  Department of Hospital Medicine   [facility] - [facility]

## 2024-05-02 NOTE — ASSESSMENT & PLAN NOTE
Resolved  Patient with acute kidney injury/acute renal failure likely due to pre-renal azotemia due to IVVD and acute tubular necrosis caused by hypotension/sepsis . 1.6 on admission. BRENDA is currently improving. Baseline creatinine  1.0  - Labs reviewed- Renal function/electrolytes with Estimated Creatinine Clearance: 63.7 mL/min (based on SCr of 1.1 mg/dL). according to latest data. Monitor urine output and serial BMP and adjust therapy as needed. Avoid nephrotoxins and renally dose meds for GFR listed above.  - continue to monitor

## 2024-05-02 NOTE — NURSING
Patient arrived to TSU room 40287 from ED in stable condition.      Nurses Note -- 4 Eyes      5/1/2024   7:15 PM      Skin assessed during: Admit      [x] No Altered Skin Integrity Present    []Prevention Measures Documented      [] Yes- Altered Skin Integrity Present or Discovered   [] LDA Added if Not in Epic (Describe Wound)   [] New Altered Skin Integrity was Present on Admit and Documented in LDA   [] Wound Image Taken    Wound Care Consulted? No    Attending Nurse:  Leland Ortega RN/Staff Member:   Emily

## 2024-05-03 ENCOUNTER — TELEPHONE (OUTPATIENT)
Dept: INTERNAL MEDICINE | Facility: CLINIC | Age: 79
End: 2024-05-03
Payer: MEDICARE

## 2024-05-03 VITALS
WEIGHT: 197.75 LBS | SYSTOLIC BLOOD PRESSURE: 144 MMHG | HEIGHT: 71 IN | HEART RATE: 81 BPM | BODY MASS INDEX: 27.69 KG/M2 | DIASTOLIC BLOOD PRESSURE: 81 MMHG | RESPIRATION RATE: 16 BRPM | OXYGEN SATURATION: 98 % | TEMPERATURE: 98 F

## 2024-05-03 LAB
ANION GAP SERPL CALC-SCNC: 8 MMOL/L (ref 8–16)
BASOPHILS # BLD AUTO: 0.02 K/UL (ref 0–0.2)
BASOPHILS NFR BLD: 0.3 % (ref 0–1.9)
BUN SERPL-MCNC: 12 MG/DL (ref 8–23)
CALCIUM SERPL-MCNC: 8.4 MG/DL (ref 8.7–10.5)
CHLORIDE SERPL-SCNC: 103 MMOL/L (ref 95–110)
CO2 SERPL-SCNC: 26 MMOL/L (ref 23–29)
CREAT SERPL-MCNC: 0.9 MG/DL (ref 0.5–1.4)
DIFFERENTIAL METHOD BLD: ABNORMAL
EOSINOPHIL # BLD AUTO: 0.3 K/UL (ref 0–0.5)
EOSINOPHIL NFR BLD: 5.2 % (ref 0–8)
ERYTHROCYTE [DISTWIDTH] IN BLOOD BY AUTOMATED COUNT: 13.3 % (ref 11.5–14.5)
EST. GFR  (NO RACE VARIABLE): >60 ML/MIN/1.73 M^2
FINAL PATHOLOGIC DIAGNOSIS: NORMAL
GLUCOSE SERPL-MCNC: 98 MG/DL (ref 70–110)
GROSS: NORMAL
HCT VFR BLD AUTO: 31.3 % (ref 40–54)
HGB BLD-MCNC: 10.6 G/DL (ref 14–18)
IMM GRANULOCYTES # BLD AUTO: 0.03 K/UL (ref 0–0.04)
IMM GRANULOCYTES NFR BLD AUTO: 0.5 % (ref 0–0.5)
LYMPHOCYTES # BLD AUTO: 2 K/UL (ref 1–4.8)
LYMPHOCYTES NFR BLD: 29.8 % (ref 18–48)
Lab: NORMAL
MAGNESIUM SERPL-MCNC: 1.7 MG/DL (ref 1.6–2.6)
MCH RBC QN AUTO: 30.4 PG (ref 27–31)
MCHC RBC AUTO-ENTMCNC: 33.9 G/DL (ref 32–36)
MCV RBC AUTO: 90 FL (ref 82–98)
MONOCYTES # BLD AUTO: 0.5 K/UL (ref 0.3–1)
MONOCYTES NFR BLD: 8.1 % (ref 4–15)
NEUTROPHILS # BLD AUTO: 3.7 K/UL (ref 1.8–7.7)
NEUTROPHILS NFR BLD: 56.1 % (ref 38–73)
NRBC BLD-RTO: 0 /100 WBC
PLATELET # BLD AUTO: 231 K/UL (ref 150–450)
PMV BLD AUTO: 9.6 FL (ref 9.2–12.9)
POTASSIUM SERPL-SCNC: 3.5 MMOL/L (ref 3.5–5.1)
RBC # BLD AUTO: 3.49 M/UL (ref 4.6–6.2)
SODIUM SERPL-SCNC: 137 MMOL/L (ref 136–145)
WBC # BLD AUTO: 6.54 K/UL (ref 3.9–12.7)

## 2024-05-03 PROCEDURE — 63600175 PHARM REV CODE 636 W HCPCS: Mod: HCNC | Performed by: STUDENT IN AN ORGANIZED HEALTH CARE EDUCATION/TRAINING PROGRAM

## 2024-05-03 PROCEDURE — 25000003 PHARM REV CODE 250: Mod: HCNC | Performed by: STUDENT IN AN ORGANIZED HEALTH CARE EDUCATION/TRAINING PROGRAM

## 2024-05-03 PROCEDURE — 80048 BASIC METABOLIC PNL TOTAL CA: CPT | Mod: HCNC | Performed by: STUDENT IN AN ORGANIZED HEALTH CARE EDUCATION/TRAINING PROGRAM

## 2024-05-03 PROCEDURE — 63600175 PHARM REV CODE 636 W HCPCS: Mod: HCNC

## 2024-05-03 PROCEDURE — 36415 COLL VENOUS BLD VENIPUNCTURE: CPT | Mod: HCNC | Performed by: STUDENT IN AN ORGANIZED HEALTH CARE EDUCATION/TRAINING PROGRAM

## 2024-05-03 PROCEDURE — 83735 ASSAY OF MAGNESIUM: CPT | Mod: HCNC | Performed by: STUDENT IN AN ORGANIZED HEALTH CARE EDUCATION/TRAINING PROGRAM

## 2024-05-03 PROCEDURE — 85025 COMPLETE CBC W/AUTO DIFF WBC: CPT | Mod: HCNC | Performed by: STUDENT IN AN ORGANIZED HEALTH CARE EDUCATION/TRAINING PROGRAM

## 2024-05-03 PROCEDURE — 25000003 PHARM REV CODE 250: Mod: HCNC

## 2024-05-03 RX ORDER — SULFAMETHOXAZOLE AND TRIMETHOPRIM 800; 160 MG/1; MG/1
1 TABLET ORAL 2 TIMES DAILY
Qty: 6 TABLET | Refills: 0 | Status: SHIPPED | OUTPATIENT
Start: 2024-05-10 | End: 2024-05-13

## 2024-05-03 RX ORDER — SULFAMETHOXAZOLE AND TRIMETHOPRIM 800; 160 MG/1; MG/1
1 TABLET ORAL 2 TIMES DAILY
Status: DISCONTINUED | OUTPATIENT
Start: 2024-05-03 | End: 2024-05-03 | Stop reason: HOSPADM

## 2024-05-03 RX ORDER — SULFAMETHOXAZOLE AND TRIMETHOPRIM 800; 160 MG/1; MG/1
1 TABLET ORAL 2 TIMES DAILY
Qty: 20 TABLET | Refills: 0 | Status: SHIPPED | OUTPATIENT
Start: 2024-05-03 | End: 2024-05-13

## 2024-05-03 RX ADMIN — SULFAMETHOXAZOLE AND TRIMETHOPRIM 1 TABLET: 800; 160 TABLET ORAL at 11:05

## 2024-05-03 RX ADMIN — ASPIRIN 81 MG: 81 TABLET, COATED ORAL at 08:05

## 2024-05-03 RX ADMIN — PIPERACILLIN SODIUM AND TAZOBACTAM SODIUM 4.5 G: 4; .5 INJECTION, POWDER, FOR SOLUTION INTRAVENOUS at 08:05

## 2024-05-03 RX ADMIN — CARBIDOPA AND LEVODOPA 1 TABLET: 25; 100 TABLET ORAL at 08:05

## 2024-05-03 RX ADMIN — SODIUM CHLORIDE, POTASSIUM CHLORIDE, SODIUM LACTATE AND CALCIUM CHLORIDE 1000 ML: 600; 310; 30; 20 INJECTION, SOLUTION INTRAVENOUS at 10:05

## 2024-05-03 RX ADMIN — PIPERACILLIN SODIUM AND TAZOBACTAM SODIUM 4.5 G: 4; .5 INJECTION, POWDER, FOR SOLUTION INTRAVENOUS at 01:05

## 2024-05-03 RX ADMIN — GABAPENTIN 300 MG: 300 CAPSULE ORAL at 08:05

## 2024-05-03 RX ADMIN — PANTOPRAZOLE SODIUM 40 MG: 40 TABLET, DELAYED RELEASE ORAL at 08:05

## 2024-05-03 RX ADMIN — FINASTERIDE 5 MG: 5 TABLET, FILM COATED ORAL at 08:05

## 2024-05-03 RX ADMIN — FLUTICASONE PROPIONATE 50 MCG: 50 SPRAY, METERED NASAL at 08:05

## 2024-05-03 RX ADMIN — CHOLECALCIFEROL TAB 25 MCG (1000 UNIT) 2000 UNITS: 25 TAB at 08:05

## 2024-05-03 RX ADMIN — CARBIDOPA AND LEVODOPA 1 TABLET: 25; 100 TABLET ORAL at 03:05

## 2024-05-03 NOTE — PLAN OF CARE
CHW called Spouse/Guillermina about patient experience, Son/Tito stated she left her cell phone at home.

## 2024-05-03 NOTE — TELEPHONE ENCOUNTER
----- Message from Lyndon Henriquez sent at 5/3/2024 12:04 PM CDT -----  Contact: Self [phone]  Would like to receive medical advice.    Would they like a call back or a response via [facility]:  call back     Additional information:  Calling to speak with the office about getting the pt I earlier than the 5/29 for a hosp f/u

## 2024-05-03 NOTE — PLAN OF CARE
Update at 3:15 PM  Patient is cleared for discharge from case management standpoint.    Update at 12:45 PM  PCP follow-up needs to be within 1 week of discharge.    Patient will likely go home this evening or Saturday morning. Will need PCP follow-up. CM staff following.      Mireya Thakkar RN  Weekend  - [facility]  Spectralink: [phone]

## 2024-05-03 NOTE — ASSESSMENT & PLAN NOTE
Urine culture positive for e coli. Blood cultures collected 4/29 with coagulase negative staph - likely contaminant. Multiple repeat blood cultures negative. Patient transitioned to bactrim - plan for 14 days total treatment.      This patient does have evidence of infective focus  My overall impression is sepsis.  Source: Urinary Tract  Antibiotics given-   Antibiotics (72h ago, onward)      Start     Stop Route Frequency Ordered    05/03/24 1130  sulfamethoxazole-trimethoprim 800-160mg per tablet 1 tablet         05/13/24 0859 Oral 2 times daily 05/03/24 1028          Latest lactate reviewed-  Recent Labs   Lab 04/30/24  1824   POCLAC 1.58       Organ dysfunction indicated by Acute kidney injury    Fluid challenge Not needed - patient is not hypotensive  ; received 1.5L while in ED with good blood pressure response.    Post- resuscitation assessment No - Post resuscitation assessment not needed       Will Not start Pressors- Levophed for MAP of 65  Source control achieved by: Mariya for now, will deescalate as appropriate

## 2024-05-03 NOTE — DISCHARGE SUMMARY
[facility] - Transplant [facility] Medicine  Discharge Summary      Patient Name: Tito Menard  MRN: [id number]  ESTER: [id number]  Patient Class: IP- Inpatient  Admission Date: 4/30/2024  Hospital Length of Stay: 3 days  Discharge Date and Time: 5/3/2024  6:23 PM  Attending Physician: Gill Warren MD   Discharging Provider: Gill Warren MD  Primary Care Provider: Amanda Brown MD  [facility] Medicine Team: [facility] Gill Warren MD  Primary Care Team: [facility]    HPI:   Tito Menard is a 77yo M w/ a hx of HTN, HLD, GERD, T2DM, and Parkinsons (on sinemet) presenting after a syncopal episode at home. Patient was admitted yesterday after a syncopal episode. He reported multiple episodes of diarrhea at the time, stool studies were ordered but not collected as he did not have any further episodes of diarrhea while in the hospital. He also was treated with zosyn for UTI and discharged home with bactrim. He reports that when he got home he was feeling okay but felt cold in the home so he went to go sit outside in the sun. He was taking with his brother and the next thing he remembers was his brother waking him up and telling him he has passed out while sitting and talking.  He was told he was slumped over with his eyes open and when his brother went to call 911 he somehow used his walker and ambulated back inside the house. He does not remember this. He did not have a prodrome. Denies any chest pain, palpitations, fever, chills, cough, shortness of breath, abdominal pain, dysuria, frequency.urgency flank pain. He felt generally well when discharged. His diarrhea stopped. He ate soup and crackers.     Noted to have extensive work up for syncopal in the past including CTA head and neck, EEG, event monitor. TTE yesterday fairly unremarkable. UA yesterday + for GNRs.    In the ED: tachycardic, febrile, hypotensive (90s/50s). Workup remarkable for infectious appearing urine, imaging w/  evidence of bladder wall thickening and fat stranding. Patient give IV zosyn and vancomycin as well as 1L  IVFs. Admitted to  for syncopal episode in setting of urosepsis.     * No surgery found *      Hospital Course:   Patient admitted for sepsis. Now with fever, tachycardia, hypotension. Started on vanc and zosyn 4/30.  Vanc stopped 5/1 as MRSA negative. Urine culture from 4/29 growing e. Coli. Blood cultures collected 4/29 with coagulase negative staph - likely contaminant. Multiple repeat blood cultures negative. Patient transitioned to bactrim - plan for 14 days total treatment. Patient with borderline orthostatic hypotension so given additional fluid prior to discharge. Patient to have close follow-up with PCP. Instructed to hold BP medications until that time. Patient instructed to take precautions for orthostatic hypotension.     Patient seen and examined on day of discharge and deemed appropriate for discharge. Plan discussed with patient, who was agreeable and amenable. Medications were discussed and reviewed, outpatient follow-up scheduled, ER precautions were given, all questions were answered to the patient's satisfaction, and patient was subsequently discharged.       Goals of Care Treatment Preferences:  Code Status: Full Code      Consults:     Physical Exam  Vitals and nursing note reviewed.   Constitutional:       General: He is not in acute distress.  HENT:      Head: Normocephalic and atraumatic.      Nose: No congestion.      Mouth/Throat:      Pharynx: No oropharyngeal exudate.   Eyes:      Extraocular Movements: Extraocular movements intact.      Conjunctiva/sclera: Conjunctivae normal.   Cardiovascular:      Rate and Rhythm: Normal rate and regular rhythm.      Heart sounds: Normal heart sounds. No murmur heard.  Pulmonary:      Effort: Pulmonary effort is normal. No respiratory distress.      Breath sounds: Normal breath sounds.   Abdominal:      General: Bowel sounds are normal. There is  no distension.      Palpations: Abdomen is soft.      Tenderness: There is no abdominal tenderness.   Musculoskeletal:         General: No tenderness. Normal range of motion.      Cervical back: Normal range of motion.   Skin:     General: Skin is warm and dry.   Neurological:      General: No focal deficit present.      Mental Status: He is alert and oriented to person, place, and time.   Psychiatric:         Mood and Affect: Mood normal.         Behavior: Behavior normal.        Neuro  Parkinsonism  Continue sinemet     Diabetic polyneuropathy associated with type 2 diabetes mellitus  Patient's FSGs are controlled on current medication regimen.  Last A1c reviewed-   Lab Results   Component Value Date    HGBA1C 5.7 (H) 05/01/2024     Most recent fingerstick glucose reviewed-   Recent Labs   Lab 05/02/24  1330 05/02/24  2214   POCTGLUCOSE 103 114*       Current correctional scale  Low  Maintain anti-hyperglycemic dose as follows-   Antihyperglycemics (From admission, onward)      Start     Stop Route Frequency Ordered    04/30/24 2304  insulin aspart U-100 pen 0-5 Units         -- SubQ Before meals & nightly PRN 04/30/24 2205          Hold Oral hypoglycemics while patient is in the hospital.  Continue home gabapentin     Cardiac/Vascular  Mixed hyperlipidemia  - Continue home statin     Essential hypertension  Chronic, controlled. Latest blood pressure and vitals reviewed-     Temp:  [97.6 °F (36.4 °C)-98.4 °F (36.9 °C)]   Pulse:  [55-91]   Resp:  [16-20]   BP: (122-151)/(66-86)   SpO2:  [97 %-100 %] .   Home meds for hypertension were reviewed and noted below.   Hypertension Medications      While in the hospital, will manage blood pressure as follows; Adjust home antihypertensive regimen as follows- Hold for now given episodes of hypotension during admission yesterday and on arrival to ED today and concern for sepsis . Resume home medication as appropriate    Renal/  BPH with obstruction/lower urinary tract  symptoms  - Continue home tamsulosin and finasteride     BRENDA (acute kidney injury)  Resolved  Patient with acute kidney injury/acute renal failure likely due to pre-renal azotemia due to IVVD and acute tubular necrosis caused by hypotension/sepsis . 1.6 on admission. BRENDA is currently improving. Baseline creatinine  1.0  - Labs reviewed- Renal function/electrolytes with Estimated Creatinine Clearance: 70.9 mL/min (based on SCr of 0.9 mg/dL). according to latest data. Monitor urine output and serial BMP and adjust therapy as needed. Avoid nephrotoxins and renally dose meds for GFR listed above.  - continue to monitor    ID  * Sepsis due to gram-negative UTI  Urine culture positive for e coli. Blood cultures collected 4/29 with coagulase negative staph - likely contaminant. Multiple repeat blood cultures negative. Patient transitioned to bactrim - plan for 14 days total treatment.      This patient does have evidence of infective focus  My overall impression is sepsis.  Source: Urinary Tract  Antibiotics given-   Antibiotics (72h ago, onward)      Start     Stop Route Frequency Ordered    05/03/24 1130  sulfamethoxazole-trimethoprim 800-160mg per tablet 1 tablet         05/13/24 0859 Oral 2 times daily 05/03/24 1028          Latest lactate reviewed-  Recent Labs   Lab 04/30/24  1824   POCLAC 1.58       Organ dysfunction indicated by Acute kidney injury    Fluid challenge Not needed - patient is not hypotensive  ; received 1.5L while in ED with good blood pressure response.    Post- resuscitation assessment No - Post resuscitation assessment not needed       Will Not start Pressors- Levophed for MAP of 65  Source control achieved by: Zosyn for now, will deescalate as appropriate     Endocrine  Vitamin D deficiency disease  - Continue vitamin D supplement     GI  Diarrhea  Likely resolved. Pt reported multiple episodes of diarrhea after colonoscopy last week. Stool studies ordered yesterday but not obtained since he had  no further episodes.  Monitor I/Os and collect stool studies if diarrhea persists.     Gastroesophageal reflux disease with esophagitis  - Continue home PPI    Other  Syncope  Occurred while seated outside and was described as brief unresponsiveness with eyes open and slumped over, witnessed by brother.  Work up previously including CTA head and neck, event monitor, TTE, EEG all unremarkable. Hypotensive on EMS arrival. Suspect this episode was 2/2 hypotension/sepsis UTI.  - Monitor on telemetry   - fall precautions  - orthostatics q shift      Final Active Diagnoses:    Diagnosis Date Noted POA    PRINCIPAL PROBLEM:  Sepsis due to gram-negative UTI [A41.50, N39.0] 04/30/2024 Yes    BRENDA (acute kidney injury) [N17.9] 04/30/2024 Yes    Syncope [R55] 04/26/2021 Yes    Diarrhea [R19.7] 04/30/2024 Yes    Parkinsonism [G20.C] 04/30/2024 Yes    Gastroesophageal reflux disease with esophagitis [K21.00] 05/31/2023 Yes    BPH with obstruction/lower urinary tract symptoms [N40.1, N13.8] 07/19/2018 Yes     Chronic    Diabetic polyneuropathy associated with type 2 diabetes mellitus [E11.42] 10/23/2015 Yes    Vitamin D deficiency disease [E55.9] 10/09/2014 Yes    Essential hypertension [I10]  Yes     Chronic    Mixed hyperlipidemia [E78.2]  Yes     Chronic      Problems Resolved During this Admission:       Discharged Condition: stable    Disposition:     Follow Up:   Follow-up Information       Amanda Brown MD Follow up in 1 week(s).    Specialty: Internal Medicine  Contact information:  [address] [place] [address] [phone]                           Patient Instructions:   No discharge procedures on file.    Significant Diagnostic Studies: Microbiology:   Microbiology Results (last 7 days)       Procedure Component Value Units Date/Time    Blood culture [[id number]] Collected: 05/02/24 0920    Order Status: Completed Specimen: Blood Updated: 05/03/24 1012     Blood Culture, Routine No Growth to  date      No Growth to date    Blood culture [[id number]] Collected: 05/02/24 0919    Order Status: Completed Specimen: Blood Updated: 05/03/24 1012     Blood Culture, Routine No Growth to date      No Growth to date    Blood culture x two cultures. Draw prior to antibiotics. [[id number]] Collected: 04/30/24 1813    Order Status: Completed Specimen: Blood from Peripheral, Antecubital, Right Updated: 05/02/24 2012     Blood Culture, Routine No Growth to date      No Growth to date      No Growth to date    Narrative:      Aerobic and anaerobic    Blood culture x two cultures. Draw prior to antibiotics. [[id number]] Collected: 04/30/24 1813    Order Status: Completed Specimen: Blood from Peripheral, Antecubital, Left Updated: 05/02/24 2012     Blood Culture, Routine No Growth to date      No Growth to date      No Growth to date    Narrative:      Aerobic and anaerobic    Urine culture [[id number]] Collected: 04/30/24 1823    Order Status: Completed Specimen: Urine Updated: 05/02/24 0023     Urine Culture, Routine No significant growth    Narrative:      Specimen Source->Urine    PER BROOKE JACOBS PA-C REQUEST ADD ON URINE SODIUM (ORDER   [id number]) URINE CREATININE (ORDER [id number])  05/01/2024  00:28     Influenza A & B by Molecular [[id number]] Collected: 04/30/24 1824    Order Status: Completed Specimen: Nasopharyngeal Swab Updated: 04/30/24 1952     Influenza A, Molecular Negative     Influenza B, Molecular Negative     Flu A & B Source Nasal swab    Clostridium difficile EIA [[id number]]     Order Status: Canceled Specimen: Stool     Stool culture [[id number]]     Order Status: No result Specimen: Stool               Pending Diagnostic Studies:       None           Medications:  Reconciled Home Medications:      Medication List        CHANGE how you take these medications      clotrimazole-betamethasone 1-0.05% cream  Commonly known as: LOTRISONE  APPLY TOPICALLY 2 (TWO) TIMES DAILY.  What changed:  additional instructions     ketoconazole 2 % cream  Commonly known as: NIZORAL  AAA bid (facial redness and scaling)  What changed: additional instructions     pravastatin 20 MG tablet  Commonly known as: PRAVACHOL  TAKE 1 TABLET EVERY DAY  What changed: when to take this            CONTINUE taking these medications      ARTIFICIAL TEARS OPHT  Place 1-2 drops into both eyes daily as needed (for dry eyes).     aspirin 81 MG EC tablet  Commonly known as: ECOTRIN  TAKE 1 TABLET EVERY DAY.     azelastine 137 mcg (0.1 %) nasal spray  Commonly known as: ASTELIN  1 spray (137 mcg total) by Nasal route 2 (two) times daily.     carbidopa-levodopa  mg  mg per tablet  Commonly known as: SINEMET  Take 1 tablet by mouth 3 (three) times daily.     cholecalciferol (vitamin D3) 50 mcg (2,000 unit) Cap capsule  Commonly known as: VITAMIN D3  Take 1 capsule by mouth once daily.     finasteride 5 mg tablet  Commonly known as: PROSCAR  Take 1 tablet (5 mg total) by mouth once daily.     fluticasone propionate 50 mcg/actuation nasal spray  Commonly known as: FLONASE  USE 1 SPRAY NASALLY ONE TIME DAILY     gabapentin 300 MG capsule  Commonly known as: NEURONTIN  One po at noon daily and two po every evening     LIDOcaine 4 % Ptmd  Apply 1 patch topically daily as needed (for back pain).     omeprazole 20 MG capsule  Commonly known as: PRILOSEC  TAKE 2 CAPSULES ONE TIME DAILY     sulfamethoxazole-trimethoprim 800-160mg 800-160 mg Tab  Commonly known as: BACTRIM DS  Take 1 tablet by mouth 2 (two) times daily. for 10 days     TUMS ORAL  Take 1-2 tablets by mouth daily as needed (for acid reflux).            STOP taking these medications      methocarbamoL 500 MG Tab  Commonly known as: ROBAXIN     tamsulosin 0.4 mg Cap  Commonly known as: FLOMAX              Indwelling Lines/Drains at time of discharge:   Lines/Drains/Airways       Drain  Duration             Male External Urine Management Device w/ Suction 05/02/24 2100 Other  (Comment) <1 day                    Time spent on the discharge of patient: 35 minutes         Gill Warren MD  Department of Hospital Medicine  [facility] - Transplant Stepdown

## 2024-05-03 NOTE — TELEPHONE ENCOUNTER
Called and spoke to pt on the phone -- booked him in to see Hannah Dudley for HSFU Monday at 10:30am

## 2024-05-03 NOTE — ASSESSMENT & PLAN NOTE
Chronic, controlled. Latest blood pressure and vitals reviewed-     Temp:  [97.6 °F (36.4 °C)-98.4 °F (36.9 °C)]   Pulse:  [55-91]   Resp:  [16-20]   BP: (122-151)/(66-86)   SpO2:  [97 %-100 %] .   Home meds for hypertension were reviewed and noted below.   Hypertension Medications      While in the hospital, will manage blood pressure as follows; Adjust home antihypertensive regimen as follows- Hold for now given episodes of hypotension during admission yesterday and on arrival to ED today and concern for sepsis . Resume home medication as appropriate

## 2024-05-03 NOTE — NURSING
Pt discharged and brought down in wheelchair per nurse. Pt standby assisted into car. No acute distress. Pt free from harm.

## 2024-05-03 NOTE — PLAN OF CARE
Pt aao x3. Pt slightly orthostatic. IL bolus given. Pt steady on his feet with use of a walker. Pt to be discharged home on oral antibiotics for UTI. Pt took a shower himself without harm. See assessment for full chart details.

## 2024-05-03 NOTE — ASSESSMENT & PLAN NOTE
Resolved  Patient with acute kidney injury/acute renal failure likely due to pre-renal azotemia due to IVVD and acute tubular necrosis caused by hypotension/sepsis . 1.6 on admission. BRENDA is currently improving. Baseline creatinine  1.0  - Labs reviewed- Renal function/electrolytes with Estimated Creatinine Clearance: 70.9 mL/min (based on SCr of 0.9 mg/dL). according to latest data. Monitor urine output and serial BMP and adjust therapy as needed. Avoid nephrotoxins and renally dose meds for GFR listed above.  - continue to monitor

## 2024-05-03 NOTE — PLAN OF CARE
Follow up with Amanda Brown MD  CHW called to schedule appt, Fidelina/ sent message to nurse to schedule appt. [address]  [facility] [address]  [facility] [address]  [phone]

## 2024-05-03 NOTE — ASSESSMENT & PLAN NOTE
Patient's FSGs are controlled on current medication regimen.  Last A1c reviewed-   Lab Results   Component Value Date    HGBA1C 5.7 (H) 05/01/2024     Most recent fingerstick glucose reviewed-   Recent Labs   Lab 05/02/24  1330 05/02/24  2214   POCTGLUCOSE 103 114*       Current correctional scale  Low  Maintain anti-hyperglycemic dose as follows-   Antihyperglycemics (From admission, onward)    Start     Stop Route Frequency Ordered    04/30/24 2304  insulin aspart U-100 pen 0-5 Units         -- SubQ Before meals & nightly PRN 04/30/24 2205        Hold Oral hypoglycemics while patient is in the hospital.  Continue home gabapentin

## 2024-05-03 NOTE — PLAN OF CARE
- Patient admitted 4/30/24 with syncopal episodes and low-grade fever (100.5).  - No acute events overnight. No syncopal episodes since admission.  - Blood cultures drawn on admission showed GPC in clusters resembling Staph; repeated blood cultures x 2 have NGTD.  - Urine culture from admission grew E coli.  - Still no BM since admit - unable to collect ordered stool studies. C-diff lab order/precautions cancelled yesterday.  - Patient is up with walker. He requested to move to recliner chair ~0500 this morning to watch the news.  - IV Abx: Zosyn q8h.  - Patient has chronic back pain. Extra-strength Tylenol administered once overnight.  - Cardiac monitoring is in place - right bundle branch block with bradycardic HR (low 50s) while sleeping.  - BG monitored AC/HS. BG= 114 at bedtime.  - Due to UTI, patient is having frequent urination and occasional urinary incontinence. Condom cath and Qivi both utilized during admission, but due to patient's anatomy these have not been working well. Explained to patient that urinary issues should improve as UTI resolves.

## 2024-05-03 NOTE — DISCHARGE INSTRUCTIONS
You were discharged with antibiotics after your last hospitalization. These are the same antibiotics that you should start taking when you leave the hospital. You should the antibiotics for 10 days after you are discharged.     Please check your blood pressure daily. Do not take blood pressure medication until you are seen by your primary care provider and instructed to restart.

## 2024-05-04 NOTE — PLAN OF CARE
[facility] - Transplant Stepdown  Discharge Final Note    Primary Care Provider: Amanda Brown MD    Expected Discharge Date: 5/3/2024    Final Discharge Note (most recent)       Final Note - 05/03/24 1820          Final Note    Assessment Type Final Discharge Note     Anticipated Discharge Disposition Home or Self Care     What phone number can be called within the next 1-3 days to see how you are doing after discharge? [phone]     Hospital Resources/Appts/Education Provided Provided patient/caregiver with written discharge plan information;Appointments scheduled and added to AVS        Post-Acute Status    Discharge Delays None known at this time                   Important Message from Medicare         Contact Info       Amanda Brown MD   Specialty: Internal Medicine   Relationship: PCP - General  Hypertension Digital Medicine Responsible Provider    [address]  [address]  [facility] [address]   Phone: [phone]       Next Steps: Follow up in 1 week(s)    Amanda Brown MD   Specialty: Internal Medicine   Relationship: PCP - General  Hypertension Digital Medicine Responsible Provider    [address]  [address]  [facility] [address]   Phone: [phone]       Next Steps: Follow up    Instructions: CHW called to schedule appt, Fidelina/ sent message to nurse to schedule appt.          Mireya Thakkar RN  Weekend  - [facility]  Spectralink: [phone]

## 2024-05-05 LAB
BACTERIA BLD CULT: NORMAL
BACTERIA BLD CULT: NORMAL

## 2024-05-06 ENCOUNTER — TELEPHONE (OUTPATIENT)
Dept: UROLOGY | Facility: CLINIC | Age: 79
End: 2024-05-06
Payer: MEDICARE

## 2024-05-06 ENCOUNTER — OFFICE VISIT (OUTPATIENT)
Dept: INTERNAL MEDICINE | Facility: CLINIC | Age: 79
End: 2024-05-06
Payer: MEDICARE

## 2024-05-06 VITALS
BODY MASS INDEX: 27.9 KG/M2 | DIASTOLIC BLOOD PRESSURE: 66 MMHG | WEIGHT: 199.31 LBS | HEIGHT: 71 IN | SYSTOLIC BLOOD PRESSURE: 130 MMHG | OXYGEN SATURATION: 94 % | HEART RATE: 82 BPM

## 2024-05-06 DIAGNOSIS — I10 ESSENTIAL HYPERTENSION: Chronic | ICD-10-CM

## 2024-05-06 DIAGNOSIS — G20.C PARKINSONISM, UNSPECIFIED PARKINSONISM TYPE: ICD-10-CM

## 2024-05-06 DIAGNOSIS — Z87.440 HISTORY OF URINARY TRACT INFECTION: ICD-10-CM

## 2024-05-06 DIAGNOSIS — J44.89 OBSTRUCTIVE CHRONIC BRONCHITIS WITHOUT EXACERBATION: ICD-10-CM

## 2024-05-06 DIAGNOSIS — R55 SYNCOPE, UNSPECIFIED SYNCOPE TYPE: Primary | ICD-10-CM

## 2024-05-06 PROCEDURE — 3078F DIAST BP <80 MM HG: CPT | Mod: HCNC,CPTII,S$GLB, | Performed by: PHYSICIAN ASSISTANT

## 2024-05-06 PROCEDURE — 99214 OFFICE O/P EST MOD 30 MIN: CPT | Mod: HCNC,S$GLB,, | Performed by: PHYSICIAN ASSISTANT

## 2024-05-06 PROCEDURE — 1111F DSCHRG MED/CURRENT MED MERGE: CPT | Mod: HCNC,CPTII,S$GLB, | Performed by: PHYSICIAN ASSISTANT

## 2024-05-06 PROCEDURE — 99999 PR PBB SHADOW E&M-EST. PATIENT-LVL V: CPT | Mod: PBBFAC,HCNC,, | Performed by: PHYSICIAN ASSISTANT

## 2024-05-06 PROCEDURE — 1100F PTFALLS ASSESS-DOCD GE2>/YR: CPT | Mod: HCNC,CPTII,S$GLB, | Performed by: PHYSICIAN ASSISTANT

## 2024-05-06 PROCEDURE — 3288F FALL RISK ASSESSMENT DOCD: CPT | Mod: HCNC,CPTII,S$GLB, | Performed by: PHYSICIAN ASSISTANT

## 2024-05-06 PROCEDURE — 3075F SYST BP GE 130 - 139MM HG: CPT | Mod: HCNC,CPTII,S$GLB, | Performed by: PHYSICIAN ASSISTANT

## 2024-05-06 NOTE — TELEPHONE ENCOUNTER
----- Message from Beatrice Vaca MD sent at 5/1/2024  5:09 PM CDT -----  He has been in and out of the hospital, presently in the hospital.  I can see him for follow up as they will want him to have a cystoscopy.  Please give him an appointment in 3 weeks.  ----- Message -----  From: Maria Teresa Kaufman LPN  Sent: 4/26/2024   4:19 PM CDT  To: Beatrice Vaca MD    Had recent ER visit d/t retention. States he's been seeing you for years and would like you to review his chart.  ----- Message -----  From: Dalia Hansen  Sent: 4/26/2024   3:36 PM CDT  To: Lio Barron Staff    Type:  Call    Who Called:pt  Does the patient know what this is regarding?:ER test  Would the patient rather a call back or a response via [facility]? call  Best Call Back Number: [phone]  Additional Information: Pt would like to go over test from ER

## 2024-05-06 NOTE — TELEPHONE ENCOUNTER
Pt scheduled for 3 week follow up with Dr. Vaca on 5/31/24 at 1:20p. Appt letter mailed for reminder.

## 2024-05-06 NOTE — PATIENT INSTRUCTIONS
Ok to restart these medications today :    Pravastatin - cholesterol medicine   2.   Neurontin -for nerve pain   3.   Sinemet - Parkinsons  4.   Prilosec -acid reflux     Please ask your urologist about restarting Proscar    Please continue monitoring your blood pressure every morning. We will slowly start to add back blood pressure mediations as needed.   The previous blood pressure medications you were taking are as below  Chlorthalidone   Irbesartan   Spironolactone  Amlodipine     Finally, please make sure you take the prescribed antibiotic, Bactrim to completion.     Please plan to follow-up with me in clinic next week. We will plan for close follow-up until you are feeling back to normal.     Kindly,   Hannah Dudley PA-C

## 2024-05-06 NOTE — PROGRESS NOTES
INTERNAL MEDICINE PROGRESS NOTE    CHIEF COMPLAINT     Chief Complaint   Patient presents with    Follow-up       HPI     Tito Menard is a 78 y.o. male who presents for an Follow-up visit today.    Pt presents today for hospital follow-up. He was admitted to [facility] [place] [facility] on 4/30 for uro sepsis and discharged on 5/3/2024. He was treated inpatient with OV fluids, vanc and zosyn. He had UCx grow e.coli and blood cultures with contaminant (coag neg staph) with multiple negative subsequent Blood Cx. He was transitioned to PO bactrim. On day of discharge he was borderline orthostatic so he was given fluid s and told to hold BP meds until PCP follow-up.     Today in clinic he is feeling somewhat better, overall still feeling weak. His BP is 130/66. Still feeling weak and slightly out of breath   Taking abx  No appetite      Seeing NP. Zahida today with urology.   He is not taking any home meds apart from abx - waiting on clearance to restart home meds.   Sinemet  Proscar  Neurontin  Prilosec  Pravastatin  Chlorthalidone 25  Irbesartan 300  Spironolactone 12.5    Past Medical History:  Past Medical History:   Diagnosis Date    Allergy     Aneurysm of artery of lower extremity     Chalazion of left eye     CKD (chronic kidney disease), stage II 4/1/2019    Diabetes mellitus type II     Diabetes with neurologic complications     Enlarged aorta 8/2/2016    Noted on pharmacological stress echo 2/28/2014.      GERD (gastroesophageal reflux disease)     egd 2008    Hyperlipidemia     Hypertension     Jock itch 7/19/2018    MGD (meibomian gland dysfunction)     Osteopenia     Spinal stenosis     Spondylosis without myelopathy 10/23/2015    Vitamin D deficiency disease        Home Medications:  Prior to Admission medications    Medication Sig Start Date End Date Taking? Authorizing Provider   aspirin (ECOTRIN) 81 MG EC tablet TAKE 1 TABLET EVERY DAY. 12/12/23   Amanda Brown MD   azelastine (ASTELIN) 137 mcg (0.1 %) nasal spray 1  spray (137 mcg total) by Nasal route 2 (two) times daily. 2/26/24   Amanda Brown MD   calcium carbonate (TUMS ORAL) Take 1-2 tablets by mouth daily as needed (for acid reflux).    Provider, Historical   carbidopa-levodopa  mg (SINEMET)  mg per tablet Take 1 tablet by mouth 3 (three) times daily. 11/15/23 5/13/24  Sheldon James MD   cholecalciferol, vitamin D3, (VITAMIN D3) 50 mcg (2,000 unit) Cap Take 1 capsule by mouth once daily.    Provider, Historical   clotrimazole-betamethasone 1-0.05% (LOTRISONE) cream APPLY TOPICALLY 2 (TWO) TIMES DAILY. 10/21/20   Beatrice Vaca MD   finasteride (PROSCAR) 5 mg tablet Take 1 tablet (5 mg total) by mouth once daily. 9/25/23 9/24/24  Beatrice Vaca MD   fluticasone propionate (FLONASE) 50 mcg/actuation nasal spray USE 1 SPRAY NASALLY ONE TIME DAILY 4/17/24   Amanda Brown MD   gabapentin (NEURONTIN) 300 MG capsule One po at noon daily and two po every evening 11/14/23   Amanda Brown MD   ketoconazole (NIZORAL) 2 % cream AAA bid (facial redness and scaling)  Patient taking differently: Apply topically once weekly for facial redness and scaling. 5/4/21   Anny Fregoso MD   lanolin/mineral oil/petrolatum (ARTIFICIAL TEARS OPHT) Place 1-2 drops into both eyes daily as needed (for dry eyes).    Provider, Historical   LIDOcaine 4 % PtMd Apply 1 patch topically daily as needed (for back pain).    Provider, Historical   omeprazole (PRILOSEC) 20 MG capsule TAKE 2 CAPSULES ONE TIME DAILY 4/1/24   Amanda Brown MD   pravastatin (PRAVACHOL) 20 MG tablet TAKE 1 TABLET EVERY DAY 5/2/24   Amanda Brown MD   sulfamethoxazole-trimethoprim 800-160mg (BACTRIM DS) 800-160 mg Tab Take 1 tablet by mouth 2 (two) times daily. for 10 days 5/3/24 5/13/24  Gill Warren MD   sulfamethoxazole-trimethoprim 800-160mg (BACTRIM DS) 800-160 mg Tab Take 1 tablet by mouth 2 (two) times daily. for 3 days 5/10/24 5/13/24  Gill Warren MD       Review  "of Systems:  Review of Systems   Constitutional:  Negative for chills and fever.   HENT:  Negative for sore throat and trouble swallowing.    Eyes:  Negative for visual disturbance.   Respiratory:  Negative for cough and shortness of breath.    Cardiovascular:  Negative for chest pain.   Gastrointestinal:  Negative for abdominal pain, constipation, diarrhea, nausea and vomiting.   Genitourinary:  Negative for dysuria and flank pain.   Musculoskeletal:  Negative for back pain, neck pain and neck stiffness.   Skin:  Negative for rash.   Neurological:  Negative for dizziness, syncope, weakness and headaches.   Psychiatric/Behavioral:  Negative for confusion.        Health Maintainence:   Immunizations:  Health Maintenance         Date Due Completion Date    RSV Vaccine (Age 60+ and Pregnant patients) (1 - 1-dose 60+ series) Never done ---    COVID-19 Vaccine (5 - 2023-24 season) 03/03/2024 11/3/2023    Diabetes Urine Screening 05/31/2024 5/31/2023    Lipid Panel 05/31/2024 5/31/2023    Eye Exam 08/03/2024 8/3/2023    Override on 6/14/2018: Done    Override on 8/5/2015: Done    Hemoglobin A1c 11/01/2024 5/1/2024    Override on 10/23/2015: Done    Colonoscopy 04/24/2027 4/24/2024    TETANUS VACCINE 03/30/2033 3/30/2023             PHYSICAL EXAM     /66   Pulse 82   Ht 5' 11" (1.803 m)   Wt 90.4 kg (199 lb 4.7 oz)   SpO2 (!) 94%   BMI 27.80 kg/m²     Physical Exam  Vitals and nursing note reviewed.   Constitutional:       Appearance: Normal appearance.      Comments: Elderly male in NAD or apparent pain. He makes good eye contact, speaks in clear full sentences and ambulates with walker from home. His wife is accompanying him in the office today.   Appears fatigued    HENT:      Head: Normocephalic and atraumatic.      Nose: Nose normal.      Mouth/Throat:      Pharynx: Oropharynx is clear.   Eyes:      Conjunctiva/sclera: Conjunctivae normal.   Cardiovascular:      Rate and Rhythm: Normal rate and regular " rhythm.      Pulses: Normal pulses.   Pulmonary:      Effort: No respiratory distress.   Abdominal:      Tenderness: There is no abdominal tenderness.   Musculoskeletal:         General: Normal range of motion.      Cervical back: No rigidity.   Skin:     General: Skin is warm and dry.      Capillary Refill: Capillary refill takes less than 2 seconds.      Findings: No rash.   Neurological:      General: No focal deficit present.      Mental Status: He is alert.      Gait: Gait normal.   Psychiatric:         Mood and Affect: Mood normal.         LABS     Lab Results   Component Value Date    HGBA1C 5.7 (H) 05/01/2024     CMP  Sodium   Date Value Ref Range Status   05/03/2024 137 136 - 145 mmol/L Final     Potassium   Date Value Ref Range Status   05/03/2024 3.5 3.5 - 5.1 mmol/L Final     Chloride   Date Value Ref Range Status   05/03/2024 103 95 - 110 mmol/L Final     CO2   Date Value Ref Range Status   05/03/2024 26 23 - 29 mmol/L Final     Glucose   Date Value Ref Range Status   05/03/2024 98 70 - 110 mg/dL Final   07/09/2022 102 mg/dL Final     BUN   Date Value Ref Range Status   05/03/2024 12 8 - 23 mg/dL Final   07/09/2022 13 4 - 21 mg/dL Final     Creatinine   Date Value Ref Range Status   05/03/2024 0.9 0.5 - 1.4 mg/dL Final   07/09/2022 0.9 mg/dL Final     Calcium   Date Value Ref Range Status   05/03/2024 8.4 (L) 8.7 - 10.5 mg/dL Final     Total Protein   Date Value Ref Range Status   04/30/2024 6.8 6.0 - 8.4 g/dL Final     Albumin   Date Value Ref Range Status   04/30/2024 2.7 (L) 3.5 - 5.2 g/dL Final     Total Bilirubin   Date Value Ref Range Status   04/30/2024 0.4 0.1 - 1.0 mg/dL Final     Comment:     For infants and newborns, interpretation of results should be based  on gestational age, weight and in agreement with clinical  observations.    Premature Infant recommended reference ranges:  Up to 24 hours.............<8.0 mg/dL  Up to 48 hours............<12.0 mg/dL  3-5 days..................<15.0  mg/dL  6-29 days.................<15.0 mg/dL       Alkaline Phosphatase   Date Value Ref Range Status   04/30/2024 76 55 - 135 U/L Final     AST   Date Value Ref Range Status   04/30/2024 34 10 - 40 U/L Final     ALT   Date Value Ref Range Status   04/30/2024 18 10 - 44 U/L Final     Anion Gap   Date Value Ref Range Status   05/03/2024 8 8 - 16 mmol/L Final     eGFR if    Date Value Ref Range Status   06/27/2022 >60.0 >60 mL/min/1.73 m^2 Final     eGFR if non    Date Value Ref Range Status   06/27/2022 >60.0 >60 mL/min/1.73 m^2 Final     Comment:     Calculation used to obtain the estimated glomerular filtration  rate (eGFR) is the CKD-EPI equation.        Lab Results   Component Value Date    WBC 6.54 05/03/2024    HGB 10.6 (L) 05/03/2024    HCT 31.3 (L) 05/03/2024    MCV 90 05/03/2024     05/03/2024     Lab Results   Component Value Date    CHOL 167 05/31/2023    CHOL 153 07/16/2021    CHOL 151 07/31/2020     Lab Results   Component Value Date    HDL 44 05/31/2023    HDL 46 07/16/2021    HDL 45 07/31/2020     Lab Results   Component Value Date    LDLCALC 103.6 05/31/2023    LDLCALC 90.6 07/16/2021    LDLCALC 86.2 07/31/2020     Lab Results   Component Value Date    TRIG 97 05/31/2023    TRIG 82 07/16/2021    TRIG 99 07/31/2020     Lab Results   Component Value Date    CHOLHDL 26.3 05/31/2023    CHOLHDL 30.1 07/16/2021    CHOLHDL 29.8 07/31/2020     Lab Results   Component Value Date    TSH 0.661 06/20/2023       ASSESSMENT/PLAN     Tito Menard is a 78 y.o. male     Needs neuro follow-up  Seeing urology today   Needs labs and follow-up with me in one-two week   Need to bring all med bottles to next OV  Keep track of home BP   Hydrate well     Tito was seen today for follow-up.    Diagnoses and all orders for this visit:    Syncope, unspecified syncope type  -     Ambulatory referral/consult to Neurology; Future  -     CBC Auto Differential; Future  -     Comprehensive  Metabolic Panel; Future    Essential hypertension   -well controlled in office today despite holding BP meds   -monitor BP closely at home and follow-up in 2 weeks, will slowly reintroduce BP med regimen if BP increases.     Parkinsonism, unspecified Parkinsonism type    Obstructive chronic bronchitis without exacerbation    History of urinary tract infection   -taking bactrim as prescribed on hospital discharge.      Patient was counseled on when and how to seek emergent care.       Hannah Dudley PA-C   Department of Internal Medicine - [facility]   10:34 AM

## 2024-05-07 LAB
BACTERIA BLD CULT: NORMAL
BACTERIA BLD CULT: NORMAL

## 2024-05-13 ENCOUNTER — LAB VISIT (OUTPATIENT)
Dept: LAB | Facility: HOSPITAL | Age: 79
End: 2024-05-13
Attending: INTERNAL MEDICINE
Payer: MEDICARE

## 2024-05-13 DIAGNOSIS — J30.2 SEASONAL ALLERGIC RHINITIS, UNSPECIFIED TRIGGER: ICD-10-CM

## 2024-05-13 DIAGNOSIS — R55 SYNCOPE, UNSPECIFIED SYNCOPE TYPE: ICD-10-CM

## 2024-05-13 LAB
ALBUMIN SERPL BCP-MCNC: 3 G/DL (ref 3.5–5.2)
ALP SERPL-CCNC: 65 U/L (ref 55–135)
ALT SERPL W/O P-5'-P-CCNC: 23 U/L (ref 10–44)
ANION GAP SERPL CALC-SCNC: 12 MMOL/L (ref 8–16)
AST SERPL-CCNC: 21 U/L (ref 10–40)
BASOPHILS # BLD AUTO: 0.02 K/UL (ref 0–0.2)
BASOPHILS NFR BLD: 0.5 % (ref 0–1.9)
BILIRUB SERPL-MCNC: 0.3 MG/DL (ref 0.1–1)
BUN SERPL-MCNC: 12 MG/DL (ref 8–23)
CALCIUM SERPL-MCNC: 8.6 MG/DL (ref 8.7–10.5)
CHLORIDE SERPL-SCNC: 106 MMOL/L (ref 95–110)
CO2 SERPL-SCNC: 21 MMOL/L (ref 23–29)
CREAT SERPL-MCNC: 0.9 MG/DL (ref 0.5–1.4)
DIFFERENTIAL METHOD BLD: ABNORMAL
EOSINOPHIL # BLD AUTO: 0.2 K/UL (ref 0–0.5)
EOSINOPHIL NFR BLD: 4.3 % (ref 0–8)
ERYTHROCYTE [DISTWIDTH] IN BLOOD BY AUTOMATED COUNT: 14.4 % (ref 11.5–14.5)
EST. GFR  (NO RACE VARIABLE): >60 ML/MIN/1.73 M^2
GLUCOSE SERPL-MCNC: 105 MG/DL (ref 70–110)
HCT VFR BLD AUTO: 35.6 % (ref 40–54)
HGB BLD-MCNC: 11.1 G/DL (ref 14–18)
IMM GRANULOCYTES # BLD AUTO: 0.03 K/UL (ref 0–0.04)
IMM GRANULOCYTES NFR BLD AUTO: 0.7 % (ref 0–0.5)
LYMPHOCYTES # BLD AUTO: 1.6 K/UL (ref 1–4.8)
LYMPHOCYTES NFR BLD: 38 % (ref 18–48)
MCH RBC QN AUTO: 29.4 PG (ref 27–31)
MCHC RBC AUTO-ENTMCNC: 31.2 G/DL (ref 32–36)
MCV RBC AUTO: 94 FL (ref 82–98)
MONOCYTES # BLD AUTO: 0.4 K/UL (ref 0.3–1)
MONOCYTES NFR BLD: 10.5 % (ref 4–15)
NEUTROPHILS # BLD AUTO: 1.9 K/UL (ref 1.8–7.7)
NEUTROPHILS NFR BLD: 46 % (ref 38–73)
NRBC BLD-RTO: 0 /100 WBC
PLATELET # BLD AUTO: 287 K/UL (ref 150–450)
PMV BLD AUTO: 9.1 FL (ref 9.2–12.9)
POTASSIUM SERPL-SCNC: 4 MMOL/L (ref 3.5–5.1)
PROT SERPL-MCNC: 6.8 G/DL (ref 6–8.4)
RBC # BLD AUTO: 3.78 M/UL (ref 4.6–6.2)
SODIUM SERPL-SCNC: 139 MMOL/L (ref 136–145)
WBC # BLD AUTO: 4.18 K/UL (ref 3.9–12.7)

## 2024-05-13 PROCEDURE — 80053 COMPREHEN METABOLIC PANEL: CPT | Mod: HCNC | Performed by: PHYSICIAN ASSISTANT

## 2024-05-13 PROCEDURE — 36415 COLL VENOUS BLD VENIPUNCTURE: CPT | Mod: HCNC,PN | Performed by: PHYSICIAN ASSISTANT

## 2024-05-13 PROCEDURE — 85025 COMPLETE CBC W/AUTO DIFF WBC: CPT | Mod: HCNC | Performed by: PHYSICIAN ASSISTANT

## 2024-05-13 RX ORDER — AZELASTINE 1 MG/ML
1 SPRAY, METERED NASAL 2 TIMES DAILY
Qty: 60 ML | Refills: 0 | Status: SHIPPED | OUTPATIENT
Start: 2024-05-13

## 2024-05-13 NOTE — TELEPHONE ENCOUNTER
Care Due:                  Date            Visit Type   Department     Provider  --------------------------------------------------------------------------------                                EP -                              PRIMARY      [facility] INTERNAL  Amanda Guerin  Last Visit: 05-      CARE ([facility])   MEDICINE       Stephanie  Next Visit: None Scheduled  None         None Found                                                            Last  Test          Frequency    Reason                     Performed    Due Date  --------------------------------------------------------------------------------    Lipid Panel.  12 months..  pravastatin..............  05- 05-    Health [facility] Embedded Care Due Messages. Reference number: [id number].   5/13/2024 10:32:24 AM CDT

## 2024-05-13 NOTE — TELEPHONE ENCOUNTER
Refill Routing Note   Medication(s) are not appropriate for processing by [facility] for the following reason(s):        ED/Hospital Visit since last OV with provider    [facility] action(s):  Defer      Medication Therapy Plan: FLOS; MULTIPLE ED VISITS SINCE LAST OV      Appointments  past 12m or future 3m with PCP    Date Provider   Last Visit   5/31/2023 Amanda Brown MD   Next Visit   Visit date not found Amanda Brown MD   ED visits in past 90 days: 1        Note composed:1:36 PM 05/13/2024

## 2024-05-14 ENCOUNTER — LAB VISIT (OUTPATIENT)
Dept: LAB | Facility: HOSPITAL | Age: 79
End: 2024-05-14
Payer: MEDICARE

## 2024-05-14 ENCOUNTER — OFFICE VISIT (OUTPATIENT)
Dept: INTERNAL MEDICINE | Facility: CLINIC | Age: 79
End: 2024-05-14
Payer: MEDICARE

## 2024-05-14 VITALS
WEIGHT: 203.06 LBS | HEART RATE: 66 BPM | SYSTOLIC BLOOD PRESSURE: 160 MMHG | DIASTOLIC BLOOD PRESSURE: 80 MMHG | OXYGEN SATURATION: 95 % | HEIGHT: 71 IN | BODY MASS INDEX: 28.43 KG/M2

## 2024-05-14 DIAGNOSIS — I10 ESSENTIAL HYPERTENSION: ICD-10-CM

## 2024-05-14 DIAGNOSIS — G20.C PARKINSONISM, UNSPECIFIED PARKINSONISM TYPE: ICD-10-CM

## 2024-05-14 DIAGNOSIS — R26.9 GAIT DISORDER: ICD-10-CM

## 2024-05-14 DIAGNOSIS — I50.32 CHRONIC DIASTOLIC HEART FAILURE: ICD-10-CM

## 2024-05-14 DIAGNOSIS — Z74.09 POOR MOBILITY: ICD-10-CM

## 2024-05-14 DIAGNOSIS — Z87.440 HISTORY OF UTI: Primary | ICD-10-CM

## 2024-05-14 DIAGNOSIS — E78.2 MIXED HYPERLIPIDEMIA: ICD-10-CM

## 2024-05-14 DIAGNOSIS — Z87.440 HISTORY OF UTI: ICD-10-CM

## 2024-05-14 LAB
BILIRUB UR QL STRIP: NEGATIVE
CLARITY UR REFRACT.AUTO: CLEAR
COLOR UR AUTO: YELLOW
GLUCOSE UR QL STRIP: NEGATIVE
HGB UR QL STRIP: NEGATIVE
KETONES UR QL STRIP: NEGATIVE
LEUKOCYTE ESTERASE UR QL STRIP: NEGATIVE
NITRITE UR QL STRIP: NEGATIVE
PH UR STRIP: 6 [PH] (ref 5–8)
PROT UR QL STRIP: NEGATIVE
SP GR UR STRIP: 1.02 (ref 1–1.03)
URN SPEC COLLECT METH UR: NORMAL

## 2024-05-14 PROCEDURE — 3079F DIAST BP 80-89 MM HG: CPT | Mod: HCNC,CPTII,S$GLB, | Performed by: PHYSICIAN ASSISTANT

## 2024-05-14 PROCEDURE — 81003 URINALYSIS AUTO W/O SCOPE: CPT | Mod: HCNC | Performed by: PHYSICIAN ASSISTANT

## 2024-05-14 PROCEDURE — 1100F PTFALLS ASSESS-DOCD GE2>/YR: CPT | Mod: HCNC,CPTII,S$GLB, | Performed by: PHYSICIAN ASSISTANT

## 2024-05-14 PROCEDURE — 3077F SYST BP >= 140 MM HG: CPT | Mod: HCNC,CPTII,S$GLB, | Performed by: PHYSICIAN ASSISTANT

## 2024-05-14 PROCEDURE — 1159F MED LIST DOCD IN RCRD: CPT | Mod: HCNC,CPTII,S$GLB, | Performed by: PHYSICIAN ASSISTANT

## 2024-05-14 PROCEDURE — 99214 OFFICE O/P EST MOD 30 MIN: CPT | Mod: HCNC,S$GLB,, | Performed by: PHYSICIAN ASSISTANT

## 2024-05-14 PROCEDURE — 3288F FALL RISK ASSESSMENT DOCD: CPT | Mod: HCNC,CPTII,S$GLB, | Performed by: PHYSICIAN ASSISTANT

## 2024-05-14 PROCEDURE — 1111F DSCHRG MED/CURRENT MED MERGE: CPT | Mod: HCNC,CPTII,S$GLB, | Performed by: PHYSICIAN ASSISTANT

## 2024-05-14 PROCEDURE — 87086 URINE CULTURE/COLONY COUNT: CPT | Mod: HCNC | Performed by: PHYSICIAN ASSISTANT

## 2024-05-14 PROCEDURE — 99999 PR PBB SHADOW E&M-EST. PATIENT-LVL V: CPT | Mod: PBBFAC,HCNC,, | Performed by: PHYSICIAN ASSISTANT

## 2024-05-14 RX ORDER — IRBESARTAN 150 MG/1
300 TABLET ORAL NIGHTLY
Start: 2024-05-14 | End: 2025-05-14

## 2024-05-14 RX ORDER — AMLODIPINE BESYLATE 5 MG/1
5 TABLET ORAL DAILY
Start: 2024-05-14 | End: 2025-05-14

## 2024-05-14 RX ORDER — TAMSULOSIN HYDROCHLORIDE 0.4 MG/1
0.4 CAPSULE ORAL DAILY
Start: 2024-05-14

## 2024-05-14 NOTE — PATIENT INSTRUCTIONS
Please restart Amlodipine 5mg in the morning and Irbesartan 300 mg in the evenings. Also ok to restart Aspirin, Tamsulosin and Finasteride.     Please continue to HOLD chlorthalidone, spironolactone and potassium.     Please check your blood pressure daily and plan to follow-up with me in two weeks for a symptom and BP re-check.     You should be hearing from the [facility] soon about Physical Therapy.     -Hannah Dudley PA-C

## 2024-05-14 NOTE — PROGRESS NOTES
INTERNAL MEDICINE PROGRESS NOTE    CHIEF COMPLAINT     Chief Complaint   Patient presents with    Follow-up       HPI     Tito Menard is a 78 y.o. male who presents for an Follow-up visit today.    Mr. Menard presents today for follow-up. I saw him in office two weeks ago for a hospital follow-up - he was admitted for uro sepsis. At that time he was improving but was not back to baseline. He has held his antihypertensive meds since discharge as BP was low-normal at last OV.     He had labs drawn yesterday 5/13/2024  Renal function normal  Liver function normal   Ca is low 8.6  Albumin is low 3.0  Anemic at 11.1 and 35.6    He has plan to see neuro -- apt on 5/29 with Dr. Dunn   Seeing urology --- 5/31/2024  He has been watching his BP at home and it is increasing - home BP has been 150's for two days - elevated today in office. He denies chest pain and SOB, still feels weak and mobility is poor.   He has been hydrating well - he is working on increasing lean protein in his daily routine.     Still having some urinary urgency- completed the abx (bactrim). Will re-check UA today      Med review done:   Restarting:  Amlodipine  Irbesartan   ASA  Finasteride and tamuslosin     Still holding:   Chlorthalidone 25  Potassium 10 mEq tid   Spironolactone 25, take 1/2 daily     Past Medical History:  Past Medical History:   Diagnosis Date    Allergy     Aneurysm of artery of lower extremity     Chalazion of left eye     CKD (chronic kidney disease), stage II 4/1/2019    Diabetes mellitus type II     Diabetes with neurologic complications     Enlarged aorta 8/2/2016    Noted on pharmacological stress echo 2/28/2014.      GERD (gastroesophageal reflux disease)     egd 2008    Hyperlipidemia     Hypertension     Jock itch 7/19/2018    MGD (meibomian gland dysfunction)     Osteopenia     Spinal stenosis     Spondylosis without myelopathy 10/23/2015    Vitamin D deficiency disease        Home Medications:  Prior to  Admission medications    Medication Sig Start Date End Date Taking? Authorizing Provider   aspirin (ECOTRIN) 81 MG EC tablet TAKE 1 TABLET EVERY DAY. 12/12/23   Amanda Brown MD   azelastine (ASTELIN) 137 mcg (0.1 %) nasal spray USE 1 SPRAY NASALLY TWICE DAILY 5/13/24   Hannah Dudley PA-C   calcium carbonate (TUMS ORAL) Take 1-2 tablets by mouth daily as needed (for acid reflux).    Provider, Historical   carbidopa-levodopa  mg (SINEMET)  mg per tablet Take 1 tablet by mouth 3 (three) times daily. 11/15/23 5/13/24  Sheldon James MD   cholecalciferol, vitamin D3, (VITAMIN D3) 50 mcg (2,000 unit) Cap Take 1 capsule by mouth once daily.    Provider, Historical   clotrimazole-betamethasone 1-0.05% (LOTRISONE) cream APPLY TOPICALLY 2 (TWO) TIMES DAILY. 10/21/20   Beatrice Vaca MD   finasteride (PROSCAR) 5 mg tablet Take 1 tablet (5 mg total) by mouth once daily. 9/25/23 9/24/24  Beatrice Vaca MD   fluticasone propionate (FLONASE) 50 mcg/actuation nasal spray USE 1 SPRAY NASALLY ONE TIME DAILY 4/17/24   Amanda Brown MD   gabapentin (NEURONTIN) 300 MG capsule One po at noon daily and two po every evening 11/14/23   Amanda Brown MD   ketoconazole (NIZORAL) 2 % cream AAA bid (facial redness and scaling)  Patient taking differently: Apply topically once weekly for facial redness and scaling. 5/4/21   Anny Fregoso MD   lanolin/mineral oil/petrolatum (ARTIFICIAL TEARS OPHT) Place 1-2 drops into both eyes daily as needed (for dry eyes).    Provider, Historical   LIDOcaine 4 % PtMd Apply 1 patch topically daily as needed (for back pain).    Provider, Historical   omeprazole (PRILOSEC) 20 MG capsule TAKE 2 CAPSULES ONE TIME DAILY 4/1/24   Amanda Brown MD   pravastatin (PRAVACHOL) 20 MG tablet TAKE 1 TABLET EVERY DAY 5/2/24   Amanda Brown MD   sulfamethoxazole-trimethoprim 800-160mg (BACTRIM DS) 800-160 mg Tab Take 1 tablet by mouth 2 (two) times daily. for 10 days  "5/3/24 5/13/24  Gill Warren MD   sulfamethoxazole-trimethoprim 800-160mg (BACTRIM DS) 800-160 mg Tab Take 1 tablet by mouth 2 (two) times daily. for 3 days 5/10/24 5/13/24  Gill Warren MD       Review of Systems:  Review of Systems   Constitutional:  Negative for chills and fever.   HENT:  Negative for sore throat and trouble swallowing.    Eyes:  Negative for visual disturbance.   Respiratory:  Negative for cough and shortness of breath.    Cardiovascular:  Negative for chest pain.   Gastrointestinal:  Negative for abdominal pain, constipation, diarrhea, nausea and vomiting.   Genitourinary:  Negative for dysuria and flank pain.   Musculoskeletal:  Negative for back pain, neck pain and neck stiffness.   Skin:  Negative for rash.   Neurological:  Negative for dizziness, syncope, weakness and headaches.   Psychiatric/Behavioral:  Negative for confusion.        Health Maintainence:   Immunizations:  Health Maintenance         Date Due Completion Date    RSV Vaccine (Age 60+ and Pregnant patients) (1 - 1-dose 60+ series) Never done ---    COVID-19 Vaccine (5 - 2023-24 season) 03/03/2024 11/3/2023    Diabetes Urine Screening 05/31/2024 5/31/2023    Lipid Panel 05/31/2024 5/31/2023    Eye Exam 08/03/2024 8/3/2023    Override on 6/14/2018: Done    Override on 8/5/2015: Done    Hemoglobin A1c 11/01/2024 5/1/2024    Override on 10/23/2015: Done    Colonoscopy 04/24/2027 4/24/2024    TETANUS VACCINE 03/30/2033 3/30/2023             PHYSICAL EXAM     BP (!) 160/80   Pulse 66   Ht 5' 11" (1.803 m)   Wt 92.1 kg (203 lb 0.7 oz)   SpO2 95%   BMI 28.32 kg/m²     Physical Exam  Vitals and nursing note reviewed.   Constitutional:       Appearance: Normal appearance.      Comments: Elderly male in NAD or apparent pain. He makes good eye contact, speaks in clear full sentences and ambulates with walker from home.    HENT:      Head: Normocephalic and atraumatic.      Nose: Nose normal.      Mouth/Throat:      Pharynx: " Oropharynx is clear.   Eyes:      Conjunctiva/sclera: Conjunctivae normal.   Cardiovascular:      Rate and Rhythm: Normal rate and regular rhythm.      Pulses: Normal pulses.   Pulmonary:      Effort: No respiratory distress.   Abdominal:      Tenderness: There is no abdominal tenderness.   Musculoskeletal:         General: Normal range of motion.      Cervical back: No rigidity.      Comments: Scant lower pitting edema    Skin:     General: Skin is warm and dry.      Capillary Refill: Capillary refill takes less than 2 seconds.      Findings: No rash.   Neurological:      General: No focal deficit present.      Mental Status: He is alert.      Gait: Gait normal.   Psychiatric:         Mood and Affect: Mood normal.         LABS     Lab Results   Component Value Date    HGBA1C 5.7 (H) 05/01/2024     CMP  Sodium   Date Value Ref Range Status   05/13/2024 139 136 - 145 mmol/L Final     Potassium   Date Value Ref Range Status   05/13/2024 4.0 3.5 - 5.1 mmol/L Final     Chloride   Date Value Ref Range Status   05/13/2024 106 95 - 110 mmol/L Final     CO2   Date Value Ref Range Status   05/13/2024 21 (L) 23 - 29 mmol/L Final     Glucose   Date Value Ref Range Status   05/13/2024 105 70 - 110 mg/dL Final   07/09/2022 102 mg/dL Final     BUN   Date Value Ref Range Status   05/13/2024 12 8 - 23 mg/dL Final   07/09/2022 13 4 - 21 mg/dL Final     Creatinine   Date Value Ref Range Status   05/13/2024 0.9 0.5 - 1.4 mg/dL Final   07/09/2022 0.9 mg/dL Final     Calcium   Date Value Ref Range Status   05/13/2024 8.6 (L) 8.7 - 10.5 mg/dL Final     Total Protein   Date Value Ref Range Status   05/13/2024 6.8 6.0 - 8.4 g/dL Final     Albumin   Date Value Ref Range Status   05/13/2024 3.0 (L) 3.5 - 5.2 g/dL Final     Total Bilirubin   Date Value Ref Range Status   05/13/2024 0.3 0.1 - 1.0 mg/dL Final     Comment:     For infants and newborns, interpretation of results should be based  on gestational age, weight and in agreement with  clinical  observations.    Premature Infant recommended reference ranges:  Up to 24 hours.............<8.0 mg/dL  Up to 48 hours............<12.0 mg/dL  3-5 days..................<15.0 mg/dL  6-29 days.................<15.0 mg/dL       Alkaline Phosphatase   Date Value Ref Range Status   05/13/2024 65 55 - 135 U/L Final     AST   Date Value Ref Range Status   05/13/2024 21 10 - 40 U/L Final     ALT   Date Value Ref Range Status   05/13/2024 23 10 - 44 U/L Final     Anion Gap   Date Value Ref Range Status   05/13/2024 12 8 - 16 mmol/L Final     eGFR if    Date Value Ref Range Status   06/27/2022 >60.0 >60 mL/min/1.73 m^2 Final     eGFR if non    Date Value Ref Range Status   06/27/2022 >60.0 >60 mL/min/1.73 m^2 Final     Comment:     Calculation used to obtain the estimated glomerular filtration  rate (eGFR) is the CKD-EPI equation.        Lab Results   Component Value Date    WBC 4.18 05/13/2024    HGB 11.1 (L) 05/13/2024    HCT 35.6 (L) 05/13/2024    MCV 94 05/13/2024     05/13/2024     Lab Results   Component Value Date    CHOL 167 05/31/2023    CHOL 153 07/16/2021    CHOL 151 07/31/2020     Lab Results   Component Value Date    HDL 44 05/31/2023    HDL 46 07/16/2021    HDL 45 07/31/2020     Lab Results   Component Value Date    LDLCALC 103.6 05/31/2023    LDLCALC 90.6 07/16/2021    LDLCALC 86.2 07/31/2020     Lab Results   Component Value Date    TRIG 97 05/31/2023    TRIG 82 07/16/2021    TRIG 99 07/31/2020     Lab Results   Component Value Date    CHOLHDL 26.3 05/31/2023    CHOLHDL 30.1 07/16/2021    CHOLHDL 29.8 07/31/2020     Lab Results   Component Value Date    TSH 0.661 06/20/2023       ASSESSMENT/PLAN     Tito Menard is a 78 y.o. male     Tito was seen today for follow-up. He is slowly improving since his hospital discharge. BP is climbing back up - today I asked him to restart amlodipine and irbesartan. He will also restart finasteride and tamsulosin. He is  still holing chlorthalidone, spironolactone and potassium. I will see him back in office in 2 weeks for BP check -will consider restarting these meds at that time. PT referral placed to help with getting mobility back to pre-admission baseline.     Diagnoses and all orders for this visit:    History of UTI   -checking urine today to ensure he has cleared infection   -     Urinalysis; Future  -     CULTURE, URINE; Future  - UCx from 4/29/2024 grew E.Coli resistant to ampicillin    Gait disorder  -     Ambulatory referral/consult to Home Health; Future    Parkinsonism, unspecified Parkinsonism type  -     Ambulatory referral/consult to Home Health; Future    Poor mobility  -     Ambulatory referral/consult to Home Health; Future    Essential hypertension   -restarting CCB and ARB, still holding diuretics     Mixed hyperlipidemia    -continue statin     Chronic diastolic heart failure   -last ECHO 4/30/2024 EF 60-65% with normal diastolic function, mild valvulopathy overall, moderate aortic valve sclerosis.     Other orders  -     amLODIPine (NORVASC) 5 MG tablet; Take 1 tablet (5 mg total) by mouth once daily.  -     tamsulosin (FLOMAX) 0.4 mg Cap; Take 1 capsule (0.4 mg total) by mouth once daily.  -     irbesartan (AVAPRO) 150 MG tablet; Take 2 tablets (300 mg total) by mouth every evening.       Patient was counseled on when and how to seek emergent care.       Hannah Dudley PA-C   Department of Internal Medicine - [facility]   8:06 AM

## 2024-05-15 LAB — BACTERIA UR CULT: NO GROWTH

## 2024-05-22 ENCOUNTER — LAB VISIT (OUTPATIENT)
Dept: LAB | Facility: HOSPITAL | Age: 79
End: 2024-05-22
Payer: MEDICARE

## 2024-05-22 ENCOUNTER — PATIENT MESSAGE (OUTPATIENT)
Dept: NEUROLOGY | Facility: CLINIC | Age: 79
End: 2024-05-22
Payer: MEDICARE

## 2024-05-22 DIAGNOSIS — E78.2 MIXED HYPERLIPIDEMIA: ICD-10-CM

## 2024-05-22 DIAGNOSIS — I10 ESSENTIAL HYPERTENSION: ICD-10-CM

## 2024-05-22 DIAGNOSIS — E11.42 DIABETIC POLYNEUROPATHY ASSOCIATED WITH TYPE 2 DIABETES MELLITUS: ICD-10-CM

## 2024-05-22 LAB
ALBUMIN SERPL BCP-MCNC: 3.3 G/DL (ref 3.5–5.2)
ALP SERPL-CCNC: 72 U/L (ref 55–135)
ALT SERPL W/O P-5'-P-CCNC: 20 U/L (ref 10–44)
ANION GAP SERPL CALC-SCNC: 8 MMOL/L (ref 8–16)
AST SERPL-CCNC: 17 U/L (ref 10–40)
BASOPHILS # BLD AUTO: 0.02 K/UL (ref 0–0.2)
BASOPHILS NFR BLD: 0.5 % (ref 0–1.9)
BILIRUB SERPL-MCNC: 0.5 MG/DL (ref 0.1–1)
BUN SERPL-MCNC: 14 MG/DL (ref 8–23)
CALCIUM SERPL-MCNC: 9.4 MG/DL (ref 8.7–10.5)
CHLORIDE SERPL-SCNC: 107 MMOL/L (ref 95–110)
CHOLEST SERPL-MCNC: 150 MG/DL (ref 120–199)
CHOLEST/HDLC SERPL: 3.3 {RATIO} (ref 2–5)
CO2 SERPL-SCNC: 29 MMOL/L (ref 23–29)
CREAT SERPL-MCNC: 0.9 MG/DL (ref 0.5–1.4)
DIFFERENTIAL METHOD BLD: ABNORMAL
EOSINOPHIL # BLD AUTO: 0.2 K/UL (ref 0–0.5)
EOSINOPHIL NFR BLD: 4.7 % (ref 0–8)
ERYTHROCYTE [DISTWIDTH] IN BLOOD BY AUTOMATED COUNT: 14.3 % (ref 11.5–14.5)
EST. GFR  (NO RACE VARIABLE): >60 ML/MIN/1.73 M^2
ESTIMATED AVG GLUCOSE: 114 MG/DL (ref 68–131)
GLUCOSE SERPL-MCNC: 92 MG/DL (ref 70–110)
HBA1C MFR BLD: 5.6 % (ref 4–5.6)
HCT VFR BLD AUTO: 37.9 % (ref 40–54)
HDLC SERPL-MCNC: 45 MG/DL (ref 40–75)
HDLC SERPL: 30 % (ref 20–50)
HGB BLD-MCNC: 11.7 G/DL (ref 14–18)
IMM GRANULOCYTES # BLD AUTO: 0.01 K/UL (ref 0–0.04)
IMM GRANULOCYTES NFR BLD AUTO: 0.2 % (ref 0–0.5)
LDLC SERPL CALC-MCNC: 88 MG/DL (ref 63–159)
LYMPHOCYTES # BLD AUTO: 2 K/UL (ref 1–4.8)
LYMPHOCYTES NFR BLD: 46.2 % (ref 18–48)
MCH RBC QN AUTO: 29.2 PG (ref 27–31)
MCHC RBC AUTO-ENTMCNC: 30.9 G/DL (ref 32–36)
MCV RBC AUTO: 95 FL (ref 82–98)
MONOCYTES # BLD AUTO: 0.3 K/UL (ref 0.3–1)
MONOCYTES NFR BLD: 7.8 % (ref 4–15)
NEUTROPHILS # BLD AUTO: 1.7 K/UL (ref 1.8–7.7)
NEUTROPHILS NFR BLD: 40.6 % (ref 38–73)
NONHDLC SERPL-MCNC: 105 MG/DL
NRBC BLD-RTO: 0 /100 WBC
PLATELET # BLD AUTO: 252 K/UL (ref 150–450)
PMV BLD AUTO: 9.4 FL (ref 9.2–12.9)
POTASSIUM SERPL-SCNC: 4.5 MMOL/L (ref 3.5–5.1)
PROT SERPL-MCNC: 7 G/DL (ref 6–8.4)
RBC # BLD AUTO: 4.01 M/UL (ref 4.6–6.2)
SODIUM SERPL-SCNC: 144 MMOL/L (ref 136–145)
TRIGL SERPL-MCNC: 85 MG/DL (ref 30–150)
WBC # BLD AUTO: 4.22 K/UL (ref 3.9–12.7)

## 2024-05-22 PROCEDURE — 80061 LIPID PANEL: CPT | Mod: HCNC | Performed by: PHYSICIAN ASSISTANT

## 2024-05-22 PROCEDURE — 36415 COLL VENOUS BLD VENIPUNCTURE: CPT | Mod: HCNC | Performed by: PHYSICIAN ASSISTANT

## 2024-05-22 PROCEDURE — 83036 HEMOGLOBIN GLYCOSYLATED A1C: CPT | Mod: HCNC | Performed by: PHYSICIAN ASSISTANT

## 2024-05-22 PROCEDURE — 80053 COMPREHEN METABOLIC PANEL: CPT | Mod: HCNC | Performed by: PHYSICIAN ASSISTANT

## 2024-05-22 PROCEDURE — 85025 COMPLETE CBC W/AUTO DIFF WBC: CPT | Mod: HCNC | Performed by: PHYSICIAN ASSISTANT

## 2024-05-28 ENCOUNTER — PATIENT OUTREACH (OUTPATIENT)
Dept: EMERGENCY MEDICINE | Facility: HOSPITAL | Age: 79
End: 2024-05-28
Payer: MEDICARE

## 2024-05-28 ENCOUNTER — LAB VISIT (OUTPATIENT)
Dept: LAB | Facility: HOSPITAL | Age: 79
End: 2024-05-28
Payer: MEDICARE

## 2024-05-28 ENCOUNTER — TELEPHONE (OUTPATIENT)
Dept: INTERNAL MEDICINE | Facility: CLINIC | Age: 79
End: 2024-05-28

## 2024-05-28 ENCOUNTER — OFFICE VISIT (OUTPATIENT)
Dept: INTERNAL MEDICINE | Facility: CLINIC | Age: 79
End: 2024-05-28
Payer: MEDICARE

## 2024-05-28 VITALS
HEIGHT: 71 IN | SYSTOLIC BLOOD PRESSURE: 132 MMHG | DIASTOLIC BLOOD PRESSURE: 70 MMHG | HEART RATE: 80 BPM | BODY MASS INDEX: 28.24 KG/M2 | WEIGHT: 201.75 LBS | OXYGEN SATURATION: 97 %

## 2024-05-28 DIAGNOSIS — R82.998 DARK URINE: ICD-10-CM

## 2024-05-28 DIAGNOSIS — I10 ESSENTIAL HYPERTENSION: Chronic | ICD-10-CM

## 2024-05-28 DIAGNOSIS — R19.7 DIARRHEA, UNSPECIFIED TYPE: Primary | ICD-10-CM

## 2024-05-28 DIAGNOSIS — R82.998 DARK URINE: Primary | ICD-10-CM

## 2024-05-28 DIAGNOSIS — K59.00 CONSTIPATION, UNSPECIFIED CONSTIPATION TYPE: ICD-10-CM

## 2024-05-28 DIAGNOSIS — R19.7 DIARRHEA, UNSPECIFIED TYPE: ICD-10-CM

## 2024-05-28 LAB
ALBUMIN SERPL BCP-MCNC: 3.3 G/DL (ref 3.5–5.2)
ALP SERPL-CCNC: 74 U/L (ref 55–135)
ALT SERPL W/O P-5'-P-CCNC: 11 U/L (ref 10–44)
ANION GAP SERPL CALC-SCNC: 6 MMOL/L (ref 8–16)
AST SERPL-CCNC: 17 U/L (ref 10–40)
BILIRUB SERPL-MCNC: 0.5 MG/DL (ref 0.1–1)
BUN SERPL-MCNC: 12 MG/DL (ref 8–23)
CALCIUM SERPL-MCNC: 9.1 MG/DL (ref 8.7–10.5)
CHLORIDE SERPL-SCNC: 108 MMOL/L (ref 95–110)
CO2 SERPL-SCNC: 26 MMOL/L (ref 23–29)
CREAT SERPL-MCNC: 0.8 MG/DL (ref 0.5–1.4)
EST. GFR  (NO RACE VARIABLE): >60 ML/MIN/1.73 M^2
GLUCOSE SERPL-MCNC: 93 MG/DL (ref 70–110)
POTASSIUM SERPL-SCNC: 3.7 MMOL/L (ref 3.5–5.1)
PROT SERPL-MCNC: 6.9 G/DL (ref 6–8.4)
SODIUM SERPL-SCNC: 140 MMOL/L (ref 136–145)

## 2024-05-28 PROCEDURE — 1111F DSCHRG MED/CURRENT MED MERGE: CPT | Mod: CPTII,S$GLB,, | Performed by: PHYSICIAN ASSISTANT

## 2024-05-28 PROCEDURE — 1159F MED LIST DOCD IN RCRD: CPT | Mod: CPTII,S$GLB,, | Performed by: PHYSICIAN ASSISTANT

## 2024-05-28 PROCEDURE — 80053 COMPREHEN METABOLIC PANEL: CPT | Mod: HCNC | Performed by: PHYSICIAN ASSISTANT

## 2024-05-28 PROCEDURE — 3288F FALL RISK ASSESSMENT DOCD: CPT | Mod: CPTII,S$GLB,, | Performed by: PHYSICIAN ASSISTANT

## 2024-05-28 PROCEDURE — 1100F PTFALLS ASSESS-DOCD GE2>/YR: CPT | Mod: CPTII,S$GLB,, | Performed by: PHYSICIAN ASSISTANT

## 2024-05-28 PROCEDURE — 99999 PR PBB SHADOW E&M-EST. PATIENT-LVL IV: CPT | Mod: PBBFAC,HCNC,, | Performed by: PHYSICIAN ASSISTANT

## 2024-05-28 PROCEDURE — 99214 OFFICE O/P EST MOD 30 MIN: CPT | Mod: S$GLB,,, | Performed by: PHYSICIAN ASSISTANT

## 2024-05-28 PROCEDURE — 36415 COLL VENOUS BLD VENIPUNCTURE: CPT | Mod: HCNC | Performed by: PHYSICIAN ASSISTANT

## 2024-05-28 PROCEDURE — 3075F SYST BP GE 130 - 139MM HG: CPT | Mod: CPTII,S$GLB,, | Performed by: PHYSICIAN ASSISTANT

## 2024-05-28 PROCEDURE — 3078F DIAST BP <80 MM HG: CPT | Mod: CPTII,S$GLB,, | Performed by: PHYSICIAN ASSISTANT

## 2024-05-28 RX ORDER — DOCUSATE SODIUM 100 MG/1
100 CAPSULE, LIQUID FILLED ORAL DAILY
Qty: 30 CAPSULE | Refills: 11 | Status: SHIPPED | OUTPATIENT
Start: 2024-05-28

## 2024-05-28 NOTE — PROGRESS NOTES
INTERNAL MEDICINE PROGRESS NOTE    CHIEF COMPLAINT     Chief Complaint   Patient presents with    Follow-up    Diarrhea       HPI     Tito Menard is a 78 y.o. male who presents for an Follow-up visit today.    Here today for 2 week follow-up. At last OV he was restarted on amlodipine and irbesartan. Reports that BP is well controlled at home. He reports an occasional elevated reading in the 140's. No chest pain or SOB.     He does report that 5 days ago he felt constipated so he took an OTC laxative. He reports that he has had watery diarrhea ever since. No blood in stool. Admits that his urine is dark in color, He is feeling fatigued and is having chills at home. Denies measured fever. No nausea or vomiting. Has not taken any medications to help with diarrhea. Has been hydrating and eating as best as tolerated. He denies chest pain, SOB, dizziness, HA, vision changes.     PT was ordered to help with mobility but has not reached out to the patient yet.      Past Medical History:  Past Medical History:   Diagnosis Date    Allergy     Aneurysm of artery of lower extremity     Chalazion of left eye     CKD (chronic kidney disease), stage II 4/1/2019    Diabetes mellitus type II     Diabetes with neurologic complications     Enlarged aorta 8/2/2016    Noted on pharmacological stress echo 2/28/2014.      GERD (gastroesophageal reflux disease)     egd 2008    Hyperlipidemia     Hypertension     Jock itch 7/19/2018    MGD (meibomian gland dysfunction)     Osteopenia     Spinal stenosis     Spondylosis without myelopathy 10/23/2015    Vitamin D deficiency disease        Home Medications:  Prior to Admission medications    Medication Sig Start Date End Date Taking? Authorizing Provider   amLODIPine (NORVASC) 5 MG tablet Take 1 tablet (5 mg total) by mouth once daily. 5/14/24 5/14/25  Hannah Dudley PA-C   aspirin (ECOTRIN) 81 MG EC tablet TAKE 1 TABLET EVERY DAY. 12/12/23   Amanda Brown MD azelastine  (ASTELIN) 137 mcg (0.1 %) nasal spray USE 1 SPRAY NASALLY TWICE DAILY 5/13/24   Hannah Dudley PA-C   calcium carbonate (TUMS ORAL) Take 1-2 tablets by mouth daily as needed (for acid reflux).    Provider, Historical   carbidopa-levodopa  mg (SINEMET)  mg per tablet Take 1 tablet by mouth 3 (three) times daily. 11/15/23 5/13/24  Sheldon James MD   cholecalciferol, vitamin D3, (VITAMIN D3) 50 mcg (2,000 unit) Cap Take 1 capsule by mouth once daily.    Provider, Historical   clotrimazole-betamethasone 1-0.05% (LOTRISONE) cream APPLY TOPICALLY 2 (TWO) TIMES DAILY. 10/21/20   Beatrice Vaca MD   finasteride (PROSCAR) 5 mg tablet Take 1 tablet (5 mg total) by mouth once daily. 9/25/23 9/24/24  Beatrice Vaca MD   fluticasone propionate (FLONASE) 50 mcg/actuation nasal spray USE 1 SPRAY NASALLY ONE TIME DAILY 4/17/24   Amanda Brown MD   gabapentin (NEURONTIN) 300 MG capsule One po at noon daily and two po every evening 11/14/23   Amanda Brown MD   irbesartan (AVAPRO) 150 MG tablet Take 2 tablets (300 mg total) by mouth every evening. 5/14/24 5/14/25  Hannah Dudley PA-C   ketoconazole (NIZORAL) 2 % cream AAA bid (facial redness and scaling)  Patient taking differently: Apply topically once weekly for facial redness and scaling. 5/4/21   Anny Fregoso MD   lanolin/mineral oil/petrolatum (ARTIFICIAL TEARS OPHT) Place 1-2 drops into both eyes daily as needed (for dry eyes).    Provider, Historical   LIDOcaine 4 % PtMd Apply 1 patch topically daily as needed (for back pain).    Provider, Historical   omeprazole (PRILOSEC) 20 MG capsule TAKE 2 CAPSULES ONE TIME DAILY 4/1/24   Amanda Brown MD   pravastatin (PRAVACHOL) 20 MG tablet TAKE 1 TABLET EVERY DAY 5/2/24   Amanda Brown MD   tamsulosin (FLOMAX) 0.4 mg Cap Take 1 capsule (0.4 mg total) by mouth once daily. 5/14/24   Hannah Dudley, PA-C       Review of Systems:  Review of Systems   Constitutional:  Positive for  "chills and fatigue. Negative for fever.   HENT:  Negative for sore throat and trouble swallowing.    Eyes:  Negative for visual disturbance.   Respiratory:  Negative for cough and shortness of breath.    Cardiovascular:  Negative for chest pain.   Gastrointestinal:  Positive for constipation and diarrhea. Negative for abdominal pain, nausea and vomiting.   Genitourinary:  Negative for dysuria and flank pain.   Musculoskeletal:  Negative for back pain, neck pain and neck stiffness.   Skin:  Negative for rash.   Neurological:  Negative for dizziness, syncope, weakness and headaches.   Psychiatric/Behavioral:  Negative for confusion.        Health Maintainence:   Immunizations:  Health Maintenance         Date Due Completion Date    RSV Vaccine (Age 60+ and Pregnant patients) (1 - 1-dose 60+ series) Never done ---    COVID-19 Vaccine (5 - 2023-24 season) 03/03/2024 11/3/2023    Diabetes Urine Screening 05/31/2024 5/31/2023    Eye Exam 08/03/2024 8/3/2023    Override on 6/14/2018: Done    Override on 8/5/2015: Done    Hemoglobin A1c 11/22/2024 5/22/2024    Override on 10/23/2015: Done    Lipid Panel 05/22/2025 5/22/2024    Colonoscopy 04/24/2027 4/24/2024    TETANUS VACCINE 03/30/2033 3/30/2023             PHYSICAL EXAM     /70   Pulse 80   Ht 5' 11" (1.803 m)   Wt 91.5 kg (201 lb 11.5 oz)   SpO2 97%   BMI 28.13 kg/m²     Physical Exam  Vitals and nursing note reviewed.   Constitutional:       Appearance: Normal appearance.      Comments: Elderly male in NAD or apparent pain. He makes good eye contact, speaks in clear full sentences and ambulates with ease.    HENT:      Head: Normocephalic and atraumatic.      Nose: Nose normal.      Mouth/Throat:      Pharynx: Oropharynx is clear.   Eyes:      Conjunctiva/sclera: Conjunctivae normal.   Cardiovascular:      Rate and Rhythm: Normal rate and regular rhythm.      Pulses: Normal pulses.   Pulmonary:      Effort: No respiratory distress.   Abdominal:      " Tenderness: There is no abdominal tenderness.   Musculoskeletal:         General: Normal range of motion.      Cervical back: No rigidity.   Skin:     General: Skin is warm and dry.      Capillary Refill: Capillary refill takes less than 2 seconds.      Findings: No rash.   Neurological:      General: No focal deficit present.      Mental Status: He is alert.      Gait: Gait normal.   Psychiatric:         Mood and Affect: Mood normal.         LABS     Lab Results   Component Value Date    HGBA1C 5.6 05/22/2024     CMP  Sodium   Date Value Ref Range Status   05/22/2024 144 136 - 145 mmol/L Final     Potassium   Date Value Ref Range Status   05/22/2024 4.5 3.5 - 5.1 mmol/L Final     Chloride   Date Value Ref Range Status   05/22/2024 107 95 - 110 mmol/L Final     CO2   Date Value Ref Range Status   05/22/2024 29 23 - 29 mmol/L Final     Glucose   Date Value Ref Range Status   05/22/2024 92 70 - 110 mg/dL Final   07/09/2022 102 mg/dL Final     BUN   Date Value Ref Range Status   05/22/2024 14 8 - 23 mg/dL Final   07/09/2022 13 4 - 21 mg/dL Final     Creatinine   Date Value Ref Range Status   05/22/2024 0.9 0.5 - 1.4 mg/dL Final   07/09/2022 0.9 mg/dL Final     Calcium   Date Value Ref Range Status   05/22/2024 9.4 8.7 - 10.5 mg/dL Final     Total Protein   Date Value Ref Range Status   05/22/2024 7.0 6.0 - 8.4 g/dL Final     Albumin   Date Value Ref Range Status   05/22/2024 3.3 (L) 3.5 - 5.2 g/dL Final     Total Bilirubin   Date Value Ref Range Status   05/22/2024 0.5 0.1 - 1.0 mg/dL Final     Comment:     For infants and newborns, interpretation of results should be based  on gestational age, weight and in agreement with clinical  observations.    Premature Infant recommended reference ranges:  Up to 24 hours.............<8.0 mg/dL  Up to 48 hours............<12.0 mg/dL  3-5 days..................<15.0 mg/dL  6-29 days.................<15.0 mg/dL       Alkaline Phosphatase   Date Value Ref Range Status   05/22/2024  72 55 - 135 U/L Final     AST   Date Value Ref Range Status   05/22/2024 17 10 - 40 U/L Final     ALT   Date Value Ref Range Status   05/22/2024 20 10 - 44 U/L Final     Anion Gap   Date Value Ref Range Status   05/22/2024 8 8 - 16 mmol/L Final     eGFR if    Date Value Ref Range Status   06/27/2022 >60.0 >60 mL/min/1.73 m^2 Final     eGFR if non    Date Value Ref Range Status   06/27/2022 >60.0 >60 mL/min/1.73 m^2 Final     Comment:     Calculation used to obtain the estimated glomerular filtration  rate (eGFR) is the CKD-EPI equation.        Lab Results   Component Value Date    WBC 4.22 05/22/2024    HGB 11.7 (L) 05/22/2024    HCT 37.9 (L) 05/22/2024    MCV 95 05/22/2024     05/22/2024     Lab Results   Component Value Date    CHOL 150 05/22/2024    CHOL 167 05/31/2023    CHOL 153 07/16/2021     Lab Results   Component Value Date    HDL 45 05/22/2024    HDL 44 05/31/2023    HDL 46 07/16/2021     Lab Results   Component Value Date    LDLCALC 88.0 05/22/2024    LDLCALC 103.6 05/31/2023    LDLCALC 90.6 07/16/2021     Lab Results   Component Value Date    TRIG 85 05/22/2024    TRIG 97 05/31/2023    TRIG 82 07/16/2021     Lab Results   Component Value Date    CHOLHDL 30.0 05/22/2024    CHOLHDL 26.3 05/31/2023    CHOLHDL 30.1 07/16/2021     Lab Results   Component Value Date    TSH 0.661 06/20/2023       ASSESSMENT/PLAN     Tito Menard is a 78 y.o. male     Tito was seen today for follow-up and diarrhea. Recommend a single dose of OTC imodium. BRAT diet. Hydration and electrolyte replacement. Check urine and labs. RTC in 4 weeks, sooner if needed.     Diagnoses and all orders for this visit:    Diarrhea, unspecified type  -     COMPREHENSIVE METABOLIC PANEL; Future    Constipation, unspecified constipation type    Dark urine  -     Urinalysis; Future  -     CULTURE, URINE; Future    Essential hypertension   -stable, continue current med regimen     Other orders  -      docusate sodium (COLACE) 100 MG capsule; Take 1 capsule (100 mg total) by mouth once daily.     Patient was counseled on when and how to seek emergent care.       Hannah Dudley PA-C   Department of Internal Medicine - [facility]   10:11 AM

## 2024-05-29 ENCOUNTER — LAB VISIT (OUTPATIENT)
Dept: LAB | Facility: HOSPITAL | Age: 79
End: 2024-05-29
Payer: MEDICARE

## 2024-05-29 ENCOUNTER — OFFICE VISIT (OUTPATIENT)
Dept: NEUROLOGY | Facility: CLINIC | Age: 79
End: 2024-05-29
Payer: MEDICARE

## 2024-05-29 VITALS
DIASTOLIC BLOOD PRESSURE: 82 MMHG | BODY MASS INDEX: 28.24 KG/M2 | WEIGHT: 201.75 LBS | HEART RATE: 77 BPM | HEIGHT: 71 IN | SYSTOLIC BLOOD PRESSURE: 158 MMHG

## 2024-05-29 DIAGNOSIS — R42 DIZZINESS AND GIDDINESS: Primary | ICD-10-CM

## 2024-05-29 DIAGNOSIS — R55 SYNCOPE, UNSPECIFIED SYNCOPE TYPE: ICD-10-CM

## 2024-05-29 DIAGNOSIS — R56.9 SEIZURE: ICD-10-CM

## 2024-05-29 DIAGNOSIS — R55 SYNCOPE AND COLLAPSE: ICD-10-CM

## 2024-05-29 DIAGNOSIS — G95.9 CERVICAL MYELOPATHY: ICD-10-CM

## 2024-05-29 DIAGNOSIS — R82.998 DARK URINE: ICD-10-CM

## 2024-05-29 LAB
BILIRUB UR QL STRIP: NEGATIVE
CLARITY UR REFRACT.AUTO: CLEAR
COLOR UR AUTO: YELLOW
GLUCOSE UR QL STRIP: ABNORMAL
HGB UR QL STRIP: ABNORMAL
KETONES UR QL STRIP: NEGATIVE
LEUKOCYTE ESTERASE UR QL STRIP: NEGATIVE
NITRITE UR QL STRIP: NEGATIVE
PH UR STRIP: 6 [PH] (ref 5–8)
PROT UR QL STRIP: NEGATIVE
SP GR UR STRIP: 1.02 (ref 1–1.03)
URN SPEC COLLECT METH UR: ABNORMAL

## 2024-05-29 PROCEDURE — 99214 OFFICE O/P EST MOD 30 MIN: CPT | Mod: S$GLB,,, | Performed by: PSYCHIATRY & NEUROLOGY

## 2024-05-29 PROCEDURE — 1159F MED LIST DOCD IN RCRD: CPT | Mod: CPTII,S$GLB,, | Performed by: PSYCHIATRY & NEUROLOGY

## 2024-05-29 PROCEDURE — 1125F AMNT PAIN NOTED PAIN PRSNT: CPT | Mod: CPTII,S$GLB,, | Performed by: PSYCHIATRY & NEUROLOGY

## 2024-05-29 PROCEDURE — 3077F SYST BP >= 140 MM HG: CPT | Mod: CPTII,S$GLB,, | Performed by: PSYCHIATRY & NEUROLOGY

## 2024-05-29 PROCEDURE — 1100F PTFALLS ASSESS-DOCD GE2>/YR: CPT | Mod: CPTII,S$GLB,, | Performed by: PSYCHIATRY & NEUROLOGY

## 2024-05-29 PROCEDURE — 3079F DIAST BP 80-89 MM HG: CPT | Mod: CPTII,S$GLB,, | Performed by: PSYCHIATRY & NEUROLOGY

## 2024-05-29 PROCEDURE — 1111F DSCHRG MED/CURRENT MED MERGE: CPT | Mod: CPTII,S$GLB,, | Performed by: PSYCHIATRY & NEUROLOGY

## 2024-05-29 PROCEDURE — 99999 PR PBB SHADOW E&M-EST. PATIENT-LVL V: CPT | Mod: PBBFAC,,, | Performed by: PSYCHIATRY & NEUROLOGY

## 2024-05-29 PROCEDURE — 3288F FALL RISK ASSESSMENT DOCD: CPT | Mod: CPTII,S$GLB,, | Performed by: PSYCHIATRY & NEUROLOGY

## 2024-05-29 PROCEDURE — 81003 URINALYSIS AUTO W/O SCOPE: CPT | Performed by: PHYSICIAN ASSISTANT

## 2024-05-29 PROCEDURE — 87086 URINE CULTURE/COLONY COUNT: CPT | Performed by: PHYSICIAN ASSISTANT

## 2024-05-29 NOTE — PROGRESS NOTES
"  Tito Menard is a 78 y.o. year old male that  presents with chief complain of recurrent falls. He is accompanied by his wife.    HPI:  Mr Menard has HTN, HLD, DM, CKD II, enlarged aorta, cervical spine stenosis, s/p L spine surgery x 2, and PD on levodopa.  He complains of not being able to stand up and walk due to progressive imbalance with frequent falls for the past one or two years.   Interestingly enough, wife stated that the falls are " rather unusual because he is not just having simple falls but weakness attacks " that she describes as sudden episodes in which he goes down, becomes confused with a dazed look in his face/eyes, then gets unresponsive and after few minutes he comes back without remembering anything regarding the fall. No bladder impairment, abnormal movements, or tongue biting. Those episodes are for the most part very similar all the time.  On the other hand, he tells me that he is seeing a neurologist regarding his PD " but I don't see a significant improvement with the levodopa '.  The R leg is chronically weak following spine surgery. In addition, he reports feet tingling and numbness that is better on gabapentin.  Utilizes a walker all the time for safety reasons.  Did PT but didn't really help.  Denies HA, vertigo, double vision, language impairment, or visual disturbances.        Past Medical History:   Diagnosis Date    Allergy     Aneurysm of artery of lower extremity     Chalazion of left eye     CKD (chronic kidney disease), stage II 4/1/2019    Diabetes mellitus type II     Diabetes with neurologic complications     Enlarged aorta 8/2/2016    Noted on pharmacological stress echo 2/28/2014.      GERD (gastroesophageal reflux disease)     egd 2008    Hyperlipidemia     Hypertension     Jock itch 7/19/2018    MGD (meibomian gland dysfunction)     Osteopenia     Spinal stenosis     Spondylosis without myelopathy 10/23/2015    Vitamin D deficiency disease      Social History "     Socioeconomic History    Marital status:    Occupational History    Occupation: Retired     Comment:    Tobacco Use    Smoking status: Never    Smokeless tobacco: Former     Types: Chew    Tobacco comments:     Chewed tobacco once per day for 4-5 years when a    Substance and Sexual Activity    Alcohol use: No    Drug use: No    Sexual activity: Not Currently     Partners: Female   Social History Narrative        Colon 2007     Social Determinants of Health     Financial Resource Strain: Low Risk  (5/2/2024)    Overall Financial Resource Strain (CARDIA)     Difficulty of Paying Living Expenses: Not hard at all   Food Insecurity: No Food Insecurity (5/2/2024)    Hunger Vital Sign     Worried About Running Out of Food in the Last Year: Never true     Ran Out of Food in the Last Year: Never true   Transportation Needs: No Transportation Needs (5/2/2024)    PRAPARE - Transportation     Lack of Transportation (Medical): No     Lack of Transportation (Non-Medical): No   Physical Activity: Inactive (5/2/2024)    Exercise Vital Sign     Days of Exercise per Week: 0 days     Minutes of Exercise per Session: 0 min   Stress: Patient Unable To Answer (5/2/2024)    [employer] [employer] - Occupational Stress Questionnaire     Feeling of Stress : Patient unable to answer   Housing Stability: Low Risk  (5/2/2024)    Housing Stability Vital Sign     Unable to Pay for Housing in the Last Year: No     Homeless in the Last Year: No     Past Surgical History:   Procedure Laterality Date    CHALAZION EXCISION Left 8/18/13    COLONOSCOPY N/A 4/24/2024    Procedure: COLONOSCOPY;  Surgeon: Catalino Sorto MD;  Location: [address]);  Service: Endoscopy;  Laterality: N/A;  Ref by Dr DEJAN Brown, PEG, portal - PC  4/18-precall complete-MS    CYST REMOVAL  2013    Back of neck    EXCISION OF HYDROCELE Right 11/14/2023    Procedure: HYDROCELECTOMY;  Surgeon: Lio  Beatrice HAWLEY MD;  [facility];  Service: Urology;  Laterality: Right;  1 hr    FLEXIBLE CYSTOSCOPY N/A 12/7/2021    Procedure: CYSTOSCOPY, FLEXIBLE;  Surgeon: Beatrice Vaca MD;  [facility];  Service: Urology;  Laterality: N/A;    FLUOROSCOPIC URODYNAMIC STUDY N/A 12/7/2021    Procedure: URODYNAMIC STUDY, FLUOROSCOPIC;  Surgeon: Beatrice Vaca MD;  Location: [facility] OR [address] [place] [address];  Service: Urology;  Laterality: N/A;  90 minutes     SPINE SURGERY  2007    x2 (2000, 2007)     Family History   Problem Relation Name Age of Onset    Hyperlipidemia Mother      Hypertension Mother      Kidney disease Mother      Cancer Mother      Heart disease Mother      Kidney failure Mother      No Known Problems Daughter Muna     No Known Problems Sister Sujata     No Known Problems Brother Abhijeet     No Known Problems Son Tito     No Known Problems Brother Alexei     No Known Problems Son Srini     Hypertension Maternal Grandmother      Amblyopia Neg Hx      Blindness Neg Hx      Cataracts Neg Hx      Diabetes Neg Hx      Glaucoma Neg Hx      Macular degeneration Neg Hx      Retinal detachment Neg Hx      Strabismus Neg Hx      Stroke Neg Hx      Thyroid disease Neg Hx      Melanoma Neg Hx      Psoriasis Neg Hx      Lupus Neg Hx      Eczema Neg Hx             Review of Systems  General ROS: negative for chills, fever or weight loss  Psychological ROS: negative for hallucination, depression or suicidal ideation  Ophthalmic ROS: negative for blurry vision, photophobia or eye pain  ENT ROS: negative for epistaxis, sore throat or rhinorrhea  Respiratory ROS: no cough, shortness of breath, or wheezing  Cardiovascular ROS: no chest pain or dyspnea on exertion  Gastrointestinal ROS: no abdominal pain, change in bowel habits, or black/ bloody stools  Genito-Urinary ROS: no dysuria, trouble voiding, or hematuria  Musculoskeletal ROS: positive for gait disturbance, falls,  muscular weakness R  "leg  Neurological ROS: no seizures; positive for gait impairment   Dermatological ROS: negative for pruritis, rash and jaundice      Physical Exam:  Ht 5' 11" (1.803 m)   Wt 91.5 kg (201 lb 11.5 oz)   BMI 28.13 kg/m²   General appearance: alert, cooperative, no distress  Constitutional:Oriented to person, place, and time.appears well-developed and well-nourished.   HEENT: Normocephalic, atraumatic, neck symmetrical, no nasal discharge   Eyes: conjunctivae/corneas clear, PERRL, EOM's intact  Lungs: clear to auscultation bilaterally, no dullness to percussion bilaterally  Heart: regular rate and rhythm without rub; no displacement of the PMI   Abdomen: soft, non-tender; bowel sounds normoactive; no organomegaly  Extremities: extremities symmetric; no clubbing, cyanosis, or edema  Integument: Skin color, texture, turgor normal; no rashes; hair distrubution normal  Neurologic:   Mental status: alert and awake, oriented x 4, comprehension, naming, and repetition intact. No right to left confusion. Performs serial 7's without difficulty .No dysarthria.  CN 2-12: pupils 4 mm bilaterally, reactive to light. Fundi without papilledema. Visual fields full to confrontation. EOM full without nystagmus. Face sensation normal in all distributions. Face symmetric. Hearing grossly intact. Palate elevates well. Tongue midline without atrophy or fasciculations.  Motor: 5/5 all over upper extremities. Weakness RLE greater than LLE.  Sensory: no tested  DTR's: 2+ all over.  Plantars: no tested.  Coordination: resting tremor L hand. Finger to nose normal, heel-knee-shin no tested. Decreased ROBERTA, normal foot tapping.  Stance and Gait: stoop posture with marked bradykinesia and postural instability    LABS:    Complete Blood Count  Lab Results   Component Value Date    RBC 4.01 (L) 05/22/2024    HGB 11.7 (L) 05/22/2024    HCT 37.9 (L) 05/22/2024    MCV 95 05/22/2024    MCH 29.2 05/22/2024    MCHC 30.9 (L) 05/22/2024    RDW 14.3 " 05/22/2024     05/22/2024    MPV 9.4 05/22/2024    GRAN 1.7 (L) 05/22/2024    GRAN 40.6 05/22/2024    LYMPH 2.0 05/22/2024    LYMPH 46.2 05/22/2024    MONO 0.3 05/22/2024    MONO 7.8 05/22/2024    EOS 0.2 05/22/2024    BASO 0.02 05/22/2024    EOSINOPHIL 4.7 05/22/2024    BASOPHIL 0.5 05/22/2024    DIFFMETHOD Automated 05/22/2024       Comprehensive Metabolic Panel  Lab Results   Component Value Date    GLU 93 05/28/2024    BUN 12 05/28/2024    CREATININE 0.8 05/28/2024     05/28/2024    K 3.7 05/28/2024     05/28/2024    PROT 6.9 05/28/2024    ALBUMIN 3.3 (L) 05/28/2024    BILITOT 0.5 05/28/2024    AST 17 05/28/2024    ALKPHOS 74 05/28/2024    CO2 26 05/28/2024    ALT 11 05/28/2024    ANIONGAP 6 (L) 05/28/2024    EGFRNONAA >60.0 06/27/2022    ESTGFRAFRICA >60.0 06/27/2022       TSH  Lab Results   Component Value Date    TSH 0.661 06/20/2023         Assessment: pleasant 77 y/o with HTN, HLD, DM, CKD II, enlarged aorta, cervical spine stenosis, s/p L spine surgery x 2, and PD on levodopa, presents for evaluation of recurrent falls with associated impairment of conscience as detailed above.  D Dx includes atonic seizures, recurrent syncope, VB insufficiency.  Will start neuro work up with MRI/MRA brain and EEG.  Follow up after tests completion.                ICD-10-CM ICD-9-CM    1. Syncope, unspecified syncope type  R55 780.2 Ambulatory referral/consult to Neurology        The encounter diagnosis was Syncope, unspecified syncope type.      Plan:  1) Recurrent falls with impairment of consciousness: as above  2) HTN  3) HLD  4) DM  5) CKD II  6) Enlarged aorta  7 )Cervical spine stenosis  8) s/p L spine surgery x 2  9) PD on levodopa            No orders of the defined types were placed in this encounter.          Jorge Luis Dunn MD

## 2024-05-31 ENCOUNTER — OFFICE VISIT (OUTPATIENT)
Dept: UROLOGY | Facility: CLINIC | Age: 79
End: 2024-05-31
Payer: MEDICARE

## 2024-05-31 VITALS
WEIGHT: 202.81 LBS | DIASTOLIC BLOOD PRESSURE: 81 MMHG | BODY MASS INDEX: 28.39 KG/M2 | HEIGHT: 71 IN | SYSTOLIC BLOOD PRESSURE: 150 MMHG | HEART RATE: 68 BPM

## 2024-05-31 DIAGNOSIS — N40.1 BPH WITH OBSTRUCTION/LOWER URINARY TRACT SYMPTOMS: ICD-10-CM

## 2024-05-31 DIAGNOSIS — N39.0 RECURRENT UTI: Primary | ICD-10-CM

## 2024-05-31 DIAGNOSIS — N13.8 BPH WITH OBSTRUCTION/LOWER URINARY TRACT SYMPTOMS: ICD-10-CM

## 2024-05-31 DIAGNOSIS — N39.41 URGE INCONTINENCE: ICD-10-CM

## 2024-05-31 LAB
BACTERIA UR CULT: NORMAL
BACTERIA UR CULT: NORMAL

## 2024-05-31 PROCEDURE — 1125F AMNT PAIN NOTED PAIN PRSNT: CPT | Mod: HCNC,CPTII,S$GLB, | Performed by: UROLOGY

## 2024-05-31 PROCEDURE — 81002 URINALYSIS NONAUTO W/O SCOPE: CPT | Mod: HCNC,S$GLB,, | Performed by: UROLOGY

## 2024-05-31 PROCEDURE — 99999 PR PBB SHADOW E&M-EST. PATIENT-LVL III: CPT | Mod: PBBFAC,HCNC,, | Performed by: UROLOGY

## 2024-05-31 PROCEDURE — 3079F DIAST BP 80-89 MM HG: CPT | Mod: HCNC,CPTII,S$GLB, | Performed by: UROLOGY

## 2024-05-31 PROCEDURE — 1159F MED LIST DOCD IN RCRD: CPT | Mod: HCNC,CPTII,S$GLB, | Performed by: UROLOGY

## 2024-05-31 PROCEDURE — 51798 US URINE CAPACITY MEASURE: CPT | Mod: HCNC,S$GLB,, | Performed by: UROLOGY

## 2024-05-31 PROCEDURE — 99214 OFFICE O/P EST MOD 30 MIN: CPT | Mod: HCNC,S$GLB,, | Performed by: UROLOGY

## 2024-05-31 PROCEDURE — 3077F SYST BP >= 140 MM HG: CPT | Mod: HCNC,CPTII,S$GLB, | Performed by: UROLOGY

## 2024-05-31 PROCEDURE — 3288F FALL RISK ASSESSMENT DOCD: CPT | Mod: HCNC,CPTII,S$GLB, | Performed by: UROLOGY

## 2024-05-31 PROCEDURE — 1111F DSCHRG MED/CURRENT MED MERGE: CPT | Mod: HCNC,CPTII,S$GLB, | Performed by: UROLOGY

## 2024-05-31 PROCEDURE — 1100F PTFALLS ASSESS-DOCD GE2>/YR: CPT | Mod: HCNC,CPTII,S$GLB, | Performed by: UROLOGY

## 2024-05-31 RX ORDER — LIDOCAINE HYDROCHLORIDE 20 MG/ML
JELLY TOPICAL ONCE
OUTPATIENT
Start: 2024-05-31 | End: 2024-05-31

## 2024-05-31 RX ORDER — DOXYCYCLINE HYCLATE 100 MG
100 TABLET ORAL ONCE
OUTPATIENT
Start: 2024-05-31 | End: 2024-05-31

## 2024-05-31 NOTE — PROGRESS NOTES
CHIEF COMPLAINT:    Mr. Menard is a 78 y.o. male presenting for a follow up after recent bladder infection.     PRESENTING ILLNESS:    Tito Menard is a 78 y.o. male who presents with a history of LUTS, Parkinson's disease and epididymitis, status post right hydrocelectomy 11/14/2023.  He recently had a syncopal episode 4/29/2024.  He was found to have a urine culture positive for E coli with intermediate sensitivity to ampicillin, was otherwise, pan sensitive.  Blood cultures were contaminated with coag negative staph.      He states 5 days prior to this episode (4/24/20204), he underwent colonoscopy and went into urinary retention.  According to the ER note, bladder scan was 1 liter, he was straight catheterized (no note documenting volume drained found) and he was discharged home.  He had ongoing diarrhea and on 4/29/2024 had a syncopal episode (was found having fallen off the toilet then was able to walk with assistance)     He states that he was having to push to urinate but that has improved since he has been on antibiotics.  He has been on finasteride and tamsulosin so is on maximal medical therapy.  He states that he has ongoing urgency and urge incontinence.  PVR today was 14 ml by bladder scan.    REVIEW OF SYSTEMS:    Review of Systems   Constitutional: Negative.    HENT: Negative.     Eyes: Negative.    Respiratory: Negative.     Cardiovascular: Negative.    Gastrointestinal: Negative.    Genitourinary:  Positive for urgency.        Urge incontinence   Musculoskeletal:  Positive for falls.   Skin: Negative.    Neurological:  Positive for weakness.        Parkinson's disease   Endo/Heme/Allergies: Negative.    Psychiatric/Behavioral: Negative.         PATIENT HISTORY:    Past Medical History:   Diagnosis Date    Allergy     Aneurysm of artery of lower extremity     Chalazion of left eye     CKD (chronic kidney disease), stage II 4/1/2019    Diabetes mellitus type II     Diabetes with neurologic  complications     Enlarged aorta 8/2/2016    Noted on pharmacological stress echo 2/28/2014.      GERD (gastroesophageal reflux disease)     egd 2008    Hyperlipidemia     Hypertension     Jock itch 7/19/2018    MGD (meibomian gland dysfunction)     Osteopenia     Seizure 5/29/2024    Spinal stenosis     Spondylosis without myelopathy 10/23/2015    Vitamin D deficiency disease        Past Surgical History:   Procedure Laterality Date    CHALAZION EXCISION Left 8/18/13    COLONOSCOPY N/A 4/24/2024    Procedure: COLONOSCOPY;  Surgeon: Catalino Sorto MD;  Location: [facility] ENDO (4TH FLR);  Service: Endoscopy;  Laterality: N/A;  Ref by Dr DEJAN Brown, PEG, portal - PC  4/18-precall complete-MS    CYST REMOVAL  2013    Back of neck    EXCISION OF HYDROCELE Right 11/14/2023    Procedure: HYDROCELECTOMY;  Surgeon: Beatrice Vaca MD;  Location: [facility] OR 2ND FLR;  Service: Urology;  Laterality: Right;  1 hr    FLEXIBLE CYSTOSCOPY N/A 12/7/2021    Procedure: CYSTOSCOPY, FLEXIBLE;  Surgeon: Beatrice Vaca MD;  Location: [facility] OR 1ST FLR;  Service: Urology;  Laterality: N/A;    FLUOROSCOPIC URODYNAMIC STUDY N/A 12/7/2021    Procedure: URODYNAMIC STUDY, FLUOROSCOPIC;  Surgeon: Beatrice Vaca MD;  Location: [facility] OR 1ST FLR;  Service: Urology;  Laterality: N/A;  90 minutes     SPINE SURGERY  2007    x2 (2000, 2007)       Family History   Problem Relation Name Age of Onset    Hyperlipidemia Mother      Hypertension Mother      Kidney disease Mother      Cancer Mother      Heart disease Mother      Kidney failure Mother      Hypertension Maternal Grandmother       Social History     Socioeconomic History    Marital status:    Occupational History    Occupation: Retired     Comment:    Tobacco Use    Smoking status: Never    Smokeless tobacco: Former     Types: Chew    Tobacco comments:     Chewed tobacco once per day for 4-5 years when a    Substance and Sexual Activity    Alcohol use: No     Drug use: No    Sexual activity: Not Currently     Partners: Female   Social History Narrative        Colon 2007     Social Determinants of Health     Financial Resource Strain: Low Risk  (5/2/2024)    Overall Financial Resource Strain (CARDIA)     Difficulty of Paying Living Expenses: Not hard at all   Food Insecurity: No Food Insecurity (5/2/2024)    Hunger Vital Sign     Worried About Running Out of Food in the Last Year: Never true     Ran Out of Food in the Last Year: Never true   Transportation Needs: No Transportation Needs (5/2/2024)    PRAPARE - Transportation     Lack of Transportation (Medical): No     Lack of Transportation (Non-Medical): No   Physical Activity: Inactive (5/2/2024)    Exercise Vital Sign     Days of Exercise per Week: 0 days     Minutes of Exercise per Session: 0 min   Stress: Patient Unable To Answer (5/2/2024)    [employer] - Occupational Stress Questionnaire     Feeling of Stress : Patient unable to answer   Housing Stability: Low Risk  (5/2/2024)    Housing Stability Vital Sign     Unable to Pay for Housing in the Last Year: No     Homeless in the Last Year: No       Allergies:  Patient has no known allergies.    Medications:  Outpatient Encounter Medications as of 5/31/2024   Medication Sig Dispense Refill    amLODIPine (NORVASC) 5 MG tablet Take 1 tablet (5 mg total) by mouth once daily.      aspirin (ECOTRIN) 81 MG EC tablet TAKE 1 TABLET EVERY DAY. 90 tablet 3    azelastine (ASTELIN) 137 mcg (0.1 %) nasal spray USE 1 SPRAY NASALLY TWICE DAILY 60 mL 0    calcium carbonate (TUMS ORAL) Take 1-2 tablets by mouth daily as needed (for acid reflux).      cholecalciferol, vitamin D3, (VITAMIN D3) 50 mcg (2,000 unit) Cap Take 1 capsule by mouth once daily.      clotrimazole-betamethasone 1-0.05% (LOTRISONE) cream APPLY TOPICALLY 2 (TWO) TIMES DAILY. 45 g 0    docusate sodium (COLACE) 100 MG capsule Take 1 capsule (100 mg total) by mouth once  daily. 30 capsule 11    finasteride (PROSCAR) 5 mg tablet Take 1 tablet (5 mg total) by mouth once daily. 90 tablet 3    fluticasone propionate (FLONASE) 50 mcg/actuation nasal spray USE 1 SPRAY NASALLY ONE TIME DAILY 32 g 0    gabapentin (NEURONTIN) 300 MG capsule One po at noon daily and two po every evening 360 capsule 1    ketoconazole (NIZORAL) 2 % cream AAA bid (facial redness and scaling) (Patient taking differently: Apply topically once weekly for facial redness and scaling.) 60 g 3    lanolin/mineral oil/petrolatum (ARTIFICIAL TEARS OPHT) Place 1-2 drops into both eyes daily as needed (for dry eyes).      LIDOcaine 4 % PtMd Apply 1 patch topically daily as needed (for back pain).      omeprazole (PRILOSEC) 20 MG capsule TAKE 2 CAPSULES ONE TIME DAILY 180 capsule 0    pravastatin (PRAVACHOL) 20 MG tablet TAKE 1 TABLET EVERY DAY 90 tablet 0    tamsulosin (FLOMAX) 0.4 mg Cap Take 1 capsule (0.4 mg total) by mouth once daily.      carbidopa-levodopa  mg (SINEMET)  mg per tablet Take 1 tablet by mouth 3 (three) times daily. 90 tablet 5    irbesartan (AVAPRO) 150 MG tablet Take 2 tablets (300 mg total) by mouth every evening. (Patient not taking: Reported on 5/29/2024)       PHYSICAL EXAMINATION:    The patient generally appears in good health, is appropriately interactive, and is in no apparent distress.    Skin: No lesions.    Mental: Cooperative with normal affect.    Neuro: Grossly intact.    HEENT: Normal. No evidence of lymphadenopathy.    Chest:  normal inspiratory effort.    Abdomen: Soft, non-tender. No masses or organomegaly. Bladder is not palpable. No evidence of flank discomfort. No evidence of inguinal hernia.    Extremities: No clubbing, cyanosis, or edema    PVR by bladder scan was 14 ml    LABS:    Lab Results   Component Value Date    BUN 12 05/28/2024    CREATININE 0.8 05/28/2024       Lab Results   Component Value Date    PSA 2.2 11/11/2021    PSA 1.6 09/02/2020    PSA 1.9  08/29/2019    PSADIAG 2.1 07/19/2018    PSADIAG 1.6 06/12/2017    PSADIAG 1.9 05/15/2015       UA 1.010, pH 7, otherwise, negative.     CT abd/pelvis 4/30/2024CLINICAL HISTORY:  LLQ abdominal pain;     TECHNIQUE:  Low dose axial images, sagittal and coronal reformations were obtained from the lung bases to the pubic symphysis following the IV administration of 100 mL of Omnipaque 350 .  Oral contrast was not administered.     COMPARISON:  None.     FINDINGS:  Abdomen:     - Lower thorax:     - Lung bases: Mild right lower lobe atelectasis or mild consolidation posteriorly.     - Liver: No focal mass.     - Gallbladder: No calcified gallstones.     - Bile Ducts: No evidence of intra or extra hepatic biliary ductal dilation.     - Spleen: Negative.     - Kidneys: 4.8 cm simple cyst at the upper pole the right kidney.  No stone, soft tissue mass, hydronephrosis or hydroureter bilaterally.     - Adrenals: Unremarkable.     - Pancreas: No mass or peripancreatic fat stranding.     - Retroperitoneum:  No significant adenopathy.     - Vascular: No abdominal aortic aneurysm.     - Abdominal wall:  Small fat containing umbilical hernia.     The appendix is adequately maintained.     Pelvis:     There is diffuse wall thickening of the urinary bladder with mild stranding.  Findings could represent cystitis.  Follow-up recommended.     The prostate is mildly enlarged measuring 5.4 cm.     Bowel/Mesentery:     No evidence of bowel obstruction or inflammation.  Mild gaseous distension.     Few scattered diverticula of the left colon with no evidence of acute diverticulitis.     Nondistention of the distal sigmoid colon and rectum.  Mild proctocolitis is not excluded.     Bones:  No acute fractures.     Grade 1 spondylolisthesis of L3 on L4.  Severe disc space narrowing at L5-S1 and L4-5 with moderate disc space narrowing at L3-4 and L2-3.     Multilevel mild diffuse posterior disc osteophyte complex.     Mild central canal  narrowing at L5-S1.  Moderate-severe central canal narrowing at L4-5.  Severe central canal narrowing at L3-4.  Severe central canal narrowing at L2-3.     Severe foraminal narrowing bilaterally from L1 through S1, right greater than left, most prominent at L5-S1 and L4-5.     Impression:     1. Diffuse wall thickening of the urinary bladder with mild adjacent stranding.  Findings suggest cystitis.  Follow-up recommended.  2. Mild prostatomegaly.  3. Mild right lower lobe pulmonary atelectasis or mild consolidation.  Follow-up recommended.  4. Nondistention of the distal sigmoid colon and rectum.  Mild wall thickening/proctocolitis is not excluded.  5. Multilevel chronic degenerative changes of the lumbar spine.  See above comments.       IMPRESSION:    History of bladder infection  LUTS, history of Parkinson's disease    PLAN:    1. For urodynamics and cystoscopy  2.  If he is obstructed, would consider referral for prostatic enucleation    I spent 30 minutes with the patient of which more than half was spent in direct consultation with the patient in regards to our treatment and plan.

## 2024-06-10 ENCOUNTER — PATIENT MESSAGE (OUTPATIENT)
Dept: INTERNAL MEDICINE | Facility: CLINIC | Age: 79
End: 2024-06-10
Payer: MEDICARE

## 2024-06-13 DIAGNOSIS — K21.00 GASTROESOPHAGEAL REFLUX DISEASE WITH ESOPHAGITIS: ICD-10-CM

## 2024-06-13 RX ORDER — OMEPRAZOLE 20 MG/1
40 CAPSULE, DELAYED RELEASE ORAL
Qty: 180 CAPSULE | Refills: 0 | Status: SHIPPED | OUTPATIENT
Start: 2024-06-13

## 2024-06-13 NOTE — TELEPHONE ENCOUNTER
Care Due:                  Date            Visit Type   Department     Provider  --------------------------------------------------------------------------------                                EP -                              PRIMARY      [facility] INTERNAL  Amanda Guerin  Last Visit: 05-      CARE ([facility])   MEDICINE       Stephanie  Next Visit: None Scheduled  None         None Found                                                            Last  Test          Frequency    Reason                     Performed    Due Date  --------------------------------------------------------------------------------    Office Visit  15 months..  omeprazole, pravastatin..  05- 08-    [facility] Embedded Care Due Messages. Reference number: [id number].   6/13/2024 3:26:08 AM CDT

## 2024-06-13 NOTE — TELEPHONE ENCOUNTER
Refill Routing Note   Medication(s) are not appropriate for processing by [facility] for the following reason(s):        ED/Hospital Visit since last OV with provider    [facility] action(s):  Defer   Requires appointment : Yes        Medication Therapy Plan: >12 months since LOV with PCP      Appointments  past 12m or future 3m with PCP    Date Provider   Last Visit   5/31/2023 Amanda Brown MD   Next Visit   Visit date not found Amanda Brown MD   ED visits in past 90 days: 1        Note composed:9:24 AM 06/13/2024

## 2024-06-21 ENCOUNTER — PROCEDURE VISIT (OUTPATIENT)
Dept: UROLOGY | Facility: CLINIC | Age: 79
End: 2024-06-21
Payer: MEDICARE

## 2024-06-21 VITALS
WEIGHT: 207 LBS | HEIGHT: 71 IN | HEART RATE: 95 BPM | BODY MASS INDEX: 28.98 KG/M2 | SYSTOLIC BLOOD PRESSURE: 175 MMHG | DIASTOLIC BLOOD PRESSURE: 98 MMHG | TEMPERATURE: 98 F

## 2024-06-21 DIAGNOSIS — N39.0 RECURRENT UTI: ICD-10-CM

## 2024-06-21 DIAGNOSIS — N39.41 URGE INCONTINENCE: ICD-10-CM

## 2024-06-21 PROCEDURE — 51784 ANAL/URINARY MUSCLE STUDY: CPT | Mod: 26,HCNC,51,S$GLB | Performed by: UROLOGY

## 2024-06-21 PROCEDURE — 52000 CYSTOURETHROSCOPY: CPT | Mod: HCNC,59,S$GLB, | Performed by: UROLOGY

## 2024-06-21 PROCEDURE — 51728 CYSTOMETROGRAM W/VP: CPT | Mod: 26,HCNC,S$GLB, | Performed by: UROLOGY

## 2024-06-21 PROCEDURE — 51797 INTRAABDOMINAL PRESSURE TEST: CPT | Mod: 26,HCNC,S$GLB, | Performed by: UROLOGY

## 2024-06-21 RX ORDER — LIDOCAINE HYDROCHLORIDE 20 MG/ML
JELLY TOPICAL ONCE
Status: COMPLETED | OUTPATIENT
Start: 2024-06-21 | End: 2024-06-21

## 2024-06-21 RX ORDER — DOXYCYCLINE HYCLATE 100 MG
100 TABLET ORAL ONCE
Status: COMPLETED | OUTPATIENT
Start: 2024-06-21 | End: 2024-06-21

## 2024-06-21 RX ADMIN — LIDOCAINE HYDROCHLORIDE: 20 JELLY TOPICAL at 02:06

## 2024-06-21 RX ADMIN — Medication 100 MG: at 02:06

## 2024-06-21 NOTE — PATIENT INSTRUCTIONS
_                                                                                                                                                                                             If any problems after hours or weekends, you may call [phone] and ask for the urology resident on call.SIMPLE URODYNAMIC STUDY (SUDS) & CYSTOSCOPY  UROLOGY CLINIC DISCHARGE INSTRUCTIONS    You have had a procedure that will require time to properly heal. Follow the instructions you have been given on how to care for yourself once you are home. Below is additional information to help in your recovery.    ACTIVITY  There are no restrictions in activity. Start doing again the things you did before the procedure.  You may experience a slight burning sensation. You may notice a small amount of blood in your urine. This will clear up within a day. Call the clinic if this continues beyond 48 hours.    DIET  Continue your normal diet. You may eat the same foods you ate before your procedure.  Drink plenty of fluids during the first 24-48 hours following your procedure.    MEDICATIONS  Resume all other previous medications from your prescribing physician.  Continue any pre=procedure antibiotics until they are all gone.    SIGNS AND SYMPTOMS TO REPORT TO THE DOCTOR  Chills or fever greater than 101° F within 24 hours of procedure.  Changes in urination, such as increased bleeding, foul smell, cloudy urine, or painful urination.  Call your doctor with any questions or concerns.    For any emergency situation, call 911 immediately or go to your nearest emergency room.    [facility]  [phone]

## 2024-06-23 ENCOUNTER — HOSPITAL ENCOUNTER (OUTPATIENT)
Dept: RADIOLOGY | Facility: HOSPITAL | Age: 79
Discharge: HOME | End: 2024-06-23
Attending: PSYCHIATRY & NEUROLOGY
Payer: MEDICARE

## 2024-06-23 DIAGNOSIS — R42 DIZZINESS AND GIDDINESS: ICD-10-CM

## 2024-06-23 DIAGNOSIS — R55 SYNCOPE AND COLLAPSE: ICD-10-CM

## 2024-06-23 PROCEDURE — 70551 MRI BRAIN STEM W/O DYE: CPT | Mod: 26,HCNC,, | Performed by: RADIOLOGY

## 2024-06-23 PROCEDURE — 70551 MRI BRAIN STEM W/O DYE: CPT | Mod: TC,HCNC

## 2024-06-23 PROCEDURE — 70544 MR ANGIOGRAPHY HEAD W/O DYE: CPT | Mod: TC,HCNC

## 2024-06-24 NOTE — PROCEDURES
Procedures    Urodynamic Report    Indication:  LUTS, Parkinsons disease and recurrent UTI    Patient was taken to the Urodynamic Suite where a time out was performed.  He was asked to perform a free uroflow.  Next, the patient was prepped and the urinary residual was drained with a 14 Fr catheter.  A 7 Fr dual lumen catheter was placed to measure intravesical pressures.  A 10 Fr balloon manometer was placed into the rectum for abdominal pressure measurements.  Patch EMG electrodes were placed on the perineum.  The patient was connected to the [employer] Urodynamic machine, using a multichannel technique, the data were interpreted.  The bladder was filled with sterile water at room temperature at a rate of 30 ml/min.  Patient is filled to urgency.  Filling is performed with the patient in the seated position.  The patient was then asked to sit and void for a pressure flow study.    The following are the results of the study:  1.  Uroflow       Q max:  could not void     2.  Amount drained:  60 ml    3.  CMG       Sensation:         First Desire:  140 ml         Normal Desire:  157 ml         Strong Desire:           Urgency:       Capacity:       Abnormal Contractions:  had DO        Compliance:  decreased 6.5    4. EMG:  no change    5.  Voiding phase       Q max:  23.2 ml/sec       P det at Q max:  69 cm H2O       Pattern of the curve:  prolonged and attenuated       Voided volume:  135 ml       PVR:  100 ml    6.  Analysis:  detrusor overactivity which limited the sensation and capacity.  Obstructed voiding with incomplete bladder emptying    7.  Recommendations:       a.  He has a prostate that protrudes into the bladder with obstructed voiding.  Recommended a TURP       b.  He has other issues that he is dealing with and needs to have those addressed       c.  Concern is of the DO and bladder walls changing with increased collagen deposition over time, limiting the bladder wall's ability to contract.  He and  his wife expressed understanding       D.  Follow up in 3 months.       CYSTOSCOPY REPORT    Pre Procedure Diagnosis:  recurrent UTI, urgency, LUTS    Post Procedure Diagnosis:  prostate protrudes into the bladder, SVML was 4 cm    Anesthesia: 10 cc 2% lidocaine jelly applied per urethra.    14 FR Flexible Ambu cystoscope used.    FINDINGS:  Dome, anterior, posterior, lateral walls and bladder base free of urothelial abnormalities. Right and left ureteral orifices in the normal postion and configuration, both effluxed clear urine.  Bladder neck and urethra were normal.  SVML 4 cm.  On retroflexion of the scope, the prostate protrudes circumferentially into the bladder.      Specimen:  none    The patient was taken to the cystoscopy suite and placed in supine position.  The genitalia was prepped and draped  in the usual sterile fashion.  Time out was perfomed.  Two percent lidocaine jelly was inserted in the urethra and held in place with a penile clamp.  After sufficent time had passed to allow good local anesthesia, the cystoscope was inserted in the urethra and passed into the bladder visualizing the urethra along its entire course.  The dome, anterior, posterior and lateral walls were examined systematically.  The ureteral orifices were in their usual position and configuration.  The cystoscope was turned upon itself 180 degrees to visualize the bladder neck.  The cystoscope was then brought to the level of the bladder neck, the water was turned on and the prostate was visualized.  The cystoscope was removed and the patient was instructed to urinate prior to leaving the office.     Post procedure medication:  doxycycline 100 mg x 1     ASSESSMENT/PLAN:  78 year old man status post flexible cystoscopy.  1. Push fluids for 24 hours.  2. May see blood in the urine, this should gradually improve over the next 2-3 days.  3. The patient was instructed to return to the office or go to the emergency should fever, chills,  cloudy urine, or inability to urinate develop.  4. Follow up in 3 months.

## 2024-06-28 ENCOUNTER — OFFICE VISIT (OUTPATIENT)
Dept: INTERNAL MEDICINE | Facility: CLINIC | Age: 79
End: 2024-06-28
Payer: MEDICARE

## 2024-06-28 VITALS
HEIGHT: 71 IN | BODY MASS INDEX: 28.86 KG/M2 | SYSTOLIC BLOOD PRESSURE: 128 MMHG | WEIGHT: 206.13 LBS | DIASTOLIC BLOOD PRESSURE: 70 MMHG | HEART RATE: 80 BPM | OXYGEN SATURATION: 98 %

## 2024-06-28 DIAGNOSIS — M79.642 LEFT HAND PAIN: Primary | ICD-10-CM

## 2024-06-28 DIAGNOSIS — Z74.09 POOR MOBILITY: ICD-10-CM

## 2024-06-28 DIAGNOSIS — I10 ESSENTIAL HYPERTENSION: ICD-10-CM

## 2024-06-28 PROCEDURE — 99999 PR PBB SHADOW E&M-EST. PATIENT-LVL V: CPT | Mod: PBBFAC,HCNC,, | Performed by: PHYSICIAN ASSISTANT

## 2024-06-28 RX ORDER — KETOCONAZOLE 20 MG/G
CREAM TOPICAL
Qty: 60 G | Refills: 3 | Status: SHIPPED | OUTPATIENT
Start: 2024-06-28

## 2024-06-28 NOTE — PROGRESS NOTES
INTERNAL MEDICINE PROGRESS NOTE    CHIEF COMPLAINT     Chief Complaint   Patient presents with    Diarrhea     Follow up       HPI     Tito Menard is a 78 y.o. male who presents for an Follow-up visit today.    Here for follow-up. On last OV he had some diarrhea that is now resolved. Taking stool softeners only when bowel movements become slow - he is too sensitive to OTC laxatives.     He has HTN that is currently managed by amlodipine 10, irbesartan 150mg. At his most recent hospitalization his BP was low so his spironolactone and chlorthalidone (and K supplement) were discontinued. We have not yet restarted these meds. His BP has been well controlled at home in high normal range. Today he was initially 140/70's, but repeat on rest he was 128/70. Will continue med reigmen as is.     He would like to see a hand surgeon for eval of left hand trigger finger. Referral placed.     Seeing neurology   Seeing urolgy     Past Medical History:  Past Medical History:   Diagnosis Date    Allergy     Aneurysm of artery of lower extremity     Chalazion of left eye     CKD (chronic kidney disease), stage II 4/1/2019    Diabetes mellitus type II     Diabetes with neurologic complications     Enlarged aorta 8/2/2016    Noted on pharmacological stress echo 2/28/2014.      GERD (gastroesophageal reflux disease)     egd 2008    Hyperlipidemia     Hypertension     Jock itch 7/19/2018    MGD (meibomian gland dysfunction)     Osteopenia     Seizure 5/29/2024    Spinal stenosis     Spondylosis without myelopathy 10/23/2015    Vitamin D deficiency disease        Home Medications:  Prior to Admission medications    Medication Sig Start Date End Date Taking? Authorizing Provider   amLODIPine (NORVASC) 5 MG tablet Take 1 tablet (5 mg total) by mouth once daily. 5/14/24 5/14/25 Yes Hannah Dudley PA-C   aspirin (ECOTRIN) 81 MG EC tablet TAKE 1 TABLET EVERY DAY. 12/12/23  Yes Amanda Brown MD   azelastine (ASTELIN) 137 mcg (0.1  %) nasal spray USE 1 SPRAY NASALLY TWICE DAILY 5/13/24  Yes Hannah Dudley PA-C   calcium carbonate (TUMS ORAL) Take 1-2 tablets by mouth daily as needed (for acid reflux).   Yes Provider, Historical   carbidopa-levodopa  mg (SINEMET)  mg per tablet Take 1 tablet by mouth 3 (three) times daily. 11/15/23 6/28/24 Yes Sheldon James MD   cholecalciferol, vitamin D3, (VITAMIN D3) 50 mcg (2,000 unit) Cap Take 1 capsule by mouth once daily.   Yes Provider, Historical   clotrimazole-betamethasone 1-0.05% (LOTRISONE) cream APPLY TOPICALLY 2 (TWO) TIMES DAILY. 10/21/20  Yes Beatrice Vaca MD   docusate sodium (COLACE) 100 MG capsule Take 1 capsule (100 mg total) by mouth once daily. 5/28/24  Yes Hannah Dudley PA-C   finasteride (PROSCAR) 5 mg tablet Take 1 tablet (5 mg total) by mouth once daily. 9/25/23 9/24/24 Yes Beatrice Vaca MD   fluticasone propionate (FLONASE) 50 mcg/actuation nasal spray USE 1 SPRAY NASALLY ONE TIME DAILY 4/17/24  Yes Amanda Brown MD   gabapentin (NEURONTIN) 300 MG capsule One po at noon daily and two po every evening 11/14/23  Yes Amanda Brown MD   irbesartan (AVAPRO) 150 MG tablet Take 2 tablets (300 mg total) by mouth every evening. 5/14/24 5/14/25 Yes Hannah Dudley PA-C   ketoconazole (NIZORAL) 2 % cream AAA bid (facial redness and scaling)  Patient taking differently: Apply topically once weekly for facial redness and scaling. 5/4/21  Yes Anny Fregoso MD   lanolin/mineral oil/petrolatum (ARTIFICIAL TEARS OPHT) Place 1-2 drops into both eyes daily as needed (for dry eyes).   Yes Provider, Historical   LIDOcaine 4 % PtMd Apply 1 patch topically daily as needed (for back pain).   Yes Provider, Historical   omeprazole (PRILOSEC) 20 MG capsule TAKE 2 CAPSULES ONE TIME DAILY 6/13/24  Yes Amanda Brown MD   pravastatin (PRAVACHOL) 20 MG tablet TAKE 1 TABLET EVERY DAY 5/2/24  Yes Amanda Brown MD   tamsulosin (FLOMAX) 0.4 mg Cap Take 1  "capsule (0.4 mg total) by mouth once daily. 5/14/24  Yes Hannah Dudley, SENAIT       Review of Systems:  Review of Systems   Constitutional:  Negative for chills and fever.   HENT:  Negative for sore throat and trouble swallowing.    Eyes:  Negative for visual disturbance.   Respiratory:  Negative for cough and shortness of breath.    Cardiovascular:  Negative for chest pain.   Gastrointestinal:  Negative for abdominal pain, constipation, diarrhea, nausea and vomiting.   Genitourinary:  Negative for dysuria and flank pain.   Musculoskeletal:  Negative for back pain, neck pain and neck stiffness.   Skin:  Negative for rash.   Neurological:  Negative for dizziness, syncope, weakness and headaches.   Psychiatric/Behavioral:  Negative for confusion.        Health Maintainence:   Immunizations:  Health Maintenance         Date Due Completion Date    RSV Vaccine (Age 60+ and Pregnant patients) (1 - 1-dose 60+ series) Never done ---    COVID-19 Vaccine (5 - 2023-24 season) 03/03/2024 11/3/2023    Diabetes Urine Screening 05/31/2024 5/31/2023    Eye Exam 08/03/2024 8/3/2023    Override on 6/14/2018: Done    Override on 8/5/2015: Done    Hemoglobin A1c 11/22/2024 5/22/2024    Override on 10/23/2015: Done    Lipid Panel 05/22/2025 5/22/2024    Colonoscopy 04/24/2027 4/24/2024    TETANUS VACCINE 03/30/2033 3/30/2023             PHYSICAL EXAM     BP (!) 144/72 (BP Location: Left arm, Patient Position: Sitting, BP Method: Large (Manual))   Pulse 80   Ht 5' 11" (1.803 m)   Wt 93.5 kg (206 lb 2.1 oz)   SpO2 98%   BMI 28.75 kg/m²     Physical Exam  Vitals and nursing note reviewed.   Constitutional:       Appearance: Normal appearance.      Comments: Elderly male in NAD or apparent pain. He makes good eye contact, speaks in clear full sentences and ambulates with rolator from home -baseline.    HENT:      Head: Normocephalic and atraumatic.      Nose: Nose normal.      Mouth/Throat:      Pharynx: Oropharynx is clear.   Eyes: "      Conjunctiva/sclera: Conjunctivae normal.   Cardiovascular:      Rate and Rhythm: Normal rate and regular rhythm.      Pulses: Normal pulses.   Pulmonary:      Effort: No respiratory distress.   Abdominal:      Tenderness: There is no abdominal tenderness.   Musculoskeletal:         General: Normal range of motion.      Cervical back: No rigidity.   Skin:     General: Skin is warm and dry.      Capillary Refill: Capillary refill takes less than 2 seconds.      Findings: No rash.   Neurological:      General: No focal deficit present.      Mental Status: He is alert.      Gait: Gait normal.   Psychiatric:         Mood and Affect: Mood normal.         LABS     Lab Results   Component Value Date    HGBA1C 5.6 05/22/2024     CMP  Sodium   Date Value Ref Range Status   05/28/2024 140 136 - 145 mmol/L Final     Potassium   Date Value Ref Range Status   05/28/2024 3.7 3.5 - 5.1 mmol/L Final     Chloride   Date Value Ref Range Status   05/28/2024 108 95 - 110 mmol/L Final     CO2   Date Value Ref Range Status   05/28/2024 26 23 - 29 mmol/L Final     Glucose   Date Value Ref Range Status   05/28/2024 93 70 - 110 mg/dL Final   07/09/2022 102 mg/dL Final     BUN   Date Value Ref Range Status   05/28/2024 12 8 - 23 mg/dL Final   07/09/2022 13 4 - 21 mg/dL Final     Creatinine   Date Value Ref Range Status   05/28/2024 0.8 0.5 - 1.4 mg/dL Final   07/09/2022 0.9 mg/dL Final     Calcium   Date Value Ref Range Status   05/28/2024 9.1 8.7 - 10.5 mg/dL Final     Total Protein   Date Value Ref Range Status   05/28/2024 6.9 6.0 - 8.4 g/dL Final     Albumin   Date Value Ref Range Status   05/28/2024 3.3 (L) 3.5 - 5.2 g/dL Final     Total Bilirubin   Date Value Ref Range Status   05/28/2024 0.5 0.1 - 1.0 mg/dL Final     Comment:     For infants and newborns, interpretation of results should be based  on gestational age, weight and in agreement with clinical  observations.    Premature Infant recommended reference ranges:  Up to 24  hours.............<8.0 mg/dL  Up to 48 hours............<12.0 mg/dL  3-5 days..................<15.0 mg/dL  6-29 days.................<15.0 mg/dL       Alkaline Phosphatase   Date Value Ref Range Status   05/28/2024 74 55 - 135 U/L Final     AST   Date Value Ref Range Status   05/28/2024 17 10 - 40 U/L Final     ALT   Date Value Ref Range Status   05/28/2024 11 10 - 44 U/L Final     Anion Gap   Date Value Ref Range Status   05/28/2024 6 (L) 8 - 16 mmol/L Final     eGFR if    Date Value Ref Range Status   06/27/2022 >60.0 >60 mL/min/1.73 m^2 Final     eGFR if non    Date Value Ref Range Status   06/27/2022 >60.0 >60 mL/min/1.73 m^2 Final     Comment:     Calculation used to obtain the estimated glomerular filtration  rate (eGFR) is the CKD-EPI equation.        Lab Results   Component Value Date    WBC 4.22 05/22/2024    HGB 11.7 (L) 05/22/2024    HCT 37.9 (L) 05/22/2024    MCV 95 05/22/2024     05/22/2024     Lab Results   Component Value Date    CHOL 150 05/22/2024    CHOL 167 05/31/2023    CHOL 153 07/16/2021     Lab Results   Component Value Date    HDL 45 05/22/2024    HDL 44 05/31/2023    HDL 46 07/16/2021     Lab Results   Component Value Date    LDLCALC 88.0 05/22/2024    LDLCALC 103.6 05/31/2023    LDLCALC 90.6 07/16/2021     Lab Results   Component Value Date    TRIG 85 05/22/2024    TRIG 97 05/31/2023    TRIG 82 07/16/2021     Lab Results   Component Value Date    CHOLHDL 30.0 05/22/2024    CHOLHDL 26.3 05/31/2023    CHOLHDL 30.1 07/16/2021     Lab Results   Component Value Date    TSH 0.661 06/20/2023       ASSESSMENT/PLAN     Tito Menard is a 78 y.o. male     Tito was seen today for diarrhea -resolved, will place referral for trigger finger. Continue BP med regimen as is, he monitors home BP carefully. He will follow-up with PCP in 3 months for annual, sooner with me if needed.     Diagnoses and all orders for this visit:    Left hand pain  -     Ambulatory  referral/consult to Hand Surgery; Future    Essential hypertension   -continue amlodipine and irbesartan   -still holing spironolactone and chlorthalidone (and K supplement)     Poor mobility   -enrolled in PT currently     Other orders  -     ketoconazole (NIZORAL) 2 % cream; Apply topically once weekly for facial redness and scaling. (Refill requested by pt)        Patient was counseled on when and how to seek emergent care.       Hannah Dudley PA-C   Department of Internal Medicine - [facility]   11:07 AM

## 2024-07-01 RX ORDER — FLUTICASONE PROPIONATE 50 MCG
SPRAY, SUSPENSION (ML) NASAL
Qty: 32 G | Refills: 0 | Status: SHIPPED | OUTPATIENT
Start: 2024-07-01

## 2024-07-01 NOTE — TELEPHONE ENCOUNTER
Tito Menard  is requesting a refill authorization.  Brief Assessment and Rationale for Refill:  Approve     Medication Therapy Plan:  ED follow up was 5/6/24 with Hannah Dudley PA-C; approved per protocol      Extended chart review required: Yes   Comments:     Note composed:1:17 PM 07/01/2024

## 2024-07-01 NOTE — TELEPHONE ENCOUNTER
No care due was identified.  Health [facility] Embedded Care Due Messages. Reference number: [id number].   7/01/2024 10:34:29 AM CDT

## 2024-07-08 ENCOUNTER — HOSPITAL ENCOUNTER (OUTPATIENT)
Dept: NEUROLOGY | Facility: CLINIC | Age: 79
Discharge: HOME | End: 2024-07-08
Payer: MEDICARE

## 2024-07-08 ENCOUNTER — DOCUMENTATION ONLY (OUTPATIENT)
Dept: NEUROLOGY | Facility: CLINIC | Age: 79
End: 2024-07-08

## 2024-07-08 DIAGNOSIS — R55 SYNCOPE, UNSPECIFIED SYNCOPE TYPE: ICD-10-CM

## 2024-07-08 PROCEDURE — 95819 EEG AWAKE AND ASLEEP: CPT | Mod: HCNC,S$GLB,, | Performed by: STUDENT IN AN ORGANIZED HEALTH CARE EDUCATION/TRAINING PROGRAM

## 2024-07-08 NOTE — PROGRESS NOTES
EEG Hook up  No skin breakdown noticed    Skin Integrity: Normal     Jesus Villalba   07/08/2024 10:57 AM

## 2024-07-08 NOTE — PROCEDURES
ELECTROENCEPHALOGRAM REPORT    DATE OF SERVICE: 7/8/24  EEG NUMBER: OC   REQUESTED BY: Dr Dunn  LOCATION OF SERVICE:outpatient     METHODOLOGY   Electroencephalographic (EEG) recording is with electrodes placed according to the International 10-20 placement system.  Thirty two (32) channels of digital signal (sampling rate of 512/sec) including T1 and T2 was simultaneously recorded from the scalp and may include  EKG, EMG, and/or eye monitors.  Recording band pass was 0.1 to 512 hz.  Digital video recording of the patient is simultaneously recorded with the EEG.  The patient is instructed report clinical symptoms which may occur during the recording session.  EEG and video recording is stored and archived in digital format. Activation procedures which include photic stimulation, hyperventilation and instructing patients to perform simple task are done in selected patients.    The EEG is displayed on a monitor screen and can be reviewed using different montages.  Computer assisted analysis is employed to detect spike and electrographic seizure activity.   The entire record is submitted for computer analysis.  The entire recording is visually reviewed and the times identified by computer analysis as being spikes or seizures are reviewed again.  Compresses spectral analysis (CSA) is also performed on the activity recorded from each individual channel.  This is displayed as a power display of frequencies from 0 to 30 Hz over time.   The CSA is reviewed looking for asymmetries in power between homologous areas of the scalp and then compared with the original EEG recording.     Clone software was also utilized in the review of this study.  This software suite analyzes the EEG recording in multiple domains.  Coherence and rhythmicity is computed to identify EEG sections which may contain organized seizures.  Each channel undergoes analysis to detect presence of spike and sharp waves which have special and  morphological characteristic of epileptic activity.  The routine EEG recording is converted from spacial into frequency domain.  This is then displayed comparing homologous areas to identify areas of significant asymmetry.  Algorithm to identify non-cortically generated artifact is used to separate eye movement, EMG and other artifact from the EEG    Indication: 78 y.o.M with HTN, HLD, DM, CKD II, enlarged aorta, cervical spine stenosis, s/p L spine surgery x 2, and PD on levodopa.  He complains of not being able to stand up and walk due to progressive imbalance with frequent falls for the past one or two years.     State of Consciousness:   Awake and asleep    Background:   The background is well organized, continuous and symmetric, with a normal anterior posterior gradient and a well modulated 8 hz posterior dominant rhythm noted at maximum alertness.  No focal slowing is seen.    Sleep:   The majority of the record takes places in drowsiness.  There is transition to stage II sleep seen as well with normal symmetric sleep architecture noted.      Non-epileptiform Abnormalities:  None    Epileptiform Abnormalities:   None    EKG:   Regular rate and rhythm    Activating procedures:   - Hyperventilation: not performed  - Photic stimulation: no photic driving or epileptiform discharges elicited    Events:   None    Impression:  This is a normal awake and sleep EEG.  There are no epileptiform discharges or focal abnormalities noted.  Please note that a normal EEG does not exclude a diagnosis of epilepsy and clinical correlation is advised.     Alia Norton MD  [facility]   Department of Neurology

## 2024-07-18 DIAGNOSIS — E78.5 HYPERLIPIDEMIA, UNSPECIFIED HYPERLIPIDEMIA TYPE: ICD-10-CM

## 2024-07-18 RX ORDER — PRAVASTATIN SODIUM 20 MG/1
20 TABLET ORAL
Qty: 90 TABLET | Refills: 3 | Status: SHIPPED | OUTPATIENT
Start: 2024-07-18

## 2024-07-18 NOTE — TELEPHONE ENCOUNTER
No care due was identified.  Health [facility] Embedded Care Due Messages. Reference number: [id number].   7/18/2024 10:46:49 AM CDT

## 2024-07-19 DIAGNOSIS — M79.642 LEFT HAND PAIN: Primary | ICD-10-CM

## 2024-07-26 ENCOUNTER — TELEPHONE (OUTPATIENT)
Dept: ORTHOPEDICS | Facility: CLINIC | Age: 79
End: 2024-07-26
Payer: MEDICARE

## 2024-07-26 NOTE — TELEPHONE ENCOUNTER
Patient communication     Notified patient to stop at [facility] Location - 1st Floor [address], Please park in [address] and use [place] elevators 45 minutes prior to your appointment time for X-ray. After your X-ray please proceed to 9th Floor [address] for Appointment on 7/31/24 with Dr. Irizarry for x-rays.     Made them aware that this is not a scheduled xray appointment and they might be running behind as they are considered a walk-in xray.    Verbalized the Following:  *Please arrive at your informed time above, if you are more than 15 Minutes late to your appointment with Dr. Irizarry we will have to reschedule your appointment. This will allow you to be seen in a timely manor and be conscious to other patients being seen that same day*

## 2024-07-31 ENCOUNTER — OFFICE VISIT (OUTPATIENT)
Dept: ORTHOPEDICS | Facility: CLINIC | Age: 79
End: 2024-07-31
Payer: MEDICARE

## 2024-07-31 ENCOUNTER — HOSPITAL ENCOUNTER (OUTPATIENT)
Dept: RADIOLOGY | Facility: OTHER | Age: 79
Discharge: HOME | End: 2024-07-31
Attending: ORTHOPAEDIC SURGERY
Payer: MEDICARE

## 2024-07-31 VITALS — BODY MASS INDEX: 28.86 KG/M2 | WEIGHT: 206.13 LBS | HEIGHT: 71 IN

## 2024-07-31 DIAGNOSIS — M79.642 LEFT HAND PAIN: ICD-10-CM

## 2024-07-31 DIAGNOSIS — N13.8 BENIGN PROSTATIC HYPERPLASIA WITH URINARY OBSTRUCTION: ICD-10-CM

## 2024-07-31 DIAGNOSIS — M19.042 PRIMARY OSTEOARTHRITIS OF LEFT HAND: ICD-10-CM

## 2024-07-31 DIAGNOSIS — G89.29 CHRONIC HAND PAIN, LEFT: ICD-10-CM

## 2024-07-31 DIAGNOSIS — M65.342 TRIGGER RING FINGER OF LEFT HAND: Primary | ICD-10-CM

## 2024-07-31 DIAGNOSIS — N40.1 BENIGN PROSTATIC HYPERPLASIA WITH URINARY OBSTRUCTION: ICD-10-CM

## 2024-07-31 DIAGNOSIS — M79.642 CHRONIC HAND PAIN, LEFT: ICD-10-CM

## 2024-07-31 DIAGNOSIS — J30.2 SEASONAL ALLERGIC RHINITIS, UNSPECIFIED TRIGGER: ICD-10-CM

## 2024-07-31 PROCEDURE — 1159F MED LIST DOCD IN RCRD: CPT | Mod: HCNC,CPTII,S$GLB, | Performed by: ORTHOPAEDIC SURGERY

## 2024-07-31 PROCEDURE — 99204 OFFICE O/P NEW MOD 45 MIN: CPT | Mod: HCNC,25,S$GLB, | Performed by: ORTHOPAEDIC SURGERY

## 2024-07-31 PROCEDURE — 73130 X-RAY EXAM OF HAND: CPT | Mod: TC,FY,LT

## 2024-07-31 PROCEDURE — 3288F FALL RISK ASSESSMENT DOCD: CPT | Mod: HCNC,CPTII,S$GLB, | Performed by: ORTHOPAEDIC SURGERY

## 2024-07-31 PROCEDURE — 1100F PTFALLS ASSESS-DOCD GE2>/YR: CPT | Mod: HCNC,CPTII,S$GLB, | Performed by: ORTHOPAEDIC SURGERY

## 2024-07-31 PROCEDURE — 73130 X-RAY EXAM OF HAND: CPT | Mod: 26,LT,, | Performed by: RADIOLOGY

## 2024-07-31 PROCEDURE — 20550 NJX 1 TENDON SHEATH/LIGAMENT: CPT | Mod: HCNC,LT,S$GLB, | Performed by: ORTHOPAEDIC SURGERY

## 2024-07-31 PROCEDURE — 99999 PR PBB SHADOW E&M-EST. PATIENT-LVL IV: CPT | Mod: PBBFAC,HCNC,, | Performed by: ORTHOPAEDIC SURGERY

## 2024-07-31 PROCEDURE — 1125F AMNT PAIN NOTED PAIN PRSNT: CPT | Mod: HCNC,CPTII,S$GLB, | Performed by: ORTHOPAEDIC SURGERY

## 2024-07-31 PROCEDURE — 1160F RVW MEDS BY RX/DR IN RCRD: CPT | Mod: HCNC,CPTII,S$GLB, | Performed by: ORTHOPAEDIC SURGERY

## 2024-07-31 RX ORDER — AZELASTINE 1 MG/ML
1 SPRAY, METERED NASAL 2 TIMES DAILY
Qty: 60 ML | Refills: 3 | Status: SHIPPED | OUTPATIENT
Start: 2024-07-31

## 2024-07-31 RX ADMIN — METHYLPREDNISOLONE ACETATE 40 MG: 40 INJECTION, SUSPENSION INTRA-ARTICULAR; INTRALESIONAL; INTRAMUSCULAR; SOFT TISSUE at 09:07

## 2024-07-31 NOTE — PROGRESS NOTES
[facility]  History & Physical  Orthopedics    SUBJECTIVE:      Chief Complaint:   Chief Complaint   Patient presents with    Left Hand - Pain, Swelling       Referring Provider: Hannah Dudley PA-C     History of Present Illness:    Patient is a 78 y.o. right hand dominant male who presents today with complaints of left ring finger trigger finger. He states that over the past few months he occasionally has his left ring finger get stuck in flexion to the point that he has to pull it out of flexion and into extensions. He states this has been occuring almost daily lately.  Endorses point pain over the A1 pulley of the left ring finger. He endorses occasional numbness of the left ring finger during episodes. States he has been kymberly taping the finger which has helped prevent triggering. 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       Vitals:    07/31/24 0924   PainSc:   4   PainLoc: Hand     The patient denies any fevers, chills, N/V, D/C and presents for evaluation.    Past Medical History:   Diagnosis Date    Allergy     Aneurysm of artery of lower extremity     Chalazion of left eye     CKD (chronic kidney disease), stage II 4/1/2019    Diabetes mellitus type II     Diabetes with neurologic complications     Enlarged aorta 8/2/2016    Noted on pharmacological stress echo 2/28/2014.      GERD (gastroesophageal reflux disease)     egd 2008    Hyperlipidemia     Hypertension     Jock itch 7/19/2018    MGD (meibomian gland dysfunction)     Osteopenia     Seizure 5/29/2024    Spinal stenosis     Spondylosis without myelopathy 10/23/2015    Vitamin D deficiency disease      Past Surgical History:   Procedure Laterality Date    CHALAZION EXCISION Left 8/18/13    COLONOSCOPY N/A 4/24/2024    Procedure: COLONOSCOPY;  Surgeon: Catalino Sorto MD;  Location: [facility] ([address] [place] [address]);  Service: Endoscopy;  Laterality: N/A;  Ref by Dr DEJAN Brown, PEG, portal - PC  4/18-precall complete-MS     CYST REMOVAL  2013    Back of neck    EXCISION OF HYDROCELE Right 11/14/2023    Procedure: HYDROCELECTOMY;  Surgeon: Beatrice Vaca MD;  Location: [facility] OR 2ND FLR;  Service: Urology;  Laterality: Right;  1 hr    FLEXIBLE CYSTOSCOPY N/A 12/7/2021    Procedure: CYSTOSCOPY, FLEXIBLE;  Surgeon: Beatrice Vaca MD;  Location: [facility] OR 1ST FLR;  Service: Urology;  Laterality: N/A;    FLUOROSCOPIC URODYNAMIC STUDY N/A 12/7/2021    Procedure: URODYNAMIC STUDY, FLUOROSCOPIC;  Surgeon: Beatrice Vaca MD;  Location: [facility] OR 1ST FLR;  Service: Urology;  Laterality: N/A;  90 minutes     SPINE SURGERY  2007    x2 (2000, 2007)     Review of patient's allergies indicates:  No Known Allergies  Social History     Social History Narrative        Colon 2007     Family History   Problem Relation Name Age of Onset    Hyperlipidemia Mother      Hypertension Mother      Kidney disease Mother      Cancer Mother      Heart disease Mother      Kidney failure Mother      No Known Problems Daughter Muna     No Known Problems Sister Sujata     No Known Problems Brother Abhijeet     No Known Problems Son Tito     No Known Problems Brother Alexei     No Known Problems Son Srini     Hypertension Maternal Grandmother      Amblyopia Neg Hx      Blindness Neg Hx      Cataracts Neg Hx      Diabetes Neg Hx      Glaucoma Neg Hx      Macular degeneration Neg Hx      Retinal detachment Neg Hx      Strabismus Neg Hx      Stroke Neg Hx      Thyroid disease Neg Hx      Melanoma Neg Hx      Psoriasis Neg Hx      Lupus Neg Hx      Eczema Neg Hx           Current Outpatient Medications:     amLODIPine (NORVASC) 5 MG tablet, Take 1 tablet (5 mg total) by mouth once daily., Disp: , Rfl:     aspirin (ECOTRIN) 81 MG EC tablet, TAKE 1 TABLET EVERY DAY., Disp: 90 tablet, Rfl: 3    azelastine (ASTELIN) 137 mcg (0.1 %) nasal spray, USE 1 SPRAY NASALLY TWICE DAILY, Disp: 60 mL, Rfl: 3    calcium carbonate (TUMS ORAL), Take 1-2 tablets by mouth  daily as needed (for acid reflux)., Disp: , Rfl:     carbidopa-levodopa  mg (SINEMET)  mg per tablet, Take 1 tablet by mouth 3 (three) times daily., Disp: 90 tablet, Rfl: 5    cholecalciferol, vitamin D3, (VITAMIN D3) 50 mcg (2,000 unit) Cap, Take 1 capsule by mouth once daily., Disp: , Rfl:     clotrimazole-betamethasone 1-0.05% (LOTRISONE) cream, APPLY TOPICALLY 2 (TWO) TIMES DAILY., Disp: 45 g, Rfl: 0    docusate sodium (COLACE) 100 MG capsule, Take 1 capsule (100 mg total) by mouth once daily., Disp: 30 capsule, Rfl: 11    finasteride (PROSCAR) 5 mg tablet, TAKE 1 TABLET (5 MG TOTAL) BY MOUTH ONCE DAILY., Disp: 90 tablet, Rfl: 3    fluticasone propionate (FLONASE) 50 mcg/actuation nasal spray, USE 1 SPRAY NASALLY ONE TIME DAILY, Disp: 32 g, Rfl: 0    gabapentin (NEURONTIN) 300 MG capsule, TAKE 1 CAPSULE DAILY AT NOON AND TAKE 2 CAPSULES EVERY EVENING, Disp: 270 capsule, Rfl: 3    irbesartan (AVAPRO) 150 MG tablet, Take 2 tablets (300 mg total) by mouth every evening., Disp: , Rfl:     ketoconazole (NIZORAL) 2 % cream, Apply topically once weekly for facial redness and scaling., Disp: 60 g, Rfl: 3    lanolin/mineral oil/petrolatum (ARTIFICIAL TEARS OPHT), Place 1-2 drops into both eyes daily as needed (for dry eyes)., Disp: , Rfl:     LIDOcaine 4 % PtMd, Apply 1 patch topically daily as needed (for back pain)., Disp: , Rfl:     omeprazole (PRILOSEC) 20 MG capsule, TAKE 2 CAPSULES ONE TIME DAILY, Disp: 180 capsule, Rfl: 3    pravastatin (PRAVACHOL) 20 MG tablet, TAKE 1 TABLET EVERY DAY, Disp: 90 tablet, Rfl: 3    tamsulosin (FLOMAX) 0.4 mg Cap, Take 1 capsule (0.4 mg total) by mouth once daily., Disp: , Rfl:     ROS    Review of Systems:  Constitutional: no fever or chills  Eyes: no visual changes  ENT: no nasal congestion or sore throat  Respiratory: no cough or shortness of breath  Cardiovascular: no chest pain  Gastrointestinal: no nausea or vomiting, tolerating diet  Musculoskeletal: pain and  "soreness    OBJECTIVE:      Vital Signs (Most Recent):  Vitals:    07/31/24 0924   Weight: 93.5 kg (206 lb 2.1 oz)   Height: 5' 11" (1.803 m)     Body mass index is 28.75 kg/m².    Physical Exam    Physical Exam:  Constitutional: The patient appears well-developed and well-nourished. No distress.   Head: Normocephalic and atraumatic.   Nose: Nose normal.   Eyes: Conjunctivae and EOM are normal.   Neck: No tracheal deviation present.   Cardiovascular: Normal rate and intact distal pulses.    Pulmonary/Chest: Effort normal. No respiratory distress.   Abdominal: There is no guarding.   Lymphatic: Negative for adenopathy   Neurological: The patient is alert.   Psychiatric: The patient has a normal mood and affect.     Bilateral Hand/Wrist Examination:  Observation/Inspection:  Swelling  none    Deformity  Left thumb joints hypertrophied   Discoloration  none     Scars   none    Atrophy  none    HAND/WRIST EXAMINATION:    Physical Exam        Tender to palpation over the left ring finger A1 pulley, neg compression and tinels left wrist   Palpable catching over the A1 pulley with passive flexion of the left ring finer        Neurovascular Exam:  Digits WWP, brisk CR < 3s throughout  NVI motor/LTS to M/R/U nerves, radial pulse 2+  2+ biceps and brachioradialis reflexes    Diagnostic studies and other clinical records review:  Results             X-rays AP, lateral and oblique left hand taken today are independently reviewed by me and shows Eaton stage II basilar thumb and advanced IP joint arthritis.          ASSESSMENT/PLAN:    78 y.o. yo male with   Encounter Diagnoses   Name Primary?    Left hand pain     Primary osteoarthritis of left hand     Trigger ring finger of left hand Yes      Assessment & Plan            The patient and I had a thorough discussion today. We discussed the working diagnosis as well as several other potential alternative diagnoses. Treatment options were discussed, both conservative and surgical. " Conservative treatment options would include things such as activity modifications, workplace modifications, a period of rest, oral vs topical OTC and prescription anti-inflammatory medications, occupational therapy, splinting/bracing, immobilization, corticosteroid injections, and others. Surgical options were discussed as well. I have recommended a CSI injection of the left ring finger A1 pulley.     Follow up in 4 weeks top discuss any improvement following CSI  Call with any questions/concerns in the interim    The patient's pathophysiology was explained in detail with reference to x-rays, models, other visual aids as appropriate.  Treatment options were discussed in detail.  Questions were invited and answered to the patient's satisfaction. I reviewed Primary care , and other specialty's notes to better coordinate patient's care.          Makenna Irizarry MD    Please be aware that this note has been generated with the assistance of [employer] voice-to-text.  Please excuse any spelling or grammatical errors.    This note was generated with the assistance of ambient listening technology. Verbal consent was obtained by the patient and accompanying visitor(s) for the recording of patient appointment to facilitate this note. I attest to having reviewed and edited the generated note for accuracy, though some syntax or spelling errors may persist. Please contact the author of this note for any clarification.

## 2024-08-05 PROBLEM — A41.50 SEPSIS DUE TO GRAM-NEGATIVE UTI: Status: RESOLVED | Noted: 2024-04-30 | Resolved: 2024-08-05

## 2024-08-05 PROBLEM — N17.9 AKI (ACUTE KIDNEY INJURY): Status: RESOLVED | Noted: 2024-04-30 | Resolved: 2024-08-05

## 2024-08-05 PROBLEM — N39.0 SEPSIS DUE TO GRAM-NEGATIVE UTI: Status: RESOLVED | Noted: 2024-04-30 | Resolved: 2024-08-05

## 2024-08-05 RX ORDER — FINASTERIDE 5 MG/1
5 TABLET, FILM COATED ORAL DAILY
Qty: 90 TABLET | Refills: 3 | Status: SHIPPED | OUTPATIENT
Start: 2024-08-05 | End: 2025-08-05

## 2024-08-06 ENCOUNTER — TELEPHONE (OUTPATIENT)
Dept: INTERNAL MEDICINE | Facility: CLINIC | Age: 79
End: 2024-08-06
Payer: MEDICARE

## 2024-08-07 DIAGNOSIS — K21.00 GASTROESOPHAGEAL REFLUX DISEASE WITH ESOPHAGITIS: ICD-10-CM

## 2024-08-07 DIAGNOSIS — E11.42 DIABETIC POLYNEUROPATHY ASSOCIATED WITH TYPE 2 DIABETES MELLITUS: ICD-10-CM

## 2024-08-07 RX ORDER — OMEPRAZOLE 20 MG/1
40 CAPSULE, DELAYED RELEASE ORAL
Qty: 180 CAPSULE | Refills: 3 | Status: SHIPPED | OUTPATIENT
Start: 2024-08-07

## 2024-08-07 RX ORDER — GABAPENTIN 300 MG/1
CAPSULE ORAL
Qty: 270 CAPSULE | Refills: 3 | Status: SHIPPED | OUTPATIENT
Start: 2024-08-07

## 2024-09-02 RX ORDER — METHYLPREDNISOLONE ACETATE 40 MG/ML
40 INJECTION, SUSPENSION INTRA-ARTICULAR; INTRALESIONAL; INTRAMUSCULAR; SOFT TISSUE
Status: DISCONTINUED | OUTPATIENT
Start: 2024-07-31 | End: 2024-09-02 | Stop reason: HOSPADM

## 2024-09-02 NOTE — PROCEDURES
Tendon Sheath    Date/Time: 7/31/2024 9:30 AM    Performed by: Makenna Irizarry MD  Authorized by: Makenna Irizarry MD    Consent Done?:  Yes (Verbal)  Indications:  Pain  Timeout: prior to procedure the correct patient, procedure, and site was verified    Prep: patient was prepped and draped in usual sterile fashion      Local anesthesia used?: Yes    Anesthesia:  Local infiltration  Local anesthetic:  Lidocaine 1% without epinephrine  Anesthetic total (ml):  1    Location:  Ring finger  Site:  L ring flexor tendon sheath  Ultrasonic guidance for needle placement?: No    Needle size:  25 G  Approach:  Volar  Medications:  40 mg methylPREDNISolone acetate 40 mg/mL  Patient tolerance:  Patient tolerated the procedure well with no immediate complications

## 2024-09-03 NOTE — TELEPHONE ENCOUNTER
No care due was identified.  [facility] Embedded Care Due Messages. Reference number: [id number].   9/03/2024 11:40:43 AM CDT

## 2024-09-04 ENCOUNTER — OFFICE VISIT (OUTPATIENT)
Dept: ORTHOPEDICS | Facility: CLINIC | Age: 79
End: 2024-09-04
Payer: MEDICARE

## 2024-09-04 DIAGNOSIS — M19.042 PRIMARY OSTEOARTHRITIS OF LEFT HAND: ICD-10-CM

## 2024-09-04 DIAGNOSIS — M65.342 TRIGGER RING FINGER OF LEFT HAND: Primary | ICD-10-CM

## 2024-09-04 PROCEDURE — 1160F RVW MEDS BY RX/DR IN RCRD: CPT | Mod: HCNC,CPTII,95, | Performed by: ORTHOPAEDIC SURGERY

## 2024-09-04 PROCEDURE — 99441 PR PHYSICIAN TELEPHONE EVALUATION 5-10 MIN: CPT | Mod: HCNC,95,, | Performed by: ORTHOPAEDIC SURGERY

## 2024-09-04 PROCEDURE — 1159F MED LIST DOCD IN RCRD: CPT | Mod: HCNC,CPTII,95, | Performed by: ORTHOPAEDIC SURGERY

## 2024-09-04 RX ORDER — IRBESARTAN 300 MG/1
TABLET ORAL
Qty: 777 TABLET | Refills: 0 | OUTPATIENT
Start: 2024-09-04

## 2024-09-04 NOTE — TELEPHONE ENCOUNTER
Refill Decision Note   Tito Menard  is requesting a refill authorization.  Brief Assessment and Rationale for Refill:  Quick Discontinue     Medication Therapy Plan:    Pharmacy is requesting new scripts for the following medications without required information, (sig/ frequency/qty/etc)      Medication Reconciliation Completed: No     Comments: Pharmacies have been requesting medications for patients without required information, (sig, frequency, qty, etc.). In addition, requests are sent for medication(s) pt. are currently not taking, and medications patients have never taken.    We have spoken to the pharmacies about these request types and advised their teams previously that we are unable to assess these New Script requests and require all details for these requests. This is a known issue and has been reported.     Note composed:9:50 AM 09/04/2024

## 2024-09-04 NOTE — PROGRESS NOTES
Established Patient - Audio Only Telehealth Visit     The patient location is: [place]  The chief complaint leading to consultation is: left ring trigger finger  Visit type: Virtual visit with audio only (telephone)  Total time spent with patient: 5 mins       The reason for the audio only service rather than synchronous audio and video virtual visit was related to technical difficulties or patient preference/necessity.     Each patient to whom I provide medical services by telemedicine is:  (1) informed of the relationship between the physician and patient and the respective role of any other health care provider with respect to management of the patient; and (2) notified that they may decline to receive medical services by telemedicine and may withdraw from such care at any time. Patient verbally consented to receive this service via voice-only telephone call.       HPI: Patient states that the left ring finger feels better, is not getting stuck.  He states that the finger does feel stiff in the PIP joint.  We discussed continued conservative treatment versus surgical intervention.     Assessment and plan:  Follow up p.r.n.                        This service was not originating from a related E/M service provided within the previous 7 days nor will  to an E/M service or procedure within the next 24 hours or my soonest available appointment.  Prevailing standard of care was able to be met in this audio-only visit.

## 2024-09-13 RX ORDER — FLUTICASONE PROPIONATE 50 MCG
SPRAY, SUSPENSION (ML) NASAL
Qty: 32 G | Refills: 3 | Status: SHIPPED | OUTPATIENT
Start: 2024-09-13

## 2024-09-13 NOTE — TELEPHONE ENCOUNTER
Refill Routing Note   Medication(s) are not appropriate for processing by [facility] for the following reason(s):        Patient not seen by provider within 15 months    [facility] action(s):  Defer        Medication Therapy Plan: FOVS in 1 week      Appointments  past 12m or future 3m with PCP    Date Provider   Last Visit   Visit date not found Amanda Brown MD   Next Visit   9/24/2024 Amanda Brown MD   ED visits in past 90 days: 0        Note composed:10:52 AM 09/13/2024

## 2024-09-13 NOTE — TELEPHONE ENCOUNTER
No care due was identified.  [facility] Embedded Care Due Messages. Reference number: [id number].   9/13/2024 3:06:07 AM CDT

## 2024-09-16 NOTE — TELEPHONE ENCOUNTER
----- Message from Katie Rogers MA sent at 9/16/2024 11:35 AM CDT -----  Regarding: Refill Request  Who Called:ASHLEY FISHER [[id number]]           New Prescription or Refill : Refill      RX Name and Strength:  carbidopa-levodopa  mg (SINEMET)  mg per tablet            30 day or 90 day RX: 90           Local or Mail Order : Mail            Preferred Pharmacy: [facility] - [facility] [address]       Would the patient rather a call back or a response via [facility]? call        Best Call Back Number:  [phone]

## 2024-09-17 NOTE — TELEPHONE ENCOUNTER
Refill Routing Note   Medication(s) are not appropriate for processing by [facility] for the following reason(s):        Outside of protocol    [facility] action(s):  Route               Appointments  past 12m or future 3m with PCP    Date Provider   Last Visit   Visit date not found Amanda Brown MD   Next Visit   9/24/2024 Amanda Brown MD   ED visits in past 90 days: 0        Note composed:12:44 PM 09/17/2024           Satisfactory

## 2024-09-18 RX ORDER — CARBIDOPA AND LEVODOPA 25; 100 MG/1; MG/1
1 TABLET ORAL 3 TIMES DAILY
Qty: 90 TABLET | Refills: 5 | Status: SHIPPED | OUTPATIENT
Start: 2024-09-18 | End: 2025-03-17

## 2024-09-24 ENCOUNTER — OFFICE VISIT (OUTPATIENT)
Dept: INTERNAL MEDICINE | Facility: CLINIC | Age: 79
End: 2024-09-24
Payer: MEDICARE

## 2024-09-24 ENCOUNTER — IMMUNIZATION (OUTPATIENT)
Dept: INTERNAL MEDICINE | Facility: CLINIC | Age: 79
End: 2024-09-24
Payer: MEDICARE

## 2024-09-24 ENCOUNTER — LAB VISIT (OUTPATIENT)
Dept: LAB | Facility: HOSPITAL | Age: 79
End: 2024-09-24
Attending: INTERNAL MEDICINE
Payer: MEDICARE

## 2024-09-24 VITALS
HEIGHT: 71 IN | BODY MASS INDEX: 28.45 KG/M2 | HEART RATE: 82 BPM | SYSTOLIC BLOOD PRESSURE: 134 MMHG | WEIGHT: 203.25 LBS | DIASTOLIC BLOOD PRESSURE: 74 MMHG | OXYGEN SATURATION: 98 %

## 2024-09-24 DIAGNOSIS — Z23 NEED FOR VACCINATION: Primary | ICD-10-CM

## 2024-09-24 DIAGNOSIS — L84 CALLUS OF HEEL: ICD-10-CM

## 2024-09-24 DIAGNOSIS — E88.09 HYPOALBUMINEMIA: ICD-10-CM

## 2024-09-24 DIAGNOSIS — R60.9 CHRONIC EDEMA: ICD-10-CM

## 2024-09-24 DIAGNOSIS — D22.72 ATYPICAL NEVUS OF LEFT LOWER LEG: ICD-10-CM

## 2024-09-24 DIAGNOSIS — E11.42 DIABETIC POLYNEUROPATHY ASSOCIATED WITH TYPE 2 DIABETES MELLITUS: Primary | ICD-10-CM

## 2024-09-24 PROCEDURE — 3075F SYST BP GE 130 - 139MM HG: CPT | Mod: HCNC,CPTII,S$GLB, | Performed by: INTERNAL MEDICINE

## 2024-09-24 PROCEDURE — G0008 ADMIN INFLUENZA VIRUS VAC: HCPCS | Mod: HCNC,S$GLB,, | Performed by: INTERNAL MEDICINE

## 2024-09-24 PROCEDURE — 3288F FALL RISK ASSESSMENT DOCD: CPT | Mod: HCNC,CPTII,S$GLB, | Performed by: INTERNAL MEDICINE

## 2024-09-24 PROCEDURE — 84165 PROTEIN E-PHORESIS SERUM: CPT | Mod: HCNC | Performed by: INTERNAL MEDICINE

## 2024-09-24 PROCEDURE — 1100F PTFALLS ASSESS-DOCD GE2>/YR: CPT | Mod: HCNC,CPTII,S$GLB, | Performed by: INTERNAL MEDICINE

## 2024-09-24 PROCEDURE — 36415 COLL VENOUS BLD VENIPUNCTURE: CPT | Mod: HCNC | Performed by: INTERNAL MEDICINE

## 2024-09-24 PROCEDURE — 1126F AMNT PAIN NOTED NONE PRSNT: CPT | Mod: HCNC,CPTII,S$GLB, | Performed by: INTERNAL MEDICINE

## 2024-09-24 PROCEDURE — 99215 OFFICE O/P EST HI 40 MIN: CPT | Mod: HCNC,S$GLB,, | Performed by: INTERNAL MEDICINE

## 2024-09-24 PROCEDURE — 84165 PROTEIN E-PHORESIS SERUM: CPT | Mod: 26,HCNC,, | Performed by: PATHOLOGY

## 2024-09-24 PROCEDURE — 3078F DIAST BP <80 MM HG: CPT | Mod: HCNC,CPTII,S$GLB, | Performed by: INTERNAL MEDICINE

## 2024-09-24 PROCEDURE — G2211 COMPLEX E/M VISIT ADD ON: HCPCS | Mod: HCNC,S$GLB,, | Performed by: INTERNAL MEDICINE

## 2024-09-24 PROCEDURE — 1159F MED LIST DOCD IN RCRD: CPT | Mod: HCNC,CPTII,S$GLB, | Performed by: INTERNAL MEDICINE

## 2024-09-24 PROCEDURE — 99999 PR PBB SHADOW E&M-EST. PATIENT-LVL V: CPT | Mod: PBBFAC,HCNC,, | Performed by: INTERNAL MEDICINE

## 2024-09-24 PROCEDURE — 83521 IG LIGHT CHAINS FREE EACH: CPT | Mod: 59,HCNC | Performed by: INTERNAL MEDICINE

## 2024-09-24 PROCEDURE — 1160F RVW MEDS BY RX/DR IN RCRD: CPT | Mod: HCNC,CPTII,S$GLB, | Performed by: INTERNAL MEDICINE

## 2024-09-24 PROCEDURE — 90653 IIV ADJUVANT VACCINE IM: CPT | Mod: HCNC,S$GLB,, | Performed by: INTERNAL MEDICINE

## 2024-09-24 NOTE — PROGRESS NOTES
"Subjective:       Patient ID: Tito Menard is a 78 y.o. male.    Chief Complaint: Hypertension (Follow up)     Tito Menard is a 78 y.o.  male who presents for Hypertension (Follow up)  .  Saw neuro for episodes of "falling with glazed eyes" with repeat mri/mra, EEG ordered- has not been seen again for follow up.     Pt has HTN.  Counseled on low salt diet and graded exercise program. Denies CP, SOB, orthopnea or PND.  Currently treated with antihypertensives listed in med card and compliant most of the time. No side effects from medication noted by patient.     Patient has hyperlipidemia. Pt is complaint with risk reduction medication. Denies muscle cramping and weakness. Pt attempts to follow a low cholesterol diet and exercise. No CP, SOB, orthopnea or PND.     No recent falls, using seated walker for support. Still with back pain, worse on right.      Still in chronic pain with his back, also having pain in his left heel, especially at night. Takinhg gabapentin, pain 6/10. Pt not interested in further evaluation or management.      Hx of chronic LE edema.  No hx of chf.  On gabapentin, varies in intensity.  Wears compression socks.      Hypertension      Patient Active Problem List   Diagnosis    Essential hypertension    Mixed hyperlipidemia    Osteopenia    Spinal stenosis    Vitamin D deficiency disease    Obstructive chronic bronchitis without exacerbation    Diabetic polyneuropathy associated with type 2 diabetes mellitus    Spondylosis of cervical joint with myelopathy    Gait disorder    Chronic right-sided low back pain without sciatica    BPH with obstruction/lower urinary tract symptoms    Restrictive lung disease due to kyphoscoliosis    History of diabetes mellitus, type II    Decreased functional mobility    Alteration in performance of activities of daily living    Impaired instrumental activities of daily living (IADL)    Right bundle branch block    Hx of multiple pulmonary nodules    " Chronically elevated hemidiaphragm    Pre-syncope    Syncope    Subcortical microvascular ischemic occlusive disease    Lower extremity edema    Class 1 obesity due to excess calories with serious comorbidity and body mass index (BMI) of 31.0 to 31.9 in adult    Thoracic aortic aneurysm without rupture    Epididymo-orchitis    Bacteremia    Chest discomfort    Hypokalemia    Aortic atherosclerosis    Gastroesophageal reflux disease with esophagitis    Acute neck pain    Poor posture    Leg weakness, bilateral    Hydrocele, right    Diarrhea    Cystitis    Atelectasis    Hypocalcemia    Parkinsonism    Cervical myelopathy    Seizure           Past Medical History:   Diagnosis Date    Allergy     Aneurysm of artery of lower extremity     Chalazion of left eye     CKD (chronic kidney disease), stage II 4/1/2019    Diabetes mellitus type II     Diabetes with neurologic complications     Enlarged aorta 8/2/2016    Noted on pharmacological stress echo 2/28/2014.      GERD (gastroesophageal reflux disease)     egd 2008    Hyperlipidemia     Hypertension     Jock itch 7/19/2018    MGD (meibomian gland dysfunction)     Osteopenia     Seizure 5/29/2024    Spinal stenosis     Spondylosis without myelopathy 10/23/2015    Vitamin D deficiency disease        Past Surgical History:   Procedure Laterality Date    CHALAZION EXCISION Left 8/18/13    COLONOSCOPY N/A 4/24/2024    Procedure: COLONOSCOPY;  Surgeon: Catalino Sorto MD;  Location: [facility] ENDO (4TH FLR);  Service: Endoscopy;  Laterality: N/A;  Ref by Dr DEJAN Brown, PEG, portal - PC  4/18-precall complete-MS    CYST REMOVAL  2013    Back of neck    EXCISION OF HYDROCELE Right 11/14/2023    Procedure: HYDROCELECTOMY;  Surgeon: Beatrice Vaca MD;  Location: [facility] OR 2ND FLR;  Service: Urology;  Laterality: Right;  1 hr    FLEXIBLE CYSTOSCOPY N/A 12/7/2021    Procedure: CYSTOSCOPY, FLEXIBLE;  Surgeon: Beatrice Vaca MD;  Location: [facility] OR 1ST FLR;  Service: Urology;   "Laterality: N/A;    FLUOROSCOPIC URODYNAMIC STUDY N/A 12/7/2021    Procedure: URODYNAMIC STUDY, FLUOROSCOPIC;  Surgeon: Beatrice Vaca MD;  Location: [facility] OR [address] [place] [address];  Service: Urology;  Laterality: N/A;  90 minutes     SPINE SURGERY  2007    x2 (2000, 2007)       Family History   Problem Relation Name Age of Onset    Hyperlipidemia Mother      Hypertension Mother      Kidney disease Mother      Cancer Mother      Heart disease Mother      Kidney failure Mother      No Known Problems Daughter Muna     No Known Problems Sister Sujata     No Known Problems Brother Abhijeet     No Known Problems Son Tito     No Known Problems Brother Alexei     No Known Problems Son Srini     Hypertension Maternal Grandmother      Amblyopia Neg Hx      Blindness Neg Hx      Cataracts Neg Hx      Diabetes Neg Hx      Glaucoma Neg Hx      Macular degeneration Neg Hx      Retinal detachment Neg Hx      Strabismus Neg Hx      Stroke Neg Hx      Thyroid disease Neg Hx      Melanoma Neg Hx      Psoriasis Neg Hx      Lupus Neg Hx      Eczema Neg Hx         Social History     Tobacco Use    Smoking status: Never    Smokeless tobacco: Former     Types: Chew    Tobacco comments:     Chewed tobacco once per day for 4-5 years when a    Substance Use Topics    Alcohol use: No    Drug use: No         Review of Systems      Objective:   Blood pressure 134/74, pulse 82, height 5' 11" (1.803 m), weight 92.2 kg (203 lb 4.2 oz), SpO2 98%.     Physical Exam  Musculoskeletal:      Right lower leg: Edema present.      Left lower leg: Edema present.        Feet:    Feet:      Comments: Hard callous, TTP, no erythema or fluctuance                  Prior labs reviewed    Health Maintenance         Date Due Completion Date    RSV Vaccine (Age 60+ and Pregnant patients) (1 - 1-dose 60+ series) Never done ---    Diabetes Urine Screening 05/31/2024 5/31/2023    Eye Exam 08/03/2024 8/3/2023    Override on 6/14/2018: Done    Override on 8/5/2015: " Done    Influenza Vaccine (1) 09/01/2024 11/3/2023    Override on 10/1/2014: Done (per pt)    Override on 11/7/2013: Done    COVID-19 Vaccine (5 - 2023-24 season) 09/01/2024 11/3/2023    Hemoglobin A1c 11/22/2024 5/22/2024    Override on 10/23/2015: Done    Lipid Panel 05/22/2025 5/22/2024    Colonoscopy 04/24/2027 4/24/2024    TETANUS VACCINE 03/30/2033 3/30/2023            Assessment/Plan:       1. Diabetic polyneuropathy associated with type 2 diabetes mellitus  Overview:  DM had resolved off meds with lifestlye hanges but has returned to prediabetic levels  Manage as diabetes due to long history of DM increasing risk of complication   Cont diabetic diet     Orders:  -     Ambulatory referral/consult to Ophthalmology; Future; Expected date: 10/01/2024  -     Hearing screen  -     Microalbumin/Creatinine Ratio, Urine; Future  -     Microalbumin/Creatinine Ratio, Urine; Future  -     Comprehensive Metabolic Panel; Future; Expected date: 03/27/2025  -     Lipid Panel; Future; Expected date: 03/27/2025  -     Hemoglobin A1C; Future; Expected date: 03/23/2025    2. Atypical nevus of left lower leg  -     Ambulatory referral/consult to Dermatology; Future; Expected date: 10/01/2024    3. Callus of heel  -     Ambulatory referral/consult to Podiatry; Future; Expected date: 10/01/2024    4. Chronic edema  -     Ambulatory referral/consult to Cardiology; Future; Expected date: 10/01/2024  -     PROTEIN ELECTROPHORESIS, SERUM; Future; Expected date: 09/24/2024  -     Immunoglobulin Free LT Chains Blood; Future; Expected date: 09/24/2024    5. Hypoalbuminemia  -     PROTEIN ELECTROPHORESIS, SERUM; Future; Expected date: 09/24/2024  -     Immunoglobulin Free LT Chains Blood; Future; Expected date: 09/24/2024    40 min spent in care of patient including history, physical, chart review, orders and coordination of care.     Visit today included increased complexity associated with the care of the episodic problems and addressed  and managing the longitudinal care of the patient due to the serious and/or complex managed problem(s) as above.         Medication List with Changes/Refills   Current Medications    AMLODIPINE (NORVASC) 5 MG TABLET    Take 1 tablet (5 mg total) by mouth once daily.    ASPIRIN (ECOTRIN) 81 MG EC TABLET    TAKE 1 TABLET EVERY DAY.    AZELASTINE (ASTELIN) 137 MCG (0.1 %) NASAL SPRAY    USE 1 SPRAY NASALLY TWICE DAILY    CALCIUM CARBONATE (TUMS ORAL)    Take 1-2 tablets by mouth daily as needed (for acid reflux).    CARBIDOPA-LEVODOPA  MG (SINEMET)  MG PER TABLET    Take 1 tablet by mouth 3 (three) times daily.    CHOLECALCIFEROL, VITAMIN D3, (VITAMIN D3) 50 MCG (2,000 UNIT) CAP    Take 1 capsule by mouth once daily.    CLOTRIMAZOLE-BETAMETHASONE 1-0.05% (LOTRISONE) CREAM    APPLY TOPICALLY 2 (TWO) TIMES DAILY.    DOCUSATE SODIUM (COLACE) 100 MG CAPSULE    Take 1 capsule (100 mg total) by mouth once daily.    FINASTERIDE (PROSCAR) 5 MG TABLET    TAKE 1 TABLET (5 MG TOTAL) BY MOUTH ONCE DAILY.    FLUTICASONE PROPIONATE (FLONASE) 50 MCG/ACTUATION NASAL SPRAY    USE 1 SPRAY NASALLY ONE TIME DAILY    GABAPENTIN (NEURONTIN) 300 MG CAPSULE    TAKE 1 CAPSULE DAILY AT NOON AND TAKE 2 CAPSULES EVERY EVENING    IRBESARTAN (AVAPRO) 150 MG TABLET    Take 2 tablets (300 mg total) by mouth every evening.    KETOCONAZOLE (NIZORAL) 2 % CREAM    Apply topically once weekly for facial redness and scaling.    LANOLIN/MINERAL OIL/PETROLATUM (ARTIFICIAL TEARS OPHT)    Place 1-2 drops into both eyes daily as needed (for dry eyes).    LIDOCAINE 4 % PTMD    Apply 1 patch topically daily as needed (for back pain).    OMEPRAZOLE (PRILOSEC) 20 MG CAPSULE    TAKE 2 CAPSULES ONE TIME DAILY    PRAVASTATIN (PRAVACHOL) 20 MG TABLET    TAKE 1 TABLET EVERY DAY    TAMSULOSIN (FLOMAX) 0.4 MG CAP    Take 1 capsule (0.4 mg total) by mouth once daily.       Recommend patient complete vaccinations as listed in the overdue health maintenance  report. Pt may obtain vaccinations at their local pharmacy, any [facility] pharmacy or request vaccination in our clinic.  Please note that some insurances will only cover vaccination cost at specific locations.Please check with your insurance provider regarding your coverage.  Vaccines that are not covered are still recommended.         minutes spent in care of patient including history, physical, chart review, orders and coordination of care.

## 2024-09-24 NOTE — Clinical Note
Looks like you wanted to see him back after mri/mra and eeg completed but was lost to follow up. I don't see an apt on his schedule. Do you still want to see him?  KBB

## 2024-09-25 ENCOUNTER — OFFICE VISIT (OUTPATIENT)
Dept: PODIATRY | Facility: CLINIC | Age: 79
End: 2024-09-25
Payer: MEDICARE

## 2024-09-25 VITALS
HEART RATE: 82 BPM | DIASTOLIC BLOOD PRESSURE: 85 MMHG | BODY MASS INDEX: 28.55 KG/M2 | SYSTOLIC BLOOD PRESSURE: 152 MMHG | WEIGHT: 203.94 LBS | HEIGHT: 71 IN

## 2024-09-25 DIAGNOSIS — B35.1 ONYCHOMYCOSIS DUE TO DERMATOPHYTE: ICD-10-CM

## 2024-09-25 DIAGNOSIS — L84 CALLUS OF HEEL: ICD-10-CM

## 2024-09-25 DIAGNOSIS — E11.42 DIABETIC POLYNEUROPATHY ASSOCIATED WITH TYPE 2 DIABETES MELLITUS: Primary | ICD-10-CM

## 2024-09-25 LAB
ALBUMIN SERPL ELPH-MCNC: 3.79 G/DL (ref 3.35–5.55)
ALPHA1 GLOB SERPL ELPH-MCNC: 0.33 G/DL (ref 0.17–0.41)
ALPHA2 GLOB SERPL ELPH-MCNC: 0.8 G/DL (ref 0.43–0.99)
B-GLOBULIN SERPL ELPH-MCNC: 0.85 G/DL (ref 0.5–1.1)
GAMMA GLOB SERPL ELPH-MCNC: 1.23 G/DL (ref 0.67–1.58)
KAPPA LC SER QL IA: 3.04 MG/DL (ref 0.33–1.94)
KAPPA LC/LAMBDA SER IA: 1.79 (ref 0.26–1.65)
LAMBDA LC SER QL IA: 1.7 MG/DL (ref 0.57–2.63)
PATHOLOGIST INTERPRETATION SPE: NORMAL
PROT SERPL-MCNC: 7 G/DL (ref 6–8.4)

## 2024-09-25 PROCEDURE — 99999 PR PBB SHADOW E&M-EST. PATIENT-LVL IV: CPT | Mod: PBBFAC,HCNC,, | Performed by: PODIATRIST

## 2024-09-25 NOTE — PROGRESS NOTES
Subjective:      Patient ID: Tito Menrad is a 78 y.o. male.    Chief Complaint: Diabetic Foot Exam (9/24/24 - Amanda Brown MD, PCP) and Heel Pain (Left heel pain that wakes him up at night)    Tito is a 78 y.o. male who presents to the clinic for evaluation and treatment of high risk feet. Tito has a past medical history of Allergy, Aneurysm of artery of lower extremity, Chalazion of left eye, CKD (chronic kidney disease), stage II (4/1/2019), Diabetes mellitus type II, Diabetes with neurologic complications, Enlarged aorta (8/2/2016), GERD (gastroesophageal reflux disease), Hyperlipidemia, Hypertension, Jock itch (7/19/2018), MGD (meibomian gland dysfunction), Osteopenia, Seizure (5/29/2024), Spinal stenosis, Spondylosis without myelopathy (10/23/2015), and Vitamin D deficiency disease. The patient's chief complaint is long, thick toenails. This patient has documented high risk feet requiring routine maintenance secondary to peripheral neuropathy.    PCP: Amanda Brown MD    Date Last Seen by PCP:   Chief Complaint   Patient presents with    Diabetic Foot Exam     9/24/24 - Amanda Brown MD, PCP    Heel Pain     Left heel pain that wakes him up at night         Current shoe gear:  Affected Foot: Tennis shoes     Unaffected Foot: Tennis shoes    Hemoglobin A1C   Date Value Ref Range Status   05/22/2024 5.6 4.0 - 5.6 % Final     Comment:     ADA Screening Guidelines:  5.7-6.4%  Consistent with prediabetes  >or=6.5%  Consistent with diabetes    High levels of fetal hemoglobin interfere with the HbA1C  assay. Heterozygous hemoglobin variants (HbS, HgC, etc)do  not significantly interfere with this assay.   However, presence of multiple variants may affect accuracy.     05/01/2024 5.7 (H) 4.0 - 5.6 % Final     Comment:     ADA Screening Guidelines:  5.7-6.4%  Consistent with prediabetes  >or=6.5%  Consistent with diabetes    High levels of fetal hemoglobin interfere with the HbA1C  assay.  Heterozygous hemoglobin variants (HbS, HgC, etc)do  not significantly interfere with this assay.   However, presence of multiple variants may affect accuracy.     12/06/2023 5.6 4.0 - 5.6 % Final     Comment:     ADA Screening Guidelines:  5.7-6.4%  Consistent with prediabetes  >or=6.5%  Consistent with diabetes    High levels of fetal hemoglobin interfere with the HbA1C  assay. Heterozygous hemoglobin variants (HbS, HgC, etc)do  not significantly interfere with this assay.   However, presence of multiple variants may affect accuracy.         Review of Systems   Constitutional: Negative for chills, fever and malaise/fatigue.   HENT:  Negative for hearing loss.    Cardiovascular:  Negative for claudication.   Respiratory:  Negative for shortness of breath.    Skin:  Positive for color change, dry skin and nail changes. Negative for flushing and rash.   Musculoskeletal:  Negative for joint pain and myalgias.   Neurological:  Positive for numbness and paresthesias. Negative for loss of balance and sensory change.   Psychiatric/Behavioral:  Negative for altered mental status.            Objective:      Physical Exam  Vitals reviewed.   Cardiovascular:      Pulses:           Dorsalis pedis pulses are 2+ on the right side and 2+ on the left side.        Posterior tibial pulses are 2+ on the right side and 2+ on the left side.      Comments: No edema noted b/L  Musculoskeletal:         General: Normal range of motion.      Comments:        Feet:      Right foot:      Protective Sensation: 5 sites tested.  5 sites sensed.      Left foot:      Protective Sensation: 5 sites tested.  5 sites sensed.   Skin:     General: Skin is warm.      Capillary Refill: Capillary refill takes 2 to 3 seconds.      Comments: Normal skin tugor noted.   No open lesion noted b/L  Skin temp is warm to warm from proximal to distal b/L.  Webspaces clean, dry, and intact     Neurological:      Mental Status: He is alert.      Comments: Gross  sensation intact b/L         Nails x9 are elongated by  6-8mm's, thickened by 2-3 mm's, dystrophic, and are yellowish in  coloration . Xerosis Bilaterally. No open lesions noted.   Right hallux nail absent    HKL noted to left heel        Assessment:       Encounter Diagnoses   Name Primary?    Callus of heel     Diabetic polyneuropathy associated with type 2 diabetes mellitus Yes    Onychomycosis due to dermatophyte          Plan:       Tito was seen today for diabetic foot exam and heel pain.    Diagnoses and all orders for this visit:    Diabetic polyneuropathy associated with type 2 diabetes mellitus    Callus of heel  -     Ambulatory referral/consult to Podiatry    Onychomycosis due to dermatophyte      I counseled the patient on his conditions, their implications and medical management.        - Shoe inspection. Diabetic Foot Education. Patient reminded of the importance of good nutrition and blood sugar control to help prevent podiatric complications of diabetes. Patient instructed on proper foot hygeine. We discussed wearing proper shoe gear, daily foot inspections, never walking without protective shoe gear, never putting sharp instruments to feet, routine podiatric nail visits every 2-3 months.    After cleansing with an alcohol prep pad, the about mentioned hyperkeratotic lesions were sharply debrided X 1 utilizing a #15 blade to a smooth base without incident. Pt tolerated the procedure well and reported comfort to the debarment sites. Pt will continue to use padding and moisture the callused areas.    - With patient's permission, nails were aggressively reduced and debrided x 9 to their soft tissue attachment mechanically and with electric , removing all offending nail and debris. Patient relates relief following the procedure. He will continue to monitor the areas daily, inspect his feet, wear protective shoe gear when ambulatory, moisturizer to maintain skin integrity and follow in this  office in approximately 2-3 months, sooner p.r.n.

## 2024-10-21 DIAGNOSIS — I35.1 MILD AORTIC REGURGITATION WITH LEFT VENTRICULAR SYSTOLIC DYSFUNCTION BY PRIOR ECHOCARDIOGRAM: ICD-10-CM

## 2024-10-21 DIAGNOSIS — I51.9 MILD AORTIC REGURGITATION WITH LEFT VENTRICULAR SYSTOLIC DYSFUNCTION BY PRIOR ECHOCARDIOGRAM: ICD-10-CM

## 2024-10-21 RX ORDER — KETOCONAZOLE 20 MG/G
CREAM TOPICAL
Qty: 60 G | Refills: 11 | Status: SHIPPED | OUTPATIENT
Start: 2024-10-21

## 2024-10-22 RX ORDER — ASPIRIN 81 MG/1
TABLET ORAL
Qty: 90 TABLET | Refills: 3 | Status: SHIPPED | OUTPATIENT
Start: 2024-10-22

## 2024-10-22 NOTE — TELEPHONE ENCOUNTER
Refill Routing Note   Medication(s) are not appropriate for processing by [facility] for the following reason(s):        Outside of protocol    [facility] action(s):  Route               Appointments  past 12m or future 3m with PCP    Date Provider   Last Visit   9/24/2024 Amanda Brown MD   Next Visit   3/27/2025 Amanda Borwn MD   ED visits in past 90 days: 0        Note composed:10:13 PM 10/21/2024

## 2024-10-24 ENCOUNTER — OFFICE VISIT (OUTPATIENT)
Dept: NEUROLOGY | Facility: CLINIC | Age: 79
End: 2024-10-24
Payer: MEDICARE

## 2024-10-24 VITALS
HEIGHT: 71 IN | DIASTOLIC BLOOD PRESSURE: 73 MMHG | BODY MASS INDEX: 28.55 KG/M2 | WEIGHT: 203.94 LBS | HEART RATE: 88 BPM | SYSTOLIC BLOOD PRESSURE: 128 MMHG

## 2024-10-24 DIAGNOSIS — R29.6 FALLS FREQUENTLY: Primary | ICD-10-CM

## 2024-10-24 PROCEDURE — 99999 PR PBB SHADOW E&M-EST. PATIENT-LVL III: CPT | Mod: PBBFAC,HCNC,, | Performed by: PSYCHIATRY & NEUROLOGY

## 2024-10-25 NOTE — PROGRESS NOTES
"  Tito Menard is a 78 y.o. year old male that  presents for routine neurology follow up and review of neuro testing.     HPI:  Mr Menard returns to the office for follow up.   He has HTN, HLD, DM, CKD II, enlarged aorta, cervical spine stenosis, s/p L spine surgery x 2, and PD on levodopa.  I first saw him on 5/29/24 regarding recurrent falls with associated impairment of conscience and requested MRI/MRA brain and EEG that were unrevealing.  Stated that such episodes haven't occur lately but still experiences less frequent events in which he gets dizzy " like I am about to faint " and then has to sit down and rest for 20 or 30 minutes until the feels back to normal.  Importantly, he denies associated diaphoresis, chest pain, SOB, altered awareness, trouble speaking, or focal weakness during such episodes.  Has chronic RLE weakness that he attributes to prior back surgery.  At the same time, he tells me that he has PD and takes Levodopa BID as recommended by a neurologist here at [facility] but I can not find a note in the chart to confirm that assertion.    Past Medical History:   Diagnosis Date    Allergy     Aneurysm of artery of lower extremity     Chalazion of left eye     CKD (chronic kidney disease), stage II 4/1/2019    Diabetes mellitus type II     Diabetes with neurologic complications     Enlarged aorta 8/2/2016    Noted on pharmacological stress echo 2/28/2014.      GERD (gastroesophageal reflux disease)     egd 2008    Hyperlipidemia     Hypertension     Jock itch 7/19/2018    MGD (meibomian gland dysfunction)     Osteopenia     Seizure 5/29/2024    Spinal stenosis     Spondylosis without myelopathy 10/23/2015    Vitamin D deficiency disease      Social History     Socioeconomic History    Marital status:    Occupational History    Occupation: Retired     Comment:    Tobacco Use    Smoking status: Never    Smokeless tobacco: Former     Types: Chew    Tobacco comments:     Chewed " tobacco once per day for 4-5 years when a    Substance and Sexual Activity    Alcohol use: No    Drug use: No    Sexual activity: Not Currently     Partners: Female   Social History Narrative        Colon 2007     Social Drivers of Health     Financial Resource Strain: Low Risk  (5/2/2024)    Overall Financial Resource Strain (CARDIA)     Difficulty of Paying Living Expenses: Not hard at all   Food Insecurity: No Food Insecurity (5/2/2024)    Hunger Vital Sign     Worried About Running Out of Food in the Last Year: Never true     Ran Out of Food in the Last Year: Never true   Transportation Needs: No Transportation Needs (5/2/2024)    PRAPARE - Transportation     Lack of Transportation (Medical): No     Lack of Transportation (Non-Medical): No   Physical Activity: Inactive (5/2/2024)    Exercise Vital Sign     Days of Exercise per Week: 0 days     Minutes of Exercise per Session: 0 min   Stress: Patient Unable To Answer (5/2/2024)    [employer] - Occupational Stress Questionnaire     Feeling of Stress : Patient unable to answer   Housing Stability: Low Risk  (5/2/2024)    Housing Stability Vital Sign     Unable to Pay for Housing in the Last Year: No     Homeless in the Last Year: No     Past Surgical History:   Procedure Laterality Date    CHALAZION EXCISION Left 8/18/13    COLONOSCOPY N/A 4/24/2024    Procedure: COLONOSCOPY;  Surgeon: Catalino Sorto MD;  Location: [facility] (4TH FLR);  Service: Endoscopy;  Laterality: N/A;  Ref by Dr DEJAN Brown, PEG, portal - PC  4/18-precall complete-MS    CYST REMOVAL  2013    Back of neck    EXCISION OF HYDROCELE Right 11/14/2023    Procedure: HYDROCELECTOMY;  Surgeon: Beatrice Vaca MD;  Location: [facility] OR 2ND FLR;  Service: Urology;  Laterality: Right;  1 hr    FLEXIBLE CYSTOSCOPY N/A 12/7/2021    Procedure: CYSTOSCOPY, FLEXIBLE;  Surgeon: Beatrice Vaca MD;  Location: [facility] OR [address] [place] [address];  Service: Urology;   "Laterality: N/A;    FLUOROSCOPIC URODYNAMIC STUDY N/A 12/7/2021    Procedure: URODYNAMIC STUDY, FLUOROSCOPIC;  Surgeon: Beatrice Vaca MD;  Location: [facility] OR [address] [place] [address];  Service: Urology;  Laterality: N/A;  90 minutes     SPINE SURGERY  2007    x2 (2000, 2007)     Family History   Problem Relation Name Age of Onset    Hyperlipidemia Mother      Hypertension Mother      Kidney disease Mother      Cancer Mother      Heart disease Mother      Kidney failure Mother      No Known Problems Daughter Muna     No Known Problems Sister Sujata     No Known Problems Brother Abhijeet     No Known Problems Son Tito     No Known Problems Brother Alexei     No Known Problems Son Srini     Hypertension Maternal Grandmother      Amblyopia Neg Hx      Blindness Neg Hx      Cataracts Neg Hx      Diabetes Neg Hx      Glaucoma Neg Hx      Macular degeneration Neg Hx      Retinal detachment Neg Hx      Strabismus Neg Hx      Stroke Neg Hx      Thyroid disease Neg Hx      Melanoma Neg Hx      Psoriasis Neg Hx      Lupus Neg Hx      Eczema Neg Hx             Review of Systems  General ROS: negative for chills, fever or weight loss  Psychological ROS: negative for hallucination, depression or suicidal ideation  Ophthalmic ROS: negative for blurry vision, photophobia or eye pain  ENT ROS: negative for epistaxis, sore throat or rhinorrhea  Respiratory ROS: no cough, shortness of breath, or wheezing  Cardiovascular ROS: no chest pain or dyspnea on exertion  Gastrointestinal ROS: no abdominal pain, change in bowel habits, or black/ bloody stools  Genito-Urinary ROS: no dysuria, trouble voiding, or hematuria  Musculoskeletal ROS: positive for gait disturbance and muscular weakness RLE  Neurological ROS: no syncope or seizures; no ataxia  Dermatological ROS: negative for pruritis, rash and jaundice      Physical Exam:  /73 (BP Location: Right arm, Patient Position: Sitting)   Pulse 88   Ht 5' 11" (1.803 m)   Wt 92.5 kg (203 lb 14.8 " oz)   BMI 28.44 kg/m²   General appearance: alert, cooperative, no distress  Constitutional:Oriented to person, place, and time.appears well-developed and well-nourished.   HEENT: Normocephalic, atraumatic, neck symmetrical, no nasal discharge   Eyes: conjunctivae/corneas clear, PERRL, EOM's intact  Lungs: clear to auscultation bilaterally, no dullness to percussion bilaterally  Heart: regular rate and rhythm without rub; no displacement of the PMI   Abdomen: soft, non-tender; bowel sounds normoactive; no organomegaly  Extremities: extremities symmetric; no clubbing, cyanosis, or edema  Integument: Skin color, texture, turgor normal; no rashes; hair distrubution normal  Neurologic:  Mental status: alert and awake, oriented x 4, comprehension, naming, and repetition intact. No right to left confusion. Performs serial 7's without difficulty .No dysarthria.  CN 2-12: pupils 4 mm bilaterally, reactive to light. Fundi without papilledema. Visual fields full to confrontation. EOM full without nystagmus. Face sensation normal in all distributions. Face symmetric. Hearing grossly intact. Palate elevates well. Tongue midline without atrophy or fasciculations.  Motor: 5/5 all over upper extremities. Weakness RLE greater than LLE.  Sensory: no tested  DTR's: 2+ all over.  Plantars: no tested.  Coordination: resting tremor L hand. Finger to nose normal, heel-knee-shin no tested. Decreased ROBERTA, normal foot tapping.  Stance and Gait: slight stoop posture with bradykinesia and postural instability    LABS:    Complete Blood Count  Lab Results   Component Value Date    RBC 4.01 (L) 05/22/2024    HGB 11.7 (L) 05/22/2024    HCT 37.9 (L) 05/22/2024    MCV 95 05/22/2024    MCH 29.2 05/22/2024    MCHC 30.9 (L) 05/22/2024    RDW 14.3 05/22/2024     05/22/2024    MPV 9.4 05/22/2024    GRAN 1.7 (L) 05/22/2024    GRAN 40.6 05/22/2024    LYMPH 2.0 05/22/2024    LYMPH 46.2 05/22/2024    MONO 0.3 05/22/2024    MONO 7.8 05/22/2024     EOS 0.2 05/22/2024    BASO 0.02 05/22/2024    EOSINOPHIL 4.7 05/22/2024    BASOPHIL 0.5 05/22/2024    DIFFMETHOD Automated 05/22/2024       Comprehensive Metabolic Panel  Lab Results   Component Value Date    GLU 93 05/28/2024    BUN 12 05/28/2024    CREATININE 0.8 05/28/2024     05/28/2024    K 3.7 05/28/2024     05/28/2024    PROT 6.9 05/28/2024    ALBUMIN 3.3 (L) 05/28/2024    BILITOT 0.5 05/28/2024    AST 17 05/28/2024    ALKPHOS 74 05/28/2024    CO2 26 05/28/2024    ALT 11 05/28/2024    ANIONGAP 6 (L) 05/28/2024    EGFRNONAA >60.0 06/27/2022    ESTGFRAFRICA >60.0 06/27/2022       TSH  Lab Results   Component Value Date    TSH 0.661 06/20/2023         Assessment:  pleasant 79 y/o with HTN, HLD, DM, CKD II, enlarged aorta, cervical spine stenosis, s/p L spine surgery x 2, and PD on levodopa, presents for evaluation of recurrent falls with and without associated impairment of consciousness.   Neuro exam as detailed above.  Unremarkable MRI/MRA brain and EEG.  At this point, I am wondering if patient's paroxysmal episodes could be a manifestation of dysautonomia in the context of underlying PD.        ICD-10-CM ICD-9-CM    1. Falls frequently  R29.6 V15.88         The encounter diagnosis was Falls frequently.      Plan:  1) Recurrent dizziness, falls with impairment of consciousness: concern for dysautonomia in the setting of PD ?  2) HTN  3) HLD  4) DM  5) CKD II  6) Enlarged aorta  7) Cervical spine stenosis  8) s/p L spine surgery x 2  9) PD on levodopa  Follow up in 4 to 6 months or before if needed            No orders of the defined types were placed in this encounter.          Jorge Luis Dunn MD

## 2024-11-14 ENCOUNTER — OFFICE VISIT (OUTPATIENT)
Dept: OPTOMETRY | Facility: CLINIC | Age: 79
End: 2024-11-14
Payer: MEDICARE

## 2024-11-14 DIAGNOSIS — H52.203 HYPEROPIA OF BOTH EYES WITH ASTIGMATISM AND PRESBYOPIA: ICD-10-CM

## 2024-11-14 DIAGNOSIS — E11.42 DIABETIC POLYNEUROPATHY ASSOCIATED WITH TYPE 2 DIABETES MELLITUS: ICD-10-CM

## 2024-11-14 DIAGNOSIS — E11.9 DIABETES MELLITUS TYPE 2 WITHOUT RETINOPATHY: Primary | ICD-10-CM

## 2024-11-14 DIAGNOSIS — H52.4 HYPEROPIA OF BOTH EYES WITH ASTIGMATISM AND PRESBYOPIA: ICD-10-CM

## 2024-11-14 DIAGNOSIS — H52.03 HYPEROPIA OF BOTH EYES WITH ASTIGMATISM AND PRESBYOPIA: ICD-10-CM

## 2024-11-14 DIAGNOSIS — H25.813 COMBINED FORM OF SENILE CATARACT OF BOTH EYES: ICD-10-CM

## 2024-11-14 PROCEDURE — 99999 PR PBB SHADOW E&M-EST. PATIENT-LVL III: CPT | Mod: PBBFAC,HCNC,, | Performed by: OPTOMETRIST

## 2024-11-14 NOTE — PROGRESS NOTES
HPI    FRANCESCA: 8/3/23 Dr. Palafox  Last DFE: 8/3/23  Chief complaint (CC): 79 yo M here for diabetic eye exam. Pt c/o blurry   vision OU, requests updated glasses Rx. Pt also c/o tearing OS>OD. Pt   denies any other ocular or visual complaints today.     Glasses? +  Contacts? -  H/o eye surgery, injections or laser: + Cyst removal   H/o eye injury: -  Known eye conditions?     Hyperopia of both eyes with astigmatism  Family h/o eye conditions? -  Eye gtts? +      (-) Flashes (-)  Floaters (-) Mucous   (+)  Tearing (+) Itching (-) Burning   (-) Headaches (-) Eye Pain/discomfort (-) Irritation   (-)  Redness (-) Double vision (+) Blurry vision    CL Exam: No    Diabetic? +  BLS : unrecorded   A1c? Lab Results       Component                Value               Date                       HGBA1C                   5.6                 05/22/2024              Last edited by Rafal Marrufo, OD on 11/14/2024 11:16 AM.            Assessment /Plan     For exam results, see Encounter Report.      Diabetes mellitus type 2 without retinopathy  Diabetic polyneuropathy associated with type 2 diabetes mellitus  - Pt educated on the importance of maintaining adequate glycemic and blood pressure control via diet, compliance with medications, exercise and timely follow up with PCP.  No diabetic retinopathy, No CSME.   - Return in 1 year for dilated eye exam.    Combined form of senile cataract of both eyes   - Not visually significant. Monitor.     Hyperopia of both eyes with astigmatism and presbyopia   - New Spectacle Rx given, discussed different options for glasses.   - RTC 1 year for routine eye exam.

## 2024-11-14 NOTE — PROGRESS NOTES
Cardiology Clinic Note  Reason for Visit: edema    HPI:     Tito Menard is a 78 y.o. M, who presents for edema.    He was last seen 7/7/23.  Echos showed E/e' <8 consistently over the years.  All BNP checks <10. Most recent in 2023.  Jardiance stopped as believed to not have CHF.    He monitors BP at home. -140 systolic.  Hydrates well. Clear urine. Darkens overnight.    No orthopnea. Mild stable bilateral LE edema.  No weight gain. Down over past few years.    Medical: HTN, HLD, h/o DM2 (controlled off meds), CKD 2, enlarged aorta (CT 2/2020 - mid ascending aorta 4.1cm; 4.1cm on echo), RBBB  Surgical: Reviewed, as below.  Family: Reviewed, as below. Mother with heart disease.  Social: Reviewed, as below. 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    ROS:    Pertinent ROS included in HPI and below.  PMH:     Past Medical History:   Diagnosis Date    Allergy     Aneurysm of artery of lower extremity     Chalazion of left eye     CKD (chronic kidney disease), stage II 04/01/2019    Diabetes mellitus type II     Diabetes with neurologic complications     GERD (gastroesophageal reflux disease)     egd 2008    Jock itch 07/19/2018    MGD (meibomian gland dysfunction)     Osteopenia     Seizure 05/29/2024    Spinal stenosis     Spondylosis without myelopathy 10/23/2015    Vitamin D deficiency disease      Past Surgical History:   Procedure Laterality Date    CHALAZION EXCISION Left 8/18/13    COLONOSCOPY N/A 4/24/2024    Procedure: COLONOSCOPY;  Surgeon: Catalino Sorto MD;  Location: [facility] (4TH FLR);  Service: Endoscopy;  Laterality: N/A;  Ref by Dr DEJAN Brown, PEG, portal - PC  4/18-precall complete-MS    CYST REMOVAL  2013    Back of neck    EXCISION OF HYDROCELE Right 11/14/2023    Procedure: HYDROCELECTOMY;  Surgeon: Beatrice Vaca MD;  [facility];  Service: Urology;  Laterality: Right;  1 hr    FLEXIBLE CYSTOSCOPY N/A 12/7/2021    Procedure: CYSTOSCOPY, FLEXIBLE;  Surgeon:  Beatrice Vaca MD;  Location: [facility] OR [address] [place] [address];  Service: Urology;  Laterality: N/A;    FLUOROSCOPIC URODYNAMIC STUDY N/A 12/7/2021    Procedure: URODYNAMIC STUDY, FLUOROSCOPIC;  Surgeon: Beatrice Vaca MD;  Location: [facility] OR [address] [place] [address];  Service: Urology;  Laterality: N/A;  90 minutes     SPINE SURGERY  2007    x2 (2000, 2007)     Allergies:   Review of patient's allergies indicates:  No Known Allergies  Medications:     Current Outpatient Medications on File Prior to Visit   Medication Sig Dispense Refill    amLODIPine (NORVASC) 5 MG tablet Take 1 tablet (5 mg total) by mouth once daily.      aspirin (ECOTRIN) 81 MG EC tablet TAKE 1 TABLET EVERY DAY 90 tablet 3    azelastine (ASTELIN) 137 mcg (0.1 %) nasal spray USE 1 SPRAY NASALLY TWICE DAILY 60 mL 3    calcium carbonate (TUMS ORAL) Take 1-2 tablets by mouth daily as needed (for acid reflux).      carbidopa-levodopa  mg (SINEMET)  mg per tablet Take 1 tablet by mouth 3 (three) times daily. 90 tablet 5    cholecalciferol, vitamin D3, (VITAMIN D3) 50 mcg (2,000 unit) Cap Take 1 capsule by mouth once daily.      clotrimazole-betamethasone 1-0.05% (LOTRISONE) cream APPLY TOPICALLY 2 (TWO) TIMES DAILY. 45 g 0    docusate sodium (COLACE) 100 MG capsule Take 1 capsule (100 mg total) by mouth once daily. 30 capsule 11    finasteride (PROSCAR) 5 mg tablet TAKE 1 TABLET (5 MG TOTAL) BY MOUTH ONCE DAILY. 90 tablet 3    fluticasone propionate (FLONASE) 50 mcg/actuation nasal spray USE 1 SPRAY NASALLY ONE TIME DAILY 32 g 3    gabapentin (NEURONTIN) 300 MG capsule TAKE 1 CAPSULE DAILY AT NOON AND TAKE 2 CAPSULES EVERY EVENING 270 capsule 3    irbesartan (AVAPRO) 150 MG tablet Take 2 tablets (300 mg total) by mouth every evening.      ketoconazole (NIZORAL) 2 % cream APPLY TOPICALLY ONCE WEEKLY FOR FACIAL REDNESS AND SCALING. 60 g 11    lanolin/mineral oil/petrolatum (ARTIFICIAL TEARS OPHT) Place 1-2 drops into both eyes daily as needed (for dry eyes).       "LIDOcaine 4 % PtMd Apply 1 patch topically daily as needed (for back pain).      omeprazole (PRILOSEC) 20 MG capsule TAKE 2 CAPSULES ONE TIME DAILY 180 capsule 3    pravastatin (PRAVACHOL) 20 MG tablet TAKE 1 TABLET EVERY DAY 90 tablet 3    tamsulosin (FLOMAX) 0.4 mg Cap Take 1 capsule (0.4 mg total) by mouth once daily.       No current facility-administered medications on file prior to visit.     Social History:     Social History     Tobacco Use    Smoking status: Never    Smokeless tobacco: Former     Types: Chew    Tobacco comments:     Chewed tobacco once per day for 4-5 years when a    Substance Use Topics    Alcohol use: No     Family History:     Family History   Problem Relation Name Age of Onset    Hyperlipidemia Mother      Hypertension Mother      Kidney disease Mother      Cancer Mother      Heart disease Mother      Kidney failure Mother      No Known Problems Daughter Muna     No Known Problems Sister Sujata     No Known Problems Brother Abhijeet     No Known Problems Son Tito     No Known Problems Brother Alexei     No Known Problems Son Srini     Hypertension Maternal Grandmother      Amblyopia Neg Hx      Blindness Neg Hx      Cataracts Neg Hx      Diabetes Neg Hx      Glaucoma Neg Hx      Macular degeneration Neg Hx      Retinal detachment Neg Hx      Strabismus Neg Hx      Stroke Neg Hx      Thyroid disease Neg Hx      Melanoma Neg Hx      Psoriasis Neg Hx      Lupus Neg Hx      Eczema Neg Hx       Physical Exam:   /78   Pulse 91   Ht 5' 1" (1.549 m)   Wt 92.7 kg (204 lb 5.9 oz)   SpO2 96%   BMI 38.61 kg/m²      Constitutional: No apparent distress, conversant  Neck: No jugular venous distension, no carotid bruits  CV: Regular rate and rhythm, no murmurs  Pulm: Clear to auscultation bilaterally  Extremities: 1+ bilateral lower extremity edema, warm with palpable pulses    Labs:     Blood Tests:  Lab Results   Component Value Date    BNP <10 03/30/2023     " 05/28/2024    K 3.7 05/28/2024     05/28/2024    CO2 26 05/28/2024    BUN 12 05/28/2024    BUN 13 07/09/2022    CREATININE 0.8 05/28/2024    CREATININE 0.9 07/09/2022    GLU 93 05/28/2024     07/09/2022    HGBA1C 5.6 05/22/2024    MG 1.7 05/03/2024    AST 17 05/28/2024    ALT 11 05/28/2024    ALBUMIN 3.3 (L) 05/28/2024    PROT 6.9 05/28/2024    BILITOT 0.5 05/28/2024    WBC 4.22 05/22/2024    HGB 11.7 (L) 05/22/2024    HCT 37.9 (L) 05/22/2024    HCT 35 (L) 04/24/2024    MCV 95 05/22/2024     05/22/2024    INR 1.0 04/30/2024    PSA 2.2 11/11/2021    TSH 0.661 06/20/2023       Lab Results   Component Value Date    CHOL 150 05/22/2024    HDL 45 05/22/2024    TRIG 85 05/22/2024       Lab Results   Component Value Date    LDLCALC 88.0 05/22/2024       Urine Tests:  Lab Results   Component Value Date    COLORU Yellow 05/29/2024    APPEARANCEUA Clear 05/29/2024    PHUR 6.0 05/29/2024    SPECGRAV 1.020 05/29/2024    PROTEINUA Negative 05/29/2024    GLUCUA Trace (A) 05/29/2024    KETONESU Negative 05/29/2024    BILIRUBINUA Negative 05/29/2024    OCCULTUA Trace (A) 05/29/2024    NITRITE Negative 05/29/2024    UROBILINOGEN normal 12/29/2020    UROBILINOGEN 0.2 10/09/2012    LEUKOCYTESUR Negative 05/29/2024    CREATRANDUR 95.0 09/24/2024       Imaging:     Echocardiogram  TTE 4/30/24    Left Ventricle: The left ventricle is normal in size. Ventricular mass is normal. Mildly increased wall thickness. Mild septal thickening. There is concentric remodeling. There is normal systolic function with a visually estimated ejection fraction of 60 - 65%. There is normal diastolic function.    Right Ventricle: Normal right ventricular cavity size. Wall thickness is normal. Systolic function is normal.    Aortic Valve: The aortic valve is a trileaflet valve. There is moderate aortic valve sclerosis. There is mild annular calcification present. There is trace aortic regurgitation.    Mitral Valve: There is mild bileaflet  sclerosis.    Pulmonic Valve: There is mild regurgitation.    Pulmonary Artery: No pulmonary hypertension.    IVC/SVC: Normal venous pressure at 3 mmHg.    TTE 1/13/21  The left ventricle is normal in size with normal systolic function. The estimated ejection fraction is 70%  Grade I left ventricular diastolic dysfunction.  Normal right ventricular size with normal right ventricular systolic function.    Stress testing  CHLOE 7/21/21  2D Echo only  The ECG portion of this study is negative for myocardial ischemia.  The stress echo portion of this study is negative for myocardial ischemia.  The patient reached the end of the protocol.  During stress, the following significant arrhythmias were observed: rare PACs, occasional PVCs.  The estimated ejection fraction is 60%.  The left ventricle is normal in size with normal systolic function.  Normal left ventricular diastolic function.  Normal right ventricular size with normal right ventricular systolic function.  Mild pulmonic regurgitation.  Mild tricuspid regurgitation.  The estimated PA systolic pressure is 28 mmHg.  Normal central venous pressure (3 mmHg).  The ascending aorta is mildly dilated at 4.1 cm.    Cath Lab  None    Other  CT abd/pelv 4/30/24  - Vascular: No abdominal aortic aneurysm.     CT chest 11/2/22  1. Aneurysmal dilatation of ascending aorta, up to 4.1 cm at the sinuses of Valsalva and the mid ascending segment.     Event 7/1/21  Negative event monitor with no clinical arrhythmias.  Symptoms corresponding with normal sinus rhythm.    CT chest 2/20/20  There is a left-sided aortic arch with 2 branch vessels.  There is persistent dilatation of the ascending aorta with measurements as follows:  *Sinotubular junction: 3.7 cm.  *Proximal ascending thoracic aorta: 3.8 cm.  *Mid ascending thoracic aorta: 4.1 cm (previously 4.1 cm).  *Distal ascending thoracic aorta: 3.7 cm.  *Aortic arch: 2.9 cm.  *Aortic isthmus: 2.9 cm.  *Proximal descending thoracic  aorta: 3 cm.  Pulmonary arteries distribute normally.  There are 4 pulmonary veins.    Arterial US 16  Duplex ultrasound of the bilateral Popliteal arteries reveals no evidence of an aneurysm.     EK24 - NSR, RBBB (personally reviewed)    Assessment:     1. Essential hypertension    2. Mixed hyperlipidemia    3. Right bundle branch block    4. Aneurysm of ascending aorta without rupture    5. Aortic atherosclerosis    6. Parkinsonism, unspecified Parkinsonism type      Plan:     Essential hypertension  Enrolled in digital HTN program  BP borderline, but goal is <140 due to orthostasis  Continue meds  Low salt    Mixed hyperlipidemia  Aortic atherosclerosis  Continue statin    Aneurysm of ascending aorta without rupture  Stable  4.1cm on echo in 2024    Edema due to venous insufficiency  May have contribution from amlodipine  Encouraged compression socks    Dyspnea on exertion  Restrictive lung disease  Mild on PFTs in 2018  Encouraged follow up with PCP/pulm    Right bundle branch block  Stable    History of diabetes mellitus, type II  A1c at goal <7%  Off meds    Signed:  Mao Driscoll MD  Cardiology     Follow-up:     Future Appointments   Date Time Provider Department Center   2024 10:20 AM Rafal Marrfuo OD [facility] OPTOMTY [facility]   2024  8:00 AM Thien Driscoll III, MD [facility] CARDIO [facility]   1/15/2025 10:40 AM Jimi Hernandez MD [facility] DERM [facility]   3/24/2025 10:00 AM [facility] LAB [facility]   3/27/2025 10:00 AM Amanda Brown MD [facility] IM [facility] PCW

## 2024-11-21 ENCOUNTER — OFFICE VISIT (OUTPATIENT)
Dept: CARDIOLOGY | Facility: CLINIC | Age: 79
End: 2024-11-21
Payer: MEDICARE

## 2024-11-21 VITALS
HEIGHT: 61 IN | SYSTOLIC BLOOD PRESSURE: 138 MMHG | WEIGHT: 204.38 LBS | HEART RATE: 91 BPM | BODY MASS INDEX: 38.59 KG/M2 | OXYGEN SATURATION: 96 % | DIASTOLIC BLOOD PRESSURE: 78 MMHG

## 2024-11-21 DIAGNOSIS — G20.C PARKINSONISM, UNSPECIFIED PARKINSONISM TYPE: ICD-10-CM

## 2024-11-21 DIAGNOSIS — E78.2 MIXED HYPERLIPIDEMIA: Chronic | ICD-10-CM

## 2024-11-21 DIAGNOSIS — R60.9 CHRONIC EDEMA: ICD-10-CM

## 2024-11-21 DIAGNOSIS — I45.10 RIGHT BUNDLE BRANCH BLOCK: Chronic | ICD-10-CM

## 2024-11-21 DIAGNOSIS — I70.0 AORTIC ATHEROSCLEROSIS: ICD-10-CM

## 2024-11-21 DIAGNOSIS — I71.21 ANEURYSM OF ASCENDING AORTA WITHOUT RUPTURE: Chronic | ICD-10-CM

## 2024-11-21 DIAGNOSIS — I10 ESSENTIAL HYPERTENSION: Primary | Chronic | ICD-10-CM

## 2024-11-21 PROCEDURE — 1159F MED LIST DOCD IN RCRD: CPT | Mod: HCNC,CPTII,S$GLB, | Performed by: INTERNAL MEDICINE

## 2024-11-21 PROCEDURE — 99214 OFFICE O/P EST MOD 30 MIN: CPT | Mod: HCNC,S$GLB,, | Performed by: INTERNAL MEDICINE

## 2024-11-21 PROCEDURE — 1126F AMNT PAIN NOTED NONE PRSNT: CPT | Mod: HCNC,CPTII,S$GLB, | Performed by: INTERNAL MEDICINE

## 2024-11-21 PROCEDURE — 3288F FALL RISK ASSESSMENT DOCD: CPT | Mod: HCNC,CPTII,S$GLB, | Performed by: INTERNAL MEDICINE

## 2024-11-21 PROCEDURE — 3075F SYST BP GE 130 - 139MM HG: CPT | Mod: HCNC,CPTII,S$GLB, | Performed by: INTERNAL MEDICINE

## 2024-11-21 PROCEDURE — 99999 PR PBB SHADOW E&M-EST. PATIENT-LVL V: CPT | Mod: PBBFAC,HCNC,, | Performed by: INTERNAL MEDICINE

## 2024-11-21 PROCEDURE — 1101F PT FALLS ASSESS-DOCD LE1/YR: CPT | Mod: HCNC,CPTII,S$GLB, | Performed by: INTERNAL MEDICINE

## 2024-11-21 PROCEDURE — 3078F DIAST BP <80 MM HG: CPT | Mod: HCNC,CPTII,S$GLB, | Performed by: INTERNAL MEDICINE

## 2024-11-22 ENCOUNTER — TELEPHONE (OUTPATIENT)
Dept: INTERNAL MEDICINE | Facility: CLINIC | Age: 79
End: 2024-11-22
Payer: MEDICARE

## 2024-11-22 DIAGNOSIS — I87.2 CHRONIC VENOUS INSUFFICIENCY OF LOWER EXTREMITY: Primary | ICD-10-CM

## 2024-12-27 ENCOUNTER — PATIENT MESSAGE (OUTPATIENT)
Dept: OPTOMETRY | Facility: CLINIC | Age: 79
End: 2024-12-27
Payer: MEDICARE

## 2025-01-13 DIAGNOSIS — Z00.00 ENCOUNTER FOR MEDICARE ANNUAL WELLNESS EXAM: ICD-10-CM

## 2025-01-15 ENCOUNTER — OFFICE VISIT (OUTPATIENT)
Dept: DERMATOLOGY | Facility: CLINIC | Age: 80
End: 2025-01-15
Payer: MEDICARE

## 2025-01-15 DIAGNOSIS — L72.0 EIC (EPIDERMAL INCLUSION CYST): Primary | ICD-10-CM

## 2025-01-15 DIAGNOSIS — L85.3 XEROSIS CUTIS: ICD-10-CM

## 2025-01-15 DIAGNOSIS — D23.9 DERMATOFIBROMA: ICD-10-CM

## 2025-01-15 DIAGNOSIS — L91.8 SKIN TAG: ICD-10-CM

## 2025-01-15 DIAGNOSIS — L21.9 SEBORRHEIC DERMATITIS: ICD-10-CM

## 2025-01-15 DIAGNOSIS — L82.1 SEBORRHEIC KERATOSES: ICD-10-CM

## 2025-01-15 DIAGNOSIS — D36.10 NEUROFIBROMA: ICD-10-CM

## 2025-01-15 DIAGNOSIS — Z12.83 SCREENING EXAM FOR SKIN CANCER: ICD-10-CM

## 2025-01-15 PROCEDURE — 99999 PR PBB SHADOW E&M-EST. PATIENT-LVL III: CPT | Mod: PBBFAC,HCNC,, | Performed by: STUDENT IN AN ORGANIZED HEALTH CARE EDUCATION/TRAINING PROGRAM

## 2025-01-15 PROCEDURE — 1159F MED LIST DOCD IN RCRD: CPT | Mod: HCNC,CPTII,S$GLB, | Performed by: STUDENT IN AN ORGANIZED HEALTH CARE EDUCATION/TRAINING PROGRAM

## 2025-01-15 PROCEDURE — 3072F LOW RISK FOR RETINOPATHY: CPT | Mod: HCNC,CPTII,S$GLB, | Performed by: STUDENT IN AN ORGANIZED HEALTH CARE EDUCATION/TRAINING PROGRAM

## 2025-01-15 PROCEDURE — 3288F FALL RISK ASSESSMENT DOCD: CPT | Mod: HCNC,CPTII,S$GLB, | Performed by: STUDENT IN AN ORGANIZED HEALTH CARE EDUCATION/TRAINING PROGRAM

## 2025-01-15 PROCEDURE — 1160F RVW MEDS BY RX/DR IN RCRD: CPT | Mod: HCNC,CPTII,S$GLB, | Performed by: STUDENT IN AN ORGANIZED HEALTH CARE EDUCATION/TRAINING PROGRAM

## 2025-01-15 PROCEDURE — 1125F AMNT PAIN NOTED PAIN PRSNT: CPT | Mod: HCNC,CPTII,S$GLB, | Performed by: STUDENT IN AN ORGANIZED HEALTH CARE EDUCATION/TRAINING PROGRAM

## 2025-01-15 PROCEDURE — 99204 OFFICE O/P NEW MOD 45 MIN: CPT | Mod: HCNC,S$GLB,, | Performed by: STUDENT IN AN ORGANIZED HEALTH CARE EDUCATION/TRAINING PROGRAM

## 2025-01-15 PROCEDURE — 1100F PTFALLS ASSESS-DOCD GE2>/YR: CPT | Mod: HCNC,CPTII,S$GLB, | Performed by: STUDENT IN AN ORGANIZED HEALTH CARE EDUCATION/TRAINING PROGRAM

## 2025-01-15 RX ORDER — KETOCONAZOLE 20 MG/G
CREAM TOPICAL
Qty: 60 G | Refills: 11 | Status: SHIPPED | OUTPATIENT
Start: 2025-01-15

## 2025-01-15 RX ORDER — KETOCONAZOLE 20 MG/ML
SHAMPOO, SUSPENSION TOPICAL
Qty: 240 ML | Refills: 10 | Status: SHIPPED | OUTPATIENT
Start: 2025-01-16

## 2025-01-15 RX ORDER — FLUOCINONIDE TOPICAL SOLUTION USP, 0.05% 0.5 MG/ML
SOLUTION TOPICAL
Qty: 60 ML | Refills: 4 | Status: SHIPPED | OUTPATIENT
Start: 2025-01-15

## 2025-01-15 RX ORDER — AMMONIUM LACTATE 12 G/100G
CREAM TOPICAL
Qty: 385 G | Refills: 10 | Status: SHIPPED | OUTPATIENT
Start: 2025-01-15

## 2025-01-15 NOTE — PROGRESS NOTES
Subjective:      Patient ID:  Tito Menard is a 79 y.o. male who presents for   Chief Complaint   Patient presents with    Skin Check     TBSE      Pt is here for TBSE   Pt has flaky skin of the legs and scalp   Pt also has dark marks on the lower legs       Review of Systems   Constitutional:  Negative for fever and chills.   Cardiovascular:  Positive for pedal edema.   Skin:  Negative for itching and rash.   Hematologic/Lymphatic: Does not bruise/bleed easily.       Objective:   Physical Exam   Constitutional: He appears well-developed and well-nourished. No distress.   Neurological: He is alert and oriented to person, place, and time. He is not disoriented.   Psychiatric: He has a normal mood and affect.   Skin:   Areas Examined (abnormalities noted in diagram):   Scalp / Hair Palpated and Inspected  Head / Face Inspection Performed  Neck Inspection Performed  Chest / Axilla Inspection Performed  Abdomen Inspection Performed  Back Inspection Performed  RUE Inspected  LUE Inspection Performed  RLE Inspected  LLE Inspection Performed  Nails and Digits Inspection Performed                 Diagram Legend     Erythematous scaling macule/papule c/w actinic keratosis       Vascular papule c/w angioma      Pigmented verrucoid papule/plaque c/w seborrheic keratosis      Yellow umbilicated papule c/w sebaceous hyperplasia      Irregularly shaped tan macule c/w lentigo     1-2 mm smooth white papules consistent with Milia      Movable subcutaneous cyst with punctum c/w epidermal inclusion cyst      Subcutaneous movable cyst c/w pilar cyst      Firm pink to brown papule c/w dermatofibroma      Pedunculated fleshy papule(s) c/w skin tag(s)      Evenly pigmented macule c/w junctional nevus     Mildly variegated pigmented, slightly irregular-bordered macule c/w mildly atypical nevus      Flesh colored to evenly pigmented papule c/w intradermal nevus       Pink pearly papule/plaque c/w basal cell carcinoma       Erythematous hyperkeratotic cursted plaque c/w SCC      Surgical scar with no sign of skin cancer recurrence      Open and closed comedones      Inflammatory papules and pustules      Verrucoid papule consistent consistent with wart     Erythematous eczematous patches and plaques     Dystrophic onycholytic nail with subungual debris c/w onychomycosis     Umbilicated papule    Erythematous-base heme-crusted tan verrucoid plaque consistent with inflamed seborrheic keratosis     Erythematous Silvery Scaling Plaque c/w Psoriasis     See annotation      Assessment / Plan:      EIC (epidermal inclusion cyst)  Neurofibroma  Dermatofibroma  Seborrheic keratoses  Skin tag  Reassurance given to patient. No treatment is necessary.   Treatment of benign, asymptomatic lesions may be considered cosmetic.    Screening exam for skin cancer  Total body skin examination performed today including at least 12 points as noted in physical examination. No lesions suspicious for malignancy noted.    Recommend daily sun protection/avoidance, use of at least SPF 30, broad spectrum sunscreen (OTC drug), skin self examinations, and routine physician surveillance to optimize early detection    Seborrheic dermatitis--scalp and face  -     fluocinonide (LIDEX) 0.05 % external solution; Apply topically 3 (three) times a week. Use to affected areas for up to 2 weeks then take a 1 week break or decrease to 3 times weekly. Do not apply to groin or face. Use to scalp  Dispense: 60 mL; Refill: 4  -     ketoconazole (NIZORAL) 2 % cream; Apply topically daily for facial redness and scaling.  Dispense: 60 g; Refill: 11  -     ketoconazole (NIZORAL) 2 % shampoo; Apply topically twice a week. Leave on for 5 minutes then wash off. For scalp  Dispense: 240 mL; Refill: 10    Xerosis cutis--arms and legs  -     ammonium lactate 12 % Crea; Apply topically to arms and legs for dry skin  Dispense: 385 g; Refill: 10           F/u prn

## 2025-02-27 ENCOUNTER — TELEPHONE (OUTPATIENT)
Dept: ENDOSCOPY | Facility: HOSPITAL | Age: 80
End: 2025-02-27
Payer: MEDICARE

## 2025-02-27 NOTE — TELEPHONE ENCOUNTER
----- Message -----   From: Catalino Sorto MD   Sent: 2024  12:00 AM CST   To: [facility]   Subject: Colonoscopy Scheduling for 2025                Procedure: Colonoscopy     Diagnosis: Surveillance colonoscopy - Hx of colon polyps     Procedure Timin-12 weeks (~2025)     Provider: Any endoscopist     Location: No Preference     Additional Scheduling Information: No scheduling concerns     Prep Specifications:Standard prep     Is the patient taking a GLP-1 Agonist:no     Have you attached a patient to this message: yes

## 2025-02-27 NOTE — TELEPHONE ENCOUNTER
Called patient to schedule Colonoscopy.  Patient stated he does not want to get another colonoscopy.  Patient stated he will talk to his PCP about it.

## 2025-03-24 ENCOUNTER — LAB VISIT (OUTPATIENT)
Dept: LAB | Facility: HOSPITAL | Age: 80
End: 2025-03-24
Attending: INTERNAL MEDICINE
Payer: MEDICARE

## 2025-03-24 DIAGNOSIS — E11.42 DIABETIC POLYNEUROPATHY ASSOCIATED WITH TYPE 2 DIABETES MELLITUS: ICD-10-CM

## 2025-03-24 LAB
ALBUMIN SERPL BCP-MCNC: 3.5 G/DL (ref 3.5–5.2)
ALP SERPL-CCNC: 92 UNIT/L (ref 40–150)
ALT SERPL W/O P-5'-P-CCNC: 9 UNIT/L (ref 10–44)
ANION GAP (OHS): 7 MMOL/L (ref 8–16)
AST SERPL-CCNC: 24 UNIT/L (ref 11–45)
BILIRUB SERPL-MCNC: 0.5 MG/DL (ref 0.1–1)
BUN SERPL-MCNC: 12 MG/DL (ref 8–23)
CALCIUM SERPL-MCNC: 8.8 MG/DL (ref 8.7–10.5)
CHLORIDE SERPL-SCNC: 108 MMOL/L (ref 95–110)
CHOLEST SERPL-MCNC: 143 MG/DL (ref 120–199)
CHOLEST/HDLC SERPL: 2.8 {RATIO} (ref 2–5)
CO2 SERPL-SCNC: 26 MMOL/L (ref 23–29)
CREAT SERPL-MCNC: 0.8 MG/DL (ref 0.5–1.4)
EAG (OHS): 108 MG/DL (ref 68–131)
GFR SERPLBLD CREATININE-BSD FMLA CKD-EPI: >60 ML/MIN/1.73/M2
GLUCOSE SERPL-MCNC: 84 MG/DL (ref 70–110)
HBA1C MFR BLD: 5.4 % (ref 4–5.6)
HDLC SERPL-MCNC: 51 MG/DL (ref 40–75)
HDLC SERPL: 35.7 % (ref 20–50)
LDLC SERPL CALC-MCNC: 77 MG/DL (ref 63–159)
NONHDLC SERPL-MCNC: 92 MG/DL
POTASSIUM SERPL-SCNC: 3.8 MMOL/L (ref 3.5–5.1)
PROT SERPL-MCNC: 7.3 GM/DL (ref 6–8.4)
SODIUM SERPL-SCNC: 141 MMOL/L (ref 136–145)
TRIGL SERPL-MCNC: 75 MG/DL (ref 30–150)

## 2025-03-24 PROCEDURE — 82435 ASSAY OF BLOOD CHLORIDE: CPT | Mod: HCNC

## 2025-03-24 PROCEDURE — 83718 ASSAY OF LIPOPROTEIN: CPT | Mod: HCNC

## 2025-03-24 PROCEDURE — 83036 HEMOGLOBIN GLYCOSYLATED A1C: CPT | Mod: HCNC

## 2025-03-24 PROCEDURE — 36415 COLL VENOUS BLD VENIPUNCTURE: CPT | Mod: HCNC,PN

## 2025-03-27 ENCOUNTER — OFFICE VISIT (OUTPATIENT)
Dept: INTERNAL MEDICINE | Facility: CLINIC | Age: 80
End: 2025-03-27
Payer: MEDICARE

## 2025-03-27 VITALS
BODY MASS INDEX: 28.31 KG/M2 | DIASTOLIC BLOOD PRESSURE: 70 MMHG | HEIGHT: 71 IN | SYSTOLIC BLOOD PRESSURE: 114 MMHG | HEART RATE: 94 BPM | OXYGEN SATURATION: 97 % | WEIGHT: 202.19 LBS

## 2025-03-27 DIAGNOSIS — D47.2 NEUROPATHY ASSOCIATED WITH MONOCLONAL GAMMOPATHY OF UNKNOWN SIGNIFICANCE (MGUS): ICD-10-CM

## 2025-03-27 DIAGNOSIS — E11.42 DIABETIC POLYNEUROPATHY ASSOCIATED WITH TYPE 2 DIABETES MELLITUS: ICD-10-CM

## 2025-03-27 DIAGNOSIS — G20.C PARKINSONISM, UNSPECIFIED PARKINSONISM TYPE: ICD-10-CM

## 2025-03-27 DIAGNOSIS — J98.4 RESTRICTIVE LUNG DISEASE: ICD-10-CM

## 2025-03-27 DIAGNOSIS — E78.2 MIXED HYPERLIPIDEMIA: Chronic | ICD-10-CM

## 2025-03-27 DIAGNOSIS — G95.9 CERVICAL MYELOPATHY: ICD-10-CM

## 2025-03-27 DIAGNOSIS — I70.0 AORTIC ATHEROSCLEROSIS: ICD-10-CM

## 2025-03-27 DIAGNOSIS — G63 NEUROPATHY ASSOCIATED WITH MONOCLONAL GAMMOPATHY OF UNKNOWN SIGNIFICANCE (MGUS): ICD-10-CM

## 2025-03-27 DIAGNOSIS — L84 CALLUS OF HEEL: ICD-10-CM

## 2025-03-27 DIAGNOSIS — I10 ESSENTIAL HYPERTENSION: Primary | Chronic | ICD-10-CM

## 2025-03-27 PROBLEM — R56.9 SEIZURE: Status: RESOLVED | Noted: 2024-05-29 | Resolved: 2025-03-27

## 2025-03-27 PROCEDURE — 1159F MED LIST DOCD IN RCRD: CPT | Mod: HCNC,CPTII,S$GLB, | Performed by: INTERNAL MEDICINE

## 2025-03-27 PROCEDURE — 3072F LOW RISK FOR RETINOPATHY: CPT | Mod: HCNC,CPTII,S$GLB, | Performed by: INTERNAL MEDICINE

## 2025-03-27 PROCEDURE — 1100F PTFALLS ASSESS-DOCD GE2>/YR: CPT | Mod: HCNC,CPTII,S$GLB, | Performed by: INTERNAL MEDICINE

## 2025-03-27 PROCEDURE — 3288F FALL RISK ASSESSMENT DOCD: CPT | Mod: HCNC,CPTII,S$GLB, | Performed by: INTERNAL MEDICINE

## 2025-03-27 PROCEDURE — 3078F DIAST BP <80 MM HG: CPT | Mod: HCNC,CPTII,S$GLB, | Performed by: INTERNAL MEDICINE

## 2025-03-27 PROCEDURE — 99999 PR PBB SHADOW E&M-EST. PATIENT-LVL V: CPT | Mod: PBBFAC,HCNC,, | Performed by: INTERNAL MEDICINE

## 2025-03-27 PROCEDURE — 3074F SYST BP LT 130 MM HG: CPT | Mod: HCNC,CPTII,S$GLB, | Performed by: INTERNAL MEDICINE

## 2025-03-27 PROCEDURE — 1125F AMNT PAIN NOTED PAIN PRSNT: CPT | Mod: HCNC,CPTII,S$GLB, | Performed by: INTERNAL MEDICINE

## 2025-03-27 PROCEDURE — 99214 OFFICE O/P EST MOD 30 MIN: CPT | Mod: HCNC,S$GLB,, | Performed by: INTERNAL MEDICINE

## 2025-03-27 PROCEDURE — G2211 COMPLEX E/M VISIT ADD ON: HCPCS | Mod: HCNC,S$GLB,, | Performed by: INTERNAL MEDICINE

## 2025-03-27 NOTE — Clinical Note
Hi- looks like mr morin is overdue to see you all and his sinemet ran out. Can you refill and get him in for follow up?  Amanda

## 2025-03-27 NOTE — PROGRESS NOTES
"Subjective:       Patient ID: Tito Menard is a 79 y.o. male.    Chief Complaint: Diabetes     Tito Menard is a 79 y.o.  male who presents for Diabetes  .  Pt has HTN.  Counseled on low salt diet and graded exercise program. Denies CP, SOB, orthopnea or PND.  Currently treated with antihypertensives listed in med card and compliant most of the time. No side effects from medication noted by patient.     Patient has hyperlipidemia. Pt is complaint with risk reduction medication. Denies muscle cramping and weakness. Pt attempts to follow a low cholesterol diet and exercise. No CP, SOB, orthopnea or PND.         History of Present Illness    Mr. Menard presents today for follow up of hypertension    He reports 1-2 non-serious falls since last visit due to loss of balance. He requires walker for all ambulation, including in bedroom and bathroom. He reports Physical therapy was discontinued as no further interventions could be provided for his condition. Hx of spinal stenosis and parkinsonism affecting gait.     He experiences foot pain, particularly on the plantar surface when lying down and at night. The pain disrupts sleep and switching positions causes pain to occur on the contralateral foot. He has calluses that were previously shaved by podiatrist. He notes mild foot swelling, which is less severe than usual. C/o corn on right heel that aches.    His recent health concern is urinary frequency, occurring every 5-10 minutes. He underwent surgery for this issue and reports improvement, stating symptoms have been "going well" since the procedure.    He experiences shortness of breath with activity, specifically when walking quickly through the house or during physical exertion. Previous PFTs showed restrictive lung disease no obstruction. He is kyphotic which may be impacting these results. 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    He has a history of polyps found on multiple colonoscopies and experienced urinary retention requiring hospitalization following his last colonoscopy.  He was recommended to have a repeat scope in 3 years but his does not want to do this due to his difficulty with the prior scope.        ROS:  Cardiovascular: +lower extremity edema  Respiratory: +exertional dyspnea  Genitourinary: +excessive urination  Musculoskeletal: +limb pain, +pain with movement  Neurological: +numbness, +tingling, +balance issues, +falling       Problem List[1]      Past Medical History:   Diagnosis Date    Allergy     Chalazion of left eye     CKD (chronic kidney disease), stage II 04/01/2019    Diabetes mellitus type II     Diabetes with neurologic complications     GERD (gastroesophageal reflux disease)     egd 2008    Jock itch 07/19/2018    MGD (meibomian gland dysfunction)     Osteopenia     Seizure 05/29/2024    Spinal stenosis     Spondylosis without myelopathy 10/23/2015    Vitamin D deficiency disease        Past Surgical History:   Procedure Laterality Date    CHALAZION EXCISION Left 8/18/13    COLONOSCOPY N/A 4/24/2024    Procedure: COLONOSCOPY;  Surgeon: Catalino Sorto MD;  Location: [facility] (4TH FLR);  Service: Endoscopy;  Laterality: N/A;  Ref by Dr DEJAN Brown, PEG, portal - PC  4/18-precall complete-MS    CYST REMOVAL  2013    Back of neck    EXCISION OF HYDROCELE Right 11/14/2023    Procedure: HYDROCELECTOMY;  Surgeon: Beatrice Vaca MD;  Location: [facility] OR 2ND FLR;  Service: Urology;  Laterality: Right;  1 hr    FLEXIBLE CYSTOSCOPY N/A 12/7/2021    Procedure: CYSTOSCOPY, FLEXIBLE;  Surgeon: Beatrice Vaca MD;  Location: [facility] OR 1ST FLR;  Service: Urology;  Laterality: N/A;    FLUOROSCOPIC URODYNAMIC STUDY N/A 12/7/2021    Procedure: URODYNAMIC STUDY, FLUOROSCOPIC;  Surgeon: Beatrice Vaca MD;  [facility];  Service: Urology;  Laterality: N/A;  90 minutes     SPINE  "SURGERY  2007    x2 (2000, 2007)       Family History   Problem Relation Name Age of Onset    Cataracts Mother      Hyperlipidemia Mother      Hypertension Mother      Kidney disease Mother      Cancer Mother      Heart disease Mother      Kidney failure Mother      No Known Problems Sister Sujata     No Known Problems Brother Abhijeet     No Known Problems Brother Alexei     Hypertension Maternal Grandmother      No Known Problems Daughter Muna     No Known Problems Son Tito     No Known Problems Son Srini     Amblyopia Neg Hx      Blindness Neg Hx      Diabetes Neg Hx      Glaucoma Neg Hx      Macular degeneration Neg Hx      Retinal detachment Neg Hx      Strabismus Neg Hx      Stroke Neg Hx      Thyroid disease Neg Hx      Melanoma Neg Hx      Psoriasis Neg Hx      Lupus Neg Hx      Eczema Neg Hx         Social History[2]      Review of Systems      Objective:   Blood pressure 114/70, pulse 94, height 5' 11" (1.803 m), weight 91.7 kg (202 lb 2.6 oz), SpO2 97%.     Physical Exam  Constitutional:       Appearance: Normal appearance. He is well-developed. He is not ill-appearing.   HENT:      Head: Normocephalic and atraumatic.   Eyes:      General: No scleral icterus.     Conjunctiva/sclera: Conjunctivae normal.   Cardiovascular:      Rate and Rhythm: Normal rate.      Heart sounds: Normal heart sounds. No murmur heard.     No friction rub. No gallop.   Pulmonary:      Effort: Pulmonary effort is normal.      Breath sounds: Normal breath sounds. No wheezing or rales.   Chest:      Chest wall: No tenderness.   Musculoskeletal:         General: No tenderness or signs of injury.        Feet:    Feet:      Comments: Thickened skin, no erythema or edema no fissures or exudates  Skin:     General: Skin is warm and dry.   Neurological:      Mental Status: He is alert and oriented to person, place, and time. Mental status is at baseline.   Psychiatric:         Behavior: Behavior normal.         Thought Content: " Thought content normal.       Physical Exam    Vitals: Blood pressure: 114/70.  Extremities: No clubbing in fingers.           Prior labs reviewed    Health Maintenance         Date Due Completion Date    RSV Vaccine (Age 60+ and Pregnant patients) (1 - 1-dose 75+ series) Never done ---    COVID-19 Vaccine (5 - 2024-25 season) 09/01/2024 11/3/2023    Diabetes Urine Screening 09/24/2025 9/24/2024    Hemoglobin A1c 09/24/2025 3/24/2025    Override on 10/23/2015: Done    Diabetic Eye Exam 11/14/2025 11/14/2024    Override on 6/14/2018: Done    Override on 8/5/2015: Done    Lipid Panel 03/24/2026 3/24/2025    Colonoscopy 04/24/2027 4/24/2024    TETANUS VACCINE 03/30/2033 3/30/2023            Assessment/Plan:       1. Essential hypertension  Overview:  Goal BP <120 systolic due to history of mild fusiform dilation of Ascending Aorta, stable since 2014.   Syncopal episode 2-19, goal SBP < 130.  12/20 pt tx with amlodipine 10 mg , cholthalidone stopped and irbesartan increased due to urinary frequency        2. Mixed hyperlipidemia  Well controlled  Cont current BP medication(s) and low salt diet  Hypertension instructions:   1. Please take your BP medication at approximately the same time each day.  2. Do not skip your medication if your blood pressure is within the normal range (near or under 120/80). This means the medication is working and you should continue using it.  3. Foods and beverages high in sodium (salt) can raise your blood pressure so please avoid added salt and read labels to avoid items with high sodium including, but not limited to, canned foods, fast foods and many sodas.   4. Take your blood pressure only when you are calm, seated quietly for over 15 minutes, with your arm supported on a table at the level of your heart. Tensing the muscles in the arm, pain or any excitement (stress, fear, etc...) can cause an elevated blood pressure and is not a true reflection of your resting blood pressure.     3.  Callus of heel  -     Ambulatory referral/consult to Podiatry; Future; Expected date: 04/03/2025  Recommend he discuss his footwear with his podiatrist    4. Aortic atherosclerosis  Cont statin, rec heart healthy diet    5. 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 Restrictive lung disease  -     Complete PFT with bronchodilator; Future  -     CT Chest High Resolution Without Contrast; Future; Expected date: 03/27/2025    7. Diabetic polyneuropathy associated with type 2 diabetes mellitus  Overview:  DM had resolved off meds with lifestlye hanges but has returned to prediabetic levels  Manage as diabetes due to long history of DM increasing risk of complication   Cont diabetic diet       8. Cervical myelopathy    9. Parkinsonism, unspecified Parkinsonism type  Per chart review, in 2023 falls Improved with carbidopa- levadopa  Follow up with  neurology   Cont meds per neuro rec  Message sent to neuro    Assessment & Plan    - HTN well-controlled with current regimen of Norvasc and Irbesartan.  - Diabetes resolved without medication, but continued management due to prolonged history.  - Parkinson's managed by neurology with sinemet- overdue for follow up  - Stable aneurysm of ascending aorta, hypertensive goal <140 due to orthostasis history per cardiology.  - HLD well-controlled with Rosuvastatin.  - Noted callus on foot, not ulcerated; likely due to friction.  - Considered monoclonal gammopathy of undetermined significance as potential cause for peripheral neuropathy.  - Pulmonary symptoms may be related to restrictive lung disease, possibly associated with Parkinson's.    PLAN SUMMARY:  - Refer patient to hematology for evaluation of elevated kappa free light chains and blood test abnormalities  - Recommend prescription diabetic shoes to prevent callus  formation  - Repeat pulmonary function tests to reassess lung condition  - Continue current medication regimen: Norvasc 5 mg and Irbesartan 150 mg  - Consider flexible sigmoidoscopy or Cologuard for future colon cancer screenings due to hx of polyps and advanced age/difficulty with scope  - Follow up with neurologist regarding increased falls  - Follow up with hematologist annually for abnormal test results review  - Follow up with urologist Dr. Vaca annually  - Mr. Menard to use walker whenever possible and cane when walker is not accessible    DIABETIC POLYNEUROPATHY ASSOCIATED WITH TYPE 2 DIABETES MELLITUS:  - Discussed potential causes of peripheral neuropathy with the patient.  - Noted improved hemoglobin A1c (5.4) after lifestyle modifications, no longer in diabetic range.  - Observed a callus on the patient's foot, confirming it is not an ulcer.  - Acknowledged that numbness in feet might be caused by monoclonal gammopathy of undetermined significance rather than diabetes.  - Recommend prescription diabetic shoes to prevent callus formation and suggested referral to hematologist for further evaluation.  - Instructed patient to report any worsening of numbness, tingling, or pain in feet.    PARKINSONISM, UNSPECIFIED PARKINSONISM TYPE:  - Noted patient's diagnosis of Parkinson's and current levodopa treatment.  - Acknowledged recent neurology visit in October for increased falls.  - Emphasized the importance of using a walker whenever possible to prevent falls, and advised using a cane when the walker is not accessible.  - Explained the concept of restrictive lung disease and its potential association with Parkinson's.  - Planned to repeat pulmonary function tests and follow up with neurologist.    OBSTRUCTIVE CHRONIC BRONCHITIS WITHOUT EXACERBATION:  - Mr. Menard reported shortness of breath with exertion, such as walking through the house at a fast pace.  - Reviewed previous pulmonary function tests  showing mild restrictive disease.  - Performed finger exam, noting no clubbing.  - Considered potential causes of restrictive lung disease, including Parkinson's and occupational exposure to dust and chemicals.  - Planned to repeat pulmonary function tests to reassess the patient's lung condition.    ESSENTIAL HYPERTENSION:  - Blood pressure well controlled at 114/70.  - Hypertensive goal of less than 140 due to history of orthostasis per cardiology.  - Mr. Menard followed by digital medicine.  - Continue current medication regimen: Norvasc 5 mg and Irbesartan 150 mg.    PERIPHERAL NEUROPATHY:  - Mr. Menard reports worsening numbness and tingling in feet, particularly bothersome at night.  - Abnormal test result shows elevated kappa free light chain, suggesting possible monoclonal gammopathy of undetermined significance.    UNSTEADINESS AND FALLS:  - Mr. Menard reports losing balance and experiencing one or two falls since last visit.  - Previous physical therapy had limited effectiveness.  - previously improved with senemet- follow up with neurology - message sent to dr lombardo    SHORTNESS OF BREATH:  - Performed physical exam, including lung auscultation.  Nl cards exam, restriction on PFTs likely due to kyphosis  Pulse ox wnl  Repeat pfts,get high resolution ct chest, follow up with pulmonary if any abnormalities  Consider 6 min walk    URINARY ISSUES:  - Mr. Menard reports frequent urination, occurring every 5 to 10 minutes.  - History of urination problems and previous surgery noted.  - Mr. Menard sees urologist Dr. Vaca annually.  - Previous episode of urinary retention following colonoscopy noted.    ABNORMAL BLOOD CHEMISTRY:  - Slight increase in kappa light chains noted.  - Referred patient to hematology for evaluation of elevated kappa free light chains and blood test abnormalities.  - Hematologists to review abnormal test results and follow up annually.    HISTORY OF COLON POLYPS:  - Mr. Menard  has history of colon polyps found during colonoscopies, with bad-looking polyps found during the last procedure.  - Discussed need for follow-up but considered alternative screening methods due to patient's age and previous complications.  - Suggested considering flexible sigmoidoscopy or Cologuard for future screenings.           31 min spent in care of patient including history, physical, chart review, orders and coordination of care.     Visit today included increased complexity associated with the care of the episodic problems and addressed and managing the longitudinal care of the patient due to the serious and/or complex managed problem(s) as above.     Medication List with Changes/Refills   Current Medications    AMLODIPINE (NORVASC) 5 MG TABLET    Take 1 tablet (5 mg total) by mouth once daily.    AMMONIUM LACTATE 12 % CREA    Apply topically to arms and legs for dry skin    ASPIRIN (ECOTRIN) 81 MG EC TABLET    TAKE 1 TABLET EVERY DAY    AZELASTINE (ASTELIN) 137 MCG (0.1 %) NASAL SPRAY    USE 1 SPRAY NASALLY TWICE DAILY    CALCIUM CARBONATE (TUMS ORAL)    Take 1-2 tablets by mouth daily as needed (for acid reflux).    CARBIDOPA-LEVODOPA  MG (SINEMET)  MG PER TABLET    Take 1 tablet by mouth 3 (three) times daily.    CHOLECALCIFEROL, VITAMIN D3, (VITAMIN D3) 50 MCG (2,000 UNIT) CAP    Take 1 capsule by mouth once daily.    CLOTRIMAZOLE-BETAMETHASONE 1-0.05% (LOTRISONE) CREAM    APPLY TOPICALLY 2 (TWO) TIMES DAILY.    DOCUSATE SODIUM (COLACE) 100 MG CAPSULE    Take 1 capsule (100 mg total) by mouth once daily.    FINASTERIDE (PROSCAR) 5 MG TABLET    TAKE 1 TABLET (5 MG TOTAL) BY MOUTH ONCE DAILY.    FLUOCINONIDE (LIDEX) 0.05 % EXTERNAL SOLUTION    Apply topically 3 (three) times a week. Use to affected areas for up to 2 weeks then take a 1 week break or decrease to 3 times weekly. Do not apply to groin or face. Use to scalp    FLUTICASONE PROPIONATE (FLONASE) 50 MCG/ACTUATION NASAL SPRAY    USE 1  SPRAY NASALLY ONE TIME DAILY    GABAPENTIN (NEURONTIN) 300 MG CAPSULE    TAKE 1 CAPSULE DAILY AT NOON AND TAKE 2 CAPSULES EVERY EVENING    IRBESARTAN (AVAPRO) 150 MG TABLET    Take 2 tablets (300 mg total) by mouth every evening.    KETOCONAZOLE (NIZORAL) 2 % CREAM    Apply topically daily for facial redness and scaling.    KETOCONAZOLE (NIZORAL) 2 % SHAMPOO    Apply topically twice a week. Leave on for 5 minutes then wash off. For scalp    LANOLIN/MINERAL OIL/PETROLATUM (ARTIFICIAL TEARS OPHT)    Place 1-2 drops into both eyes daily as needed (for dry eyes).    LIDOCAINE 4 % PTMD    Apply 1 patch topically daily as needed (for back pain).    OMEPRAZOLE (PRILOSEC) 20 MG CAPSULE    TAKE 2 CAPSULES ONE TIME DAILY    PRAVASTATIN (PRAVACHOL) 20 MG TABLET    TAKE 1 TABLET EVERY DAY    TAMSULOSIN (FLOMAX) 0.4 MG CAP    Take 1 capsule (0.4 mg total) by mouth once daily.       Recommend patient complete vaccinations as listed in the overdue health maintenance report. Pt may obtain vaccinations at their local pharmacy, any [facility] pharmacy or request vaccination in our clinic.  Please note that some insurances will only cover vaccination cost at specific locations.Please check with your insurance provider regarding your coverage.  Vaccines that are not covered are still recommended.         minutes spent in care of patient including history, physical, chart review, orders and coordination of care.        This note was generated with the assistance of ambient listening technology. Verbal consent was obtained by the patient and accompanying visitor(s) for the recording of patient appointment to facilitate this note. I attest to having reviewed and edited the generated note for accuracy, though some syntax or spelling errors may persist. Please contact the author of this note for any clarification.             [1]   Patient Active Problem List  Diagnosis    Essential hypertension    Mixed hyperlipidemia    Osteopenia    Spinal  stenosis    Vitamin D deficiency disease    Diabetic polyneuropathy associated with type 2 diabetes mellitus    Spondylosis of cervical joint with myelopathy    Gait disorder    Chronic right-sided low back pain without sciatica    BPH with obstruction/lower urinary tract symptoms    Restrictive lung disease due to kyphoscoliosis    History of diabetes mellitus, type II    Decreased functional mobility    Alteration in performance of activities of daily living    Impaired instrumental activities of daily living (IADL)    Right bundle branch block    Hx of multiple pulmonary nodules    Chronically elevated hemidiaphragm    Pre-syncope    Syncope    Subcortical microvascular ischemic occlusive disease    Lower extremity edema    Class 1 obesity due to excess calories with serious comorbidity and body mass index (BMI) of 31.0 to 31.9 in adult    Thoracic aortic aneurysm without rupture    Epididymo-orchitis    Bacteremia    Hypokalemia    Aortic atherosclerosis    Gastroesophageal reflux disease with esophagitis    Acute neck pain    Poor posture    Leg weakness, bilateral    Hydrocele, right    Diarrhea    Cystitis    Atelectasis    Hypocalcemia    Parkinsonism    Cervical myelopathy    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

## 2025-04-10 ENCOUNTER — HOSPITAL ENCOUNTER (OUTPATIENT)
Dept: PULMONOLOGY | Facility: CLINIC | Age: 80
Discharge: HOME | End: 2025-04-10
Payer: MEDICARE

## 2025-04-10 ENCOUNTER — HOSPITAL ENCOUNTER (OUTPATIENT)
Dept: RADIOLOGY | Facility: HOSPITAL | Age: 80
Discharge: HOME | End: 2025-04-10
Attending: INTERNAL MEDICINE
Payer: MEDICARE

## 2025-04-10 DIAGNOSIS — J98.4 RESTRICTIVE LUNG DISEASE: ICD-10-CM

## 2025-04-10 LAB
DLCO SINGLE BREATH LLN: 19.13
DLCO SINGLE BREATH PRE REF: 45.2 %
DLCO SINGLE BREATH REF: 26.06
DLCOC SBVA LLN: 2.45
DLCOC SBVA REF: 3.55
DLCOC SINGLE BREATH LLN: 19.13
DLCOC SINGLE BREATH REF: 26.06
DLCOCSBVAULN: 4.66
DLCOCSINGLEBREATHULN: 32.98
DLCOSINGLEBREATHULN: 32.98
DLCOSINGLEBREATHZSCORE: -3.39
DLCOVA LLN: 2.45
DLCOVA PRE REF: 100.4 %
DLCOVA PRE: 3.57 ML/(MIN*MMHG*L) (ref 2.45–4.66)
DLCOVA REF: 3.55
DLCOVAULN: 4.66
ERVN2 LLN: -16449.04
ERVN2 PRE REF: 33.1 %
ERVN2 PRE: 0.32 L (ref -16449.04–16450.96)
ERVN2 REF: 0.96
ERVN2ULN: ABNORMAL
FEF 25 75 LLN: 1.05
FEF 25 75 PRE REF: 35.8 %
FEF 25 75 REF: 2.76
FET100 CHG: -0.8 %
FEV05 LLN: 1.43
FEV05 REF: 2.57
FEV1 CHG: -2 %
FEV1 FVC LLN: 61
FEV1 FVC PRE REF: 97.2 %
FEV1 FVC REF: 75
FEV1 LLN: 1.65
FEV1 PRE REF: 60.2 %
FEV1 REF: 2.52
FEV1 VOL CHG: -0.03
FEV1FVCZSCORE: -0.25
FEV1ZSCORE: -1.88
FRCN2 LLN: 2.86
FRCN2 PRE REF: 50.5 %
FRCN2 REF: 3.85
FRCN2ULN: 4.84
FVC CHG: -5.7 %
FVC LLN: 2.37
FVC PRE REF: 61.4 %
FVC REF: 3.39
FVC VOL CHG: -0.12
FVCZSCORE: -2.1
ICN2REF: 3.15
IVC PRE: 2.04 L (ref 2.37–4.44)
IVC SINGLE BREATH LLN: 2.37
IVC SINGLE BREATH PRE REF: 60.1 %
IVC SINGLE BREATH REF: 3.39
IVCSINGLEBREATHULN: 4.44
LLN IC N2: -9999996.85
PEF LLN: 5.12
PEF PRE REF: 62.2 %
PEF REF: 7.85
PHYSICIAN COMMENT: ABNORMAL
POST FEF 25 75: 1.21 L/S (ref 1.05–4.47)
POST FET 100: 6.39 SEC
POST FEV1 FVC: 75.79 % (ref 60.73–88.04)
POST FEV1: 1.49 L (ref 1.65–3.32)
POST FEV5: 1.24 L (ref 1.43–3.7)
POST FVC: 1.97 L (ref 2.37–4.44)
POST PEF: 4.18 L/S (ref 5.12–10.58)
PRE DLCO: 11.77 ML/(MIN*MMHG) (ref 19.13–32.98)
PRE FEF 25 75: 0.99 L/S (ref 1.05–4.47)
PRE FET 100: 6.44 SEC
PRE FEV05 REF: 47.8 %
PRE FEV1 FVC: 72.94 % (ref 60.73–88.04)
PRE FEV1: 1.52 L (ref 1.65–3.32)
PRE FEV5: 1.23 L (ref 1.43–3.7)
PRE FRC N2: 1.94 L (ref 2.86–4.84)
PRE FVC: 2.08 L (ref 2.37–4.44)
PRE IC N2: 1.8 L (ref -9999996.85–#######.####)
PRE PEF: 4.88 L/S (ref 5.12–10.58)
PRE REF IC N2: 57 %
RVN2 LLN: 2.22
RVN2 PRE REF: 56.2 %
RVN2 PRE: 1.63 L (ref 2.22–3.57)
RVN2 REF: 2.89
RVN2TLCN2 LLN: 36.18
RVN2TLCN2 PRE REF: 96.3 %
RVN2TLCN2 PRE: 43.49 % (ref 36.18–54.14)
RVN2TLCN2 REF: 45.16
RVN2TLCN2ULN: 54.14
RVN2ULN: 3.57
TLCN2 LLN: 6.18
TLCN2 PRE REF: 51 %
TLCN2 PRE: 3.74 L (ref 6.18–8.48)
TLCN2 REF: 7.33
TLCN2ULN: 8.48
TLCN2ZSCORE: -5.13
ULN IC N2: ABNORMAL
VA PRE: 3.3 L (ref 7.18–7.18)
VA SINGLE BREATH LLN: 7.18
VA SINGLE BREATH PRE REF: 45.9 %
VA SINGLE BREATH REF: 7.18
VASINGLEBREATHULN: 7.18
VCMAXN2 LLN: 2.37
VCMAXN2 PRE REF: 62.3 %
VCMAXN2 PRE: 2.11 L (ref 2.37–4.44)
VCMAXN2 REF: 3.39
VCMAXN2ULN: 4.44

## 2025-04-10 PROCEDURE — 71250 CT THORAX DX C-: CPT | Mod: 26,HCNC,, | Performed by: RADIOLOGY

## 2025-04-10 PROCEDURE — 71250 CT THORAX DX C-: CPT | Mod: TC,HCNC

## 2025-04-29 DIAGNOSIS — E78.5 HYPERLIPIDEMIA, UNSPECIFIED HYPERLIPIDEMIA TYPE: ICD-10-CM

## 2025-04-29 DIAGNOSIS — E11.42 DIABETIC POLYNEUROPATHY ASSOCIATED WITH TYPE 2 DIABETES MELLITUS: ICD-10-CM

## 2025-04-29 RX ORDER — PRAVASTATIN SODIUM 20 MG/1
20 TABLET ORAL DAILY
Qty: 90 TABLET | Refills: 3 | Status: SHIPPED | OUTPATIENT
Start: 2025-04-29

## 2025-04-29 NOTE — TELEPHONE ENCOUNTER
No care due was identified.  [facility] Embedded Care Due Messages. Reference number: [id number].   4/29/2025 11:26:15 AM CDT

## 2025-04-29 NOTE — TELEPHONE ENCOUNTER
Refill Routing Note   Medication(s) are not appropriate for processing by [facility] for the following reason(s):        Outside of protocol    [facility] action(s):  Route  Approve             Appointments  past 12m or future 3m with PCP    Date Provider   Last Visit   3/27/2025 Amanda Brown MD   Next Visit   7/3/2025 Amanda Brown MD   ED visits in past 90 days: 0        Note composed:12:06 PM 04/29/2025

## 2025-04-29 NOTE — TELEPHONE ENCOUNTER
----- Message from Med Assistant Wilson sent at 4/29/2025 11:16 AM CDT -----  Regarding: Refill Request  Who Called:ASHLEY FISHER [[id number]] New Prescription or Refill : RefillRX Name and Strength:  pravastatin (PRAVACHOL) 20 MG tablet  RX Name and Strength:  gabapentin (NEURONTIN) 300 MG capsule  30 day or 90 day RX: 90 Local or Mail Order : mail  Preferred Pharmacy:[facility] - [facility] [address] Would the patient rather a call back or a response via [facility]? [facility] Call Back Number:  [phone]

## 2025-04-30 ENCOUNTER — TELEPHONE (OUTPATIENT)
Dept: PODIATRY | Facility: CLINIC | Age: 80
End: 2025-04-30
Payer: MEDICARE

## 2025-04-30 RX ORDER — GABAPENTIN 300 MG/1
CAPSULE ORAL
Qty: 270 CAPSULE | Refills: 3 | Status: SHIPPED | OUTPATIENT
Start: 2025-04-30

## 2025-04-30 NOTE — TELEPHONE ENCOUNTER
Left  for pt with callback number of [phone] in regards to provider being out of clinic 4/30. Sent portal message. R/s to first available and mailed appt reminder.

## 2025-05-01 NOTE — TELEPHONE ENCOUNTER
----- Message from Sean sent at 5/1/2025 10:48 AM CDT -----  Type: RX REFILL REQUEST  Who Called: Agnieszka Mcclure)   Refill or New Rx: refill   Rx Name and Strength:tamsulosin (FLOMAX) 0.4 mg Cap, amLODIPine (NORVASC) 5 MG tablet, carbidopa-levodopa  mg (SINEMET)  mg per tablet  Rep is requesting these meds be refilled for the pt. If the providers office needs to reach the pt please reach out to the pts wife. Guillermina Menard. Please Advise   Would the patient rather a call back or a response via [facility]? Call   [facility] Call Back Number:[phone]  Additional Information:  [facility] - [place], [place] - [id number] [facility] [address]Xnahj: [phone] Fax: [phone]

## 2025-05-01 NOTE — TELEPHONE ENCOUNTER
Refill Routing Note   Medication(s) are not appropriate for processing by [facility] for the following reason(s):        No active prescription written by provider  Outside of protocol    [facility] action(s):  Defer  Route               Appointments  past 12m or future 3m with PCP    Date Provider   Last Visit   3/27/2025 Amanda Brown MD   Next Visit   7/3/2025 Amanda Brown MD   ED visits in past 90 days: 0        Note composed:12:47 PM 05/01/2025

## 2025-05-01 NOTE — TELEPHONE ENCOUNTER
No care due was identified.  [facility] Embedded Care Due Messages. Reference number: [id number].   5/01/2025 10:59:28 AM CDT

## 2025-05-07 ENCOUNTER — TELEPHONE (OUTPATIENT)
Dept: INTERNAL MEDICINE | Facility: CLINIC | Age: 80
End: 2025-05-07
Payer: MEDICARE

## 2025-05-07 DIAGNOSIS — R26.9 GAIT DISORDER: Primary | ICD-10-CM

## 2025-05-07 DIAGNOSIS — R26.89 DECREASED FUNCTIONAL MOBILITY: ICD-10-CM

## 2025-05-07 NOTE — TELEPHONE ENCOUNTER
----- Message from Miesha sent at 5/7/2025  4:41 PM CDT -----  Regarding: Shower chair  Patient is following up on the need of shower chair with getting referral. Please call [phone]. Thank you!

## 2025-05-08 RX ORDER — TAMSULOSIN HYDROCHLORIDE 0.4 MG/1
0.4 CAPSULE ORAL DAILY
Qty: 90 CAPSULE | Refills: 3 | Status: SHIPPED | OUTPATIENT
Start: 2025-05-08

## 2025-05-08 RX ORDER — AMLODIPINE BESYLATE 5 MG/1
5 TABLET ORAL DAILY
Qty: 90 TABLET | Refills: 3 | Status: SHIPPED | OUTPATIENT
Start: 2025-05-08 | End: 2026-05-08

## 2025-05-08 RX ORDER — CARBIDOPA AND LEVODOPA 25; 100 MG/1; MG/1
1 TABLET ORAL 3 TIMES DAILY
Qty: 90 TABLET | Refills: 5 | Status: SHIPPED | OUTPATIENT
Start: 2025-05-08 | End: 2025-11-04

## 2025-05-12 RX ORDER — CARBIDOPA AND LEVODOPA 25; 100 MG/1; MG/1
1 TABLET ORAL 3 TIMES DAILY
Qty: 90 TABLET | Refills: 5 | Status: SHIPPED | OUTPATIENT
Start: 2025-05-12 | End: 2025-11-08

## 2025-05-16 ENCOUNTER — PATIENT MESSAGE (OUTPATIENT)
Facility: CLINIC | Age: 80
End: 2025-05-16
Payer: MEDICARE

## 2025-05-24 DIAGNOSIS — J30.2 SEASONAL ALLERGIC RHINITIS, UNSPECIFIED TRIGGER: ICD-10-CM

## 2025-05-24 DIAGNOSIS — N13.8 BENIGN PROSTATIC HYPERPLASIA WITH URINARY OBSTRUCTION: ICD-10-CM

## 2025-05-24 DIAGNOSIS — N40.1 BENIGN PROSTATIC HYPERPLASIA WITH URINARY OBSTRUCTION: ICD-10-CM

## 2025-05-24 RX ORDER — AZELASTINE 1 MG/ML
1 SPRAY, METERED NASAL 2 TIMES DAILY
Qty: 90 ML | Refills: 3 | Status: SHIPPED | OUTPATIENT
Start: 2025-05-24

## 2025-05-24 NOTE — TELEPHONE ENCOUNTER
No care due was identified.  [facility] Embedded Care Due Messages. Reference number: [id number].   5/24/2025 2:04:14 AM CDT

## 2025-05-26 RX ORDER — FINASTERIDE 5 MG/1
5 TABLET, FILM COATED ORAL
Qty: 90 TABLET | Refills: 0 | Status: SHIPPED | OUTPATIENT
Start: 2025-05-26

## 2025-05-27 ENCOUNTER — OFFICE VISIT (OUTPATIENT)
Dept: PODIATRY | Facility: CLINIC | Age: 80
End: 2025-05-27
Payer: MEDICARE

## 2025-05-27 VITALS
BODY MASS INDEX: 28.37 KG/M2 | DIASTOLIC BLOOD PRESSURE: 68 MMHG | SYSTOLIC BLOOD PRESSURE: 121 MMHG | HEIGHT: 71 IN | HEART RATE: 93 BPM | WEIGHT: 202.63 LBS

## 2025-05-27 DIAGNOSIS — L84 CALLUS OF HEEL: ICD-10-CM

## 2025-05-27 DIAGNOSIS — B35.1 ONYCHOMYCOSIS DUE TO DERMATOPHYTE: ICD-10-CM

## 2025-05-27 DIAGNOSIS — E11.42 DIABETIC POLYNEUROPATHY ASSOCIATED WITH TYPE 2 DIABETES MELLITUS: Primary | ICD-10-CM

## 2025-05-27 PROCEDURE — 99999 PR PBB SHADOW E&M-EST. PATIENT-LVL IV: CPT | Mod: PBBFAC,HCNC,, | Performed by: PODIATRIST

## 2025-05-27 NOTE — PROGRESS NOTES
Subjective:      Patient ID: Tito Menard is a 79 y.o. male.    Chief Complaint: Diabetic Foot Exam (3/27/25 - Amanda Brown MD, PCP) and Foot Pain (B/L, at night, keeping pt awake)    Tito is a 79 y.o. male who presents to the clinic for evaluation and treatment of high risk feet. Tito has a past medical history of Allergy, Chalazion of left eye, CKD (chronic kidney disease), stage II (04/01/2019), Diabetes mellitus type II, Diabetes with neurologic complications, GERD (gastroesophageal reflux disease), Jock itch (07/19/2018), MGD (meibomian gland dysfunction), Osteopenia, Seizure (05/29/2024), Spinal stenosis, Spondylosis without myelopathy (10/23/2015), and Vitamin D deficiency disease. The patient's chief complaint is long, thick toenails. This patient has documented high risk feet requiring routine maintenance secondary to peripheral neuropathy.    PCP: Amanda Brown MD    Date Last Seen by PCP:   Chief Complaint   Patient presents with    Diabetic Foot Exam     3/27/25 - Amanda Brown MD, PCP    Foot Pain     B/L, at night, keeping pt awake         Current shoe gear:  Affected Foot: Tennis shoes     Unaffected Foot: Tennis shoes    Hemoglobin A1C   Date Value Ref Range Status   05/22/2024 5.6 4.0 - 5.6 % Final     Comment:     ADA Screening Guidelines:  5.7-6.4%  Consistent with prediabetes  >or=6.5%  Consistent with diabetes    High levels of fetal hemoglobin interfere with the HbA1C  assay. Heterozygous hemoglobin variants (HbS, HgC, etc)do  not significantly interfere with this assay.   However, presence of multiple variants may affect accuracy.     05/01/2024 5.7 (H) 4.0 - 5.6 % Final     Comment:     ADA Screening Guidelines:  5.7-6.4%  Consistent with prediabetes  >or=6.5%  Consistent with diabetes    High levels of fetal hemoglobin interfere with the HbA1C  assay. Heterozygous hemoglobin variants (HbS, HgC, etc)do  not significantly interfere with this assay.   However, presence  of multiple variants may affect accuracy.     12/06/2023 5.6 4.0 - 5.6 % Final     Comment:     ADA Screening Guidelines:  5.7-6.4%  Consistent with prediabetes  >or=6.5%  Consistent with diabetes    High levels of fetal hemoglobin interfere with the HbA1C  assay. Heterozygous hemoglobin variants (HbS, HgC, etc)do  not significantly interfere with this assay.   However, presence of multiple variants may affect accuracy.       Hemoglobin A1c   Date Value Ref Range Status   03/24/2025 5.4 4.0 - 5.6 % Final     Comment:     ADA Screening Guidelines:  5.7-6.4%  Consistent with prediabetes  >=6.5%  Consistent with diabetes    High levels of fetal hemoglobin interfere with the HbA1C  assay. Heterozygous hemoglobin variants (HbS, HgC, etc)do  not significantly interfere with this assay.   However, presence of multiple variants may affect accuracy.       Review of Systems   Constitutional: Negative for chills, fever and malaise/fatigue.   HENT:  Negative for hearing loss.    Cardiovascular:  Negative for claudication.   Respiratory:  Negative for shortness of breath.    Skin:  Positive for color change, dry skin and nail changes. Negative for flushing and rash.   Musculoskeletal:  Negative for joint pain and myalgias.   Neurological:  Positive for numbness and paresthesias. Negative for loss of balance and sensory change.   Psychiatric/Behavioral:  Negative for altered mental status.            Objective:      Physical Exam  Vitals reviewed.   Cardiovascular:      Pulses:           Dorsalis pedis pulses are 2+ on the right side and 2+ on the left side.        Posterior tibial pulses are 2+ on the right side and 2+ on the left side.      Comments: No edema noted b/L  Musculoskeletal:         General: Normal range of motion.      Comments:        Feet:      Right foot:      Protective Sensation: 5 sites tested.  5 sites sensed.      Left foot:      Protective Sensation: 5 sites tested.  5 sites sensed.   Skin:     General:  Skin is warm.      Capillary Refill: Capillary refill takes 2 to 3 seconds.      Comments: Normal skin tugor noted.   No open lesion noted b/L  Skin temp is warm to warm from proximal to distal b/L.  Webspaces clean, dry, and intact     Neurological:      Mental Status: He is alert.      Comments: Gross sensation intact b/L         Nails x9 are elongated by  5-9mm's, thickened by 2-3 mm's, dystrophic, and are yellowish in  coloration . Xerosis Bilaterally. No open lesions noted.   Right hallux nail absent    HKL noted to left heel        Assessment:       Encounter Diagnoses   Name Primary?    Callus of heel     Diabetic polyneuropathy associated with type 2 diabetes mellitus Yes    Onychomycosis due to dermatophyte          Plan:       Tito was seen today for diabetic foot exam and foot pain.    Diagnoses and all orders for this visit:    Diabetic polyneuropathy associated with type 2 diabetes mellitus    Callus of heel  -     Ambulatory referral/consult to Podiatry    Onychomycosis due to dermatophyte      I counseled the patient on his conditions, their implications and medical management.        - Shoe inspection. Diabetic Foot Education. Patient reminded of the importance of good nutrition and blood sugar control to help prevent podiatric complications of diabetes. Patient instructed on proper foot hygeine. We discussed wearing proper shoe gear, daily foot inspections, never walking without protective shoe gear, never putting sharp instruments to feet, routine podiatric nail visits every 2-3 months.    After cleansing with an alcohol prep pad, the about mentioned hyperkeratotic lesions were sharply debrided X 1 utilizing a #15 blade to a smooth base without incident. Pt tolerated the procedure well and reported comfort to the debarment sites. Pt will continue to use padding and moisture the callused areas.    - With patient's permission, nails were aggressively reduced and debrided x 9 to their soft tissue  attachment mechanically and with electric , removing all offending nail and debris. Patient relates relief following the procedure. He will continue to monitor the areas daily, inspect his feet, wear protective shoe gear when ambulatory, moisturizer to maintain skin integrity and follow in this office in approximately 2-3 months, sooner p.r.n.

## 2025-05-30 PROBLEM — I87.2 EDEMA OF BOTH LOWER EXTREMITIES DUE TO PERIPHERAL VENOUS INSUFFICIENCY: Status: ACTIVE | Noted: 2025-05-30

## 2025-05-30 NOTE — PROGRESS NOTES
General Cardiology Clinic Note  Last Clinic Visit: 11/21/24 with Dr. Driscoll   General Cardiologist: Dr. Driscoll    HPI:     Tito Menard is a 79 y.o. male who presents for f/u of HTN.    PROBLEM LIST:  HTN  HLD  Enlarged aorta (CT 2025 - mid ascending aorta 4.3cm)  Hx of orthostasis  Hx of DM2 (now controlled off meds)  Venous insufficiency  CKD 2  PFTs with restrictive lung disease      Interval HPI:   He presents today for routine follow up. From a cardiac standpoint, he reports doing fine. Symptoms are overall stable. He recently had updated PFTs with pulm, which again showed restrictive lung disease. He has chronic, mild LE edema which fluctuates and is stable. Still with intermittent orthostasis and balance issues deemed 2/2 Parkinsonism. He is still able to ambulate with his walker and can take his time doing yard work such as cutting hedges and weed trimming. He denies chest pain/discomfort, new/worsening ZUNIGA, sustained palpitations, PND/orthopnea, syncope, or changes in exertional capacity. Weight is stable. He is active in the digital HTN program, and BP at home fluctuates between 120s-140s SBP. Last FLP done on 3/24/25: LDL - 77. He is on pravastatin 20mg. Kidney function is stable, last Cr 0.8.       11/2024 HPI (Dr. Driscoll)  Tito Menard is a 78 y.o. M, who presents for edema.  He was last seen 7/7/23.  Echos showed E/e' < 8 consistently over the years.  All BNP checks < 10. Most recent in 2023.  Jardiance stopped as believed to not have CHF.  He monitors BP at home. -140 systolic.  Hydrates well. Clear urine. Darkens overnight.  No orthopnea. Mild stable bilateral LE edema.  No weight gain. Down over past few years.    7/2023 HPI (Dr. Beauchamp)  Tito Menard is a 77 y.o. y.o. male who presents for inpatient discharge referral for syncope.  Patient reports that he had a syncopal episode while sitting. He was post-ictal with slurred speech and and confused upon ED assessment. EEG  done but no seizures during episode. CTA head and neck negative for LVO. Patient has a longstanding history of syncope that does not appear to have any cardiac cause. Multiple holter monitors with no arrhythmias, Afib, tachy-christofer syndrome. Echo with normal EF. Negative stress test. He is enrolled in digital HTN for HTN management.   PFTs with restrictive lung disease.   The patient is not known to have coexisting coronary artery disease.    6/2023 HPI (Dr. Dawson)  Patient reports that he had an episode of dizziness that led to a syncopal episode. After recovery from LOC witnessed by wife, patient reports that he had slurred speech and was taken to the hospital. Went to the ED 12/31/2022 and admitted 01/01/2023 for a 1 day hospital stay. He was discharged after negative CT scan and CT C/A/P that showed improvement of ascending aorta size to 3.5 cm. Patient has a host of other complaints, including nerve pain going down the right leg as well as indigestion that improved with an increased dose of Prilosec, inability to afford PT due to high costs. He is enrolled in digital HTN for HTN management.   PFTs with restrictive lung disease.     11/2022 HPI (Dr. Driscoll)  Tito Menard is a 76 y.o. M, who presents for chest pain.  He reports that he has central chest discomfort for the past two weeks after taking pills or eating.  Happening almost daily. Most foods cause this issue. No issue with liquids.  Last occurrence on 10/31, symptoms lasted all day.  Last EGD in 2013.  He feels short of breath with exertion. No chest pain with exertion.  His back limits exercise. Had two prior surgeries.  He walks in his driveway, but he is not very active.  He had a stress echo in 7/2021 for the same symptoms. Stress was normal.   PFTs 2018 with restrictive lung disease.    7/2021 HPI (Dr. Radha Thomson)  He has a history of DM2 (resolved, off medication), HTN, HLD, chronic LBP, and mild restrictive lung disease 2/2 kyphoscoliosis  " In the past year he has had 2 episodes of syncope in the past.  The patient went to the [facility] for syncope upon standing, was found to have positive orthostatics, and was discharged with f/u with his PCP. He had a carotid US that was negative and an ECHO that was read as EF 50-55%, mild AI, trace MR, and normal diastolic function. The patient says his BP runs in the 130s at home. He had a second episode where he became confused and spoke "gibberish". CT/MRI head negative for acute intracranial process. He was given a 30 day event monitor which he is currently wearing.  He has been having worsening dyspnea on exertion and lower extremity edema. The patient denies orthopnea, CP, palpitations and smoking. He uses one pillow without PND. His ZUNIGA was thought to be 2/2 deconditioning 8/2019 in cardiology clinic. He has been taking chlorthalidone 12.5mg (which was recently decreased from 25mg) due to urinary incontinence. He does occasionally wear compression stockings. Using foot bike.  Walking with walker.        Surgical: Reviewed, as below.  Family: Reviewed, as below.   Social: Reviewed, as below.    ROS:    Pertinent ROS included in HPI and below.  PMH:     Past Medical History:   Diagnosis Date    Allergy     Chalazion of left eye     CKD (chronic kidney disease), stage II 04/01/2019    Diabetes mellitus type II     Diabetes with neurologic complications     GERD (gastroesophageal reflux disease)     egd 2008    Jock itch 07/19/2018    MGD (meibomian gland dysfunction)     Osteopenia     Seizure 05/29/2024    Spinal stenosis     Spondylosis without myelopathy 10/23/2015    Vitamin D deficiency disease      Past Surgical History:   Procedure Laterality Date    CHALAZION EXCISION Left 8/18/13    COLONOSCOPY N/A 4/24/2024    Procedure: COLONOSCOPY;  Surgeon: Catailno Sorto MD;  Location: [facility] ([address] [place] [address]);  Service: Endoscopy;  Laterality: N/A;  Ref by Dr DEJAN Brown, ANITHA, portal - PC  4/18-precall complete-MS    " "CYST REMOVAL  2013    Back of neck    EXCISION OF HYDROCELE Right 11/14/2023    Procedure: HYDROCELECTOMY;  Surgeon: Beatrice Vaca MD;  Location: [facility] OR 2ND FLR;  Service: Urology;  Laterality: Right;  1 hr    FLEXIBLE CYSTOSCOPY N/A 12/7/2021    Procedure: CYSTOSCOPY, FLEXIBLE;  Surgeon: Beatrice Vaca MD;  Location: [facility] OR 1ST FLR;  Service: Urology;  Laterality: N/A;    FLUOROSCOPIC URODYNAMIC STUDY N/A 12/7/2021    Procedure: URODYNAMIC STUDY, FLUOROSCOPIC;  Surgeon: Beatrice Vaca MD;  [facility];  Service: Urology;  Laterality: N/A;  90 minutes     SPINE SURGERY  2007    x2 (2000, 2007)     Allergies:   Review of patient's allergies indicates:  No Known Allergies  Medications:   Medications Ordered Prior to Encounter[1]  Social History:     Social History     Tobacco Use    Smoking status: Never    Smokeless tobacco: Former     Types: Chew    Tobacco comments:     Chewed tobacco once per day for 4-5 years when a    Substance Use Topics    Alcohol use: No     Family History:     Family History   Problem Relation Name Age of Onset    Cataracts Mother      Hyperlipidemia Mother      Hypertension Mother      Kidney disease Mother      Cancer Mother      Heart disease Mother      Kidney failure Mother      No Known Problems Sister Sujata     No Known Problems Brother Abhijeet     No Known Problems Brother Alexei     Hypertension Maternal Grandmother      No Known Problems Daughter Muna     No Known Problems Son Tito     No Known Problems Son Srini     Amblyopia Neg Hx      Blindness Neg Hx      Diabetes Neg Hx      Glaucoma Neg Hx      Macular degeneration Neg Hx      Retinal detachment Neg Hx      Strabismus Neg Hx      Stroke Neg Hx      Thyroid disease Neg Hx      Melanoma Neg Hx      Psoriasis Neg Hx      Lupus Neg Hx      Eczema Neg Hx       Physical Exam:   BP (!) 149/83   Pulse 74   Ht 5' 11" (1.803 m)   Wt 93 kg (205 lb 0.4 oz)   SpO2 99%   BMI 28.60 kg/m²  "     Physical Exam  Constitutional:       Appearance: Normal appearance.   Neck:      Vascular: No carotid bruit.   Cardiovascular:      Rate and Rhythm: Normal rate and regular rhythm.      Pulses:           Carotid pulses are 2+ on the right side and 2+ on the left side.       Radial pulses are 2+ on the right side and 2+ on the left side.      Heart sounds: Normal heart sounds. No murmur heard.  Pulmonary:      Effort: Pulmonary effort is normal.      Breath sounds: Normal breath sounds.   Musculoskeletal:      Right lower leg: Edema present.      Left lower leg: Edema present.      Comments: 1+ pitting edema to bilateral ankles   Skin:     General: Skin is warm and dry.   Neurological:      Mental Status: He is alert and oriented to person, place, and time.          Labs:     Blood Tests:  Lab Results   Component Value Date    BNP <10 03/30/2023     03/24/2025     05/28/2024    K 3.8 03/24/2025    K 3.7 05/28/2024     03/24/2025     05/28/2024    CO2 26 03/24/2025    CO2 26 05/28/2024    BUN 12 03/24/2025    CREATININE 0.8 03/24/2025    CREATININE 0.9 07/09/2022    GLU 84 03/24/2025    GLU 93 05/28/2024     07/09/2022    HGBA1C 5.4 03/24/2025    HGBA1C 5.6 05/22/2024    MG 1.7 05/03/2024    AST 24 03/24/2025    AST 17 05/28/2024    ALT 9 (L) 03/24/2025    ALT 11 05/28/2024    ALBUMIN 3.5 03/24/2025    ALBUMIN 3.3 (L) 05/28/2024    PROT 7.3 03/24/2025    PROT 6.9 05/28/2024    BILITOT 0.5 03/24/2025    BILITOT 0.5 05/28/2024    WBC 4.22 05/22/2024    HGB 11.7 (L) 05/22/2024    HCT 37.9 (L) 05/22/2024    HCT 35 (L) 04/24/2024    MCV 95 05/22/2024     05/22/2024    INR 1.0 04/30/2024    TSH 0.661 06/20/2023       Lab Results   Component Value Date    CHOL 143 03/24/2025    CHOL 150 05/22/2024    HDL 51 03/24/2025    TRIG 75 03/24/2025    TRIG 85 05/22/2024       Lab Results   Component Value Date    LDLCALC 77.0 03/24/2025       Lab Results   Component Value Date    TSH 0.661  06/20/2023       Lab Results   Component Value Date    HGBA1C 5.4 03/24/2025         Imaging:     Echocardiogram  TTE 4/2024    Left Ventricle: The left ventricle is normal in size. Ventricular mass is normal. Mildly increased wall thickness. Mild septal thickening. There is concentric remodeling. There is normal systolic function with a visually estimated ejection fraction of 60 - 65%. There is normal diastolic function.    Right Ventricle: Normal right ventricular cavity size. Wall thickness is normal. Systolic function is normal.    Aortic Valve: The aortic valve is a trileaflet valve. There is moderate aortic valve sclerosis. There is mild annular calcification present. There is trace aortic regurgitation.    Mitral Valve: There is mild bileaflet sclerosis.    Pulmonic Valve: There is mild regurgitation.    Pulmonary Artery: No pulmonary hypertension.    IVC/SVC: Normal venous pressure at 3 mmHg.    TTE 6/2023  The left ventricle is normal in size with normal systolic function.  The estimated ejection fraction is 55%.  Normal left ventricular diastolic function.  Mild aortic regurgitation.  Mild right ventricular enlargement with moderately reduced right ventricular systolic function.  The estimated PA systolic pressure is 24 mmHg. Faint TR, the PASP may be under-estimated.  Normal central venous pressure (3 mmHg).  The ascending aorta is mildly dilated.    TTE 1/2023  The left ventricle is normal in size with normal systolic function.  The estimated ejection fraction is 55%.  Grade I left ventricular diastolic dysfunction.  Mild left atrial enlargement.  The ascending aorta is mildly dilated.  Mild aortic regurgitation.  Mild right ventricular enlargement with low normal right ventricular systolic function.  There is abnormal septal wall motion.  The quantitatively derived ejection fraction is 57%.  Mild tricuspid regurgitation.  Intermediate central venous pressure (8 mmHg).  The estimated PA systolic pressure  is 44 mmHg.  There is pulmonary hypertension.    TTE 2021  The left ventricle is normal in size with normal systolic function. The estimated ejection fraction is 70%  Grade I left ventricular diastolic dysfunction.  Normal right ventricular size with normal right ventricular systolic function.    Stress testing  DSE 2021  The ECG portion of this study is negative for myocardial ischemia.  The stress echo portion of this study is negative for myocardial ischemia.  The patient reached the end of the protocol.  During stress, the following significant arrhythmias were observed: rare PACs, occasional PVCs.  The estimated ejection fraction is 60%.  The left ventricle is normal in size with normal systolic function.  Normal left ventricular diastolic function.  Normal right ventricular size with normal right ventricular systolic function.  Mild pulmonic regurgitation.  Mild tricuspid regurgitation.  The estimated PA systolic pressure is 28 mmHg.  Normal central venous pressure (3 mmHg).  The ascending aorta is mildly dilated at 4.1 cm.    DSE 2018    1 - Normal left ventricular systolic function (EF 60-65%).     2 - Normal left ventricular diastolic function.     3 - Normal right ventricular systolic function .     4 - No wall motion abnormalities.     5 - Upper limit of normal ascending aorta.   No evidence of stress induced myocardial ischemia.     DSE 2014    1 - Normal left ventricular systolic function (EF 55-60%).     2 - Normal right ventricular systolic function .     3 - Normal left ventricular diastolic function.     4 - Mildly enlarged ascending aorta.   No evidence of stress induced myocardial ischemia.     Cath Lab  None    Other  Chest CT 2025  Aorta: The ascending aorta measures up to 4.3 cm, unchanged going back at least as far as 2022.     Chest CT 2022  Aortic diameter as follows-  Annulus: Not well seen  Sinuses of Valsalva: 4.1 cm (602-174)  Mid ascendin.1 cm (601-74)  Mid arch  "3.1 cm (602-187)  Isthmus: 3.1 cm (602-194)  Mid descendin.4 cm (602-198)    Cardiac Event Monitor 2021  Negative event monitor with no clinical arrhythmias.  Symptoms corresponding with normal sinus rhythm.    EK24 - Normal sinus rhythm, 99 BPM   Right bundle branch block   Abnormal ECG     Assessment:     1. Essential hypertension    2. Mixed hyperlipidemia    3. Aortic atherosclerosis    4. Aneurysm of ascending aorta without rupture    5. Edema of both lower extremities due to peripheral venous insufficiency    6. Restrictive lung disease due to kyphoscoliosis        Plan:     Essential hypertension  BP overall well-controlled. At times borderline, but goal is <140 due to prior orthostasis.  Continue current medication regimen. Encourage low-sodium diet.     Mixed hyperlipidemia  Aortic atherosclerosis  LDL at goal. Continue statin therapy.     Aneurysm of ascending aorta without rupture  Per chest CT 2025: "The ascending aorta measures up to 4.3 cm, unchanged going back at least as far as 2022."  4.1cm on echo in 2024.  Can monitor annually via echo.    Edema of both lower extremities due to peripheral venous insufficiency  Recommend limiting dietary sodium to < 2 g per day, elevate legs when possible, and utilize compression stockings during the day as tolerable, especially when traveling.    Restrictive lung disease due to kyphoscoliosis  Stable. Recently had updated PFTs again showing restrictive disease.        Signed:  Saskia Gil PA-C  [facility]     2025     Follow-up:     Future Appointments   Date Time Provider Department Center   2025  1:45 PM ECHO, [facility] ECHOSTR [facility]   7/3/2025  9:30 AM Amanda Brown MD [facility] IM [facility] PCW              [1]   Current Outpatient Medications on File Prior to Visit   Medication Sig Dispense Refill    amLODIPine (NORVASC) 5 MG tablet Take 1 tablet (5 mg total) by mouth once daily. 90 tablet 3    ammonium " lactate 12 % Crea Apply topically to arms and legs for dry skin 385 g 10    aspirin (ECOTRIN) 81 MG EC tablet TAKE 1 TABLET EVERY DAY 90 tablet 3    azelastine (ASTELIN) 137 mcg (0.1 %) nasal spray USE 1 SPRAY NASALLY TWICE DAILY 90 mL 3    calcium carbonate (TUMS ORAL) Take 1-2 tablets by mouth daily as needed (for acid reflux).      carbidopa-levodopa  mg (SINEMET)  mg per tablet Take 1 tablet by mouth 3 (three) times daily. 90 tablet 5    cholecalciferol, vitamin D3, (VITAMIN D3) 50 mcg (2,000 unit) Cap Take 1 capsule by mouth once daily.      clotrimazole-betamethasone 1-0.05% (LOTRISONE) cream APPLY TOPICALLY 2 (TWO) TIMES DAILY. 45 g 0    docusate sodium (COLACE) 100 MG capsule Take 1 capsule (100 mg total) by mouth once daily. 30 capsule 11    finasteride (PROSCAR) 5 mg tablet TAKE 1 TABLET ONE TIME DAILY 90 tablet 0    fluocinonide (LIDEX) 0.05 % external solution Apply topically 3 (three) times a week. Use to affected areas for up to 2 weeks then take a 1 week break or decrease to 3 times weekly. Do not apply to groin or face. Use to scalp 60 mL 4    fluticasone propionate (FLONASE) 50 mcg/actuation nasal spray USE 1 SPRAY NASALLY ONE TIME DAILY 32 g 3    gabapentin (NEURONTIN) 300 MG capsule TAKE 1 CAPSULE DAILY AT NOON AND TAKE 2 CAPSULES EVERY EVENING 270 capsule 3    ketoconazole (NIZORAL) 2 % cream Apply topically daily for facial redness and scaling. 60 g 11    ketoconazole (NIZORAL) 2 % shampoo Apply topically twice a week. Leave on for 5 minutes then wash off. For scalp 240 mL 10    lanolin/mineral oil/petrolatum (ARTIFICIAL TEARS OPHT) Place 1-2 drops into both eyes daily as needed (for dry eyes).      LIDOcaine 4 % PtMd Apply 1 patch topically daily as needed (for back pain).      omeprazole (PRILOSEC) 20 MG capsule Take 2 capsules (40 mg total) by mouth once daily. 180 capsule 3    pravastatin (PRAVACHOL) 20 MG tablet Take 1 tablet (20 mg total) by mouth once daily. 90 tablet 3     tamsulosin (FLOMAX) 0.4 mg Cap Take 1 capsule (0.4 mg total) by mouth once daily. 90 capsule 3    irbesartan (AVAPRO) 150 MG tablet Take 2 tablets (300 mg total) by mouth every evening.       No current facility-administered medications on file prior to visit.

## 2025-06-08 DIAGNOSIS — K21.00 GASTROESOPHAGEAL REFLUX DISEASE WITH ESOPHAGITIS: ICD-10-CM

## 2025-06-08 RX ORDER — OMEPRAZOLE 20 MG/1
40 CAPSULE, DELAYED RELEASE ORAL DAILY
Qty: 180 CAPSULE | Refills: 3 | Status: SHIPPED | OUTPATIENT
Start: 2025-06-08

## 2025-06-08 NOTE — TELEPHONE ENCOUNTER
No care due was identified.  [facility] Embedded Care Due Messages. Reference number: [id number].   6/08/2025 1:00:38 PM CDT

## 2025-06-09 NOTE — TELEPHONE ENCOUNTER
Refill Decision Note   Tito Menard  is requesting a refill authorization.  Brief Assessment and Rationale for Refill:  Approve     Medication Therapy Plan:         Comments:     Note composed:11:49 PM 06/08/2025

## 2025-06-12 ENCOUNTER — OFFICE VISIT (OUTPATIENT)
Dept: HEMATOLOGY/ONCOLOGY | Facility: CLINIC | Age: 80
End: 2025-06-12
Payer: MEDICARE

## 2025-06-12 VITALS
HEART RATE: 76 BPM | BODY MASS INDEX: 28.43 KG/M2 | WEIGHT: 203.06 LBS | TEMPERATURE: 98 F | HEIGHT: 71 IN | OXYGEN SATURATION: 97 % | SYSTOLIC BLOOD PRESSURE: 144 MMHG | DIASTOLIC BLOOD PRESSURE: 82 MMHG

## 2025-06-12 DIAGNOSIS — D47.2 NEUROPATHY ASSOCIATED WITH MONOCLONAL GAMMOPATHY OF UNKNOWN SIGNIFICANCE (MGUS): ICD-10-CM

## 2025-06-12 DIAGNOSIS — G63 NEUROPATHY ASSOCIATED WITH MONOCLONAL GAMMOPATHY OF UNKNOWN SIGNIFICANCE (MGUS): ICD-10-CM

## 2025-06-12 DIAGNOSIS — R76.8 ELEVATED SERUM IMMUNOGLOBULIN FREE LIGHT CHAIN LEVEL: Primary | ICD-10-CM

## 2025-06-12 PROCEDURE — 99999 PR PBB SHADOW E&M-EST. PATIENT-LVL IV: CPT | Mod: PBBFAC,HCNC,, | Performed by: INTERNAL MEDICINE

## 2025-06-12 NOTE — PROGRESS NOTES
HEMATOLOGY ONCOLOGY FELLOW CLINIC    IDENTIFYING STATEMENT   Tito Meanrd (Tito) is a 79 y.o. male who was referred by  for evaluation of MGUS     HISTORY OF PRESENT ILLNESS:    Patient referred to hematology for abnormal free light chains and patient symptoms of upper extremity shaking, right leg weakness, and dizziness that patient reports is postural. Patient states that he has been having issues with his right leg since his back surgery in 2007. His upper arms have started shaking more, he is seeing neurology for this, on sinemet. On chart review, he has had mild anemia, but hemoglobin has always been above 10. He has had no evidence of renal dysfunction or hypercalcemia. Previous CT imaging reviewed, although not whole body, no evidence of bone lesions. At the time of initial consultation, patient reports above symptoms but denies any other systemic symptoms.     Past Medical History:   Diagnosis Date    Allergy     Chalazion of left eye     CKD (chronic kidney disease), stage II 04/01/2019    Diabetes mellitus type II     Diabetes with neurologic complications     GERD (gastroesophageal reflux disease)     egd 2008    Jock itch 07/19/2018    MGD (meibomian gland dysfunction)     Osteopenia     Seizure 05/29/2024    Spinal stenosis     Spondylosis without myelopathy 10/23/2015    Vitamin D deficiency disease        Family History   Problem Relation Name Age of Onset    Cataracts Mother      Hyperlipidemia Mother      Hypertension Mother      Kidney disease Mother      Cancer Mother      Heart disease Mother      Kidney failure Mother      No Known Problems Sister Sujata     No Known Problems Brother Abhijeet     No Known Problems Brother Alexei     Hypertension Maternal Grandmother      No Known Problems Daughter Muna     No Known Problems Son Tito     No Known Problems Son Srini     Amblyopia Neg Hx      Blindness Neg Hx      Diabetes Neg Hx      Glaucoma Neg Hx      Macular degeneration  "Neg Hx      Retinal detachment Neg Hx      Strabismus Neg Hx      Stroke Neg Hx      Thyroid disease Neg Hx      Melanoma Neg Hx      Psoriasis Neg Hx      Lupus Neg Hx      Eczema Neg Hx         Social History[1]      MEDICATIONS:     Medications Ordered Prior to Encounter[2]    ALLERGIES: Review of patient's allergies indicates:  No Known Allergies     ROS:       Review of Systems   Constitutional:  Positive for fatigue. Negative for chills and fever.   HENT:   Negative for hearing loss and sore throat.    Respiratory:  Negative for cough and shortness of breath.    Cardiovascular:  Negative for chest pain and palpitations.   Gastrointestinal:  Negative for abdominal pain, diarrhea, nausea and vomiting.   Genitourinary:  Negative for difficulty urinating and dysuria.    Musculoskeletal:  Negative for arthralgias and myalgias.   Skin:  Negative for rash.   Neurological:  Positive for dizziness and extremity weakness (right leg, chronic). Negative for headaches.   Hematological:  Negative for adenopathy. Does not bruise/bleed easily.   Psychiatric/Behavioral:  Negative for confusion and decreased concentration.        PHYSICAL EXAM:  Vitals:    06/12/25 1316   BP: (!) 144/82   Pulse: 76   Temp: 97.7 °F (36.5 °C)   TempSrc: Oral   SpO2: 97%   Weight: 92.1 kg (203 lb 0.7 oz)   Height: 5' 11" (1.803 m)   PainSc:   3   PainLoc: Back       Physical Exam    LAB:   Results for orders placed or performed during the hospital encounter of 04/10/25   Complete PFT with bronchodilator    Collection Time: 04/10/25 12:14 PM   Result Value Ref Range    Physician Comment       Spirometry suggests restriction. Spirometry remains unimproved following bronchodilator. Lung volumes show moderate restriction is present. DLCO is moderately decreased.   Â   Notes: The failure to demonstrate improvement in spirometry does not preclude a clinical response to a trial of bronchodilators. No recent hemoglobin value available. DLCO interpretation " assumes a normal hemoglobin value.      Post FVC 1.97 (L) 2.37 - 4.44 L    Post FEV5 1.24 (L) 1.43 - 3.70 L    Post FEV1 1.49 (L) 1.65 - 3.32 L    Post FEV1 FVC 75.79 60.73 - 88.04 %    Post FEF 25 75 1.21 1.05 - 4.47 L/s    Post PEF 4.18 (L) 5.12 - 10.58 L/s    Post  6.39 sec    Pre DLCO 11.77 (L) 19.13 - 32.98 ml/(min*mmHg)    DLCOVA PRE 3.57 2.45 - 4.66 ml/(min*mmHg*L)    VA PRE 3.30 (L) 7.18 - 7.18 L    IVC PRE 2.04 (L) 2.37 - 4.44 L    TLCN2 PRE 3.74 (L) 6.18 - 8.48 L    VCMAXN2 PRE 2.11 (L) 2.37 - 4.44 L    PRE IC N2 1.80 -1671588.85 - 82890918.15 L    Pre FRC N2 1.94 (L) 2.86 - 4.84 L    ERVN2 PRE 0.32 -16002.04 - 05017.96 L    RVN2 PRE 1.63 (L) 2.22 - 3.57 L    [id number] PRE 43.49 36.18 - 54.14 %    Pre FVC 2.08 (L) 2.37 - 4.44 L    PRE FEV5 1.23 (L) 1.43 - 3.70 L    Pre FEV1 1.52 (L) 1.65 - 3.32 L    Pre FEV1 FVC 72.94 60.73 - 88.04 %    Pre FEF 25 75 0.99 (L) 1.05 - 4.47 L/s    Pre PEF 4.88 (L) 5.12 - 10.58 L/s    Pre  6.44 sec    FVC Ref 3.39     FVC LLN 2.37     FVC Pre Ref 61.4 %    FEV05 REF 2.57     FEV05 LLN 1.43     PRE FEV05 REF 47.8 %    FEV1 Ref 2.52     FEV1 LLN 1.65     FEV1 Pre Ref 60.2 %    FEV1 FVC Ref 75     FEV1 FVC LLN 61     FEV1 FVC Pre Ref 97.2 %    FEF 25 75 Ref 2.76     FEF 25 75 LLN 1.05     FEF 25 75 Pre Ref 35.8 %    PEF Ref 7.85     PEF LLN 5.12     PEF Pre Ref 62.2 %    FVC Chg -5.7 %    FEV1 Chg -2.0 %    BFR255 Chg -0.8 %    FVC VOL CHG -0.12     FEV1 VOL CHG -0.03     TLCN2 Ref 7.33     TLCN2 LLN 6.18     TLC N2 ULN 8.48     TLCN2 Pre Ref 51.0 %    VCMAXN2 Ref 3.39     VCMAXN2 LLN 2.37     VCMAX N2 ULN 4.44     VCMAXN2 Pre Ref 62.3 %    DDH0NUQ 3.15     LLN IC N2 -5742578.85     ULN IC N2 72073600.15     PRE REF IC N2 57.0 %    FRCN2 Ref 3.85     FRCN2 LLN 2.86     FRC N2 ULN 4.84     FRCN2 Pre Ref 50.5 %    ERVN2 Ref 0.96     ERVN2 LLN -21159.04     ERV N2 ULN 04026.96     ERVN2 Pre Ref 33.1 %    RVN2 Ref 2.89     RVN2 LLN 2.22     RV N2 ULN 3.57     RVN2 Pre  Ref 56.2 %    YWD0FQWU6 Ref 45.16     SNV5DLDO3 LLN 36.18     RV N2 TLC N2 ULN 54.14     WIP5SZVI0 Pre Ref 96.3 %    DLCO Single Breath Ref 26.06     DLCO Single Breath LLN 19.13     DLCO SINGLEBREATH ULN 32.98     DLCO Single Breath Pre Ref 45.2 %    DLCOc Single Breath Ref 26.06     DLCOc Single Breath LLN 19.13     DLCOC SINGLEBREATH ULN 32.98     DLCOVA Ref 3.55     DLCOVA LLN 2.45     DLCOVA ULN 4.66     DLCOVA Pre Ref 100.4 %    DLCOc SBVA Ref 3.55     DLCOc SBVA LLN 2.45     DLCOCSBVA ULN 4.66     VA Single Breath Ref 7.18     VA Single Breath LLN 7.18     VA SINGLEBREATH ULN 7.18     VA Single Breath Pre Ref 45.9 %    IVC Single Breath Ref 3.39     IVC Single Breath LLN 2.37     IVC SINGLEBREATH ULN 4.44     IVC Single Breath Pre Ref 60.1 %    FVCZSCORE -2.10     NAL8ABEJFY -1.88     NXI0FRBPAQZDQ -0.25     PPUH0GCBUBW -5.13     DLCOSINGLEBREATHZSCORE -3.39      *Note: Due to a large number of results and/or encounters for the requested time period, some results have not been displayed. A complete set of results can be found in Results Review.       PROBLEMS ASSESSED THIS VISIT:    1. Elevated serum immunoglobulin free light chain level    2. 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 Labs reviewed, free light chain mildly elevated at 1.79. SPEP with normal total protein. In addition, patient has no other evidence of CRAB criteria. At this point, mildly elevated free light chain ratio unlikely to represent even MGUS. Regardless, it is very unlikely that a plasma cell dyscrasia is contributing to his neuropathic issues. This will not require routine monitoring at this time. Regular follow up is not needed. However, if he should have worsening anemia (Hg<10), new renal dysfunction,  hypercalcemia or evidence of new bone lesions, he should be referred back to hematology for repeat paraprotein studies.      Discussed with Dr. Edgardo Oseguera MD  PGY-VI  Hematology/Oncology Fellow      BMT Chart Routing      Follow up with physician    Follow up with LO No follow up needed.   Provider visit type    Infusion scheduling note    Injection scheduling note    Labs    Imaging    Pharmacy appointment    Other referrals                        [1]   Social History  Socioeconomic History    Marital status:    Occupational History    Occupation: Retired     Comment:    Tobacco Use    Smoking status: Never    Smokeless tobacco: Former     Types: Chew    Tobacco comments:     Chewed tobacco once per day for 4-5 years when a    Substance and Sexual Activity    Alcohol use: No    Drug use: No    Sexual activity: Not Currently     Partners: Female   Social History Narrative        Colon 2007     Social Drivers of Health     Financial Resource Strain: Medium Risk (6/12/2025)    Overall Financial Resource Strain (CARDIA)     Difficulty of Paying Living Expenses: Somewhat hard   Food Insecurity: No Food Insecurity (6/12/2025)    Hunger Vital Sign     Worried About Running Out of Food in the Last Year: Never true     Ran Out of Food in the Last Year: Never true   Transportation Needs: No Transportation Needs (6/12/2025)    PRAPARE - Transportation     Lack of Transportation (Medical): No     Lack of Transportation (Non-Medical): No   Physical Activity: Unknown (6/12/2025)    Exercise Vital Sign     Days of Exercise per Week: 3 days   Stress: No Stress Concern Present (6/12/2025)    [employer] - Occupational Stress Questionnaire     Feeling of Stress : Only a little   Housing Stability: Low Risk  (6/12/2025)    Housing Stability Vital Sign     Unable to Pay for Housing in the Last Year: No     Homeless in the Last Year: No   [2]    Current Outpatient Medications on File Prior to Visit   Medication Sig Dispense Refill    amLODIPine (NORVASC) 5 MG tablet Take 1 tablet (5 mg total) by mouth once daily. 90 tablet 3    ammonium lactate 12 % Crea Apply topically to arms and legs for dry skin 385 g 10    aspirin (ECOTRIN) 81 MG EC tablet TAKE 1 TABLET EVERY DAY 90 tablet 3    azelastine (ASTELIN) 137 mcg (0.1 %) nasal spray USE 1 SPRAY NASALLY TWICE DAILY 90 mL 3    calcium carbonate (TUMS ORAL) Take 1-2 tablets by mouth daily as needed (for acid reflux).      carbidopa-levodopa  mg (SINEMET)  mg per tablet Take 1 tablet by mouth 3 (three) times daily. 90 tablet 5    cholecalciferol, vitamin D3, (VITAMIN D3) 50 mcg (2,000 unit) Cap Take 1 capsule by mouth once daily.      clotrimazole-betamethasone 1-0.05% (LOTRISONE) cream APPLY TOPICALLY 2 (TWO) TIMES DAILY. 45 g 0    docusate sodium (COLACE) 100 MG capsule Take 1 capsule (100 mg total) by mouth once daily. 30 capsule 11    finasteride (PROSCAR) 5 mg tablet TAKE 1 TABLET ONE TIME DAILY 90 tablet 0    fluocinonide (LIDEX) 0.05 % external solution Apply topically 3 (three) times a week. Use to affected areas for up to 2 weeks then take a 1 week break or decrease to 3 times weekly. Do not apply to groin or face. Use to scalp (Patient not taking: Reported on 5/27/2025) 60 mL 4    fluticasone propionate (FLONASE) 50 mcg/actuation nasal spray USE 1 SPRAY NASALLY ONE TIME DAILY 32 g 3    gabapentin (NEURONTIN) 300 MG capsule TAKE 1 CAPSULE DAILY AT NOON AND TAKE 2 CAPSULES EVERY EVENING 270 capsule 3    irbesartan (AVAPRO) 150 MG tablet Take 2 tablets (300 mg total) by mouth every evening.      ketoconazole (NIZORAL) 2 % cream Apply topically daily for facial redness and scaling. 60 g 11    ketoconazole (NIZORAL) 2 % shampoo Apply topically twice a week. Leave on for 5 minutes then wash off. For scalp 240 mL 10    lanolin/mineral oil/petrolatum (ARTIFICIAL TEARS OPHT) Place 1-2 drops into  both eyes daily as needed (for dry eyes).      LIDOcaine 4 % PtMd Apply 1 patch topically daily as needed (for back pain).      omeprazole (PRILOSEC) 20 MG capsule Take 2 capsules (40 mg total) by mouth once daily. 180 capsule 3    pravastatin (PRAVACHOL) 20 MG tablet Take 1 tablet (20 mg total) by mouth once daily. 90 tablet 3    tamsulosin (FLOMAX) 0.4 mg Cap Take 1 capsule (0.4 mg total) by mouth once daily. 90 capsule 3     No current facility-administered medications on file prior to visit.

## 2025-06-17 ENCOUNTER — TELEPHONE (OUTPATIENT)
Dept: CARDIOLOGY | Facility: CLINIC | Age: 80
End: 2025-06-17
Payer: MEDICARE

## 2025-06-19 ENCOUNTER — OFFICE VISIT (OUTPATIENT)
Dept: CARDIOLOGY | Facility: CLINIC | Age: 80
End: 2025-06-19
Payer: MEDICARE

## 2025-06-19 VITALS
WEIGHT: 205 LBS | BODY MASS INDEX: 28.7 KG/M2 | HEIGHT: 71 IN | DIASTOLIC BLOOD PRESSURE: 83 MMHG | HEART RATE: 74 BPM | SYSTOLIC BLOOD PRESSURE: 149 MMHG | OXYGEN SATURATION: 99 %

## 2025-06-19 DIAGNOSIS — I87.2 EDEMA OF BOTH LOWER EXTREMITIES DUE TO PERIPHERAL VENOUS INSUFFICIENCY: ICD-10-CM

## 2025-06-19 DIAGNOSIS — I70.0 AORTIC ATHEROSCLEROSIS: ICD-10-CM

## 2025-06-19 DIAGNOSIS — M41.9 RESTRICTIVE LUNG DISEASE DUE TO KYPHOSCOLIOSIS: Chronic | ICD-10-CM

## 2025-06-19 DIAGNOSIS — I71.21 ANEURYSM OF ASCENDING AORTA WITHOUT RUPTURE: Chronic | ICD-10-CM

## 2025-06-19 DIAGNOSIS — E78.2 MIXED HYPERLIPIDEMIA: Chronic | ICD-10-CM

## 2025-06-19 DIAGNOSIS — I10 ESSENTIAL HYPERTENSION: Primary | Chronic | ICD-10-CM

## 2025-06-19 DIAGNOSIS — J98.4 RESTRICTIVE LUNG DISEASE DUE TO KYPHOSCOLIOSIS: Chronic | ICD-10-CM

## 2025-06-19 PROCEDURE — 3077F SYST BP >= 140 MM HG: CPT | Mod: CPTII,HCNC,S$GLB,

## 2025-06-19 PROCEDURE — 99999 PR PBB SHADOW E&M-EST. PATIENT-LVL IV: CPT | Mod: PBBFAC,HCNC,,

## 2025-06-19 PROCEDURE — 3079F DIAST BP 80-89 MM HG: CPT | Mod: CPTII,HCNC,S$GLB,

## 2025-06-19 PROCEDURE — 3288F FALL RISK ASSESSMENT DOCD: CPT | Mod: CPTII,HCNC,S$GLB,

## 2025-06-19 PROCEDURE — 3072F LOW RISK FOR RETINOPATHY: CPT | Mod: CPTII,HCNC,S$GLB,

## 2025-06-19 PROCEDURE — 99214 OFFICE O/P EST MOD 30 MIN: CPT | Mod: HCNC,S$GLB,,

## 2025-06-19 PROCEDURE — 1159F MED LIST DOCD IN RCRD: CPT | Mod: CPTII,HCNC,S$GLB,

## 2025-06-19 PROCEDURE — 1125F AMNT PAIN NOTED PAIN PRSNT: CPT | Mod: CPTII,HCNC,S$GLB,

## 2025-06-19 PROCEDURE — 1101F PT FALLS ASSESS-DOCD LE1/YR: CPT | Mod: CPTII,HCNC,S$GLB,

## 2025-07-01 ENCOUNTER — HOSPITAL ENCOUNTER (OUTPATIENT)
Dept: CARDIOLOGY | Facility: HOSPITAL | Age: 80
Discharge: HOME | End: 2025-07-01
Payer: MEDICARE

## 2025-07-01 VITALS
SYSTOLIC BLOOD PRESSURE: 140 MMHG | WEIGHT: 205 LBS | HEART RATE: 64 BPM | BODY MASS INDEX: 28.7 KG/M2 | DIASTOLIC BLOOD PRESSURE: 80 MMHG | HEIGHT: 71 IN

## 2025-07-01 DIAGNOSIS — I71.21 ANEURYSM OF ASCENDING AORTA WITHOUT RUPTURE: Chronic | ICD-10-CM

## 2025-07-01 LAB
AORTIC SIZE INDEX (SOV): 1.6 CM/M2
AORTIC SIZE INDEX: 1.9 CM/M2
ASCENDING AORTA: 4 CM
AV AREA BY CONTINUOUS VTI: 3.6 CM2
AV INDEX (PROSTH): 0.86
AV LVOT MEAN GRADIENT: 1 MMHG
AV LVOT PEAK GRADIENT: 3 MMHG
AV MEAN GRADIENT: 3 MMHG
AV PEAK GRADIENT: 5 MMHG
AV VALVE AREA BY VELOCITY RATIO: 3 CM²
AV VALVE AREA: 3.6 CM2
AV VELOCITY RATIO: 0.73
BSA FOR ECHO PROCEDURE: 2.16 M2
CV ECHO LV RWT: 0.22 CM
DOP CALC AO PEAK VEL: 1.1 M/S
DOP CALC AO VTI: 19.7 CM
DOP CALC LVOT AREA: 4.2 CM2
DOP CALC LVOT DIAMETER: 2.3 CM
DOP CALC LVOT PEAK VEL: 0.8 M/S
DOP CALC LVOT STROKE VOLUME: 70.6 CM3
DOP CALCLVOT PEAK VEL VTI: 17 CM
E WAVE DECELERATION TIME: 393 MS
E/A RATIO: 0.69
E/E' RATIO: 6 M/S
ECHO EF ESTIMATED: 63 %
ECHO LV POSTERIOR WALL: 0.6 CM (ref 0.6–1.1)
FRACTIONAL SHORTENING: 34.5 % (ref 28–44)
INTERVENTRICULAR SEPTUM: 0.8 CM (ref 0.6–1.1)
IVC DIAMETER: 1.12 CM
LA MAJOR: 5.4 CM
LA MINOR: 5.3 CM
LA WIDTH: 3.7 CM
LEFT ATRIUM SIZE: 3.9 CM
LEFT ATRIUM VOLUME INDEX MOD: 37 ML/M2
LEFT ATRIUM VOLUME INDEX: 31 ML/M2
LEFT ATRIUM VOLUME MOD: 78 ML
LEFT ATRIUM VOLUME: 66 CM3
LEFT INTERNAL DIMENSION IN SYSTOLE: 3.6 CM (ref 2.1–4)
LEFT VENTRICLE DIASTOLIC VOLUME INDEX: 68.08 ML/M2
LEFT VENTRICLE DIASTOLIC VOLUME: 145 ML
LEFT VENTRICLE MASS INDEX: 63.6 G/M2
LEFT VENTRICLE SYSTOLIC VOLUME INDEX: 25.4 ML/M2
LEFT VENTRICLE SYSTOLIC VOLUME: 54 ML
LEFT VENTRICULAR INTERNAL DIMENSION IN DIASTOLE: 5.5 CM (ref 3.5–6)
LEFT VENTRICULAR MASS: 135.5 G
LV LATERAL E/E' RATIO: 5.7
LV SEPTAL E/E' RATIO: 5.7
MV PEAK A VEL: 0.58 M/S
MV PEAK E VEL: 0.4 M/S
OHS CV RV/LV RATIO: 0.8 CM
PISA TR MAX VEL: 2 M/S
RA MAJOR: 4.29 CM
RA PRESSURE ESTIMATED: 3 MMHG
RA WIDTH: 3.18 CM
RIGHT ATRIAL AREA: 10.8 CM2
RIGHT VENTRICLE DIASTOLIC BASEL DIMENSION: 4.4 CM
RV TB RVSP: 5 MMHG
RV TISSUE DOPPLER FREE WALL SYSTOLIC VELOCITY 1 (APICAL 4 CHAMBER VIEW): 12.24 CM/S
SINUS: 3.5 CM
STJ: 3 CM
TDI LATERAL: 0.07 M/S
TDI SEPTAL: 0.07 M/S
TDI: 0.07 M/S
TRICUSPID ANNULAR PLANE SYSTOLIC EXCURSION: 1.5 CM
TV PEAK GRADIENT: 16 MMHG
TV REST PULMONARY ARTERY PRESSURE: 19 MMHG
Z-SCORE OF LEFT VENTRICULAR DIMENSION IN END DIASTOLE: -2.11
Z-SCORE OF LEFT VENTRICULAR DIMENSION IN END SYSTOLE: -1.11

## 2025-07-01 PROCEDURE — 93306 TTE W/DOPPLER COMPLETE: CPT | Mod: 26,HCNC,, | Performed by: INTERNAL MEDICINE

## 2025-07-01 PROCEDURE — 93306 TTE W/DOPPLER COMPLETE: CPT | Mod: HCNC

## 2025-07-02 ENCOUNTER — TELEPHONE (OUTPATIENT)
Dept: CARDIOLOGY | Facility: CLINIC | Age: 80
End: 2025-07-02
Payer: MEDICARE

## 2025-07-02 ENCOUNTER — RESULTS FOLLOW-UP (OUTPATIENT)
Dept: CARDIOLOGY | Facility: CLINIC | Age: 80
End: 2025-07-02

## 2025-07-02 DIAGNOSIS — I50.22 HEART FAILURE WITH MILDLY REDUCED EJECTION FRACTION (HFMREF): Primary | ICD-10-CM

## 2025-07-02 NOTE — TELEPHONE ENCOUNTER
Spoke with patient. Patient with newly mildly reduced EF compared to prior. He has no history of CHF or CAD. Will proceed with PET stress to r/o ischemia. He remains asymptomatic. He verbalized understanding and appreciated the call.

## 2025-07-03 ENCOUNTER — OFFICE VISIT (OUTPATIENT)
Dept: INTERNAL MEDICINE | Facility: CLINIC | Age: 80
End: 2025-07-03
Payer: MEDICARE

## 2025-07-03 VITALS
DIASTOLIC BLOOD PRESSURE: 72 MMHG | HEIGHT: 71 IN | BODY MASS INDEX: 28.24 KG/M2 | WEIGHT: 201.75 LBS | OXYGEN SATURATION: 98 % | SYSTOLIC BLOOD PRESSURE: 130 MMHG | HEART RATE: 69 BPM

## 2025-07-03 DIAGNOSIS — Z86.39 HISTORY OF DIABETES MELLITUS, TYPE II: Primary | ICD-10-CM

## 2025-07-03 DIAGNOSIS — I51.9 MILD AORTIC REGURGITATION WITH LEFT VENTRICULAR SYSTOLIC DYSFUNCTION BY PRIOR ECHOCARDIOGRAM: ICD-10-CM

## 2025-07-03 DIAGNOSIS — E11.42 DIABETIC POLYNEUROPATHY ASSOCIATED WITH TYPE 2 DIABETES MELLITUS: ICD-10-CM

## 2025-07-03 DIAGNOSIS — I35.1 MILD AORTIC REGURGITATION WITH LEFT VENTRICULAR SYSTOLIC DYSFUNCTION BY PRIOR ECHOCARDIOGRAM: ICD-10-CM

## 2025-07-03 DIAGNOSIS — R53.81 PHYSICAL DEBILITY: ICD-10-CM
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
## 2025-07-03 DIAGNOSIS — H93.13 TINNITUS AURIUM, BILATERAL: ICD-10-CM

## 2025-07-03 DIAGNOSIS — E11.40 CONTROLLED TYPE 2 DIABETES MELLITUS WITH DIABETIC NEUROPATHY, WITHOUT LONG-TERM CURRENT USE OF INSULIN: ICD-10-CM

## 2025-07-03 DIAGNOSIS — G20.C PARKINSONISM, UNSPECIFIED PARKINSONISM TYPE: ICD-10-CM

## 2025-07-03 DIAGNOSIS — I10 ESSENTIAL HYPERTENSION: Chronic | ICD-10-CM

## 2025-07-03 DIAGNOSIS — J98.4 RESTRICTIVE LUNG DISEASE: ICD-10-CM

## 2025-07-03 DIAGNOSIS — J30.9 ALLERGIC RHINITIS, UNSPECIFIED SEASONALITY, UNSPECIFIED TRIGGER: ICD-10-CM

## 2025-07-03 DIAGNOSIS — H91.90 HEARING LOSS, UNSPECIFIED HEARING LOSS TYPE, UNSPECIFIED LATERALITY: ICD-10-CM

## 2025-07-03 PROCEDURE — 99999 PR PBB SHADOW E&M-EST. PATIENT-LVL V: CPT | Mod: PBBFAC,HCNC,, | Performed by: INTERNAL MEDICINE

## 2025-07-03 RX ORDER — IRBESARTAN 300 MG/1
300 TABLET ORAL NIGHTLY
Qty: 90 TABLET | Refills: 3 | Status: SHIPPED | OUTPATIENT
Start: 2025-07-03 | End: 2026-07-03

## 2025-07-03 RX ORDER — ASPIRIN 81 MG/1
81 TABLET ORAL DAILY
Qty: 90 TABLET | Refills: 3 | Status: SHIPPED | OUTPATIENT
Start: 2025-07-03

## 2025-07-03 NOTE — PROGRESS NOTES
Spiritual Plan of Care    Pt Name: Quinn Nieto  Pt : 1984  Date: 2023    Visit Type: In person    Referral Source: Interdisciplinary team    Reason for Visit: Advance Directives    Visited With: Patient    Length of Visit: 15 minutes    Requires Follow-up: No    Spiritual Care Consult Needed: Spiritual Care eval completed    Spiritual Care Visit Preference: None    Taxonomy:    · Intended Effects: Aligning care plan with patient's values  · Methods: Collaborate with care team member, Offer support  · Interventions: Assist someone with Advance Directives, Silent prayer, Identify supportive relationship(s)    Patient Affect at Time of Visit: Pleasant, Open to  Visit  Patient Assessment: Support system, Coping   Patient  Intervention: Advance directive    Visited with pt in pre-op area to witness POAHC that pt prepared prior to arrival. Made copies, returned original and several copies to pt's folder in belongings bag, placed paper copy on chart, entered information in Epic.     Agent: Zurdo Nieto, brother   [phone]    Successor Agent: Maia Nieto, mother  [phone]    No further needs at this time. Should spiritual care needs arise, please page  at .    Spiritual Plan of Care: No plan for follow up at this time    Patient Reported Outcome:  Decision made regarding palliative care/hospice/treatment plan/other (POAHC completed)    Rev. Socorro Dee M.Div., [facility], [facility]  [facility]  Staff Chaplain [email or web address]  Phone , Pager   [facility] Chaplains Available  at Pager    Subjective:       Patient ID: Tito Menard is a 79 y.o. male.    Chief Complaint: Follow-up (3 Month Follow Up/Shower chair) and Hearing Problem     Tito Menard is a 79 y.o.  male who presents for Follow-up (3 Month Follow Up/Shower chair) and Hearing Problem  .  Pt has HTN.  Counseled on low salt diet and graded exercise program. Denies CP, SOB, orthopnea or PND.  Currently treated with antihypertensives listed in med card and compliant most of the time. No side effects from medication noted by patient.     Reports chronic tinnitus        Follow-up    History of Present Illness    Mr. Menard presents today for follow up    He reports bilateral tinnitus for the past couple weeks, described as a steady locust-like sound that occasionally quiets down. The right ear is more bothersome with occasional aching. He uses cotton in both ears at night and during episodes of discomfort. Family members have noted his difficulty hearing. He has persistent nasal congestion with clear rhinorrhea, particularly when bending over. Flonase and Azellin appear to worsen nasal drainage after 1-2 weeks of use. He experiences nighttime respiratory symptoms.    He reports ongoing balance and mobility issues with a fall occurring approximately one month ago, resulting in whiplash-like symptoms but no significant injury. He has difficulty getting in and out of the bathtub, and his shower chair recently collapsed during use.    He experiences progressive shortness of breath and lightheadedness with minimal exertion, such as walking to his car, requiring rest to recover. He denies chronic cough and has never been a regular smoker, only briefly trying it in his youth. He believes consuming milk and eggs helps loosen respiratory secretions.    He has Parkinson's disease and diabetes, with the latter being well-controlled without medication. His hemoglobin A1C remains within acceptable range.    He takes:  - Gabapentin 1 tablet at noon,  2 tablets in evening  - Finasteride 5mg for prostate  - Prilosec daily for reflux  - Levodopa Carbidopa 3 times daily for Parkinson's disease  - Amlodipine 5mg daily  - Pravastatin for cholesterol (LDL 77)  - Irbesartan 2 tablets in evening  All medications are stored in a weekly container and taken together in the morning.      ROS:  ENT: +ear pain, +tinnitus, +ear pressure, +runny nose, +nasal discharge  Respiratory: -cough, +exertional dyspnea  Musculoskeletal: +neck pain  Neurological: +headache, +dizziness, +balance issues, +lightheadedness       Problem List[1]      Past Medical History:   Diagnosis Date    Allergy     Chalazion of left eye     CKD (chronic kidney disease), stage II 04/01/2019    Diabetes mellitus type II     Diabetes with neurologic complications     GERD (gastroesophageal reflux disease)     egd 2008    Jock itch 07/19/2018    MGD (meibomian gland dysfunction)     Osteopenia     Seizure 05/29/2024    Spinal stenosis     Spondylosis without myelopathy 10/23/2015    Vitamin D deficiency disease        Past Surgical History:   Procedure Laterality Date    CHALAZION EXCISION Left 8/18/13    COLONOSCOPY N/A 4/24/2024    Procedure: COLONOSCOPY;  Surgeon: Catalino Sorto MD;  Location: [facility] (4TH FLR);  Service: Endoscopy;  Laterality: N/A;  Ref by Dr DEJAN Brown, [facility], portal - PC  4/18-precall complete-MS    CYST REMOVAL  2013    Back of neck    EXCISION OF HYDROCELE Right 11/14/2023    Procedure: HYDROCELECTOMY;  Surgeon: Beatrice Vaca MD;  Location: [facility] OR 2ND FLR;  Service: Urology;  Laterality: Right;  1 hr    FLEXIBLE CYSTOSCOPY N/A 12/7/2021    Procedure: CYSTOSCOPY, FLEXIBLE;  Surgeon: Beatrice Vaca MD;  Location: [facility] OR 1ST FLR;  Service: Urology;  Laterality: N/A;    FLUOROSCOPIC URODYNAMIC STUDY N/A 12/7/2021    Procedure: URODYNAMIC STUDY, FLUOROSCOPIC;  Surgeon: Beatrice Vaca MD;  Location: [facility] OR 1ST FLR;  Service: Urology;  Laterality: N/A;  90 minutes     SPINE  "SURGERY  2007    x2 (2000, 2007)       Family History   Problem Relation Name Age of Onset    Cataracts Mother      Hyperlipidemia Mother      Hypertension Mother      Kidney disease Mother      Cancer Mother      Heart disease Mother      Kidney failure Mother      No Known Problems Sister Sujata     No Known Problems Brother Abhijeet     No Known Problems Brother Alexei     Hypertension Maternal Grandmother      No Known Problems Daughter Muna     No Known Problems Son Tito     No Known Problems Son Srini     Amblyopia Neg Hx      Blindness Neg Hx      Diabetes Neg Hx      Glaucoma Neg Hx      Macular degeneration Neg Hx      Retinal detachment Neg Hx      Strabismus Neg Hx      Stroke Neg Hx      Thyroid disease Neg Hx      Melanoma Neg Hx      Psoriasis Neg Hx      Lupus Neg Hx      Eczema Neg Hx         Social History[2]      Review of Systems      Objective:   Blood pressure 130/72, pulse 69, height 5' 11" (1.803 m), weight 91.5 kg (201 lb 11.5 oz), SpO2 98%.     Physical Exam  Constitutional:       Appearance: Normal appearance. He is well-developed. He is not ill-appearing.   HENT:      Head: Normocephalic and atraumatic.   Eyes:      General: No scleral icterus.     Conjunctiva/sclera: Conjunctivae normal.   Cardiovascular:      Rate and Rhythm: Normal rate.   Pulmonary:      Effort: Pulmonary effort is normal.      Breath sounds: Rales (fine crackles LLL) present. No wheezing.   Chest:      Chest wall: No tenderness.   Musculoskeletal:         General: No tenderness or signs of injury.   Skin:     General: Skin is warm and dry.   Neurological:      Mental Status: He is alert and oriented to person, place, and time. Mental status is at baseline.   Psychiatric:         Behavior: Behavior normal.         Thought Content: Thought content normal.       Physical Exam    Vitals: Normal blood pressure.  Ears: Left ear canal clear. Mild cerumen in right ear.  Lungs: Crackles in left lower lung.           Prior " labs reviewed    Health Maintenance         Date Due Completion Date    RSV Vaccine (Age 60+ and Pregnant patients) (1 - 1-dose 75+ series) Never done ---    COVID-19 Vaccine (5 - 2024-25 season) 09/01/2024 11/3/2023    Influenza Vaccine (1) 09/01/2025 9/24/2024    Override on 10/1/2014: Done (per pt)    Override on 11/7/2013: Done    Diabetes Urine Screening 09/24/2025 9/24/2024    Hemoglobin A1c 09/24/2025 3/24/2025    Override on 10/23/2015: Done    Diabetic Eye Exam 11/14/2025 11/14/2024    Override on 6/14/2018: Done    Override on 8/5/2015: Done    Lipid Panel 03/24/2026 3/24/2025    Colonoscopy 04/24/2027 4/24/2024    TETANUS VACCINE 03/30/2033 3/30/2023            Assessment/Plan:       1. History of diabetes mellitus, type II  Overview:  Was treated with oral diabetic medications prior to 2005. Treated for several years.  Has not been diabetic for over ten years. Labs in 2018 showed return of prediabetic level hba1c.      2. Diabetic polyneuropathy associated with type 2 diabetes mellitus  Overview:  DM had resolved off meds with lifestlye hanges but has returned to prediabetic levels  Manage as diabetes due to long history of DM increasing risk of complication   Cont diabetic diet       3. Controlled type 2 diabetes mellitus with diabetic neuropathy, without long-term current use of insulin  -     aspirin (ECOTRIN) 81 MG EC tablet; Take 1 tablet (81 mg total) by mouth once daily.  Dispense: 90 tablet; Refill: 3    4. Essential hypertension  Overview:  Goal BP <120 systolic due to history of mild fusiform dilation of Ascending Aorta, stable since 2014.   Syncopal episode 2-19, goal SBP < 130.  12/20 pt tx with amlodipine 10 mg , cholthalidone stopped and irbesartan increased due to urinary frequency      Orders:  -     irbesartan (AVAPRO) 300 MG tablet; Take 1 tablet (300 mg total) by mouth every evening.  Dispense: 90 tablet; Refill: 3    5. Fall against object  -     BATH/SHOWER CHAIR FOR HOME USE    6.  Physical debility  -     BATH/SHOWER CHAIR FOR HOME USE    7. Parkinsonism, unspecified Parkinsonism type  -     BATH/SHOWER CHAIR FOR HOME USE    8. Hearing loss, unspecified hearing loss type, unspecified laterality  -     Ambulatory referral/consult to ENT; Future; Expected date: 07/10/2025  -     Ambulatory referral/consult to Audiology; Future; Expected date: 07/10/2025    9. Tinnitus aurium, bilateral  -     Ambulatory referral/consult to ENT; Future; Expected date: 07/10/2025  -     Ambulatory referral/consult to Audiology; Future; Expected date: 07/10/2025    10. Mild aortic regurgitation with left ventricular systolic dysfunction by prior echocardiogram  -     aspirin (ECOTRIN) 81 MG EC tablet; Take 1 tablet (81 mg total) by mouth once daily.  Dispense: 90 tablet; Refill: 3    11. Restrictive lung disease  -     Ambulatory referral/consult to Pulmonology; Future; Expected date: 07/10/2025  -     Stress test, pulmonary; Future; Expected date: 07/17/2025    12. Allergic rhinitis, unspecified seasonality, unspecified trigger  Comments:  use flonase daily  use astelin prn      Assessment & Plan    - BP and diabetes well-controlled without medication.  - Evaluated need for shower chair due to balance issues and recent fall.  - Reviewed cardiology findings: Grade 1 diastolic dysfunction, mild aortic regurgitation, dilated right ventricle, and mildly reduced systolic function (EF 45-50%).  - Considered pulmonary issues as cause for shortness of breath and lightheadedness; noted restrictive lung disease and decreased diffusion on PFTs.  - LDL 77 on current statin regimen.    PLAN SUMMARY:  - Referred to pulmonology for evaluation of restrictive lung disease and respiratory symptoms  - Referred to audiology for repeat hearing test  - Referred to ENT specialist for comprehensive evaluation and audiometry  - Ordered DME for shower chair  - Continue Pravastatin for hyperlipidemia  - Continue amlodipine 5 mg daily  -  Continue Prilosec daily for gastroesophageal reflux  - Continue Levodopa Carbidopa 3 times daily for Parkinson's disease  - Continue finasteride 5 mg for prostatic hyperplasia  - Modified Irbesartan dosage to 300 mg daily  - Follow up on the 7th for cardiac PET scan  - Follow up in a few months for routine check  - Mr. Menard to bring all current medications to next appointment for review    PARKINSON'S DISEASE:  - Continue Levodopa Carbidopa 3 times daily for Parkinson's disease management.  - Will monitor patient's response to current medication regimen.    TYPE 2 DIABETES:  - Mr. Mccall diabetes remains well controlled without medication.  - Hemoglobin A1C values continue to be within target range.    HYPERTENSION:  - Modified Irbesartan dosage to 300 mg daily from previous regimen of 150 mg twice daily.  - Continue amlodipine 5 mg daily.  - Blood pressure readings are currently within acceptable range.    TINNITUS AND HEARING LOSS:  - Mr. Menard reports bilateral tinnitus that varies in intensity, sometimes constant and sometimes quieting down.  - Examination revealed left ear clear, right ear with minimal cerumen not sufficient to impair hearing.  - Referred to ENT specialist for comprehensive evaluation and audiometry.  - Also referred to audiology for repeat hearing test.  - Recommend obtaining hearing aid evaluation at a local retailer such as [employer] or [employer].    POSTNASAL DRIP:  - Mr. Menard reports rhinorrhea when bending over, possibly associated with nasal spray use.    BALANCE ISSUES AND FALL RISK:  - Mr. Menard reports balance problems and experienced a fall approximately 1 month ago without significant injury.  - Ordered durable medical equipment (DME) for shower chair to improve safety.    RESPIRATORY ISSUES:  - Mr. Menard experiences shortness of breath and lightheadedness, particularly with exertion or positional changes.  - CT shows parenchymal changes with fine crackles, suspicious  for pulmonary fibrosis.  - Pulmonary function tests demonstrate restrictive pattern with decreased diffusion capacity.  - Referred to pulmonology for evaluation of restrictive lung disease, respiratory symptoms, and possible cardiopulmonary contribution to symptoms.    DIZZINESS:  - Mr. Menard experiences dizziness and lightheadedness, particularly when walking or standing.    BENIGN PROSTATIC HYPERPLASIA:  - Continue finasteride 5 mg for management of prostatic hyperplasia.    GASTROESOPHAGEAL REFLUX DISEASE:  - Continue Prilosec daily for management of gastroesophageal reflux.    HYPERLIPIDEMIA:  - Continue Pravastatin for management of hyperlipidemia.  - LDL cholesterol level is 77, which is wit  hin target range.    FOLLOW-UP:  - Follow up on the 7th as scheduled for cardiac PET scan.  - Mr. Menard to follow up in a few months for routine check.  - Mr. Menard should bring all current medications to next appointment for review.               Recommend patient complete vaccinations as listed in the [facility] health maintenance report. Pt may obtain vaccinations at their local pharmacy, any [facility] pharmacy or request vaccination in our clinic.  Please note that some insurances will only cover vaccination cost at specific locations.Please check with your insurance provider regarding your coverage.  Vaccines that are not covered are still recommended.       30 minutes spent in care of patient including history, physical, chart review, orders and coordination of care.     Visit today included increased complexity associated with the care of the episodic problems and addressed and managing the longitudinal care of the patient due to the serious and/or complex managed problem(s) as above.        This note was generated with the assistance of ambient listening technology. Verbal consent was obtained by the patient and accompanying visitor(s) for the recording of patient appointment to facilitate this note. I attest to  having reviewed and edited the generated note for accuracy, though some syntax or spelling errors may persist. Please contact the author of this note for any clarification.             [1]   Patient Active Problem List  Diagnosis    Essential hypertension    Mixed hyperlipidemia    Osteopenia    Spinal stenosis    Vitamin D deficiency disease    Diabetic polyneuropathy associated with type 2 diabetes mellitus    Spondylosis of cervical joint with myelopathy    Gait disorder    Chronic right-sided low back pain without sciatica    BPH with obstruction/lower urinary tract symptoms    Restrictive lung disease due to kyphoscoliosis    History of diabetes mellitus, type II    Decreased functional mobility    Alteration in performance of activities of daily living    Impaired instrumental activities of daily living (IADL)    Right bundle branch block    Hx of multiple pulmonary nodules    Chronically elevated hemidiaphragm    Pre-syncope    Syncope    Subcortical microvascular ischemic occlusive disease    Lower extremity edema    Class 1 obesity due to excess calories with serious comorbidity and body mass index (BMI) of 31.0 to 31.9 in adult    Thoracic aortic aneurysm without rupture    Epididymo-orchitis    Bacteremia    Hypokalemia    Aortic atherosclerosis    Gastroesophageal reflux disease with esophagitis    Acute neck pain    Poor posture    Leg weakness, bilateral    Hydrocele, right    Diarrhea    Cystitis    Atelectasis    Hypocalcemia    Parkinsonism    Cervical myelopathy    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

## 2025-07-08 ENCOUNTER — TELEPHONE (OUTPATIENT)
Dept: CARDIOLOGY | Facility: HOSPITAL | Age: 80
End: 2025-07-08
Payer: MEDICARE

## 2025-07-08 ENCOUNTER — HOSPITAL ENCOUNTER (OUTPATIENT)
Dept: CARDIOLOGY | Facility: HOSPITAL | Age: 80
Discharge: HOME | End: 2025-07-08
Payer: MEDICARE

## 2025-07-08 VITALS
SYSTOLIC BLOOD PRESSURE: 163 MMHG | HEIGHT: 71 IN | HEART RATE: 69 BPM | DIASTOLIC BLOOD PRESSURE: 91 MMHG | BODY MASS INDEX: 28.14 KG/M2 | WEIGHT: 201 LBS

## 2025-07-08 DIAGNOSIS — I50.22 HEART FAILURE WITH MILDLY REDUCED EJECTION FRACTION (HFMREF): ICD-10-CM

## 2025-07-08 DIAGNOSIS — I50.22 HEART FAILURE WITH MILDLY REDUCED EJECTION FRACTION (HFMREF): Primary | ICD-10-CM

## 2025-07-08 LAB
CFR FLOW - ANTERIOR: 2.51
CFR FLOW - INFERIOR: 2.35
CFR FLOW - LATERAL: 2.46
CFR FLOW - MAX: 3.08
CFR FLOW - MIN: 1.47
CFR FLOW - SEPTAL: 2.44
CFR FLOW - WHOLE HEART: 2.44
CV PHARM DOSE: 0.4 MG
CV STRESS BASE HR: 69 BPM
DIASTOLIC BLOOD PRESSURE: 91 MMHG
EJECTION FRACTION- HIGH: 65 %
END DIASTOLIC INDEX-HIGH: 153 ML/M2
END DIASTOLIC INDEX-LOW: 93 ML/M2
END SYSTOLIC INDEX-HIGH: 71 ML/M2
END SYSTOLIC INDEX-LOW: 31 ML/M2
NUC REST DIASTOLIC VOLUME INDEX: 143
NUC REST EJECTION FRACTION: 45
NUC REST SYSTOLIC VOLUME INDEX: 78
NUC STRESS DIASTOLIC VOLUME INDEX: 149
NUC STRESS EJECTION FRACTION: 48 %
NUC STRESS SYSTOLIC VOLUME INDEX: 78
OHS CV CPX 85 PERCENT MAX PREDICTED HEART RATE MALE: 119
OHS CV CPX MAX PREDICTED HEART RATE: 140
OHS CV CPX PATIENT IS FEMALE: 0
OHS CV CPX PATIENT IS MALE: 1
OHS CV CPX PEAK DIASTOLIC BLOOD PRESSURE: 51 MMHG
OHS CV CPX PEAK HEAR RATE: 67 BPM
OHS CV CPX PEAK RATE PRESSURE PRODUCT: 8643
OHS CV CPX PEAK SYSTOLIC BLOOD PRESSURE: 129 MMHG
OHS CV CPX PERCENT MAX PREDICTED HEART RATE ACHIEVED: 48
OHS CV CPX RATE PRESSURE PRODUCT PRESENTING: NORMAL
OHS CV INITIAL DOSE: 27.8 MCG/KG/MIN
OHS CV NO ISCHEMIA MILDLY REDUCED FLOW CAPACTY: 40 %
OHS CV NO ISCHEMIA MINIMALLY REDUCED FLOW CAPACITY: 55 %
OHS CV NORMAL FLOW CAPACITY COMPARABLE TO HEALTHY YOUNG VOLUNTEERS: 5 %
OHS CV PEAK DOSE: 27.7 MCG/KG/MIN
OHS CV PET ID: 9253
OHS CV TOTAL EXAM DLP: 406.69 MGY-CM
REST FLOW - ANTERIOR: 0.64 CC/MIN/G
REST FLOW - INFERIOR: 0.61 CC/MIN/G
REST FLOW - LATERAL: 0.72 CC/MIN/G
REST FLOW - MAX: 0.98 CC/MIN/G
REST FLOW - MIN: 0.24 CC/MIN/G
REST FLOW - SEPTAL: 0.45 CC/MIN/G
REST FLOW - WHOLE HEART: 0.61 CC/MIN/G
RETIRED EF AND QEF - SEE NOTES: 53 %
STRESS FLOW - ANTERIOR: 1.57 CC/MIN/G
STRESS FLOW - INFERIOR: 1.43 CC/MIN/G
STRESS FLOW - LATERAL: 1.76 CC/MIN/G
STRESS FLOW - MAX: 2.1 CC/MIN/G
STRESS FLOW - MIN: 0.37 CC/MIN/G
STRESS FLOW - SEPTAL: 1.09 CC/MIN/G
STRESS FLOW - WHOLE HEART: 1.46 CC/MIN/G
SYSTOLIC BLOOD PRESSURE: 163 MMHG

## 2025-07-08 PROCEDURE — 78434 AQMBF PET REST & RX STRESS: CPT | Mod: HCNC

## 2025-07-08 PROCEDURE — 78431 MYOCRD IMG PET RST&STRS CT: CPT | Mod: 26,HCNC,, | Performed by: INTERNAL MEDICINE

## 2025-07-08 PROCEDURE — 78434 AQMBF PET REST & RX STRESS: CPT | Mod: 26,HCNC,, | Performed by: INTERNAL MEDICINE

## 2025-07-08 PROCEDURE — A9555 RB82 RUBIDIUM: HCPCS | Mod: HCNC

## 2025-07-08 PROCEDURE — 93016 CV STRESS TEST SUPVJ ONLY: CPT | Mod: HCNC,,, | Performed by: INTERNAL MEDICINE

## 2025-07-08 PROCEDURE — 63600175 PHARM REV CODE 636 W HCPCS: Mod: HCNC

## 2025-07-08 PROCEDURE — 93018 CV STRESS TEST I&R ONLY: CPT | Mod: HCNC,,, | Performed by: INTERNAL MEDICINE

## 2025-07-08 RX ORDER — AMINOPHYLLINE 25 MG/ML
75 INJECTION, SOLUTION INTRAVENOUS ONCE
Status: COMPLETED | OUTPATIENT
Start: 2025-07-08 | End: 2025-07-08

## 2025-07-08 RX ORDER — REGADENOSON 0.08 MG/ML
0.4 INJECTION, SOLUTION INTRAVENOUS
Status: COMPLETED | OUTPATIENT
Start: 2025-07-08 | End: 2025-07-08

## 2025-07-08 RX ADMIN — REGADENOSON 0.4 MG: 0.08 INJECTION, SOLUTION INTRAVENOUS at 09:07

## 2025-07-08 RX ADMIN — RUBIDIUM CHLORIDE RB-82 27.7 MILLICURIE: 150 INJECTION, SOLUTION INTRAVENOUS at 09:07

## 2025-07-08 RX ADMIN — RUBIDIUM CHLORIDE RB-82 27.8 MILLICURIE: 150 INJECTION, SOLUTION INTRAVENOUS at 09:07

## 2025-07-08 RX ADMIN — AMINOPHYLLINE 75 MG: 25 INJECTION, SOLUTION INTRAVENOUS at 09:07

## 2025-07-08 NOTE — TELEPHONE ENCOUNTER
Spoke with patient. PET stress with non-obstructive CAD. He remains asymptomatic.     He was previously on Jardiance and doesn't recall having any issues with it; was apparently stopped due to no e/o CHF. Will add this back and check BMP in 2 weeks (some delay with Yaneli).     Will have him come back sooner than 12 months given this new finding. Discussed symptoms to look out for in the meantime. He verbalized understanding and appreciated the call.

## 2025-07-11 RX ORDER — FLUTICASONE PROPIONATE 50 MCG
SPRAY, SUSPENSION (ML) NASAL
Qty: 32 G | Refills: 3 | Status: SHIPPED | OUTPATIENT
Start: 2025-07-11

## 2025-07-11 NOTE — TELEPHONE ENCOUNTER
Refill Decision Note   Tito Menard  is requesting a refill authorization.  Brief Assessment and Rationale for Refill:  Approve     Medication Therapy Plan:        Pharmacist review requested: Yes   Comments:     Note composed:1:25 PM 07/11/2025

## 2025-07-11 NOTE — TELEPHONE ENCOUNTER
Refill Routing Note   Medication(s) are not appropriate for processing by [facility] for the following reason(s):        Drug-disease interaction    [facility] action(s):  Defer      Medication Therapy Plan: Drug-Disease: fluticasone propionate and Bacteremia; Epididymo-orchitis    Pharmacist review requested: Yes     Appointments  past 12m or future 3m with PCP    Date Provider   Last Visit   7/3/2025 Amanda Brown MD   Next Visit   9/17/2025 Amanda Brown MD   ED visits in past 90 days: 0        Note composed:12:23 PM 07/11/2025

## 2025-07-11 NOTE — TELEPHONE ENCOUNTER
No care due was identified.  [facility] Embedded Care Due Messages. Reference number: [id number].   7/11/2025 2:18:16 AM CDT

## 2025-08-08 DIAGNOSIS — N40.1 BENIGN PROSTATIC HYPERPLASIA WITH URINARY OBSTRUCTION: ICD-10-CM

## 2025-08-08 DIAGNOSIS — N13.8 BENIGN PROSTATIC HYPERPLASIA WITH URINARY OBSTRUCTION: ICD-10-CM

## 2025-08-12 RX ORDER — FINASTERIDE 5 MG/1
5 TABLET, FILM COATED ORAL
Qty: 90 TABLET | Refills: 0 | Status: SHIPPED | OUTPATIENT
Start: 2025-08-12

## 2025-08-21 ENCOUNTER — TELEPHONE (OUTPATIENT)
Dept: CARDIOLOGY | Facility: CLINIC | Age: 80
End: 2025-08-21
Payer: MEDICARE

## 2025-08-26 ENCOUNTER — PATIENT MESSAGE (OUTPATIENT)
Dept: CARDIOLOGY | Facility: CLINIC | Age: 80
End: 2025-08-26
Payer: MEDICARE

## 2025-08-28 ENCOUNTER — LAB VISIT (OUTPATIENT)
Dept: LAB | Facility: HOSPITAL | Age: 80
End: 2025-08-28
Payer: MEDICARE

## 2025-08-28 DIAGNOSIS — I50.22 HEART FAILURE WITH MILDLY REDUCED EJECTION FRACTION (HFMREF): ICD-10-CM

## 2025-08-28 LAB
ANION GAP (OHS): 8 MMOL/L (ref 8–16)
BUN SERPL-MCNC: 13 MG/DL (ref 8–23)
CALCIUM SERPL-MCNC: 9.2 MG/DL (ref 8.7–10.5)
CHLORIDE SERPL-SCNC: 108 MMOL/L (ref 95–110)
CO2 SERPL-SCNC: 25 MMOL/L (ref 23–29)
CREAT SERPL-MCNC: 0.9 MG/DL (ref 0.5–1.4)
GFR SERPLBLD CREATININE-BSD FMLA CKD-EPI: >60 ML/MIN/1.73/M2
GLUCOSE SERPL-MCNC: 117 MG/DL (ref 70–110)
POTASSIUM SERPL-SCNC: 3.7 MMOL/L (ref 3.5–5.1)
SODIUM SERPL-SCNC: 141 MMOL/L (ref 136–145)

## 2025-08-28 PROCEDURE — 36415 COLL VENOUS BLD VENIPUNCTURE: CPT | Mod: HCNC

## 2025-08-28 PROCEDURE — 82310 ASSAY OF CALCIUM: CPT | Mod: HCNC

## 2025-08-29 ENCOUNTER — RESULTS FOLLOW-UP (OUTPATIENT)
Dept: CARDIOLOGY | Facility: CLINIC | Age: 80
End: 2025-08-29
Payer: MEDICARE

## (undated) DEVICE — SUSPENSORY X-LARGE

## (undated) DEVICE — SUT 2/0 30IN SILK BLK BRAI

## (undated) DEVICE — TRAY MINOR GEN SURG OMC

## (undated) DEVICE — SUT 0 18IN SILK BLK BRAIDE

## (undated) DEVICE — ADHESIVE MASTISOL VIAL 48/BX

## (undated) DEVICE — CLIPPER BLADE MOD 4406 (CAREF)

## (undated) DEVICE — SUT K835H SILK 1-0

## (undated) DEVICE — GAUZE DERMACEA 3 PLY 6IN 4YD

## (undated) DEVICE — ELECTRODE REM PLYHSV RETURN 9

## (undated) DEVICE — DRAPE LAP T SHT W/ INSTR PAD

## (undated) DEVICE — SUT MCRYL PLUS 4-0 PS2 27IN

## (undated) DEVICE — DRESSING TELFA STRL 4X3 LF